# Patient Record
Sex: MALE | URBAN - METROPOLITAN AREA
[De-identification: names, ages, dates, MRNs, and addresses within clinical notes are randomized per-mention and may not be internally consistent; named-entity substitution may affect disease eponyms.]

---

## 2016-03-24 RX ORDER — DOLUTEGRAVIR SODIUM 25 MG/1
2 TABLET, FILM COATED ORAL
Qty: 0 | Refills: 0 | COMMUNITY
Start: 2016-03-24

## 2016-03-24 RX ORDER — LABETALOL HCL 100 MG
2 TABLET ORAL
Qty: 90 | Refills: 1 | COMMUNITY
Start: 2016-03-24 | End: 2016-05-22

## 2017-07-19 ENCOUNTER — INPATIENT (INPATIENT)
Facility: HOSPITAL | Age: 27
LOS: 3 days | Discharge: HOME HEALTH | DRG: 100 | End: 2017-07-23
Attending: HOSPITALIST | Admitting: HOSPITALIST
Payer: COMMERCIAL

## 2017-07-19 VITALS
SYSTOLIC BLOOD PRESSURE: 154 MMHG | TEMPERATURE: 99 F | HEIGHT: 69 IN | WEIGHT: 134.48 LBS | RESPIRATION RATE: 16 BRPM | DIASTOLIC BLOOD PRESSURE: 100 MMHG | OXYGEN SATURATION: 100 % | HEART RATE: 104 BPM

## 2017-07-19 DIAGNOSIS — I77.0 ARTERIOVENOUS FISTULA, ACQUIRED: Chronic | ICD-10-CM

## 2017-07-19 DIAGNOSIS — G40.909 EPILEPSY, UNSPECIFIED, NOT INTRACTABLE, WITHOUT STATUS EPILEPTICUS: ICD-10-CM

## 2017-07-19 LAB
ALBUMIN SERPL ELPH-MCNC: 4.2 G/DL — SIGNIFICANT CHANGE UP (ref 3.3–5.2)
ALP SERPL-CCNC: 625 U/L — HIGH (ref 40–120)
ALT FLD-CCNC: 11 U/L — SIGNIFICANT CHANGE UP
ANION GAP SERPL CALC-SCNC: 20 MMOL/L — HIGH (ref 5–17)
ANION GAP SERPL CALC-SCNC: 30 MMOL/L — HIGH (ref 5–17)
APTT BLD: 31.7 SEC — SIGNIFICANT CHANGE UP (ref 27.5–37.4)
AST SERPL-CCNC: 39 U/L — SIGNIFICANT CHANGE UP
BASOPHILS # BLD AUTO: 0 K/UL — SIGNIFICANT CHANGE UP (ref 0–0.2)
BASOPHILS NFR BLD AUTO: 0.2 % — SIGNIFICANT CHANGE UP (ref 0–2)
BILIRUB SERPL-MCNC: 0.5 MG/DL — SIGNIFICANT CHANGE UP (ref 0.4–2)
BLD GP AB SCN SERPL QL: SIGNIFICANT CHANGE UP
BUN SERPL-MCNC: 14 MG/DL — SIGNIFICANT CHANGE UP (ref 8–20)
BUN SERPL-MCNC: 16 MG/DL — SIGNIFICANT CHANGE UP (ref 8–20)
CALCIUM SERPL-MCNC: 8.5 MG/DL — LOW (ref 8.6–10.2)
CALCIUM SERPL-MCNC: 9 MG/DL — SIGNIFICANT CHANGE UP (ref 8.6–10.2)
CHLORIDE SERPL-SCNC: 93 MMOL/L — LOW (ref 98–107)
CHLORIDE SERPL-SCNC: 94 MMOL/L — LOW (ref 98–107)
CK MB CFR SERPL CALC: 2.6 NG/ML — SIGNIFICANT CHANGE UP (ref 0–6.7)
CK SERPL-CCNC: 555 U/L — HIGH (ref 30–200)
CO2 SERPL-SCNC: 17 MMOL/L — LOW (ref 22–29)
CO2 SERPL-SCNC: 25 MMOL/L — SIGNIFICANT CHANGE UP (ref 22–29)
CREAT SERPL-MCNC: 5.82 MG/DL — HIGH (ref 0.5–1.3)
CREAT SERPL-MCNC: 6.64 MG/DL — HIGH (ref 0.5–1.3)
EOSINOPHIL # BLD AUTO: 0 K/UL — SIGNIFICANT CHANGE UP (ref 0–0.5)
EOSINOPHIL NFR BLD AUTO: 0.3 % — SIGNIFICANT CHANGE UP (ref 0–5)
GLUCOSE SERPL-MCNC: 119 MG/DL — HIGH (ref 70–115)
GLUCOSE SERPL-MCNC: 124 MG/DL — HIGH (ref 70–115)
HCT VFR BLD CALC: 29.9 % — LOW (ref 42–52)
HGB BLD-MCNC: 9.3 G/DL — LOW (ref 14–18)
INR BLD: 1.03 RATIO — SIGNIFICANT CHANGE UP (ref 0.88–1.16)
LYMPHOCYTES # BLD AUTO: 1.7 K/UL — SIGNIFICANT CHANGE UP (ref 1–4.8)
LYMPHOCYTES # BLD AUTO: 25.5 % — SIGNIFICANT CHANGE UP (ref 20–55)
MCHC RBC-ENTMCNC: 28.6 PG — SIGNIFICANT CHANGE UP (ref 27–31)
MCHC RBC-ENTMCNC: 31.1 G/DL — LOW (ref 32–36)
MCV RBC AUTO: 92 FL — SIGNIFICANT CHANGE UP (ref 80–94)
MONOCYTES # BLD AUTO: 0.6 K/UL — SIGNIFICANT CHANGE UP (ref 0–0.8)
MONOCYTES NFR BLD AUTO: 8.7 % — SIGNIFICANT CHANGE UP (ref 3–10)
NEUTROPHILS # BLD AUTO: 4.2 K/UL — SIGNIFICANT CHANGE UP (ref 1.8–8)
NEUTROPHILS NFR BLD AUTO: 65 % — SIGNIFICANT CHANGE UP (ref 37–73)
PLATELET # BLD AUTO: 177 K/UL — SIGNIFICANT CHANGE UP (ref 150–400)
POTASSIUM SERPL-MCNC: 4.1 MMOL/L — SIGNIFICANT CHANGE UP (ref 3.5–5.3)
POTASSIUM SERPL-MCNC: 5.5 MMOL/L — HIGH (ref 3.5–5.3)
POTASSIUM SERPL-SCNC: 4.1 MMOL/L — SIGNIFICANT CHANGE UP (ref 3.5–5.3)
POTASSIUM SERPL-SCNC: 5.5 MMOL/L — HIGH (ref 3.5–5.3)
PROT SERPL-MCNC: 7.3 G/DL — SIGNIFICANT CHANGE UP (ref 6.6–8.7)
PROTHROM AB SERPL-ACNC: 11.3 SEC — SIGNIFICANT CHANGE UP (ref 9.8–12.7)
RBC # BLD: 3.25 M/UL — LOW (ref 4.6–6.2)
RBC # FLD: 17.2 % — HIGH (ref 11–15.6)
SODIUM SERPL-SCNC: 139 MMOL/L — SIGNIFICANT CHANGE UP (ref 135–145)
SODIUM SERPL-SCNC: 140 MMOL/L — SIGNIFICANT CHANGE UP (ref 135–145)
WBC # BLD: 6.5 K/UL — SIGNIFICANT CHANGE UP (ref 4.8–10.8)
WBC # FLD AUTO: 6.5 K/UL — SIGNIFICANT CHANGE UP (ref 4.8–10.8)

## 2017-07-19 PROCEDURE — 99291 CRITICAL CARE FIRST HOUR: CPT

## 2017-07-19 PROCEDURE — 93010 ELECTROCARDIOGRAM REPORT: CPT

## 2017-07-19 PROCEDURE — 73080 X-RAY EXAM OF ELBOW: CPT | Mod: 26,RT

## 2017-07-19 RX ORDER — LISINOPRIL 2.5 MG/1
20 TABLET ORAL DAILY
Qty: 0 | Refills: 0 | Status: DISCONTINUED | OUTPATIENT
Start: 2017-07-19 | End: 2017-07-23

## 2017-07-19 RX ORDER — GABAPENTIN 400 MG/1
100 CAPSULE ORAL AT BEDTIME
Qty: 0 | Refills: 0 | Status: DISCONTINUED | OUTPATIENT
Start: 2017-07-19 | End: 2017-07-23

## 2017-07-19 RX ORDER — LABETALOL HCL 100 MG
100 TABLET ORAL
Qty: 0 | Refills: 0 | Status: DISCONTINUED | OUTPATIENT
Start: 2017-07-19 | End: 2017-07-23

## 2017-07-19 RX ORDER — ACETAMINOPHEN 500 MG
650 TABLET ORAL EVERY 6 HOURS
Qty: 0 | Refills: 0 | Status: DISCONTINUED | OUTPATIENT
Start: 2017-07-19 | End: 2017-07-23

## 2017-07-19 RX ORDER — LEVETIRACETAM 250 MG/1
500 TABLET, FILM COATED ORAL
Qty: 0 | Refills: 0 | Status: DISCONTINUED | OUTPATIENT
Start: 2017-07-19 | End: 2017-07-20

## 2017-07-19 RX ORDER — LEVETIRACETAM 250 MG/1
500 TABLET, FILM COATED ORAL ONCE
Qty: 0 | Refills: 0 | Status: COMPLETED | OUTPATIENT
Start: 2017-07-19 | End: 2017-07-19

## 2017-07-19 RX ORDER — MORPHINE SULFATE 50 MG/1
2 CAPSULE, EXTENDED RELEASE ORAL ONCE
Qty: 0 | Refills: 0 | Status: DISCONTINUED | OUTPATIENT
Start: 2017-07-19 | End: 2017-07-19

## 2017-07-19 RX ORDER — AMLODIPINE BESYLATE 2.5 MG/1
10 TABLET ORAL DAILY
Qty: 0 | Refills: 0 | Status: DISCONTINUED | OUTPATIENT
Start: 2017-07-19 | End: 2017-07-23

## 2017-07-19 RX ORDER — MAGNESIUM SULFATE 500 MG/ML
2 VIAL (ML) INJECTION ONCE
Qty: 0 | Refills: 0 | Status: COMPLETED | OUTPATIENT
Start: 2017-07-19 | End: 2017-07-19

## 2017-07-19 RX ADMIN — Medication 100 MILLIGRAM(S): at 22:48

## 2017-07-19 RX ADMIN — Medication 2 MILLIGRAM(S): at 13:30

## 2017-07-19 RX ADMIN — Medication 2 MILLIGRAM(S): at 13:27

## 2017-07-19 RX ADMIN — Medication 50 GRAM(S): at 19:17

## 2017-07-19 RX ADMIN — LEVETIRACETAM 420 MILLIGRAM(S): 250 TABLET, FILM COATED ORAL at 13:36

## 2017-07-19 NOTE — ED PROVIDER NOTE - CARE PLAN
Principal Discharge DX:	Seizure disorder Principal Discharge DX:	Seizure disorder  Secondary Diagnosis:	ESRD (end stage renal disease) on dialysis  Secondary Diagnosis:	QT prolongation

## 2017-07-19 NOTE — ED PROVIDER NOTE - OBJECTIVE STATEMENT
26 year-old male pmh ESRD on HD (M,W,F), seizure-disorder, congenital HIV, cardiomyopathy, HTN, anxiety, chronic back pain, sent to ED from dialysis center after seizure-like activity. He had just finished his dialysis session and was standing up, when he reports that he felt light-headed and fell to the ground. He does not remember hitting the ground, but states that when he awoke, both knees were hurting. He was observed shaking, but had no loss of bladder or bowel control.. denies fever. denies HA or neck pain. no chest pain or sob. no abd pain. no n/v/d. no urinary f/u/d. no back pain. no motor or sensory deficits. no rash. no other acute issues symptoms or concerns

## 2017-07-19 NOTE — ED PROVIDER NOTE - CRITICAL CARE PROVIDED
documentation/consultation with other physicians/direct patient care (not related to procedure)/additional history taking/interpretation of diagnostic studies

## 2017-07-19 NOTE — ED PROVIDER NOTE - MEDICAL DECISION MAKING DETAILS
high risk dseizure question med complaince did have wirtnessed tonic clonic seizure at bedside responded to iv ativan will admit for im and nueor management need for multiple bedside reassessments 2/2 seizure and iv anti sz medications pt agrees to plan of care

## 2017-07-19 NOTE — ED PROVIDER NOTE - PHYSICAL EXAMINATION
neuro: CN II - XII intact, EOMI, PERRL, no papilledema, 5/5 muscle strength x 4 extremities, no sensory deficits, 2+ dtr globally, negative babinski, no ataxic gait, normal JIN and FNT, normal romberg

## 2017-07-19 NOTE — ED ADULT NURSE NOTE - OBJECTIVE STATEMENT
pt received sitting up in stretcher yelling for pain meds, biba from dialysis after treatment ( 5 minutes early due to seizure activity per pt ) + demanding and agitated at times, a+ox3,  states that he cant move any of his extemities and feels paralized, " I cant lift myself, I cant pull down my pants I need help,  ( pt noted to be moving all over his stretcher turning left and right side at times. states he doesn't feel good and needs more pain medsr eports he took all his meds this morning( poor historian, prior hx of noncompliance with follow up care)

## 2017-07-19 NOTE — ED ADULT NURSE NOTE - PMH
Anxiety    Cardiomyopathy    Depression    HIV disease  born HIV+  HTN (hypertension)    Pericarditis    Renal failure (ARF), acute on chronic  dialysis av fistula, RUE  Seizure

## 2017-07-20 ENCOUNTER — TRANSCRIPTION ENCOUNTER (OUTPATIENT)
Age: 27
End: 2017-07-20

## 2017-07-20 DIAGNOSIS — I42.9 CARDIOMYOPATHY, UNSPECIFIED: ICD-10-CM

## 2017-07-20 DIAGNOSIS — R94.31 ABNORMAL ELECTROCARDIOGRAM [ECG] [EKG]: ICD-10-CM

## 2017-07-20 DIAGNOSIS — B20 HUMAN IMMUNODEFICIENCY VIRUS [HIV] DISEASE: ICD-10-CM

## 2017-07-20 DIAGNOSIS — R56.9 UNSPECIFIED CONVULSIONS: ICD-10-CM

## 2017-07-20 DIAGNOSIS — I10 ESSENTIAL (PRIMARY) HYPERTENSION: ICD-10-CM

## 2017-07-20 DIAGNOSIS — N18.6 END STAGE RENAL DISEASE: ICD-10-CM

## 2017-07-20 DIAGNOSIS — Z29.9 ENCOUNTER FOR PROPHYLACTIC MEASURES, UNSPECIFIED: ICD-10-CM

## 2017-07-20 DIAGNOSIS — F41.9 ANXIETY DISORDER, UNSPECIFIED: ICD-10-CM

## 2017-07-20 DIAGNOSIS — G40.909 EPILEPSY, UNSPECIFIED, NOT INTRACTABLE, WITHOUT STATUS EPILEPTICUS: ICD-10-CM

## 2017-07-20 DIAGNOSIS — G89.29 OTHER CHRONIC PAIN: ICD-10-CM

## 2017-07-20 LAB
ABO RH CONFIRMATION: SIGNIFICANT CHANGE UP
ANION GAP SERPL CALC-SCNC: 18 MMOL/L — HIGH (ref 5–17)
BUN SERPL-MCNC: 25 MG/DL — HIGH (ref 8–20)
CALCIUM SERPL-MCNC: 8.1 MG/DL — LOW (ref 8.6–10.2)
CHLORIDE SERPL-SCNC: 91 MMOL/L — LOW (ref 98–107)
CO2 SERPL-SCNC: 24 MMOL/L — SIGNIFICANT CHANGE UP (ref 22–29)
CREAT SERPL-MCNC: 9.67 MG/DL — HIGH (ref 0.5–1.3)
GLUCOSE SERPL-MCNC: 105 MG/DL — SIGNIFICANT CHANGE UP (ref 70–115)
POTASSIUM SERPL-MCNC: 4.8 MMOL/L — SIGNIFICANT CHANGE UP (ref 3.5–5.3)
POTASSIUM SERPL-SCNC: 4.8 MMOL/L — SIGNIFICANT CHANGE UP (ref 3.5–5.3)
SODIUM SERPL-SCNC: 133 MMOL/L — LOW (ref 135–145)

## 2017-07-20 PROCEDURE — 70450 CT HEAD/BRAIN W/O DYE: CPT | Mod: 26

## 2017-07-20 RX ORDER — SEVELAMER CARBONATE 2400 MG/1
1 POWDER, FOR SUSPENSION ORAL
Qty: 90 | Refills: 0 | OUTPATIENT
Start: 2017-07-20 | End: 2017-08-19

## 2017-07-20 RX ORDER — DARUNAVIR 75 MG/1
1 TABLET, FILM COATED ORAL
Qty: 0 | Refills: 0 | COMMUNITY

## 2017-07-20 RX ORDER — SEVELAMER CARBONATE 2400 MG/1
800 POWDER, FOR SUSPENSION ORAL
Qty: 0 | Refills: 0 | Status: DISCONTINUED | OUTPATIENT
Start: 2017-07-20 | End: 2017-07-23

## 2017-07-20 RX ORDER — LABETALOL HCL 100 MG
1 TABLET ORAL
Qty: 60 | Refills: 0 | OUTPATIENT
Start: 2017-07-20 | End: 2017-08-04

## 2017-07-20 RX ORDER — GABAPENTIN 400 MG/1
1 CAPSULE ORAL
Qty: 30 | Refills: 0 | OUTPATIENT
Start: 2017-07-20 | End: 2017-08-19

## 2017-07-20 RX ORDER — CALCIUM ACETATE 667 MG
3 TABLET ORAL
Qty: 90 | Refills: 0 | OUTPATIENT
Start: 2017-07-20 | End: 2017-07-30

## 2017-07-20 RX ORDER — PANTOPRAZOLE SODIUM 20 MG/1
40 TABLET, DELAYED RELEASE ORAL
Qty: 0 | Refills: 0 | Status: DISCONTINUED | OUTPATIENT
Start: 2017-07-20 | End: 2017-07-23

## 2017-07-20 RX ORDER — ALPRAZOLAM 0.25 MG
2 TABLET ORAL
Qty: 0 | Refills: 0 | Status: DISCONTINUED | OUTPATIENT
Start: 2017-07-20 | End: 2017-07-23

## 2017-07-20 RX ORDER — DOLUTEGRAVIR SODIUM 25 MG/1
1 TABLET, FILM COATED ORAL
Qty: 30 | Refills: 0 | OUTPATIENT
Start: 2017-07-20 | End: 2017-08-19

## 2017-07-20 RX ORDER — LEVETIRACETAM 250 MG/1
750 TABLET, FILM COATED ORAL
Qty: 0 | Refills: 0 | Status: DISCONTINUED | OUTPATIENT
Start: 2017-07-20 | End: 2017-07-23

## 2017-07-20 RX ORDER — OXYCODONE AND ACETAMINOPHEN 5; 325 MG/1; MG/1
2 TABLET ORAL EVERY 6 HOURS
Qty: 0 | Refills: 0 | Status: DISCONTINUED | OUTPATIENT
Start: 2017-07-20 | End: 2017-07-21

## 2017-07-20 RX ORDER — LEVETIRACETAM 250 MG/1
1 TABLET, FILM COATED ORAL
Qty: 60 | Refills: 0 | OUTPATIENT
Start: 2017-07-20 | End: 2017-08-19

## 2017-07-20 RX ORDER — EMTRICITABINE 200 MG/1
200 CAPSULE ORAL
Qty: 0 | Refills: 0 | Status: DISCONTINUED | OUTPATIENT
Start: 2017-07-20 | End: 2017-07-22

## 2017-07-20 RX ORDER — GABAPENTIN 400 MG/1
1 CAPSULE ORAL
Qty: 0 | Refills: 0 | COMMUNITY

## 2017-07-20 RX ORDER — LABETALOL HCL 100 MG
1 TABLET ORAL
Qty: 0 | Refills: 0 | COMMUNITY

## 2017-07-20 RX ORDER — DOLUTEGRAVIR SODIUM 25 MG/1
1 TABLET, FILM COATED ORAL
Qty: 0 | Refills: 0 | COMMUNITY

## 2017-07-20 RX ORDER — DARUNAVIR 75 MG/1
800 TABLET, FILM COATED ORAL DAILY
Qty: 0 | Refills: 0 | Status: DISCONTINUED | OUTPATIENT
Start: 2017-07-20 | End: 2017-07-23

## 2017-07-20 RX ORDER — AMLODIPINE BESYLATE 2.5 MG/1
1 TABLET ORAL
Qty: 0 | Refills: 0 | COMMUNITY

## 2017-07-20 RX ORDER — DARUNAVIR 75 MG/1
1 TABLET, FILM COATED ORAL
Qty: 30 | Refills: 0 | OUTPATIENT
Start: 2017-07-20 | End: 2017-08-19

## 2017-07-20 RX ORDER — DOLUTEGRAVIR SODIUM 25 MG/1
2 TABLET, FILM COATED ORAL
Qty: 60 | Refills: 0 | OUTPATIENT
Start: 2017-07-20 | End: 2017-08-19

## 2017-07-20 RX ORDER — CALCIUM ACETATE 667 MG
3 TABLET ORAL
Qty: 0 | Refills: 0 | COMMUNITY

## 2017-07-20 RX ORDER — PANTOPRAZOLE SODIUM 20 MG/1
1 TABLET, DELAYED RELEASE ORAL
Qty: 30 | Refills: 0 | OUTPATIENT
Start: 2017-07-20 | End: 2017-08-19

## 2017-07-20 RX ORDER — RITONAVIR 100 MG/1
1 TABLET, FILM COATED ORAL
Qty: 0 | Refills: 0 | COMMUNITY

## 2017-07-20 RX ORDER — LISINOPRIL 2.5 MG/1
1 TABLET ORAL
Qty: 15 | Refills: 0 | OUTPATIENT
Start: 2017-07-20 | End: 2017-08-04

## 2017-07-20 RX ORDER — CALCIUM ACETATE 667 MG
2001 TABLET ORAL
Qty: 0 | Refills: 0 | Status: DISCONTINUED | OUTPATIENT
Start: 2017-07-20 | End: 2017-07-23

## 2017-07-20 RX ORDER — EMTRICITABINE 200 MG/1
200 CAPSULE ORAL DAILY
Qty: 0 | Refills: 0 | Status: DISCONTINUED | OUTPATIENT
Start: 2017-07-20 | End: 2017-07-20

## 2017-07-20 RX ORDER — RITONAVIR 100 MG/1
1 TABLET, FILM COATED ORAL
Qty: 30 | Refills: 0 | OUTPATIENT
Start: 2017-07-20 | End: 2017-08-19

## 2017-07-20 RX ORDER — AMLODIPINE BESYLATE 2.5 MG/1
1 TABLET ORAL
Qty: 30 | Refills: 0 | OUTPATIENT
Start: 2017-07-20 | End: 2017-08-19

## 2017-07-20 RX ORDER — SEVELAMER CARBONATE 2400 MG/1
1 POWDER, FOR SUSPENSION ORAL
Qty: 0 | Refills: 0 | COMMUNITY

## 2017-07-20 RX ADMIN — OXYCODONE AND ACETAMINOPHEN 2 TABLET(S): 5; 325 TABLET ORAL at 09:00

## 2017-07-20 RX ADMIN — Medication 100 MILLIGRAM(S): at 05:18

## 2017-07-20 RX ADMIN — Medication 650 MILLIGRAM(S): at 06:21

## 2017-07-20 RX ADMIN — Medication 2001 MILLIGRAM(S): at 08:39

## 2017-07-20 RX ADMIN — Medication 0.5 MILLIGRAM(S): at 13:45

## 2017-07-20 RX ADMIN — Medication 2 MILLIGRAM(S): at 08:39

## 2017-07-20 RX ADMIN — OXYCODONE AND ACETAMINOPHEN 2 TABLET(S): 5; 325 TABLET ORAL at 08:39

## 2017-07-20 RX ADMIN — OXYCODONE AND ACETAMINOPHEN 2 TABLET(S): 5; 325 TABLET ORAL at 02:17

## 2017-07-20 RX ADMIN — Medication 2 MILLIGRAM(S): at 02:17

## 2017-07-20 RX ADMIN — SEVELAMER CARBONATE 800 MILLIGRAM(S): 2400 POWDER, FOR SUSPENSION ORAL at 08:39

## 2017-07-20 RX ADMIN — OXYCODONE AND ACETAMINOPHEN 2 TABLET(S): 5; 325 TABLET ORAL at 22:07

## 2017-07-20 RX ADMIN — LEVETIRACETAM 500 MILLIGRAM(S): 250 TABLET, FILM COATED ORAL at 05:19

## 2017-07-20 RX ADMIN — LEVETIRACETAM 750 MILLIGRAM(S): 250 TABLET, FILM COATED ORAL at 18:18

## 2017-07-20 RX ADMIN — OXYCODONE AND ACETAMINOPHEN 2 TABLET(S): 5; 325 TABLET ORAL at 15:36

## 2017-07-20 RX ADMIN — LISINOPRIL 20 MILLIGRAM(S): 2.5 TABLET ORAL at 05:17

## 2017-07-20 RX ADMIN — AMLODIPINE BESYLATE 10 MILLIGRAM(S): 2.5 TABLET ORAL at 05:17

## 2017-07-20 RX ADMIN — OXYCODONE AND ACETAMINOPHEN 2 TABLET(S): 5; 325 TABLET ORAL at 23:00

## 2017-07-20 RX ADMIN — EMTRICITABINE 200 MILLIGRAM(S): 200 CAPSULE ORAL at 08:46

## 2017-07-20 RX ADMIN — Medication 100 MILLIGRAM(S): at 18:18

## 2017-07-20 RX ADMIN — PANTOPRAZOLE SODIUM 40 MILLIGRAM(S): 20 TABLET, DELAYED RELEASE ORAL at 06:21

## 2017-07-20 RX ADMIN — OXYCODONE AND ACETAMINOPHEN 2 TABLET(S): 5; 325 TABLET ORAL at 16:11

## 2017-07-20 RX ADMIN — OXYCODONE AND ACETAMINOPHEN 2 TABLET(S): 5; 325 TABLET ORAL at 04:53

## 2017-07-20 RX ADMIN — Medication 100 MILLIGRAM(S): at 15:36

## 2017-07-20 RX ADMIN — Medication 650 MILLIGRAM(S): at 07:55

## 2017-07-20 RX ADMIN — Medication 650 MILLIGRAM(S): at 01:18

## 2017-07-20 RX ADMIN — SEVELAMER CARBONATE 800 MILLIGRAM(S): 2400 POWDER, FOR SUSPENSION ORAL at 18:19

## 2017-07-20 RX ADMIN — Medication 0.5 MILLIGRAM(S): at 13:32

## 2017-07-20 RX ADMIN — Medication 650 MILLIGRAM(S): at 00:18

## 2017-07-20 RX ADMIN — GABAPENTIN 100 MILLIGRAM(S): 400 CAPSULE ORAL at 22:07

## 2017-07-20 NOTE — H&P ADULT - NSHPSOCIALHISTORY_GEN_ALL_CORE
Lives at home with mother. Has a visiting nurse who arranges his medications weekly. Denies alcohol, tobacco, or drug use.

## 2017-07-20 NOTE — H&P ADULT - PROBLEM SELECTOR PLAN 1
- admit to medicine resident service under Dr. Bertrand  - telemetry +   - patient received loading dose of IV keppra in ED, before keppra levels could be drawn  - c/w home dose keppra 500 mg PO BID  - TTE

## 2017-07-20 NOTE — DISCHARGE NOTE ADULT - PLAN OF CARE
Stable. Please continue all medications as prescribed, and follow up with your PMD in 1-2 days after discharge.Outpt eegs and old record comparison with Dr. Jiménez as outpatient. Please follow up with neurology (Dr. Jiménez) as outpatient. stable Please continue all medications as prescribed. Follow up with PMD in 1-2 days. Please continue dialysis as directed. Follow up with renal doctor within 1-2 days of discharge and continue and keep dialysis appointments as directed. Continue a renal diet as above. Please continue all medications as prescribed, and follow up with your PMD in 1-2 days . Continue all medications as prescribed, and follow up with PMD in 1-2 days. Continue medications as prescribed. Follow up with your PMD in 1-2 days and get a repeat EKG as outpatient. Please continue all medications as prescribed, and follow up with your PMD in 1-2 days after discharge. Outpt eegs and old record comparison with Dr. Jiménez as outpatient. Please follow up with neurology (Dr. Jiménez) as outpatient.

## 2017-07-20 NOTE — DISCHARGE NOTE ADULT - OTHER SIGNIFICANT FINDINGS
9.3    6.5   )-----------( 177      ( 19 Jul 2017 12:52 )             29.9   07-20    133<L>  |  91<L>  |  25.0<H>  ----------------------------<  105  4.8   |  24.0  |  9.67<H>    Ca    8.1<L>      20 Jul 2017 09:46  Phos  5.2     07-19  Mg     2.7     07-19    TPro  7.3  /  Alb  4.2  /  TBili  0.5  /  DBili  x   /  AST  39  /  ALT  11  /  AlkPhos  625<H>  07-19    < from: CT Head No Cont (07.20.17 @ 00:04) >    Impression:     No acute intracranial hemorrhage, large cortical infarct or mass effect,   given the extent of artifact. Additional findings as described. If   clinically indicated, a short-term follow-up or an MRI may be obtained   for further evaluation.     < end of copied text >

## 2017-07-20 NOTE — DISCHARGE NOTE ADULT - INSTRUCTIONS
Continue a renal diet  Please follow a renal diet, be careful to monitor intake of potassium, sodium, phosphorus and fluids.  Follow up with nephrologist in 1-2 days after discharge and attend appointments as directed.

## 2017-07-20 NOTE — DISCHARGE NOTE ADULT - MEDICATION SUMMARY - MEDICATIONS TO CHANGE
I will SWITCH the dose or number of times a day I take the medications listed below when I get home from the hospital:    levETIRAcetam 500 mg oral tablet  -- 1 tab(s) by mouth 2 times a day    labetalol 100 mg oral tablet  -- 1 tab(s) by mouth 2 times a day    dolutegravir 50 mg oral tablet  -- 2 tab(s) by mouth once a day    labetalol 200 mg oral tablet  -- 2 tab(s) by mouth 2 times a day

## 2017-07-20 NOTE — CHART NOTE - NSCHARTNOTEFT_GEN_A_CORE
Confidential Drug Utilization Report  Search Terms: matt claros, 01/25/1955 Search Date: 07/20/2017 04:33:21 PM  The Drug Utilization Report below displays all of the controlled substance prescriptions, if any, that your patient has filled in the last twelve months. The information displayed on this report is compiled from pharmacy submissions to the Department, and accurately reflects the information as submitted by the pharmacies.    This report was requested by: Polly Bertrand | Reference #: [id number]    Others' Prescriptions  Patient Name:	Matt Claros	YOB: 1955  Address:	[address] [place] [address]	Sex:	Male  Rx Written	Rx Dispensed	Drug	Quantity	Days Supply	Prescriber Name  07/14/2017	07/17/2017	hydrocodone-acetaminophen 5-300 mg tablet	50	16	Ernst Haynes MD  06/23/2017	06/27/2017	hydrocodone-acetaminophen 5-300 mg tablet	50	17	Ernst Haynes MD  06/16/2017	06/17/2017	hydrocodone-acetaminophen 7.5-300 mg tablet	22	7	Ernst Haynes MD  06/02/2017	06/02/2017	hydrocodone-acetaminophen 7.5-300 mg tablet	45	15	Ernst Haynes MD  05/12/2017	05/22/2017	hydrocodone-acetaminophen 5-300 mg tablet	50	14	Ernst Haynes MD  05/05/2017	05/08/2017	hydrocodone-acetaminophen 5-300 mg tablet	25	7	Ernst Haynes MD  04/21/2017	04/24/2017	hydrocodone-acetaminophen 5-300 mg tablet	50	16	Ernst Haynes MD  03/31/2017	04/01/2017	hydrocodone-acetaminophen 5-300 mg tablet	75	25	Ernst Haynes MD  03/17/2017	03/19/2017	hydrocodone-acetaminophen 5-300 mg tablet	50	16	Ernst Haynes MD  03/03/2017	03/04/2017	hydrocodone-acetaminophen 5-300 mg tablet	50	17	Ernst Haynes MD  02/10/2017	02/10/2017	hydrocodone-acetaminophen 5-300 mg tablet	75	25	Ernst Haynes MD  01/20/2017	01/21/2017	hydrocodone-acetaminophen 5-300 mg tablet	50	14	Ernst Haynes MD  01/06/2017	01/10/2017	hydrocodone-acetaminophen 5-300 mg tablet	50	14	Ernst Haynes MD  12/23/2016	12/26/2016	hydrocodone-acetaminophen 5-300 mg tablet	50	16	Ernst Haynes MD  12/09/2016	12/11/2016	hydrocodone-acetaminophen 5-300 mg tablet	50	14	Ernst Haynes MD  11/25/2016	11/28/2016	hydrocodone-acetaminophen 5-300 mg tablet	50	16	Ernst Haynes MD  11/11/2016	11/15/2016	hydrocodone-acetaminophen 5-300 mg tablet	50	16	Ernst Haynes MD  10/15/2016	10/15/2016	hydrocodone-acetaminophen 5-300 mg tablet	50	17	Ernst Haynes MD  09/30/2016	09/30/2016	hydrocodone-acetaminophen 5-300 mg tablet	50	16	Ernst Haynes MD  09/16/2016	09/16/2016	hydrocodone-acetaminophen 5-300 mg tablet	44	14	Ernst Haynes MD  09/09/2016	09/10/2016	hydrocodone-acetaminophen 5-300 mg tablet	22	7	Ernst Haynes MD  08/22/2016	08/30/2016	oxycodone-acetaminophen 7.5-325 mg tablet	28	7	Reshma Lam, MS FNP  08/12/2016	08/13/2016	hydrocodone-acetaminophen 5-300 mg tablet	50	16	Ernst Haynes MD  08/12/2016	08/13/2016	lyrica 100 mg capsule	60	30	Ernst Haynes MD  07/29/2016	07/29/2016	lyrica 75 mg capsule	60	30	Ernst Haynes MD  07/29/2016	07/29/2016	hydrocodone-acetaminophen 5-300 mg tablet	50	16	Ernst Haynes MD  Patient Name:	Matt Claros	YOB: 1955  Address:	[address] [place] [address]	Sex:	Male  Rx Written	Rx Dispensed	Drug	Quantity	Days Supply	Prescriber Name  08/18/2016	08/18/2016	hydrocodone-acetaminophen 7.5-325 tab	20	5	Adry Hylton ( MD)  08/18/2016	08/18/2016	lyrica 100 mg capsule	10	5	Adry Hylton MD)  * - Drugs marked with an asterisk are compound drugs. If the compound drug is made up of more than one controlled substance, then each controlled substance will be a separate row in the

## 2017-07-20 NOTE — H&P ADULT - ASSESSMENT
26 year-old male pmh ESRD on HD (M,W,F), seizure-disorder, congenital HIV, cardiomyopathy, HTN, anxiety, chronic back pain, a/w seizure-like activity concerning for seizure vs cardiogenic syncope.

## 2017-07-20 NOTE — CONSULT NOTE ADULT - PROBLEM SELECTOR RECOMMENDATION 9
Increase Keppra 750mg po q12.  MRI Brain without mey with seizure protocol.  Outpt eegs and old record comparison.  DVT prophylaxis.  Maintain seizure precautions.  D/w pt in detail.  Will follow.

## 2017-07-20 NOTE — DISCHARGE NOTE ADULT - MEDICATION SUMMARY - MEDICATIONS TO TAKE
I will START or STAY ON the medications listed below when I get home from the hospital:    lisinopril 20 mg oral tablet  -- 1 tab(s) by mouth once a day  -- Indication: For Hypertension    gabapentin 100 mg oral capsule  -- 1 cap(s) by mouth once a day (at bedtime)  -- Indication: For Antiseizure     levETIRAcetam 750 mg oral tablet  -- 1 tab(s) by mouth 2 times a day  -- Indication: For Antiseizure     ritonavir 100 mg oral tablet  -- 1 tab(s) by mouth once a day  -- Indication: For HIV disease    Prezista 800 mg oral tablet  -- 1 tab(s) by mouth once a day  -- Indication: For HIV disease    dolutegravir 50 mg oral tablet  -- 1 tab(s) by mouth once a day  -- Indication: For HIV disease    ALPRAZolam 2 mg oral tablet  -- 1 tab(s) by mouth 2 times a day; Dr Damian - [phone]  -- Indication: For Anxiety    labetalol 100 mg oral tablet  -- 1 tab(s) by mouth 4 times a day  -- Indication: For Hypertension    amLODIPine 10 mg oral tablet  -- 1 tab(s) by mouth once a day  -- Indication: For Hypertension    calcium acetate 667 mg oral tablet  -- 3 tab(s) by mouth 3 times a day with meals  -- Indication: For ESRD (end stage renal disease) on dialysis    sevelamer hydrochloride 800 mg oral tablet  -- 1 tab(s) by mouth 3 times a day (with meals)  -- Indication: For ESRD (end stage renal disease) on dialysis    pantoprazole 40 mg oral delayed release tablet  -- 1 tab(s) by mouth once a day (before a meal)  -- Indication: For PPI I will START or STAY ON the medications listed below when I get home from the hospital:    acetaminophen-hydrocodone 325 mg-10 mg oral tablet  -- 2 tab(s) by mouth every 6 hours, As needed, Severe Pain (7 - 10)  -- Indication: For Pain    lisinopril 20 mg oral tablet  -- 1 tab(s) by mouth once a day  -- Indication: For Hypertension    gabapentin 100 mg oral capsule  -- 1 cap(s) by mouth once a day (at bedtime)  -- Indication: For Antiseizure     levETIRAcetam 750 mg oral tablet  -- 1 tab(s) by mouth 2 times a day  -- Indication: For Antiseizure     nystatin 100,000 units/mL oral suspension  -- 5 milliliter(s) by mouth every 6 hours  -- Indication: For Antifungal    Prezista 800 mg oral tablet  -- 1 tab(s) by mouth once a day  -- Indication: For HIV disease    dolutegravir 50 mg oral tablet  -- 1 tab(s) by mouth once a day  -- Indication: For HIV disease    ritonavir 100 mg oral tablet  -- 1 tab(s) by mouth once a day  -- Indication: For HIV disease    ALPRAZolam 2 mg oral tablet  -- 1 tab(s) by mouth 2 times a day; Dr Damian - [phone]  -- Indication: For Anxiety    labetalol 100 mg oral tablet  -- 1 tab(s) by mouth 4 times a day  -- Indication: For Hypertension    amLODIPine 10 mg oral tablet  -- 1 tab(s) by mouth once a day  -- Indication: For Hypertension    docusate sodium 100 mg oral capsule  -- 1 cap(s) by mouth 2 times a day  -- Indication: For Constipation    polyethylene glycol 3350 oral powder for reconstitution  -- 17 gram(s) by mouth once a day, As Needed  -- Indication: For Constipation    senna oral tablet  -- 2 tab(s) by mouth once a day (at bedtime), As needed, Constipation  -- Indication: For Constipation    calcium acetate 667 mg oral tablet  -- 3 tab(s) by mouth 3 times a day with meals  -- Indication: For ESRD (end stage renal disease) on dialysis    sevelamer hydrochloride 800 mg oral tablet  -- 1 tab(s) by mouth 3 times a day (with meals)  -- Indication: For ESRD (end stage renal disease) on dialysis    pantoprazole 40 mg oral delayed release tablet  -- 1 tab(s) by mouth once a day (before a meal)  -- Indication: For PPI

## 2017-07-20 NOTE — DISCHARGE NOTE ADULT - HOSPITAL COURSE
Mr. Savage is a 27 yo M with a history of ESRD  (M,W,F), seizure-disorder, congenital HIV, cardiomyopathy, HTN, anxiety, chronic back pain who was sent to ED from dialysis center after seizure-like activity.   In the ED, he received a loading dose of keppra. Medications were reconciled with pharmacy on the day of admission ([facility]) and patient was continued on all appropriate medications.   X rays of the elbows were performed due to fall which showed diffuse soft tissue swelling but no fractures.   A CT head noncontrast was performed which was unremarkable. MRI of the head was performed which was ________.     For seizures, neurology was consulted (Dr. Jiménez) and dose adjustment was made to keppra.   Nephrology was consulted for ESRD and patient was found to be stable with scheduled dialysis for inpatient if patient were to stay inpatient.   While in house, patient was found to be hypertensive and adjustment was made to his BP medications.   For HIV, patient was continued on outpatient medications (pharmacy was called and most recent dosages of medications were reconciled).   Patient's pain was controlled on medications as appropriate, and he was maintained on seizure protocol and fall risk. Mr. Savage is a 25 yo M with a history of ESRD  (M,W,F), seizure-disorder, congenital HIV, cardiomyopathy, HTN, anxiety, chronic back pain who was sent to ED from dialysis center after seizure-like activity.   In the ED, he received a loading dose of keppra. Medications were reconciled with pharmacy on the day of admission ([facility]) and patient was continued on all appropriate medications.   X rays of the elbows were performed due to fall which showed diffuse soft tissue swelling but no fractures.   A CT head noncontrast was performed which was unremarkable. MRI of the head was performed which showed diffuse calvarial thickening and sclerosis with widening of the diploic space, may be related to hemoglobinopathies, blood dyscrasia or chronic anemias, metabolic disorders, anticonvulsant medications may also contribute calvarial thickening.      For seizures, neurology was consulted (Dr. Jiménez) and dose adjustment was made to keppra.   Nephrology was consulted for ESRD and patient was found to be stable with scheduled dialysis for inpatient if patient were to stay inpatient.   While in house, patient was found to be hypertensive and adjustment was made to his BP medications.   For HIV, patient was continued on outpatient medications (pharmacy was called and most recent dosages of medications were reconciled).   Patient's pain was controlled on medications as appropriate, and he was maintained on seizure protocol and fall risk. Mr. Savage is a 25 yo M with a history of ESRD  (M,W,F), seizure-disorder, congenital HIV, cardiomyopathy, HTN, anxiety, chronic back pain who was sent to ED from dialysis center after seizure-like activity.   In the ED, he received a loading dose of keppra. Medications were reconciled with pharmacy on the day of admission ([facility]) and patient was continued on all appropriate medications.   X rays of the elbows were performed due to fall which showed diffuse soft tissue swelling but no fractures.   A CT head noncontrast was performed which was unremarkable. MRI of the head was performed which showed diffuse calvarial thickening and sclerosis with widening of the diploic space, may be related to hemoglobinopathies, blood dyscrasia or chronic anemias, metabolic disorders, anticonvulsant medications may also contribute calvarial thickening.      For seizures, neurology was consulted (Dr. Jiménez) and dose adjustment was made to keppra.   Nephrology was consulted for ESRD and patient was found to be stable with scheduled dialysis while inpatient.     While in house, patient was found to be hypertensive and adjustment was made to his BP medications.   For HIV, patient was continued on outpatient medications (pharmacy was called and most recent dosages of medications were reconciled).   Patient's pain was controlled on medications as appropriate, and he was maintained on pain medications as tolerated.  Patient was maintained on seizure protocol and fall risk.   Social work and case management helped patient to set up home health care services upon discharge. Mr. Savage is a 25 yo M with a history of ESRD  (M,W,F), seizure-disorder, congenital HIV, cardiomyopathy, HTN, anxiety, chronic back pain who was sent to ED from dialysis center after seizure-like activity.   In the ED, he received a loading dose of keppra. Medications were reconciled with pharmacy on the day of admission ([facility]) and patient was continued on all appropriate medications.   X rays of the elbows were performed due to fall which showed diffuse soft tissue swelling but no fractures.   A CT head noncontrast was performed which was unremarkable. MRI of the head was performed which showed diffuse calvarial thickening and sclerosis with widening of the diploic space, may be related to hemoglobinopathies, blood dyscrasia or chronic anemias, metabolic disorders, anticonvulsant medications may also contribute calvarial thickening.      For seizures, neurology was consulted (Dr. Jiménez) and dose adjustment was made to keppra.   Nephrology was consulted for ESRD and patient was found to be stable with scheduled dialysis while inpatient.     While in house, patient was found to be hypertensive and adjustment was made to his BP medications.   For HIV, patient was continued on outpatient medications (pharmacy was called and most recent dosages of medications were reconciled).   Patient has chronic pain and was on multiple medications as an outpatient. During hospital stay he repeated kept on asking for IV Morphine, complaining of pain and swelling in his arms. Bilateral upper extremity dopplers were negative for DVT or arterial thrombus. His fistula is functional, through which he received HD. His medications we reconciled with [facility].  Social work and case management helped patient to set up home health care services upon discharge.     Discharge time - 45 minutes Mr. Savage is a 25 yo M with a history of ESRD  (M,W,F), seizure-disorder, congenital HIV, cardiomyopathy, HTN, anxiety, chronic back pain who was sent to ED from dialysis center after seizure-like activity.   In the ED, he received a loading dose of keppra. Medications were reconciled with pharmacy on the day of admission ([facility]) and patient was continued on all appropriate medications.   X rays of the elbows were performed due to fall which showed diffuse soft tissue swelling but no fractures.   A CT head noncontrast was performed which was unremarkable. MRI of the head was performed which showed diffuse calvarial thickening and sclerosis with widening of the diploic space, may be related to hemoglobinopathies, blood dyscrasia or chronic anemias, metabolic disorders, anticonvulsant medications may also contribute calvarial thickening.      For seizures, neurology was consulted (Dr. Jiménez) and dose adjustment was made to keppra. EEg was recommended by Neurology as an outpatient.    Nephrology was consulted for ESRD and patient was found to be stable with scheduled dialysis while inpatient.     While in house, patient was found to be hypertensive and adjustment was made to his BP medications.     For HIV, patient was continued on outpatient medications (pharmacy was called and most recent dosages of medications were reconciled).     Patient has chronic pain and was on multiple medications as an outpatient. During hospital stay he repeated kept on asking for IV Morphine, complaining of pain and swelling in his arms. Bilateral upper extremity dopplers were negative for DVT or arterial thrombus. His fistula is functional, through which he received HD. His medications we reconciled with [facility].  Social work and case management were involved  to help resume  home health care services upon discharge, which were to be arranged for Monday 07/24/17, however patient signed out AMA because he was not being given IV Morphine.    Discharge time - 45 minutes

## 2017-07-20 NOTE — PROGRESS NOTE ADULT - ATTENDING COMMENTS
Discussed with Neurology.  Keppra increased to 750mg twice daily from 500mg twice daily  Recommend MRI.

## 2017-07-20 NOTE — H&P ADULT - PROBLEM SELECTOR PLAN 7
- patient's xanax dose confirmed with his pharmacy, will continue lower dose here PRN for over sedation. - patient's xanax dose confirmed with his pharmacy, continue xanax 2 mg PO BID

## 2017-07-20 NOTE — DISCHARGE NOTE ADULT - CARE PROVIDER_API CALL
Marino Jiménez), Neurology  [address] [place] [address]  Phone: [phone]  Fax: [phone]    Marcelo Jiménez), Nephrology  [address] [place] [address]  Phone: [phone]  Fax: [phone]

## 2017-07-20 NOTE — PROGRESS NOTE ADULT - PROBLEM SELECTOR PLAN 6
- continue home emtricitabine 200 mg PO qd, darunavir 800 mg PO qd  - patient takes dolutegravir 50 mg, however it is not available here.   - ask patient's mother to bring this medication to the hospital, for dispensing by pharmacy

## 2017-07-20 NOTE — H&P ADULT - NSHPPHYSICALEXAM_GEN_ALL_CORE
VS:  ,  /87,  RR 20,  T 99.2 F,  SpO2 100 on RA  Gen- sleeping, NAD when awaken  HEENT- right eye lateral deviation, conjunctiva clear, no tongue lacerations or bleeding  Neck- supple, non-tender  Resp- CTABL  CV- tachycardic, II/VI systolic murmur, no JVD, cap refill <2 sec  Abd- soft, non-tender, non-distended  Neuro- AAO x 2 (did not know date), CN II-XII intact, motor exam limited by effort, non-focal  Vascular- RUE fistula +thrill, +radial pulse, cap refill <2 sec distally  Ext- +swelling of RUE, +tenderness to patella b/l without edema or skin changes  Psych- anxious

## 2017-07-20 NOTE — H&P ADULT - PROBLEM SELECTOR PLAN 4
- continue home labetalol (unable to verify dose, will start at 100 mg PO QID and titrate as needed)  - continue home amlodipine 10 mg qd

## 2017-07-20 NOTE — CONSULT NOTE ADULT - SUBJECTIVE AND OBJECTIVE BOX
HPI: 25yo RH male with pmh ESRD on HD (M,W,F), seizure-disorder, congenital HIV, cardiomyopathy, HTN, anxiety, chronic back pain, sent to ED from dialysis center after seizure-like activity. He had just finished his dialysis session and was standing up, when he reports that he felt light-headed and fell to the ground. He does not remember hitting the ground, but states that when he awoke, both knees were hurting. He was observed shaking, but had no loss of bladder or bowel control. Pt reports of using his Keppra regularly and has had seizure dx for a while.  His events typically occur post HD.  No recurrent seizure overnight.  Pt has not seen Neurologist as outpt.      PAST MEDICAL & SURGICAL HISTORY:  Seizure  Pericarditis  Anxiety  Depression  Renal failure (ARF), acute on chronic: dialysis av fistula, RUE  HTN (hypertension)  Cardiomyopathy  HIV disease: born HIV+  AV fistula  S/P tonsillectomy    MEDICATIONS  (STANDING):  lisinopril 20 milliGRAM(s) Oral daily  labetalol 100 milliGRAM(s) Oral four times a day  amLODIPine   Tablet 10 milliGRAM(s) Oral daily  gabapentin 100 milliGRAM(s) Oral at bedtime  calcium acetate 2001 milliGRAM(s) Oral three times a day with meals  sevelamer hydrochloride 800 milliGRAM(s) Oral three times a day with meals  pantoprazole    Tablet 40 milliGRAM(s) Oral before breakfast  darunavir 800 milliGRAM(s) Oral daily  emtricitabine 200 milliGRAM(s) Oral every 96 hours  levETIRAcetam 750 milliGRAM(s) Oral two times a day    MEDICATIONS  (PRN):  acetaminophen   Tablet. 650 milliGRAM(s) Oral every 6 hours PRN Moderate Pain (4 - 6)  ALPRAZolam 2 milliGRAM(s) Oral two times a day PRN anxiety  oxyCODONE    5 mG/acetaminophen 325 mG 2 Tablet(s) Oral every 6 hours PRN Severe Pain (7 - 10)    Allergies    vancomycin (Anaphylaxis)    Intolerances        FAMILY HISTORY:  No pertinent family history in first degree relatives          SOCIAL HISTORY:  Denies toxic habits;     REVIEW OF SYSTEMS:    As noted in the HPI.    VITAL SIGNS:  Vital Signs Last 24 Hrs  T(C): 37.3 (20 Jul 2017 04:30), Max: 38.1 (19 Jul 2017 20:23)  T(F): 99.2 (20 Jul 2017 04:30), Max: 100.5 (19 Jul 2017 20:23)  HR: 105 (20 Jul 2017 04:30) (81 - 108)  BP: 151/103 (20 Jul 2017 04:30) (131/87 - 154/100)  BP(mean): --  RR: 20 (20 Jul 2017 04:30) (16 - 20)  SpO2: 98% (20 Jul 2017 04:30) (97% - 100%)    PHYSICAL EXAMINATION:  General: Well-developed, well nourished, in no acute distress.  Eyes: Conjunctiva and sclera clear. Fundoscopic examination was deferred.  Neck: Supple.  Cardiac: +S1 & S2; Regular.  Chest: CTA b/l.    Musculoskeletal: No tenderness on palpation of spine.  No Brudzinski/Kernig's sign.    Neurologic:  - Mental Status:  Alert, awake, oriented to person, place, and time; Speech is fluent with intact naming, repetition, and comprehension  Cranial Nerves II-XII:    II:  Visual acuity is normal for age ; Visual fields are full to confrontation; Pupils are equal, round, and reactive to light.  III, IV, VI:  Extraocular movements are intact without nystagmus.  V:  Facial sensation is intact in the V1-V3 distribution bilaterally.  VII:  Face is symmetric with normal eye closure and smile  VIII:  Hearing is intact and symmetric for age  IX, X:  Uvula is midline and soft palate rises symmetrically  XI:  Head turning and shoulder shrug are intact.  XII:  Tongue protrudes in the midline.  - Motor:  Strength is 5/5 throughout.  There is no pronator drift.  Normal muscle bulk and tone throughout.  - Reflexes:  2+ and symmetric throughout.  - Sensory:  Intact and symmetric to light touch, and joint-position sense.  - Coordination:  No dysmetria/dysdiadochokinesis.   - Gait: Deferred.      LABS:                          9.3    6.5   )-----------( 177      ( 19 Jul 2017 12:52 )             29.9     19 Jul 2017 13:52    139    |  94     |  16.0   ----------------------------<  119    4.1     |  25.0   |  6.64     Ca    8.5        19 Jul 2017 13:52  Phos  5.2       19 Jul 2017 12:52  Mg     2.7       19 Jul 2017 12:52    TPro  7.3    /  Alb  4.2    /  TBili  0.5    /  DBili  x      /  AST  39     /  ALT  11     /  AlkPhos  625    19 Jul 2017 12:52    LIVER FUNCTIONS - ( 19 Jul 2017 12:52 )  Alb: 4.2 g/dL / Pro: 7.3 g/dL / ALK PHOS: 625 U/L / ALT: 11 U/L / AST: 39 U/L / GGT: x           PT/INR - ( 19 Jul 2017 12:57 )   PT: 11.3 sec;   INR: 1.03 ratio         PTT - ( 19 Jul 2017 12:57 )  PTT:31.7 sec      RADIOLOGY & ADDITIONAL STUDIES:        CT Head without contrast 7/20/17 - No acute intracranial hemorrhage, large cortical infarct or mass effect, given the extent of artifact. Additional findings as described. If clinically indicated, a short-term follow-up or an MRI may be obtained for further evaluation.        IMPRESSION:  H/o Seizures with breakthrough event.

## 2017-07-20 NOTE — PROGRESS NOTE ADULT - SUBJECTIVE AND OBJECTIVE BOX
26 year-old male pmh ESRD on HD (M,W,F), seizure-disorder, congenital HIV, cardiomyopathy, HTN, anxiety, chronic back pain, a/w seizure-like activity concerning for seizure vs cardiogenic syncope. No acute overnight events. No seizure activity. Sleeping this AM, lethargic when awoken, no complaints.      Vital Signs Last 24 Hrs  T(C): 37.3 (20 Jul 2017 04:30), Max: 38.1 (19 Jul 2017 20:23)  T(F): 99.2 (20 Jul 2017 04:30), Max: 100.5 (19 Jul 2017 20:23)  HR: 105 (20 Jul 2017 04:30) (81 - 108)  BP: 151/103 (20 Jul 2017 04:30) (131/87 - 154/100)  BP(mean): --  RR: 20 (20 Jul 2017 04:30) (16 - 20)  SpO2: 98% (20 Jul 2017 04:30) (97% - 100%)    Gen- sleeping, lethargic  HEENT- conjunctiva clear  Neck- supple, non-tender  Resp- CTABL  CV- tachycardic, II/VI systolic murmur, no JVD, cap refill <2 sec  Abd- soft, non-tender, non-distended  Neuro- AAO x 2 (did not know date), CN II-XII intact, motor exam limited by effort, non-focal  Vascular- RUE fistula +thrill, +radial pulse, cap refill <2 sec distally  Ext- +swelling of RUE, +tenderness to patella b/l without edema or skin changes  Psych- anxious    MEDICATIONS  (STANDING):  lisinopril 20 milliGRAM(s) Oral daily  labetalol 100 milliGRAM(s) Oral four times a day  levETIRAcetam 500 milliGRAM(s) Oral two times a day  amLODIPine   Tablet 10 milliGRAM(s) Oral daily  gabapentin 100 milliGRAM(s) Oral at bedtime  calcium acetate 2001 milliGRAM(s) Oral three times a day with meals  sevelamer hydrochloride 800 milliGRAM(s) Oral three times a day with meals  pantoprazole    Tablet 40 milliGRAM(s) Oral before breakfast  darunavir 800 milliGRAM(s) Oral daily  emtricitabine 200 milliGRAM(s) Oral every 96 hours    MEDICATIONS  (PRN):  acetaminophen   Tablet. 650 milliGRAM(s) Oral every 6 hours PRN Moderate Pain (4 - 6)  ALPRAZolam 2 milliGRAM(s) Oral two times a day PRN anxiety  oxyCODONE    5 mG/acetaminophen 325 mG 2 Tablet(s) Oral every 6 hours PRN Severe Pain (7 - 10)

## 2017-07-20 NOTE — H&P ADULT - PROBLEM SELECTOR PLAN 6
continue home emtricitabine 200 mg PO qd, dolutegravir 50 mg PO qd, darunavir 800 mg PO qd - continue home emtricitabine 200 mg PO qd, darunavir 800 mg PO qd  - patient takes dolutegravir 50 mg, however it is not available here. Will ask patient's mother to bring this medication to the hospital, for dispensing by pharmacy

## 2017-07-20 NOTE — CHART NOTE - NSCHARTNOTEFT_GEN_A_CORE
Called [facility] for most recent HIV medications,   which are:  Ritonavir 100mg QD  Tivicay 50mg QD  Prezista 800QD  Will reconcile med rec and send patient out on those medications as well    Alex NEGRETE

## 2017-07-20 NOTE — CONSULT NOTE ADULT - SUBJECTIVE AND OBJECTIVE BOX
NEPHROLOGY INTERVAL HPI/OVERNIGHT EVENTS:  HPI:  26 year-old male pmh ESRD on HD (M,W,F), seizure-disorder, congenital HIV, cardiomyopathy, HTN, anxiety, chronic back pain, sent to ED from dialysis center after seizure-like activity after HD.  Nephrology was consulted for ESRD care.  No SOB. Still complaining of pain all over and asking for Morphine IV.      ROS; other than pain rest negative  blind       PAST MEDICAL & SURGICAL HISTORY:  Seizure  Pericarditis  Anxiety  Depression  Renal failure (ARF), acute on chronic: dialysis av fistula, RUE  HTN (hypertension)  Cardiomyopathy  HIV disease: born HIV+  AV fistula  S/P tonsillectomy      MEDICATIONS  (STANDING):  lisinopril 20 milliGRAM(s) Oral daily  labetalol 100 milliGRAM(s) Oral four times a day  amLODIPine   Tablet 10 milliGRAM(s) Oral daily  gabapentin 100 milliGRAM(s) Oral at bedtime  calcium acetate 2001 milliGRAM(s) Oral three times a day with meals  sevelamer hydrochloride 800 milliGRAM(s) Oral three times a day with meals  pantoprazole    Tablet 40 milliGRAM(s) Oral before breakfast  darunavir 800 milliGRAM(s) Oral daily  emtricitabine 200 milliGRAM(s) Oral every 96 hours  levETIRAcetam 750 milliGRAM(s) Oral two times a day    MEDICATIONS  (PRN):  acetaminophen   Tablet. 650 milliGRAM(s) Oral every 6 hours PRN Moderate Pain (4 - 6)  ALPRAZolam 2 milliGRAM(s) Oral two times a day PRN anxiety  oxyCODONE    5 mG/acetaminophen 325 mG 2 Tablet(s) Oral every 6 hours PRN Severe Pain (7 - 10)  LORazepam   Injectable 0.5 milliGRAM(s) IntraMuscular once PRN Agitation      Allergies    vancomycin (Anaphylaxis)    Intolerances        Vital Signs Last 24 Hrs  T(C): 37.3 (20 Jul 2017 04:30), Max: 38.1 (19 Jul 2017 20:23)  T(F): 99.2 (20 Jul 2017 04:30), Max: 100.5 (19 Jul 2017 20:23)  HR: 105 (20 Jul 2017 04:30) (81 - 108)  BP: 151/103 (20 Jul 2017 04:30) (131/87 - 153/109)  BP(mean): --  RR: 20 (20 Jul 2017 04:30) (18 - 20)  SpO2: 98% (20 Jul 2017 04:30) (97% - 100%)  Daily     Daily     PHYSICAL EXAM:    GENERAL: NAD, well-groomed, well-developed  HEAD:  Atraumatic, Normocephalic  EYES: EOMI, PERRLA, conjunctiva and sclera clear  NECK: Supple, No JVD, Normal thyroid  NERVOUS SYSTEM:  Alert & Oriented X3,   blind   CHEST/LUNG: Clear to percussion bilaterally; No rales, rhonchi, wheezing, or rubs  HEART: Regular rate and rhythm; No murmurs, rubs, or gallops  ABDOMEN: Soft, Nontender, Nondistended; Bowel sounds present  EXTREMITIES:  bilateral UE swelling   right UE AVF  SKIN: No rashes or lesions    LABS:                        9.3    6.5   )-----------( 177      ( 19 Jul 2017 12:52 )             29.9     07-20    133<L>  |  91<L>  |  25.0<H>  ----------------------------<  105  4.8   |  24.0  |  9.67<H>    Ca    8.1<L>      20 Jul 2017 09:46  Phos  5.2     07-19  Mg     2.7     07-19    TPro  7.3  /  Alb  4.2  /  TBili  0.5  /  DBili  x   /  AST  39  /  ALT  11  /  AlkPhos  625<H>  07-19    PT/INR - ( 19 Jul 2017 12:57 )   PT: 11.3 sec;   INR: 1.03 ratio         PTT - ( 19 Jul 2017 12:57 )  PTT:31.7 sec            RADIOLOGY & ADDITIONAL TESTS:

## 2017-07-20 NOTE — PATIENT PROFILE ADULT. - NS TRANSFER PATIENT BELONGINGS
Jewelry/Money (specify)/cellphone, cell phone , head phones, jeans, coat, keys, cologne, sandals, shirt/Electronic Device (specify)/Clothing

## 2017-07-20 NOTE — DISCHARGE NOTE ADULT - PATIENT PORTAL LINK FT
“You can access the [facility] Patient Portal, offered by [facility], by registering with the following website: [email or web address]”

## 2017-07-20 NOTE — H&P ADULT - PROBLEM SELECTOR PLAN 2
- unclear eitiology  - avoid QTc prolonging medications - unclear etiology  - avoid QTc prolonging medications  - f/u electrolytes

## 2017-07-20 NOTE — H&P ADULT - PROBLEM SELECTOR PLAN 8
- IMPROVE score 0, chemoprophylaxis for DVT not indicated, encourage ambulation with assistance - continue home percocet 10/325, confirmed by patient's pharmacy

## 2017-07-20 NOTE — DISCHARGE NOTE ADULT - ADDITIONAL INSTRUCTIONS
Continue all diet and activity as tolerated  Continue medications as prescribed  Follow up with PMD in 1-2 days  Follow up with neurologist in 1-2 days after discharge  Follow up with nephrologist in 1-2 days

## 2017-07-20 NOTE — CONSULT NOTE ADULT - ASSESSMENT
ESRD on HD MWF  Seizure  Anemia of CKD  HIV    recommendations:  will arrange HD tomorrow if he remains in hospital.  will follow    thank you for the consult

## 2017-07-20 NOTE — DISCHARGE NOTE ADULT - NS AS ACTIVITY OBS
Do not make important decisions/Walking-Outdoors allowed/Do not drive or operate machinery/No Heavy lifting/straining/Showering allowed/Bathing allowed/Walking-Indoors allowed

## 2017-07-21 LAB
4/8 RATIO: 0.22 RATIO — LOW (ref 0.9–3.6)
ABS CD8: 1321 /UL — HIGH (ref 136–757)
ANION GAP SERPL CALC-SCNC: 21 MMOL/L — HIGH (ref 5–17)
BUN SERPL-MCNC: 44 MG/DL — HIGH (ref 8–20)
CALCIUM SERPL-MCNC: 8.5 MG/DL — LOW (ref 8.6–10.2)
CD16+CD56+ CELLS NFR BLD: 8 % — SIGNIFICANT CHANGE UP (ref 4–25)
CD16+CD56+ CELLS NFR SPEC: 169 /UL — SIGNIFICANT CHANGE UP (ref 64–494)
CD19 BLASTS SPEC-ACNC: 15 % — SIGNIFICANT CHANGE UP (ref 5–22)
CD19 BLASTS SPEC-ACNC: 313 /UL — SIGNIFICANT CHANGE UP (ref 68–528)
CD3 BLASTS SPEC-ACNC: 1597 /UL — SIGNIFICANT CHANGE UP (ref 799–2171)
CD3 BLASTS SPEC-ACNC: 76 % — SIGNIFICANT CHANGE UP (ref 59–85)
CD4 %: 13 % — LOW (ref 36–65)
CD8 %: 62 % — HIGH (ref 11–36)
CHLORIDE SERPL-SCNC: 87 MMOL/L — LOW (ref 98–107)
CO2 SERPL-SCNC: 23 MMOL/L — SIGNIFICANT CHANGE UP (ref 22–29)
CREAT SERPL-MCNC: 11.47 MG/DL — HIGH (ref 0.5–1.3)
GLUCOSE SERPL-MCNC: 92 MG/DL — SIGNIFICANT CHANGE UP (ref 70–115)
POTASSIUM SERPL-MCNC: 6.2 MMOL/L — CRITICAL HIGH (ref 3.5–5.3)
POTASSIUM SERPL-SCNC: 6.2 MMOL/L — CRITICAL HIGH (ref 3.5–5.3)
PREALB SERPL-MCNC: 15 MG/DL — LOW (ref 18–38)
SODIUM SERPL-SCNC: 131 MMOL/L — LOW (ref 135–145)
T-CELL CD4 SUBSET PNL BLD: 286 /UL — LOW (ref 489–1457)

## 2017-07-21 PROCEDURE — 99233 SBSQ HOSP IP/OBS HIGH 50: CPT | Mod: GC

## 2017-07-21 PROCEDURE — 93306 TTE W/DOPPLER COMPLETE: CPT | Mod: 26

## 2017-07-21 RX ORDER — DIPHENHYDRAMINE HCL 50 MG
25 CAPSULE ORAL ONCE
Qty: 0 | Refills: 0 | Status: COMPLETED | OUTPATIENT
Start: 2017-07-21 | End: 2017-07-21

## 2017-07-21 RX ORDER — OXYCODONE AND ACETAMINOPHEN 5; 325 MG/1; MG/1
2 TABLET ORAL EVERY 4 HOURS
Qty: 0 | Refills: 0 | Status: DISCONTINUED | OUTPATIENT
Start: 2017-07-21 | End: 2017-07-22

## 2017-07-21 RX ADMIN — OXYCODONE AND ACETAMINOPHEN 2 TABLET(S): 5; 325 TABLET ORAL at 03:10

## 2017-07-21 RX ADMIN — Medication 100 MILLIGRAM(S): at 23:25

## 2017-07-21 RX ADMIN — PANTOPRAZOLE SODIUM 40 MILLIGRAM(S): 20 TABLET, DELAYED RELEASE ORAL at 05:55

## 2017-07-21 RX ADMIN — Medication 100 MILLIGRAM(S): at 17:14

## 2017-07-21 RX ADMIN — SEVELAMER CARBONATE 800 MILLIGRAM(S): 2400 POWDER, FOR SUSPENSION ORAL at 08:55

## 2017-07-21 RX ADMIN — LEVETIRACETAM 750 MILLIGRAM(S): 250 TABLET, FILM COATED ORAL at 05:55

## 2017-07-21 RX ADMIN — LISINOPRIL 20 MILLIGRAM(S): 2.5 TABLET ORAL at 05:55

## 2017-07-21 RX ADMIN — Medication 25 MILLIGRAM(S): at 18:09

## 2017-07-21 RX ADMIN — GABAPENTIN 100 MILLIGRAM(S): 400 CAPSULE ORAL at 23:26

## 2017-07-21 RX ADMIN — OXYCODONE AND ACETAMINOPHEN 2 TABLET(S): 5; 325 TABLET ORAL at 10:16

## 2017-07-21 RX ADMIN — Medication 2 MILLIGRAM(S): at 09:14

## 2017-07-21 RX ADMIN — Medication 100 MILLIGRAM(S): at 00:23

## 2017-07-21 RX ADMIN — Medication 2 MILLIGRAM(S): at 02:48

## 2017-07-21 RX ADMIN — LEVETIRACETAM 750 MILLIGRAM(S): 250 TABLET, FILM COATED ORAL at 17:14

## 2017-07-21 RX ADMIN — OXYCODONE AND ACETAMINOPHEN 2 TABLET(S): 5; 325 TABLET ORAL at 21:39

## 2017-07-21 RX ADMIN — DARUNAVIR 800 MILLIGRAM(S): 75 TABLET, FILM COATED ORAL at 13:29

## 2017-07-21 RX ADMIN — OXYCODONE AND ACETAMINOPHEN 2 TABLET(S): 5; 325 TABLET ORAL at 11:16

## 2017-07-21 RX ADMIN — OXYCODONE AND ACETAMINOPHEN 2 TABLET(S): 5; 325 TABLET ORAL at 21:00

## 2017-07-21 RX ADMIN — SEVELAMER CARBONATE 800 MILLIGRAM(S): 2400 POWDER, FOR SUSPENSION ORAL at 13:29

## 2017-07-21 RX ADMIN — Medication 100 MILLIGRAM(S): at 13:29

## 2017-07-21 RX ADMIN — AMLODIPINE BESYLATE 10 MILLIGRAM(S): 2.5 TABLET ORAL at 05:55

## 2017-07-21 RX ADMIN — Medication 100 MILLIGRAM(S): at 05:55

## 2017-07-21 NOTE — PROGRESS NOTE ADULT - ASSESSMENT
ESRD on HD MWF  Seizure  Anemia of CKD  HIV    Continue HD as ordered. MWF, HD today  K2 bath  will follow

## 2017-07-21 NOTE — PROGRESS NOTE ADULT - ATTENDING COMMENTS
Awaiting MRI - if negative may discharge home. Awaiting MRI   Still with swelling and pain bilateral upper extremities.  Check bilateral UE dopplers  Increase Percocet to q4 from q6.

## 2017-07-21 NOTE — PROGRESS NOTE ADULT - PROBLEM SELECTOR PLAN 1
On Keppra 750mg po q12.  Await for MRI Brain/MRA brain.  DVT prophylaxis.  D/w pt in detail.  Will follow.

## 2017-07-21 NOTE — PROVIDER CONTACT NOTE (OTHER) - SITUATION
pt reports no relief in pain with percocet. requesting to speak with MD. Refusing MRI/MRA until medicated for pain.

## 2017-07-21 NOTE — PROVIDER CONTACT NOTE (OTHER) - SITUATION
pain reporting pain unrelieved with percocet. requesting dilaudid or morphine for b/l arm and back pain

## 2017-07-21 NOTE — PROGRESS NOTE ADULT - SUBJECTIVE AND OBJECTIVE BOX
INTERVAL HISTORY:  Seen at bedside and no further seizures.  Had refused MRI and now wants it done.    vancomycin (Anaphylaxis)      VITAL SIGNS:  Vital Signs Last 24 Hrs  T(C): 37.4 (21 Jul 2017 09:53), Max: 37.4 (21 Jul 2017 09:53)  T(F): 99.3 (21 Jul 2017 09:53), Max: 99.3 (21 Jul 2017 09:53)  HR: 77 (21 Jul 2017 09:53) (77 - 101)  BP: 152/91 (21 Jul 2017 09:53) (150/105 - 154/89)  BP(mean): --  RR: 20 (21 Jul 2017 09:53) (20 - 22)  SpO2: 97% (21 Jul 2017 09:53) (97% - 100%)    PHYSICAL EXAMINATION:  General: Well-developed, well nourished, in no acute distress.  Eyes: Conjunctiva and sclera clear.  Neurologic:  - Mental Status:  Alert, awake, oriented to person, place, and time; Speech is normal; Affect is normal.  - Cranial Nerves: II-XI intact.  - Motor:  Strength is 5/5 throughout.  There is no pronator drift.  Normal muscle bulk and tone throughout.  - Reflexes:  2+ throughout  - Sensory:  Intact to light touch, pin prick, vibration, and joint-position sense throughout.  - Coordination:  No dysmetria/dysdiadochokinesis.    MEDS:  MEDICATIONS  (STANDING):  lisinopril 20 milliGRAM(s) Oral daily  labetalol 100 milliGRAM(s) Oral four times a day  amLODIPine   Tablet 10 milliGRAM(s) Oral daily  gabapentin 100 milliGRAM(s) Oral at bedtime  calcium acetate 2001 milliGRAM(s) Oral three times a day with meals  sevelamer hydrochloride 800 milliGRAM(s) Oral three times a day with meals  pantoprazole    Tablet 40 milliGRAM(s) Oral before breakfast  darunavir 800 milliGRAM(s) Oral daily  emtricitabine 200 milliGRAM(s) Oral every 96 hours  levETIRAcetam 750 milliGRAM(s) Oral two times a day    MEDICATIONS  (PRN):  acetaminophen   Tablet. 650 milliGRAM(s) Oral every 6 hours PRN Moderate Pain (4 - 6)  ALPRAZolam 2 milliGRAM(s) Oral two times a day PRN anxiety  oxyCODONE    5 mG/acetaminophen 325 mG 2 Tablet(s) Oral every 6 hours PRN Severe Pain (7 - 10)      LABS:                          9.3    6.5   )-----------( 177      ( 19 Jul 2017 12:52 )             29.9     07-21    131<L>  |  87<L>  |  44.0<H>  ----------------------------<  92  6.2<HH>   |  23.0  |  11.47<H>    Ca    8.5<L>      21 Jul 2017 06:55  Phos  5.2     07-19  Mg     2.7     07-19    TPro  7.3  /  Alb  4.2  /  TBili  0.5  /  DBili  x   /  AST  39  /  ALT  11  /  AlkPhos  625<H>  07-19    LIVER FUNCTIONS - ( 19 Jul 2017 12:52 )  Alb: 4.2 g/dL / Pro: 7.3 g/dL / ALK PHOS: 625 U/L / ALT: 11 U/L / AST: 39 U/L / GGT: x                 IMPRESSION:  Seizure dx with HIV.

## 2017-07-21 NOTE — PROGRESS NOTE ADULT - SUBJECTIVE AND OBJECTIVE BOX
26 year-old male pmh ESRD on HD (M,W,F), seizure-disorder, congenital HIV, cardiomyopathy, HTN, anxiety, chronic back pain, a/w seizure-like activity concerning for seizure vs cardiogenic syncope. Actively drug seeking overnight, threatening to leave AMA if he was not given IV morphine or fentanyl. His mother visited in the evening and instructed him to stay. No seizure activity.       Vital Signs Last 24 Hrs  T(C): 37.2 (21 Jul 2017 05:50), Max: 37.2 (20 Jul 2017 09:52)  T(F): 98.9 (21 Jul 2017 05:50), Max: 98.9 (20 Jul 2017 09:52)  HR: 100 (21 Jul 2017 05:50) (97 - 101)  BP: 150/105 (21 Jul 2017 05:50) (150/105 - 155/90)  BP(mean): --  RR: 21 (21 Jul 2017 05:50) (20 - 22)  SpO2: 97% (21 Jul 2017 05:50) (97% - 100%)    Gen- sleeping, lethargic  HEENT- conjunctiva clear  Neck- supple, non-tender  Resp- CTABL  CV- RRR, II/VI systolic murmur, no JVD, cap refill <2 sec  Abd- soft, non-tender, non-distended  Neuro- AAO x 3 (did not know date), CN II-XII intact, motor exam limited by effort, non-focal  Vascular- RUE fistula +thrill, +radial pulse, cap refill <2 sec distally  Ext- +swelling of RUE, +tenderness to patella b/l without edema or skin changes      MEDICATIONS  (STANDING):  lisinopril 20 milliGRAM(s) Oral daily  labetalol 100 milliGRAM(s) Oral four times a day  amLODIPine   Tablet 10 milliGRAM(s) Oral daily  gabapentin 100 milliGRAM(s) Oral at bedtime  calcium acetate 2001 milliGRAM(s) Oral three times a day with meals  sevelamer hydrochloride 800 milliGRAM(s) Oral three times a day with meals  pantoprazole    Tablet 40 milliGRAM(s) Oral before breakfast  darunavir 800 milliGRAM(s) Oral daily  emtricitabine 200 milliGRAM(s) Oral every 96 hours  levETIRAcetam 750 milliGRAM(s) Oral two times a day    MEDICATIONS  (PRN):  acetaminophen   Tablet. 650 milliGRAM(s) Oral every 6 hours PRN Moderate Pain (4 - 6)  ALPRAZolam 2 milliGRAM(s) Oral two times a day PRN anxiety  oxyCODONE    5 mG/acetaminophen 325 mG 2 Tablet(s) Oral every 6 hours PRN Severe Pain (7 - 10)

## 2017-07-21 NOTE — ED ADULT NURSE REASSESSMENT NOTE - NS ED NURSE REASSESS COMMENT FT1
1800 - pt refused to go to ct scan, md aware.
Assumed pt care. Pt requesting pain meds frequently. Wants to changed doctors or have his MD paged to change his orders to more frequent Pt is legally blind and non ambulatory. Awaiting bed assignment.
Bed assigned, report given to 4T RN. Transported to floor.
PT REFUSED CT SCAN UNTIL MEDS RN AWARE
Pt medicated as ordered. Pt constantly calling for nurse and demanding morphine. Dr Chavez was at bedside and advised him that a morphine order will NOT be written. Pt persists in calling for help, and asking to be repositioned when he is mobile enough to self position for comfort. Pt often pulling his gown up and exposing his genitals. RN requesting a male aide to assist this pt. Awaiting bed assignment.
Pt persistently calling RN to bedside to ask for morphine. Pt advised that MD told him that he will not write an order for morphine. Pt states he wants to sign out AMA. Advised pt that Dr Chavez will be paged and made aware.
pt's sister called from [place] ( [phone] [phone])  for update and to mention that pt is very depressed over the death of his best friend last month. since than he has been more noncompliant with his care. info passed along to md.

## 2017-07-21 NOTE — PROGRESS NOTE ADULT - SUBJECTIVE AND OBJECTIVE BOX
NEPHROLOGY INTERVAL HPI/OVERNIGHT EVENTS:  HPI:  26 year-old male pmh ESRD on HD (M,W,F), seizure-disorder, congenital HIV, cardiomyopathy, HTN, anxiety, chronic back pain, sent to ED from dialysis center after seizure-like activity. He had just finished his dialysis session and was standing up, when he reports that he felt light-headed and fell to the ground. He does not remember hitting the ground, but states that when he awoke, both knees were hurting. He was observed shaking, but had no loss of bladder or bowel control. At the time of interview he states that the anterior aspects of both knees hurt, but was unable to further qualify pain. (20 Jul 2017 00:29)    Follow up for ESRD    Resting        PAST MEDICAL & SURGICAL HISTORY:  Seizure  Pericarditis  Anxiety  Depression  Renal failure (ARF), acute on chronic: dialysis av fistula, RUE  HTN (hypertension)  Cardiomyopathy  HIV disease: born HIV+  AV fistula  S/P tonsillectomy      MEDICATIONS  (STANDING):  lisinopril 20 milliGRAM(s) Oral daily  labetalol 100 milliGRAM(s) Oral four times a day  amLODIPine   Tablet 10 milliGRAM(s) Oral daily  gabapentin 100 milliGRAM(s) Oral at bedtime  calcium acetate 2001 milliGRAM(s) Oral three times a day with meals  sevelamer hydrochloride 800 milliGRAM(s) Oral three times a day with meals  pantoprazole    Tablet 40 milliGRAM(s) Oral before breakfast  darunavir 800 milliGRAM(s) Oral daily  emtricitabine 200 milliGRAM(s) Oral every 96 hours  levETIRAcetam 750 milliGRAM(s) Oral two times a day    MEDICATIONS  (PRN):  acetaminophen   Tablet. 650 milliGRAM(s) Oral every 6 hours PRN Moderate Pain (4 - 6)  ALPRAZolam 2 milliGRAM(s) Oral two times a day PRN anxiety  oxyCODONE    5 mG/acetaminophen 325 mG 2 Tablet(s) Oral every 6 hours PRN Severe Pain (7 - 10)      Allergies    vancomycin (Anaphylaxis)    Intolerances        Vital Signs Last 24 Hrs  T(C): 37 (21 Jul 2017 16:19), Max: 37.4 (21 Jul 2017 09:53)  T(F): 98.6 (21 Jul 2017 16:19), Max: 99.3 (21 Jul 2017 09:53)  HR: 96 (21 Jul 2017 13:00) (77 - 101)  BP: 148/80 (21 Jul 2017 13:00) (148/80 - 154/89)  BP(mean): --  RR: 20 (21 Jul 2017 13:00) (20 - 22)  SpO2: 98% (21 Jul 2017 13:00) (97% - 100%)  Daily     Daily     PHYSICAL EXAM:    GENERAL: NAD, well-groomed, well-developed  HEAD:  Atraumatic, Normocephalic  EYES: EOMI, PERRLA, conjunctiva and sclera clear  NECK: Supple, No JVD, Normal thyroid  NERVOUS SYSTEM:  Alert & Oriented X3,   blind   CHEST/LUNG: Clear to percussion bilaterally; No rales, rhonchi, wheezing, or rubs  HEART: Regular rate and rhythm; No murmurs, rubs, or gallops  ABDOMEN: Soft, Nontender, Nondistended; Bowel sounds present  EXTREMITIES:  bilateral UE swelling   right UE AVF  SKIN: No rashes or lesions      LABS:    07-21    131<L>  |  87<L>  |  44.0<H>  ----------------------------<  92  6.2<HH>   |  23.0  |  11.47<H>    Ca    8.5<L>      21 Jul 2017 06:55                  RADIOLOGY & ADDITIONAL TESTS:

## 2017-07-22 LAB
ANION GAP SERPL CALC-SCNC: 17 MMOL/L — SIGNIFICANT CHANGE UP (ref 5–17)
BUN SERPL-MCNC: 33 MG/DL — HIGH (ref 8–20)
CALCIUM SERPL-MCNC: 8.6 MG/DL — SIGNIFICANT CHANGE UP (ref 8.6–10.2)
CHLORIDE SERPL-SCNC: 94 MMOL/L — LOW (ref 98–107)
CO2 SERPL-SCNC: 28 MMOL/L — SIGNIFICANT CHANGE UP (ref 22–29)
CREAT SERPL-MCNC: 8.32 MG/DL — HIGH (ref 0.5–1.3)
GLUCOSE SERPL-MCNC: 93 MG/DL — SIGNIFICANT CHANGE UP (ref 70–115)
HIV-1 VIRAL LOAD RESULT: ABNORMAL
HIV1 RNA # SERPL NAA+PROBE: SIGNIFICANT CHANGE UP
HIV1 RNA SER-IMP: SIGNIFICANT CHANGE UP
HIV1 RNA SERPL NAA+PROBE-ACNC: ABNORMAL
HIV1 RNA SERPL NAA+PROBE-LOG#: 4.69 — SIGNIFICANT CHANGE UP
POTASSIUM SERPL-MCNC: 4.8 MMOL/L — SIGNIFICANT CHANGE UP (ref 3.5–5.3)
POTASSIUM SERPL-SCNC: 4.8 MMOL/L — SIGNIFICANT CHANGE UP (ref 3.5–5.3)
SODIUM SERPL-SCNC: 139 MMOL/L — SIGNIFICANT CHANGE UP (ref 135–145)

## 2017-07-22 PROCEDURE — 70551 MRI BRAIN STEM W/O DYE: CPT | Mod: 26

## 2017-07-22 PROCEDURE — 93971 EXTREMITY STUDY: CPT | Mod: 26,RT

## 2017-07-22 PROCEDURE — 99233 SBSQ HOSP IP/OBS HIGH 50: CPT | Mod: GC

## 2017-07-22 RX ORDER — DOLUTEGRAVIR SODIUM 25 MG/1
50 TABLET, FILM COATED ORAL DAILY
Qty: 0 | Refills: 0 | Status: DISCONTINUED | OUTPATIENT
Start: 2017-07-22 | End: 2017-07-23

## 2017-07-22 RX ORDER — OXYCODONE HYDROCHLORIDE 5 MG/1
15 TABLET ORAL EVERY 12 HOURS
Qty: 0 | Refills: 0 | Status: DISCONTINUED | OUTPATIENT
Start: 2017-07-22 | End: 2017-07-23

## 2017-07-22 RX ORDER — RITONAVIR 100 MG/1
100 TABLET, FILM COATED ORAL DAILY
Qty: 0 | Refills: 0 | Status: DISCONTINUED | OUTPATIENT
Start: 2017-07-22 | End: 2017-07-23

## 2017-07-22 RX ADMIN — RITONAVIR 100 MILLIGRAM(S): 100 TABLET, FILM COATED ORAL at 13:01

## 2017-07-22 RX ADMIN — OXYCODONE AND ACETAMINOPHEN 2 TABLET(S): 5; 325 TABLET ORAL at 13:02

## 2017-07-22 RX ADMIN — Medication 100 MILLIGRAM(S): at 23:22

## 2017-07-22 RX ADMIN — Medication 100 MILLIGRAM(S): at 13:03

## 2017-07-22 RX ADMIN — LISINOPRIL 20 MILLIGRAM(S): 2.5 TABLET ORAL at 06:20

## 2017-07-22 RX ADMIN — LEVETIRACETAM 750 MILLIGRAM(S): 250 TABLET, FILM COATED ORAL at 17:01

## 2017-07-22 RX ADMIN — OXYCODONE HYDROCHLORIDE 15 MILLIGRAM(S): 5 TABLET ORAL at 18:10

## 2017-07-22 RX ADMIN — Medication 100 MILLIGRAM(S): at 06:20

## 2017-07-22 RX ADMIN — DOLUTEGRAVIR SODIUM 50 MILLIGRAM(S): 25 TABLET, FILM COATED ORAL at 17:01

## 2017-07-22 RX ADMIN — Medication 2 MILLIGRAM(S): at 01:19

## 2017-07-22 RX ADMIN — LEVETIRACETAM 750 MILLIGRAM(S): 250 TABLET, FILM COATED ORAL at 06:19

## 2017-07-22 RX ADMIN — AMLODIPINE BESYLATE 10 MILLIGRAM(S): 2.5 TABLET ORAL at 06:20

## 2017-07-22 RX ADMIN — OXYCODONE AND ACETAMINOPHEN 2 TABLET(S): 5; 325 TABLET ORAL at 07:15

## 2017-07-22 RX ADMIN — PANTOPRAZOLE SODIUM 40 MILLIGRAM(S): 20 TABLET, DELAYED RELEASE ORAL at 06:19

## 2017-07-22 RX ADMIN — Medication 2 MILLIGRAM(S): at 10:35

## 2017-07-22 RX ADMIN — SEVELAMER CARBONATE 800 MILLIGRAM(S): 2400 POWDER, FOR SUSPENSION ORAL at 13:01

## 2017-07-22 RX ADMIN — OXYCODONE AND ACETAMINOPHEN 2 TABLET(S): 5; 325 TABLET ORAL at 01:06

## 2017-07-22 RX ADMIN — OXYCODONE AND ACETAMINOPHEN 2 TABLET(S): 5; 325 TABLET ORAL at 06:24

## 2017-07-22 RX ADMIN — Medication 100 MILLIGRAM(S): at 17:01

## 2017-07-22 RX ADMIN — DARUNAVIR 800 MILLIGRAM(S): 75 TABLET, FILM COATED ORAL at 13:01

## 2017-07-22 RX ADMIN — OXYCODONE AND ACETAMINOPHEN 2 TABLET(S): 5; 325 TABLET ORAL at 14:03

## 2017-07-22 RX ADMIN — OXYCODONE AND ACETAMINOPHEN 2 TABLET(S): 5; 325 TABLET ORAL at 02:00

## 2017-07-22 RX ADMIN — OXYCODONE HYDROCHLORIDE 15 MILLIGRAM(S): 5 TABLET ORAL at 19:02

## 2017-07-22 RX ADMIN — GABAPENTIN 100 MILLIGRAM(S): 400 CAPSULE ORAL at 23:22

## 2017-07-22 NOTE — PROGRESS NOTE ADULT - PROBLEM SELECTOR PLAN 1
Neuro stable.  Continue Keppra 750mg po q12.  F/u with primary treating neurologist.  Seizure precautions.  No driving for now.  Consider pain management with multiple issues.  Reconsult PRN.

## 2017-07-22 NOTE — PROGRESS NOTE ADULT - SUBJECTIVE AND OBJECTIVE BOX
INTERVAL HISTORY:  Seen at bedside.    vancomycin (Anaphylaxis)      VITAL SIGNS:  Vital Signs Last 24 Hrs  T(C): 37.6 (22 Jul 2017 10:50), Max: 37.8 (21 Jul 2017 23:18)  T(F): 99.6 (22 Jul 2017 10:50), Max: 100.1 (21 Jul 2017 23:18)  HR: 86 (22 Jul 2017 10:50) (86 - 103)  BP: 142/80 (22 Jul 2017 10:50) (130/60 - 153/86)  BP(mean): --  RR: 18 (22 Jul 2017 10:50) (16 - 18)  SpO2: 100% (22 Jul 2017 09:05) (20% - 100%)    PHYSICAL EXAMINATION:  General: Well-developed, well nourished, in no acute distress.  Eyes: Conjunctiva and sclera clear.  Neurologic:  - Mental Status:  Asleep but arousable, oriented to person, place, and time; Speech is normal; Affect is normal.  - Cranial Nerves: II-XI intact.  - Motor:  Symmetric and spontaneous all 4 ext.  There is no pronator drift.  Normal muscle bulk and tone throughout.  - Reflexes:  2+ throughout  - Sensory:  Intact to light touch sense throughout.  - Coordination:  Pt refused.    MEDS:  MEDICATIONS  (STANDING):  lisinopril 20 milliGRAM(s) Oral daily  labetalol 100 milliGRAM(s) Oral four times a day  amLODIPine   Tablet 10 milliGRAM(s) Oral daily  gabapentin 100 milliGRAM(s) Oral at bedtime  calcium acetate 2001 milliGRAM(s) Oral three times a day with meals  sevelamer hydrochloride 800 milliGRAM(s) Oral three times a day with meals  pantoprazole    Tablet 40 milliGRAM(s) Oral before breakfast  darunavir 800 milliGRAM(s) Oral daily  levETIRAcetam 750 milliGRAM(s) Oral two times a day  ritonavir Tablet 100 milliGRAM(s) Oral daily  oxyCODONE  ER Tablet 15 milliGRAM(s) Oral every 12 hours  dolutegravir 50 milliGRAM(s) Oral daily    MEDICATIONS  (PRN):  acetaminophen   Tablet. 650 milliGRAM(s) Oral every 6 hours PRN Moderate Pain (4 - 6)  ALPRAZolam 2 milliGRAM(s) Oral two times a day PRN anxiety  HYDROcodone 10 mG/acetaminophen 325 mG 2 Tablet(s) Oral every 6 hours PRN Severe Pain (7 - 10)      LABS:      07-22    139  |  94<L>  |  33.0<H>  ----------------------------<  93  4.8   |  28.0  |  8.32<H>    Ca    8.6      22 Jul 2017 06:58            RADIOLOGY & ADDITIONAL STUDIES:      MRI/MRA brain without mey 7/22/17 -   No space-occupying lesion or intracranial mass in particular, the mesial   temporal lobe structures are unremarkable.    Normal MRA of [place].    Diffuse calvarial thickening and sclerosis with widening of the diploic   space, may be related to hemoglobinopathies, blood dyscrasia or chronic   anemias, metabolic disorders, anticonvulsant medications may also   contribute calvarial thickening.        IMPRESSION: H/o Seizure dx with breakthrough sz.

## 2017-07-22 NOTE — PROGRESS NOTE ADULT - ATTENDING COMMENTS
No further seizures.  MRI brain without any responsible pathology.  Persistent complaints of swelling, weakness bilateral upper extremities asking for one shot of IV Morphine.  [place]  [place] - resume home Oxycodone ER twice daily and increase Percocet to 10/325 2tabs q6.  Pain management consultation

## 2017-07-22 NOTE — PROGRESS NOTE ADULT - SUBJECTIVE AND OBJECTIVE BOX
NEPHROLOGY INTERVAL HPI/OVERNIGHT EVENTS:  HPI:  26 year-old male pmh ESRD on HD (M,W,F), seizure-disorder, congenital HIV, cardiomyopathy, HTN, anxiety, chronic back pain, sent to ED from dialysis center after seizure-like activity. He had just finished his dialysis session and was standing up, when he reports that he felt light-headed and fell to the ground. He does not remember hitting the ground, but states that when he awoke, both knees were hurting. He was observed shaking, but had no loss of bladder or bowel control. At the time of interview he states that the anterior aspects of both knees hurt, but was unable to further qualify pain. (2017 00:29)    Follow up for ESRD    resting       PAST MEDICAL & SURGICAL HISTORY:  Seizure  Pericarditis  Anxiety  Depression  Renal failure (ARF), acute on chronic: dialysis av fistula, RUE  HTN (hypertension)  Cardiomyopathy  HIV disease: born HIV+  AV fistula  S/P tonsillectomy      MEDICATIONS  (STANDING):  lisinopril 20 milliGRAM(s) Oral daily  labetalol 100 milliGRAM(s) Oral four times a day  amLODIPine   Tablet 10 milliGRAM(s) Oral daily  gabapentin 100 milliGRAM(s) Oral at bedtime  calcium acetate 2001 milliGRAM(s) Oral three times a day with meals  sevelamer hydrochloride 800 milliGRAM(s) Oral three times a day with meals  pantoprazole    Tablet 40 milliGRAM(s) Oral before breakfast  darunavir 800 milliGRAM(s) Oral daily  levETIRAcetam 750 milliGRAM(s) Oral two times a day  ritonavir Tablet 100 milliGRAM(s) Oral daily    MEDICATIONS  (PRN):  acetaminophen   Tablet. 650 milliGRAM(s) Oral every 6 hours PRN Moderate Pain (4 - 6)  ALPRAZolam 2 milliGRAM(s) Oral two times a day PRN anxiety  oxyCODONE    5 mG/acetaminophen 325 mG 2 Tablet(s) Oral every 4 hours PRN Severe Pain (7 - 10)      Allergies    vancomycin (Anaphylaxis)    Intolerances        Vital Signs Last 24 Hrs  T(C): 37.6 (2017 10:50), Max: 37.8 (2017 23:18)  T(F): 99.6 (2017 10:50), Max: 100.1 (2017 23:18)  HR: 86 (2017 10:50) (86 - 103)  BP: 142/80 (2017 10:50) (130/60 - 153/86)  BP(mean): --  RR: 18 (2017 10:50) (16 - 20)  SpO2: 100% (2017 09:05) (20% - 100%)  Daily     Daily Weight in k.5 (2017 17:45)    PHYSICAL EXAM:      GENERAL: NAD, well-groomed, well-developed  HEAD:  Atraumatic, Normocephalic  EYES: EOMI, PERRLA, conjunctiva and sclera clear  NECK: Supple, No JVD, Normal thyroid  NERVOUS SYSTEM:  Alert & Oriented X3,   blind   CHEST/LUNG: Clear to percussion bilaterally; No rales, rhonchi, wheezing, or rubs  HEART: Regular rate and rhythm; No murmurs, rubs, or gallops  ABDOMEN: Soft, Nontender, Nondistended; Bowel sounds present  EXTREMITIES:  bilateral UE swelling   right UE AVF  SKIN: No rashes or lesions    LABS:        139  |  94<L>  |  33.0<H>  ----------------------------<  93  4.8   |  28.0  |  8.32<H>    Ca    8.6      2017 06:58                  RADIOLOGY & ADDITIONAL TESTS:

## 2017-07-22 NOTE — PROGRESS NOTE ADULT - SUBJECTIVE AND OBJECTIVE BOX
26 year-old male pmh ESRD on HD (M,W,F), seizure-disorder, congenital HIV, cardiomyopathy, HTN, anxiety, chronic back pain, a/w seizure-like activity concerning for seizure vs cardiogenic syncope. Displaying drug seeking behavior overnight. He repeatedly asked for fentanyl, morphine, or dilaudid, and stated that although he is prescribed xanax twice daily, he is allowed to take it as frequently as he wants. Per nursing report, he would state that he was in severe pain just after waking up, and when he was not given additional pain medication, he would fall back to sleep.  This AM he continues to state that he still has back and arm pain.     Tele - no events for 24 hours      Vital Signs Last 24 Hrs  T(C): 37.2 (22 Jul 2017 06:17), Max: 37.8 (21 Jul 2017 23:18)  T(F): 99 (22 Jul 2017 06:17), Max: 100.1 (21 Jul 2017 23:18)  HR: 99 (22 Jul 2017 06:17) (77 - 103)  BP: 140/80 (22 Jul 2017 06:17) (130/60 - 153/86)  BP(mean): --  RR: 18 (22 Jul 2017 06:17) (16 - 20)  SpO2: 99% (22 Jul 2017 06:17) (20% - 100%)    Patient refused physical exam.      MEDICATIONS  (STANDING):  lisinopril 20 milliGRAM(s) Oral daily  labetalol 100 milliGRAM(s) Oral four times a day  amLODIPine   Tablet 10 milliGRAM(s) Oral daily  gabapentin 100 milliGRAM(s) Oral at bedtime  calcium acetate 2001 milliGRAM(s) Oral three times a day with meals  sevelamer hydrochloride 800 milliGRAM(s) Oral three times a day with meals  pantoprazole    Tablet 40 milliGRAM(s) Oral before breakfast  darunavir 800 milliGRAM(s) Oral daily  emtricitabine 200 milliGRAM(s) Oral every 96 hours  levETIRAcetam 750 milliGRAM(s) Oral two times a day    MEDICATIONS  (PRN):  acetaminophen   Tablet. 650 milliGRAM(s) Oral every 6 hours PRN Moderate Pain (4 - 6)  ALPRAZolam 2 milliGRAM(s) Oral two times a day PRN anxiety  oxyCODONE    5 mG/acetaminophen 325 mG 2 Tablet(s) Oral every 6 hours PRN Severe Pain (7 - 10)

## 2017-07-23 VITALS
SYSTOLIC BLOOD PRESSURE: 120 MMHG | TEMPERATURE: 99 F | RESPIRATION RATE: 16 BRPM | HEART RATE: 70 BPM | OXYGEN SATURATION: 97 % | DIASTOLIC BLOOD PRESSURE: 70 MMHG

## 2017-07-23 DIAGNOSIS — B37.0 CANDIDAL STOMATITIS: ICD-10-CM

## 2017-07-23 PROCEDURE — 80177 DRUG SCRN QUAN LEVETIRACETAM: CPT

## 2017-07-23 PROCEDURE — 76377 3D RENDER W/INTRP POSTPROCES: CPT

## 2017-07-23 PROCEDURE — 96374 THER/PROPH/DIAG INJ IV PUSH: CPT

## 2017-07-23 PROCEDURE — 70450 CT HEAD/BRAIN W/O DYE: CPT

## 2017-07-23 PROCEDURE — 84134 ASSAY OF PREALBUMIN: CPT

## 2017-07-23 PROCEDURE — 93005 ELECTROCARDIOGRAM TRACING: CPT

## 2017-07-23 PROCEDURE — 93306 TTE W/DOPPLER COMPLETE: CPT

## 2017-07-23 PROCEDURE — 97163 PT EVAL HIGH COMPLEX 45 MIN: CPT

## 2017-07-23 PROCEDURE — T1013: CPT

## 2017-07-23 PROCEDURE — 86900 BLOOD TYPING SEROLOGIC ABO: CPT

## 2017-07-23 PROCEDURE — 99285 EMERGENCY DEPT VISIT HI MDM: CPT | Mod: 25

## 2017-07-23 PROCEDURE — 99261: CPT

## 2017-07-23 PROCEDURE — 70544 MR ANGIOGRAPHY HEAD W/O DYE: CPT

## 2017-07-23 PROCEDURE — 85610 PROTHROMBIN TIME: CPT

## 2017-07-23 PROCEDURE — 86355 B CELLS TOTAL COUNT: CPT

## 2017-07-23 PROCEDURE — 86357 NK CELLS TOTAL COUNT: CPT

## 2017-07-23 PROCEDURE — 86850 RBC ANTIBODY SCREEN: CPT

## 2017-07-23 PROCEDURE — 70551 MRI BRAIN STEM W/O DYE: CPT

## 2017-07-23 PROCEDURE — 85027 COMPLETE CBC AUTOMATED: CPT

## 2017-07-23 PROCEDURE — 73080 X-RAY EXAM OF ELBOW: CPT

## 2017-07-23 PROCEDURE — 96375 TX/PRO/DX INJ NEW DRUG ADDON: CPT

## 2017-07-23 PROCEDURE — 80053 COMPREHEN METABOLIC PANEL: CPT

## 2017-07-23 PROCEDURE — 99239 HOSP IP/OBS DSCHRG MGMT >30: CPT

## 2017-07-23 PROCEDURE — 87536 HIV-1 QUANT&REVRSE TRNSCRPJ: CPT

## 2017-07-23 PROCEDURE — 82553 CREATINE MB FRACTION: CPT

## 2017-07-23 PROCEDURE — 36415 COLL VENOUS BLD VENIPUNCTURE: CPT

## 2017-07-23 PROCEDURE — 83735 ASSAY OF MAGNESIUM: CPT

## 2017-07-23 PROCEDURE — 93971 EXTREMITY STUDY: CPT

## 2017-07-23 PROCEDURE — 84100 ASSAY OF PHOSPHORUS: CPT

## 2017-07-23 PROCEDURE — 82550 ASSAY OF CK (CPK): CPT

## 2017-07-23 PROCEDURE — 84443 ASSAY THYROID STIM HORMONE: CPT

## 2017-07-23 PROCEDURE — 85730 THROMBOPLASTIN TIME PARTIAL: CPT

## 2017-07-23 PROCEDURE — 86901 BLOOD TYPING SEROLOGIC RH(D): CPT

## 2017-07-23 PROCEDURE — 80048 BASIC METABOLIC PNL TOTAL CA: CPT

## 2017-07-23 PROCEDURE — 80307 DRUG TEST PRSMV CHEM ANLYZR: CPT

## 2017-07-23 RX ORDER — SENNA PLUS 8.6 MG/1
2 TABLET ORAL
Qty: 60 | Refills: 0 | OUTPATIENT
Start: 2017-07-23 | End: 2017-08-22

## 2017-07-23 RX ORDER — NYSTATIN 500MM UNIT
5 POWDER (EA) MISCELLANEOUS
Qty: 40 | Refills: 0 | OUTPATIENT
Start: 2017-07-23 | End: 2017-07-25

## 2017-07-23 RX ORDER — OXYCODONE HYDROCHLORIDE 5 MG/1
20 TABLET ORAL EVERY 12 HOURS
Qty: 0 | Refills: 0 | Status: DISCONTINUED | OUTPATIENT
Start: 2017-07-23 | End: 2017-07-23

## 2017-07-23 RX ORDER — NYSTATIN 500MM UNIT
500000 POWDER (EA) MISCELLANEOUS EVERY 6 HOURS
Qty: 0 | Refills: 0 | Status: DISCONTINUED | OUTPATIENT
Start: 2017-07-23 | End: 2017-07-23

## 2017-07-23 RX ORDER — SENNA PLUS 8.6 MG/1
2 TABLET ORAL AT BEDTIME
Qty: 0 | Refills: 0 | Status: DISCONTINUED | OUTPATIENT
Start: 2017-07-23 | End: 2017-07-23

## 2017-07-23 RX ORDER — DOCUSATE SODIUM 100 MG
100 CAPSULE ORAL
Qty: 0 | Refills: 0 | Status: DISCONTINUED | OUTPATIENT
Start: 2017-07-23 | End: 2017-07-23

## 2017-07-23 RX ORDER — POLYETHYLENE GLYCOL 3350 17 G/17G
17 POWDER, FOR SOLUTION ORAL
Qty: 510 | Refills: 0 | OUTPATIENT
Start: 2017-07-23 | End: 2017-08-22

## 2017-07-23 RX ORDER — DOCUSATE SODIUM 100 MG
1 CAPSULE ORAL
Qty: 60 | Refills: 0 | OUTPATIENT
Start: 2017-07-23 | End: 2017-08-22

## 2017-07-23 RX ORDER — ENOXAPARIN SODIUM 100 MG/ML
30 INJECTION SUBCUTANEOUS DAILY
Qty: 0 | Refills: 0 | Status: DISCONTINUED | OUTPATIENT
Start: 2017-07-23 | End: 2017-07-23

## 2017-07-23 RX ORDER — POLYETHYLENE GLYCOL 3350 17 G/17G
17 POWDER, FOR SOLUTION ORAL DAILY
Qty: 0 | Refills: 0 | Status: DISCONTINUED | OUTPATIENT
Start: 2017-07-23 | End: 2017-07-23

## 2017-07-23 RX ADMIN — RITONAVIR 100 MILLIGRAM(S): 100 TABLET, FILM COATED ORAL at 13:23

## 2017-07-23 RX ADMIN — Medication 100 MILLIGRAM(S): at 13:23

## 2017-07-23 RX ADMIN — Medication 2001 MILLIGRAM(S): at 13:23

## 2017-07-23 RX ADMIN — DOLUTEGRAVIR SODIUM 50 MILLIGRAM(S): 25 TABLET, FILM COATED ORAL at 13:23

## 2017-07-23 RX ADMIN — Medication 2 MILLIGRAM(S): at 05:15

## 2017-07-23 RX ADMIN — PANTOPRAZOLE SODIUM 40 MILLIGRAM(S): 20 TABLET, DELAYED RELEASE ORAL at 06:30

## 2017-07-23 RX ADMIN — OXYCODONE HYDROCHLORIDE 15 MILLIGRAM(S): 5 TABLET ORAL at 06:30

## 2017-07-23 RX ADMIN — DARUNAVIR 800 MILLIGRAM(S): 75 TABLET, FILM COATED ORAL at 13:23

## 2017-07-23 RX ADMIN — Medication 100 MILLIGRAM(S): at 06:30

## 2017-07-23 RX ADMIN — LEVETIRACETAM 750 MILLIGRAM(S): 250 TABLET, FILM COATED ORAL at 07:04

## 2017-07-23 RX ADMIN — AMLODIPINE BESYLATE 10 MILLIGRAM(S): 2.5 TABLET ORAL at 06:30

## 2017-07-23 RX ADMIN — OXYCODONE HYDROCHLORIDE 15 MILLIGRAM(S): 5 TABLET ORAL at 07:30

## 2017-07-23 RX ADMIN — LISINOPRIL 20 MILLIGRAM(S): 2.5 TABLET ORAL at 06:30

## 2017-07-23 NOTE — PROGRESS NOTE ADULT - PROBLEM SELECTOR PLAN 1
- continue keppra 750 mg PO BID -patient is neurologically stable, no signs of seizure overnight;    - continue keppra 750 mg PO BID  -MRI Brain w/o contrast is negative for space occupying lesions or intracranial mass;

## 2017-07-23 NOTE — PROVIDER CONTACT NOTE (OTHER) - ACTION/TREATMENT ORDERED:
pt signing out AMA. paperwork sign. team notified
no new orders at this time. Dr Bertrand to round on patient
no new orders at this time. re evaluate symptoms post HD and follow up with team if necessary
resident to come to unit to sign AMA paperwork. pt aware
team to round on pt shortly. no new orders at this time

## 2017-07-23 NOTE — PROGRESS NOTE ADULT - PROBLEM SELECTOR PLAN 3
- received HD yesterday  - appreciate nephrology recs - HD is M,W,F;    - appreciate nephrology recs  -ultrasound of av fistula does not detect any thrombus;

## 2017-07-23 NOTE — PROGRESS NOTE ADULT - PROBLEM SELECTOR PLAN 9
- IMPROVE score 0, chemoprophylaxis for DVT not indicated, encourage ambulation with assistance
-swish and swallow for thrush;

## 2017-07-23 NOTE — PROGRESS NOTE ADULT - PROBLEM SELECTOR PLAN 7
- continue home percocet 10/325, confirmed by patient's pharmacy Pain control improved with Hydrocodone-acetaminophen () 2 tbs at 5pm, 2 tabs at 3am;  reduced the pain from a 10 to 7;    Oxycontin (oxycodone er) 15mg 6pm, next dose at 6am;    Xanax 2mg po at 5am for anxiety;  Acute Pain Management consult placed, pending eval;

## 2017-07-23 NOTE — PROVIDER CONTACT NOTE (OTHER) - SITUATION
pt requesting d/c. home care needs not in place. explained to pt and mother with interpretor at bedside.

## 2017-07-23 NOTE — PROGRESS NOTE ADULT - PROBLEM SELECTOR PLAN 6
- patient's xanax dose confirmed with his pharmacy, continue xanax 2 mg PO BID - patient's xanax dose confirmed with his pharmacy,   -continue xanax 2 mg PO BID

## 2017-07-23 NOTE — PROGRESS NOTE ADULT - ATTENDING COMMENTS
No further seizures, work up negative. Stable on Keppra 750 twice daily.  Ultrasound upper extremities without thrombus - intact AVF.  Still asking for IV morphine '1 dose', though increased medications yesterday.  Awaiting PT/OT, encourage OOB.  Medically stable for discharge pending PT/OT

## 2017-07-23 NOTE — PROGRESS NOTE ADULT - PROBLEM SELECTOR PLAN 2
- unclear etiology  - avoid QTc prolonging medications
- unclear etiology  - avoid QTc prolonging medications  - f/u AM electrolytes

## 2017-07-23 NOTE — PROGRESS NOTE ADULT - SUBJECTIVE AND OBJECTIVE BOX
NEPHROLOGY INTERVAL HPI/OVERNIGHT EVENTS:    Follow up for ESRD    Resting      PAST MEDICAL & SURGICAL HISTORY:  Seizure  Pericarditis  Anxiety  Depression  Renal failure (ARF), acute on chronic: dialysis av fistula, RUE  HTN (hypertension)  Cardiomyopathy  HIV disease: born HIV+  AV fistula  S/P tonsillectomy      MEDICATIONS  (STANDING):  lisinopril 20 milliGRAM(s) Oral daily  labetalol 100 milliGRAM(s) Oral four times a day  amLODIPine   Tablet 10 milliGRAM(s) Oral daily  gabapentin 100 milliGRAM(s) Oral at bedtime  calcium acetate 2001 milliGRAM(s) Oral three times a day with meals  sevelamer hydrochloride 800 milliGRAM(s) Oral three times a day with meals  pantoprazole    Tablet 40 milliGRAM(s) Oral before breakfast  darunavir 800 milliGRAM(s) Oral daily  levETIRAcetam 750 milliGRAM(s) Oral two times a day  ritonavir Tablet 100 milliGRAM(s) Oral daily  oxyCODONE  ER Tablet 15 milliGRAM(s) Oral every 12 hours  dolutegravir 50 milliGRAM(s) Oral daily  docusate sodium 100 milliGRAM(s) Oral two times a day  polyethylene glycol 3350 17 Gram(s) Oral daily  nystatin    Suspension 563836 Unit(s) Oral every 6 hours  enoxaparin Injectable 40 milliGRAM(s) SubCutaneous daily    MEDICATIONS  (PRN):  acetaminophen   Tablet. 650 milliGRAM(s) Oral every 6 hours PRN Moderate Pain (4 - 6)  ALPRAZolam 2 milliGRAM(s) Oral two times a day PRN anxiety  HYDROcodone 10 mG/acetaminophen 325 mG 2 Tablet(s) Oral every 6 hours PRN Severe Pain (7 - 10)  senna 2 Tablet(s) Oral at bedtime PRN Constipation      Allergies    vancomycin (Anaphylaxis)    Intolerances        Vital Signs Last 24 Hrs  T(C): 36.9 (2017 11:17), Max: 37.2 (2017 23:18)  T(F): 98.5 (2017 11:17), Max: 99 (2017 23:18)  HR: 78 (2017 11:17) (70 - 100)  BP: 126/80 (2017 11:17) (125/70 - 149/82)  BP(mean): --  RR: 16 (2017 11:17) (16 - 18)  SpO2: 99% (2017 11:17) (95% - 100%)  Daily     Daily Weight in k.3 (2017 06:52)    PHYSICAL EXAM:    GENERAL: NAD, well-groomed, well-developed  HEAD:  Atraumatic, Normocephalic  EYES: EOMI, PERRLA, conjunctiva and sclera clear  NECK: Supple, No JVD, Normal thyroid  NERVOUS SYSTEM:  Alert & Oriented X3,   blind   CHEST/LUNG: Clear to percussion bilaterally; No rales, rhonchi, wheezing, or rubs  HEART: Regular rate and rhythm; No murmurs, rubs, or gallops  ABDOMEN: Soft, Nontender, Nondistended; Bowel sounds present  EXTREMITIES:  bilateral UE swelling   right UE AVF  SKIN: No rashes or lesions      LABS:        139  |  94<L>  |  33.0<H>  ----------------------------<  93  4.8   |  28.0  |  8.32<H>    Ca    8.6      2017 06:58                  RADIOLOGY & ADDITIONAL TESTS:

## 2017-07-23 NOTE — PROGRESS NOTE ADULT - ASSESSMENT
26 year-old male pmh ESRD on HD (M,W,F), seizure-disorder, congenital HIV, cardiomyopathy, HTN, anxiety, chronic back pain, a/w seizure-like activity concerning for seizure vs cardiogenic syncope.    Hospital Day 5;  7/23/17  oxyCODONE  ER Tablet 15 milliGRAM(s) Oral every 12 hours standing order     acetaminophen   Tablet. 650 milliGRAM(s) Oral every 6 hours PRN Moderate Pain (4 - 6)  ALPRAZolam 2 milliGRAM(s) Oral two times a day PRN anxiety  HYDROcodone 10 mG/acetaminophen 325 mG 2 Tablet(s) Oral every 6 hours PRN Severe Pain (7 - 10) 26 year-old male pmh ESRD on HD (M,W,F), seizure-disorder, congenital HIV, cardiomyopathy, HTN, anxiety, chronic back pain, a/w seizure-like activity concerning for seizure vs cardiogenic syncope.    Hospital Day 5;  7/23/17  u/s right upper extremity is negative for thrombus; Neuro recommend continue Keppra 750mg po q12,F/u with primary treating neurologist.  Nephro on board;    Pain control improved with Hydrocodone-acetaminophen () 2 tbs at 5pm, 2 tabs at 3am;  reduced the pain from a 10 to 7;    Oxycontin (oxycodone er) 15mg 6pm, next dose at 6am;    Xanax 2mg po at 5am for anxiety;  Acute Pain Management consult placed, pending eval; 26 year-old male pmh ESRD on HD (M,W,F), seizure-disorder, congenital HIV, cardiomyopathy, HTN, anxiety, chronic back pain, a/w seizure-like activity concerning for seizure vs cardiogenic syncope.  Patient likely had a breakthrough seizure.       Hospital Day 5;  7/23/17  u/s right upper extremity is negative for thrombus; Neuro recommend continue Keppra 750mg po q12,F/u with primary treating neurologist.  Nephro on board;    Pain control improved with Hydrocodone-acetaminophen () 2 tbs at 5pm, 2 tabs at 3am;  reduced the pain from a 10 to 7;    Oxycontin (oxycodone er) 15mg 6pm, next dose at 6am;    Xanax 2mg po at 5am for anxiety;  Acute Pain Management consult placed, pending eval;

## 2017-07-23 NOTE — PROGRESS NOTE ADULT - PROBLEM SELECTOR PLAN 4
- continue labetalol 100 mg PO QID  - continue home amlodipine 10 mg qd, and lisinopril 20 mg qd -bp not optimized currently;  systolic 140's   -patient will be better optimized with increasing doage of lisinopril from 20 to 30mg po daily;    - continue labetalol 100 mg PO QID  - continue home amlodipine 10 mg qd,

## 2017-07-23 NOTE — PROGRESS NOTE ADULT - PROBLEM SELECTOR PROBLEM 3
ESRD (end stage renal disease) on dialysis

## 2017-07-24 NOTE — CHART NOTE - NSCHARTNOTEFT_GEN_A_CORE
Called by Nurse earlier in evening to speak to patient who was threatening to leave AMA  Spoke to patient, about discharge plan and need to increase home health aide hours as per CM.  Patient stating that if we give him " like one dose of iv morphine"  to control his pain, then he is willing stay.  Explained to patient, home pain medication reviewed on I-STOPP and dose adjusted accordingly.    Spoke to PMD for patient who stated that she did not recommend patient sign out AMA.    Plan discussed to RN

## 2017-07-25 LAB — LEVETIRACETAM SERPL-MCNC: 52.5 MCG/ML — HIGH (ref 12–46)

## 2017-10-19 ENCOUNTER — INPATIENT (INPATIENT)
Facility: HOSPITAL | Age: 27
LOS: 3 days | Discharge: HOME | DRG: 683 | End: 2017-10-23
Attending: INTERNAL MEDICINE | Admitting: STUDENT IN AN ORGANIZED HEALTH CARE EDUCATION/TRAINING PROGRAM
Payer: COMMERCIAL

## 2017-10-19 VITALS
SYSTOLIC BLOOD PRESSURE: 188 MMHG | WEIGHT: 139.99 LBS | RESPIRATION RATE: 16 BRPM | DIASTOLIC BLOOD PRESSURE: 114 MMHG | HEIGHT: 69 IN | HEART RATE: 74 BPM | OXYGEN SATURATION: 99 % | TEMPERATURE: 97 F

## 2017-10-19 DIAGNOSIS — R53.1 WEAKNESS: ICD-10-CM

## 2017-10-19 DIAGNOSIS — I77.0 ARTERIOVENOUS FISTULA, ACQUIRED: Chronic | ICD-10-CM

## 2017-10-19 LAB
ALBUMIN SERPL ELPH-MCNC: 3.6 G/DL — SIGNIFICANT CHANGE UP (ref 3.3–5.2)
ALP SERPL-CCNC: 436 U/L — HIGH (ref 40–120)
ALT FLD-CCNC: <5 U/L — SIGNIFICANT CHANGE UP
ANION GAP SERPL CALC-SCNC: 18 MMOL/L — HIGH (ref 5–17)
ANISOCYTOSIS BLD QL: SLIGHT — SIGNIFICANT CHANGE UP
AST SERPL-CCNC: 11 U/L — SIGNIFICANT CHANGE UP
BILIRUB SERPL-MCNC: 0.5 MG/DL — SIGNIFICANT CHANGE UP (ref 0.4–2)
BUN SERPL-MCNC: 36 MG/DL — HIGH (ref 8–20)
CALCIUM SERPL-MCNC: 8.6 MG/DL — SIGNIFICANT CHANGE UP (ref 8.6–10.2)
CHLORIDE SERPL-SCNC: 94 MMOL/L — LOW (ref 98–107)
CO2 SERPL-SCNC: 27 MMOL/L — SIGNIFICANT CHANGE UP (ref 22–29)
CREAT SERPL-MCNC: 9.36 MG/DL — HIGH (ref 0.5–1.3)
DACRYOCYTES BLD QL SMEAR: SLIGHT — SIGNIFICANT CHANGE UP
ELLIPTOCYTES BLD QL SMEAR: SLIGHT — SIGNIFICANT CHANGE UP
GLUCOSE SERPL-MCNC: 94 MG/DL — SIGNIFICANT CHANGE UP (ref 70–115)
HCT VFR BLD CALC: 30.9 % — LOW (ref 42–52)
HGB BLD-MCNC: 9.7 G/DL — LOW (ref 14–18)
HYPOCHROMIA BLD QL: SLIGHT — SIGNIFICANT CHANGE UP
LIDOCAIN IGE QN: 14 U/L — LOW (ref 22–51)
LYMPHOCYTES # BLD AUTO: 29 % — SIGNIFICANT CHANGE UP (ref 20–55)
MACROCYTES BLD QL: SLIGHT — SIGNIFICANT CHANGE UP
MCHC RBC-ENTMCNC: 29.6 PG — SIGNIFICANT CHANGE UP (ref 27–31)
MCHC RBC-ENTMCNC: 31.4 G/DL — LOW (ref 32–36)
MCV RBC AUTO: 94.2 FL — HIGH (ref 80–94)
MICROCYTES BLD QL: SLIGHT — SIGNIFICANT CHANGE UP
MONOCYTES NFR BLD AUTO: 6 % — SIGNIFICANT CHANGE UP (ref 3–10)
NEUTROPHILS NFR BLD AUTO: 65 % — SIGNIFICANT CHANGE UP (ref 37–73)
NT-PROBNP SERPL-SCNC: HIGH PG/ML (ref 0–300)
OVALOCYTES BLD QL SMEAR: SLIGHT — SIGNIFICANT CHANGE UP
PLAT MORPH BLD: NORMAL — SIGNIFICANT CHANGE UP
PLATELET # BLD AUTO: 104 K/UL — LOW (ref 150–400)
POIKILOCYTOSIS BLD QL AUTO: SLIGHT — SIGNIFICANT CHANGE UP
POTASSIUM SERPL-MCNC: 4.2 MMOL/L — SIGNIFICANT CHANGE UP (ref 3.5–5.3)
POTASSIUM SERPL-SCNC: 4.2 MMOL/L — SIGNIFICANT CHANGE UP (ref 3.5–5.3)
PROT SERPL-MCNC: 6.8 G/DL — SIGNIFICANT CHANGE UP (ref 6.6–8.7)
RBC # BLD: 3.28 M/UL — LOW (ref 4.6–6.2)
RBC # FLD: 17.1 % — HIGH (ref 11–15.6)
RBC BLD AUTO: ABNORMAL
SCHISTOCYTES BLD QL AUTO: SLIGHT — SIGNIFICANT CHANGE UP
SODIUM SERPL-SCNC: 139 MMOL/L — SIGNIFICANT CHANGE UP (ref 135–145)
TARGETS BLD QL SMEAR: SLIGHT — SIGNIFICANT CHANGE UP
TROPONIN T SERPL-MCNC: 0.32 NG/ML — HIGH (ref 0–0.06)
WBC # BLD: 2.1 K/UL — LOW (ref 4.8–10.8)
WBC # FLD AUTO: 2.1 K/UL — LOW (ref 4.8–10.8)

## 2017-10-19 PROCEDURE — 71020: CPT | Mod: 26

## 2017-10-19 PROCEDURE — 93010 ELECTROCARDIOGRAM REPORT: CPT

## 2017-10-19 PROCEDURE — 99223 1ST HOSP IP/OBS HIGH 75: CPT

## 2017-10-19 PROCEDURE — 99285 EMERGENCY DEPT VISIT HI MDM: CPT

## 2017-10-19 RX ORDER — LABETALOL HCL 100 MG
10 TABLET ORAL ONCE
Qty: 0 | Refills: 0 | Status: COMPLETED | OUTPATIENT
Start: 2017-10-19 | End: 2017-10-19

## 2017-10-19 RX ORDER — SODIUM CHLORIDE 9 MG/ML
3 INJECTION INTRAMUSCULAR; INTRAVENOUS; SUBCUTANEOUS ONCE
Qty: 0 | Refills: 0 | Status: COMPLETED | OUTPATIENT
Start: 2017-10-19 | End: 2017-10-19

## 2017-10-19 RX ORDER — SODIUM CHLORIDE 9 MG/ML
500 INJECTION INTRAMUSCULAR; INTRAVENOUS; SUBCUTANEOUS ONCE
Qty: 0 | Refills: 0 | Status: COMPLETED | OUTPATIENT
Start: 2017-10-19 | End: 2017-10-19

## 2017-10-19 RX ORDER — FENTANYL CITRATE 50 UG/ML
1 INJECTION INTRAVENOUS
Qty: 0 | Refills: 0 | Status: DISCONTINUED | OUTPATIENT
Start: 2017-10-19 | End: 2017-10-23

## 2017-10-19 RX ORDER — LABETALOL HCL 100 MG
20 TABLET ORAL ONCE
Qty: 0 | Refills: 0 | Status: DISCONTINUED | OUTPATIENT
Start: 2017-10-19 | End: 2017-10-19

## 2017-10-19 RX ORDER — HEPARIN SODIUM 5000 [USP'U]/ML
5000 INJECTION INTRAVENOUS; SUBCUTANEOUS EVERY 12 HOURS
Qty: 0 | Refills: 0 | Status: DISCONTINUED | OUTPATIENT
Start: 2017-10-19 | End: 2017-10-23

## 2017-10-19 RX ORDER — METOCLOPRAMIDE HCL 10 MG
10 TABLET ORAL ONCE
Qty: 0 | Refills: 0 | Status: COMPLETED | OUTPATIENT
Start: 2017-10-19 | End: 2017-10-19

## 2017-10-19 RX ORDER — ALPRAZOLAM 0.25 MG
2 TABLET ORAL ONCE
Qty: 0 | Refills: 0 | Status: DISCONTINUED | OUTPATIENT
Start: 2017-10-19 | End: 2017-10-19

## 2017-10-19 RX ADMIN — Medication 10 MILLIGRAM(S): at 20:33

## 2017-10-19 RX ADMIN — Medication 1 MILLIGRAM(S): at 18:04

## 2017-10-19 RX ADMIN — SODIUM CHLORIDE 1500 MILLILITER(S): 9 INJECTION INTRAMUSCULAR; INTRAVENOUS; SUBCUTANEOUS at 16:57

## 2017-10-19 RX ADMIN — SODIUM CHLORIDE 3 MILLILITER(S): 9 INJECTION INTRAMUSCULAR; INTRAVENOUS; SUBCUTANEOUS at 20:33

## 2017-10-19 RX ADMIN — FENTANYL CITRATE 1 PATCH: 50 INJECTION INTRAVENOUS at 18:58

## 2017-10-19 NOTE — H&P ADULT - HISTORY OF PRESENT ILLNESS
This is a 26 y/o male, poor historian, with history of HIV +( CD4 unknown), ESRD on hemodialysis (M-W-F), Anxiety, Cardiomyopathy, Depression, HTN, Seizure. Patient indicates that he has been off his pain medication regimen for approx. one week, patient refused to answer further questions asking for his pain medications be restarted via IV. Unable to complete HPI.

## 2017-10-19 NOTE — ED PROVIDER NOTE - MEDICAL DECISION MAKING DETAILS
weakness last dialysis yesterday; cp hx of cardiomyopathy will fu labs to check electrolytes, ekg; trop; cxr to ro infectious etiology; tx with 500cc NS; consult dr. george (pts nephrologist)--reassess

## 2017-10-19 NOTE — ED ADULT NURSE NOTE - CHIEF COMPLAINT QUOTE
pt presents to ED with palpitations, weakness and body aches with decreased PO intake. pt c/o nausea. afebrile, pt dialysis pt, (M, W & F). pt is legally blind

## 2017-10-19 NOTE — ED PROVIDER NOTE - OBJECTIVE STATEMENT
26yo M hx of HIV at birth (unknown cd4) on meds but not full treatment as per pt, ESRD (M, W, F), cardiomyopathy, prolonged qt seizures, anxiety , depression p/w weakness, generalized body aches, dehydration, cp and palpitations. notes that felt like his heart was pounding today. + nausea last dialysis yesterday. Denies f/c/v/sob/palpitations/ cough/rash/headache/dizziness/abd.pain/d/c/dysuria/hematuria. does not make urine anymore. notes that has not been eating for the past few days as no appetite. no sick contacts/recent travel

## 2017-10-19 NOTE — ED ADULT NURSE NOTE - OBJECTIVE STATEMENT
Assumed pt care at 1700.  Pt awake alert and oriented x3 c/o chest pain, palpitations, chronic back pain and  pain to AV fistula.  Dialysis pt MWF, last dialysis yesterday.  Av fistula present to right upper arm.  Respirations even and unlabored, no signs of acute distress.  Skin warm and dry.  NSR on cardiac monitor.

## 2017-10-19 NOTE — ED ADULT TRIAGE NOTE - CHIEF COMPLAINT QUOTE
pt presents to ED with palpitations, weakness and body aches with decreased PO intake. pt c/o nausea. afebrile, pt dialysis pt, (M, W & F) pt presents to ED with palpitations, weakness and body aches with decreased PO intake. pt c/o nausea. afebrile, pt dialysis pt, (M, W & F). pt is legally blind

## 2017-10-19 NOTE — ED ADULT NURSE REASSESSMENT NOTE - NS ED NURSE REASSESS COMMENT FT1
Pt c/o nausea, elevated BP and requesting "xanax" and pain medication that he takes at home.  Dr. Soto made aware.  Pt also with elevated BP, Dr. Soto aware, no further orders at this time for BP.

## 2017-10-19 NOTE — H&P ADULT - NSHPPHYSICALEXAM_GEN_ALL_CORE
Vital Signs Last 24 Hrs  T(C): 37.2 (19 Oct 2017 19:05), Max: 37.2 (19 Oct 2017 19:05)  T(F): 99 (19 Oct 2017 19:05), Max: 99 (19 Oct 2017 19:05)  HR: 80 (19 Oct 2017 22:20) (71 - 82)  BP: 185/106 (19 Oct 2017 22:20) (185/106 - 200/110)  RR: 16 (19 Oct 2017 22:20) (15 - 16)  SpO2: 100% (19 Oct 2017 22:20) (99% - 100%)    Constitutional/General: Well developed, well nourished, vitals reviewed  EYE: blind, conjunctiva clear  ENT: Poor dentition, oropharynx clear  NECK: No masses, thyroid normal  RESP: CTAB, no wheezes, no rhonchi, normal effort  CV: RRR, normal S1S2, no murmur, 2+ DP pulses, no JVD, no edema  ABDOMEN: Soft, nontender, no HSM  LYMPH: No cervical or supraclavicular adenopathy  SKIN: right arm AV graft with pseudoaneurysm with thrill, Warm, dry, no rashes  PSYCHIATRIC: Oriented x3, normal mood and affect

## 2017-10-19 NOTE — H&P ADULT - ASSESSMENT
28 y/o male with history of HIV +( CD4 unknown), ESRD on hemodialysis (M-W-F), Anxiety, Cardiomyopathy, Depression, HTN, Seizure.   patient is hypertensive emergency   - 26 y/o male, poor historian, with history of HIV +( CD4 unknown), ESRD on hemodialysis (M-W-F), Anxiety, Cardiomyopathy, Depression, HTN, Seizure. patient indicates that he has been off this pain regimen for approx one week, current symptoms suspected secondary to opioids withdrawal. while in ER patient was found smoking THC in bed. IStop pain medication verified : Ref#[id number]    Admit to medical unit  please consult pain management for optimization of pain medications.   OOB with assistance  fall/aspiration precautions.   renal restriction diet    Hypertensive emergency   - Amlodipine  - Labetalol  - lisinopril    - hydralazine PRN    ESRD on hemodialysis (M-W-F)  to be managed by nephrology Dr. Jiménez consulted.     Chronic pain, specifics unknown    - As per ISTOP:  - 1c 5mg THC and 0.25mg caps  - 1c 2mg THC and 0.1mg cbd per 5 sec inhalation  - fentanyl 100mcg/hr patch (currently on pt)  - percocet 10/325  - Oxycodone ER 10mg   - Alprazolam 2mg    	  H/O HIV (acquired via vertical transmission)   - follows with Dr. Corbin ID ([place])  - patient unable to verify medications - please call [facility] pharm [phone]   - CD 4 unknown   - anemia suspected secondary to ACD, leukocytopenia suspected secondary to HIV    h/o cardiomyopathy, no clinical indication of HF  - ECHO   - elevated trop and BNP, possible secondary to hemodialysis     H/O Seizure - on keppra  - levels supra therapeutic    DVT prophylaxis; Heparin

## 2017-10-20 DIAGNOSIS — B20 HUMAN IMMUNODEFICIENCY VIRUS [HIV] DISEASE: ICD-10-CM

## 2017-10-20 DIAGNOSIS — F11.29 OPIOID DEPENDENCE WITH UNSPECIFIED OPIOID-INDUCED DISORDER: ICD-10-CM

## 2017-10-20 DIAGNOSIS — N17.9 ACUTE KIDNEY FAILURE, UNSPECIFIED: ICD-10-CM

## 2017-10-20 DIAGNOSIS — F32.9 MAJOR DEPRESSIVE DISORDER, SINGLE EPISODE, UNSPECIFIED: ICD-10-CM

## 2017-10-20 DIAGNOSIS — F41.9 ANXIETY DISORDER, UNSPECIFIED: ICD-10-CM

## 2017-10-20 DIAGNOSIS — M79.601 PAIN IN RIGHT ARM: ICD-10-CM

## 2017-10-20 DIAGNOSIS — G89.29 OTHER CHRONIC PAIN: ICD-10-CM

## 2017-10-20 DIAGNOSIS — M54.9 DORSALGIA, UNSPECIFIED: ICD-10-CM

## 2017-10-20 LAB
ALBUMIN SERPL ELPH-MCNC: 3.6 G/DL — SIGNIFICANT CHANGE UP (ref 3.3–5.2)
ALP SERPL-CCNC: 407 U/L — HIGH (ref 40–120)
ALT FLD-CCNC: 5 U/L — SIGNIFICANT CHANGE UP
ANION GAP SERPL CALC-SCNC: 29 MMOL/L — HIGH (ref 5–17)
ANISOCYTOSIS BLD QL: SLIGHT — SIGNIFICANT CHANGE UP
AST SERPL-CCNC: 14 U/L — SIGNIFICANT CHANGE UP
BILIRUB SERPL-MCNC: 0.5 MG/DL — SIGNIFICANT CHANGE UP (ref 0.4–2)
BUN SERPL-MCNC: 17 MG/DL — SIGNIFICANT CHANGE UP (ref 8–20)
CALCIUM SERPL-MCNC: 9.1 MG/DL — SIGNIFICANT CHANGE UP (ref 8.6–10.2)
CHLORIDE SERPL-SCNC: 96 MMOL/L — LOW (ref 98–107)
CK SERPL-CCNC: 92 U/L — SIGNIFICANT CHANGE UP (ref 30–200)
CO2 SERPL-SCNC: 18 MMOL/L — LOW (ref 22–29)
CREAT SERPL-MCNC: 6.75 MG/DL — HIGH (ref 0.5–1.3)
DACRYOCYTES BLD QL SMEAR: SLIGHT — SIGNIFICANT CHANGE UP
ELLIPTOCYTES BLD QL SMEAR: SLIGHT — SIGNIFICANT CHANGE UP
EOSINOPHIL NFR BLD AUTO: 1 % — SIGNIFICANT CHANGE UP (ref 0–6)
GLUCOSE BLDC GLUCOMTR-MCNC: 142 MG/DL — HIGH (ref 70–99)
GLUCOSE SERPL-MCNC: 116 MG/DL — HIGH (ref 70–115)
HCT VFR BLD CALC: 32.1 % — LOW (ref 42–52)
HGB BLD-MCNC: 9.7 G/DL — LOW (ref 14–18)
HYPOCHROMIA BLD QL: SLIGHT — SIGNIFICANT CHANGE UP
LYMPHOCYTES # BLD AUTO: 29 % — SIGNIFICANT CHANGE UP (ref 20–55)
MAGNESIUM SERPL-MCNC: 2.4 MG/DL — SIGNIFICANT CHANGE UP (ref 1.6–2.6)
MCHC RBC-ENTMCNC: 28.9 PG — SIGNIFICANT CHANGE UP (ref 27–31)
MCHC RBC-ENTMCNC: 30.2 G/DL — LOW (ref 32–36)
MCV RBC AUTO: 95.5 FL — HIGH (ref 80–94)
MONOCYTES NFR BLD AUTO: 11 % — HIGH (ref 3–10)
NEUTROPHILS NFR BLD AUTO: 56 % — SIGNIFICANT CHANGE UP (ref 37–73)
NEUTS BAND # BLD: 3 % — SIGNIFICANT CHANGE UP (ref 0–8)
PHOSPHATE SERPL-MCNC: 4 MG/DL — SIGNIFICANT CHANGE UP (ref 2.4–4.7)
PLAT MORPH BLD: NORMAL — SIGNIFICANT CHANGE UP
PLATELET # BLD AUTO: 112 K/UL — LOW (ref 150–400)
POIKILOCYTOSIS BLD QL AUTO: SLIGHT — SIGNIFICANT CHANGE UP
POTASSIUM SERPL-MCNC: 3.6 MMOL/L — SIGNIFICANT CHANGE UP (ref 3.5–5.3)
POTASSIUM SERPL-SCNC: 3.6 MMOL/L — SIGNIFICANT CHANGE UP (ref 3.5–5.3)
PROT SERPL-MCNC: 7.1 G/DL — SIGNIFICANT CHANGE UP (ref 6.6–8.7)
RBC # BLD: 3.36 M/UL — LOW (ref 4.6–6.2)
RBC # FLD: 17.3 % — HIGH (ref 11–15.6)
RBC BLD AUTO: ABNORMAL
SODIUM SERPL-SCNC: 143 MMOL/L — SIGNIFICANT CHANGE UP (ref 135–145)
TROPONIN T SERPL-MCNC: 0.32 NG/ML — HIGH (ref 0–0.06)
WBC # BLD: 2.4 K/UL — LOW (ref 4.8–10.8)
WBC # FLD AUTO: 2.4 K/UL — LOW (ref 4.8–10.8)

## 2017-10-20 PROCEDURE — 99233 SBSQ HOSP IP/OBS HIGH 50: CPT

## 2017-10-20 PROCEDURE — 93010 ELECTROCARDIOGRAM REPORT: CPT

## 2017-10-20 PROCEDURE — 93306 TTE W/DOPPLER COMPLETE: CPT | Mod: 26

## 2017-10-20 RX ORDER — HYDRALAZINE HCL 50 MG
10 TABLET ORAL ONCE
Qty: 0 | Refills: 0 | Status: COMPLETED | OUTPATIENT
Start: 2017-10-20 | End: 2017-10-20

## 2017-10-20 RX ORDER — LABETALOL HCL 100 MG
100 TABLET ORAL
Qty: 0 | Refills: 0 | Status: DISCONTINUED | OUTPATIENT
Start: 2017-10-20 | End: 2017-10-23

## 2017-10-20 RX ORDER — DIPHENHYDRAMINE HCL 50 MG
25 CAPSULE ORAL ONCE
Qty: 0 | Refills: 0 | Status: COMPLETED | OUTPATIENT
Start: 2017-10-20 | End: 2017-10-20

## 2017-10-20 RX ORDER — CALCIUM ACETATE 667 MG
1334 TABLET ORAL
Qty: 0 | Refills: 0 | Status: DISCONTINUED | OUTPATIENT
Start: 2017-10-20 | End: 2017-10-23

## 2017-10-20 RX ORDER — SEVELAMER CARBONATE 2400 MG/1
800 POWDER, FOR SUSPENSION ORAL
Qty: 0 | Refills: 0 | Status: DISCONTINUED | OUTPATIENT
Start: 2017-10-20 | End: 2017-10-23

## 2017-10-20 RX ORDER — PANTOPRAZOLE SODIUM 20 MG/1
40 TABLET, DELAYED RELEASE ORAL
Qty: 0 | Refills: 0 | Status: DISCONTINUED | OUTPATIENT
Start: 2017-10-20 | End: 2017-10-23

## 2017-10-20 RX ORDER — HYDRALAZINE HCL 50 MG
10 TABLET ORAL EVERY 6 HOURS
Qty: 0 | Refills: 0 | Status: DISCONTINUED | OUTPATIENT
Start: 2017-10-20 | End: 2017-10-23

## 2017-10-20 RX ORDER — ERYTHROPOIETIN 10000 [IU]/ML
10000 INJECTION, SOLUTION INTRAVENOUS; SUBCUTANEOUS
Qty: 0 | Refills: 0 | Status: DISCONTINUED | OUTPATIENT
Start: 2017-10-20 | End: 2017-10-23

## 2017-10-20 RX ORDER — POLYETHYLENE GLYCOL 3350 17 G/17G
17 POWDER, FOR SOLUTION ORAL DAILY
Qty: 0 | Refills: 0 | Status: DISCONTINUED | OUTPATIENT
Start: 2017-10-20 | End: 2017-10-23

## 2017-10-20 RX ORDER — LISINOPRIL 2.5 MG/1
20 TABLET ORAL DAILY
Qty: 0 | Refills: 0 | Status: DISCONTINUED | OUTPATIENT
Start: 2017-10-20 | End: 2017-10-23

## 2017-10-20 RX ORDER — OXYCODONE AND ACETAMINOPHEN 5; 325 MG/1; MG/1
2 TABLET ORAL EVERY 4 HOURS
Qty: 0 | Refills: 0 | Status: DISCONTINUED | OUTPATIENT
Start: 2017-10-20 | End: 2017-10-20

## 2017-10-20 RX ORDER — DIPHENHYDRAMINE HCL 50 MG
50 CAPSULE ORAL ONCE
Qty: 0 | Refills: 0 | Status: COMPLETED | OUTPATIENT
Start: 2017-10-20 | End: 2017-10-20

## 2017-10-20 RX ORDER — OXYCODONE HYDROCHLORIDE 5 MG/1
10 TABLET ORAL EVERY 12 HOURS
Qty: 0 | Refills: 0 | Status: DISCONTINUED | OUTPATIENT
Start: 2017-10-20 | End: 2017-10-20

## 2017-10-20 RX ORDER — AMLODIPINE BESYLATE 2.5 MG/1
10 TABLET ORAL DAILY
Qty: 0 | Refills: 0 | Status: DISCONTINUED | OUTPATIENT
Start: 2017-10-20 | End: 2017-10-23

## 2017-10-20 RX ORDER — DOCUSATE SODIUM 100 MG
100 CAPSULE ORAL
Qty: 0 | Refills: 0 | Status: DISCONTINUED | OUTPATIENT
Start: 2017-10-20 | End: 2017-10-23

## 2017-10-20 RX ORDER — SENNA PLUS 8.6 MG/1
2 TABLET ORAL AT BEDTIME
Qty: 0 | Refills: 0 | Status: DISCONTINUED | OUTPATIENT
Start: 2017-10-20 | End: 2017-10-23

## 2017-10-20 RX ORDER — GABAPENTIN 400 MG/1
100 CAPSULE ORAL AT BEDTIME
Qty: 0 | Refills: 0 | Status: DISCONTINUED | OUTPATIENT
Start: 2017-10-20 | End: 2017-10-23

## 2017-10-20 RX ORDER — INFLUENZA VIRUS VACCINE 15; 15; 15; 15 UG/.5ML; UG/.5ML; UG/.5ML; UG/.5ML
0.5 SUSPENSION INTRAMUSCULAR ONCE
Qty: 0 | Refills: 0 | Status: DISCONTINUED | OUTPATIENT
Start: 2017-10-20 | End: 2017-10-23

## 2017-10-20 RX ORDER — ALPRAZOLAM 0.25 MG
2 TABLET ORAL
Qty: 0 | Refills: 0 | Status: DISCONTINUED | OUTPATIENT
Start: 2017-10-20 | End: 2017-10-23

## 2017-10-20 RX ORDER — LEVETIRACETAM 250 MG/1
1000 TABLET, FILM COATED ORAL ONCE
Qty: 0 | Refills: 0 | Status: COMPLETED | OUTPATIENT
Start: 2017-10-20 | End: 2017-10-20

## 2017-10-20 RX ADMIN — Medication 10 MILLIGRAM(S): at 18:01

## 2017-10-20 RX ADMIN — PANTOPRAZOLE SODIUM 40 MILLIGRAM(S): 20 TABLET, DELAYED RELEASE ORAL at 06:01

## 2017-10-20 RX ADMIN — ERYTHROPOIETIN 10000 UNIT(S): 10000 INJECTION, SOLUTION INTRAVENOUS; SUBCUTANEOUS at 19:50

## 2017-10-20 RX ADMIN — Medication 100 MILLIGRAM(S): at 06:01

## 2017-10-20 RX ADMIN — Medication 2 MILLIGRAM(S): at 06:00

## 2017-10-20 RX ADMIN — Medication 1334 MILLIGRAM(S): at 08:43

## 2017-10-20 RX ADMIN — Medication 10 MILLIGRAM(S): at 03:52

## 2017-10-20 RX ADMIN — SEVELAMER CARBONATE 800 MILLIGRAM(S): 2400 POWDER, FOR SUSPENSION ORAL at 08:43

## 2017-10-20 RX ADMIN — AMLODIPINE BESYLATE 10 MILLIGRAM(S): 2.5 TABLET ORAL at 06:01

## 2017-10-20 RX ADMIN — Medication 100 MILLIGRAM(S): at 13:22

## 2017-10-20 RX ADMIN — OXYCODONE AND ACETAMINOPHEN 2 TABLET(S): 5; 325 TABLET ORAL at 10:35

## 2017-10-20 RX ADMIN — Medication 25 MILLIGRAM(S): at 18:09

## 2017-10-20 RX ADMIN — OXYCODONE AND ACETAMINOPHEN 2 TABLET(S): 5; 325 TABLET ORAL at 08:43

## 2017-10-20 RX ADMIN — LISINOPRIL 20 MILLIGRAM(S): 2.5 TABLET ORAL at 06:01

## 2017-10-20 RX ADMIN — Medication 25 MILLIGRAM(S): at 03:52

## 2017-10-20 RX ADMIN — Medication 100 MILLIGRAM(S): at 19:16

## 2017-10-20 RX ADMIN — Medication 50 MILLIGRAM(S): at 16:55

## 2017-10-20 NOTE — CONSULT NOTE ADULT - PROBLEM SELECTOR PROBLEM 3
Acute renal failure superimposed on chronic kidney disease, on chronic dialysis, unspecified acute renal failure type

## 2017-10-20 NOTE — CONSULT NOTE ADULT - PROBLEM SELECTOR RECOMMENDATION 9
1-Work up in progress  2-Meds: d/c Percocet & Oxycontin  -Start: Tylenol 975mg/f5c-[id number]            Oxycodone 5/10mg q4hrs/prn-mod/severe pain  -Continue: Fentanyl Patch 100mcg/q72hrs                  Gabapentin 100mg/QD                  Lyrica 25mg/@hs 1-Work up in progress  2-Meds: d/c Percocet & Oxycontin  -Start: Tylenol 975mg/[id number]            Oxycodone 5/10mg q4hrs/prn-mod/severe pain  -Continue: Fentanyl Patch 100mcg/q72hrs                  Gabapentin 100mg/QD                  Lyrica 25mg/@hs  3-Bowel Regimen as ordered  4-Extensive discussion about the risks, benefits, alternative to opioids, including treatment plan, patient expectations and pain management goals. Patient verbalised understanding, questions answered to his satisfaction.  5-We appreciate your consult, thank you for allowing us to participate in this patient's care.  6-Patient was seen and examined with Dr. Stone, discussed plan with Dr. Leon  7-Will f/u

## 2017-10-20 NOTE — CONSULT NOTE ADULT - ASSESSMENT
28 y/o male, poor historian, with history of HIV +, CD4 not bad, ESRD on hemodialysis (M-W-F), Anxiety, Cardiomyopathy, Depression, HTN, Seizure.     ESRD on hemodialysis (M-W-F)  - seen earlier, will dialyze in PM, consent in chart, d/w RN HD and 5 TOWER  - renal restriction diet  Anemia  HIV  Hypertensive	  H/O HIV (acquired via vertical transmission)   - follows with Dr. Corbin ID ([place])  h/o cardiomyopathy, no clinical indication of HF    shall follow 26 y/o male, poor historian, with history of HIV +, CD4 not bad, ESRD on hemodialysis (M-W-F), Anxiety, Cardiomyopathy, Depression, HTN, Seizure.     ESRD on hemodialysis (M-W-F)  - seen earlier, will dialyze in PM, consent in chart, d/w RN HD and 5 TOWER  - renal restriction diet  Anemia  HIV  Hypertensive	  H/O HIV (acquired via vertical transmission)   - follows with Dr. Corbin ID ([place])  h/o cardiomyopathy, no clinical indication of HF    shall follow,

## 2017-10-20 NOTE — CONSULT NOTE ADULT - ASSESSMENT
28 y/o male, PMH of  HIV +( 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 Patient reports back pain is constant, severe, throbbing, aching with minimal relief with the current medication regimen, also has pain to RUE at his AVF site. Patient admits he been out of his home medication; Fentanyl patch 100mcg/q72hrs; Percocet 10/325mg QID; and medical Marijuana for at least one week; which is prescribed by Dr. Corbin, states "I do not have her number." Patient reports pain is 10/10 at it's worse, with improves to 5/10 at it's best.

## 2017-10-20 NOTE — CONSULT NOTE ADULT - SUBJECTIVE AND OBJECTIVE BOX
Chief Complaint:    HPI:  This is a 26 y/o male, poor historian, with history of HIV +( CD4 unknown), ESRD on hemodialysis (M-W-F), Anxiety, Cardiomyopathy, Depression, HTN, Seizure. Patient indicates that he has been off his pain medication regimen for approx. one week, patient refused to answer further questions asking for his pain medications be restarted via IV. Unable to complete HPI. (19 Oct 2017 22:26)      PAST MEDICAL & SURGICAL HISTORY:  Seizure  Pericarditis  Anxiety  Depression  Renal failure (ARF), acute on chronic: dialysis av fistula, RUE  HTN (hypertension)  Cardiomyopathy  HIV disease: born HIV+  AV fistula  S/P tonsillectomy      FAMILY HISTORY:  No pertinent family history in first degree relatives      SOCIAL HISTORY:  [ ] Denies Smoking, Alcohol, or Drug Use    Allergies    vancomycin (Anaphylaxis)    Intolerances        PAIN MEDICATIONS:  ALPRAZolam 2 milliGRAM(s) Oral two times a day  fentaNYL   Patch 100 MICROgram(s)/Hr 1 Patch Transdermal every 72 hours  gabapentin 100 milliGRAM(s) Oral at bedtime  oxyCODONE    5 mG/acetaminophen 325 mG 2 Tablet(s) Oral every 4 hours PRN  oxyCODONE  ER Tablet 10 milliGRAM(s) Oral every 12 hours  pregabalin 25 milliGRAM(s) Oral at bedtime    Heme:  heparin  Injectable 5000 Unit(s) SubCutaneous every 12 hours    Antibiotics:    Cardiovascular:  amLODIPine   Tablet 10 milliGRAM(s) Oral daily  hydrALAZINE Injectable 10 milliGRAM(s) IV Push every 6 hours PRN  labetalol 100 milliGRAM(s) Oral four times a day  lisinopril 20 milliGRAM(s) Oral daily    GI:  docusate sodium 100 milliGRAM(s) Oral two times a day  pantoprazole    Tablet 40 milliGRAM(s) Oral before breakfast  polyethylene glycol 3350 17 Gram(s) Oral daily PRN  senna 2 Tablet(s) Oral at bedtime PRN    Endocrine:    All Other Medications:  calcium acetate 1334 milliGRAM(s) Oral three times a day with meals  influenza   Vaccine 0.5 milliLiter(s) IntraMuscular once  sevelamer hydrochloride 800 milliGRAM(s) Oral three times a day with meals      REVIEW OF SYSTEMS:    CONSTITUTIONAL: No fever, weight loss, or fatigue  EYES: No eye pain, visual disturbances, or discharge  ENMT:  No difficulty hearing, tinnitus, vertigo; No sinus or throat pain  NECK: No pain or stiffness  BREASTS: No pain, masses, or nipple discharge  RESPIRATORY: No cough, wheezing, chills or hemoptysis; No shortness of breath  CARDIOVASCULAR: No chest pain, palpitations, dizziness, or leg swelling  GASTROINTESTINAL: No abdominal or epigastric pain. No nausea, vomiting, or hematemesis; No diarrhea or constipation. No melena or hematochezia.  GENITOURINARY: No dysuria, frequency, hematuria, or incontinence  NEUROLOGICAL: No headaches, memory loss, loss of strength, numbness, or tremors  SKIN: No itching, burning, rashes, or lesions   LYMPH NODES: No enlarged glands  ENDOCRINE: No heat or cold intolerance; No hair loss  MUSCULOSKELETAL: No joint pain or swelling; No muscle, back, or extremity pain  PSYCHIATRIC: No depression, anxiety, mood swings, or difficulty sleeping  HEME/LYMPH: No easy bruising, or bleeding gums  ALLERY AND IMMUNOLOGIC: No hives or eczema      Vital Signs Last 24 Hrs  T(C): 36.6 (20 Oct 2017 08:44), Max: 37.2 (19 Oct 2017 19:05)  T(F): 97.9 (20 Oct 2017 08:44), Max: 99 (19 Oct 2017 19:05)  HR: 95 (20 Oct 2017 08:44) (71 - 95)  BP: 149/86 (20 Oct 2017 08:44) (147/90 - 200/110)  BP(mean): --  RR: 18 (20 Oct 2017 08:44) (15 - 20)  SpO2: 99% (20 Oct 2017 08:44) (98% - 100%)    PAIN SCALE:     VNRS (Verbal Numerical Rating Scale)1-10             CONSTITUTIONAL: Well-appearing; well nourished; in no apparent distress.  HEAD: Normocephalic, atraumatic.  EYES: PERRL, EOM intact, conjunctiva and sclera WNL  NECK/LYMPH: Supple, non tender, no cervical lymphadenopathy  LUNGS: Normal chest excursion with respiration  ABD/GI: Normal bowel sounds; non-distended, non-tender, no palpable organomegaly.  Back: No evidence of deformity, or step off noted.  Mild pain to palpation of the para-spinal area.   EXT/MS: Normal ROM in all four extremities; non-tender to palpation; distal pulses are normal.  SKIN: Warm and dry; good skin turgor; no apparrent lesions or exudate.  NEURO: Awake, alert and oriented X3, no gross deficits        LABS:                          9.7    2.1   )-----------( 104      ( 19 Oct 2017 17:12 )             30.9     10-19    139  |  94<L>  |  36.0<H>  ----------------------------<  94  4.2   |  27.0  |  9.36<H>    Ca    8.6      19 Oct 2017 17:12    TPro  6.8  /  Alb  3.6  /  TBili  0.5  /  DBili  x   /  AST  11  /  ALT  <5  /  AlkPhos  436<H>  10-19          RADIOLOGY:    Drug Screen:            [x ]  [place]  Reviewed and Copied to Chart    Search Terms: kalyan savage, 1990   Search Date: 10/20/2017 09:09:35 AM   The Drug Utilization Report below displays all of the controlled substance prescriptions, if any, that your patient has filled in the last twelve months. The information displayed on this report is compiled from pharmacy submissions to the Department, and accurately reflects the information as submitted by the pharmacies.  This report was requested by: Bekah Sanford | Reference #: [id number]   Others' Prescriptions  Patient Name:	Kalyan Savage	YOB: 1990	  Address:	[address] [place] [address]	Sex:	Male	    Rx Written	Rx Dispensed	Drug	Quantity	Days Supply	Prescriber Name			  02/16/2017	09/29/2017	20t:1c 5mg thc and 0.25mg cbd/capsule 	14	7	González Fung MD			  02/16/2017	09/29/2017	20t:1c 2mg thc and 0.1mg cbd per 5-second inhalation 	10	15	González Fung MD			  08/24/2017	09/16/2017	fentanyl 100 mcg/hr patch 	15	30	Octaviano Corbin			  09/13/2017	09/16/2017	oxycodone-acetaminophen  mg tab 	240	30	Octaviano Corbin			  09/12/2017	09/16/2017	oxycodone hcl er 10 mg tablet 	60	30	Octaviano Corbin			  09/02/2017	09/10/2017	alprazolam 2 mg tablet 	60	30	Hosea Damian, Npp			  08/24/2017	08/26/2017	oxycodone hcl 10 mg tablet 	120	30	Shaquille, Alexea			  08/15/2017	08/19/2017	hydromorphone 4 mg tablet 	24	4	Mukhi, Angelic			  08/16/2017	08/19/2017	clonazepam 1 mg tablet 	15	5	Mukhi, Angelic			  08/16/2017	08/19/2017	fentanyl 75 mcg/hr patch 	5	15	Mukhi, Angelic			  08/16/2017	08/19/2017	fentanyl 12 mcg/hr patch 	5	15	Mukhi, Angelic			  07/26/2017	07/26/2017	oxycodone hcl 10 mg tablet 	120	30	Shaquille, Alexea			  07/10/2017	07/10/2017	alprazolam 2 mg tablet 	60	30	Hosea Damian, Npp			  02/16/2017	06/28/2017	20t:1c 2mg thc and 0.1mg cbd per 5-second inhalation 	8	12	González Fung MD			  06/15/2017	06/17/2017	oxycodone hcl er 10 mg tablet 	45	5	Sukhdev Moyer			  06/15/2017	06/17/2017	oxycodone hcl 10 mg tablet 	30	5	Sukhdev Moyer			  06/03/2017	06/05/2017	oxycodone hcl er 10 mg tablet 	60	30	Shaquille Alexnehemias			  06/03/2017	06/05/2017	oxycodone-acetaminophen  mg tab 	240	30	Shaquille Alexnehemias			  05/13/2017	05/18/2017	alprazolam 2 mg tablet 	90	30	Hosea Damian, Npp			  02/16/2017	05/16/2017	20t:1c 2mg thc and 0.1mg cbd per 5-second inhalation 	6	30	González Fung MD			  02/16/2017	05/16/2017	20t:1c 2mg thc and 0.1mg cbd per 5-second inhalation 	6	30	González Fung MD			  04/19/2017	05/10/2017	fentanyl 100 mcg/hr patch 	15	30	Shaquille Alexnehemias			  05/02/2017	05/04/2017	oxycodone hcl er 10 mg tablet 	60	30	Octaviano Corbin			  04/19/2017	04/24/2017	oxycodone-acetaminophen  mg tab 	240	30	Octaviano Corbin HPI:  28 y/o male, PMH of  HIV +( 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 Patient reports back pain is constant, severe, throbbing, aching with minimal relief with the current medication regimen, also has pain to RUE at his AVF site. Patient admits he been out of his home medication; Fentanyl patch 100mcg/q72hrs; Percocet 10/325mg QID; and medical Marijuana for at least one week; which is prescribed by Dr. Corbin, states "I do not have her number." Patient reports pain is 10/10 at it's worse, with improves to 5/10 at it's best.   (patient very drowsy, slurring speech, falling asleep during interview, and asking for more pain medication, mother at bedside.)      PAST MEDICAL & SURGICAL HISTORY:  Seizure  Pericarditis  Anxiety  Depression  Renal failure (ARF), acute on chronic: dialysis av fistula, RUE  HTN (hypertension)  Cardiomyopathy  HIV disease: born HIV+  AV fistula  S/P tonsillectomy      FAMILY HISTORY:  No pertinent family history in first degree relatives      SOCIAL HISTORY:  [ ] Denies Smoking, Alcohol, or Drug Use    Allergies    vancomycin (Anaphylaxis)    Intolerances        PAIN MEDICATIONS:  ALPRAZolam 2 milliGRAM(s) Oral two times a day  fentaNYL   Patch 100 MICROgram(s)/Hr 1 Patch Transdermal every 72 hours  gabapentin 100 milliGRAM(s) Oral at bedtime  oxyCODONE    5 mG/acetaminophen 325 mG 2 Tablet(s) Oral every 4 hours PRN  oxyCODONE  ER Tablet 10 milliGRAM(s) Oral every 12 hours  pregabalin 25 milliGRAM(s) Oral at bedtime    Heme:  heparin  Injectable 5000 Unit(s) SubCutaneous every 12 hours    Antibiotics:    Cardiovascular:  amLODIPine   Tablet 10 milliGRAM(s) Oral daily  hydrALAZINE Injectable 10 milliGRAM(s) IV Push every 6 hours PRN  labetalol 100 milliGRAM(s) Oral four times a day  lisinopril 20 milliGRAM(s) Oral daily    GI:  docusate sodium 100 milliGRAM(s) Oral two times a day  pantoprazole    Tablet 40 milliGRAM(s) Oral before breakfast  polyethylene glycol 3350 17 Gram(s) Oral daily PRN  senna 2 Tablet(s) Oral at bedtime PRN    Endocrine:    All Other Medications:  calcium acetate 1334 milliGRAM(s) Oral three times a day with meals  influenza   Vaccine 0.5 milliLiter(s) IntraMuscular once  sevelamer hydrochloride 800 milliGRAM(s) Oral three times a day with meals      REVIEW OF SYSTEMS:    CONSTITUTIONAL: No fever, weight loss, or fatigue  EYES: No eye pain, visual disturbances, or discharge  ENMT:  No difficulty hearing, tinnitus, vertigo; No sinus or throat pain  NECK: No pain or stiffness  RESPIRATORY: No cough, wheezing, chills or hemoptysis; No shortness of breath  CARDIOVASCULAR: No chest pain, palpitations, dizziness, or leg swelling  GASTROINTESTINAL: No abdominal or epigastric pain. No nausea, vomiting; No diarrhea or constipation.   GENITOURINARY: No dysuria, frequency, hematuria, or incontinence  NEUROLOGICAL: No headaches, memory loss, loss of strength, numbness, or tremors  SKIN: + itching, no burning, rashes   MUSCULOSKELETAL: + RUE joint pain, no swelling; + muscle, back,  extremity pain  PSYCHIATRIC: + depression, anxiety, no difficulty sleeping      Vital Signs Last 24 Hrs  T(C): 36.6 (20 Oct 2017 08:44), Max: 37.2 (19 Oct 2017 19:05)  T(F): 97.9 (20 Oct 2017 08:44), Max: 99 (19 Oct 2017 19:05)  HR: 95 (20 Oct 2017 08:44) (71 - 95)  BP: 149/86 (20 Oct 2017 08:44) (147/90 - 200/110)  BP(mean): --  RR: 18 (20 Oct 2017 08:44) (15 - 20)  SpO2: 99% (20 Oct 2017 08:44) (98% - 100%)    PAIN SCALE:  5/10   VNRS (Verbal Numerical Rating Scale)1-10             CONSTITUTIONAL: Lethargic, well nourished; in no apparent distress.  HEAD: Normocephalic, atraumatic.  EYES: PERRL, EOM intact, conjunctiva and sclera WNL  NECK/LYMPH: Supple, non tender, no cervical lymphadenopathy  LUNGS: Normal chest excursion with respiration  ABD/GI: Normal bowel sounds; non-distended, non-tender, no palpable organomegaly.  Back: No evidence of deformity, positive tenderness to palpation of the para-spinal area.   EXT/MS: Normal ROM in all four extremities; non-tender to palpation; distal pulses are normal. LUE AVF bruit/thrill   SKIN: Warm and dry; good skin turgor; no apparent lesions or exudate.  NEURO: Lethargic,        LABS:                          9.7    2.1   )-----------( 104      ( 19 Oct 2017 17:12 )             30.9     10-19    139  |  94<L>  |  36.0<H>  ----------------------------<  94  4.2   |  27.0  |  9.36<H>    Ca    8.6      19 Oct 2017 17:12    TPro  6.8  /  Alb  3.6  /  TBili  0.5  /  DBili  x   /  AST  11  /  ALT  <5  /  AlkPhos  436<H>  10-19          RADIOLOGY:    Drug Screen:            [x ]  [place]  Reviewed and Copied to Chart    Search Terms: kalyan savage, 1990   Search Date: 10/20/2017 09:09:35 AM   The Drug Utilization Report below displays all of the controlled substance prescriptions, if any, that your patient has filled in the last twelve months. The information displayed on this report is compiled from pharmacy submissions to the Department, and accurately reflects the information as submitted by the pharmacies.  This report was requested by: Bekah Sanford | Reference #: [id number]   Others' Prescriptions  Patient Name:	Kalyan Savage	YOB: 1990	  Address:	[address] [place] [address]	Sex:	Male	    Rx Written	Rx Dispensed	Drug	Quantity	Days Supply	Prescriber Name			  02/16/2017	09/29/2017	20t:1c 5mg thc and 0.25mg cbd/capsule 	14	7	González Fung MD			  02/16/2017	09/29/2017	20t:1c 2mg thc and 0.1mg cbd per 5-second inhalation 	10	15	González Fung MD			  08/24/2017	09/16/2017	fentanyl 100 mcg/hr patch 	15	30	Octaviano Corbin			  09/13/2017	09/16/2017	oxycodone-acetaminophen  mg tab 	240	30	Octaviano Corbin			  09/12/2017	09/16/2017	oxycodone hcl er 10 mg tablet 	60	30	Willa-Venegas, Alexea			  09/02/2017	09/10/2017	alprazolam 2 mg tablet 	60	30	Hosea Damian, Npp			  08/24/2017	08/26/2017	oxycodone hcl 10 mg tablet 	120	30	Willa-Venegas, Alexea			  08/15/2017	08/19/2017	hydromorphone 4 mg tablet 	24	4	Mukhi, Angelic			  08/16/2017	08/19/2017	clonazepam 1 mg tablet 	15	5	Mukhi, Angelic			  08/16/2017	08/19/2017	fentanyl 75 mcg/hr patch 	5	15	Mukhi, Angelic			  08/16/2017	08/19/2017	fentanyl 12 mcg/hr patch 	5	15	Mukhi, Angelic			  07/26/2017	07/26/2017	oxycodone hcl 10 mg tablet 	120	30	[place]-Venegas, Alexea			  07/10/2017	07/10/2017	alprazolam 2 mg tablet 	60	30	Hosea Damian, Npp			  02/16/2017	06/28/2017	20t:1c 2mg thc and 0.1mg cbd per 5-second inhalation 	8	12	González Fung MD			  06/15/2017	06/17/2017	oxycodone hcl er 10 mg tablet 	45	5	Sukhdev Moyer			  06/15/2017	06/17/2017	oxycodone hcl 10 mg tablet 	30	5	Sukhdev Moyer			  06/03/2017	06/05/2017	oxycodone hcl er 10 mg tablet 	60	30	[place]-Venegas, Alexea			  06/03/2017	06/05/2017	oxycodone-acetaminophen  mg tab 	240	30	Willa-Venegas, Alexea			  05/13/2017	05/18/2017	alprazolam 2 mg tablet 	90	30	Hosea Damian, Npp			  02/16/2017	05/16/2017	20t:1c 2mg thc and 0.1mg cbd per 5-second inhalation 	6	30	González Fung MD			  02/16/2017	05/16/2017	20t:1c 2mg thc and 0.1mg cbd per 5-second inhalation 	6	30	González Fung MD			  04/19/2017	05/10/2017	fentanyl 100 mcg/hr patch 	15	30	[place]-Venegas, Alexea			  05/02/2017	05/04/2017	oxycodone hcl er 10 mg tablet 	60	30	Octaviano Corbin			  04/19/2017	04/24/2017	oxycodone-acetaminophen  mg tab 	240	30	Octaviano Corbin HPI:  28 y/o male, PMH of  HIV +( 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 Patient reports back pain is constant, severe, throbbing, aching with minimal relief with the current medication regimen, also has pain to RUE at his AVF site. Patient admits he been out of his home medication; Fentanyl patch 100mcg/q72hrs; Percocet 10/325mg QID; and medical Marijuana for at least one week; which is prescribed by Dr. Corbin, states "I do not have her number." Patient reports pain is 10/10 at it's worse, with improves to 5/10 at it's best.   (patient very drowsy, slurring speech, falling asleep during interview, and asking for more pain medication, mother at bedside.)      PAST MEDICAL & SURGICAL HISTORY:  Seizure  Pericarditis  Anxiety  Depression  Renal failure (ARF), acute on chronic: dialysis av fistula, RUE  HTN (hypertension)  Cardiomyopathy  HIV disease: born HIV+  AV fistula  S/P tonsillectomy      FAMILY HISTORY:  No pertinent family history in first degree relatives      SOCIAL HISTORY:  [ ] Denies Smoking, Alcohol, or Drug Use    Allergies    vancomycin (Anaphylaxis)    Intolerances        PAIN MEDICATIONS:  ALPRAZolam 2 milliGRAM(s) Oral two times a day  fentaNYL   Patch 100 MICROgram(s)/Hr 1 Patch Transdermal every 72 hours  gabapentin 100 milliGRAM(s) Oral at bedtime  oxyCODONE    5 mG/acetaminophen 325 mG 2 Tablet(s) Oral every 4 hours PRN  oxyCODONE  ER Tablet 10 milliGRAM(s) Oral every 12 hours  pregabalin 25 milliGRAM(s) Oral at bedtime    Heme:  heparin  Injectable 5000 Unit(s) SubCutaneous every 12 hours    Antibiotics:    Cardiovascular:  amLODIPine   Tablet 10 milliGRAM(s) Oral daily  hydrALAZINE Injectable 10 milliGRAM(s) IV Push every 6 hours PRN  labetalol 100 milliGRAM(s) Oral four times a day  lisinopril 20 milliGRAM(s) Oral daily    GI:  docusate sodium 100 milliGRAM(s) Oral two times a day  pantoprazole    Tablet 40 milliGRAM(s) Oral before breakfast  polyethylene glycol 3350 17 Gram(s) Oral daily PRN  senna 2 Tablet(s) Oral at bedtime PRN    Endocrine:    All Other Medications:  calcium acetate 1334 milliGRAM(s) Oral three times a day with meals  influenza   Vaccine 0.5 milliLiter(s) IntraMuscular once  sevelamer hydrochloride 800 milliGRAM(s) Oral three times a day with meals      REVIEW OF SYSTEMS:    CONSTITUTIONAL: No fever, weight loss, or fatigue  EYES: No eye pain, visual disturbances, or discharge  ENMT:  No difficulty hearing, tinnitus, vertigo; No sinus or throat pain  NECK: No pain or stiffness  RESPIRATORY: No cough, wheezing, chills or hemoptysis; No shortness of breath  CARDIOVASCULAR: No chest pain, palpitations, dizziness, or leg swelling  GASTROINTESTINAL: No abdominal or epigastric pain. No nausea, vomiting; No diarrhea or constipation.   GENITOURINARY: No dysuria, frequency, hematuria, or incontinence  NEUROLOGICAL: No headaches, memory loss, loss of strength, numbness, or tremors  SKIN: + itching, no burning, rashes   MUSCULOSKELETAL: + RUE joint pain, no swelling; + muscle, back,  extremity pain  PSYCHIATRIC: + depression, anxiety, no difficulty sleeping      Vital Signs Last 24 Hrs  T(C): 36.6 (20 Oct 2017 08:44), Max: 37.2 (19 Oct 2017 19:05)  T(F): 97.9 (20 Oct 2017 08:44), Max: 99 (19 Oct 2017 19:05)  HR: 95 (20 Oct 2017 08:44) (71 - 95)  BP: 149/86 (20 Oct 2017 08:44) (147/90 - 200/110)  BP(mean): --  RR: 18 (20 Oct 2017 08:44) (15 - 20)  SpO2: 99% (20 Oct 2017 08:44) (98% - 100%)    PAIN SCALE:  5/10   VNRS (Verbal Numerical Rating Scale)1-10             CONSTITUTIONAL: Lethargic, well nourished; in no apparent distress.  HEAD: Normocephalic, atraumatic.  EYES: PERRL, EOM intact, conjunctiva and sclera WNL  NECK/LYMPH: Supple, non tender, no cervical lymphadenopathy  LUNGS: Normal chest excursion with respiration; on room O2   ABD/GI: Normal bowel sounds; non-distended, non-tender  Back: No evidence of deformity, positive tenderness to palpation of the para-spinal area.   EXT/MS: Normal ROM in all four extremities; non-tender to palpation; distal pulses are normal. LUE AVF bruit/thrill   SKIN: Warm and dry; good skin turgor; no apparent lesions or exudate.  NEURO: Lethargic, SILT  Psych: limited/impaired insight; poor coping skills         LABS:                          9.7    2.1   )-----------( 104      ( 19 Oct 2017 17:12 )             30.9     10-19    139  |  94<L>  |  36.0<H>  ----------------------------<  94  4.2   |  27.0  |  9.36<H>    Ca    8.6      19 Oct 2017 17:12    TPro  6.8  /  Alb  3.6  /  TBili  0.5  /  DBili  x   /  AST  11  /  ALT  <5  /  AlkPhos  436<H>  10-19          RADIOLOGY:    Drug Screen:            [x ]  [place]  Reviewed and Copied to Chart    Search Terms: kalyan savage, 1990   Search Date: 10/20/2017 09:09:35 AM   The Drug Utilization Report below displays all of the controlled substance prescriptions, if any, that your patient has filled in the last twelve months. The information displayed on this report is compiled from pharmacy submissions to the Department, and accurately reflects the information as submitted by the pharmacies.  This report was requested by: Bekah Sanford | Reference #: [id number]   Others' Prescriptions  Patient Name:	Kalyan Savage	YOB: 1990	  Address:	[address] [place] [address]	Sex:	Male	    Rx Written	Rx Dispensed	Drug	Quantity	Days Supply	Prescriber Name			  02/16/2017	09/29/2017	20t:1c 5mg thc and 0.25mg cbd/capsule 	14	7	González Fung MD			  02/16/2017	09/29/2017	20t:1c 2mg thc and 0.1mg cbd per 5-second inhalation 	10	15	González Fung MD			  08/24/2017	09/16/2017	fentanyl 100 mcg/hr patch 	15	30	Octaviano Corbin			  09/13/2017	09/16/2017	oxycodone-acetaminophen  mg tab 	240	30	Octaviano Corbin			  09/12/2017	09/16/2017	oxycodone hcl er 10 mg tablet 	60	30	Shaquille, Alexea			  09/02/2017	09/10/2017	alprazolam 2 mg tablet 	60	30	Hosea Damian, Npp			  08/24/2017	08/26/2017	oxycodone hcl 10 mg tablet 	120	30	Shaquille, Alexea			  08/15/2017	08/19/2017	hydromorphone 4 mg tablet 	24	4	Mukhi, Angelic			  08/16/2017	08/19/2017	clonazepam 1 mg tablet 	15	5	Mukhi, Angelic			  08/16/2017	08/19/2017	fentanyl 75 mcg/hr patch 	5	15	Mukhi, Angelic			  08/16/2017	08/19/2017	fentanyl 12 mcg/hr patch 	5	15	Mukhi, Angelic			  07/26/2017	07/26/2017	oxycodone hcl 10 mg tablet 	120	30	Shaquille, Alexea			  07/10/2017	07/10/2017	alprazolam 2 mg tablet 	60	30	Hosea Damian, Npp			  02/16/2017	06/28/2017	20t:1c 2mg thc and 0.1mg cbd per 5-second inhalation 	8	12	González Fung MD			  06/15/2017	06/17/2017	oxycodone hcl er 10 mg tablet 	45	5	Sukhdev Moyer			  06/15/2017	06/17/2017	oxycodone hcl 10 mg tablet 	30	5	Sukhdev Moyer			  06/03/2017	06/05/2017	oxycodone hcl er 10 mg tablet 	60	30	RamandeepVenegasOctaviano			  06/03/2017	06/05/2017	oxycodone-acetaminophen  mg tab 	240	30	WillaChrisVenegas, Alexea			  05/13/2017	05/18/2017	alprazolam 2 mg tablet 	90	30	Hosea Damian, Npp			  02/16/2017	05/16/2017	20t:1c 2mg thc and 0.1mg cbd per 5-second inhalation 	6	30	González Fung MD			  02/16/2017	05/16/2017	20t:1c 2mg thc and 0.1mg cbd per 5-second inhalation 	6	30	González Fung MD			  04/19/2017	05/10/2017	fentanyl 100 mcg/hr patch 	15	30	Octaviano Corbin			  05/02/2017	05/04/2017	oxycodone hcl er 10 mg tablet 	60	30	Octaviano Corbin			  04/19/2017	04/24/2017	oxycodone-acetaminophen  mg tab 	240	30	Octaviano Corbin

## 2017-10-20 NOTE — PATIENT PROFILE ADULT. - NS TRANSFER PATIENT BELONGINGS
cellphone, cell phone , head phones, jeans, coat, keys, cologne, sandals, shirt/Clothing/Jewelry/Money (specify)/Electronic Device (specify)

## 2017-10-21 DIAGNOSIS — R56.9 UNSPECIFIED CONVULSIONS: ICD-10-CM

## 2017-10-21 PROCEDURE — 99233 SBSQ HOSP IP/OBS HIGH 50: CPT

## 2017-10-21 RX ORDER — ACETAMINOPHEN 500 MG
650 TABLET ORAL EVERY 6 HOURS
Qty: 0 | Refills: 0 | Status: DISCONTINUED | OUTPATIENT
Start: 2017-10-21 | End: 2017-10-23

## 2017-10-21 RX ORDER — OXYCODONE AND ACETAMINOPHEN 5; 325 MG/1; MG/1
1 TABLET ORAL ONCE
Qty: 0 | Refills: 0 | Status: DISCONTINUED | OUTPATIENT
Start: 2017-10-21 | End: 2017-10-21

## 2017-10-21 RX ORDER — ACETAMINOPHEN 500 MG
650 TABLET ORAL EVERY 6 HOURS
Qty: 0 | Refills: 0 | Status: DISCONTINUED | OUTPATIENT
Start: 2017-10-21 | End: 2017-10-21

## 2017-10-21 RX ORDER — LEVETIRACETAM 250 MG/1
750 TABLET, FILM COATED ORAL
Qty: 0 | Refills: 0 | Status: DISCONTINUED | OUTPATIENT
Start: 2017-10-21 | End: 2017-10-23

## 2017-10-21 RX ADMIN — PANTOPRAZOLE SODIUM 40 MILLIGRAM(S): 20 TABLET, DELAYED RELEASE ORAL at 06:21

## 2017-10-21 RX ADMIN — Medication 25 MILLIGRAM(S): at 00:03

## 2017-10-21 RX ADMIN — OXYCODONE AND ACETAMINOPHEN 1 TABLET(S): 5; 325 TABLET ORAL at 17:15

## 2017-10-21 RX ADMIN — Medication 2 MILLIGRAM(S): at 09:04

## 2017-10-21 RX ADMIN — SEVELAMER CARBONATE 800 MILLIGRAM(S): 2400 POWDER, FOR SUSPENSION ORAL at 17:15

## 2017-10-21 RX ADMIN — Medication 100 MILLIGRAM(S): at 00:05

## 2017-10-21 RX ADMIN — Medication 10 MILLIGRAM(S): at 00:06

## 2017-10-21 RX ADMIN — LISINOPRIL 20 MILLIGRAM(S): 2.5 TABLET ORAL at 06:21

## 2017-10-21 RX ADMIN — LEVETIRACETAM 750 MILLIGRAM(S): 250 TABLET, FILM COATED ORAL at 17:14

## 2017-10-21 RX ADMIN — Medication 100 MILLIGRAM(S): at 06:21

## 2017-10-21 RX ADMIN — AMLODIPINE BESYLATE 10 MILLIGRAM(S): 2.5 TABLET ORAL at 06:21

## 2017-10-21 RX ADMIN — OXYCODONE AND ACETAMINOPHEN 1 TABLET(S): 5; 325 TABLET ORAL at 17:23

## 2017-10-21 RX ADMIN — SEVELAMER CARBONATE 800 MILLIGRAM(S): 2400 POWDER, FOR SUSPENSION ORAL at 13:14

## 2017-10-21 RX ADMIN — Medication 650 MILLIGRAM(S): at 09:49

## 2017-10-21 RX ADMIN — Medication 2 MILLIGRAM(S): at 00:02

## 2017-10-21 RX ADMIN — Medication 25 MILLIGRAM(S): at 21:58

## 2017-10-21 RX ADMIN — LEVETIRACETAM 400 MILLIGRAM(S): 250 TABLET, FILM COATED ORAL at 00:04

## 2017-10-21 RX ADMIN — Medication 100 MILLIGRAM(S): at 17:14

## 2017-10-21 RX ADMIN — LEVETIRACETAM 750 MILLIGRAM(S): 250 TABLET, FILM COATED ORAL at 06:22

## 2017-10-21 RX ADMIN — Medication 1334 MILLIGRAM(S): at 17:14

## 2017-10-21 RX ADMIN — Medication 100 MILLIGRAM(S): at 13:13

## 2017-10-21 RX ADMIN — GABAPENTIN 100 MILLIGRAM(S): 400 CAPSULE ORAL at 00:04

## 2017-10-21 RX ADMIN — GABAPENTIN 100 MILLIGRAM(S): 400 CAPSULE ORAL at 21:58

## 2017-10-21 RX ADMIN — Medication 2 MILLIGRAM(S): at 21:58

## 2017-10-21 NOTE — PHARMACY COMMUNICATION NOTE - COMMENTS
Spoke with MD regarding usual 1 time daily dosing when patient is on hemodialysis.  MD wants to continue patient's home regimen of two times a day.

## 2017-10-21 NOTE — CHART NOTE - NSCHARTNOTEFT_GEN_A_CORE
Rapid Response PGY 2/ PGY 3 Note  Patient is a 27y old  Male   PMH history of HIV +,ESRD on hemodialysis (M-W-F), Anxiety, Cardiomyopathy, Depression, HTN, Seizure admitted for uncrontrolled pain  Rapid response team called because pt had a breif siezure less than 1 minutes and was postical and diaphoretic    Patient was seen and examined at the bedside by the rapid response team.    Allergies    vancomycin (Anaphylaxis)    Intolerances        PAST MEDICAL & SURGICAL HISTORY:  Seizure  Pericarditis  Anxiety  Depression  Renal failure (ARF), acute on chronic: dialysis av fistula, RUE  HTN (hypertension)  Cardiomyopathy  HIV disease: born HIV+  AV fistula  S/P tonsillectomy      Vital Signs Last 24 Hrs  T(C): 37.3 (20 Oct 2017 20:25), Max: 37.3 (20 Oct 2017 20:25)  T(F): 99.2 (20 Oct 2017 20:25), Max: 99.2 (20 Oct 2017 20:25)  HR: 97 (20 Oct 2017 20:25) (76 - 97)  BP: 160/94 (20 Oct 2017 20:25) (147/90 - 180/108)  BP(mean): --  RR: 18 (20 Oct 2017 20:25) (18 - 20)  SpO2: 100% (20 Oct 2017 20:25) (98% - 100%)          GENERAL: The patient is awake and alert  below baseline , diaphoretic  HEENT: Head is normocephalic and atraumatic. Extraocular muscles are intact. Mucous membranes are moist. No throat erythema/exudates no lymphadenopathy, no JVD,   NECK: Supple.  LUNGS: Clear to auscultation BL without wheezing, rales or rhonchi; respirations unlabored  HEART: Regular rate and rhythm ,+S1/+S2, no murmurs, rubs, gallops  ABDOMEN: Soft, nontender, and nondistended, no rebound, guarding rigidity, bowel sounds in all 4 quadrants  EXTREMITIES: Without any cyanosis, clubbing, rash, lesions or edema. left arm with blood in bedsheets  SKIN: No new rashes or lesions.  MSK: strength equal BL  VASCULAR: Radial and Dorsal pedal pulses palpable BL  NEUROLOGIC: Grossly intact.  PSYCH: No new changes.    10-19 @ 07:01  -  10-20 @ 07:00  --------------------------------------------------------  IN: 240 mL / OUT: 0 mL / NET: 240 mL    10-20 @ 07:01  -  10-21 @ 00:38  --------------------------------------------------------  IN: 0 mL / OUT: 1700 mL / NET: -1700 mL                              9.7    2.4   )-----------( 112      ( 20 Oct 2017 23:03 )             32.1     10-20    143  |  96<L>  |  17.0  ----------------------------<  116<H>  3.6   |  18.0<L>  |  6.75<H>    Ca    9.1      20 Oct 2017 23:03  Phos  4.0     10-20  Mg     2.4     10-20    TPro  7.1  /  Alb  3.6  /  TBili  0.5  /  DBili  x   /  AST  14  /  ALT  5   /  AlkPhos  407<H>  10-20         LIVER FUNCTIONS - ( 20 Oct 2017 23:03 )  Alb: 3.6 g/dL / Pro: 7.1 g/dL / ALK PHOS: 407 U/L / ALT: 5 U/L / AST: 14 U/L / GGT: x                  Vital Signs Last 24 Hrs*       Assessment- Rapid Response called for 27y year old Male with a past medical history of HIV +( CD4 unknown), ESRD on hemodialysis (M-W-F), Anxiety, Cardiomyopathy, Depression, HTN, Seizure here for seizure     Plan-pt is on keppra 750mg bid. Pt has not been getting it in the hospital.   Ordered 1000mg iv x1 now and 750mg po bid to start in the am  Pt was also hypertensive with a sbp 200 and 188. hydralazine 10mg ivp was given  keppra level ordered  case discussed with eICU and medical PA who agreed with plan

## 2017-10-22 LAB
ANION GAP SERPL CALC-SCNC: 18 MMOL/L — HIGH (ref 5–17)
BUN SERPL-MCNC: 31 MG/DL — HIGH (ref 8–20)
CALCIUM SERPL-MCNC: 8.6 MG/DL — SIGNIFICANT CHANGE UP (ref 8.6–10.2)
CHLORIDE SERPL-SCNC: 94 MMOL/L — LOW (ref 98–107)
CO2 SERPL-SCNC: 26 MMOL/L — SIGNIFICANT CHANGE UP (ref 22–29)
CREAT SERPL-MCNC: 10.25 MG/DL — HIGH (ref 0.5–1.3)
GLUCOSE SERPL-MCNC: 89 MG/DL — SIGNIFICANT CHANGE UP (ref 70–115)
HCT VFR BLD CALC: 29.3 % — LOW (ref 42–52)
HGB BLD-MCNC: 8.8 G/DL — LOW (ref 14–18)
MAGNESIUM SERPL-MCNC: 2.5 MG/DL — SIGNIFICANT CHANGE UP (ref 1.6–2.6)
MCHC RBC-ENTMCNC: 28.7 PG — SIGNIFICANT CHANGE UP (ref 27–31)
MCHC RBC-ENTMCNC: 30 G/DL — LOW (ref 32–36)
MCV RBC AUTO: 95.4 FL — HIGH (ref 80–94)
PHOSPHATE SERPL-MCNC: 5.8 MG/DL — HIGH (ref 2.4–4.7)
PLATELET # BLD AUTO: 89 K/UL — LOW (ref 150–400)
POTASSIUM SERPL-MCNC: 3.5 MMOL/L — SIGNIFICANT CHANGE UP (ref 3.5–5.3)
POTASSIUM SERPL-SCNC: 3.5 MMOL/L — SIGNIFICANT CHANGE UP (ref 3.5–5.3)
RBC # BLD: 3.07 M/UL — LOW (ref 4.6–6.2)
RBC # FLD: 17.4 % — HIGH (ref 11–15.6)
SODIUM SERPL-SCNC: 138 MMOL/L — SIGNIFICANT CHANGE UP (ref 135–145)
WBC # BLD: 2.8 K/UL — LOW (ref 4.8–10.8)
WBC # FLD AUTO: 2.8 K/UL — LOW (ref 4.8–10.8)

## 2017-10-22 PROCEDURE — 99233 SBSQ HOSP IP/OBS HIGH 50: CPT

## 2017-10-22 RX ORDER — OXYCODONE AND ACETAMINOPHEN 5; 325 MG/1; MG/1
2 TABLET ORAL EVERY 6 HOURS
Qty: 0 | Refills: 0 | Status: DISCONTINUED | OUTPATIENT
Start: 2017-10-22 | End: 2017-10-23

## 2017-10-22 RX ADMIN — OXYCODONE AND ACETAMINOPHEN 2 TABLET(S): 5; 325 TABLET ORAL at 18:01

## 2017-10-22 RX ADMIN — LEVETIRACETAM 750 MILLIGRAM(S): 250 TABLET, FILM COATED ORAL at 18:01

## 2017-10-22 RX ADMIN — FENTANYL CITRATE 1 PATCH: 50 INJECTION INTRAVENOUS at 18:05

## 2017-10-22 RX ADMIN — Medication 650 MILLIGRAM(S): at 08:25

## 2017-10-22 RX ADMIN — OXYCODONE AND ACETAMINOPHEN 2 TABLET(S): 5; 325 TABLET ORAL at 18:05

## 2017-10-22 RX ADMIN — LISINOPRIL 20 MILLIGRAM(S): 2.5 TABLET ORAL at 06:13

## 2017-10-22 RX ADMIN — Medication 100 MILLIGRAM(S): at 18:01

## 2017-10-22 RX ADMIN — Medication 100 MILLIGRAM(S): at 23:44

## 2017-10-22 RX ADMIN — Medication 1334 MILLIGRAM(S): at 08:25

## 2017-10-22 RX ADMIN — Medication 2 MILLIGRAM(S): at 21:47

## 2017-10-22 RX ADMIN — Medication 2 MILLIGRAM(S): at 08:25

## 2017-10-22 RX ADMIN — SEVELAMER CARBONATE 800 MILLIGRAM(S): 2400 POWDER, FOR SUSPENSION ORAL at 08:25

## 2017-10-22 RX ADMIN — Medication 100 MILLIGRAM(S): at 18:00

## 2017-10-22 RX ADMIN — Medication 650 MILLIGRAM(S): at 09:30

## 2017-10-22 RX ADMIN — Medication 100 MILLIGRAM(S): at 00:50

## 2017-10-22 RX ADMIN — Medication 100 MILLIGRAM(S): at 06:13

## 2017-10-22 RX ADMIN — FENTANYL CITRATE 1 PATCH: 50 INJECTION INTRAVENOUS at 18:01

## 2017-10-22 RX ADMIN — OXYCODONE AND ACETAMINOPHEN 2 TABLET(S): 5; 325 TABLET ORAL at 23:53

## 2017-10-22 RX ADMIN — OXYCODONE AND ACETAMINOPHEN 2 TABLET(S): 5; 325 TABLET ORAL at 13:08

## 2017-10-22 RX ADMIN — Medication 25 MILLIGRAM(S): at 21:48

## 2017-10-22 RX ADMIN — PANTOPRAZOLE SODIUM 40 MILLIGRAM(S): 20 TABLET, DELAYED RELEASE ORAL at 06:13

## 2017-10-22 RX ADMIN — AMLODIPINE BESYLATE 10 MILLIGRAM(S): 2.5 TABLET ORAL at 06:13

## 2017-10-22 RX ADMIN — Medication 10 MILLIGRAM(S): at 22:12

## 2017-10-22 RX ADMIN — GABAPENTIN 100 MILLIGRAM(S): 400 CAPSULE ORAL at 21:48

## 2017-10-22 RX ADMIN — Medication 100 MILLIGRAM(S): at 14:12

## 2017-10-22 RX ADMIN — OXYCODONE AND ACETAMINOPHEN 2 TABLET(S): 5; 325 TABLET ORAL at 11:31

## 2017-10-22 RX ADMIN — Medication 1334 MILLIGRAM(S): at 18:01

## 2017-10-22 RX ADMIN — SEVELAMER CARBONATE 800 MILLIGRAM(S): 2400 POWDER, FOR SUSPENSION ORAL at 18:01

## 2017-10-22 RX ADMIN — LEVETIRACETAM 750 MILLIGRAM(S): 250 TABLET, FILM COATED ORAL at 06:13

## 2017-10-22 NOTE — CONSULT NOTE ADULT - PROBLEM SELECTOR RECOMMENDATION 9
Continue Keppra 750mg po q12.  D/w pt in great detail of med compliance.  No driving.  Neuro stable.  Outpatient follow up at [facility] at [phone] or [phone].   Reconsult PRN.

## 2017-10-22 NOTE — CONSULT NOTE ADULT - ASSESSMENT
27 year old male with ESRD HD MWF consulted to "infiltration and ecchymosis" of right AVF site  - continue accessing R AVF until no longer able to  - when no longer able to access, make plans to create new AVF site, with temporary HD catheter until maturation, and then ligate current R AVF  - no imaging indicated at this time  - no further vascular surgery indicated at this time  - will follow  - Dr. Manzo to speak to Dr. Jiménez  Patient seen and examined with Dr. Manzo, plan also discussed with Dr. Manzo 27 year old male with ESRD HD MWF consulted for "infiltration and ecchymosis" of right AVF site  - continue accessing R AVF, there are sites that are not currently being used that are usable. We can erica for HD unit use of that is helpful.  - When patient is routinely not able to achieve adequate HD, recommend creation of new AVF with plan to use current access as bridge to new. Ligate current R AVF when new AVF established.  - No imaging indicated at this time  - No in patient procedure indicated at this time.  - Would be willing to follow as outpatient or he can follow-up with the current vascular surgeons he has been working with (was scheduled to be seen as outpatient tomorrow).   - Dr. Manzo spoke to Dr. Leon with these rec's.    Patient seen and examined with Dr. Manzo.

## 2017-10-22 NOTE — CONSULT NOTE ADULT - ATTENDING COMMENTS
Aneurysmal AVF - understand HD unit has issues/fear regarding use. NO revision of current AVF would resolve this given the degree of calcification of the current AVF. Recommendations are as above. Access central to the aneurysm for venous return and access closer to the arterial ansatamosis for draw both possible based on exam.    Happy to follow as outpatient or he can follow-up with his established [facility] vascular surgeon.    Will follow to D/C.
Patient poor historian with noted lethargy on exam.  Unable to sustain eye contact during encounter.  Concern for oversedation secondary to opioids.  Please monitor closely for respiratory depression.  Recommend d/c of Percocet & Oxycontin at this time.

## 2017-10-22 NOTE — CONSULT NOTE ADULT - SUBJECTIVE AND OBJECTIVE BOX
Vascular Attending:  Dr. Manzo       HPI:  This is a 26 y/o male, poor historian, with history of HIV +( CD4 unknown), ESRD on hemodialysis (M-W-F), Anxiety, Cardiomyopathy, Depression, HTN, Seizure. Patient indicates that he has been off his pain medication regimen for approx. one week, patient refused to answer further questions asking for his pain medications be restarted via IV. Unable to complete HPI. (19 Oct 2017 22:26)    Was consulted by medical team for "ecchymosis and infiltration" of his right AVF. Patient reports that he had his last HD session on Friday, was able to be dialyzed, but many alarms went off the entire time. Patient also reports technicians have had difficult time accessing the fistula. Fistula is aneurysmal, calcified and torturous, but remains functional.       PAST MEDICAL & SURGICAL HISTORY:  Seizure  Pericarditis  Anxiety  Depression  Renal failure (ARF), acute on chronic: dialysis av fistula, RUE  HTN (hypertension)  Cardiomyopathy  HIV disease: born HIV+  AV fistula  S/P tonsillectomy      MEDICATIONS  (STANDING):  ALPRAZolam 2 milliGRAM(s) Oral two times a day  amLODIPine   Tablet 10 milliGRAM(s) Oral daily  calcium acetate 1334 milliGRAM(s) Oral three times a day with meals  docusate sodium 100 milliGRAM(s) Oral two times a day  epoetin nitihn Injectable 41118 Unit(s) IV Push <User Schedule>  fentaNYL   Patch 100 MICROgram(s)/Hr 1 Patch Transdermal every 72 hours  gabapentin 100 milliGRAM(s) Oral at bedtime  heparin  Injectable 5000 Unit(s) SubCutaneous every 12 hours  influenza   Vaccine 0.5 milliLiter(s) IntraMuscular once  labetalol 100 milliGRAM(s) Oral four times a day  levETIRAcetam 750 milliGRAM(s) Oral two times a day  lisinopril 20 milliGRAM(s) Oral daily  pantoprazole    Tablet 40 milliGRAM(s) Oral before breakfast  pregabalin 25 milliGRAM(s) Oral at bedtime  sevelamer hydrochloride 800 milliGRAM(s) Oral three times a day with meals    MEDICATIONS  (PRN):  acetaminophen   Tablet. 650 milliGRAM(s) Oral every 6 hours PRN Moderate Pain (4 - 6)  hydrALAZINE Injectable 10 milliGRAM(s) IV Push every 6 hours PRN SBP/DBP >160/100  oxyCODONE    5 mG/acetaminophen 325 mG 2 Tablet(s) Oral every 6 hours PRN Moderate Pain (4 - 6)  polyethylene glycol 3350 17 Gram(s) Oral daily PRN Constipation  senna 2 Tablet(s) Oral at bedtime PRN Constipation      Allergies    vancomycin (Anaphylaxis)    Intolerances        SOCIAL HISTORY:      Vital Signs Last 24 Hrs  T(C): 37.1 (22 Oct 2017 06:00), Max: 37.3 (21 Oct 2017 17:20)  T(F): 98.7 (22 Oct 2017 06:00), Max: 99.1 (21 Oct 2017 17:20)  HR: 95 (22 Oct 2017 06:00) (95 - 97)  BP: 155/87 (22 Oct 2017 06:00) (148/89 - 155/87)  BP(mean): --  RR: 18 (22 Oct 2017 06:00) (18 - 18)  SpO2: 98% (22 Oct 2017 06:00) (98% - 98%)    PHYSICAL EXAM:    Constitutional: NAD  Eyes: EOMI  Respiratory: breathing comfortably on room air, no accessory muscle use, no conversational dyspnea  Extremities: R arm with AVF, torturous, calcified, aneurysmal, no surrounding erythema or ecchymosis, +thrill. Able to identify viable access sites  Neurological: A&O x 3        LABS:                        8.8    2.8   )-----------( 89       ( 22 Oct 2017 09:06 )             29.3     10-22    138  |  94<L>  |  31.0<H>  ----------------------------<  89  3.5   |  26.0  |  10.25<H>    Ca    8.6      22 Oct 2017 09:06  Phos  5.8     10-22  Mg     2.5     10-22    TPro  7.1  /  Alb  3.6  /  TBili  0.5  /  DBili  x   /  AST  14  /  ALT  5   /  AlkPhos  407<H>  10-20          RADIOLOGY & ADDITIONAL STUDIES    Impression and Plan:

## 2017-10-22 NOTE — CONSULT NOTE ADULT - SUBJECTIVE AND OBJECTIVE BOX
HPI: 28yo RH male with pmh ESRD on HD (M,W,F), seizure-disorder, congenital HIV, cardiomyopathy, HTN, anxiety, chronic back pain, sent to ED from dialysis center after seizure-like activity.  Pt reports of using his Keppra regularly and has had seizure dx for a while.  His events typically occur post HD.  Pt has not seen Neurologist as outpt.  Pt was admitted here for hist dialysis shunt issues and apparently had breakthrough seizure yesterday.  Pt admits to only using his morning Keppra on the outside and has not been using it regularly.  No new head injuries or falls.  Now at baseline.      PAST MEDICAL & SURGICAL HISTORY:  Seizure  Pericarditis  Anxiety  Depression  Renal failure (ARF), acute on chronic: dialysis av fistula, RUE  HTN (hypertension)  Cardiomyopathy  HIV disease: born HIV+  AV fistula  S/P tonsillectomy    MEDICATIONS  (STANDING):  ALPRAZolam 2 milliGRAM(s) Oral two times a day  amLODIPine   Tablet 10 milliGRAM(s) Oral daily  calcium acetate 1334 milliGRAM(s) Oral three times a day with meals  docusate sodium 100 milliGRAM(s) Oral two times a day  epoetin nithin Injectable 15326 Unit(s) IV Push <User Schedule>  fentaNYL   Patch 100 MICROgram(s)/Hr 1 Patch Transdermal every 72 hours  gabapentin 100 milliGRAM(s) Oral at bedtime  heparin  Injectable 5000 Unit(s) SubCutaneous every 12 hours  influenza   Vaccine 0.5 milliLiter(s) IntraMuscular once  labetalol 100 milliGRAM(s) Oral four times a day  levETIRAcetam 750 milliGRAM(s) Oral two times a day  lisinopril 20 milliGRAM(s) Oral daily  pantoprazole    Tablet 40 milliGRAM(s) Oral before breakfast  pregabalin 25 milliGRAM(s) Oral at bedtime  sevelamer hydrochloride 800 milliGRAM(s) Oral three times a day with meals    MEDICATIONS  (PRN):  acetaminophen   Tablet. 650 milliGRAM(s) Oral every 6 hours PRN Moderate Pain (4 - 6)  hydrALAZINE Injectable 10 milliGRAM(s) IV Push every 6 hours PRN SBP/DBP >160/100  oxyCODONE    5 mG/acetaminophen 325 mG 2 Tablet(s) Oral every 6 hours PRN Moderate Pain (4 - 6)  polyethylene glycol 3350 17 Gram(s) Oral daily PRN Constipation  senna 2 Tablet(s) Oral at bedtime PRN Constipation    Allergies    vancomycin (Anaphylaxis)    Intolerances        FAMILY HISTORY:  No pertinent family history in first degree relatives          SOCIAL HISTORY:  Denies toxic habits;     REVIEW OF SYSTEMS:    As noted in the HPI.    VITAL SIGNS:  Vital Signs Last 24 Hrs  T(C): 37.1 (22 Oct 2017 06:00), Max: 37.3 (21 Oct 2017 17:20)  T(F): 98.7 (22 Oct 2017 06:00), Max: 99.1 (21 Oct 2017 17:20)  HR: 95 (22 Oct 2017 06:00) (95 - 97)  BP: 155/87 (22 Oct 2017 06:00) (148/89 - 155/87)  BP(mean): --  RR: 18 (22 Oct 2017 06:00) (18 - 18)  SpO2: 98% (22 Oct 2017 06:00) (98% - 98%)    PHYSICAL EXAMINATION:  General: Well-developed, well nourished, in no acute distress.  Eyes: Conjunctiva and sclera clear. Fundoscopic examination was deferred.  Neck: Supple.  Cardiac: +S1 & S2; Regular.  Chest: CTA b/l.    Musculoskeletal: No tenderness on palpation of spine.  No Brudzinski/Kernig's sign.    Neurologic:  - Mental Status:  Alert, awake, oriented to person, place, and time; Speech is fluent with intact naming, repetition, and comprehension  Cranial Nerves II-XII:    II:  Visual acuity is normal for age ; Visual fields are full to confrontation; Pupils are equal, round, and reactive to light.  III, IV, VI:  Extraocular movements are intact without nystagmus.  V:  Facial sensation is intact in the V1-V3 distribution bilaterally.  VII:  Face is symmetric with normal eye closure and smile  VIII:  Hearing is intact and symmetric for age  IX, X:  Uvula is midline and soft palate rises symmetrically  XI:  Head turning and shoulder shrug are intact.  XII:  Tongue protrudes in the midline.  - Motor:  Strength is 5/5 throughout.  There is no pronator drift.  Normal muscle bulk and tone throughout.  - Reflexes:  2+ and symmetric throughout.  - Sensory:  Intact and symmetric to light touch, and joint-position sense.  - Coordination:  No dysmetria/dysdiadochokinesis.   - Gait: Deferred.      LABS:                          8.8    2.8   )-----------( 89       ( 22 Oct 2017 09:06 )             29.3     22 Oct 2017 09:06    138    |  94     |  31.0   ----------------------------<  89     3.5     |  26.0   |  10.25    Ca    8.6        22 Oct 2017 09:06  Phos  5.8       22 Oct 2017 09:06  Mg     2.5       22 Oct 2017 09:06    TPro  7.1    /  Alb  3.6    /  TBili  0.5    /  DBili  x      /  AST  14     /  ALT  5      /  AlkPhos  407    20 Oct 2017 23:03    LIVER FUNCTIONS - ( 20 Oct 2017 23:03 )  Alb: 3.6 g/dL / Pro: 7.1 g/dL / ALK PHOS: 407 U/L / ALT: 5 U/L / AST: 14 U/L / GGT: x               IMPRESSION:  History of seizure disorder and now with breakthrough event due to med noncompliance.

## 2017-10-23 ENCOUNTER — TRANSCRIPTION ENCOUNTER (OUTPATIENT)
Age: 27
End: 2017-10-23

## 2017-10-23 VITALS
DIASTOLIC BLOOD PRESSURE: 96 MMHG | RESPIRATION RATE: 18 BRPM | OXYGEN SATURATION: 99 % | TEMPERATURE: 98 F | HEART RATE: 87 BPM | SYSTOLIC BLOOD PRESSURE: 152 MMHG

## 2017-10-23 LAB — LEVETIRACETAM SERPL-MCNC: <2 MCG/ML — LOW (ref 12–46)

## 2017-10-23 PROCEDURE — 85027 COMPLETE CBC AUTOMATED: CPT

## 2017-10-23 PROCEDURE — 84100 ASSAY OF PHOSPHORUS: CPT

## 2017-10-23 PROCEDURE — 82962 GLUCOSE BLOOD TEST: CPT

## 2017-10-23 PROCEDURE — 82550 ASSAY OF CK (CPK): CPT

## 2017-10-23 PROCEDURE — 99261: CPT

## 2017-10-23 PROCEDURE — 80048 BASIC METABOLIC PNL TOTAL CA: CPT

## 2017-10-23 PROCEDURE — 93306 TTE W/DOPPLER COMPLETE: CPT

## 2017-10-23 PROCEDURE — 36415 COLL VENOUS BLD VENIPUNCTURE: CPT

## 2017-10-23 PROCEDURE — 84484 ASSAY OF TROPONIN QUANT: CPT

## 2017-10-23 PROCEDURE — 80053 COMPREHEN METABOLIC PANEL: CPT

## 2017-10-23 PROCEDURE — 71046 X-RAY EXAM CHEST 2 VIEWS: CPT

## 2017-10-23 PROCEDURE — 99239 HOSP IP/OBS DSCHRG MGMT >30: CPT

## 2017-10-23 PROCEDURE — 83880 ASSAY OF NATRIURETIC PEPTIDE: CPT

## 2017-10-23 PROCEDURE — 99285 EMERGENCY DEPT VISIT HI MDM: CPT | Mod: 25

## 2017-10-23 PROCEDURE — 96374 THER/PROPH/DIAG INJ IV PUSH: CPT

## 2017-10-23 PROCEDURE — 83690 ASSAY OF LIPASE: CPT

## 2017-10-23 PROCEDURE — 83735 ASSAY OF MAGNESIUM: CPT

## 2017-10-23 PROCEDURE — 93005 ELECTROCARDIOGRAM TRACING: CPT

## 2017-10-23 PROCEDURE — 80177 DRUG SCRN QUAN LEVETIRACETAM: CPT

## 2017-10-23 RX ORDER — LISINOPRIL 2.5 MG/1
1 TABLET ORAL
Qty: 30 | Refills: 0 | OUTPATIENT
Start: 2017-10-23 | End: 2017-11-22

## 2017-10-23 RX ORDER — SEVELAMER CARBONATE 2400 MG/1
1 POWDER, FOR SUSPENSION ORAL
Qty: 90 | Refills: 0 | OUTPATIENT
Start: 2017-10-23 | End: 2017-11-22

## 2017-10-23 RX ORDER — FENTANYL CITRATE 50 UG/ML
1 INJECTION INTRAVENOUS
Qty: 3 | Refills: 0 | OUTPATIENT
Start: 2017-10-23 | End: 2017-10-26

## 2017-10-23 RX ORDER — LEVETIRACETAM 250 MG/1
1 TABLET, FILM COATED ORAL
Qty: 60 | Refills: 0 | OUTPATIENT
Start: 2017-10-23 | End: 2017-11-22

## 2017-10-23 RX ORDER — RITONAVIR 100 MG/1
1 TABLET, FILM COATED ORAL
Qty: 30 | Refills: 0 | OUTPATIENT
Start: 2017-10-23 | End: 2017-11-22

## 2017-10-23 RX ORDER — DOCUSATE SODIUM 100 MG
1 CAPSULE ORAL
Qty: 60 | Refills: 0 | OUTPATIENT
Start: 2017-10-23 | End: 2017-11-22

## 2017-10-23 RX ORDER — AMLODIPINE BESYLATE 2.5 MG/1
1 TABLET ORAL
Qty: 30 | Refills: 0 | OUTPATIENT
Start: 2017-10-23 | End: 2017-11-22

## 2017-10-23 RX ORDER — CALCIUM ACETATE 667 MG
3 TABLET ORAL
Qty: 90 | Refills: 0 | OUTPATIENT
Start: 2017-10-23 | End: 2017-11-02

## 2017-10-23 RX ORDER — DOLUTEGRAVIR SODIUM 25 MG/1
1 TABLET, FILM COATED ORAL
Qty: 30 | Refills: 0 | OUTPATIENT
Start: 2017-10-23 | End: 2017-11-22

## 2017-10-23 RX ORDER — LABETALOL HCL 100 MG
1 TABLET ORAL
Qty: 120 | Refills: 0 | OUTPATIENT
Start: 2017-10-23 | End: 2017-11-22

## 2017-10-23 RX ORDER — DARUNAVIR 75 MG/1
1 TABLET, FILM COATED ORAL
Qty: 30 | Refills: 0 | OUTPATIENT
Start: 2017-10-23 | End: 2017-11-22

## 2017-10-23 RX ORDER — GABAPENTIN 400 MG/1
1 CAPSULE ORAL
Qty: 30 | Refills: 0 | OUTPATIENT
Start: 2017-10-23 | End: 2017-11-22

## 2017-10-23 RX ORDER — PANTOPRAZOLE SODIUM 20 MG/1
1 TABLET, DELAYED RELEASE ORAL
Qty: 30 | Refills: 0 | OUTPATIENT
Start: 2017-10-23 | End: 2017-11-22

## 2017-10-23 RX ADMIN — PANTOPRAZOLE SODIUM 40 MILLIGRAM(S): 20 TABLET, DELAYED RELEASE ORAL at 05:43

## 2017-10-23 RX ADMIN — SEVELAMER CARBONATE 800 MILLIGRAM(S): 2400 POWDER, FOR SUSPENSION ORAL at 08:51

## 2017-10-23 RX ADMIN — Medication 2 MILLIGRAM(S): at 12:21

## 2017-10-23 RX ADMIN — OXYCODONE AND ACETAMINOPHEN 2 TABLET(S): 5; 325 TABLET ORAL at 00:15

## 2017-10-23 RX ADMIN — Medication 1334 MILLIGRAM(S): at 12:21

## 2017-10-23 RX ADMIN — LISINOPRIL 20 MILLIGRAM(S): 2.5 TABLET ORAL at 05:42

## 2017-10-23 RX ADMIN — LEVETIRACETAM 750 MILLIGRAM(S): 250 TABLET, FILM COATED ORAL at 05:42

## 2017-10-23 RX ADMIN — SEVELAMER CARBONATE 800 MILLIGRAM(S): 2400 POWDER, FOR SUSPENSION ORAL at 12:21

## 2017-10-23 RX ADMIN — Medication 1334 MILLIGRAM(S): at 08:51

## 2017-10-23 RX ADMIN — Medication 100 MILLIGRAM(S): at 05:42

## 2017-10-23 RX ADMIN — AMLODIPINE BESYLATE 10 MILLIGRAM(S): 2.5 TABLET ORAL at 05:42

## 2017-10-23 RX ADMIN — Medication 100 MILLIGRAM(S): at 12:21

## 2017-10-23 NOTE — PROGRESS NOTE ADULT - PROBLEM SELECTOR PLAN 4
[facility]

## 2017-10-23 NOTE — DISCHARGE NOTE ADULT - CARE PLAN
Principal Discharge DX:	Acute renal failure superimposed on chronic kidney disease, on chronic dialysis, unspecified acute renal failure type  Goal:	Continue dialysis  Instructions for follow-up, activity and diet:	Follow up with nephrology.  Patient had a seizure. You must comply and take your Keppra 750 mg twice a day.  Secondary Diagnosis:	Anxiety  Goal:	Stable mood  Secondary Diagnosis:	HIV disease  Goal:	Comply with medications  Instructions for follow-up, activity and diet:	Follow up with Infectious Disease  Secondary Diagnosis:	Opioid dependence with opioid-induced disorder  Goal:	Follow up with Pain Managment Principal Discharge DX:	Acute renal failure superimposed on chronic kidney disease, on chronic dialysis, unspecified acute renal failure type  Goal:	Continue dialysis  Instructions for follow-up, activity and diet:	Follow up with nephrology.  Patient had a seizure. You must comply and take your Keppra 750 mg twice a day.  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 I called Dr Landau of vascular surgery and scheduled an appointment with him Monday 10/30 around noon.  Secondary Diagnosis:	Anxiety  Goal:	Stable mood  Secondary Diagnosis:	HIV disease  Goal:	Comply with medications  Instructions for follow-up, activity and diet:	Follow up with Infectious Disease  Secondary Diagnosis:	Opioid dependence with opioid-induced disorder  Goal:	Follow up with Pain Management

## 2017-10-23 NOTE — PROGRESS NOTE ADULT - PROBLEM SELECTOR PLAN 1
Pain management reducing pain meds secondary to grogginess. 10/21 Patient off oxycodone and morphine, still groggy. 10/23 Patient awake and alert back to baseline.
Pain management reducing pain meds secondary to grogginess. 10/21 Patient off oxycodone and morphine, still groggy.
Pain management reducing pain meds secondary to groggy

## 2017-10-23 NOTE — PROGRESS NOTE ADULT - SUBJECTIVE AND OBJECTIVE BOX
TEDDY CRAWFORD     Chief Complaint: Patient is a 27y old  Male who presents with a chief complaint of palpitations, nausea, weakness and body aches with decreased PO intake. (19 Oct 2017 22:26)      PAST MEDICAL & SURGICAL HISTORY:  Seizure  Pericarditis  Anxiety  Depression  Renal failure (ARF), acute on chronic: dialysis av fistula, RUE  HTN (hypertension)  Cardiomyopathy  HIV disease: born HIV+  AV fistula  S/P tonsillectomy      HPI/OVERNIGHT EVENTS: Patient in no distress    MEDICATIONS  (STANDING):  ALPRAZolam 2 milliGRAM(s) Oral two times a day  amLODIPine   Tablet 10 milliGRAM(s) Oral daily  calcium acetate 1334 milliGRAM(s) Oral three times a day with meals  docusate sodium 100 milliGRAM(s) Oral two times a day  epoetin nithin Injectable 26247 Unit(s) IV Push <User Schedule>  fentaNYL   Patch 100 MICROgram(s)/Hr 1 Patch Transdermal every 72 hours  gabapentin 100 milliGRAM(s) Oral at bedtime  heparin  Injectable 5000 Unit(s) SubCutaneous every 12 hours  influenza   Vaccine 0.5 milliLiter(s) IntraMuscular once  labetalol 100 milliGRAM(s) Oral four times a day  levETIRAcetam 750 milliGRAM(s) Oral two times a day  lisinopril 20 milliGRAM(s) Oral daily  pantoprazole    Tablet 40 milliGRAM(s) Oral before breakfast  pregabalin 25 milliGRAM(s) Oral at bedtime  sevelamer hydrochloride 800 milliGRAM(s) Oral three times a day with meals      Vital Signs Last 24 Hrs  T(C): 36.5 (23 Oct 2017 04:45), Max: 37.1 (22 Oct 2017 22:20)  T(F): 97.7 (23 Oct 2017 04:45), Max: 98.7 (22 Oct 2017 22:20)  HR: 87 (23 Oct 2017 04:45) (87 - 95)  BP: 152/96 (23 Oct 2017 04:45) (152/96 - 168/100)  BP(mean): --  RR: 18 (23 Oct 2017 04:45) (18 - 20)  SpO2: 99% (23 Oct 2017 04:45) (99% - 100%)    PHYSICAL EXAM:  Constitutional: NAD, well-groomed, well-developed  HEENT: PERRLA, EOMI, Normal Hearing, MMM  Neck: No LAD, No JVD  Back: Normal spine flexure, No CVA tenderness  Respiratory: CTAB Cardiovascular: S1 and S2, RRR, no M/G/R  Gastrointestinal: BS+, soft, NT/ND  Extremities: No peripheral edema  Vascular: 2+ peripheral pulses  Neurological: A/O x 3, no focal deficits  Psychiatric: Normal mood, normal affect  Musculoskeletal: 5/5 strength b/l upper and lower extremities  Skin: No rashes    CAPILLARY BLOOD GLUCOSE    LABS:                        8.8    2.8   )-----------( 89       ( 22 Oct 2017 09:06 )             29.3     10-22    138  |  94<L>  |  31.0<H>  ----------------------------<  89  3.5   |  26.0  |  10.25<H>    Ca    8.6      22 Oct 2017 09:06  Phos  5.8     10-22  Mg     2.5     10-22            RADIOLOGY & ADDITIONAL TESTS:
NEPHROLOGY INTERVAL HPI/OVERNIGHT EVENTS:  HPI:  This is a 28 y/o male, poor historian, with history of HIV +( CD4 unknown), ESRD on hemodialysis (M-W-F), Anxiety, Cardiomyopathy, Depression, HTN, Seizure. Patient indicates that he has been off his pain medication regimen for approx. one week, patient refused to answer further questions asking for his pain medications be restarted via IV. Unable to complete HPI. (19 Oct 2017 22:26)    No complaints today    PAST MEDICAL & SURGICAL HISTORY:  Seizure  Pericarditis  Anxiety  Depression  Renal failure (ARF), acute on chronic: dialysis av fistula, RUE  HTN (hypertension)  Cardiomyopathy  HIV disease: born HIV+  AV fistula  S/P tonsillectomy      MEDICATIONS  (STANDING):  ALPRAZolam 2 milliGRAM(s) Oral two times a day  amLODIPine   Tablet 10 milliGRAM(s) Oral daily  calcium acetate 1334 milliGRAM(s) Oral three times a day with meals  docusate sodium 100 milliGRAM(s) Oral two times a day  epoetin nithin Injectable 82207 Unit(s) IV Push <User Schedule>  fentaNYL   Patch 100 MICROgram(s)/Hr 1 Patch Transdermal every 72 hours  gabapentin 100 milliGRAM(s) Oral at bedtime  heparin  Injectable 5000 Unit(s) SubCutaneous every 12 hours  influenza   Vaccine 0.5 milliLiter(s) IntraMuscular once  labetalol 100 milliGRAM(s) Oral four times a day  levETIRAcetam 750 milliGRAM(s) Oral two times a day  lisinopril 20 milliGRAM(s) Oral daily  pantoprazole    Tablet 40 milliGRAM(s) Oral before breakfast  pregabalin 25 milliGRAM(s) Oral at bedtime  sevelamer hydrochloride 800 milliGRAM(s) Oral three times a day with meals    MEDICATIONS  (PRN):  acetaminophen   Tablet. 650 milliGRAM(s) Oral every 6 hours PRN Moderate Pain (4 - 6)  hydrALAZINE Injectable 10 milliGRAM(s) IV Push every 6 hours PRN SBP/DBP >160/100  oxyCODONE    5 mG/acetaminophen 325 mG 2 Tablet(s) Oral every 6 hours PRN Moderate Pain (4 - 6)  polyethylene glycol 3350 17 Gram(s) Oral daily PRN Constipation  senna 2 Tablet(s) Oral at bedtime PRN Constipation      Allergies    vancomycin (Anaphylaxis)    Intolerances        Vital Signs Last 24 Hrs  T(C): 37.1 (22 Oct 2017 06:00), Max: 37.3 (21 Oct 2017 17:20)  T(F): 98.7 (22 Oct 2017 06:00), Max: 99.1 (21 Oct 2017 17:20)  HR: 95 (22 Oct 2017 06:00) (95 - 97)  BP: 155/87 (22 Oct 2017 06:00) (148/89 - 155/87)  BP(mean): --  RR: 18 (22 Oct 2017 06:00) (18 - 18)  SpO2: 98% (22 Oct 2017 06:00) (98% - 98%)  Daily     Daily     PHYSICAL EXAM:  GENERAL: NAD, well-groomed, well-developed  HEAD:  Atraumatic, Normocephalic  EYES: EOMI, PERRLA, conjunctiva and sclera clear  ENMT: No tonsillar erythema, exudates, or enlargement; Moist mucous membranes, Good dentition, No lesions  NECK: Supple, No JVD, Normal thyroid  NERVOUS SYSTEM:  Alert & Oriented X3, Good concentration; Motor Strength 5/5 B/L upper and lower extremities; DTRs 2+ intact and symmetric  CHEST/LUNG: Clear to percussion bilaterally; No rales, rhonchi, wheezing, or rubs  HEART: Regular rate and rhythm; No murmurs, rubs, or gallops  ABDOMEN: Soft, Nontender, Nondistended; Bowel sounds present  EXTREMITIES:  2+ Peripheral Pulses, No clubbing, cyanosis, or edema  SKIN: No rashes or lesions    LABS:                        8.8    2.8   )-----------( 89       ( 22 Oct 2017 09:06 )             29.3     10-22    138  |  94<L>  |  31.0<H>  ----------------------------<  89  3.5   |  26.0  |  10.25<H>    Ca    8.6      22 Oct 2017 09:06  Phos  5.8     10-22  Mg     2.5     10-22    TPro  7.1  /  Alb  3.6  /  TBili  0.5  /  DBili  x   /  AST  14  /  ALT  5   /  AlkPhos  407<H>  10-20        Magnesium, Serum: 2.5 mg/dL (10-22 @ 09:06)  Phosphorus Level, Serum: 5.8 mg/dL (10-22 @ 09:06)        RADIOLOGY & ADDITIONAL TESTS:
TEDDY CRAWFORD     Chief Complaint: Patient is a 27y old  Male who presents with a chief complaint of palpitations, nausea, weakness and body aches with decreased PO intake. (19 Oct 2017 22:26)      PAST MEDICAL & SURGICAL HISTORY:  Seizure  Pericarditis  Anxiety  Depression  Renal failure (ARF), acute on chronic: dialysis av fistula, RUE  HTN (hypertension)  Cardiomyopathy  HIV disease: born HIV+  AV fistula  S/P tonsillectomy      HPI/OVERNIGHT EVENTS: Patient groggy, pain managment input appreciated.    MEDICATIONS  (STANDING):  ALPRAZolam 2 milliGRAM(s) Oral two times a day  amLODIPine   Tablet 10 milliGRAM(s) Oral daily  calcium acetate 1334 milliGRAM(s) Oral three times a day with meals  docusate sodium 100 milliGRAM(s) Oral two times a day  fentaNYL   Patch 100 MICROgram(s)/Hr 1 Patch Transdermal every 72 hours  gabapentin 100 milliGRAM(s) Oral at bedtime  heparin  Injectable 5000 Unit(s) SubCutaneous every 12 hours  influenza   Vaccine 0.5 milliLiter(s) IntraMuscular once  labetalol 100 milliGRAM(s) Oral four times a day  lisinopril 20 milliGRAM(s) Oral daily  pantoprazole    Tablet 40 milliGRAM(s) Oral before breakfast  pregabalin 25 milliGRAM(s) Oral at bedtime  sevelamer hydrochloride 800 milliGRAM(s) Oral three times a day with meals      Vital Signs Last 24 Hrs  T(C): 36.6 (20 Oct 2017 08:44), Max: 37.2 (19 Oct 2017 19:05)  T(F): 97.9 (20 Oct 2017 08:44), Max: 99 (19 Oct 2017 19:05)  HR: 95 (20 Oct 2017 08:44) (71 - 95)  BP: 149/86 (20 Oct 2017 08:44) (147/90 - 200/110)  BP(mean): --  RR: 18 (20 Oct 2017 08:44) (15 - 20)  SpO2: 99% (20 Oct 2017 08:44) (98% - 100%)    PHYSICAL EXAM:  Constitutional: NAD, well-groomed, well-developed  HEENT: PERRLA, EOMI, Normal Hearing, MMM  Neck: No LAD, No JVD  Back: Normal spine flexure, No CVA tenderness  Respiratory: CTAB Cardiovascular: S1 and S2, RRR, no M/G/R  Gastrointestinal: BS+, soft, NT/ND  Extremities: No peripheral edema  Vascular: 2+ peripheral pulses  Neurological: A/O x 3, no focal deficits  Psychiatric: Normal mood, normal affect  Musculoskeletal: 5/5 strength b/l upper and lower extremities  Skin: No rashes    CAPILLARY BLOOD GLUCOSE    LABS:                        9.7    2.1   )-----------( 104      ( 19 Oct 2017 17:12 )             30.9     10-19    139  |  94<L>  |  36.0<H>  ----------------------------<  94  4.2   |  27.0  |  9.36<H>    Ca    8.6      19 Oct 2017 17:12    TPro  6.8  /  Alb  3.6  /  TBili  0.5  /  DBili  x   /  AST  11  /  ALT  <5  /  AlkPhos  436<H>  10-19          RADIOLOGY & ADDITIONAL TESTS:
TEDDY CRAWFORD     Chief Complaint: Patient is a 27y old  Male who presents with a chief complaint of palpitations, nausea, weakness and body aches with decreased PO intake. (19 Oct 2017 22:26)      PAST MEDICAL & SURGICAL HISTORY:  Seizure  Pericarditis  Anxiety  Depression  Renal failure (ARF), acute on chronic: dialysis av fistula, RUE  HTN (hypertension)  Cardiomyopathy  HIV disease: born HIV+  AV fistula  S/P tonsillectomy      HPI/OVERNIGHT EVENTS: Patient sleeping, arousable. Pain management appreciated, off oxycodone.    MEDICATIONS  (STANDING):  ALPRAZolam 2 milliGRAM(s) Oral two times a day  amLODIPine   Tablet 10 milliGRAM(s) Oral daily  calcium acetate 1334 milliGRAM(s) Oral three times a day with meals  docusate sodium 100 milliGRAM(s) Oral two times a day  epoetin nithin Injectable 56504 Unit(s) IV Push <User Schedule>  fentaNYL   Patch 100 MICROgram(s)/Hr 1 Patch Transdermal every 72 hours  gabapentin 100 milliGRAM(s) Oral at bedtime  heparin  Injectable 5000 Unit(s) SubCutaneous every 12 hours  influenza   Vaccine 0.5 milliLiter(s) IntraMuscular once  labetalol 100 milliGRAM(s) Oral four times a day  levETIRAcetam 750 milliGRAM(s) Oral two times a day  lisinopril 20 milliGRAM(s) Oral daily  pantoprazole    Tablet 40 milliGRAM(s) Oral before breakfast  pregabalin 25 milliGRAM(s) Oral at bedtime  sevelamer hydrochloride 800 milliGRAM(s) Oral three times a day with meals      Vital Signs Last 24 Hrs  T(C): 37.8 (21 Oct 2017 08:55), Max: 37.8 (21 Oct 2017 08:55)  T(F): 100 (21 Oct 2017 08:55), Max: 100 (21 Oct 2017 08:55)  HR: 99 (21 Oct 2017 08:55) (87 - 110)  BP: 163/93 (21 Oct 2017 08:55) (160/94 - 166/105)  BP(mean): --  RR: 18 (21 Oct 2017 08:55) (18 - 18)  SpO2: 97% (21 Oct 2017 08:55) (97% - 100%)    PHYSICAL EXAM:  Constitutional:  groggy  HEENT: PERRLA, EOMI, Normal Hearing, MMM  Neck: No LAD, No JVD  Back: Normal spine flexure, No CVA tenderness  Respiratory: CTAB Cardiovascular: S1 and S2, RRR, no M/G/R  Gastrointestinal: BS+, soft, NT/ND  Extremities: No peripheral edema  Vascular: 2+ peripheral pulses  Neurological:  sleeping arousable  Psychiatric: Normal mood, normal affect  Musculoskeletal: 5/5 strength b/l upper and lower extremities  Skin: No rashes    CAPILLARY BLOOD GLUCOSE    LABS:                        9.7    2.4   )-----------( 112      ( 20 Oct 2017 23:03 )             32.1     10-20    143  |  96<L>  |  17.0  ----------------------------<  116<H>  3.6   |  18.0<L>  |  6.75<H>    Ca    9.1      20 Oct 2017 23:03  Phos  4.0     10-20  Mg     2.4     10-20    TPro  7.1  /  Alb  3.6  /  TBili  0.5  /  DBili  x   /  AST  14  /  ALT  5   /  AlkPhos  407<H>  10-20          RADIOLOGY & ADDITIONAL TESTS:

## 2017-10-23 NOTE — DISCHARGE NOTE ADULT - MEDICATION SUMMARY - MEDICATIONS TO TAKE
I will START or STAY ON the medications listed below when I get home from the hospital:    fentaNYL 100 mcg/hr transdermal film, extended release  -- 1 patch by transdermal patch every 72 hours MDD:1  -- Indication: For Pain    lisinopril 20 mg oral tablet  -- 1 tab(s) by mouth once a day  -- Indication: For HTN    levETIRAcetam 750 mg oral tablet  -- 1 tab(s) by mouth 2 times a day  -- Indication: For Seizure    pregabalin 25 mg oral capsule  -- 1 cap(s) by mouth once a day (at bedtime) MDD:1  -- Indication: For Pain    gabapentin 100 mg oral capsule  -- 1 cap(s) by mouth once a day (at bedtime)  -- Indication: For Pain    dolutegravir 50 mg oral tablet  -- 1 tab(s) by mouth once a day  -- Indication: For HIV disease    Prezista 800 mg oral tablet  -- 1 tab(s) by mouth once a day  -- Indication: For HIV disease    ritonavir 100 mg oral tablet  -- 1 tab(s) by mouth once a day  -- Indication: For HIV disease    ALPRAZolam 2 mg oral tablet  -- 1 tab(s) by mouth 2 times a day; Dr Damian - [phone]  -- Indication: For Anxiety    labetalol 100 mg oral tablet  -- 1 tab(s) by mouth 4 times a day  -- Indication: For HTN    amLODIPine 10 mg oral tablet  -- 1 tab(s) by mouth once a day  -- Indication: For HTN    docusate sodium 100 mg oral capsule  -- 1 cap(s) by mouth 2 times a day  -- Indication: For Constipation    calcium acetate 667 mg oral tablet  -- 3 tab(s) by mouth 3 times a day with meals  -- Indication: For ESRD    sevelamer hydrochloride 800 mg oral tablet  -- 1 tab(s) by mouth 3 times a day (with meals)  -- Indication: For ESRD    pantoprazole 40 mg oral delayed release tablet  -- 1 tab(s) by mouth once a day (before a meal)  -- Indication: For Gerd

## 2017-10-23 NOTE — PROGRESS NOTE ADULT - ASSESSMENT
27 year old male HIV, esrd, chronic pain, I spoke to Dr Nam who cleared for discharge.
27 year old male HIV, esrd, chronic pain
27 year old male HIV, esrd, chronic pain
Assessment and Plan:    ESRD on hemodialysis (M-W-F)  - Will maintain dialysis per schedule; next session arranged for 10/23/17  - Electrolytes stable, no uremia, no gross fluid overload; no acute indication for dialysis to be done at present  - Renal dialysis diet  Anemia  HIV  Hypertensive - BP stable at present  H/O HIV (acquired via vertical transmission)   - follows with Dr. Corbin ID ([place])  h/o cardiomyopathy, no clinical indication of HF

## 2017-10-23 NOTE — DISCHARGE NOTE ADULT - HOSPITAL COURSE
27 year old male HIV, esrd, chronic pain, I spoke to Dr Nam who cleared for discharge.     Problem/Plan - 1:  ·  Problem: Intractable back pain.  Plan: Pain management reducing pain meds secondary to grogginess. 10/21 Patient off oxycodone and morphine, still groggy. 10/23 Patient awake and alert back to baseline.      Problem/Plan - 2:  ·  Problem: Acute renal failure superimposed on chronic kidney disease, on chronic dialysis, unspecified acute renal failure type.  Plan: Nephrology input appreciated.      Problem/Plan - 3:  ·  Problem: HIV disease.  Plan: Stable follow up with ID as outpatient.      Problem/Plan - 4:  ·  Problem: Seizure.  Plan: Consult neuro, keppra level.     Attending Attestation:   I was physically present for the key portions of the evaluation and management (E/M) service provided.  I agree with the above history, physical, and plan which I have reviewed and edited where appropriate.     45 minutes spent on total encounter; more than 50% of the visit was spent counseling and/or coordinating care by the attending physician.

## 2017-10-23 NOTE — DISCHARGE NOTE ADULT - PATIENT PORTAL LINK FT
“You can access the Follow[facility] Patient Portal, offered by [facility], by registering with the following website: [email or web address]”

## 2017-10-23 NOTE — DISCHARGE NOTE ADULT - PLAN OF CARE
Continue dialysis Follow up with nephrology.  Patient had a seizure. You must comply and take your Keppra 750 mg twice a day. Stable mood Comply with medications Follow up with Infectious Disease Follow up with Pain Managment Follow up with nephrology.  Patient had a seizure. You must comply and take your Keppra 750 mg twice a day.  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 I called Dr Landau of vascular surgery and scheduled an appointment with him Monday 10/30 around noon. Follow up with Pain Management

## 2018-01-08 NOTE — DISCHARGE NOTE ADULT - MEDICATION SUMMARY - MEDICATIONS TO CHANGE
Spoke to patient and she was referred to Wound Care at [facility]. Pt states that she has an upcoming apt this Friday in [place].    I will SWITCH the dose or number of times a day I take the medications listed below when I get home from the hospital:  None

## 2018-01-12 ENCOUNTER — INPATIENT (INPATIENT)
Facility: HOSPITAL | Age: 28
LOS: 5 days | Discharge: HOME | DRG: 640 | End: 2018-01-18
Attending: INTERNAL MEDICINE | Admitting: INTERNAL MEDICINE
Payer: COMMERCIAL

## 2018-01-12 VITALS
WEIGHT: 139.99 LBS | OXYGEN SATURATION: 98 % | SYSTOLIC BLOOD PRESSURE: 134 MMHG | RESPIRATION RATE: 16 BRPM | DIASTOLIC BLOOD PRESSURE: 96 MMHG | HEART RATE: 155 BPM

## 2018-01-12 DIAGNOSIS — B20 HUMAN IMMUNODEFICIENCY VIRUS [HIV] DISEASE: ICD-10-CM

## 2018-01-12 DIAGNOSIS — E16.2 HYPOGLYCEMIA, UNSPECIFIED: ICD-10-CM

## 2018-01-12 DIAGNOSIS — E87.2 ACIDOSIS: ICD-10-CM

## 2018-01-12 DIAGNOSIS — J96.00 ACUTE RESPIRATORY FAILURE, UNSPECIFIED WHETHER WITH HYPOXIA OR HYPERCAPNIA: ICD-10-CM

## 2018-01-12 DIAGNOSIS — G40.909 EPILEPSY, UNSPECIFIED, NOT INTRACTABLE, WITHOUT STATUS EPILEPTICUS: ICD-10-CM

## 2018-01-12 DIAGNOSIS — I77.0 ARTERIOVENOUS FISTULA, ACQUIRED: Chronic | ICD-10-CM

## 2018-01-12 DIAGNOSIS — I10 ESSENTIAL (PRIMARY) HYPERTENSION: ICD-10-CM

## 2018-01-12 DIAGNOSIS — R41.82 ALTERED MENTAL STATUS, UNSPECIFIED: ICD-10-CM

## 2018-01-12 DIAGNOSIS — E87.5 HYPERKALEMIA: ICD-10-CM

## 2018-01-12 DIAGNOSIS — N18.6 END STAGE RENAL DISEASE: ICD-10-CM

## 2018-01-12 LAB
ALBUMIN SERPL ELPH-MCNC: 3.2 G/DL — LOW (ref 3.3–5.2)
ALP SERPL-CCNC: 632 U/L — HIGH (ref 40–120)
ALT FLD-CCNC: 61 U/L — HIGH
ANION GAP SERPL CALC-SCNC: 18 MMOL/L — HIGH (ref 5–17)
ANION GAP SERPL CALC-SCNC: 33 MMOL/L — HIGH (ref 5–17)
APTT BLD: 34.3 SEC — SIGNIFICANT CHANGE UP (ref 27.5–37.4)
AST SERPL-CCNC: 152 U/L — HIGH
BILIRUB SERPL-MCNC: 1.2 MG/DL — SIGNIFICANT CHANGE UP (ref 0.4–2)
BLD GP AB SCN SERPL QL: SIGNIFICANT CHANGE UP
BUN SERPL-MCNC: 39 MG/DL — HIGH (ref 8–20)
BUN SERPL-MCNC: 82 MG/DL — HIGH (ref 8–20)
CALCIUM SERPL-MCNC: 6.1 MG/DL — CRITICAL LOW (ref 8.6–10.2)
CALCIUM SERPL-MCNC: 6.2 MG/DL — CRITICAL LOW (ref 8.6–10.2)
CHLORIDE SERPL-SCNC: 88 MMOL/L — LOW (ref 98–107)
CHLORIDE SERPL-SCNC: 93 MMOL/L — LOW (ref 98–107)
CO2 SERPL-SCNC: 13 MMOL/L — LOW (ref 22–29)
CO2 SERPL-SCNC: 27 MMOL/L — SIGNIFICANT CHANGE UP (ref 22–29)
CREAT SERPL-MCNC: 14.26 MG/DL — HIGH (ref 0.5–1.3)
CREAT SERPL-MCNC: 7.77 MG/DL — HIGH (ref 0.5–1.3)
GAS PNL BLDA: SIGNIFICANT CHANGE UP
GLUCOSE BLDC GLUCOMTR-MCNC: 190 MG/DL — HIGH (ref 70–99)
GLUCOSE SERPL-MCNC: 339 MG/DL — HIGH (ref 70–115)
GLUCOSE SERPL-MCNC: 86 MG/DL — SIGNIFICANT CHANGE UP (ref 70–115)
HCT VFR BLD CALC: 24.9 % — LOW (ref 42–52)
HGB BLD-MCNC: 7.5 G/DL — LOW (ref 14–18)
INR BLD: 1.5 RATIO — HIGH (ref 0.88–1.16)
LACTATE BLDV-MCNC: 11.3 MMOL/L — CRITICAL HIGH (ref 0.5–2)
MAGNESIUM SERPL-MCNC: 2.4 MG/DL — SIGNIFICANT CHANGE UP (ref 1.6–2.6)
MCHC RBC-ENTMCNC: 28.8 PG — SIGNIFICANT CHANGE UP (ref 27–31)
MCHC RBC-ENTMCNC: 30.1 G/DL — LOW (ref 32–36)
MCV RBC AUTO: 95.8 FL — HIGH (ref 80–94)
PLATELET # BLD AUTO: 125 K/UL — LOW (ref 150–400)
POTASSIUM SERPL-MCNC: 4.6 MMOL/L — SIGNIFICANT CHANGE UP (ref 3.5–5.3)
POTASSIUM SERPL-MCNC: 8.6 MMOL/L — CRITICAL HIGH (ref 3.5–5.3)
POTASSIUM SERPL-MCNC: 8.8 MMOL/L — CRITICAL HIGH (ref 3.5–5.3)
POTASSIUM SERPL-SCNC: 4.6 MMOL/L — SIGNIFICANT CHANGE UP (ref 3.5–5.3)
POTASSIUM SERPL-SCNC: 8.6 MMOL/L — CRITICAL HIGH (ref 3.5–5.3)
POTASSIUM SERPL-SCNC: 8.8 MMOL/L — CRITICAL HIGH (ref 3.5–5.3)
PROT SERPL-MCNC: 6.5 G/DL — LOW (ref 6.6–8.7)
PROTHROM AB SERPL-ACNC: 16.6 SEC — HIGH (ref 9.8–12.7)
RBC # BLD: 2.6 M/UL — LOW (ref 4.6–6.2)
RBC # FLD: 18.8 % — HIGH (ref 11–15.6)
SODIUM SERPL-SCNC: 134 MMOL/L — LOW (ref 135–145)
SODIUM SERPL-SCNC: 138 MMOL/L — SIGNIFICANT CHANGE UP (ref 135–145)
WBC # BLD: 6.7 K/UL — SIGNIFICANT CHANGE UP (ref 4.8–10.8)
WBC # FLD AUTO: 6.7 K/UL — SIGNIFICANT CHANGE UP (ref 4.8–10.8)

## 2018-01-12 PROCEDURE — 99223 1ST HOSP IP/OBS HIGH 75: CPT | Mod: 25

## 2018-01-12 PROCEDURE — 93010 ELECTROCARDIOGRAM REPORT: CPT | Mod: 77

## 2018-01-12 PROCEDURE — 90935 HEMODIALYSIS ONE EVALUATION: CPT

## 2018-01-12 PROCEDURE — 99223 1ST HOSP IP/OBS HIGH 75: CPT

## 2018-01-12 PROCEDURE — 71045 X-RAY EXAM CHEST 1 VIEW: CPT | Mod: 26,59

## 2018-01-12 PROCEDURE — 93010 ELECTROCARDIOGRAM REPORT: CPT

## 2018-01-12 RX ORDER — LISINOPRIL 2.5 MG/1
20 TABLET ORAL DAILY
Qty: 0 | Refills: 0 | Status: DISCONTINUED | OUTPATIENT
Start: 2018-01-12 | End: 2018-01-18

## 2018-01-12 RX ORDER — CALCIUM GLUCONATE 100 MG/ML
1 VIAL (ML) INTRAVENOUS ONCE
Qty: 0 | Refills: 0 | Status: COMPLETED | OUTPATIENT
Start: 2018-01-12 | End: 2018-01-12

## 2018-01-12 RX ORDER — SODIUM BICARBONATE 1 MEQ/ML
50 SYRINGE (ML) INTRAVENOUS ONCE
Qty: 0 | Refills: 0 | Status: COMPLETED | OUTPATIENT
Start: 2018-01-12 | End: 2018-01-12

## 2018-01-12 RX ORDER — LEVETIRACETAM 250 MG/1
750 TABLET, FILM COATED ORAL EVERY 12 HOURS
Qty: 0 | Refills: 0 | Status: DISCONTINUED | OUTPATIENT
Start: 2018-01-12 | End: 2018-01-12

## 2018-01-12 RX ORDER — FENTANYL CITRATE 50 UG/ML
100 INJECTION INTRAVENOUS ONCE
Qty: 0 | Refills: 0 | Status: DISCONTINUED | OUTPATIENT
Start: 2018-01-12 | End: 2018-01-12

## 2018-01-12 RX ORDER — PANTOPRAZOLE SODIUM 20 MG/1
40 TABLET, DELAYED RELEASE ORAL DAILY
Qty: 0 | Refills: 0 | Status: DISCONTINUED | OUTPATIENT
Start: 2018-01-12 | End: 2018-01-13

## 2018-01-12 RX ORDER — HYDRALAZINE HCL 50 MG
10 TABLET ORAL EVERY 6 HOURS
Qty: 0 | Refills: 0 | Status: DISCONTINUED | OUTPATIENT
Start: 2018-01-12 | End: 2018-01-18

## 2018-01-12 RX ORDER — AMLODIPINE BESYLATE 2.5 MG/1
10 TABLET ORAL DAILY
Qty: 0 | Refills: 0 | Status: DISCONTINUED | OUTPATIENT
Start: 2018-01-12 | End: 2018-01-18

## 2018-01-12 RX ORDER — ROCURONIUM BROMIDE 10 MG/ML
50 VIAL (ML) INTRAVENOUS ONCE
Qty: 0 | Refills: 0 | Status: COMPLETED | OUTPATIENT
Start: 2018-01-12 | End: 2018-01-12

## 2018-01-12 RX ORDER — CALCIUM GLUCONATE 100 MG/ML
2 VIAL (ML) INTRAVENOUS ONCE
Qty: 0 | Refills: 0 | Status: COMPLETED | OUTPATIENT
Start: 2018-01-12 | End: 2018-01-12

## 2018-01-12 RX ORDER — PROPOFOL 10 MG/ML
10 INJECTION, EMULSION INTRAVENOUS
Qty: 1000 | Refills: 0 | Status: DISCONTINUED | OUTPATIENT
Start: 2018-01-12 | End: 2018-01-13

## 2018-01-12 RX ORDER — CALCIUM CHLORIDE
1000 POWDER (GRAM) MISCELLANEOUS ONCE
Qty: 0 | Refills: 0 | Status: COMPLETED | OUTPATIENT
Start: 2018-01-12 | End: 2018-01-12

## 2018-01-12 RX ORDER — ETOMIDATE 2 MG/ML
20 INJECTION INTRAVENOUS ONCE
Qty: 0 | Refills: 0 | Status: COMPLETED | OUTPATIENT
Start: 2018-01-12 | End: 2018-01-12

## 2018-01-12 RX ORDER — LEVETIRACETAM 250 MG/1
500 TABLET, FILM COATED ORAL EVERY 24 HOURS
Qty: 0 | Refills: 0 | Status: DISCONTINUED | OUTPATIENT
Start: 2018-01-12 | End: 2018-01-12

## 2018-01-12 RX ORDER — HEPARIN SODIUM 5000 [USP'U]/ML
5000 INJECTION INTRAVENOUS; SUBCUTANEOUS EVERY 8 HOURS
Qty: 0 | Refills: 0 | Status: DISCONTINUED | OUTPATIENT
Start: 2018-01-12 | End: 2018-01-18

## 2018-01-12 RX ORDER — CHLORHEXIDINE GLUCONATE 213 G/1000ML
15 SOLUTION TOPICAL
Qty: 0 | Refills: 0 | Status: DISCONTINUED | OUTPATIENT
Start: 2018-01-12 | End: 2018-01-13

## 2018-01-12 RX ORDER — DEXTROSE 50 % IN WATER 50 %
100 SYRINGE (ML) INTRAVENOUS ONCE
Qty: 0 | Refills: 0 | Status: COMPLETED | OUTPATIENT
Start: 2018-01-12 | End: 2018-01-12

## 2018-01-12 RX ORDER — ALBUTEROL 90 UG/1
2.5 AEROSOL, METERED ORAL
Qty: 0 | Refills: 0 | Status: COMPLETED | OUTPATIENT
Start: 2018-01-12 | End: 2018-01-12

## 2018-01-12 RX ORDER — LEVETIRACETAM 250 MG/1
750 TABLET, FILM COATED ORAL EVERY 12 HOURS
Qty: 0 | Refills: 0 | Status: DISCONTINUED | OUTPATIENT
Start: 2018-01-12 | End: 2018-01-13

## 2018-01-12 RX ADMIN — PROPOFOL 3.79 MICROGRAM(S)/KG/MIN: 10 INJECTION, EMULSION INTRAVENOUS at 22:45

## 2018-01-12 RX ADMIN — Medication 2 MILLIGRAM(S): at 14:18

## 2018-01-12 RX ADMIN — Medication 10 MILLIGRAM(S): at 16:54

## 2018-01-12 RX ADMIN — FENTANYL CITRATE 100 MICROGRAM(S): 50 INJECTION INTRAVENOUS at 14:31

## 2018-01-12 RX ADMIN — HEPARIN SODIUM 5000 UNIT(S): 5000 INJECTION INTRAVENOUS; SUBCUTANEOUS at 21:22

## 2018-01-12 RX ADMIN — Medication 50 MILLIGRAM(S): at 13:58

## 2018-01-12 RX ADMIN — LEVETIRACETAM 400 MILLIGRAM(S): 250 TABLET, FILM COATED ORAL at 18:28

## 2018-01-12 RX ADMIN — Medication 50 MILLIEQUIVALENT(S): at 14:18

## 2018-01-12 RX ADMIN — ALBUTEROL 2.5 MILLIGRAM(S): 90 AEROSOL, METERED ORAL at 15:29

## 2018-01-12 RX ADMIN — ALBUTEROL 2.5 MILLIGRAM(S): 90 AEROSOL, METERED ORAL at 15:30

## 2018-01-12 RX ADMIN — Medication 100 MILLILITER(S): at 14:19

## 2018-01-12 RX ADMIN — ETOMIDATE 20 MILLIGRAM(S): 2 INJECTION INTRAVENOUS at 13:58

## 2018-01-12 RX ADMIN — FENTANYL CITRATE 100 MICROGRAM(S): 50 INJECTION INTRAVENOUS at 14:24

## 2018-01-12 RX ADMIN — Medication 400 GRAM(S): at 14:45

## 2018-01-12 RX ADMIN — Medication 1000 MILLIGRAM(S): at 14:24

## 2018-01-12 RX ADMIN — Medication 200 GRAM(S): at 22:45

## 2018-01-12 RX ADMIN — PANTOPRAZOLE SODIUM 40 MILLIGRAM(S): 20 TABLET, DELAYED RELEASE ORAL at 18:26

## 2018-01-12 RX ADMIN — ALBUTEROL 2.5 MILLIGRAM(S): 90 AEROSOL, METERED ORAL at 14:45

## 2018-01-12 RX ADMIN — CHLORHEXIDINE GLUCONATE 15 MILLILITER(S): 213 SOLUTION TOPICAL at 18:26

## 2018-01-12 NOTE — H&P ADULT - RS GEN PE MLT RESP DETAILS PC
no rhonchi/no wheezes/airway patent/breath sounds equal/good air movement/respirations non-labored/clear to auscultation bilaterally/no rales/no intercostal retractions

## 2018-01-12 NOTE — ED ADULT NURSE REASSESSMENT NOTE - NS ED NURSE REASSESS COMMENT FT1
Jose, ICU PA at bedside for eval. pt remains hypertensive and tachy, vent in place, settings unchanged.
patient intubated by ER MD melgar at this time. post intubation XR ordered and completed by tech. pt remains on pads, monitor and ventilator. RT at bedside
renal, Dr. Connolly at bedside, awaiting MICU bed assignment

## 2018-01-12 NOTE — ED PROVIDER NOTE - OBJECTIVE STATEMENT
[age]something male, "Kalyan Savage" we think; BIB EMS for AMS; patient is dailaysis, diabetic, ESRD; reportedly missed last 1-2 sessions of dialsysis; family called EMS for being in bed and unresponsive; had no IV access in field, found to have FS 15; given 2 amps D50 with minimal response in mental status; subsequently given 1 mg narcan and removed a fentanyl patch with minimal change in mental status; snoring respirations; multiple wide complexes and peaked t waves on monitor; intubated for airway control with etomidate and rocuronium

## 2018-01-12 NOTE — H&P ADULT - PROBLEM SELECTOR PLAN 5
- given several amps of D50 in ED  - now hyperglycemic  - will start ISS and accu-checks q 6 hours while patient remains NPO

## 2018-01-12 NOTE — ED ADULT TRIAGE NOTE - CHIEF COMPLAINT QUOTE
Pt BIBA unresponsive, per EMS pt's BS was 15, given IM  glucagon with no effect. MD Urban and code team called to bedside stat.

## 2018-01-12 NOTE — H&P ADULT - HISTORY OF PRESENT ILLNESS
28 y/o M with a h/o DM, ESRD on HD (M,W,F), HTN, depression, seizure disorder, HIV+, presents to ED via EMS after being found unresponsive in bed this morning by family members. As per report, patient has missed his last 1-2 HD appointments. Initial BG of 15. Developed snoring respirations and lost ability to protect airway and was subsequently intubated. Also found to have wide complex tachyarrhythmia with significantly peaked T-waves- complexes narrowed after administration of IV calcium/bicarb/dextrose. Hypertensive (SBP ~ 180). Lactate: 13. AB.40/19/85/15. 28 y/o M (real name: Kalyan Savage) with a h/o DM, ESRD on HD (M,W,F), HTN, depression, seizure disorder, HIV+, presents to ED via EMS after being found unresponsive in bed this morning by family members. As per report, patient has missed his last 1-2 HD appointments. Initial BG of 15. Developed snoring respirations and lost ability to protect airway and was subsequently intubated. Also found to have wide complex tachyarrhythmia with significantly peaked T-waves- complexes narrowed after administration of IV calcium/bicarb/dextrose. Hypertensive (SBP ~ 180). Lactate: 13. AB.40/19/85/15.

## 2018-01-12 NOTE — ED PROVIDER NOTE - CARE PLAN
Principal Discharge DX:	Acute respiratory failure  Secondary Diagnosis:	Hyperkalemia  Secondary Diagnosis:	Metabolic acidosis

## 2018-01-12 NOTE — H&P ADULT - PROBLEM SELECTOR PLAN 1
- related to AMS and inability to protect airway   - continue mechanical ventilation, titrating to maintain SaO2 > 92%, will wean as tolerated  - treat underlying causes of AMS  - aspiration precautions

## 2018-01-12 NOTE — H&P ADULT - PROBLEM SELECTOR PLAN 3
- will need urgent hemodialysis  - received cocktail in ED  - nephrology already on board and dialysis orders written  - continue cardiac monitoring, will continue to give CaCl if rhythm begins to widen again

## 2018-01-12 NOTE — H&P ADULT - PROBLEM SELECTOR PLAN 8
Hpi Title: Evaluation of Skin Lesions
- will start outpatient antihypertensive regimen  - IV hydralazine PRN

## 2018-01-12 NOTE — H&P ADULT - PROBLEM SELECTOR PLAN 2
- likely related to uremia and acidosis  - currently obtunded, will observe off sedation for now  - continue to monitor for acute changes

## 2018-01-12 NOTE — H&P ADULT - PMH
Diabetes    ESRD (end stage renal disease)    HIV (human immunodeficiency virus infection)    Hypertension    Seizure disorder

## 2018-01-12 NOTE — CONSULT NOTE ADULT - SUBJECTIVE AND OBJECTIVE BOX
Renal :    134<L>  |  88<L>  |  82.0<H>  ----------------------------<  339<H>  Ca:6.2<LL> (2018 13:56)  8.8<HH>   |  13.0<L>  |  14.26<H>      eGFR if Non : 4 <L>  eGFR if : 5 <L>    TPro  6.5 g/dL<L>  /  Alb  3.2 g/dL<L>  /  TBili  1.2 mg/dL  /  DBili  x      /  AST  152 U/L<H>  /  ALT  61 U/L<H>  /  AlkPhos  632 U/L<H>  2018 13:56                        7.5<L>  6.7   )-----------( 125<L>    ( 2018 13:56 )             24.9<L>    Phos:-- M.4 mg/dL PTH:-- Uric acid:-- Serum Osm:--  Ferritin:-- Iron:-- TIBC:-- Tsat:--  B12:-- TSH:-- ( @ 13:56)             28 y/o  AA M (real name: Kalyan Savage) with  h/o DM, ESRD on HD (M,W,F), HTN, depression, seizure disorder, HIV+, presents to ED via EMS after being found unresponsive in bed this morning by family members. As per report, patient has missed his last 1-2 HD appointments. Initial BG of 15. Developed snoring respirations and lost ability to protect airway and was subsequently intubated. Also found to have wide complex tachyarrhythmia with significantly peaked T-waves- complexes narrowed after administration of IV calcium/bicarb/dextrose. Hypertensive (SBP ~ 180). Lactate: 13. AB.40/19/85/15.       Review of Systems:      · General	not applicable	  · Skin/Breast	not applicable	  · Ophthalmologic	not applicable	  · ENMT	not applicable	  · Respiratory and Thorax	not applicable	  · Cardiovascular	not applicable	  · Gastrointestinal	not applicable	  · Genitourinary	not applicable	  · Musculoskeletal	not applicable	  · Neurological	not applicable	  · Psychiatric	not applicable	  · Hematology/Lymphatics	not applicable	  · Endocrine	not applicable	  · Allergic/Immunologic	not applicable	  · Additional ROS	Unable to obtain, patient intubated	      Allergies and Intolerances:     	Allergy Status Unknown:     Home Medications:   * Outpatient Medication Status not yet specified      Past Medical History:  Diabetes    ESRD (end stage renal disease)    HIV (human immunodeficiency virus infection)    Hypertension    Seizure disorder.    Past Surgical History:  AVF - R,    Family History:  No pertinent family history in first degree relatives.    Tobacco Screening:  · Core Measure Site	No	  · Has the patient used tobacco in the past 30 days?	Unable to assess due to patient's cognitive impairment	        Physical Exam:  · Constitutional	detailed exam	  · Constitutional Details	well-developed; well-groomed; well-nourished; no distress	  · Constitutional Comments	intubated	  · Eyes	EOMI; PERRL; no drainage or redness	  · ENMT	No oral lesions; no gross abnormalities	  · Neck	No bruits; no thyromegaly or nodules	  · Breasts	not examined	  · Back	No deformity or limitation of movement	  · Respiratory	detailed exam	  · Respiratory Details	airway patent; breath sounds equal; good air movement; respirations non-labored; clear to auscultation bilaterally; no intercostal retractions; no rales; no rhonchi; no wheezes	  · Cardiovascular	detailed exam	  · Cardiovascular Details	regular rate and rhythm  no rub  no murmur	  · Cardiovascular Details	positive S1; positive S2	  · Gastrointestinal	detailed exam	  · GI Normal	soft; nontender; no distention; bowel sounds normal; no rebound tenderness; no guarding; no rigidity	  · Genitourinary	not examined	  · Rectal	not examined	  · Extremities	detailed exam	  · Extremities Details	no clubbing; no cyanosis; no pedal edema	  · Vascular	Equal and normal pulses (carotid, femoral, dorsalis pedis)	  · Neurological	detailed exam	  · Mental Status	A&Ox0, likely still experiencing sedative effects from intubation agents	  · Skin	detailed exam	  · Skin Comments	Large right UE AVF	  · Lymph Nodes	No lymphadedenopathy	  · Musculoskeletal	No joint pain, swelling or deformity; no limitation of movement	  · Psychiatric	Affect and characteristics of appearance, verbalizations, behaviors are appropriate	    Assessment and Plan:               28 y/o M with a h/o DM, ESRD on HD (M,W,F), HTN, depression, seizure disorder, HIV+, with AMS, acute respiratory failure requiring intubation, hyperkalemia, metabolic acidosis, hypoglycemia.    Problem/Plan - 1:  ·  Problem: Acute respiratory failure.      Plan: - related to AMS and inability to protect airway   - continue mechanical ventilation, titrating to maintain SaO2 > 92%, will wean as tolerated  - treat underlying causes of AMS  - aspiration precautions.       Problem/Plan - 2 :  ·  Problem: Hyperkalemia.      Plan: - urgent hemodialysis  - received cocktail in ED  - continue cardiac monitoring, will continue to give CaCl if rhythm begins to widen again.         Problem/Plan - 3:  ·  Problem: Hypertension.      Plan: - will start outpatient antihypertensive regimen, when able,  - IV hydralazine PRN.       Administrative consent obtained,     HD In Progress, Discussed care w. , Jose &  KARY Villanueva

## 2018-01-12 NOTE — H&P ADULT - ASSESSMENT
26 y/o M with a h/o DM, ESRD on HD (M,W,F), HTN, depression, seizure disorder, HIV+, with AMS, acute respiratory failure requiring intubation, hyperkalemia, metabolic acidosis, hypoglycemia.    Total critical care time spent: 55 mins

## 2018-01-12 NOTE — ED ADULT NURSE NOTE - OBJECTIVE STATEMENT
arrives via ems with finger stich of 14. pt unresponsive. unstable airway, MD Wilmar intubated. missed dialyses.

## 2018-01-13 DIAGNOSIS — J96.01 ACUTE RESPIRATORY FAILURE WITH HYPOXIA: ICD-10-CM

## 2018-01-13 DIAGNOSIS — E11.49 TYPE 2 DIABETES MELLITUS WITH OTHER DIABETIC NEUROLOGICAL COMPLICATION: ICD-10-CM

## 2018-01-13 DIAGNOSIS — I10 ESSENTIAL (PRIMARY) HYPERTENSION: ICD-10-CM

## 2018-01-13 DIAGNOSIS — M54.5 LOW BACK PAIN: ICD-10-CM

## 2018-01-13 LAB
ANION GAP SERPL CALC-SCNC: 17 MMOL/L — SIGNIFICANT CHANGE UP (ref 5–17)
BUN SERPL-MCNC: 46 MG/DL — HIGH (ref 8–20)
CALCIUM SERPL-MCNC: 6.7 MG/DL — LOW (ref 8.6–10.2)
CHLORIDE SERPL-SCNC: 93 MMOL/L — LOW (ref 98–107)
CO2 SERPL-SCNC: 28 MMOL/L — SIGNIFICANT CHANGE UP (ref 22–29)
CREAT SERPL-MCNC: 9.33 MG/DL — HIGH (ref 0.5–1.3)
GAS PNL BLDA: SIGNIFICANT CHANGE UP
GLUCOSE BLDC GLUCOMTR-MCNC: 100 MG/DL — HIGH (ref 70–99)
GLUCOSE BLDC GLUCOMTR-MCNC: 101 MG/DL — HIGH (ref 70–99)
GLUCOSE BLDC GLUCOMTR-MCNC: 58 MG/DL — LOW (ref 70–99)
GLUCOSE BLDC GLUCOMTR-MCNC: 80 MG/DL — SIGNIFICANT CHANGE UP (ref 70–99)
GLUCOSE SERPL-MCNC: 90 MG/DL — SIGNIFICANT CHANGE UP (ref 70–115)
HBV CORE AB SER-ACNC: SIGNIFICANT CHANGE UP
HBV CORE IGM SER-ACNC: SIGNIFICANT CHANGE UP
HBV SURFACE AB SER-ACNC: 10.4 MIU/ML — LOW
HBV SURFACE AG SER-ACNC: SIGNIFICANT CHANGE UP
HCT VFR BLD CALC: 25 % — LOW (ref 42–52)
HCV AB S/CO SERPL IA: 0.06 S/CO — SIGNIFICANT CHANGE UP
HCV AB SERPL-IMP: SIGNIFICANT CHANGE UP
HGB BLD-MCNC: 7.7 G/DL — LOW (ref 14–18)
MAGNESIUM SERPL-MCNC: 2.2 MG/DL — SIGNIFICANT CHANGE UP (ref 1.6–2.6)
MCHC RBC-ENTMCNC: 28.3 PG — SIGNIFICANT CHANGE UP (ref 27–31)
MCHC RBC-ENTMCNC: 30.8 G/DL — LOW (ref 32–36)
MCV RBC AUTO: 91.9 FL — SIGNIFICANT CHANGE UP (ref 80–94)
PHOSPHATE SERPL-MCNC: 6.6 MG/DL — HIGH (ref 2.4–4.7)
PLATELET # BLD AUTO: 116 K/UL — LOW (ref 150–400)
POTASSIUM SERPL-MCNC: 5.4 MMOL/L — HIGH (ref 3.5–5.3)
POTASSIUM SERPL-SCNC: 5.4 MMOL/L — HIGH (ref 3.5–5.3)
RBC # BLD: 2.72 M/UL — LOW (ref 4.6–6.2)
RBC # FLD: 18.6 % — HIGH (ref 11–15.6)
SODIUM SERPL-SCNC: 138 MMOL/L — SIGNIFICANT CHANGE UP (ref 135–145)
WBC # BLD: 3.8 K/UL — LOW (ref 4.8–10.8)
WBC # FLD AUTO: 3.8 K/UL — LOW (ref 4.8–10.8)

## 2018-01-13 PROCEDURE — 99233 SBSQ HOSP IP/OBS HIGH 50: CPT

## 2018-01-13 PROCEDURE — 90937 HEMODIALYSIS REPEATED EVAL: CPT

## 2018-01-13 RX ORDER — ALPRAZOLAM 0.25 MG
2 TABLET ORAL
Qty: 0 | Refills: 0 | Status: DISCONTINUED | OUTPATIENT
Start: 2018-01-13 | End: 2018-01-18

## 2018-01-13 RX ORDER — SEVELAMER CARBONATE 2400 MG/1
800 POWDER, FOR SUSPENSION ORAL
Qty: 0 | Refills: 0 | Status: DISCONTINUED | OUTPATIENT
Start: 2018-01-13 | End: 2018-01-13

## 2018-01-13 RX ORDER — METOPROLOL TARTRATE 50 MG
50 TABLET ORAL
Qty: 0 | Refills: 0 | Status: DISCONTINUED | OUTPATIENT
Start: 2018-01-13 | End: 2018-01-18

## 2018-01-13 RX ORDER — CALCIUM ACETATE 667 MG
667 TABLET ORAL
Qty: 0 | Refills: 0 | Status: DISCONTINUED | OUTPATIENT
Start: 2018-01-13 | End: 2018-01-13

## 2018-01-13 RX ORDER — ZOLPIDEM TARTRATE 10 MG/1
5 TABLET ORAL AT BEDTIME
Qty: 0 | Refills: 0 | Status: DISCONTINUED | OUTPATIENT
Start: 2018-01-13 | End: 2018-01-18

## 2018-01-13 RX ORDER — OXYCODONE HYDROCHLORIDE 5 MG/1
10 TABLET ORAL EVERY 12 HOURS
Qty: 0 | Refills: 0 | Status: DISCONTINUED | OUTPATIENT
Start: 2018-01-13 | End: 2018-01-13

## 2018-01-13 RX ORDER — CINACALCET 30 MG/1
60 TABLET, FILM COATED ORAL
Qty: 0 | Refills: 0 | Status: DISCONTINUED | OUTPATIENT
Start: 2018-01-13 | End: 2018-01-13

## 2018-01-13 RX ORDER — DOLUTEGRAVIR SODIUM 25 MG/1
50 TABLET, FILM COATED ORAL DAILY
Qty: 0 | Refills: 0 | Status: DISCONTINUED | OUTPATIENT
Start: 2018-01-13 | End: 2018-01-18

## 2018-01-13 RX ORDER — DARUNAVIR 75 MG/1
800 TABLET, FILM COATED ORAL DAILY
Qty: 0 | Refills: 0 | Status: DISCONTINUED | OUTPATIENT
Start: 2018-01-13 | End: 2018-01-18

## 2018-01-13 RX ORDER — FAMOTIDINE 10 MG/ML
20 INJECTION INTRAVENOUS DAILY
Qty: 0 | Refills: 0 | Status: DISCONTINUED | OUTPATIENT
Start: 2018-01-13 | End: 2018-01-18

## 2018-01-13 RX ORDER — CALCIUM GLUCONATE 100 MG/ML
2 VIAL (ML) INTRAVENOUS ONCE
Qty: 0 | Refills: 0 | Status: COMPLETED | OUTPATIENT
Start: 2018-01-13 | End: 2018-01-13

## 2018-01-13 RX ORDER — DIPHENHYDRAMINE HCL 50 MG
25 CAPSULE ORAL ONCE
Qty: 0 | Refills: 0 | Status: DISCONTINUED | OUTPATIENT
Start: 2018-01-13 | End: 2018-01-13

## 2018-01-13 RX ORDER — LEVETIRACETAM 250 MG/1
750 TABLET, FILM COATED ORAL
Qty: 0 | Refills: 0 | Status: DISCONTINUED | OUTPATIENT
Start: 2018-01-13 | End: 2018-01-18

## 2018-01-13 RX ORDER — LABETALOL HCL 100 MG
100 TABLET ORAL
Qty: 0 | Refills: 0 | Status: DISCONTINUED | OUTPATIENT
Start: 2018-01-13 | End: 2018-01-13

## 2018-01-13 RX ORDER — DIPHENHYDRAMINE HCL 50 MG
50 CAPSULE ORAL ONCE
Qty: 0 | Refills: 0 | Status: COMPLETED | OUTPATIENT
Start: 2018-01-13 | End: 2018-01-13

## 2018-01-13 RX ORDER — INSULIN LISPRO 100/ML
VIAL (ML) SUBCUTANEOUS
Qty: 0 | Refills: 0 | Status: DISCONTINUED | OUTPATIENT
Start: 2018-01-13 | End: 2018-01-18

## 2018-01-13 RX ORDER — OXYCODONE HYDROCHLORIDE 5 MG/1
15 TABLET ORAL EVERY 6 HOURS
Qty: 0 | Refills: 0 | Status: DISCONTINUED | OUTPATIENT
Start: 2018-01-13 | End: 2018-01-18

## 2018-01-13 RX ORDER — ERYTHROPOIETIN 10000 [IU]/ML
12000 INJECTION, SOLUTION INTRAVENOUS; SUBCUTANEOUS
Qty: 0 | Refills: 0 | Status: DISCONTINUED | OUTPATIENT
Start: 2018-01-13 | End: 2018-01-17

## 2018-01-13 RX ORDER — RITONAVIR 100 MG/1
100 TABLET, FILM COATED ORAL
Qty: 0 | Refills: 0 | Status: DISCONTINUED | OUTPATIENT
Start: 2018-01-13 | End: 2018-01-13

## 2018-01-13 RX ORDER — PANTOPRAZOLE SODIUM 20 MG/1
40 TABLET, DELAYED RELEASE ORAL
Qty: 0 | Refills: 0 | Status: DISCONTINUED | OUTPATIENT
Start: 2018-01-13 | End: 2018-01-13

## 2018-01-13 RX ORDER — GABAPENTIN 400 MG/1
100 CAPSULE ORAL AT BEDTIME
Qty: 0 | Refills: 0 | Status: DISCONTINUED | OUTPATIENT
Start: 2018-01-13 | End: 2018-01-18

## 2018-01-13 RX ORDER — HYDROMORPHONE HYDROCHLORIDE 2 MG/ML
2 INJECTION INTRAMUSCULAR; INTRAVENOUS; SUBCUTANEOUS EVERY 6 HOURS
Qty: 0 | Refills: 0 | Status: DISCONTINUED | OUTPATIENT
Start: 2018-01-13 | End: 2018-01-13

## 2018-01-13 RX ORDER — FENTANYL CITRATE 50 UG/ML
25 INJECTION INTRAVENOUS ONCE
Qty: 0 | Refills: 0 | Status: DISCONTINUED | OUTPATIENT
Start: 2018-01-13 | End: 2018-01-13

## 2018-01-13 RX ORDER — CALCIUM GLUCONATE 100 MG/ML
2 VIAL (ML) INTRAVENOUS
Qty: 0 | Refills: 0 | Status: COMPLETED | OUTPATIENT
Start: 2018-01-13 | End: 2018-01-13

## 2018-01-13 RX ORDER — RITONAVIR 100 MG/1
100 TABLET, FILM COATED ORAL DAILY
Qty: 0 | Refills: 0 | Status: DISCONTINUED | OUTPATIENT
Start: 2018-01-13 | End: 2018-01-18

## 2018-01-13 RX ORDER — TENOFOVIR DISOPROXIL FUMARATE 300 MG/1
300 TABLET, FILM COATED ORAL
Qty: 0 | Refills: 0 | Status: DISCONTINUED | OUTPATIENT
Start: 2018-01-13 | End: 2018-01-18

## 2018-01-13 RX ORDER — DIPHENHYDRAMINE HCL 50 MG
25 CAPSULE ORAL ONCE
Qty: 0 | Refills: 0 | Status: COMPLETED | OUTPATIENT
Start: 2018-01-13 | End: 2018-01-13

## 2018-01-13 RX ORDER — EMTRICITABINE 200 MG/1
200 CAPSULE ORAL
Qty: 0 | Refills: 0 | Status: DISCONTINUED | OUTPATIENT
Start: 2018-01-13 | End: 2018-01-18

## 2018-01-13 RX ORDER — FENTANYL CITRATE 50 UG/ML
1 INJECTION INTRAVENOUS
Qty: 0 | Refills: 0 | Status: DISCONTINUED | OUTPATIENT
Start: 2018-01-13 | End: 2018-01-18

## 2018-01-13 RX ORDER — CALCIUM ACETATE 667 MG
1334 TABLET ORAL
Qty: 0 | Refills: 0 | Status: DISCONTINUED | OUTPATIENT
Start: 2018-01-13 | End: 2018-01-18

## 2018-01-13 RX ORDER — SERTRALINE 25 MG/1
50 TABLET, FILM COATED ORAL DAILY
Qty: 0 | Refills: 0 | Status: DISCONTINUED | OUTPATIENT
Start: 2018-01-13 | End: 2018-01-18

## 2018-01-13 RX ORDER — QUETIAPINE FUMARATE 200 MG/1
50 TABLET, FILM COATED ORAL AT BEDTIME
Qty: 0 | Refills: 0 | Status: DISCONTINUED | OUTPATIENT
Start: 2018-01-13 | End: 2018-01-18

## 2018-01-13 RX ORDER — OXYCODONE HYDROCHLORIDE 5 MG/1
10 TABLET ORAL EVERY 6 HOURS
Qty: 0 | Refills: 0 | Status: DISCONTINUED | OUTPATIENT
Start: 2018-01-13 | End: 2018-01-18

## 2018-01-13 RX ADMIN — SERTRALINE 50 MILLIGRAM(S): 25 TABLET, FILM COATED ORAL at 21:09

## 2018-01-13 RX ADMIN — FENTANYL CITRATE 25 MICROGRAM(S): 50 INJECTION INTRAVENOUS at 07:02

## 2018-01-13 RX ADMIN — Medication 50 MILLIGRAM(S): at 09:36

## 2018-01-13 RX ADMIN — Medication 50 MILLIGRAM(S): at 23:35

## 2018-01-13 RX ADMIN — LEVETIRACETAM 400 MILLIGRAM(S): 250 TABLET, FILM COATED ORAL at 06:07

## 2018-01-13 RX ADMIN — Medication 200 GRAM(S): at 08:17

## 2018-01-13 RX ADMIN — TENOFOVIR DISOPROXIL FUMARATE 300 MILLIGRAM(S): 300 TABLET, FILM COATED ORAL at 17:20

## 2018-01-13 RX ADMIN — QUETIAPINE FUMARATE 50 MILLIGRAM(S): 200 TABLET, FILM COATED ORAL at 21:09

## 2018-01-13 RX ADMIN — LEVETIRACETAM 750 MILLIGRAM(S): 250 TABLET, FILM COATED ORAL at 17:19

## 2018-01-13 RX ADMIN — OXYCODONE HYDROCHLORIDE 10 MILLIGRAM(S): 5 TABLET ORAL at 10:15

## 2018-01-13 RX ADMIN — OXYCODONE HYDROCHLORIDE 10 MILLIGRAM(S): 5 TABLET ORAL at 16:13

## 2018-01-13 RX ADMIN — FAMOTIDINE 20 MILLIGRAM(S): 10 INJECTION INTRAVENOUS at 17:21

## 2018-01-13 RX ADMIN — Medication 25 MILLIGRAM(S): at 11:24

## 2018-01-13 RX ADMIN — Medication 200 GRAM(S): at 01:30

## 2018-01-13 RX ADMIN — Medication 2 MILLIGRAM(S): at 18:04

## 2018-01-13 RX ADMIN — FENTANYL CITRATE 25 MICROGRAM(S): 50 INJECTION INTRAVENOUS at 05:25

## 2018-01-13 RX ADMIN — FENTANYL CITRATE 25 MICROGRAM(S): 50 INJECTION INTRAVENOUS at 06:32

## 2018-01-13 RX ADMIN — GABAPENTIN 100 MILLIGRAM(S): 400 CAPSULE ORAL at 21:09

## 2018-01-13 RX ADMIN — OXYCODONE HYDROCHLORIDE 10 MILLIGRAM(S): 5 TABLET ORAL at 10:49

## 2018-01-13 RX ADMIN — FENTANYL CITRATE 1 PATCH: 50 INJECTION INTRAVENOUS at 08:54

## 2018-01-13 RX ADMIN — ERYTHROPOIETIN 12000 UNIT(S): 10000 INJECTION, SOLUTION INTRAVENOUS; SUBCUTANEOUS at 11:07

## 2018-01-13 RX ADMIN — DARUNAVIR 800 MILLIGRAM(S): 75 TABLET, FILM COATED ORAL at 21:36

## 2018-01-13 RX ADMIN — Medication 1334 MILLIGRAM(S): at 17:19

## 2018-01-13 RX ADMIN — DOLUTEGRAVIR SODIUM 50 MILLIGRAM(S): 25 TABLET, FILM COATED ORAL at 13:44

## 2018-01-13 RX ADMIN — HEPARIN SODIUM 5000 UNIT(S): 5000 INJECTION INTRAVENOUS; SUBCUTANEOUS at 13:44

## 2018-01-13 RX ADMIN — FENTANYL CITRATE 25 MICROGRAM(S): 50 INJECTION INTRAVENOUS at 04:55

## 2018-01-13 RX ADMIN — OXYCODONE HYDROCHLORIDE 10 MILLIGRAM(S): 5 TABLET ORAL at 16:12

## 2018-01-13 RX ADMIN — AMLODIPINE BESYLATE 10 MILLIGRAM(S): 2.5 TABLET ORAL at 13:44

## 2018-01-13 RX ADMIN — RITONAVIR 100 MILLIGRAM(S): 100 TABLET, FILM COATED ORAL at 17:19

## 2018-01-13 RX ADMIN — LISINOPRIL 20 MILLIGRAM(S): 2.5 TABLET ORAL at 13:44

## 2018-01-13 RX ADMIN — Medication 200 GRAM(S): at 08:54

## 2018-01-13 RX ADMIN — Medication 50 MILLIGRAM(S): at 18:04

## 2018-01-13 RX ADMIN — HEPARIN SODIUM 5000 UNIT(S): 5000 INJECTION INTRAVENOUS; SUBCUTANEOUS at 06:06

## 2018-01-13 RX ADMIN — HEPARIN SODIUM 5000 UNIT(S): 5000 INJECTION INTRAVENOUS; SUBCUTANEOUS at 21:09

## 2018-01-13 NOTE — CHART NOTE - NSCHARTNOTEFT_GEN_A_CORE
-patient much more awake this morning once sedation held. Patient able to write his needs/messages on pieces of paper with pen  -he wrote that "he is blind." and "Am I doing better."    -as patient awake, sedation held,and placed on SBT  -CPAP 12/5 titrated down to 5/5, once on 5/5 patient watched for ~30 minutes.  -Dariusz/RSBI <80  -patient following all commands    -was successfully extubated to CAM mask.  no striodor post extubation  -speaking clearly, told us his name (Kalyan Savage) and his  (1990)  -once extubated first thing patient asked for was his fentanyl patch (100 mcg every 2 days) and his 20mg of oxycodone PRN.  -will need to investigate if these does are accurate before re-starting, also will hold these for now as patient was just extubated and do not want to cause respiratory depression    -later today if doses are confirmed, will re-start once patient maintains adequate respirations      35 minutes of critical care time spent at patient's bedside performing SBT, evaluating patient, extubating patient, and ,monitoring patient post extubation.

## 2018-01-13 NOTE — CONSULT NOTE ADULT - SUBJECTIVE AND OBJECTIVE BOX
Chief Complaint:    HPI:  28 y/o M (real name: Kalyan Savage) with a h/o DM, ESRD on HD (M,W,F), HTN, depression, seizure disorder, HIV+, presents to ED via EMS after being found unresponsive in bed this morning by family members. As per report, patient has missed his last 1-2 HD appointments. Initial BG of 15. Developed snoring respirations and lost ability to protect airway and was subsequently intubated. Also found to have wide complex tachyarrhythmia with significantly peaked T-waves- complexes narrowed after administration of IV calcium/bicarb/dextrose. Hypertensive (SBP ~ 180). Lactate: 13. AB.40/19/85/15. (2018 15:15)    Patient complains of severe pain in the lumbar spine. Pain radiates up into the thoracic and cervical spine. It is described as constant and aching nature. It is worse with any movement. Denies any radicular symptoms. No numbness/tingling or weakness. No saddle anesthesia. Patient states he has chronic pain that is managed outside with opiate medication by his pain provider. Patient states until he was given Dilaudid IV 2mg his pain was not controlled. He states he is comfortable at this time since IV meds were given.      was reviewed: Patient is currently managed with fentanyl 100mcg/hr q48 hours, oxycontin 10mg bid and oxycodone 10mg MDD 5 tabs per day. He also is prescrived xanax 2mg bid for anxiety.      PAST MEDICAL & SURGICAL HISTORY:  HIV (human immunodeficiency virus infection)  Seizure disorder  Hypertension  Diabetes  ESRD (end stage renal disease)  No significant past surgical history      FAMILY HISTORY:  No pertinent family history in first degree relatives      SOCIAL HISTORY:  [ ] Denies Smoking, Alcohol, or Drug Use    Allergies    Allergy Status Unknown    Intolerances        PAIN MEDICATIONS:  ALPRAZolam 2 milliGRAM(s) Oral two times a day  fentaNYL   Patch 100 MICROgram(s)/Hr 1 Patch Transdermal every 72 hours  gabapentin 100 milliGRAM(s) Oral at bedtime  levETIRAcetam 750 milliGRAM(s) Oral two times a day  oxyCODONE    IR 15 milliGRAM(s) Oral every 6 hours PRN  oxyCODONE    IR 10 milliGRAM(s) Oral every 6 hours PRN  QUEtiapine 50 milliGRAM(s) Oral at bedtime  sertraline 50 milliGRAM(s) Oral daily  zolpidem 5 milliGRAM(s) Oral at bedtime PRN  zolpidem 5 milliGRAM(s) Oral at bedtime PRN    Heme:  heparin  Injectable 5000 Unit(s) SubCutaneous every 8 hours    Antibiotics:  darunavir 800 milliGRAM(s) Oral daily  dolutegravir 50 milliGRAM(s) Oral daily  emtricitabine 200 milliGRAM(s) Oral <User Schedule>  ritonavir Tablet 100 milliGRAM(s) Oral daily  tenofovir 300 milliGRAM(s) Oral every 48 hours    Cardiovascular:  amLODIPine   Tablet 10 milliGRAM(s) Oral daily  hydrALAZINE Injectable 10 milliGRAM(s) IV Push every 6 hours PRN  lisinopril 20 milliGRAM(s) Oral daily  metoprolol     tartrate 50 milliGRAM(s) Oral two times a day    GI:  famotidine    Tablet 20 milliGRAM(s) Oral daily    Endocrine:  insulin lispro (HumaLOG) corrective regimen sliding scale   SubCutaneous three times a day before meals    All Other Medications:  calcium acetate 1334 milliGRAM(s) Oral three times a day with meals  epoetin nithin Injectable 90275 Unit(s) IV Push <User Schedule>      LABS:                          7.7    3.8   )-----------( 116      ( 2018 06:19 )             25.0         138  |  93<L>  |  46.0<H>  ----------------------------<  90  5.4<H>   |  28.0  |  9.33<H>    Ca    6.7<L>      2018 06:19  Phos  6.6       Mg     2.2         TPro  6.5<L>  /  Alb  3.2<L>  /  TBili  1.2  /  DBili  x   /  AST  152<H>  /  ALT  61<H>  /  AlkPhos  632<H>      PT/INR - ( 2018 13:56 )   PT: 16.6 sec;   INR: 1.50 ratio         PTT - ( 2018 13:56 )  PTT:34.3 sec      Drug Screen:        RADIOLOGY:    REVIEW OF SYSTEMS:    CONSTITUTIONAL: No fever, weight loss, or fatigue  EYES: No eye pain, patient is blind  ENMT:  No difficulty hearing, tinnitus, vertigo; No sinus or throat pain  NECK: No pain or stiffness  BREASTS: No pain, masses, or nipple discharge  RESPIRATORY: No cough, wheezing, chills or hemoptysis; No shortness of breath  CARDIOVASCULAR: No chest pain, palpitations, dizziness, or leg swelling  GASTROINTESTINAL: No abdominal or epigastric pain. No nausea, vomiting, or hematemesis; No diarrhea or constipation. No melena or hematochezia.  GENITOURINARY: No dysuria, frequency, hematuria, or incontinence  NEUROLOGICAL: No headaches, memory loss, loss of strength, numbness, or tremors  SKIN: No itching, burning, rashes, or lesions   LYMPH NODES: No enlarged glands  ENDOCRINE: No heat or cold intolerance; No hair loss  MUSCULOSKELETAL: +back pain  PSYCHIATRIC: No depression, anxiety, mood swings, or difficulty sleeping        Vital Signs Last 24 Hrs  T(C): 37 (2018 12:38), Max: 37 (2018 07:00)  T(F): 98.6 (2018 12:38), Max: 98.6 (2018 07:00)  HR: 79 (2018 16:00) (63 - 83)  BP: 107/61 (2018 16:00) (104/58 - 155/99)  BP(mean): 77 (2018 16:00) (73 - 122)  RR: 23 (2018 16:00) (9 - 29)  SpO2: 98% (2018 16:00) (81% - 100%)    PAIN SCORE:     5/10    SCALE USED: (1-10)      PHYSICAL EXAM:    GENERAL: NAD, well-groomed, well-developed  HEAD:  Atraumatic, Normocephalic  EYES: EOMI, PERRLA, conjunctiva and sclera clear  ENMT: No tonsillar erythema, exudates, or enlargement; Moist mucous membranes, Good dentition, No lesions  NECK: Supple, No JVD, Normal thyroid  NERVOUS SYSTEM:  Alert & Oriented X3, Good concentration; Motor Strength 5/5 B/L upper and lower extremities; DTRs 2+ intact and symmetric  CHEST/LUNG: Clear to percussion bilaterally;   HEART: Regular rate and rhythm;  ABDOMEN: Soft, Nontender, Nondistended; Bowel sounds present  EXTREMITIES:  2+ Peripheral Pulses, No clubbing, cyanosis, or edema        [x ]  [place]  Reviewed and Copied to Chart    Others' Prescriptions  Patient Name:	Kalyan Savage	YOB: 1990  Address:	[address] [place] [id number]	Sex:	Male  Rx Written	Rx Dispensed	Drug	Quantity	Days Supply	Prescriber Name  2018	zolpidem tartrate 10 mg tablet	30	30	Hosea Damian, Npp  2017	oxycodone hcl 10 mg tablet	150	30	Jasper-Venegas, Alexea  2017	oxycodone hcl er 10 mg tablet	60	30	Willa-Venegas, Alexea  2017	alprazolam 2 mg tablet	60	30	Hosea Damian, Npp  2017	fentanyl 100 mcg/hr patch	15	30	Jasper-Venegas, Alexea  2017	oxycodone hcl 10 mg tablet	150	30	Jasper-Venegas, Alexea  2017	20t:1c 5mg thc and 0.25mg cbd/capsule (14ct)	28	14	González Fung MD  2017	20t:1c 2mg thc and 0.1mg cbd per 5-second inhalation	6	9	González Fung MD  2017	alprazolam 2 mg tablet	60	30	Hosea Damian, Npp  10/16/2017	10/26/2017	oxycodone hcl 10 mg tablet	120	30	Willa-Venegas, Alexea  10/16/2017	10/26/2017	fentanyl 100 mcg/hr patch	15	30	Willa-Venegas, Alexea  10/24/2017	10/26/2017	oxycodone hcl er 10 mg tablet	60	30	Jasper-Venegas, Alexea  2017	20t:1c 5mg thc and 0.25mg cbd/capsule	14	7	González Fung MD  2017	20t:1c 2mg thc and 0.1mg cbd per 5-second inhalation	10	15	González Fung MD  2017	fentanyl 100 mcg/hr patch	15	30	Willa-Venegas, Alexea  2017	oxycodone-acetaminophen  mg tab	240	30	Willa-Theo, Alexea  2017	oxycodone hcl er 10 mg tablet	60	30	Jasper-Venegas, Alexea  2017	09/10/2017	alprazolam 2 mg tablet	60	30	Hosea Damian, Npp  2017	oxycodone hcl 10 mg tablet	120	30	Willa-Venegas, Alexea  08/15/2017	2017	hydromorphone 4 mg tablet	24	4	Mukhi, Angelic  2017	clonazepam 1 mg tablet	15	5	Mukhi, Angelic  2017	fentanyl 75 mcg/hr patch	5	15	Mukhi, Angelic  2017	fentanyl 12 mcg/hr patch	5	15	Mukhi, Angelic  2017	oxycodone hcl 10 mg tablet	120	30	Willa-Theo, Alexea  07/10/2017	07/10/2017	alprazolam 2 mg tablet	60	30	Hosea Damian, Npp  2017	20t:1c 2mg thc and 0.1mg cbd per 5-second inhalation	8	12	Gonzálze Fung MD  06/15/2017	2017	oxycodone hcl er 10 mg tablet	45	5	Sukhdev Moyer  06/15/2017	2017	oxycodone hcl 10 mg tablet	30	5	Sukhdev Moyer  2017	oxycodone hcl er 10 mg tablet	60	30	Willa-Theo, Alexea  2017	oxycodone-acetaminophen  mg tab	240	30	Willa-Venegas, Alexea  2017	alprazolam 2 mg tablet	90	30	Hosea Damian, Npp  2017	20t:1c 2mg thc and 0.1mg cbd per 5-second inhalation	6	30	González Fung MD  2017	20t:1c 2mg thc and 0.1mg cbd per 5-second inhalation	6	30	González Fung MD  2017	05/10/2017	fentanyl 100 mcg/hr patch	15	30	Jasper-Venegas, Octaviano  2017	oxycodone hcl er 10 mg tablet	60	30	WillaRubia, Alexea  2017	oxycodone-acetaminophen  mg tab	240	30	JasperHailey, Octaviano  2017	oxycodone hcl 5 mg tablet	10	5	[facility]  [facility] 1 mg tablet	12	4	[facility]  [facility]	20t:1c 2mg thc and 0.1mg cbd per 5-second inhalation	6	30	González Fung MD  2017	20t:1c 2mg thc and 0.1mg cbd per 5-second inhalation	6	30	González Fung MD  2017	oxycodone-acetaminophen  mg tab	180	30	Shaquille, Octaviano  2017	fentanyl 100 mcg/hr patch	15	30	Shaquille, Octaviano  2017	oxycontin 20 mg tablet	60	30	Shaquille, Alexnehemias  2017	alprazolam 2 mg tablet	90	30	Hosea Damian, Npp  2017	20t:1c 2mg thc and 0.1mg cbd per 5-second inhalation	4	20	González Fung MD  2017	20t:1c 2mg thc and 0.1mg cbd per 5-second inhalation	4	20	González Fung MD  2017	oxycontin 20 mg tablet	60	30	JasperRubia, Alexea  2017	oxycodone-acetaminophen  mg tab	240	30	Octaviano Corbin  2017	fentanyl 100 mcg/hr patch	15	30	Octaviano Corbin  2017	alprazolam 2 mg tablet	90	30	Hosea Damian CPNP, Npp  * - Drugs marked with an asterisk are compound drugs. If the compound drug is made up of more than one controlled substance, then each controlled substance will be a separate row in the

## 2018-01-13 NOTE — CONSULT NOTE ADULT - PROBLEM SELECTOR RECOMMENDATION 9
Patient reports exacerbation in low back pain.  He is currently managed outside with inappropriate high doses of opiates.  was reviewed and is in consult.  I have agreed to continue patient on his baseline medication, however I WILL NOT RECOMMEND IV OPIOID MEDICATION.  -Continue Fentanyl 100mcg/hr [id number]  -Continue Oxycodone 10mg [id number] for moderate pain  -Start oxycodone 15mg [id number] for severe pain  -Discontinue IV Dilaudid  I discussed above plan with patient. I recommend the patient follow up with outpatient addiction medicine office/treatment facility for possible suboxone treatment as he has significant psychological dependence to opiates.     Above plan discussed with Dr. Noel who is in agreement.   Will sign off. Patient reports exacerbation in low back pain.  He is currently managed outside with inappropriate high doses of opiates.  was reviewed and is in consult.  I have agreed to continue patient on his baseline medication, however I WILL NOT RECOMMEND IV OPIOID MEDICATION.  -Continue Fentanyl 100mcg/hr [id number]  -Continue Oxycodone 10mg [id number] for moderate pain  -Start oxycodone 15mg [id number] for severe pain  -Discontinue IV Dilaudid    Continue to monitor for side effects of opiates including respiratory depression, increased sedation. Hold opiates if patient is experiencing respiratory depression/increased lethargy/decreased 02.    I discussed above plan with patient. I recommend the patient follow up with outpatient addiction medicine office/treatment facility for possible suboxone treatment as he has significant psychological dependence to opiates.     Above plan discussed with Dr. Noel who is in agreement.   Will sign off.

## 2018-01-13 NOTE — CONSULT NOTE ADULT - ASSESSMENT
Patient is a 27 year old male h/o DM, ESRD on HD (M,W,F), HTN, depression, seizure disorder, HIV+, presents to ED via EMS after being found unresponsive in bed  He reports exacerbation in chronic back pain

## 2018-01-14 LAB
ANION GAP SERPL CALC-SCNC: 17 MMOL/L — SIGNIFICANT CHANGE UP (ref 5–17)
BUN SERPL-MCNC: 40 MG/DL — HIGH (ref 8–20)
CALCIUM SERPL-MCNC: 7.1 MG/DL — LOW (ref 8.6–10.2)
CHLORIDE SERPL-SCNC: 95 MMOL/L — LOW (ref 98–107)
CO2 SERPL-SCNC: 27 MMOL/L — SIGNIFICANT CHANGE UP (ref 22–29)
CREAT SERPL-MCNC: 7.6 MG/DL — HIGH (ref 0.5–1.3)
GLUCOSE BLDC GLUCOMTR-MCNC: 103 MG/DL — HIGH (ref 70–99)
GLUCOSE BLDC GLUCOMTR-MCNC: 111 MG/DL — HIGH (ref 70–99)
GLUCOSE BLDC GLUCOMTR-MCNC: 112 MG/DL — HIGH (ref 70–99)
GLUCOSE BLDC GLUCOMTR-MCNC: 94 MG/DL — SIGNIFICANT CHANGE UP (ref 70–99)
GLUCOSE SERPL-MCNC: 99 MG/DL — SIGNIFICANT CHANGE UP (ref 70–115)
HCT VFR BLD CALC: 27.7 % — LOW (ref 42–52)
HGB BLD-MCNC: 8.6 G/DL — LOW (ref 14–18)
MAGNESIUM SERPL-MCNC: 2.3 MG/DL — SIGNIFICANT CHANGE UP (ref 1.6–2.6)
MCHC RBC-ENTMCNC: 29.4 PG — SIGNIFICANT CHANGE UP (ref 27–31)
MCHC RBC-ENTMCNC: 31 G/DL — LOW (ref 32–36)
MCV RBC AUTO: 94.5 FL — HIGH (ref 80–94)
PHOSPHATE SERPL-MCNC: 5.9 MG/DL — HIGH (ref 2.4–4.7)
PLATELET # BLD AUTO: 122 K/UL — LOW (ref 150–400)
POTASSIUM SERPL-MCNC: 4.2 MMOL/L — SIGNIFICANT CHANGE UP (ref 3.5–5.3)
POTASSIUM SERPL-SCNC: 4.2 MMOL/L — SIGNIFICANT CHANGE UP (ref 3.5–5.3)
RBC # BLD: 2.93 M/UL — LOW (ref 4.6–6.2)
RBC # FLD: 18.5 % — HIGH (ref 11–15.6)
SODIUM SERPL-SCNC: 139 MMOL/L — SIGNIFICANT CHANGE UP (ref 135–145)
TSH SERPL-MCNC: 2.2 UIU/ML — SIGNIFICANT CHANGE UP (ref 0.27–4.2)
WBC # BLD: 4.1 K/UL — LOW (ref 4.8–10.8)
WBC # FLD AUTO: 4.1 K/UL — LOW (ref 4.8–10.8)

## 2018-01-14 PROCEDURE — 99233 SBSQ HOSP IP/OBS HIGH 50: CPT

## 2018-01-14 RX ORDER — ACETAMINOPHEN 500 MG
650 TABLET ORAL EVERY 6 HOURS
Qty: 0 | Refills: 0 | Status: DISCONTINUED | OUTPATIENT
Start: 2018-01-14 | End: 2018-01-18

## 2018-01-14 RX ORDER — DIPHENHYDRAMINE HCL 50 MG
25 CAPSULE ORAL ONCE
Qty: 0 | Refills: 0 | Status: COMPLETED | OUTPATIENT
Start: 2018-01-14 | End: 2018-01-14

## 2018-01-14 RX ORDER — CALCITRIOL 0.5 UG/1
0.5 CAPSULE ORAL DAILY
Qty: 0 | Refills: 0 | Status: DISCONTINUED | OUTPATIENT
Start: 2018-01-14 | End: 2018-01-18

## 2018-01-14 RX ADMIN — QUETIAPINE FUMARATE 50 MILLIGRAM(S): 200 TABLET, FILM COATED ORAL at 22:18

## 2018-01-14 RX ADMIN — DARUNAVIR 800 MILLIGRAM(S): 75 TABLET, FILM COATED ORAL at 22:18

## 2018-01-14 RX ADMIN — HEPARIN SODIUM 5000 UNIT(S): 5000 INJECTION INTRAVENOUS; SUBCUTANEOUS at 06:37

## 2018-01-14 RX ADMIN — Medication 25 MILLIGRAM(S): at 22:59

## 2018-01-14 RX ADMIN — Medication 650 MILLIGRAM(S): at 19:00

## 2018-01-14 RX ADMIN — OXYCODONE HYDROCHLORIDE 10 MILLIGRAM(S): 5 TABLET ORAL at 09:30

## 2018-01-14 RX ADMIN — Medication 2 MILLIGRAM(S): at 06:36

## 2018-01-14 RX ADMIN — Medication 50 MILLIGRAM(S): at 18:04

## 2018-01-14 RX ADMIN — CALCITRIOL 0.5 MICROGRAM(S): 0.5 CAPSULE ORAL at 13:11

## 2018-01-14 RX ADMIN — OXYCODONE HYDROCHLORIDE 15 MILLIGRAM(S): 5 TABLET ORAL at 13:05

## 2018-01-14 RX ADMIN — Medication 50 MILLIGRAM(S): at 06:37

## 2018-01-14 RX ADMIN — OXYCODONE HYDROCHLORIDE 15 MILLIGRAM(S): 5 TABLET ORAL at 22:20

## 2018-01-14 RX ADMIN — SERTRALINE 50 MILLIGRAM(S): 25 TABLET, FILM COATED ORAL at 22:18

## 2018-01-14 RX ADMIN — GABAPENTIN 100 MILLIGRAM(S): 400 CAPSULE ORAL at 22:18

## 2018-01-14 RX ADMIN — Medication 2 MILLIGRAM(S): at 18:04

## 2018-01-14 RX ADMIN — DOLUTEGRAVIR SODIUM 50 MILLIGRAM(S): 25 TABLET, FILM COATED ORAL at 12:46

## 2018-01-14 RX ADMIN — FAMOTIDINE 20 MILLIGRAM(S): 10 INJECTION INTRAVENOUS at 12:46

## 2018-01-14 RX ADMIN — OXYCODONE HYDROCHLORIDE 15 MILLIGRAM(S): 5 TABLET ORAL at 23:17

## 2018-01-14 RX ADMIN — RITONAVIR 100 MILLIGRAM(S): 100 TABLET, FILM COATED ORAL at 12:46

## 2018-01-14 RX ADMIN — Medication 1334 MILLIGRAM(S): at 12:47

## 2018-01-14 RX ADMIN — LISINOPRIL 20 MILLIGRAM(S): 2.5 TABLET ORAL at 06:37

## 2018-01-14 RX ADMIN — Medication 650 MILLIGRAM(S): at 18:04

## 2018-01-14 RX ADMIN — OXYCODONE HYDROCHLORIDE 10 MILLIGRAM(S): 5 TABLET ORAL at 08:30

## 2018-01-14 RX ADMIN — LEVETIRACETAM 750 MILLIGRAM(S): 250 TABLET, FILM COATED ORAL at 06:37

## 2018-01-14 RX ADMIN — LEVETIRACETAM 750 MILLIGRAM(S): 250 TABLET, FILM COATED ORAL at 18:04

## 2018-01-14 RX ADMIN — AMLODIPINE BESYLATE 10 MILLIGRAM(S): 2.5 TABLET ORAL at 06:37

## 2018-01-14 RX ADMIN — OXYCODONE HYDROCHLORIDE 15 MILLIGRAM(S): 5 TABLET ORAL at 14:03

## 2018-01-14 RX ADMIN — Medication 1334 MILLIGRAM(S): at 18:03

## 2018-01-15 LAB
ANION GAP SERPL CALC-SCNC: 17 MMOL/L — SIGNIFICANT CHANGE UP (ref 5–17)
BLD GP AB SCN SERPL QL: SIGNIFICANT CHANGE UP
BUN SERPL-MCNC: 58 MG/DL — HIGH (ref 8–20)
CALCIUM SERPL-MCNC: 7.6 MG/DL — LOW (ref 8.6–10.2)
CHLORIDE SERPL-SCNC: 95 MMOL/L — LOW (ref 98–107)
CO2 SERPL-SCNC: 26 MMOL/L — SIGNIFICANT CHANGE UP (ref 22–29)
CREAT SERPL-MCNC: 10.13 MG/DL — HIGH (ref 0.5–1.3)
GLUCOSE BLDC GLUCOMTR-MCNC: 103 MG/DL — HIGH (ref 70–99)
GLUCOSE BLDC GLUCOMTR-MCNC: 87 MG/DL — SIGNIFICANT CHANGE UP (ref 70–99)
GLUCOSE BLDC GLUCOMTR-MCNC: 89 MG/DL — SIGNIFICANT CHANGE UP (ref 70–99)
GLUCOSE BLDC GLUCOMTR-MCNC: 90 MG/DL — SIGNIFICANT CHANGE UP (ref 70–99)
GLUCOSE SERPL-MCNC: 96 MG/DL — SIGNIFICANT CHANGE UP (ref 70–115)
HCT VFR BLD CALC: 24.9 % — LOW (ref 42–52)
HGB BLD-MCNC: 7.7 G/DL — LOW (ref 14–18)
MCHC RBC-ENTMCNC: 28.9 PG — SIGNIFICANT CHANGE UP (ref 27–31)
MCHC RBC-ENTMCNC: 30.9 G/DL — LOW (ref 32–36)
MCV RBC AUTO: 93.6 FL — SIGNIFICANT CHANGE UP (ref 80–94)
PLATELET # BLD AUTO: 101 K/UL — LOW (ref 150–400)
POTASSIUM SERPL-MCNC: 4.9 MMOL/L — SIGNIFICANT CHANGE UP (ref 3.5–5.3)
POTASSIUM SERPL-SCNC: 4.9 MMOL/L — SIGNIFICANT CHANGE UP (ref 3.5–5.3)
RBC # BLD: 2.66 M/UL — LOW (ref 4.6–6.2)
RBC # FLD: 18.2 % — HIGH (ref 11–15.6)
SODIUM SERPL-SCNC: 138 MMOL/L — SIGNIFICANT CHANGE UP (ref 135–145)
TYPE + AB SCN PNL BLD: SIGNIFICANT CHANGE UP
WBC # BLD: 4.3 K/UL — LOW (ref 4.8–10.8)
WBC # FLD AUTO: 4.3 K/UL — LOW (ref 4.8–10.8)

## 2018-01-15 PROCEDURE — 99233 SBSQ HOSP IP/OBS HIGH 50: CPT

## 2018-01-15 PROCEDURE — 90935 HEMODIALYSIS ONE EVALUATION: CPT

## 2018-01-15 RX ORDER — DIPHENHYDRAMINE HCL 50 MG
50 CAPSULE ORAL ONCE
Qty: 0 | Refills: 0 | Status: COMPLETED | OUTPATIENT
Start: 2018-01-15 | End: 2018-01-15

## 2018-01-15 RX ADMIN — DARUNAVIR 800 MILLIGRAM(S): 75 TABLET, FILM COATED ORAL at 23:08

## 2018-01-15 RX ADMIN — LISINOPRIL 20 MILLIGRAM(S): 2.5 TABLET ORAL at 05:26

## 2018-01-15 RX ADMIN — DOLUTEGRAVIR SODIUM 50 MILLIGRAM(S): 25 TABLET, FILM COATED ORAL at 18:06

## 2018-01-15 RX ADMIN — OXYCODONE HYDROCHLORIDE 10 MILLIGRAM(S): 5 TABLET ORAL at 19:00

## 2018-01-15 RX ADMIN — TENOFOVIR DISOPROXIL FUMARATE 300 MILLIGRAM(S): 300 TABLET, FILM COATED ORAL at 18:07

## 2018-01-15 RX ADMIN — QUETIAPINE FUMARATE 50 MILLIGRAM(S): 200 TABLET, FILM COATED ORAL at 23:08

## 2018-01-15 RX ADMIN — Medication 50 MILLIGRAM(S): at 18:06

## 2018-01-15 RX ADMIN — OXYCODONE HYDROCHLORIDE 15 MILLIGRAM(S): 5 TABLET ORAL at 07:37

## 2018-01-15 RX ADMIN — LEVETIRACETAM 750 MILLIGRAM(S): 250 TABLET, FILM COATED ORAL at 18:12

## 2018-01-15 RX ADMIN — SERTRALINE 50 MILLIGRAM(S): 25 TABLET, FILM COATED ORAL at 23:07

## 2018-01-15 RX ADMIN — AMLODIPINE BESYLATE 10 MILLIGRAM(S): 2.5 TABLET ORAL at 05:25

## 2018-01-15 RX ADMIN — Medication 1334 MILLIGRAM(S): at 09:02

## 2018-01-15 RX ADMIN — Medication 2 MILLIGRAM(S): at 05:26

## 2018-01-15 RX ADMIN — OXYCODONE HYDROCHLORIDE 15 MILLIGRAM(S): 5 TABLET ORAL at 05:40

## 2018-01-15 RX ADMIN — OXYCODONE HYDROCHLORIDE 10 MILLIGRAM(S): 5 TABLET ORAL at 18:13

## 2018-01-15 RX ADMIN — LEVETIRACETAM 750 MILLIGRAM(S): 250 TABLET, FILM COATED ORAL at 05:26

## 2018-01-15 RX ADMIN — Medication 50 MILLIGRAM(S): at 14:00

## 2018-01-15 RX ADMIN — ERYTHROPOIETIN 12000 UNIT(S): 10000 INJECTION, SOLUTION INTRAVENOUS; SUBCUTANEOUS at 14:49

## 2018-01-15 RX ADMIN — RITONAVIR 100 MILLIGRAM(S): 100 TABLET, FILM COATED ORAL at 18:12

## 2018-01-15 RX ADMIN — Medication 50 MILLIGRAM(S): at 05:25

## 2018-01-15 RX ADMIN — Medication 2 MILLIGRAM(S): at 23:08

## 2018-01-15 RX ADMIN — GABAPENTIN 100 MILLIGRAM(S): 400 CAPSULE ORAL at 23:07

## 2018-01-15 RX ADMIN — Medication 1334 MILLIGRAM(S): at 18:06

## 2018-01-16 LAB
4/8 RATIO: 0.19 RATIO — LOW (ref 0.9–3.6)
ABS CD8: 1364 /UL — HIGH (ref 136–757)
ANION GAP SERPL CALC-SCNC: 17 MMOL/L — SIGNIFICANT CHANGE UP (ref 5–17)
BUN SERPL-MCNC: 29 MG/DL — HIGH (ref 8–20)
CALCIUM SERPL-MCNC: 8.3 MG/DL — LOW (ref 8.6–10.2)
CD3 BLASTS SPEC-ACNC: 1655 /UL — SIGNIFICANT CHANGE UP (ref 799–2171)
CD3 BLASTS SPEC-ACNC: 84 % — SIGNIFICANT CHANGE UP (ref 59–85)
CD4 %: 13 % — LOW (ref 36–65)
CD8 %: 69 % — HIGH (ref 11–36)
CHLORIDE SERPL-SCNC: 95 MMOL/L — LOW (ref 98–107)
CO2 SERPL-SCNC: 28 MMOL/L — SIGNIFICANT CHANGE UP (ref 22–29)
CREAT SERPL-MCNC: 7.14 MG/DL — HIGH (ref 0.5–1.3)
GLUCOSE BLDC GLUCOMTR-MCNC: 107 MG/DL — HIGH (ref 70–99)
GLUCOSE BLDC GLUCOMTR-MCNC: 115 MG/DL — HIGH (ref 70–99)
GLUCOSE BLDC GLUCOMTR-MCNC: 121 MG/DL — HIGH (ref 70–99)
GLUCOSE SERPL-MCNC: 94 MG/DL — SIGNIFICANT CHANGE UP (ref 70–115)
HCT VFR BLD CALC: 30.1 % — LOW (ref 42–52)
HGB BLD-MCNC: 9.5 G/DL — LOW (ref 14–18)
HIV-1 VIRAL LOAD RESULT: ABNORMAL
HIV1 RNA # SERPL NAA+PROBE: SIGNIFICANT CHANGE UP
HIV1 RNA SER-IMP: SIGNIFICANT CHANGE UP
HIV1 RNA SERPL NAA+PROBE-ACNC: ABNORMAL
HIV1 RNA SERPL NAA+PROBE-LOG#: 3.89 — SIGNIFICANT CHANGE UP
MCHC RBC-ENTMCNC: 29.3 PG — SIGNIFICANT CHANGE UP (ref 27–31)
MCHC RBC-ENTMCNC: 31.6 G/DL — LOW (ref 32–36)
MCV RBC AUTO: 92.9 FL — SIGNIFICANT CHANGE UP (ref 80–94)
PLATELET # BLD AUTO: 112 K/UL — LOW (ref 150–400)
POTASSIUM SERPL-MCNC: 4 MMOL/L — SIGNIFICANT CHANGE UP (ref 3.5–5.3)
POTASSIUM SERPL-SCNC: 4 MMOL/L — SIGNIFICANT CHANGE UP (ref 3.5–5.3)
RBC # BLD: 3.24 M/UL — LOW (ref 4.6–6.2)
RBC # FLD: 18.3 % — HIGH (ref 11–15.6)
SODIUM SERPL-SCNC: 140 MMOL/L — SIGNIFICANT CHANGE UP (ref 135–145)
T-CELL CD4 SUBSET PNL BLD: 254 /UL — LOW (ref 489–1457)
WBC # BLD: 4 K/UL — LOW (ref 4.8–10.8)
WBC # FLD AUTO: 4 K/UL — LOW (ref 4.8–10.8)

## 2018-01-16 PROCEDURE — 99233 SBSQ HOSP IP/OBS HIGH 50: CPT

## 2018-01-16 RX ADMIN — FENTANYL CITRATE 1 PATCH: 50 INJECTION INTRAVENOUS at 12:30

## 2018-01-16 RX ADMIN — CALCITRIOL 0.5 MICROGRAM(S): 0.5 CAPSULE ORAL at 12:38

## 2018-01-16 RX ADMIN — GABAPENTIN 100 MILLIGRAM(S): 400 CAPSULE ORAL at 22:50

## 2018-01-16 RX ADMIN — LEVETIRACETAM 750 MILLIGRAM(S): 250 TABLET, FILM COATED ORAL at 18:06

## 2018-01-16 RX ADMIN — Medication 1334 MILLIGRAM(S): at 18:06

## 2018-01-16 RX ADMIN — OXYCODONE HYDROCHLORIDE 15 MILLIGRAM(S): 5 TABLET ORAL at 13:00

## 2018-01-16 RX ADMIN — FENTANYL CITRATE 1 PATCH: 50 INJECTION INTRAVENOUS at 12:38

## 2018-01-16 RX ADMIN — Medication 2 MILLIGRAM(S): at 18:06

## 2018-01-16 RX ADMIN — QUETIAPINE FUMARATE 50 MILLIGRAM(S): 200 TABLET, FILM COATED ORAL at 22:51

## 2018-01-16 RX ADMIN — ZOLPIDEM TARTRATE 5 MILLIGRAM(S): 10 TABLET ORAL at 22:51

## 2018-01-16 RX ADMIN — Medication 50 MILLIGRAM(S): at 06:35

## 2018-01-16 RX ADMIN — DARUNAVIR 800 MILLIGRAM(S): 75 TABLET, FILM COATED ORAL at 22:51

## 2018-01-16 RX ADMIN — OXYCODONE HYDROCHLORIDE 15 MILLIGRAM(S): 5 TABLET ORAL at 12:30

## 2018-01-16 RX ADMIN — DOLUTEGRAVIR SODIUM 50 MILLIGRAM(S): 25 TABLET, FILM COATED ORAL at 12:31

## 2018-01-16 RX ADMIN — LISINOPRIL 20 MILLIGRAM(S): 2.5 TABLET ORAL at 06:35

## 2018-01-16 RX ADMIN — Medication 1334 MILLIGRAM(S): at 12:31

## 2018-01-16 RX ADMIN — RITONAVIR 100 MILLIGRAM(S): 100 TABLET, FILM COATED ORAL at 12:31

## 2018-01-16 RX ADMIN — Medication 50 MILLIGRAM(S): at 18:06

## 2018-01-16 RX ADMIN — SERTRALINE 50 MILLIGRAM(S): 25 TABLET, FILM COATED ORAL at 22:51

## 2018-01-16 RX ADMIN — Medication 1334 MILLIGRAM(S): at 10:51

## 2018-01-16 RX ADMIN — FAMOTIDINE 20 MILLIGRAM(S): 10 INJECTION INTRAVENOUS at 12:38

## 2018-01-16 RX ADMIN — LEVETIRACETAM 750 MILLIGRAM(S): 250 TABLET, FILM COATED ORAL at 06:34

## 2018-01-16 RX ADMIN — AMLODIPINE BESYLATE 10 MILLIGRAM(S): 2.5 TABLET ORAL at 06:34

## 2018-01-16 RX ADMIN — Medication 2 MILLIGRAM(S): at 06:34

## 2018-01-16 NOTE — DIETITIAN INITIAL EVALUATION ADULT. - NS AS NUTRI INTERV MEALS SNACK
DASH/TLC, CHO cons, renal replacement/Carbohydrate - modified diet/Other (specify)/Fluid - modified diet

## 2018-01-16 NOTE — PHYSICAL THERAPY INITIAL EVALUATION ADULT - ADDITIONAL COMMENTS
Pt reports living with mother and 2 brothers in a private house 3 steps to enter front, no rails. 4 to side door with rails. independent at baseline within home. Pt is blind and requires assist in new environments.

## 2018-01-16 NOTE — DIETITIAN INITIAL EVALUATION ADULT. - DIET TYPE
renal replacement pts:no protein restr,no conc K & phos, low sodium/consistent carbohydrate (no snacks)/DASH/TLC (sodium and cholesterol restricted diet)/1500ml

## 2018-01-16 NOTE — DIETITIAN INITIAL EVALUATION ADULT. - NS AS NUTRI INTERV ED CONTENT
Pt educated about CHO cons/Purpose of the nutrition education/Nutrition relationship to health/disease

## 2018-01-16 NOTE — PHYSICAL THERAPY INITIAL EVALUATION ADULT - ACTIVE RANGE OF MOTION EXAMINATION, REHAB EVAL
bilateral upper extremity Active ROM was WFL (within functional limits)/bilateral shoulder flexion to approx 90 degrees (pt reports bilateral scapular fxs 'recently')/bilateral  lower extremity Active ROM was WFL (within functional limits)

## 2018-01-16 NOTE — CHART NOTE - NSCHARTNOTEFT_GEN_A_CORE
Upon Nutritional Assessment by the Registered Dietitian your patient was determined to meet criteria / has evidence of the following diagnosis/diagnoses:          [ ]  Mild Protein Calorie Malnutrition        [x ]  Moderate Protein Calorie Malnutrition        [ ] Severe Protein Calorie Malnutrition        [ ] Unspecified Protein Calorie Malnutrition        [ ] Underweight / BMI <19        [ ] Morbid Obesity / BMI > 40      Findings as based on:  •  Comprehensive nutrition assessment and consultation  •  Calorie counts (nutrient intake analysis)  •  Food acceptance and intake status from observations by staff  •  Follow up  •  Patient education  •  Intervention secondary to interdisciplinary rounds  •   concerns      Treatment:    The following diet has been recommended: Continue diet. Recommend change to Glucerna BID, Nephrovite.       PROVIDER Section:     By signing this assessment you are acknowledging and agree with the diagnosis/diagnoses assigned by the Registered Dietitian    Comments:

## 2018-01-16 NOTE — PHYSICAL THERAPY INITIAL EVALUATION ADULT - PERTINENT HX OF CURRENT PROBLEM, REHAB EVAL
26 y/o male BIBA, found unresponsive. AMS. Required intubation as pt unable to protect airway. Extubated 1/13. Pt with h/o HIV, seizures, blind.

## 2018-01-16 NOTE — DIETITIAN INITIAL EVALUATION ADULT. - PHYSICAL APPEARANCE
BMI 24 (pre-dialysis wt, 5'9"), NFPE unavailable though pt presents with generalized weakness/underweight

## 2018-01-16 NOTE — PHYSICAL THERAPY INITIAL EVALUATION ADULT - GENERAL OBSERVATIONS, REHAB EVAL
Pt received lying in bed on 5 tower, NAD. Agreeable to PT evaluation. Requesting to utilize bathroom at start of session.

## 2018-01-16 NOTE — DIETITIAN INITIAL EVALUATION ADULT. - OTHER INFO
BMI 24. Pt states 10-15lb wt loss x 6 months, questionable due to AMS. Admit wt (1/12/18) 139lbs, questionable for accuracy. Pre-dialysis wt. (1/15/18) 161lbs, post-. Went over the menu, implementing 6 small meals. Pt educated about benefits adhering to CHO consistent and renal diet.

## 2018-01-17 LAB
ANION GAP SERPL CALC-SCNC: 18 MMOL/L — HIGH (ref 5–17)
BUN SERPL-MCNC: 44 MG/DL — HIGH (ref 8–20)
CALCIUM SERPL-MCNC: 8.4 MG/DL — LOW (ref 8.6–10.2)
CHLORIDE SERPL-SCNC: 97 MMOL/L — LOW (ref 98–107)
CO2 SERPL-SCNC: 25 MMOL/L — SIGNIFICANT CHANGE UP (ref 22–29)
CREAT SERPL-MCNC: 9.45 MG/DL — HIGH (ref 0.5–1.3)
GLUCOSE BLDC GLUCOMTR-MCNC: 117 MG/DL — HIGH (ref 70–99)
GLUCOSE BLDC GLUCOMTR-MCNC: 82 MG/DL — SIGNIFICANT CHANGE UP (ref 70–99)
GLUCOSE BLDC GLUCOMTR-MCNC: 97 MG/DL — SIGNIFICANT CHANGE UP (ref 70–99)
GLUCOSE BLDC GLUCOMTR-MCNC: 98 MG/DL — SIGNIFICANT CHANGE UP (ref 70–99)
GLUCOSE SERPL-MCNC: 95 MG/DL — SIGNIFICANT CHANGE UP (ref 70–115)
HCT VFR BLD CALC: 28.5 % — LOW (ref 42–52)
HGB BLD-MCNC: 9 G/DL — LOW (ref 14–18)
MAGNESIUM SERPL-MCNC: 2.4 MG/DL — SIGNIFICANT CHANGE UP (ref 1.6–2.6)
MCHC RBC-ENTMCNC: 28.8 PG — SIGNIFICANT CHANGE UP (ref 27–31)
MCHC RBC-ENTMCNC: 31.6 G/DL — LOW (ref 32–36)
MCV RBC AUTO: 91.3 FL — SIGNIFICANT CHANGE UP (ref 80–94)
PHOSPHATE SERPL-MCNC: 5.3 MG/DL — HIGH (ref 2.4–4.7)
PLATELET # BLD AUTO: 95 K/UL — LOW (ref 150–400)
POTASSIUM SERPL-MCNC: 4.7 MMOL/L — SIGNIFICANT CHANGE UP (ref 3.5–5.3)
POTASSIUM SERPL-SCNC: 4.7 MMOL/L — SIGNIFICANT CHANGE UP (ref 3.5–5.3)
RBC # BLD: 3.12 M/UL — LOW (ref 4.6–6.2)
RBC # FLD: 17.9 % — HIGH (ref 11–15.6)
SODIUM SERPL-SCNC: 140 MMOL/L — SIGNIFICANT CHANGE UP (ref 135–145)
WBC # BLD: 3.8 K/UL — LOW (ref 4.8–10.8)
WBC # FLD AUTO: 3.8 K/UL — LOW (ref 4.8–10.8)

## 2018-01-17 PROCEDURE — 99233 SBSQ HOSP IP/OBS HIGH 50: CPT

## 2018-01-17 RX ORDER — DIPHENHYDRAMINE HCL 50 MG
50 CAPSULE ORAL ONCE
Qty: 0 | Refills: 0 | Status: COMPLETED | OUTPATIENT
Start: 2018-01-17 | End: 2018-01-17

## 2018-01-17 RX ORDER — DIPHENHYDRAMINE HCL 50 MG
25 CAPSULE ORAL ONCE
Qty: 0 | Refills: 0 | Status: COMPLETED | OUTPATIENT
Start: 2018-01-17 | End: 2018-01-17

## 2018-01-17 RX ORDER — DIPHENHYDRAMINE HCL 50 MG
50 CAPSULE ORAL ONCE
Qty: 0 | Refills: 0 | Status: DISCONTINUED | OUTPATIENT
Start: 2018-01-17 | End: 2018-01-17

## 2018-01-17 RX ORDER — ERYTHROPOIETIN 10000 [IU]/ML
12000 INJECTION, SOLUTION INTRAVENOUS; SUBCUTANEOUS
Qty: 0 | Refills: 0 | Status: DISCONTINUED | OUTPATIENT
Start: 2018-01-17 | End: 2018-01-18

## 2018-01-17 RX ADMIN — LEVETIRACETAM 750 MILLIGRAM(S): 250 TABLET, FILM COATED ORAL at 18:36

## 2018-01-17 RX ADMIN — ZOLPIDEM TARTRATE 5 MILLIGRAM(S): 10 TABLET ORAL at 23:47

## 2018-01-17 RX ADMIN — Medication 1334 MILLIGRAM(S): at 14:30

## 2018-01-17 RX ADMIN — DOLUTEGRAVIR SODIUM 50 MILLIGRAM(S): 25 TABLET, FILM COATED ORAL at 18:35

## 2018-01-17 RX ADMIN — Medication 25 MILLIGRAM(S): at 12:03

## 2018-01-17 RX ADMIN — Medication 50 MILLIGRAM(S): at 10:06

## 2018-01-17 RX ADMIN — Medication 1334 MILLIGRAM(S): at 18:35

## 2018-01-17 RX ADMIN — DARUNAVIR 800 MILLIGRAM(S): 75 TABLET, FILM COATED ORAL at 22:08

## 2018-01-17 RX ADMIN — OXYCODONE HYDROCHLORIDE 10 MILLIGRAM(S): 5 TABLET ORAL at 22:21

## 2018-01-17 RX ADMIN — LEVETIRACETAM 750 MILLIGRAM(S): 250 TABLET, FILM COATED ORAL at 06:26

## 2018-01-17 RX ADMIN — LISINOPRIL 20 MILLIGRAM(S): 2.5 TABLET ORAL at 06:26

## 2018-01-17 RX ADMIN — CALCITRIOL 0.5 MICROGRAM(S): 0.5 CAPSULE ORAL at 14:28

## 2018-01-17 RX ADMIN — OXYCODONE HYDROCHLORIDE 10 MILLIGRAM(S): 5 TABLET ORAL at 14:46

## 2018-01-17 RX ADMIN — GABAPENTIN 100 MILLIGRAM(S): 400 CAPSULE ORAL at 22:11

## 2018-01-17 RX ADMIN — Medication 1334 MILLIGRAM(S): at 08:25

## 2018-01-17 RX ADMIN — EMTRICITABINE 200 MILLIGRAM(S): 200 CAPSULE ORAL at 18:36

## 2018-01-17 RX ADMIN — QUETIAPINE FUMARATE 50 MILLIGRAM(S): 200 TABLET, FILM COATED ORAL at 22:11

## 2018-01-17 RX ADMIN — OXYCODONE HYDROCHLORIDE 10 MILLIGRAM(S): 5 TABLET ORAL at 23:21

## 2018-01-17 RX ADMIN — AMLODIPINE BESYLATE 10 MILLIGRAM(S): 2.5 TABLET ORAL at 06:26

## 2018-01-17 RX ADMIN — SERTRALINE 50 MILLIGRAM(S): 25 TABLET, FILM COATED ORAL at 22:11

## 2018-01-17 RX ADMIN — OXYCODONE HYDROCHLORIDE 10 MILLIGRAM(S): 5 TABLET ORAL at 15:28

## 2018-01-17 RX ADMIN — Medication 2 MILLIGRAM(S): at 06:26

## 2018-01-17 RX ADMIN — OXYCODONE HYDROCHLORIDE 15 MILLIGRAM(S): 5 TABLET ORAL at 10:11

## 2018-01-17 RX ADMIN — Medication 50 MILLIGRAM(S): at 18:48

## 2018-01-17 RX ADMIN — OXYCODONE HYDROCHLORIDE 15 MILLIGRAM(S): 5 TABLET ORAL at 10:54

## 2018-01-17 RX ADMIN — Medication 50 MILLIGRAM(S): at 06:26

## 2018-01-17 RX ADMIN — ERYTHROPOIETIN 12000 UNIT(S): 10000 INJECTION, SOLUTION INTRAVENOUS; SUBCUTANEOUS at 11:06

## 2018-01-17 RX ADMIN — HEPARIN SODIUM 5000 UNIT(S): 5000 INJECTION INTRAVENOUS; SUBCUTANEOUS at 14:31

## 2018-01-17 RX ADMIN — TENOFOVIR DISOPROXIL FUMARATE 300 MILLIGRAM(S): 300 TABLET, FILM COATED ORAL at 18:34

## 2018-01-17 RX ADMIN — FAMOTIDINE 20 MILLIGRAM(S): 10 INJECTION INTRAVENOUS at 14:30

## 2018-01-17 RX ADMIN — RITONAVIR 100 MILLIGRAM(S): 100 TABLET, FILM COATED ORAL at 14:29

## 2018-01-18 ENCOUNTER — TRANSCRIPTION ENCOUNTER (OUTPATIENT)
Age: 28
End: 2018-01-18

## 2018-01-18 VITALS
SYSTOLIC BLOOD PRESSURE: 144 MMHG | DIASTOLIC BLOOD PRESSURE: 89 MMHG | HEART RATE: 96 BPM | RESPIRATION RATE: 18 BRPM | OXYGEN SATURATION: 97 % | TEMPERATURE: 98 F

## 2018-01-18 LAB
GLUCOSE BLDC GLUCOMTR-MCNC: 118 MG/DL — HIGH (ref 70–99)
GLUCOSE BLDC GLUCOMTR-MCNC: 90 MG/DL — SIGNIFICANT CHANGE UP (ref 70–99)

## 2018-01-18 PROCEDURE — 99261: CPT

## 2018-01-18 PROCEDURE — 86706 HEP B SURFACE ANTIBODY: CPT

## 2018-01-18 PROCEDURE — 85730 THROMBOPLASTIN TIME PARTIAL: CPT

## 2018-01-18 PROCEDURE — 94002 VENT MGMT INPAT INIT DAY: CPT

## 2018-01-18 PROCEDURE — 82435 ASSAY OF BLOOD CHLORIDE: CPT

## 2018-01-18 PROCEDURE — 86360 T CELL ABSOLUTE COUNT/RATIO: CPT

## 2018-01-18 PROCEDURE — 99239 HOSP IP/OBS DSCHRG MGMT >30: CPT

## 2018-01-18 PROCEDURE — 84443 ASSAY THYROID STIM HORMONE: CPT

## 2018-01-18 PROCEDURE — 83605 ASSAY OF LACTIC ACID: CPT

## 2018-01-18 PROCEDURE — 87340 HEPATITIS B SURFACE AG IA: CPT

## 2018-01-18 PROCEDURE — 97116 GAIT TRAINING THERAPY: CPT

## 2018-01-18 PROCEDURE — 71045 X-RAY EXAM CHEST 1 VIEW: CPT

## 2018-01-18 PROCEDURE — 84132 ASSAY OF SERUM POTASSIUM: CPT

## 2018-01-18 PROCEDURE — 85027 COMPLETE CBC AUTOMATED: CPT

## 2018-01-18 PROCEDURE — 82947 ASSAY GLUCOSE BLOOD QUANT: CPT

## 2018-01-18 PROCEDURE — 83735 ASSAY OF MAGNESIUM: CPT

## 2018-01-18 PROCEDURE — 86704 HEP B CORE ANTIBODY TOTAL: CPT

## 2018-01-18 PROCEDURE — 86901 BLOOD TYPING SEROLOGIC RH(D): CPT

## 2018-01-18 PROCEDURE — 96375 TX/PRO/DX INJ NEW DRUG ADDON: CPT | Mod: XU

## 2018-01-18 PROCEDURE — 86803 HEPATITIS C AB TEST: CPT

## 2018-01-18 PROCEDURE — 80048 BASIC METABOLIC PNL TOTAL CA: CPT

## 2018-01-18 PROCEDURE — 86850 RBC ANTIBODY SCREEN: CPT

## 2018-01-18 PROCEDURE — 85610 PROTHROMBIN TIME: CPT

## 2018-01-18 PROCEDURE — 94640 AIRWAY INHALATION TREATMENT: CPT

## 2018-01-18 PROCEDURE — 85014 HEMATOCRIT: CPT

## 2018-01-18 PROCEDURE — P9016: CPT

## 2018-01-18 PROCEDURE — 84100 ASSAY OF PHOSPHORUS: CPT

## 2018-01-18 PROCEDURE — 82803 BLOOD GASES ANY COMBINATION: CPT

## 2018-01-18 PROCEDURE — 86900 BLOOD TYPING SEROLOGIC ABO: CPT

## 2018-01-18 PROCEDURE — 36600 WITHDRAWAL OF ARTERIAL BLOOD: CPT

## 2018-01-18 PROCEDURE — 82962 GLUCOSE BLOOD TEST: CPT

## 2018-01-18 PROCEDURE — 86920 COMPATIBILITY TEST SPIN: CPT

## 2018-01-18 PROCEDURE — 80053 COMPREHEN METABOLIC PANEL: CPT

## 2018-01-18 PROCEDURE — 93005 ELECTROCARDIOGRAM TRACING: CPT

## 2018-01-18 PROCEDURE — 36415 COLL VENOUS BLD VENIPUNCTURE: CPT

## 2018-01-18 PROCEDURE — 97163 PT EVAL HIGH COMPLEX 45 MIN: CPT

## 2018-01-18 PROCEDURE — 84295 ASSAY OF SERUM SODIUM: CPT

## 2018-01-18 PROCEDURE — 96374 THER/PROPH/DIAG INJ IV PUSH: CPT | Mod: XU

## 2018-01-18 PROCEDURE — 99291 CRITICAL CARE FIRST HOUR: CPT | Mod: 25

## 2018-01-18 PROCEDURE — 87536 HIV-1 QUANT&REVRSE TRNSCRPJ: CPT

## 2018-01-18 PROCEDURE — 86705 HEP B CORE ANTIBODY IGM: CPT

## 2018-01-18 PROCEDURE — 94003 VENT MGMT INPAT SUBQ DAY: CPT

## 2018-01-18 PROCEDURE — 31500 INSERT EMERGENCY AIRWAY: CPT

## 2018-01-18 PROCEDURE — 82330 ASSAY OF CALCIUM: CPT

## 2018-01-18 RX ORDER — FAMOTIDINE 10 MG/ML
1 INJECTION INTRAVENOUS
Qty: 30 | Refills: 0 | OUTPATIENT
Start: 2018-01-18 | End: 2018-02-16

## 2018-01-18 RX ORDER — OXYCODONE HYDROCHLORIDE 5 MG/1
2 TABLET ORAL
Qty: 30 | Refills: 0 | OUTPATIENT
Start: 2018-01-18 | End: 2018-01-24

## 2018-01-18 RX ORDER — CALCITRIOL 0.5 UG/1
1 CAPSULE ORAL
Qty: 30 | Refills: 0 | OUTPATIENT
Start: 2018-01-18 | End: 2018-02-16

## 2018-01-18 RX ORDER — GABAPENTIN 400 MG/1
1 CAPSULE ORAL
Qty: 0 | Refills: 0 | COMMUNITY
Start: 2018-01-18

## 2018-01-18 RX ORDER — CALCIUM ACETATE 667 MG
1 TABLET ORAL
Qty: 0 | Refills: 0 | COMMUNITY
Start: 2018-01-18

## 2018-01-18 RX ORDER — AMLODIPINE BESYLATE 2.5 MG/1
1 TABLET ORAL
Qty: 30 | Refills: 0 | OUTPATIENT
Start: 2018-01-18 | End: 2018-02-16

## 2018-01-18 RX ORDER — FENTANYL CITRATE 50 UG/ML
1 INJECTION INTRAVENOUS
Qty: 10 | Refills: 0 | OUTPATIENT
Start: 2018-01-18 | End: 2018-01-27

## 2018-01-18 RX ADMIN — LISINOPRIL 20 MILLIGRAM(S): 2.5 TABLET ORAL at 05:50

## 2018-01-18 RX ADMIN — FAMOTIDINE 20 MILLIGRAM(S): 10 INJECTION INTRAVENOUS at 15:43

## 2018-01-18 RX ADMIN — Medication 50 MILLIGRAM(S): at 05:50

## 2018-01-18 RX ADMIN — DOLUTEGRAVIR SODIUM 50 MILLIGRAM(S): 25 TABLET, FILM COATED ORAL at 15:43

## 2018-01-18 RX ADMIN — Medication 2 MILLIGRAM(S): at 05:57

## 2018-01-18 RX ADMIN — Medication 1334 MILLIGRAM(S): at 09:20

## 2018-01-18 RX ADMIN — AMLODIPINE BESYLATE 10 MILLIGRAM(S): 2.5 TABLET ORAL at 05:50

## 2018-01-18 RX ADMIN — LEVETIRACETAM 750 MILLIGRAM(S): 250 TABLET, FILM COATED ORAL at 05:50

## 2018-01-18 RX ADMIN — RITONAVIR 100 MILLIGRAM(S): 100 TABLET, FILM COATED ORAL at 15:43

## 2018-01-18 NOTE — PROGRESS NOTE ADULT - PROBLEM SELECTOR PROBLEM 3
Essential hypertension
Essential hypertension
Seizure disorder
Essential hypertension

## 2018-01-18 NOTE — DISCHARGE NOTE ADULT - HOME CARE AGENCY
[facility] [phone]  Start of care is 1/20/2018 [facility]  Start of care is 1/23/2018

## 2018-01-18 NOTE — PROGRESS NOTE ADULT - SUBJECTIVE AND OBJECTIVE BOX
[facility] DIVISION OF KIDNEY DISEASES AND HYPERTENSION -- HEMODIALYSIS NOTE  --------------------------------------------------------------------------------  Chief Complaint: ESRD/Ongoing hemodialysis requirement    24 hour events/subjective: Alert,     PAST HISTORY  --------------------------------------------------------------------------------  No significant changes to PMH, PSH, FHx, SHx, unless otherwise noted    ALLERGIES & MEDICATIONS  --------------------------------------------------------------------------------  Allergies    Allergy Status Unknown    Standing Inpatient Medications  amLODIPine   Tablet 10 milliGRAM(s) Oral daily  calcium gluconate IVPB 2 Gram(s) IV Intermittent every 2 hours  diphenhydrAMINE   Injectable 50 milliGRAM(s) IV Push once  heparin  Injectable 5000 Unit(s) SubCutaneous every 8 hours  levETIRAcetam  IVPB 750 milliGRAM(s) IV Intermittent every 12 hours  lisinopril 20 milliGRAM(s) Oral daily  pantoprazole  Injectable 40 milliGRAM(s) IV Push daily    PRN Inpatient Medications  hydrALAZINE Injectable 10 milliGRAM(s) IV Push every 6 hours PRN    REVIEW OF SYSTEMS  --------------------------------------------------------------------------------  Gen: + weakness  Skin: No rashes  Head/Eyes/Ears/Mouth: No headache; Normal hearing;    Respiratory: No dyspnea, cough, wheezing, hemoptysis  CV: No chest pain, orthopnea  GI: No abdominal pain, diarrhea, constipation, nausea, vomiting, melena, hematochezia  : Anuric,  Neuro: No dizziness/lightheadedness, weakness, seizures, numbness, tingling  Heme: No easy bruising or bleeding  Endo: No heat/cold intolerance    All other systems were reviewed and are negative, except as noted.    VITALS/PHYSICAL EXAM  --------------------------------------------------------------------------------  T(C): 37 (01-13-18 @ 07:00), Max: 37.5 (01-12-18 @ 15:30)  HR: 72 (01-13-18 @ 08:00) (63 - 170)  BP: 113/69 (01-13-18 @ 08:00) (105/68 - 203/134)  RR: 10 (01-13-18 @ 08:00) (9 - 31)  SpO2: 100% (01-13-18 @ 08:00) (91% - 100%)  Wt(kg): --    Weight (kg): 63.1 (01-12-18 @ 15:30)      01-12-18 @ 07:01  -  01-13-18 @ 07:00  --------------------------------------------------------  IN: 0 mL / OUT: 2000 mL / NET: -2000 mL      Physical Exam:  	Gen: NAD, ill -appearing  	HEENT: Pale,  	Pulm: CTA B/L  	CV: RRR, S1S2; no rub  	Abd: +BS, soft, nontender/nondistended  	: No suprapubic tenderness  	UE: Warm, FROM, no clubbing, intact strength; no edema; no asterixis  	LE: Warm, FROM, no clubbing, intact strength; no edema  	Neuro: No focal deficits,       	Vascular access: AVF    LABS/STUDIES  --------------------------------------------------------------------------------               7.5    6.7   >-----------<  125      [01-12-18 @ 13:56]              24.9     138  |  93  |  46.0  ----------------------------<  90      [01-13-18 @ 06:19]  5.4   |  28.0  |  9.33        Ca     6.7     [01-13-18 @ 06:19]      Mg     2.2     [01-13-18 @ 06:19]      Phos  6.6     [01-13-18 @ 06:19]    TPro  6.5  /  Alb  3.2  /  TBili  1.2  /  DBili  x   /  AST  152  /  ALT  61  /  AlkPhos  632  [01-12-18 @ 13:56]    PT/INR: PT 16.6 , INR 1.50       [01-12-18 @ 13:56]  PTT: 34.3       [01-12-18 @ 13:56]    < from: Xray Chest 1 View AP- PORTABLE-Urgent (01.12.18 @ 14:17) >  PROCEDURE DATE:  01/12/2018          INTERPRETATION:  Portable chest radiograph        CLINICAL INFORMATION: Post intubation.    TECHNIQUE:  Portable  AP view of the chest was obtained.    COMPARISON: No previous examinations are available for review.    FINDINGS:  ET tube tip above tracheal bifurcation.  NG tube tip beyond GE junction.    The lungs  are clear.  No pleural abnormality is seen.         The  heart is enlarged in transverse diameter. No hilar mass. Trachea   midline.        Visualized osseous structures are intact.        IMPRESSION: Cardiomegaly. Lungs clear. ET tube tip above tracheal   bifurcation.  NG tube tip beyond GE junction.
Renal :    Chief Complaint: Patient is a 27y old  Sp. Male who presents with a chief complaint of  Acute Respiratory failure, hyperkalemia (12 Jan 2018 15:15)    PAST MEDICAL & SURGICAL HISTORY:  HIV (human immunodeficiency virus infection)  Seizure disorder  Hypertension  Diabetes  ESRD (end stage renal disease)    HPI /OVERNIGHT EVENTS: Alert, S/P Acute HD ,    MEDICATIONS  (STANDING):    ALPRAZolam 2 milliGRAM(s) Oral two times a day  amLODIPine   Tablet 10 milliGRAM(s) Oral daily  calcium acetate 1334 milliGRAM(s) Oral three times a day with meals  darunavir 800 milliGRAM(s) Oral daily  dolutegravir 50 milliGRAM(s) Oral daily  emtricitabine 200 milliGRAM(s) Oral <User Schedule>  epoetin nithin Injectable 57900 Unit(s) IV Push <User Schedule>  famotidine    Tablet 20 milliGRAM(s) Oral daily  fentaNYL   Patch 100 MICROgram(s)/Hr 1 Patch Transdermal every 72 hours  gabapentin 100 milliGRAM(s) Oral at bedtime  heparin  Injectable 5000 Unit(s) SubCutaneous every 8 hours  insulin lispro (HumaLOG) corrective regimen sliding scale   SubCutaneous three times a day before meals  levETIRAcetam 750 milliGRAM(s) Oral two times a day  lisinopril 20 milliGRAM(s) Oral daily  metoprolol     tartrate 50 milliGRAM(s) Oral two times a day  oxyCODONE  ER Tablet 10 milliGRAM(s) Oral every 12 hours  QUEtiapine 50 milliGRAM(s) Oral at bedtime  ritonavir Tablet 100 milliGRAM(s) Oral daily  sertraline 50 milliGRAM(s) Oral daily  tenofovir 300 milliGRAM(s) Oral every 48 hours    Vital Signs Last 24 Hrs  T(C): 37 (13 Jan 2018 09:38), Max: 37.5 (12 Jan 2018 15:30)  T(F): 98.6 (13 Jan 2018 09:38), Max: 99.5 (12 Jan 2018 15:30)  HR: 67 (13 Jan 2018 10:00) (63 - 170)  BP: 125/79 (13 Jan 2018 10:00) (105/68 - 203/134)  BP(mean): 97 (13 Jan 2018 10:00) (80 - 163)  RR: 15 (13 Jan 2018 10:00) (9 - 31)  SpO2: 96% (13 Jan 2018 10:00) (91% - 100%)    PHYSICAL EXAM:  Constitutional: NAD, well-groomed, well-developed, Pale , Debilitated,  HEENT: Blind,  Neck: No JVD  Back: No CVA tenderness  Respiratory: CTAB   Cardiovascular: S1 and S2, Harsh SM - Precordium,  Gastrointestinal: BS+, soft, NT / ND  Extremities: No peripheral edema  Vascular: 2+ peripheral pulses  Neurological: A/O x 3, no focal deficits   AVF - RUE + B & T :    CAPILLARY BLOOD GLUCOSE    LABS:                        7.7    3.8   )-----------( 116      ( 13 Jan 2018 06:19 )             25.0     01-13    138  |  93<L>  |  46.0<H>  ----------------------------<  90  5.4<H>   |  28.0  |  9.33<H>    Ca    6.7<L>      13 Jan 2018 06:19  Phos  6.6     01-13  Mg     2.2     01-13    TPro  6.5<L>  /  Alb  3.2<L>  /  TBili  1.2  /  DBili  x   /  AST  152<H>  /  ALT  61<H>  /  AlkPhos  632<H>  01-12    PT/INR - ( 12 Jan 2018 13:56 )   PT: 16.6 sec;   INR: 1.50 ratio         PTT - ( 12 Jan 2018 13:56 )  PTT:34.3 sec    RADIOLOGY & ADDITIONAL TESTS:    X-ray Chest 1 View AP- PORTABLE-Urgent (01.12.18 @ 14:17)     INTERPRETATION:  Portable chest radiograph        CLINICAL INFORMATION: Post intubation.    TECHNIQUE:  Portable  AP view of the chest was obtained.    COMPARISON: No previous examinations are available for review.    FINDINGS:  ET tube tip above tracheal bifurcation.  NG tube tip beyond GE junction.    The lungs  are clear.  No pleural abnormality is seen.         The  heart is enlarged in transverse diameter. No hilar mass. Trachea   midline.        Visualized osseous structures are intact.        IMPRESSION: Cardiomegaly. Lungs clear. ET tube tip above tracheal   bifurcation.  NG tube tip beyond GE junction.                Assessment and Plan:     27 year old black male with HIV, ESRD, blind, seizures missed dialysis was also hypoglycemic, hyperkalemic, altered intubated for airway protection improved this am.   He was extubated and transferred from MICU to medicine.         Problem/Plan - 1:  ·  Problem: Acute respiratory failure with hypoxia.      Plan: Patient now awake and alert.   Respiratory failure resolved.     Problem/Plan - 2:  ·  Problem: HIV (human immunodeficiency virus infection).      Plan: Continue antiretrovirals.     Problem/Plan - 3:  ·  Problem: Essential hypertension.      Plan: Monitor BP.     Problem/Plan - 4:  ·  Problem: Type 2 diabetes mellitus with other neurologic complication, without long-term current use of insulin.      Plan: Monitor BG,     Problem/Plan - 5:  ·  Problem: ESRD (end stage renal disease).      Plan: Dialysis in AM,
[facility] DIVISION OF KIDNEY DISEASES AND HYPERTENSION -- FOLLOW UP NOTE  --------------------------------------------------------------------------------  Chief Complaint:  ESRD on HD,    Drug - Dependance,    24 hour events/subjective:  Pt seen and examined today  OOB to chair - NAD  Offers no complaints or concerns when asked    PAST HISTORY  --------------------------------------------------------------------------------  No significant changes to PMH, PSH, FHx, SHx, unless otherwise noted    ALLERGIES & MEDICATIONS  --------------------------------------------------------------------------------  Allergies    Allergy Status Unknown    Standing Inpatient Medications :    ALPRAZolam 2 milliGRAM(s) Oral two times a day  amLODIPine   Tablet 10 milliGRAM(s) Oral daily  calcitriol   Capsule 0.5 MICROGram(s) Oral daily  calcium acetate 1334 milliGRAM(s) Oral three times a day with meals  darunavir 800 milliGRAM(s) Oral daily  dolutegravir 50 milliGRAM(s) Oral daily  emtricitabine 200 milliGRAM(s) Oral <User Schedule>  epoetin nithin Injectable 23085 Unit(s) IV Push <User Schedule>  famotidine    Tablet 20 milliGRAM(s) Oral daily  fentaNYL   Patch 100 MICROgram(s)/Hr 1 Patch Transdermal every 72 hours  gabapentin 100 milliGRAM(s) Oral at bedtime  heparin  Injectable 5000 Unit(s) SubCutaneous every 8 hours  insulin lispro (HumaLOG) corrective regimen sliding scale   SubCutaneous three times a day before meals  levETIRAcetam 750 milliGRAM(s) Oral two times a day  lisinopril 20 milliGRAM(s) Oral daily  metoprolol     tartrate 50 milliGRAM(s) Oral two times a day  QUEtiapine 50 milliGRAM(s) Oral at bedtime  ritonavir Tablet 100 milliGRAM(s) Oral daily  sertraline 50 milliGRAM(s) Oral daily  tenofovir 300 milliGRAM(s) Oral every 48 hours    PRN Inpatient Medications  acetaminophen   Tablet. 650 milliGRAM(s) Oral every 6 hours PRN  hydrALAZINE Injectable 10 milliGRAM(s) IV Push every 6 hours PRN  oxyCODONE    IR 15 milliGRAM(s) Oral every 6 hours PRN  oxyCODONE    IR 10 milliGRAM(s) Oral every 6 hours PRN  zolpidem 5 milliGRAM(s) Oral at bedtime PRN  zolpidem 5 milliGRAM(s) Oral at bedtime PRN      REVIEW OF SYSTEMS  --------------------------------------------------------------------------------  Gen: No weight changes, fatigue, fevers/chills, weakness  Skin: No rashes  Head/Eyes/Ears/Mouth: No headache; Normal hearing; Normal vision w/o blurriness; No sinus pain/discomfort, sore throat  Respiratory: No dyspnea, cough, wheezing, hemoptysis  CV: No chest pain, PND, orthopnea  GI: No abdominal pain, diarrhea, constipation, nausea, vomiting, melena, hematochezia  : No increased frequency, dysuria, hematuria, nocturia  MSK: No joint pain/swelling; no back pain; no edema  Neuro: No dizziness/lightheadedness, weakness, seizures, numbness, tingling  Heme: No easy bruising or bleeding  Endo: No heat/cold intolerance  Psych: No significant nervousness, anxiety, stress, depression    All other systems were reviewed and are negative, except as noted.    VITALS/PHYSICAL EXAM  --------------------------------------------------------------------------------  T(C): 36.7 (01-15-18 @ 12:00), Max: 37.1 (01-15-18 @ 04:53)  HR: 989 (01-15-18 @ 12:00) (80 - 989)  BP: 121/73 (01-15-18 @ 12:00) (121/73 - 136/83)  RR: 18 (01-15-18 @ 12:00) (16 - 18)  SpO2: 97% (01-15-18 @ 12:00) (95% - 97%)  Wt(kg): --    Physical Exam:    Constitutional: NAD, well-groomed, well-developed, Pale , Debilitated,  HEENT: Blind,  Neck: No JVD  Back: No CVA tenderness  Respiratory: CTAB   Cardiovascular: S1 and S2, Harsh SM - Precordium,  Gastrointestinal: BS+, soft, NT / ND  Extremities: No peripheral edema  Vascular: 2+ peripheral pulses  Neurological: A/O x 3, no focal deficits   AVF - RUE + B & T     LABS/STUDIES  --------------------------------------------------------------------------------    140    |  95<L>  |  29.0<H>  ----------------------------<  94     Ca:8.3<L> (2018 08:33)  4.0     |  28.0   |  7.14<H>      eGFR if Non : 10 <L>  eGFR if : 11 <L>                           9.5<L>( Post Transfusion )  4.0<L> )-----------( 112<L>    ( 2018 08:33 )             30.1<L>    Phos:5.9 mg/dL<H> M.3 mg/dL PTH:-- Uric acid:-- Serum Osm:--  Ferritin:-- Iron:-- TIBC:-- Tsat:--  B12:2.20 uIU/mL TSH:-- ( 08:15)                7.7    4.3   >-----------<  101      [01-15-18 @ 07:20]              24.9     138  |  95  |  58.0  ----------------------------<  96      [01-15-18 @ 07:20]  4.9   |  26.0  |  10.13        Ca     7.6     [01-15-18 @ 07:20]      Mg     2.3     [18 @ 08:15]      Phos  5.9     [18 08:15]    Creatinine Trend:  SCr 10.13 [01-15 @ 07:20]  SCr 7.60 [ 08:15]  SCr 9.33 [ 06:19]  SCr 7.77 [ 21:37]  SCr 14.26 [ 13:56]    TSH 2.20      [18 @ 08:15]    HBsAb 10.4      [18 @ 20:03]  HBsAg Nonreact      [18 @ 20:03]  HBcAb Nonreact      [18 20:03]  HCV 0.06, Nonreact      [18 20:03]        Assessment and Plan:     27 year old black male with HIV, ESRD, blind, seizures missed dialysis was also hypoglycemic, hyperkalemic, altered intubated for airway protection improved ,  He was extubated and transferred from MICU to medicine.      Problem/Plan - 1:  ·  Problem: Acute respiratory failure with hypoxia.      Plan: Patient now awake and alert.   Respiratory failure resolved.     Problem/Plan - 2:  ·  Problem: HIV (human immunodeficiency virus infection).      Plan: Continue antiretrovirals.     Problem/Plan - 3:  ·  Problem: Essential hypertension.      Plan: Monitor BP.     Problem/Plan - 4:  ·  Problem: Type 2 diabetes mellitus with other neurologic complication, without long-term current use of insulin.      Plan: Monitor Blood Glucose Levels ,     Problem/Plan - 5:  ·  Problem: ESRD (end stage renal disease).      Plan: Dialysis in AM,
[facility]     Chief Complaint: Patient is a 27y old  Male who presents with a chief complaint of Respiratory failure, hyperkalemia (12 Jan 2018 15:15)      PAST MEDICAL & SURGICAL HISTORY:  HIV (human immunodeficiency virus infection)  Seizure disorder  Hypertension  Diabetes  ESRD (end stage renal disease)  No significant past surgical history      HPI/OVERNIGHT EVENTS: Patient is hungry, being dialyzed.    MEDICATIONS  (STANDING):  ALPRAZolam 2 milliGRAM(s) Oral two times a day  amLODIPine   Tablet 10 milliGRAM(s) Oral daily  calcium acetate 1334 milliGRAM(s) Oral three times a day with meals  darunavir 800 milliGRAM(s) Oral daily  dolutegravir 50 milliGRAM(s) Oral daily  emtricitabine 200 milliGRAM(s) Oral <User Schedule>  epoetin nithin Injectable 93087 Unit(s) IV Push <User Schedule>  famotidine    Tablet 20 milliGRAM(s) Oral daily  fentaNYL   Patch 100 MICROgram(s)/Hr 1 Patch Transdermal every 72 hours  gabapentin 100 milliGRAM(s) Oral at bedtime  heparin  Injectable 5000 Unit(s) SubCutaneous every 8 hours  insulin lispro (HumaLOG) corrective regimen sliding scale   SubCutaneous three times a day before meals  levETIRAcetam 750 milliGRAM(s) Oral two times a day  lisinopril 20 milliGRAM(s) Oral daily  metoprolol     tartrate 50 milliGRAM(s) Oral two times a day  oxyCODONE  ER Tablet 10 milliGRAM(s) Oral every 12 hours  QUEtiapine 50 milliGRAM(s) Oral at bedtime  ritonavir Tablet 100 milliGRAM(s) Oral daily  sertraline 50 milliGRAM(s) Oral daily  tenofovir 300 milliGRAM(s) Oral every 48 hours      Vital Signs Last 24 Hrs  T(C): 37 (13 Jan 2018 09:38), Max: 37.5 (12 Jan 2018 15:30)  T(F): 98.6 (13 Jan 2018 09:38), Max: 99.5 (12 Jan 2018 15:30)  HR: 67 (13 Jan 2018 10:00) (63 - 170)  BP: 125/79 (13 Jan 2018 10:00) (105/68 - 203/134)  BP(mean): 97 (13 Jan 2018 10:00) (80 - 163)  RR: 15 (13 Jan 2018 10:00) (9 - 31)  SpO2: 96% (13 Jan 2018 10:00) (91% - 100%)    PHYSICAL EXAM:  Constitutional: NAD, well-groomed, well-developed  HEENT: PERRLA, EOMI, Normal Hearing, MMM  Neck: No LAD, No JVD  Back: Normal spine flexure, No CVA tenderness  Respiratory: CTAB Cardiovascular: S1 and S2, RRR, no M/G/R  Gastrointestinal: BS+, soft, NT/ND  Extremities: No peripheral edema  Vascular: 2+ peripheral pulses  Neurological: A/O x 3, no focal deficits     CAPILLARY BLOOD GLUCOSE    LABS:                        7.7    3.8   )-----------( 116      ( 13 Jan 2018 06:19 )             25.0     01-13    138  |  93<L>  |  46.0<H>  ----------------------------<  90  5.4<H>   |  28.0  |  9.33<H>    Ca    6.7<L>      13 Jan 2018 06:19  Phos  6.6     01-13  Mg     2.2     01-13    TPro  6.5<L>  /  Alb  3.2<L>  /  TBili  1.2  /  DBili  x   /  AST  152<H>  /  ALT  61<H>  /  AlkPhos  632<H>  01-12    PT/INR - ( 12 Jan 2018 13:56 )   PT: 16.6 sec;   INR: 1.50 ratio         PTT - ( 12 Jan 2018 13:56 )  PTT:34.3 sec      RADIOLOGY & ADDITIONAL TESTS:
[facility] DIVISION OF KIDNEY DISEASES AND HYPERTENSION -- FOLLOW UP NOTE  --------------------------------------------------------------------------------  Chief Complaint:  ESRD on HD    24 hour events/subjective:  Pt seen and examined today  OOB to chair - NAD  Offers no complaints or concerns when asked  HD today      PAST HISTORY  --------------------------------------------------------------------------------  No significant changes to PMH, PSH, FHx, SHx, unless otherwise noted    ALLERGIES & MEDICATIONS  --------------------------------------------------------------------------------  Allergies    Allergy Status Unknown    Intolerances      Standing Inpatient Medications  ALPRAZolam 2 milliGRAM(s) Oral two times a day  amLODIPine   Tablet 10 milliGRAM(s) Oral daily  calcitriol   Capsule 0.5 MICROGram(s) Oral daily  calcium acetate 1334 milliGRAM(s) Oral three times a day with meals  darunavir 800 milliGRAM(s) Oral daily  dolutegravir 50 milliGRAM(s) Oral daily  emtricitabine 200 milliGRAM(s) Oral <User Schedule>  epoetin nithin Injectable 48118 Unit(s) IV Push <User Schedule>  famotidine    Tablet 20 milliGRAM(s) Oral daily  fentaNYL   Patch 100 MICROgram(s)/Hr 1 Patch Transdermal every 72 hours  gabapentin 100 milliGRAM(s) Oral at bedtime  heparin  Injectable 5000 Unit(s) SubCutaneous every 8 hours  insulin lispro (HumaLOG) corrective regimen sliding scale   SubCutaneous three times a day before meals  levETIRAcetam 750 milliGRAM(s) Oral two times a day  lisinopril 20 milliGRAM(s) Oral daily  metoprolol     tartrate 50 milliGRAM(s) Oral two times a day  QUEtiapine 50 milliGRAM(s) Oral at bedtime  ritonavir Tablet 100 milliGRAM(s) Oral daily  sertraline 50 milliGRAM(s) Oral daily  tenofovir 300 milliGRAM(s) Oral every 48 hours    PRN Inpatient Medications  acetaminophen   Tablet. 650 milliGRAM(s) Oral every 6 hours PRN  hydrALAZINE Injectable 10 milliGRAM(s) IV Push every 6 hours PRN  oxyCODONE    IR 15 milliGRAM(s) Oral every 6 hours PRN  oxyCODONE    IR 10 milliGRAM(s) Oral every 6 hours PRN  zolpidem 5 milliGRAM(s) Oral at bedtime PRN  zolpidem 5 milliGRAM(s) Oral at bedtime PRN      REVIEW OF SYSTEMS  --------------------------------------------------------------------------------  Gen: No weight changes, fatigue, fevers/chills, weakness  Skin: No rashes  Head/Eyes/Ears/Mouth: No headache; Normal hearing; Normal vision w/o blurriness; No sinus pain/discomfort, sore throat  Respiratory: No dyspnea, cough, wheezing, hemoptysis  CV: No chest pain, PND, orthopnea  GI: No abdominal pain, diarrhea, constipation, nausea, vomiting, melena, hematochezia  : No increased frequency, dysuria, hematuria, nocturia  MSK: No joint pain/swelling; no back pain; no edema  Neuro: No dizziness/lightheadedness, weakness, seizures, numbness, tingling  Heme: No easy bruising or bleeding  Endo: No heat/cold intolerance  Psych: No significant nervousness, anxiety, stress, depression    All other systems were reviewed and are negative, except as noted.    VITALS/PHYSICAL EXAM  --------------------------------------------------------------------------------  T(C): 36.7 (01-15-18 @ 12:00), Max: 37.1 (01-15-18 @ 04:53)  HR: 989 (01-15-18 @ 12:00) (80 - 989)  BP: 121/73 (01-15-18 @ 12:00) (121/73 - 136/83)  RR: 18 (01-15-18 @ 12:00) (16 - 18)  SpO2: 97% (01-15-18 @ 12:00) (95% - 97%)  Wt(kg): --        Physical Exam:    Constitutional: NAD, well-groomed, well-developed, Pale , Debilitated,  HEENT: Blind,  Neck: No JVD  Back: No CVA tenderness  Respiratory: CTAB   Cardiovascular: S1 and S2, Harsh SM - Precordium,  Gastrointestinal: BS+, soft, NT / ND  Extremities: No peripheral edema  Vascular: 2+ peripheral pulses  Neurological: A/O x 3, no focal deficits   AVF - RUE + B & T     LABS/STUDIES  --------------------------------------------------------------------------------              7.7    4.3   >-----------<  101      [01-15-18 @ 07:20]              24.9     138  |  95  |  58.0  ----------------------------<  96      [01-15-18 @ 07:20]  4.9   |  26.0  |  10.13        Ca     7.6     [01-15-18 @ 07:20]      Mg     2.3     [01-14-18 @ 08:15]      Phos  5.9     [01-14-18 @ 08:15]            Creatinine Trend:  SCr 10.13 [01-15 @ 07:20]  SCr 7.60 [01-14 @ 08:15]  SCr 9.33 [01-13 @ 06:19]  SCr 7.77 [01-12 @ 21:37]  SCr 14.26 [01-12 @ 13:56]        TSH 2.20      [01-14-18 @ 08:15]    HBsAb 10.4      [01-13-18 @ 20:03]  HBsAg Nonreact      [01-13-18 @ 20:03]  HBcAb Nonreact      [01-13-18 @ 20:03]  HCV 0.06, Nonreact      [01-13-18 @ 20:03]
NEPHROLOGY INTERVAL HPI/OVERNIGHT EVENTS:  HPI: (Our patient at [facility], dialysis)    26 y/o M (real name: Kalyan Savage) with a h/o DM, ESRD on HD (M,W,F), HTN, depression, seizure disorder, HIV+, presents to ED via EMS after being found unresponsive in bed this morning by family members. As per report, patient has missed his last 1-2 HD appointments. Initial BG of 15. Developed snoring respirations and lost ability to protect airway and was subsequently intubated. Also found to have wide complex tachyarrhythmia with significantly peaked T-waves- complexes narrowed after administration of IV calcium/bicarb/dextrose. Hypertensive (SBP ~ 180). Lactate: 13. AB.40/19/85/15. (2018 15:15)      PAST MEDICAL & SURGICAL HISTORY:  HIV (human immunodeficiency virus infection)  Seizure disorder  Hypertension  Diabetes  ESRD (end stage renal disease)  No significant past surgical history      MEDICATIONS  (STANDING):  ALPRAZolam 2 milliGRAM(s) Oral two times a day  amLODIPine   Tablet 10 milliGRAM(s) Oral daily  calcitriol   Capsule 0.5 MICROGram(s) Oral daily  calcium acetate 1334 milliGRAM(s) Oral three times a day with meals  darunavir 800 milliGRAM(s) Oral daily  dolutegravir 50 milliGRAM(s) Oral daily  emtricitabine 200 milliGRAM(s) Oral <User Schedule>  epoetin nithin Injectable 88327 Unit(s) IV Push <User Schedule>  famotidine    Tablet 20 milliGRAM(s) Oral daily  fentaNYL   Patch 100 MICROgram(s)/Hr 1 Patch Transdermal every 72 hours  gabapentin 100 milliGRAM(s) Oral at bedtime  heparin  Injectable 5000 Unit(s) SubCutaneous every 8 hours  insulin lispro (HumaLOG) corrective regimen sliding scale   SubCutaneous three times a day before meals  levETIRAcetam 750 milliGRAM(s) Oral two times a day  lisinopril 20 milliGRAM(s) Oral daily  metoprolol     tartrate 50 milliGRAM(s) Oral two times a day  QUEtiapine 50 milliGRAM(s) Oral at bedtime  ritonavir Tablet 100 milliGRAM(s) Oral daily  sertraline 50 milliGRAM(s) Oral daily  tenofovir 300 milliGRAM(s) Oral every 48 hours    MEDICATIONS  (PRN):  acetaminophen   Tablet. 650 milliGRAM(s) Oral every 6 hours PRN pain  hydrALAZINE Injectable 10 milliGRAM(s) IV Push every 6 hours PRN SBP > 180  oxyCODONE    IR 15 milliGRAM(s) Oral every 6 hours PRN Severe Pain (7 - 10)  oxyCODONE    IR 10 milliGRAM(s) Oral every 6 hours PRN Moderate Pain (4 - 6)  zolpidem 5 milliGRAM(s) Oral at bedtime PRN Insomnia  zolpidem 5 milliGRAM(s) Oral at bedtime PRN Insomnia      Allergies    Allergy Status Unknown    Intolerances        Vital Signs Last 24 Hrs  T(C): 37.1 (2018 09:25), Max: 37.1 (2018 13:24)  T(F): 98.8 (2018 09:25), Max: 98.8 (2018 09:25)  HR: 95 (2018 09:25) (90 - 104)  BP: 135/89 (2018 09:25) (114/67 - 146/86)  BP(mean): --  RR: 20 (2018 09:25) (18 - 20)  SpO2: 96% (2018 09:25) (96% - 99%)  Daily     Daily Weight in k.6 (2018 09:25)    PHYSICAL EXAM:    GENERAL: NAD, well-groomed, well-developed  HEAD:  Atraumatic, Normocephalic  EYES: EOMI, PERRLA, conjunctiva and sclera clear  ENMT: No tonsillar erythema, exudates, or enlargement; Moist mucous membranes, Good dentition, No lesions  NECK: Supple, No JVD, Normal thyroid  NERVOUS SYSTEM:  Alert & Oriented X3, Good concentration; Motor Strength 5/5 B/L upper and lower extremities; DTRs 2+ intact and symmetric  CHEST/LUNG: Clear to percussion bilaterally; No rales, rhonchi, wheezing, or rubs  HEART: Regular rate and rhythm; No murmurs, rubs, or gallops  ABDOMEN: Soft, Nontender, Nondistended; Bowel sounds present  EXTREMITIES:  2+ Peripheral Pulses, No clubbing, cyanosis, or edema  SKIN: No rashes or lesions    LABS:                        9.0    3.8   )-----------( 95       ( 2018 07:25 )             28.5         140  |  97<L>  |  44.0<H>  ----------------------------<  95  4.7   |  25.0  |  9.45<H>    Ca    8.4<L>      2018 07:25  Phos  5.3       Mg     2.4               Magnesium, Serum: 2.4 mg/dL ( @ 07:25)  Phosphorus Level, Serum: 5.3 mg/dL ( @ 07:25)        RADIOLOGY & ADDITIONAL TESTS:
NEPHROLOGY INTERVAL HPI/OVERNIGHT EVENTS:  HPI: (Our patient at [place], dialysis)    26 y/o M (real name: Kalyan Savage) with a h/o DM, ESRD on HD (M,W,F), HTN, depression, seizure disorder, HIV+, presents to ED via EMS after being found unresponsive in bed this morning by family members. As per report, patient has missed his last 1-2 HD appointments. Initial BG of 15. Developed snoring respirations and lost ability to protect airway and was subsequently intubated. Also found to have wide complex tachyarrhythmia with significantly peaked T-waves- complexes narrowed after administration of IV calcium/bicarb/dextrose. Hypertensive (SBP ~ 180). Lactate: 13. AB.40/19/85/15. (2018 15:15)    F/u ESRD       PAST MEDICAL & SURGICAL HISTORY:  HIV (human immunodeficiency virus infection)  Seizure disorder  Hypertension  Diabetes  ESRD (end stage renal disease)  No significant past surgical history      MEDICATIONS  (STANDING):  ALPRAZolam 2 milliGRAM(s) Oral two times a day  amLODIPine   Tablet 10 milliGRAM(s) Oral daily  calcitriol   Capsule 0.5 MICROGram(s) Oral daily  calcium acetate 1334 milliGRAM(s) Oral three times a day with meals  darunavir 800 milliGRAM(s) Oral daily  dolutegravir 50 milliGRAM(s) Oral daily  emtricitabine 200 milliGRAM(s) Oral <User Schedule>  epoetin nithin Injectable 48180 Unit(s) IV Push <User Schedule>  famotidine    Tablet 20 milliGRAM(s) Oral daily  fentaNYL   Patch 100 MICROgram(s)/Hr 1 Patch Transdermal every 72 hours  gabapentin 100 milliGRAM(s) Oral at bedtime  heparin  Injectable 5000 Unit(s) SubCutaneous every 8 hours  insulin lispro (HumaLOG) corrective regimen sliding scale   SubCutaneous three times a day before meals  levETIRAcetam 750 milliGRAM(s) Oral two times a day  lisinopril 20 milliGRAM(s) Oral daily  metoprolol     tartrate 50 milliGRAM(s) Oral two times a day  QUEtiapine 50 milliGRAM(s) Oral at bedtime  ritonavir Tablet 100 milliGRAM(s) Oral daily  sertraline 50 milliGRAM(s) Oral daily  tenofovir 300 milliGRAM(s) Oral every 48 hours    MEDICATIONS  (PRN):  acetaminophen   Tablet. 650 milliGRAM(s) Oral every 6 hours PRN pain  hydrALAZINE Injectable 10 milliGRAM(s) IV Push every 6 hours PRN SBP > 180  oxyCODONE    IR 15 milliGRAM(s) Oral every 6 hours PRN Severe Pain (7 - 10)  oxyCODONE    IR 10 milliGRAM(s) Oral every 6 hours PRN Moderate Pain (4 - 6)  zolpidem 5 milliGRAM(s) Oral at bedtime PRN Insomnia  zolpidem 5 milliGRAM(s) Oral at bedtime PRN Insomnia      Allergies    Allergy Status Unknown    Intolerances        Vital Signs Last 24 Hrs  T(C): 37.1 (2018 09:25), Max: 37.1 (2018 13:24)  T(F): 98.8 (2018 09:25), Max: 98.8 (2018 09:25)  HR: 95 (2018 09:25) (90 - 104)  BP: 135/89 (2018 09:25) (114/67 - 146/86)  BP(mean): --  RR: 20 (2018 09:25) (18 - 20)  SpO2: 96% (2018 09:25) (96% - 99%)  Daily     Daily Weight in k.6 (2018 09:25)    PHYSICAL EXAM:    GENERAL: NAD, well-groomed, well-developed  HEAD:  Atraumatic, Normocephalic  EYES: EOMI, PERRLA, conjunctiva and sclera clear  ENMT: No tonsillar erythema, exudates, or enlargement; Moist mucous membranes, Good dentition, No lesions  NECK: Supple, No JVD, Normal thyroid  NERVOUS SYSTEM:  Alert & Oriented X3, Good concentration; Motor Strength 5/5 B/L upper and lower extremities; DTRs 2+ intact and symmetric  CHEST/LUNG: Clear to percussion bilaterally; No rales, rhonchi, wheezing, or rubs  HEART: Regular rate and rhythm; No murmurs, rubs, or gallops  ABDOMEN: Soft, Nontender, Nondistended; Bowel sounds present  EXTREMITIES:  2+ Peripheral Pulses, No clubbing, cyanosis, or edema  SKIN: No rashes or lesions    LABS:                        9.0    3.8   )-----------( 95       ( 2018 07:25 )             28.5         140  |  97<L>  |  44.0<H>  ----------------------------<  95  4.7   |  25.0  |  9.45<H>    Ca    8.4<L>      2018 07:25  Phos  5.3       Mg     2.4               Magnesium, Serum: 2.4 mg/dL ( @ 07:25)  Phosphorus Level, Serum: 5.3 mg/dL ( @ 07:25)        RADIOLOGY & ADDITIONAL TESTS:
Renal  :      HD Scheduled this PM,     Seen & Examined,    Full consult to Follow,    Discussed w. Dr. Urban,
Renal :    138    |  95<L>  |  58.0<H>  ----------------------------<  96     Ca:7.6<L> (15 Jos 2018 07:20)  4.9     |  26.0   |  10.13<H>      eGFR if Non : 6 <L>  eGFR if : 7 <L>    TPro  6.5 g/dL<L>  /  Alb  3.2 g/dL<L>  /  TBili  1.2 mg/dL  /  DBili  x      /  AST  152 U/L<H>  /  ALT  61 U/L<H>  /  AlkPhos  632 U/L<H>  2018 13:56                        7.7<L>  4.3<L> )-----------( 101<L>    ( 15 Jos 2018 07:20 )             24.9<L>    Phos:5.9 mg/dL<H> M.3 mg/dL PTH:-- Uric acid:-- Serum Osm:--  Ferritin:-- Iron:-- TIBC:-- Tsat:--  B12:2.20 uIU/mL TSH:-- ( @ 08:15)    Patient was seen and evaluated on dialysis.   Patient is tolerating the procedure well.   T(C): 36.7 (01-15-18 @ 12:00), Max: 37.1 (01-15-18 @ 04:53)  HR: 989 (01-15-18 @ 12:00) (80 - 989)  BP: 121/73 (01-15-18 @ 12:00) (121/73 - 136/83)  Continue dialysis: M W F  Dialyzer: Revaclear 300 QB:  450 ml., QD: 500ml.,  Goal UF 1-2 L as Ousmane.,    Transfused 1 Unit - PC,    D/W Dr. Leon,
Renal :    3 hrs of HD,    uf 2L ,    Post hd access care done.    Report given to KARY Geronimo By Our HD RN - Romy,    D/W Dr. Carvajal,
Renal :    Patient was seen and evaluated on dialysis.   T(C): --  HR: 76 (01-12-18 @ 15:27) (76 - 170)  BP: 179/118 (01-12-18 @ 14:35) (134/96 - 182/110)  Continue dialysis: Daily,  Dialyzer:  Revaclear 300        QB: 450 ml.,       QD: 500ml.,  Goal UF As Ousmane.    On 2 K+ Dialysate, EKG - Tall Peaked T Waves,
TEDDY CRAWFORD     Chief Complaint: Patient is a 27y old  Male who presents with a chief complaint of Respiratory failure, hyperkalemia (12 Jan 2018 15:15)      PAST MEDICAL & SURGICAL HISTORY:  HIV (human immunodeficiency virus infection)  Seizure disorder  Hypertension  Diabetes  ESRD (end stage renal disease)  No significant past surgical history      HPI/OVERNIGHT EVENTS: Patient continues to improve. DC in am.    MEDICATIONS  (STANDING):  ALPRAZolam 2 milliGRAM(s) Oral two times a day  amLODIPine   Tablet 10 milliGRAM(s) Oral daily  calcitriol   Capsule 0.5 MICROGram(s) Oral daily  calcium acetate 1334 milliGRAM(s) Oral three times a day with meals  darunavir 800 milliGRAM(s) Oral daily  dolutegravir 50 milliGRAM(s) Oral daily  emtricitabine 200 milliGRAM(s) Oral <User Schedule>  epoetin nithin Injectable 21142 Unit(s) IV Push <User Schedule>  famotidine    Tablet 20 milliGRAM(s) Oral daily  fentaNYL   Patch 100 MICROgram(s)/Hr 1 Patch Transdermal every 72 hours  gabapentin 100 milliGRAM(s) Oral at bedtime  heparin  Injectable 5000 Unit(s) SubCutaneous every 8 hours  insulin lispro (HumaLOG) corrective regimen sliding scale   SubCutaneous three times a day before meals  levETIRAcetam 750 milliGRAM(s) Oral two times a day  lisinopril 20 milliGRAM(s) Oral daily  metoprolol     tartrate 50 milliGRAM(s) Oral two times a day  QUEtiapine 50 milliGRAM(s) Oral at bedtime  ritonavir Tablet 100 milliGRAM(s) Oral daily  sertraline 50 milliGRAM(s) Oral daily  tenofovir 300 milliGRAM(s) Oral every 48 hours      Vital Signs Last 24 Hrs  T(C): 36.8 (17 Jan 2018 13:40), Max: 37.1 (17 Jan 2018 06:22)  T(F): 98.3 (17 Jan 2018 13:40), Max: 98.8 (17 Jan 2018 09:25)  HR: 90 (17 Jan 2018 13:40) (90 - 104)  BP: 124/83 (17 Jan 2018 13:40) (114/67 - 146/86)  BP(mean): --  RR: 18 (17 Jan 2018 13:40) (18 - 20)  SpO2: 98% (17 Jan 2018 13:40) (96% - 99%)    PHYSICAL EXAM:  Constitutional: NAD, well-groomed, well-developed  HEENT: PERRLA, EOMI, Normal Hearing, MMM  Neck: No LAD, No JVD  Back: Normal spine flexure, No CVA tenderness  Respiratory: CTAB Cardiovascular: S1 and S2, RRR, no M/G/R  Gastrointestinal: BS+, soft, NT/ND  Extremities: No peripheral edema  Vascular: 2+ peripheral pulses  Neurological: A/O x 3, no focal deficits   Struggles to ambulate    CAPILLARY BLOOD GLUCOSE    LABS:                        9.0    3.8   )-----------( 95       ( 17 Jan 2018 07:25 )             28.5     01-17    140  |  97<L>  |  44.0<H>  ----------------------------<  95  4.7   |  25.0  |  9.45<H>    Ca    8.4<L>      17 Jan 2018 07:25  Phos  5.3     01-17  Mg     2.4     01-17            RADIOLOGY & ADDITIONAL TESTS:
TEDDY CRAWFORD     Chief Complaint: Patient is a 27y old  Male who presents with a chief complaint of Respiratory failure, hyperkalemia (12 Jan 2018 15:15)      PAST MEDICAL & SURGICAL HISTORY:  HIV (human immunodeficiency virus infection)  Seizure disorder  Hypertension  Diabetes  ESRD (end stage renal disease)  No significant past surgical history      HPI/OVERNIGHT EVENTS: Patient is stable. I educated him regarding healthy habits such as complying with his medications and restricing his diet in particular fluid restriction. I taught him some leg strengthening exercises and cleared him for discharge.    MEDICATIONS  (STANDING):  ALPRAZolam 2 milliGRAM(s) Oral two times a day  amLODIPine   Tablet 10 milliGRAM(s) Oral daily  calcitriol   Capsule 0.5 MICROGram(s) Oral daily  calcium acetate 1334 milliGRAM(s) Oral three times a day with meals  darunavir 800 milliGRAM(s) Oral daily  dolutegravir 50 milliGRAM(s) Oral daily  emtricitabine 200 milliGRAM(s) Oral <User Schedule>  epoetin nithin Injectable 99096 Unit(s) IV Push <User Schedule>  famotidine    Tablet 20 milliGRAM(s) Oral daily  fentaNYL   Patch 100 MICROgram(s)/Hr 1 Patch Transdermal every 72 hours  gabapentin 100 milliGRAM(s) Oral at bedtime  heparin  Injectable 5000 Unit(s) SubCutaneous every 8 hours  insulin lispro (HumaLOG) corrective regimen sliding scale   SubCutaneous three times a day before meals  levETIRAcetam 750 milliGRAM(s) Oral two times a day  lisinopril 20 milliGRAM(s) Oral daily  metoprolol     tartrate 50 milliGRAM(s) Oral two times a day  QUEtiapine 50 milliGRAM(s) Oral at bedtime  ritonavir Tablet 100 milliGRAM(s) Oral daily  sertraline 50 milliGRAM(s) Oral daily  tenofovir 300 milliGRAM(s) Oral every 48 hours      Vital Signs Last 24 Hrs  T(C): 36.7 (18 Jan 2018 09:10), Max: 37.2 (17 Jan 2018 18:38)  T(F): 98 (18 Jan 2018 09:10), Max: 99 (17 Jan 2018 18:38)  HR: 84 (18 Jan 2018 09:10) (80 - 97)  BP: 129/80 (18 Jan 2018 09:10) (124/83 - 140/77)  BP(mean): --  RR: 18 (18 Jan 2018 09:10) (18 - 18)  SpO2: 97% (18 Jan 2018 09:10) (96% - 98%)    PHYSICAL EXAM:  Constitutional: NAD, well-groomed, well-developed  HEENT: blind  Neck: No LAD, No JVD  Back: Normal spine flexure, No CVA tenderness  Respiratory: CTAB Cardiovascular: S1 and S2, RRR, no M/G/R  Gastrointestinal: BS+, soft, NT/ND  Extremities: No peripheral edema  Vascular: 2+ peripheral pulses  Neurological: A/O x 3, no focal deficits   difficulty ambulating    CAPILLARY BLOOD GLUCOSE    LABS:                        9.0    3.8   )-----------( 95       ( 17 Jan 2018 07:25 )             28.5     01-17    140  |  97<L>  |  44.0<H>  ----------------------------<  95  4.7   |  25.0  |  9.45<H>    Ca    8.4<L>      17 Jan 2018 07:25  Phos  5.3     01-17  Mg     2.4     01-17            RADIOLOGY & ADDITIONAL TESTS:
TEDDY CRAWFORD     Chief Complaint: Patient is a 27y old  Male who presents with a chief complaint of Respiratory failure, hyperkalemia (12 Jan 2018 15:15)      PAST MEDICAL & SURGICAL HISTORY:  HIV (human immunodeficiency virus infection)  Seizure disorder  Hypertension  Diabetes  ESRD (end stage renal disease)  No significant past surgical history      HPI/OVERNIGHT EVENTS: Patient struggles to walk    MEDICATIONS  (STANDING):  ALPRAZolam 2 milliGRAM(s) Oral two times a day  amLODIPine   Tablet 10 milliGRAM(s) Oral daily  calcitriol   Capsule 0.5 MICROGram(s) Oral daily  calcium acetate 1334 milliGRAM(s) Oral three times a day with meals  darunavir 800 milliGRAM(s) Oral daily  dolutegravir 50 milliGRAM(s) Oral daily  emtricitabine 200 milliGRAM(s) Oral <User Schedule>  epoetin nithin Injectable 76210 Unit(s) IV Push <User Schedule>  famotidine    Tablet 20 milliGRAM(s) Oral daily  fentaNYL   Patch 100 MICROgram(s)/Hr 1 Patch Transdermal every 72 hours  gabapentin 100 milliGRAM(s) Oral at bedtime  heparin  Injectable 5000 Unit(s) SubCutaneous every 8 hours  insulin lispro (HumaLOG) corrective regimen sliding scale   SubCutaneous three times a day before meals  levETIRAcetam 750 milliGRAM(s) Oral two times a day  lisinopril 20 milliGRAM(s) Oral daily  metoprolol     tartrate 50 milliGRAM(s) Oral two times a day  QUEtiapine 50 milliGRAM(s) Oral at bedtime  ritonavir Tablet 100 milliGRAM(s) Oral daily  sertraline 50 milliGRAM(s) Oral daily  tenofovir 300 milliGRAM(s) Oral every 48 hours      Vital Signs Last 24 Hrs  T(C): 37.1 (16 Jan 2018 13:24), Max: 37.2 (15 Jos 2018 17:39)  T(F): 98.7 (16 Jan 2018 13:24), Max: 98.9 (15 Jos 2018 17:39)  HR: 97 (16 Jan 2018 13:24) (87 - 97)  BP: 137/80 (16 Jan 2018 13:24) (132/80 - 142/88)  BP(mean): --  RR: 18 (16 Jan 2018 13:24) (18 - 19)  SpO2: 96% (16 Jan 2018 13:24) (95% - 100%)    PHYSICAL EXAM:  Constitutional: blind  HEENT: PERRLA, EOMI, Normal Hearing, MMM  Neck: No LAD, No JVD  Back: Normal spine flexure, No CVA tenderness  Respiratory: CTAB Cardiovascular: S1 and S2, RRR, no M/G/R  Gastrointestinal: BS+, soft, NT/ND  Extremities: No peripheral edema  Vascular: 2+ peripheral pulses  Neurological:  can't walk     CAPILLARY BLOOD GLUCOSE    LABS:                        9.5    4.0   )-----------( 112      ( 16 Jan 2018 08:33 )             30.1     01-16    140  |  95<L>  |  29.0<H>  ----------------------------<  94  4.0   |  28.0  |  7.14<H>    Ca    8.3<L>      16 Jan 2018 08:33            RADIOLOGY & ADDITIONAL TESTS:
Patient is a 27y old  Male who presents with a chief complaint of Respiratory failure, hyperkalemia (12 Jan 2018 15:15)      BRIEF HOSPITAL COURSE: 27 yom found altered brought to ED intubated found with hypoglycemia and hyperkalemia    Events last 24 hours: extubated feeling better, mouth is dry    PAST MEDICAL & SURGICAL HISTORY:  HIV (human immunodeficiency virus infection)  Seizure disorder  Hypertension  Diabetes  ESRD (end stage renal disease)  No significant past surgical history      Review of Systems:  CONSTITUTIONAL: No fever, chills, or fatigue  EYES: blind  ENMT:  No difficulty hearing, tinnitus, vertigo; No sinus or throat pain  NECK: No pain or stiffness  RESPIRATORY: No cough, wheezing, chills or hemoptysis; No shortness of breath  CARDIOVASCULAR: No chest pain, palpitations, dizziness, or leg swelling  GASTROINTESTINAL: No abdominal or epigastric pain. No nausea, vomiting, or hematemesis; No diarrhea or constipation. No melena or hematochezia.  GENITOURINARY: No dysuria, frequency, hematuria, or incontinence  NEUROLOGICAL: No headaches, memory loss, loss of strength, numbness, or tremors  SKIN: No itching, burning, rashes, or lesions   MUSCULOSKELETAL: No joint pain or swelling; No muscle, back, or extremity pain  PSYCHIATRIC: No depression, anxiety, mood swings, or difficulty sleeping      Medications:  darunavir 800 milliGRAM(s) Oral daily  dolutegravir 50 milliGRAM(s) Oral daily  emtricitabine 200 milliGRAM(s) Oral <User Schedule>  ritonavir Tablet 100 milliGRAM(s) Oral daily  tenofovir 300 milliGRAM(s) Oral every 48 hours    amLODIPine   Tablet 10 milliGRAM(s) Oral daily  hydrALAZINE Injectable 10 milliGRAM(s) IV Push every 6 hours PRN  lisinopril 20 milliGRAM(s) Oral daily  metoprolol     tartrate 50 milliGRAM(s) Oral two times a day      ALPRAZolam 2 milliGRAM(s) Oral two times a day  fentaNYL   Patch 100 MICROgram(s)/Hr 1 Patch Transdermal every 72 hours  gabapentin 100 milliGRAM(s) Oral at bedtime  levETIRAcetam 750 milliGRAM(s) Oral two times a day  oxyCODONE    IR 10 milliGRAM(s) Oral every 6 hours PRN  oxyCODONE  ER Tablet 10 milliGRAM(s) Oral every 12 hours  QUEtiapine 50 milliGRAM(s) Oral at bedtime  sertraline 50 milliGRAM(s) Oral daily  zolpidem 5 milliGRAM(s) Oral at bedtime PRN  zolpidem 5 milliGRAM(s) Oral at bedtime PRN      heparin  Injectable 5000 Unit(s) SubCutaneous every 8 hours    famotidine    Tablet 20 milliGRAM(s) Oral daily      insulin lispro (HumaLOG) corrective regimen sliding scale   SubCutaneous three times a day before meals    calcium acetate 1334 milliGRAM(s) Oral three times a day with meals    epoetin nithin Injectable 84148 Unit(s) IV Push <User Schedule>          Mode: CPAP with PS  FiO2: 40  PEEP: 5  MAP: 9      ICU Vital Signs Last 24 Hrs  T(C): 37 (13 Jan 2018 09:38), Max: 37.5 (12 Jan 2018 15:30)  T(F): 98.6 (13 Jan 2018 09:38), Max: 99.5 (12 Jan 2018 15:30)  HR: 67 (13 Jan 2018 10:00) (63 - 170)  BP: 125/79 (13 Jan 2018 10:00) (105/68 - 203/134)  BP(mean): 97 (13 Jan 2018 10:00) (80 - 163)  ABP: --  ABP(mean): --  RR: 15 (13 Jan 2018 10:00) (9 - 31)  SpO2: 96% (13 Jan 2018 10:00) (91% - 100%)      ABG - ( 13 Jan 2018 06:32 )  pH: 7.44  /  pCO2: 44    /  pO2: 76    / HCO3: 29    / Base Excess: 5.2   /  SaO2: 94                  I&O's Detail    12 Jan 2018 07:01  -  13 Jan 2018 07:00  --------------------------------------------------------  IN:  Total IN: 0 mL    OUT:    Other: 2000 mL  Total OUT: 2000 mL    Total NET: -2000 mL            LABS:                        7.7    3.8   )-----------( 116      ( 13 Jan 2018 06:19 )             25.0     01-13    138  |  93<L>  |  46.0<H>  ----------------------------<  90  5.4<H>   |  28.0  |  9.33<H>    Ca    6.7<L>      13 Jan 2018 06:19  Phos  6.6     01-13  Mg     2.2     01-13    TPro  6.5<L>  /  Alb  3.2<L>  /  TBili  1.2  /  DBili  x   /  AST  152<H>  /  ALT  61<H>  /  AlkPhos  632<H>  01-12          CAPILLARY BLOOD GLUCOSE      POCT Blood Glucose.: 190 mg/dL (12 Jan 2018 15:56)    PT/INR - ( 12 Jan 2018 13:56 )   PT: 16.6 sec;   INR: 1.50 ratio         PTT - ( 12 Jan 2018 13:56 )  PTT:34.3 sec    CULTURES:      Physical Examination:    General: No acute distress.  Alert, oriented, interactive, nonfocal    HEENT:  Symmetric.    PULM: Clear to auscultation bilaterally, no significant sputum production    CVS: Regular rate and rhythm, no murmurs, rubs, or gallops    ABD: Soft, nondistended, nontender, normoactive bowel sounds, no masses    EXT: No edema, nontender    SKIN: Warm and well perfused, no rashes noted.
TEDDY CRAWFORD     Chief Complaint: Patient is a 27y old  Male who presents with a chief complaint of Respiratory failure, hyperkalemia (12 Jan 2018 15:15)      PAST MEDICAL & SURGICAL HISTORY:  HIV (human immunodeficiency virus infection)  Seizure disorder  Hypertension  Diabetes  ESRD (end stage renal disease)  No significant past surgical history      HPI/OVERNIGHT EVENTS: Patient for transfusion today. He needs PT.    MEDICATIONS  (STANDING):  ALPRAZolam 2 milliGRAM(s) Oral two times a day  amLODIPine   Tablet 10 milliGRAM(s) Oral daily  calcitriol   Capsule 0.5 MICROGram(s) Oral daily  calcium acetate 1334 milliGRAM(s) Oral three times a day with meals  darunavir 800 milliGRAM(s) Oral daily  dolutegravir 50 milliGRAM(s) Oral daily  emtricitabine 200 milliGRAM(s) Oral <User Schedule>  epoetin nithin Injectable 35921 Unit(s) IV Push <User Schedule>  famotidine    Tablet 20 milliGRAM(s) Oral daily  fentaNYL   Patch 100 MICROgram(s)/Hr 1 Patch Transdermal every 72 hours  gabapentin 100 milliGRAM(s) Oral at bedtime  heparin  Injectable 5000 Unit(s) SubCutaneous every 8 hours  insulin lispro (HumaLOG) corrective regimen sliding scale   SubCutaneous three times a day before meals  levETIRAcetam 750 milliGRAM(s) Oral two times a day  lisinopril 20 milliGRAM(s) Oral daily  metoprolol     tartrate 50 milliGRAM(s) Oral two times a day  QUEtiapine 50 milliGRAM(s) Oral at bedtime  ritonavir Tablet 100 milliGRAM(s) Oral daily  sertraline 50 milliGRAM(s) Oral daily  tenofovir 300 milliGRAM(s) Oral every 48 hours      Vital Signs Last 24 Hrs  T(C): 37.1 (15 Jos 2018 04:53), Max: 37.1 (15 Jos 2018 04:53)  T(F): 98.7 (15 Jos 2018 04:53), Max: 98.7 (15 Jos 2018 04:53)  HR: 99 (15 Jos 2018 04:53) (80 - 99)  BP: 136/83 (15 Jos 2018 04:53) (100/62 - 136/83)  BP(mean): --  RR: 18 (15 Jos 2018 04:53) (16 - 18)  SpO2: 95% (14 Jan 2018 21:01) (95% - 96%)    PHYSICAL EXAM:  Constitutional:  Blind  HEENT: PERRLA, EOMI, Normal Hearing, MMM  Neck: No LAD, No JVD  Back: Normal spine flexure, No CVA tenderness  Respiratory: CTAB Cardiovascular: S1 and S2, RRR, no M/G/R  Gastrointestinal: BS+, soft, NT/ND  Extremities: No peripheral edema  Vascular: 2+ peripheral pulses  Neurological:  Difficulty ambulation    CAPILLARY BLOOD GLUCOSE    LABS:                        7.7    4.3   )-----------( 101      ( 15 Jos 2018 07:20 )             24.9     01-15    138  |  95<L>  |  58.0<H>  ----------------------------<  96  4.9   |  26.0  |  10.13<H>    Ca    7.6<L>      15 Jos 2018 07:20  Phos  5.9     01-14  Mg     2.3     01-14            RADIOLOGY & ADDITIONAL TESTS:
TEDDY CRAWFORD     Chief Complaint: Patient is a 27y old  Male who presents with a chief complaint of Respiratory failure, hyperkalemia (12 Jan 2018 15:15)      PAST MEDICAL & SURGICAL HISTORY:  HIV (human immunodeficiency virus infection)  Seizure disorder  Hypertension  Diabetes  ESRD (end stage renal disease)  No significant past surgical history      HPI/OVERNIGHT EVENTS:    MEDICATIONS  (STANDING):  ALPRAZolam 2 milliGRAM(s) Oral two times a day  amLODIPine   Tablet 10 milliGRAM(s) Oral daily  calcitriol   Capsule 0.5 MICROGram(s) Oral daily  calcium acetate 1334 milliGRAM(s) Oral three times a day with meals  darunavir 800 milliGRAM(s) Oral daily  dolutegravir 50 milliGRAM(s) Oral daily  emtricitabine 200 milliGRAM(s) Oral <User Schedule>  epoetin nithin Injectable 19001 Unit(s) IV Push <User Schedule>  famotidine    Tablet 20 milliGRAM(s) Oral daily  fentaNYL   Patch 100 MICROgram(s)/Hr 1 Patch Transdermal every 72 hours  gabapentin 100 milliGRAM(s) Oral at bedtime  heparin  Injectable 5000 Unit(s) SubCutaneous every 8 hours  insulin lispro (HumaLOG) corrective regimen sliding scale   SubCutaneous three times a day before meals  levETIRAcetam 750 milliGRAM(s) Oral two times a day  lisinopril 20 milliGRAM(s) Oral daily  metoprolol     tartrate 50 milliGRAM(s) Oral two times a day  QUEtiapine 50 milliGRAM(s) Oral at bedtime  ritonavir Tablet 100 milliGRAM(s) Oral daily  sertraline 50 milliGRAM(s) Oral daily  tenofovir 300 milliGRAM(s) Oral every 48 hours      Vital Signs Last 24 Hrs  T(C): 36.8 (14 Jan 2018 12:15), Max: 37.2 (13 Jan 2018 19:00)  T(F): 98.2 (14 Jan 2018 12:15), Max: 98.9 (13 Jan 2018 19:00)  HR: 81 (14 Jan 2018 12:15) (79 - 89)  BP: 100/62 (14 Jan 2018 12:15) (100/62 - 126/83)  BP(mean): 104 (13 Jan 2018 21:00) (77 - 104)  RR: 16 (14 Jan 2018 12:15) (15 - 28)  SpO2: 96% (14 Jan 2018 12:15) (96% - 99%)    PHYSICAL EXAM:  Constitutional: NAD, well-groomed, well-developed  HEENT: PERRLA, EOMI, Normal Hearing, MMM  Neck: No LAD, No JVD  Back: Normal spine flexure, No CVA tenderness  Respiratory: CTAB Cardiovascular: S1 and S2, RRR, no M/G/R  Gastrointestinal: BS+, soft, NT/ND  Extremities: No peripheral edema  Vascular: 2+ peripheral pulses  Neurological: A/O x 3, no focal deficits  Psychiatric: Normal mood, normal affect  Musculoskeletal: 5/5 strength b/l upper and lower extremities  Skin: No rashes    CAPILLARY BLOOD GLUCOSE    LABS:                        8.6    4.1   )-----------( 122      ( 14 Jan 2018 08:15 )             27.7     01-14    139  |  95<L>  |  40.0<H>  ----------------------------<  99  4.2   |  27.0  |  7.60<H>    Ca    7.1<L>      14 Jan 2018 08:15  Phos  5.9     01-14  Mg     2.3     01-14            RADIOLOGY & ADDITIONAL TESTS:

## 2018-01-18 NOTE — PROGRESS NOTE ADULT - PROBLEM SELECTOR PROBLEM 5
ESRD (end stage renal disease)
Hypoglycemia

## 2018-01-18 NOTE — DISCHARGE NOTE ADULT - MEDICATION SUMMARY - MEDICATIONS TO TAKE
I will START or STAY ON the medications listed below when I get home from the hospital:    fentaNYL 100 mcg/hr transdermal film, extended release  -- 1 patch by transdermal patch every 72 hours MDD:1  -- Indication: For Pain    oxyCODONE 5 mg oral tablet  -- 2 tab(s) by mouth every 6 hours, As Needed pain MDD:4  -- Indication: For Pain    lisinopril 20 mg oral tablet  -- 1 tab(s) by mouth once a day  -- Indication: For HTN    gabapentin 100 mg oral capsule  -- 1 cap(s) by mouth once a day (at bedtime)  -- Indication: For Pain    Keppra 500 mg oral tablet  -- 1 tab(s) by mouth 2 times a day  -- Indication: For Seizure disorder    Zoloft 50 mg oral tablet  -- 1 tab(s) by mouth once a day  -- Indication: For Depression    SEROquel 50 mg oral tablet  -- 1 tab(s) by mouth once a day (at bedtime)  -- Indication: For Depression    Prezista 800 mg oral tablet  -- 1 tab(s) by mouth once a day  -- Indication: For Aids    Norvir 100 mg oral tablet  -- 1 tab(s) by mouth once a day  -- Indication: For AIDS    Tivicay 50 mg oral tablet  -- 1 tab(s) by mouth once a day  -- Indication: For AIDS    Viread 300 mg oral tablet  -- 1 tab(s) by mouth every 48 hours  give after dialysis on dialysis days  -- Indication: For AIDS    Emtriva 200 mg oral capsule  -- 1 cap(s) by mouth once a week on wed after dialysis  -- Indication: For AIDS    Ambien 10 mg oral tablet  -- 1 tab(s) by mouth once a day (at bedtime)  -- Indication: For Insomnia    metoprolol tartrate 50 mg oral tablet  -- 1 tab(s) by mouth 2 times a day  -- Indication: For HTN    amLODIPine 10 mg oral tablet  -- 1 tab(s) by mouth once a day  -- Indication: For HTN    famotidine 20 mg oral tablet  -- 1 tab(s) by mouth once a day  -- Indication: For GERD    calcium acetate 667 mg oral tablet  -- 1 tab(s) by mouth 3 times a day (before meals)  -- Indication: For Renal failure    Renvela 800 mg oral tablet  -- 3 tab(s) by mouth 3 times a day (with meals)  -- Indication: For Renal failure    Protonix 40 mg oral delayed release tablet  -- 1 tab(s) by mouth once a day  -- Indication: For GERD    calcitriol 0.5 mcg oral capsule  -- 1 cap(s) by mouth once a day  -- Indication: For Renal Failure

## 2018-01-18 NOTE — PROGRESS NOTE ADULT - PROBLEM SELECTOR PROBLEM 4
Type 2 diabetes mellitus with other neurologic complication, without long-term current use of insulin
ESRD (end stage renal disease)
Type 2 diabetes mellitus with other neurologic complication, without long-term current use of insulin

## 2018-01-18 NOTE — DISCHARGE NOTE ADULT - PLAN OF CARE
Resolved Follow up with primary care Follow up with nephrology Stable BP Follow up with Infectious disease Secondary to renal failure  Monitor glucose levels

## 2018-01-18 NOTE — DISCHARGE NOTE ADULT - CARE PROVIDER_API CALL
Marcelo Jiménez), Nephrology  [address]  [address]  [place] [address]  Phone: [phone]  Fax: [phone]

## 2018-01-18 NOTE — PROGRESS NOTE ADULT - PROVIDER SPECIALTY LIST ADULT
Hospitalist
Nephrology
Critical Care
Hospitalist
Hospitalist

## 2018-01-18 NOTE — DISCHARGE NOTE ADULT - PATIENT PORTAL LINK FT
“You can access the Follow[facility] Patient Portal, offered by [facility], by registering with the following website: [email or web address]”

## 2018-01-18 NOTE — PROGRESS NOTE ADULT - PROBLEM SELECTOR PLAN 5
Dialysis as per nephrology.
resolved monitor

## 2018-01-18 NOTE — DISCHARGE NOTE ADULT - HOSPITAL COURSE
27 year old black male with HIV, ESRD, blind, seizures missed dialysis was also hypoglycemic, hyperkalemic, altered intubated for airway protection improved this am. He was extubated and transferred from MICU to medicine. 1/14 Patient seen by pain management. He is requesting a grill cheese sandwich. On 1/15 Blood transfusion and pt consult. Patient is stable. I educated him regarding healthy habits such as complying with his medications and restricing his diet in particular fluid restriction. I taught him some leg strengthening exercises and cleared him for discharge.     Problem/Plan - 1:  ·  Problem: Acute respiratory failure with hypoxia.  Plan: Patient now awake and alert. Respiratory failure resolved.      Problem/Plan - 2:  ·  Problem: HIV (human immunodeficiency virus infection).  Plan: Continue antiretrovirals.      Problem/Plan - 3:  ·  Problem: Essential hypertension.  Plan: Monitor BP.      Problem/Plan - 4:  ·  Problem: Type 2 diabetes mellitus with other neurologic complication, without long-term current use of insulin.  Plan: Monitor bgm.      Problem/Plan - 5:  ·  Problem: ESRD (end stage renal disease).  Plan: Dialysis as per nephrology.     Attending Attestation:   I was physically present for the key portions of the evaluation and management (E/M) service provided.  I agree with the above history, physical, and plan which I have reviewed and edited where appropriate.     45 minutes spent on total encounter; more than 50% of the visit was spent counseling and/or coordinating care by the attending physician.

## 2018-01-18 NOTE — PROGRESS NOTE ADULT - PROBLEM SELECTOR PROBLEM 2
HIV (human immunodeficiency virus infection)
HIV (human immunodeficiency virus infection)
Hypertension
HIV (human immunodeficiency virus infection)

## 2018-01-18 NOTE — PROGRESS NOTE ADULT - PROBLEM SELECTOR PLAN 2
Continue antiretrovirals
Continue antiretrovirals
continue currnet meds
Continue antiretrovirals

## 2018-01-18 NOTE — PROGRESS NOTE ADULT - ASSESSMENT
27 year old black male with HIV, ESRD, blind, seizures missed dialysis was also hypoglycemic, hyperkalemic, altered intubated for airway protection improved this am. He was extubated and transferred from MICU to medicine. 1/14 Patient seen by pain management. He is requesting a grill cheese sandwich.
27 yom with HIV, ESRD, blind, seizures missed dialysis was also hypoglycemic, hyperkalemic, altered intubated for airway protection improved this am    Stable fro transfer to the floor
Patient was seen and evaluated on dialysis.   Patient is tolerating the procedure well.   T(C): 37 (01-13-18 @ 07:00), Max: 37.5 (01-12-18 @ 15:30)  HR: 72 (01-13-18 @ 08:00) (63 - 170)  BP: 113/69 (01-13-18 @ 08:00) (105/68 - 203/134)  Continue dialysis: Monday,  Dialyzer: Revaclear         QB: 450 ml.,       QD: 500ml.,
27 year old black male with HIV, ESRD, blind, seizures missed dialysis was also hypoglycemic, hyperkalemic, altered intubated for airway protection improved this am.   He was extubated and transferred from MICU to medicine.      Problem/Plan - 1:  ·  Problem: Acute respiratory failure with hypoxia.      Plan: Patient now awake and alert.   Respiratory failure resolved.     Problem/Plan - 2:  ·  Problem: HIV (human immunodeficiency virus infection).      Plan: Continue antiretrovirals.     Problem/Plan - 3:  ·  Problem: Essential hypertension.      Plan: Monitor BP.     Problem/Plan - 4:  ·  Problem: Type 2 diabetes mellitus with other neurologic complication, without long-term current use of insulin.      Plan: Monitor BG,     Problem/Plan - 5:  ·  Problem: ESRD (end stage renal disease).      Plan: Dialysis today.
27 year old black male with HIV, ESRD, blind, seizures missed dialysis was also hypoglycemic, hyperkalemic, altered intubated for airway protection improved this am. He was extubated and transferred from MICU to medicine.
27 year old black male with HIV, ESRD, blind, seizures missed dialysis was also hypoglycemic, hyperkalemic, altered intubated for airway protection improved this am. He was extubated and transferred from MICU to medicine. 1/14 Patient seen by pain management. He is requesting a grill cheese sandwich. On 1/15 Blood transfusion and pt consult.
27 year old black male with HIV, ESRD, blind, seizures missed dialysis was also hypoglycemic, hyperkalemic, altered intubated for airway protection improved this am. He was extubated and transferred from MICU to medicine. 1/14 Patient seen by pain management. He is requesting a grill cheese sandwich. On 1/15 Blood transfusion and pt consult. DC in 1/18
27 year old black male with HIV, ESRD, blind, seizures missed dialysis was also hypoglycemic, hyperkalemic, altered intubated for airway protection improved this am. He was extubated and transferred from MICU to medicine. 1/14 Patient seen by pain management. He is requesting a grill cheese sandwich. On 1/15 Blood transfusion and pt consult. Patient is stable. I educated him regarding healthy habits such as complying with his medications and restricing his diet in particular fluid restriction. I taught him some leg strengthening exercises and cleared him for discharge.
Assessment and Plan:        ESRD (end stage renal disease). Hyperphosphatemia and sec hyperparathyroidism. was admitted with  - Hypoglycemia and altered Mental status     - HD MWF    Acute respiratory failure with hypoxia. - Respiratory failure resolved.   HIV (human immunodeficiency virus infection).    Essential hypertension.       Outpatient follow up at [facility]
Assessment and Plan:    WELL KNOWN TO US FROM [place], SPOKE TO OTHER GROUP OF NEPHROLOGY, WHO HAVE BEEN SEEING THE PT, THEY AGREED AND WE WILL TAKE OVER THE CARE    ESRD (end stage renal disease). Hyperphosphatemia and sec hyperparathyroidism. was admitted with:  - Hypoglycemia and altered Mental status     - Patient now awake and alert and was seen at HD that is in progress    Acute respiratory failure with hypoxia.   - Respiratory failure resolved.   HIV (human immunodeficiency virus infection).    Essential hypertension.       dialysis in progress  reviewed all meds AND D/W RN AND PATIENT IN DIALYSIS
27 year old black male with HIV, ESRD, blind, seizures missed dialysis was also hypoglycemic, hyperkalemic, altered intubated for airway protection improved this am. He was extubated and transferred from MICU to medicine. 1/14 Patient seen by pain management. He is requesting a grill cheese sandwich. On 1/15 Blood transfusion and pt consult.

## 2018-01-18 NOTE — PROGRESS NOTE ADULT - PROBLEM SELECTOR PROBLEM 1
Acute respiratory failure with hypoxia
HIV (human immunodeficiency virus infection)
Acute respiratory failure with hypoxia

## 2018-01-18 NOTE — DISCHARGE NOTE ADULT - SECONDARY DIAGNOSIS.
Chronic bilateral low back pain without sciatica ESRD (end stage renal disease) Essential hypertension HIV (human immunodeficiency virus infection) Hypoglycemia

## 2018-01-18 NOTE — DISCHARGE NOTE ADULT - MEDICATION SUMMARY - MEDICATIONS TO CHANGE
I will SWITCH the dose or number of times a day I take the medications listed below when I get home from the hospital:    oxyCODONE 10 mg oral tablet, extended release  -- 1 tab(s) by mouth every 12 hours    oxyCODONE 5 mg oral tablet  -- 2 tab(s) by mouth every 6 hours, As Needed pain

## 2018-01-18 NOTE — DISCHARGE NOTE ADULT - CARE PLAN
Principal Discharge DX:	Acute respiratory failure with hypoxia  Goal:	Resolved  Assessment and plan of treatment:	Follow up with primary care  Secondary Diagnosis:	Chronic bilateral low back pain without sciatica  Goal:	Follow up with primary care  Secondary Diagnosis:	ESRD (end stage renal disease)  Goal:	Follow up with nephrology  Secondary Diagnosis:	Essential hypertension  Goal:	Stable BP  Secondary Diagnosis:	HIV (human immunodeficiency virus infection)  Goal:	Follow up with Infectious disease  Secondary Diagnosis:	Hypoglycemia  Goal:	Resolved  Assessment and plan of treatment:	Secondary to renal failure  Monitor glucose levels

## 2018-01-18 NOTE — PROGRESS NOTE ADULT - PROBLEM SELECTOR PLAN 1
Patient now awake and alert. Respiratory failure resolved.
Patient now awake and alert. Respiratory failure resolved.
continue current meds sees Dr. Venegas at [facility], [facility] and verified meds
Patient now awake and alert. Respiratory failure resolved.

## 2018-01-19 RX ORDER — CALCIUM ACETATE 667 MG
3 TABLET ORAL
Qty: 0 | Refills: 0 | COMMUNITY

## 2018-01-19 RX ORDER — ALPRAZOLAM 0.25 MG
1 TABLET ORAL
Qty: 0 | Refills: 0 | COMMUNITY

## 2018-01-19 RX ORDER — OXYCODONE HYDROCHLORIDE 5 MG/1
2 TABLET ORAL
Qty: 0 | Refills: 0 | COMMUNITY

## 2018-01-19 RX ORDER — OXYCODONE HYDROCHLORIDE 5 MG/1
1 TABLET ORAL
Qty: 0 | Refills: 0 | COMMUNITY

## 2018-01-20 RX ORDER — ALPRAZOLAM 0.25 MG
1 TABLET ORAL
Qty: 0 | Refills: 0 | COMMUNITY

## 2018-01-20 RX ORDER — FENTANYL CITRATE 50 UG/ML
1 INJECTION INTRAVENOUS
Qty: 10 | Refills: 0 | OUTPATIENT
Start: 2018-01-20 | End: 2018-01-29

## 2018-02-01 ENCOUNTER — OUTPATIENT (OUTPATIENT)
Dept: OUTPATIENT SERVICES | Facility: HOSPITAL | Age: 28
LOS: 1 days | End: 2018-02-01
Payer: MEDICAID

## 2018-02-01 DIAGNOSIS — I77.0 ARTERIOVENOUS FISTULA, ACQUIRED: Chronic | ICD-10-CM

## 2018-02-01 PROCEDURE — G9001: CPT

## 2018-02-19 ENCOUNTER — INPATIENT (INPATIENT)
Facility: HOSPITAL | Age: 28
LOS: 2 days | Discharge: HOME | DRG: 100 | End: 2018-02-22
Attending: INTERNAL MEDICINE | Admitting: INTERNAL MEDICINE
Payer: COMMERCIAL

## 2018-02-19 VITALS
TEMPERATURE: 98 F | HEART RATE: 87 BPM | RESPIRATION RATE: 16 BRPM | HEIGHT: 69 IN | DIASTOLIC BLOOD PRESSURE: 98 MMHG | OXYGEN SATURATION: 98 % | SYSTOLIC BLOOD PRESSURE: 157 MMHG | WEIGHT: 134.48 LBS

## 2018-02-19 DIAGNOSIS — I77.0 ARTERIOVENOUS FISTULA, ACQUIRED: Chronic | ICD-10-CM

## 2018-02-19 DIAGNOSIS — G40.909 EPILEPSY, UNSPECIFIED, NOT INTRACTABLE, WITHOUT STATUS EPILEPTICUS: ICD-10-CM

## 2018-02-19 PROBLEM — I10 ESSENTIAL (PRIMARY) HYPERTENSION: Chronic | Status: ACTIVE | Noted: 2018-01-12

## 2018-02-19 PROBLEM — N18.6 END STAGE RENAL DISEASE: Chronic | Status: ACTIVE | Noted: 2018-01-12

## 2018-02-19 LAB
ALBUMIN SERPL ELPH-MCNC: 3.5 G/DL — SIGNIFICANT CHANGE UP (ref 3.3–5.2)
ALP SERPL-CCNC: 748 U/L — HIGH (ref 40–120)
ALT FLD-CCNC: 8 U/L — SIGNIFICANT CHANGE UP
ANION GAP SERPL CALC-SCNC: 17 MMOL/L — SIGNIFICANT CHANGE UP (ref 5–17)
APTT BLD: 32.9 SEC — SIGNIFICANT CHANGE UP (ref 27.5–37.4)
AST SERPL-CCNC: 20 U/L — SIGNIFICANT CHANGE UP
BILIRUB SERPL-MCNC: 0.4 MG/DL — SIGNIFICANT CHANGE UP (ref 0.4–2)
BUN SERPL-MCNC: 50 MG/DL — HIGH (ref 8–20)
CALCIUM SERPL-MCNC: 8.5 MG/DL — LOW (ref 8.6–10.2)
CHLORIDE SERPL-SCNC: 95 MMOL/L — LOW (ref 98–107)
CO2 SERPL-SCNC: 27 MMOL/L — SIGNIFICANT CHANGE UP (ref 22–29)
CREAT SERPL-MCNC: 12.03 MG/DL — HIGH (ref 0.5–1.3)
GLUCOSE SERPL-MCNC: 74 MG/DL — SIGNIFICANT CHANGE UP (ref 70–115)
HCT VFR BLD CALC: 28.1 % — LOW (ref 42–52)
HGB BLD-MCNC: 8.9 G/DL — LOW (ref 14–18)
INR BLD: 1.12 RATIO — SIGNIFICANT CHANGE UP (ref 0.88–1.16)
MCHC RBC-ENTMCNC: 30.3 PG — SIGNIFICANT CHANGE UP (ref 27–31)
MCHC RBC-ENTMCNC: 31.7 G/DL — LOW (ref 32–36)
MCV RBC AUTO: 95.6 FL — HIGH (ref 80–94)
PLATELET # BLD AUTO: 74 K/UL — LOW (ref 150–400)
POTASSIUM SERPL-MCNC: 4.4 MMOL/L — SIGNIFICANT CHANGE UP (ref 3.5–5.3)
POTASSIUM SERPL-SCNC: 4.4 MMOL/L — SIGNIFICANT CHANGE UP (ref 3.5–5.3)
PROT SERPL-MCNC: 6.4 G/DL — LOW (ref 6.6–8.7)
PROTHROM AB SERPL-ACNC: 12.3 SEC — SIGNIFICANT CHANGE UP (ref 9.8–12.7)
RBC # BLD: 2.94 M/UL — LOW (ref 4.6–6.2)
RBC # FLD: 19.5 % — HIGH (ref 11–15.6)
SODIUM SERPL-SCNC: 139 MMOL/L — SIGNIFICANT CHANGE UP (ref 135–145)
WBC # BLD: 2.3 K/UL — LOW (ref 4.8–10.8)
WBC # FLD AUTO: 2.3 K/UL — LOW (ref 4.8–10.8)

## 2018-02-19 PROCEDURE — 70450 CT HEAD/BRAIN W/O DYE: CPT | Mod: 26

## 2018-02-19 PROCEDURE — 99285 EMERGENCY DEPT VISIT HI MDM: CPT

## 2018-02-19 PROCEDURE — 99223 1ST HOSP IP/OBS HIGH 75: CPT

## 2018-02-19 PROCEDURE — 71250 CT THORAX DX C-: CPT | Mod: 26

## 2018-02-19 RX ORDER — PANTOPRAZOLE SODIUM 20 MG/1
40 TABLET, DELAYED RELEASE ORAL
Qty: 0 | Refills: 0 | Status: DISCONTINUED | OUTPATIENT
Start: 2018-02-19 | End: 2018-02-22

## 2018-02-19 RX ORDER — RITONAVIR 100 MG/1
100 TABLET, FILM COATED ORAL
Qty: 0 | Refills: 0 | Status: DISCONTINUED | OUTPATIENT
Start: 2018-02-19 | End: 2018-02-21

## 2018-02-19 RX ORDER — FENTANYL CITRATE 50 UG/ML
1 INJECTION INTRAVENOUS
Qty: 0 | Refills: 0 | Status: DISCONTINUED | OUTPATIENT
Start: 2018-02-19 | End: 2018-02-22

## 2018-02-19 RX ORDER — METOCLOPRAMIDE HCL 10 MG
10 TABLET ORAL ONCE
Qty: 0 | Refills: 0 | Status: COMPLETED | OUTPATIENT
Start: 2018-02-19 | End: 2018-02-19

## 2018-02-19 RX ORDER — GABAPENTIN 400 MG/1
100 CAPSULE ORAL AT BEDTIME
Qty: 0 | Refills: 0 | Status: DISCONTINUED | OUTPATIENT
Start: 2018-02-19 | End: 2018-02-22

## 2018-02-19 RX ORDER — LEVETIRACETAM 250 MG/1
500 TABLET, FILM COATED ORAL
Qty: 0 | Refills: 0 | Status: DISCONTINUED | OUTPATIENT
Start: 2018-02-19 | End: 2018-02-22

## 2018-02-19 RX ORDER — SEVELAMER CARBONATE 2400 MG/1
800 POWDER, FOR SUSPENSION ORAL
Qty: 0 | Refills: 0 | Status: DISCONTINUED | OUTPATIENT
Start: 2018-02-19 | End: 2018-02-22

## 2018-02-19 RX ORDER — ERYTHROPOIETIN 10000 [IU]/ML
10000 INJECTION, SOLUTION INTRAVENOUS; SUBCUTANEOUS
Qty: 0 | Refills: 0 | Status: DISCONTINUED | OUTPATIENT
Start: 2018-02-19 | End: 2018-02-22

## 2018-02-19 RX ORDER — LEVETIRACETAM 250 MG/1
1000 TABLET, FILM COATED ORAL ONCE
Qty: 0 | Refills: 0 | Status: COMPLETED | OUTPATIENT
Start: 2018-02-19 | End: 2018-02-19

## 2018-02-19 RX ORDER — LISINOPRIL 2.5 MG/1
20 TABLET ORAL DAILY
Qty: 0 | Refills: 0 | Status: DISCONTINUED | OUTPATIENT
Start: 2018-02-19 | End: 2018-02-22

## 2018-02-19 RX ORDER — TENOFOVIR DISOPROXIL FUMARATE 300 MG/1
300 TABLET, FILM COATED ORAL ONCE
Qty: 0 | Refills: 0 | Status: COMPLETED | OUTPATIENT
Start: 2018-02-19 | End: 2018-02-19

## 2018-02-19 RX ORDER — SERTRALINE 25 MG/1
50 TABLET, FILM COATED ORAL DAILY
Qty: 0 | Refills: 0 | Status: DISCONTINUED | OUTPATIENT
Start: 2018-02-19 | End: 2018-02-22

## 2018-02-19 RX ORDER — LEVETIRACETAM 250 MG/1
1000 TABLET, FILM COATED ORAL
Qty: 0 | Refills: 0 | Status: DISCONTINUED | OUTPATIENT
Start: 2018-02-19 | End: 2018-02-19

## 2018-02-19 RX ORDER — CALCITRIOL 0.5 UG/1
0.5 CAPSULE ORAL DAILY
Qty: 0 | Refills: 0 | Status: DISCONTINUED | OUTPATIENT
Start: 2018-02-19 | End: 2018-02-22

## 2018-02-19 RX ORDER — OXYCODONE HYDROCHLORIDE 5 MG/1
5 TABLET ORAL EVERY 6 HOURS
Qty: 0 | Refills: 0 | Status: DISCONTINUED | OUTPATIENT
Start: 2018-02-19 | End: 2018-02-21

## 2018-02-19 RX ORDER — TENOFOVIR DISOPROXIL FUMARATE 300 MG/1
300 TABLET, FILM COATED ORAL DAILY
Qty: 0 | Refills: 0 | Status: DISCONTINUED | OUTPATIENT
Start: 2018-02-19 | End: 2018-02-21

## 2018-02-19 RX ORDER — AMLODIPINE BESYLATE 2.5 MG/1
10 TABLET ORAL ONCE
Qty: 0 | Refills: 0 | Status: COMPLETED | OUTPATIENT
Start: 2018-02-19 | End: 2018-02-19

## 2018-02-19 RX ORDER — QUETIAPINE FUMARATE 200 MG/1
50 TABLET, FILM COATED ORAL AT BEDTIME
Qty: 0 | Refills: 0 | Status: DISCONTINUED | OUTPATIENT
Start: 2018-02-19 | End: 2018-02-22

## 2018-02-19 RX ORDER — CALCIUM ACETATE 667 MG
667 TABLET ORAL
Qty: 0 | Refills: 0 | Status: DISCONTINUED | OUTPATIENT
Start: 2018-02-19 | End: 2018-02-19

## 2018-02-19 RX ORDER — AMLODIPINE BESYLATE 2.5 MG/1
10 TABLET ORAL DAILY
Qty: 0 | Refills: 0 | Status: DISCONTINUED | OUTPATIENT
Start: 2018-02-19 | End: 2018-02-22

## 2018-02-19 RX ORDER — METOPROLOL TARTRATE 50 MG
50 TABLET ORAL
Qty: 0 | Refills: 0 | Status: DISCONTINUED | OUTPATIENT
Start: 2018-02-19 | End: 2018-02-22

## 2018-02-19 RX ORDER — HYDROMORPHONE HYDROCHLORIDE 2 MG/ML
1 INJECTION INTRAMUSCULAR; INTRAVENOUS; SUBCUTANEOUS ONCE
Qty: 0 | Refills: 0 | Status: DISCONTINUED | OUTPATIENT
Start: 2018-02-19 | End: 2018-02-19

## 2018-02-19 RX ORDER — SODIUM CHLORIDE 9 MG/ML
3 INJECTION INTRAMUSCULAR; INTRAVENOUS; SUBCUTANEOUS ONCE
Qty: 0 | Refills: 0 | Status: COMPLETED | OUTPATIENT
Start: 2018-02-19 | End: 2018-02-19

## 2018-02-19 RX ADMIN — TENOFOVIR DISOPROXIL FUMARATE 300 MILLIGRAM(S): 300 TABLET, FILM COATED ORAL at 14:45

## 2018-02-19 RX ADMIN — Medication 50 MILLIGRAM(S): at 18:42

## 2018-02-19 RX ADMIN — LEVETIRACETAM 400 MILLIGRAM(S): 250 TABLET, FILM COATED ORAL at 14:46

## 2018-02-19 RX ADMIN — RITONAVIR 100 MILLIGRAM(S): 100 TABLET, FILM COATED ORAL at 14:46

## 2018-02-19 RX ADMIN — SODIUM CHLORIDE 3 MILLILITER(S): 9 INJECTION INTRAMUSCULAR; INTRAVENOUS; SUBCUTANEOUS at 18:41

## 2018-02-19 RX ADMIN — Medication 10 MILLIGRAM(S): at 14:47

## 2018-02-19 RX ADMIN — OXYCODONE HYDROCHLORIDE 5 MILLIGRAM(S): 5 TABLET ORAL at 18:42

## 2018-02-19 RX ADMIN — HYDROMORPHONE HYDROCHLORIDE 1 MILLIGRAM(S): 2 INJECTION INTRAMUSCULAR; INTRAVENOUS; SUBCUTANEOUS at 14:45

## 2018-02-19 RX ADMIN — AMLODIPINE BESYLATE 10 MILLIGRAM(S): 2.5 TABLET ORAL at 14:45

## 2018-02-19 NOTE — ED ADULT NURSE REASSESSMENT NOTE - NS ED NURSE REASSESS COMMENT FT1
Pt received from ED a/ox4 complaining of pain, medication provideed. VSS, NSR on monitor, safety maintined.

## 2018-02-19 NOTE — CONSULT NOTE ADULT - ASSESSMENT
Assessment and Plan:  HIV (vertical Tx)  low CD4,   ESRD on hemodialysis  Sec hyperparathyroidism  Aneurysmal AVF,   Seizure disorder.   Legally blind HIV/cytomegalovirus retinopathy.     In ER loaded with seizure meds  defer HD today, stabilize and then dialyze tomorrow  - consent and orders done Assessment and Plan:  HIV (vertical Tx)  low CD4,   ESRD on hemodialysis  Sec hyperparathyroidism  Anemia  Hyperphosphatemia  Aneurysmal AVF,   Seizure disorder.   Legally blind HIV/cytomegalovirus retinopathy.   HTN    In ER loaded with seizure meds  defer HD today, stabilize and then dialyze tomorrow  - consent and orders done  MIKE Assessment and Plan:  HIV (vertical Tx)  low CD4,   ESRD on hemodialysis  Sec hyperparathyroidism  Anemia  Hyperphosphatemia  Aneurysmal AVF,   Seizure disorder.   Legally blind HIV/cytomegalovirus retinopathy.   HTN    In ER loaded with seizure meds  defer HD today, stabilize and then dialyze tomorrow  - consent and orders done  MIKE  HIV meds per ID or hospitalist Assessment and Plan:  HIV (vertical Tx)  low CD4,   ESRD on hemodialysis  Sec hyperparathyroidism  Anemia  Hyperphosphatemia  Aneurysmal AVF,   Seizure disorder.   Legally blind HIV/cytomegalovirus retinopathy.   HTN    In ER loaded with seizure meds  defer HD today, stabilize and then dialyze tomorrow  - consent and orders done  MIKE  HIV meds per ID or hospitalist  d/c ca acetate, keep on renvela and calcitriol

## 2018-02-19 NOTE — H&P ADULT - ASSESSMENT
The patient is a 27 year old male with a history of HIV ( non compliant with treatment), ESRD on HD MWF, hypertension and seizure disorder who was brought to the ER from his dialysis center after having  a witnessed seizure likely secondary to non- compliance with his medications    Assessement/Plan:    1. Seizure: Resume keppra, check keppra level, seizure precautions; iv antivan prn for seizure    2. HIV- non compliant with medications. I spoke with the ER doctor who conferred with his HIV physician. She recommended Tiviay, Tenofovir and Norvir to continue at this time. He has not followed up with her since October.  HIV viral load ordered.     3. Hypertension: Resume metoprolol, lisinopril and norvasc    4. ESRD On HD MWF: Nephrology consulted for HD orders.     5. Pancytopenia likely secondary to HIV    VTE_ SCDS bilaterally

## 2018-02-19 NOTE — CHART NOTE - NSCHARTNOTEFT_GEN_A_CORE
MIGUEL Note - met with pt briefly, pt administered Dilaudid for pain and  difficult to converse with at present. pt has been hospital hopping 2/2 "can't get my meds. I am blind and no one can pour them for me. My mom speaks Bangladeshi only and can't read the bottles. When I had the nurse come he/she would pour the meds for me." pt reports he had [facility] - but they closed down. He thinks (but is not sure) that he may have had [facility] but can't recall. Has not had any assistance for several weeks so he has been going from hospital to hospital to get his meds. pt born HIV+ and is signed up with [facility], they have been assisting pt with various issues but "they don't have the power to get me a nurse to pour my meds" pt seems apathetic with some helplessness. pt to be admitted - discussed case with Dr. Rojo. Will hand off to floor SW & CCC>

## 2018-02-19 NOTE — ED PROVIDER NOTE - MEDICAL DECISION MAKING DETAILS
Patient with seizure D/O, HIV + not compliant on any medication presents s/p seizure at HD today. As he believes his CD4 count is < 200, not on any prophylactic medications, will work up for infectious etiology vs. seizure from non-complaince on medications and will restart antiretrovirals and plan to admit.

## 2018-02-19 NOTE — ED PROVIDER NOTE - CARE PLAN
Principal Discharge DX:	Seizure disorder Principal Discharge DX:	Seizure disorder  Secondary Diagnosis:	HIV disease  Secondary Diagnosis:	ESRD (end stage renal disease)

## 2018-02-19 NOTE — ED ADULT NURSE NOTE - PMH
Anxiety    Cardiomyopathy    Depression    Diabetes    ESRD (end stage renal disease)    HIV (human immunodeficiency virus infection)    HIV disease  born HIV+  HTN (hypertension)    Hypertension    Pericarditis    Renal failure (ARF), acute on chronic  dialysis av fistula, RUE  Seizure    Seizure disorder

## 2018-02-19 NOTE — H&P ADULT - HISTORY OF PRESENT ILLNESS
The patient is a 27 year old male with a history of HIV ( non compliant with treatment), ESRD on HD MWF, hypertension and seizure disorder who was brought to the ER from his dialysis center after having  a witnessed seizure. He admits to being non compliant with his medications at home; he had not taken his medications for the past 2 weeks. He was recently admitted to [facility] for respiratory failure secondary to pneumonia and treated with antibiotics. Prior to the this he was admitted in January for respiratory failure post seizure, intubated and admitted to the ICU.   In the ER, he was given IV keppra 1000mg x 1, CT head was negative; CT chest negative for infiltrate. At the time of my examination patient is The patient is a 27 year old male with a history of HIV ( non compliant with treatment), ESRD on HD MWF, hypertension and seizure disorder who was brought to the ER from his dialysis center after having  a witnessed seizure. He admits to being non compliant with his medications at home; he had not taken his medications for the past 2 weeks. He was recently admitted to [facility] for respiratory failure secondary to pneumonia and treated with antibiotics. Prior to the this he was admitted in January for respiratory failure post seizure, intubated and admitted to the ICU.   In the ER, he was given IV keppra 1000mg x 1, CT head was negative; CT chest negative for infiltrate. At the time of my examination patient is complaining of pain in his back. 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 He has been trying to get help at home but has been unsuccessful and has resigned to going from one hospital to another.

## 2018-02-19 NOTE — CONSULT NOTE ADULT - SUBJECTIVE AND OBJECTIVE BOX
Patient is a 27y old  Male who presents with a chief complaint of seizure at HD after starting for about 15 minutes at [facility].    HPI:  HIV (vertical Tx) and dialysis pt, sec hyperparathyroidism, low CD4, and aneurysmal AVF, seizure disorder. Did not take any meds for a week or so. Legally blind HIV/cytomegalovirus retinopathy. loaded in ER with seizure meds  On HD MWF.    PAST MEDICAL & SURGICAL HISTORY:  HIV (human immunodeficiency virus infection)  Seizure disorder  Hypertension  Diabetes  ESRD (end stage renal disease)  Seizure  Pericarditis  Anxiety  Depression  Renal failure (ARF), acute on chronic: dialysis av fistula, RUE  HTN (hypertension)  Cardiomyopathy  HIV disease: born HIV+  No significant past surgical history  AV fistula  S/P tonsillectomy    FAMILY HISTORY:  No pertinent family history in first degree relatives    Social History: smoker, marijuana too    MEDICATIONS  (STANDING):  ritonavir Tablet 100 milliGRAM(s) Oral two times a day  sodium chloride 0.9% lock flush 3 milliLiter(s) IV Push once    MEDICATIONS  (PRN):   Meds reviewed    Allergies vancomycin (Anaphylaxis)    REVIEW OF SYSTEMS:    CONSTITUTIONAL:  sob, weight gain, feels weak  EYES: No eye pain, visual disturbances, or discharge  ENMT:  No difficulty hearing, tinnitus, vertigo; No sinus or throat pain  NECK: No pain or stiffness  BREASTS: No pain, masses, or nipple discharge  RESPIRATORY: sob  CARDIOVASCULAR: No chest pain, palpitations, dizziness,   GASTROINTESTINAL: No abdominal or epigastric pain. No nausea, vomiting, or hematemesis; No diarrhea or constipation. No melena or hematochezia.Trunckal obesity  GENITOURINARY: No dysuria, frequency, hematuria, or incontinence  NEUROLOGICAL: No headaches, SEIZURE AT hd  SKIN: Diffuse erythema, no blisters  LYMPH NODES: No enlarged glands  ENDOCRINE: No heat or cold intolerance; No hair loss  MUSCULOSKELETAL: Chronic lymphedema legs with scars  PSYCHIATRIC: No depression, anxiety, mood swings, or difficulty sleeping  HEME/LYMPH: No easy bruising, or bleeding gums  ALLERY AND IMMUNOLOGIC: No hives or eczema    Vital Signs Last 24 Hrs  T(C): 36.8 (19 Feb 2018 16:03), Max: 36.9 (19 Feb 2018 12:32)  T(F): 98.2 (19 Feb 2018 16:03), Max: 98.4 (19 Feb 2018 12:32)  HR: 75 (19 Feb 2018 16:03) (75 - 87)  BP: 163/106 (19 Feb 2018 16:03) (157/98 - 163/106)  BP(mean): --  RR: 20 (19 Feb 2018 16:03) (16 - 20)  SpO2: 98% (19 Feb 2018 16:03) (98% - 98%)  Daily Height in cm: 175.26 (19 Feb 2018 12:32)    Daily     PHYSICAL EXAM:    GENERAL: appears chronically ill, oriented. PALLOR  HEAD:  Atraumatic, Normocephalic  EYES: EOMI, PERRLA, conjunctiva and sclera clear  ENMT: No tonsillar erythema, exudates, or enlargement; Moist mucous membranes, Good dentition, No lesions  NECK: Supple, neck  veins full  NERVOUS SYSTEM:  Alert & Oriented X3, Good concentration; Motor Strength wnl upper and lower extremities;CHEST/LUNG: Clear to percussion bilaterally; No rales, rhonchi, wheezing, or rubs  HEART: Regular rate and rhythm; No murmurs, rubs, or gallops  ABDOMEN: Soft, Nontender, Nondistended; Bowel sounds present, body wall and flank edema  EXTREMITIES:  +2 - +3 leg edema . avf RT ue WITH ANEURYSMAL AREAS  LYMPH: No lymphadenopathy noted  SKIN: No rashes or lesions, pale    LABS:                        8.9    2.3   )-----------( 74       ( 19 Feb 2018 13:27 )             28.1     02-19    139  |  95<L>  |  50.0<H>  ----------------------------<  74  4.4   |  27.0  |  12.03<H>    Ca    8.5<L>      19 Feb 2018 13:27  Phos  4.9     02-19  Mg     2.4     02-19    TPro  6.4<L>  /  Alb  3.5  /  TBili  0.4  /  DBili  x   /  AST  20  /  ALT  8   /  AlkPhos  748<H>  02-19    PT/INR - ( 19 Feb 2018 13:27 )   PT: 12.3 sec;   INR: 1.12 ratio         PTT - ( 19 Feb 2018 13:27 )  PTT:32.9 sec    Magnesium, Serum: 2.4 mg/dL (02-19 @ 13:27)  Phosphorus Level, Serum: 4.9 mg/dL (02-19 @ 13:27)      TREND Hb Hemoglobin: 8.9 (02-19 @ 13:27)    Trend Potassium Potassium, Serum: 4.4 (02-19 @ 13:27)    RADIOLOGY & ADDITIONAL TESTS:

## 2018-02-19 NOTE — ED PROVIDER NOTE - NS ED ROS FT
Const: Denies fever, chills  HEENT: Denies blurry vision, sore throat  Neck: Denies neck pain/stiffness  Resp: + SOB Denies coughing  Cardiovascular: + CP, palpitations. Denies LE edema  GI: Deneis nausea, vomiting, abdominal pain, diarrhea, constipation, blood in stool  : Denies urinary frequency/urgency/dysuria, hematuria  MSK: + back pain  Neuro: + HA, dizziness, Denies numbness, weakness  Skin: Denies rashes.

## 2018-02-19 NOTE — ED PROVIDER NOTE - OBJECTIVE STATEMENT
Patient with complicated medical history of HIV + (non-compliant on antiretrovirals, believes CD4 count is < 200), ESRD on HD (M/W/F), seizure disorder (non-compliant on Keppra), HTN presents to the ED for a witnessed seizure that occurred today at HD (about 25 mins into the procedure). He states he has not had any of his medication for the past 1.5 weeks, noted that his BP was very high at the time and he had a normal post-ictal period, but is currently experiencing total body pain, HA, dizziness, SOB. He was admitted to [facility] two weeks ago for hypertensive emergency, but cannot get his prescriptions when he is not admitted to the hospital. He believes he was being treated for PNA at [facility]. He notes allergy to Vanco, smokes medical marijuana, but denies cigarettes, EtOH or illicit drugs.  PMD/ID: Dr. Corbin

## 2018-02-19 NOTE — ED PROVIDER NOTE - PHYSICAL EXAMINATION
Const: Awake, alert and oriented. In no acute distress. Well appearing.  HEENT: NC/AT. Moist mucous membranes.  Eyes: No scleral icterus. Disconjugate gaze which is his baseline (legally blind).  Neck:. Soft and supple. Full ROM without pain.  Cardiac: Regular rate and rhythm. +S1/S2. + systolic murmur. Peripheral pulses 2+ and symmetric. No LE edema.  Resp: Speaking in full sentences. No evidence of respiratory distress. No wheezes, rales or rhonchi.  Abd: Soft, non-tender, non-distended. Normal bowel sounds in all 4 quadrants. No guarding or rebound.  Back: Spine midline and non-tender. No CVAT.  Skin: No rashes, abrasions or lacerations.  Lymph: No cervical lymphadenopathy.   Neuro: Sensation, motor grossly intact. Moves all extremities symmetrically. Const: Awake, alert and oriented. In no acute distress. Well appearing.  HEENT: NC/AT. Moist mucous membranes.  Eyes: No scleral icterus. Disconjugate gaze which is his baseline (legally blind).  Neck:. Soft and supple. Full ROM without pain.  Cardiac: Regular rate and rhythm. +S1/S2. + systolic murmur. Peripheral pulses 2+ and symmetric. No LE edema.  Resp: Speaking in full sentences. No evidence of respiratory distress. No wheezes, rales or rhonchi.  Abd: Soft, non-tender, non-distended. Normal bowel sounds in all 4 quadrants. No guarding or rebound.  Back: Spine midline and non-tender. No CVAT.  Skin: No rashes, abrasions or lacerations.  Lymph: No cervical lymphadenopathy.   Neuro: Tongue midline with fasiculations. CN sensory and motor symmetrically intact aside from CN II - VI which cannot be assessed due to blindness. Sensation, motor grossly intact. Moves all extremities symmetrically.

## 2018-02-20 LAB
4/8 RATIO: 0.17 RATIO — LOW (ref 0.9–3.6)
ABS CD8: 508 /UL — SIGNIFICANT CHANGE UP (ref 136–757)
ANION GAP SERPL CALC-SCNC: 16 MMOL/L — SIGNIFICANT CHANGE UP (ref 5–17)
BUN SERPL-MCNC: 59 MG/DL — HIGH (ref 8–20)
CALCIUM SERPL-MCNC: 8.2 MG/DL — LOW (ref 8.6–10.2)
CD16+CD56+ CELLS NFR BLD: 14 % — SIGNIFICANT CHANGE UP (ref 4–25)
CD16+CD56+ CELLS NFR SPEC: 102 /UL — SIGNIFICANT CHANGE UP (ref 64–494)
CD19 BLASTS SPEC-ACNC: 56 /UL — LOW (ref 68–528)
CD19 BLASTS SPEC-ACNC: 8 % — SIGNIFICANT CHANGE UP (ref 5–22)
CD3 BLASTS SPEC-ACNC: 590 /UL — LOW (ref 799–2171)
CD3 BLASTS SPEC-ACNC: 78 % — SIGNIFICANT CHANGE UP (ref 59–85)
CD4 %: 11 % — LOW (ref 36–65)
CD8 %: 66 % — HIGH (ref 11–36)
CHLORIDE SERPL-SCNC: 95 MMOL/L — LOW (ref 98–107)
CO2 SERPL-SCNC: 27 MMOL/L — SIGNIFICANT CHANGE UP (ref 22–29)
CREAT SERPL-MCNC: 13.67 MG/DL — HIGH (ref 0.5–1.3)
GLUCOSE SERPL-MCNC: 93 MG/DL — SIGNIFICANT CHANGE UP (ref 70–115)
HCT VFR BLD CALC: 29.1 % — LOW (ref 42–52)
HGB BLD-MCNC: 9 G/DL — LOW (ref 14–18)
MCHC RBC-ENTMCNC: 29.7 PG — SIGNIFICANT CHANGE UP (ref 27–31)
MCHC RBC-ENTMCNC: 30.9 G/DL — LOW (ref 32–36)
MCV RBC AUTO: 96 FL — HIGH (ref 80–94)
PLATELET # BLD AUTO: 69 K/UL — LOW (ref 150–400)
POTASSIUM SERPL-MCNC: 4.8 MMOL/L — SIGNIFICANT CHANGE UP (ref 3.5–5.3)
POTASSIUM SERPL-SCNC: 4.8 MMOL/L — SIGNIFICANT CHANGE UP (ref 3.5–5.3)
RBC # BLD: 3.03 M/UL — LOW (ref 4.6–6.2)
RBC # FLD: 19.6 % — HIGH (ref 11–15.6)
SODIUM SERPL-SCNC: 138 MMOL/L — SIGNIFICANT CHANGE UP (ref 135–145)
T-CELL CD4 SUBSET PNL BLD: 88 /UL — LOW (ref 489–1457)
WBC # BLD: 2.5 K/UL — LOW (ref 4.8–10.8)
WBC # FLD AUTO: 2.5 K/UL — LOW (ref 4.8–10.8)

## 2018-02-20 PROCEDURE — 93990 DOPPLER FLOW TESTING: CPT | Mod: 26

## 2018-02-20 PROCEDURE — 99233 SBSQ HOSP IP/OBS HIGH 50: CPT

## 2018-02-20 RX ORDER — DIPHENHYDRAMINE HCL 50 MG
25 CAPSULE ORAL ONCE
Qty: 0 | Refills: 0 | Status: COMPLETED | OUTPATIENT
Start: 2018-02-20 | End: 2018-02-20

## 2018-02-20 RX ORDER — ACETAMINOPHEN 500 MG
650 TABLET ORAL EVERY 8 HOURS
Qty: 0 | Refills: 0 | Status: DISCONTINUED | OUTPATIENT
Start: 2018-02-20 | End: 2018-02-20

## 2018-02-20 RX ORDER — DIPHENHYDRAMINE HCL 50 MG
50 CAPSULE ORAL ONCE
Qty: 0 | Refills: 0 | Status: COMPLETED | OUTPATIENT
Start: 2018-02-20 | End: 2018-02-20

## 2018-02-20 RX ADMIN — QUETIAPINE FUMARATE 50 MILLIGRAM(S): 200 TABLET, FILM COATED ORAL at 00:15

## 2018-02-20 RX ADMIN — Medication 50 MILLIGRAM(S): at 06:03

## 2018-02-20 RX ADMIN — LISINOPRIL 20 MILLIGRAM(S): 2.5 TABLET ORAL at 06:03

## 2018-02-20 RX ADMIN — RITONAVIR 100 MILLIGRAM(S): 100 TABLET, FILM COATED ORAL at 06:03

## 2018-02-20 RX ADMIN — QUETIAPINE FUMARATE 50 MILLIGRAM(S): 200 TABLET, FILM COATED ORAL at 21:38

## 2018-02-20 RX ADMIN — PANTOPRAZOLE SODIUM 40 MILLIGRAM(S): 20 TABLET, DELAYED RELEASE ORAL at 06:03

## 2018-02-20 RX ADMIN — Medication 25 MILLIGRAM(S): at 21:38

## 2018-02-20 RX ADMIN — SEVELAMER CARBONATE 800 MILLIGRAM(S): 2400 POWDER, FOR SUSPENSION ORAL at 18:22

## 2018-02-20 RX ADMIN — GABAPENTIN 100 MILLIGRAM(S): 400 CAPSULE ORAL at 21:38

## 2018-02-20 RX ADMIN — CALCITRIOL 0.5 MICROGRAM(S): 0.5 CAPSULE ORAL at 18:19

## 2018-02-20 RX ADMIN — OXYCODONE HYDROCHLORIDE 5 MILLIGRAM(S): 5 TABLET ORAL at 10:12

## 2018-02-20 RX ADMIN — ERYTHROPOIETIN 10000 UNIT(S): 10000 INJECTION, SOLUTION INTRAVENOUS; SUBCUTANEOUS at 10:19

## 2018-02-20 RX ADMIN — OXYCODONE HYDROCHLORIDE 5 MILLIGRAM(S): 5 TABLET ORAL at 19:29

## 2018-02-20 RX ADMIN — GABAPENTIN 100 MILLIGRAM(S): 400 CAPSULE ORAL at 00:15

## 2018-02-20 RX ADMIN — AMLODIPINE BESYLATE 10 MILLIGRAM(S): 2.5 TABLET ORAL at 06:03

## 2018-02-20 RX ADMIN — SERTRALINE 50 MILLIGRAM(S): 25 TABLET, FILM COATED ORAL at 18:19

## 2018-02-20 RX ADMIN — LEVETIRACETAM 500 MILLIGRAM(S): 250 TABLET, FILM COATED ORAL at 18:22

## 2018-02-20 RX ADMIN — Medication 25 MILLIGRAM(S): at 12:05

## 2018-02-20 RX ADMIN — SEVELAMER CARBONATE 800 MILLIGRAM(S): 2400 POWDER, FOR SUSPENSION ORAL at 08:34

## 2018-02-20 RX ADMIN — RITONAVIR 100 MILLIGRAM(S): 100 TABLET, FILM COATED ORAL at 18:20

## 2018-02-20 RX ADMIN — OXYCODONE HYDROCHLORIDE 5 MILLIGRAM(S): 5 TABLET ORAL at 18:39

## 2018-02-20 RX ADMIN — Medication 50 MILLIGRAM(S): at 18:22

## 2018-02-20 RX ADMIN — TENOFOVIR DISOPROXIL FUMARATE 300 MILLIGRAM(S): 300 TABLET, FILM COATED ORAL at 18:20

## 2018-02-20 RX ADMIN — OXYCODONE HYDROCHLORIDE 5 MILLIGRAM(S): 5 TABLET ORAL at 08:45

## 2018-02-20 RX ADMIN — Medication 50 MILLIGRAM(S): at 10:17

## 2018-02-20 RX ADMIN — LEVETIRACETAM 500 MILLIGRAM(S): 250 TABLET, FILM COATED ORAL at 06:03

## 2018-02-20 NOTE — PROGRESS NOTE ADULT - SUBJECTIVE AND OBJECTIVE BOX
CC: Seizure    INTERVAL HPI/OVERNIGHT EVENTS: Patient seen and examined, no further seizure episodes since yesterday.       Vital Signs Last 24 Hrs  T(C): 36.6 (20 Feb 2018 09:40), Max: 36.9 (19 Feb 2018 12:32)  T(F): 97.9 (20 Feb 2018 09:40), Max: 98.4 (19 Feb 2018 12:32)  HR: 74 (20 Feb 2018 09:40) (70 - 87)  BP: 132/72 (20 Feb 2018 09:40) (131/88 - 163/106)  BP(mean): --  RR: 18 (20 Feb 2018 09:40) (16 - 20)  SpO2: 98% (20 Feb 2018 09:40) (97% - 98%)    PHYSICAL EXAM:    GENERAL: NAD, AOX3  HEAD:  Atraumatic, Normocephalic  EYES: Legally blind   ENMT: Moist mucous membranes  CHEST/LUNG: Clear to auscultation bilaterally; No rales, rhonchi, wheezing, or rubs  HEART: Regular rate and rhythm; No murmurs, rubs, or gallops  ABDOMEN: Soft, Nontender, Nondistended; Bowel sounds present  EXTREMITIES:  2+ Peripheral Pulses, No clubbing, cyanosis, or edema        MEDICATIONS  (STANDING):  amLODIPine   Tablet 10 milliGRAM(s) Oral daily  calcitriol   Capsule 0.5 MICROGram(s) Oral daily  epoetin nithin Injectable 93503 Unit(s) IV Push <User Schedule>  fentaNYL   Patch 100 MICROgram(s)/Hr 1 Patch Transdermal every 72 hours  gabapentin 100 milliGRAM(s) Oral at bedtime  levETIRAcetam 500 milliGRAM(s) Oral two times a day  lisinopril 20 milliGRAM(s) Oral daily  metoprolol     tartrate 50 milliGRAM(s) Oral two times a day  pantoprazole    Tablet 40 milliGRAM(s) Oral before breakfast  QUEtiapine 50 milliGRAM(s) Oral at bedtime  ritonavir Tablet 100 milliGRAM(s) Oral two times a day  sertraline 50 milliGRAM(s) Oral daily  sevelamer hydrochloride 800 milliGRAM(s) Oral three times a day with meals  tenofovir 300 milliGRAM(s) Oral daily    MEDICATIONS  (PRN):  acetaminophen   Tablet 650 milliGRAM(s) Oral every 8 hours PRN Pain  diphenhydrAMINE   Injectable 25 milliGRAM(s) IV Push once PRN Rash and/or Itching  LORazepam   Injectable 2 milliGRAM(s) IV Push once PRN seizure  oxyCODONE    IR 5 milliGRAM(s) Oral every 6 hours PRN Moderate Pain (4 - 6)      Allergies    vancomycin (Anaphylaxis)    Intolerances          LABS:                          9.0    2.5   )-----------( 69       ( 20 Feb 2018 05:45 )             29.1     02-20    138  |  95<L>  |  59.0<H>  ----------------------------<  93  4.8   |  27.0  |  13.67<H>    Ca    8.2<L>      20 Feb 2018 05:45  Phos  4.9     02-19  Mg     2.4     02-19    TPro  6.4<L>  /  Alb  3.5  /  TBili  0.4  /  DBili  x   /  AST  20  /  ALT  8   /  AlkPhos  748<H>  02-19    PT/INR - ( 19 Feb 2018 13:27 )   PT: 12.3 sec;   INR: 1.12 ratio         PTT - ( 19 Feb 2018 13:27 )  PTT:32.9 sec      RADIOLOGY & ADDITIONAL TESTS:

## 2018-02-20 NOTE — PROGRESS NOTE ADULT - ASSESSMENT
HIV (vertical Tx)  low CD4,   ESRD on hemodialysis  Sec hyperparathyroidism  Anemia  Hyperphosphatemia  Aneurysmal AVF,   Seizure disorder.   Legally blind HIV/cytomegalovirus retinopathy.   HTN    -Seen and examined on dialysis. Tolerating well. No change to orders  -Benadryl with dialysis for severe itching  -Renal dialysis diet  -MIKE  -HIV meds per ID or hospitalist  -Keep on Renvela and Calcitriol  -Neuro eval    D/W HD RN

## 2018-02-20 NOTE — PROGRESS NOTE ADULT - ASSESSMENT
The patient is a 27 year old male with a history of HIV ( non compliant with treatment), ESRD on HD MWF, hypertension and seizure disorder who was brought to the ER from his dialysis center after having  a witnessed seizure likely secondary to non- compliance with his medications    Assessement/Plan:    1. Seizure: Continue keppra'  seizure precautions; iv antivan prn for seizure    2. HIV- non compliant with medications. I spoke with the ER doctor who conferred with his HIV physician. She recommended Tiviay, Tenofovir and Norvir to continue at this time. He has not followed up with her since October.  HIV viral load ordered.     3. Hypertension: Resume metoprolol, lisinopril and norvasc    4. ESRD On HD MWF: HD today     5. Pancytopenia likely secondary to HIV    VTE_ SCDS bilaterally

## 2018-02-20 NOTE — PROGRESS NOTE ADULT - SUBJECTIVE AND OBJECTIVE BOX
NEPHROLOGY INTERVAL HPI/OVERNIGHT EVENTS:  HPI:  The patient is a 27 year old male with a history of HIV ( non compliant with treatment), ESRD on HD MWF, hypertension and seizure disorder who was brought to the ER from his dialysis center after having  a witnessed seizure. He admits to being non compliant with his medications at home; he had not taken his medications for the past 2 weeks. He was recently admitted to [facility] for respiratory failure secondary to pneumonia and treated with antibiotics. Prior to the this he was admitted in January for respiratory failure post seizure, intubated and admitted to the ICU.   In the ER, he was given IV keppra 1000mg x 1, CT head was negative; CT chest negative for infiltrate. At the time of my examination patient is complaining of pain in his back. 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 He has been trying to get help at home but has been unsuccessful and has resigned to going from one hospital to another. (2018 17:36)    F/u ESRD on HD  Seen on dialysis, no acute complaints      PAST MEDICAL & SURGICAL HISTORY:  HIV (human immunodeficiency virus infection)  Seizure disorder  Hypertension  Diabetes  ESRD (end stage renal disease)  Seizure  Pericarditis  Anxiety  Depression  Renal failure (ARF), acute on chronic: dialysis av fistula, RUE  HTN (hypertension)  Cardiomyopathy  HIV disease: born HIV+  No significant past surgical history  AV fistula  S/P tonsillectomy      MEDICATIONS  (STANDING):  amLODIPine   Tablet 10 milliGRAM(s) Oral daily  calcitriol   Capsule 0.5 MICROGram(s) Oral daily  epoetin nithin Injectable 24898 Unit(s) IV Push <User Schedule>  fentaNYL   Patch 100 MICROgram(s)/Hr 1 Patch Transdermal every 72 hours  gabapentin 100 milliGRAM(s) Oral at bedtime  levETIRAcetam 500 milliGRAM(s) Oral two times a day  lisinopril 20 milliGRAM(s) Oral daily  metoprolol     tartrate 50 milliGRAM(s) Oral two times a day  pantoprazole    Tablet 40 milliGRAM(s) Oral before breakfast  QUEtiapine 50 milliGRAM(s) Oral at bedtime  ritonavir Tablet 100 milliGRAM(s) Oral two times a day  sertraline 50 milliGRAM(s) Oral daily  sevelamer hydrochloride 800 milliGRAM(s) Oral three times a day with meals  tenofovir 300 milliGRAM(s) Oral daily    MEDICATIONS  (PRN):  acetaminophen   Tablet 650 milliGRAM(s) Oral every 8 hours PRN Pain  diphenhydrAMINE   Injectable 25 milliGRAM(s) IV Push once PRN Rash and/or Itching  LORazepam   Injectable 2 milliGRAM(s) IV Push once PRN seizure  oxyCODONE    IR 5 milliGRAM(s) Oral every 6 hours PRN Moderate Pain (4 - 6)      Allergies    vancomycin (Anaphylaxis)    Intolerances        Vital Signs Last 24 Hrs  T(C): 36.6 (2018 09:40), Max: 36.9 (2018 12:32)  T(F): 97.9 (2018 09:40), Max: 98.4 (2018 12:32)  HR: 74 (2018 09:40) (70 - 87)  BP: 132/72 (2018 09:40) (131/88 - 163/106)  BP(mean): --  RR: 18 (2018 09:40) (16 - 20)  SpO2: 98% (2018 09:40) (97% - 98%)  Daily Height in cm: 175.26 (2018 12:32)    Daily Weight in k (2018 09:40)    PHYSICAL EXAM:  GENERAL: appears chronically ill, oriented. PALLOR  HEAD:  Atraumatic, Normocephalic  EYES: EOMI, PERRLA, conjunctiva and sclera clear  ENMT: No tonsillar erythema, exudates, or enlargement; Moist mucous membranes, Good dentition, No lesions  NECK: Supple, neck  veins full  NERVOUS SYSTEM:  Alert & Oriented X3, Good concentration; Motor Strength wnl upper and lower extremities  CHEST/LUNG: Clear to percussion bilaterally; No rales, rhonchi, wheezing, or rubs  HEART: Regular rate and rhythm; No murmurs, rubs, or gallops  ABDOMEN: Soft, Nontender, Nondistended; Bowel sounds present, body wall and flank edema  EXTREMITIES:  +2 - +3 leg edema . avf RT ue WITH ANEURYSMAL AREAS  LYMPH: No lymphadenopathy noted  SKIN: No rashes or lesions, pale    LABS:                        9.0    2.5   )-----------( 69       ( 2018 05:45 )             29.1     02-20    138  |  95<L>  |  59.0<H>  ----------------------------<  93  4.8   |  27.0  |  13.67<H>    Ca    8.2<L>      2018 05:45  Phos  4.9       Mg     2.4         TPro  6.4<L>  /  Alb  3.5  /  TBili  0.4  /  DBili  x   /  AST  20  /  ALT  8   /  AlkPhos  748<H>      PT/INR - ( 2018 13:27 )   PT: 12.3 sec;   INR: 1.12 ratio         PTT - ( 2018 13:27 )  PTT:32.9 sec    Magnesium, Serum: 2.4 mg/dL ( @ 13:27)  Phosphorus Level, Serum: 4.9 mg/dL ( @ 13:27)        RADIOLOGY & ADDITIONAL TESTS:

## 2018-02-21 ENCOUNTER — TRANSCRIPTION ENCOUNTER (OUTPATIENT)
Age: 28
End: 2018-02-21

## 2018-02-21 LAB
HIV-1 VIRAL LOAD RESULT: ABNORMAL
HIV1 RNA # SERPL NAA+PROBE: SIGNIFICANT CHANGE UP
HIV1 RNA SER-IMP: SIGNIFICANT CHANGE UP
HIV1 RNA SERPL NAA+PROBE-ACNC: ABNORMAL
HIV1 RNA SERPL NAA+PROBE-LOG#: 4.4 LG COP/ML — SIGNIFICANT CHANGE UP

## 2018-02-21 PROCEDURE — 99233 SBSQ HOSP IP/OBS HIGH 50: CPT

## 2018-02-21 RX ORDER — RITONAVIR 100 MG/1
1 TABLET, FILM COATED ORAL
Qty: 0 | Refills: 0 | COMMUNITY

## 2018-02-21 RX ORDER — RITONAVIR 100 MG/1
100 TABLET, FILM COATED ORAL DAILY
Qty: 0 | Refills: 0 | Status: DISCONTINUED | OUTPATIENT
Start: 2018-02-21 | End: 2018-02-22

## 2018-02-21 RX ORDER — ALPRAZOLAM 0.25 MG
2 TABLET ORAL DAILY
Qty: 0 | Refills: 0 | Status: DISCONTINUED | OUTPATIENT
Start: 2018-02-21 | End: 2018-02-22

## 2018-02-21 RX ORDER — DIPHENHYDRAMINE HCL 50 MG
50 CAPSULE ORAL ONCE
Qty: 0 | Refills: 0 | Status: DISCONTINUED | OUTPATIENT
Start: 2018-02-21 | End: 2018-02-21

## 2018-02-21 RX ORDER — EMTRICITABINE 200 MG/1
1 CAPSULE ORAL
Qty: 0 | Refills: 0 | COMMUNITY

## 2018-02-21 RX ORDER — DARUNAVIR 75 MG/1
1 TABLET, FILM COATED ORAL
Qty: 0 | Refills: 0 | COMMUNITY

## 2018-02-21 RX ORDER — DOLUTEGRAVIR SODIUM 25 MG/1
1 TABLET, FILM COATED ORAL
Qty: 0 | Refills: 0 | COMMUNITY

## 2018-02-21 RX ORDER — ZOLPIDEM TARTRATE 10 MG/1
5 TABLET ORAL AT BEDTIME
Qty: 0 | Refills: 0 | Status: DISCONTINUED | OUTPATIENT
Start: 2018-02-21 | End: 2018-02-22

## 2018-02-21 RX ORDER — OXYCODONE HYDROCHLORIDE 5 MG/1
10 TABLET ORAL EVERY 12 HOURS
Qty: 0 | Refills: 0 | Status: DISCONTINUED | OUTPATIENT
Start: 2018-02-21 | End: 2018-02-22

## 2018-02-21 RX ORDER — DARUNAVIR 75 MG/1
800 TABLET, FILM COATED ORAL DAILY
Qty: 0 | Refills: 0 | Status: DISCONTINUED | OUTPATIENT
Start: 2018-02-21 | End: 2018-02-22

## 2018-02-21 RX ORDER — DIPHENHYDRAMINE HCL 50 MG
50 CAPSULE ORAL ONCE
Qty: 0 | Refills: 0 | Status: COMPLETED | OUTPATIENT
Start: 2018-02-21 | End: 2018-02-21

## 2018-02-21 RX ORDER — TENOFOVIR DISOPROXIL FUMARATE 300 MG/1
1 TABLET, FILM COATED ORAL
Qty: 0 | Refills: 0 | COMMUNITY

## 2018-02-21 RX ADMIN — CALCITRIOL 0.5 MICROGRAM(S): 0.5 CAPSULE ORAL at 12:47

## 2018-02-21 RX ADMIN — AMLODIPINE BESYLATE 10 MILLIGRAM(S): 2.5 TABLET ORAL at 06:02

## 2018-02-21 RX ADMIN — PANTOPRAZOLE SODIUM 40 MILLIGRAM(S): 20 TABLET, DELAYED RELEASE ORAL at 06:03

## 2018-02-21 RX ADMIN — SEVELAMER CARBONATE 800 MILLIGRAM(S): 2400 POWDER, FOR SUSPENSION ORAL at 08:39

## 2018-02-21 RX ADMIN — LEVETIRACETAM 500 MILLIGRAM(S): 250 TABLET, FILM COATED ORAL at 17:35

## 2018-02-21 RX ADMIN — Medication 50 MILLIGRAM(S): at 17:35

## 2018-02-21 RX ADMIN — Medication 50 MILLIGRAM(S): at 00:56

## 2018-02-21 RX ADMIN — OXYCODONE HYDROCHLORIDE 10 MILLIGRAM(S): 5 TABLET ORAL at 18:01

## 2018-02-21 RX ADMIN — GABAPENTIN 100 MILLIGRAM(S): 400 CAPSULE ORAL at 22:15

## 2018-02-21 RX ADMIN — Medication 50 MILLIGRAM(S): at 06:02

## 2018-02-21 RX ADMIN — RITONAVIR 100 MILLIGRAM(S): 100 TABLET, FILM COATED ORAL at 08:39

## 2018-02-21 RX ADMIN — OXYCODONE HYDROCHLORIDE 10 MILLIGRAM(S): 5 TABLET ORAL at 17:35

## 2018-02-21 RX ADMIN — DARUNAVIR 800 MILLIGRAM(S): 75 TABLET, FILM COATED ORAL at 12:48

## 2018-02-21 RX ADMIN — OXYCODONE HYDROCHLORIDE 5 MILLIGRAM(S): 5 TABLET ORAL at 02:38

## 2018-02-21 RX ADMIN — LEVETIRACETAM 500 MILLIGRAM(S): 250 TABLET, FILM COATED ORAL at 06:03

## 2018-02-21 RX ADMIN — LISINOPRIL 20 MILLIGRAM(S): 2.5 TABLET ORAL at 06:02

## 2018-02-21 RX ADMIN — OXYCODONE HYDROCHLORIDE 5 MILLIGRAM(S): 5 TABLET ORAL at 03:30

## 2018-02-21 RX ADMIN — RITONAVIR 100 MILLIGRAM(S): 100 TABLET, FILM COATED ORAL at 12:48

## 2018-02-21 RX ADMIN — QUETIAPINE FUMARATE 50 MILLIGRAM(S): 200 TABLET, FILM COATED ORAL at 22:15

## 2018-02-21 RX ADMIN — SERTRALINE 50 MILLIGRAM(S): 25 TABLET, FILM COATED ORAL at 12:46

## 2018-02-21 RX ADMIN — SEVELAMER CARBONATE 800 MILLIGRAM(S): 2400 POWDER, FOR SUSPENSION ORAL at 17:35

## 2018-02-21 RX ADMIN — SEVELAMER CARBONATE 800 MILLIGRAM(S): 2400 POWDER, FOR SUSPENSION ORAL at 12:47

## 2018-02-21 NOTE — DISCHARGE NOTE ADULT - PLAN OF CARE
Compliance with seizure medications Continue keppra po Continue metoprolol and norvasc HAART   FOllow up with ID in 1-2 weeks HD MWF

## 2018-02-21 NOTE — PROGRESS NOTE ADULT - SUBJECTIVE AND OBJECTIVE BOX
CC: Pain    INTERVAL HPI/OVERNIGHT EVENTS: Patient seen and examined, has chronic pain in his neck and he says he is not on his pain medication regimen that is prescribed to him by his PMD.   No siezure activity since admission      Vital Signs Last 24 Hrs  T(C): 37.1 (21 Feb 2018 04:49), Max: 37.1 (21 Feb 2018 04:49)  T(F): 98.8 (21 Feb 2018 04:49), Max: 98.8 (21 Feb 2018 04:49)  HR: 73 (21 Feb 2018 09:55) (71 - 76)  BP: 128/70 (21 Feb 2018 09:55) (128/70 - 143/90)  BP(mean): --  RR: 16 (21 Feb 2018 09:55) (16 - 20)  SpO2: 98% (21 Feb 2018 09:55) (96% - 98%)    PHYSICAL EXAM:    GENERAL: NAD, AOX3  HEAD:  Atraumatic, Normocephalic  EYES: EOMI, PERRLA, conjunctiva and sclera clear; legally blind   CHEST/LUNG: Clear to auscultation bilaterally; No rales, rhonchi, wheezing, or rubs  HEART: Regular rate and rhythm; No murmurs, rubs, or gallops  ABDOMEN: Soft, Nontender, Nondistended; Bowel sounds present  EXTREMITIES:  2+ Peripheral Pulses, No clubbing, cyanosis, or edema  AVF right upper extremity         MEDICATIONS  (STANDING):  amLODIPine   Tablet 10 milliGRAM(s) Oral daily  calcitriol   Capsule 0.5 MICROGram(s) Oral daily  darunavir 800 milliGRAM(s) Oral daily  epoetin nithin Injectable 66141 Unit(s) IV Push <User Schedule>  fentaNYL   Patch 100 MICROgram(s)/Hr 1 Patch Transdermal every 72 hours  gabapentin 100 milliGRAM(s) Oral at bedtime  levETIRAcetam 500 milliGRAM(s) Oral two times a day  lisinopril 20 milliGRAM(s) Oral daily  metoprolol     tartrate 50 milliGRAM(s) Oral two times a day  oxyCODONE  ER Tablet 10 milliGRAM(s) Oral every 12 hours  pantoprazole    Tablet 40 milliGRAM(s) Oral before breakfast  QUEtiapine 50 milliGRAM(s) Oral at bedtime  ritonavir Tablet 100 milliGRAM(s) Oral daily  sertraline 50 milliGRAM(s) Oral daily  sevelamer hydrochloride 800 milliGRAM(s) Oral three times a day with meals    MEDICATIONS  (PRN):  ALPRAZolam 2 milliGRAM(s) Oral daily PRN anxiety  LORazepam   Injectable 2 milliGRAM(s) IV Push once PRN seizure  zolpidem 5 milliGRAM(s) Oral at bedtime PRN Insomnia  zolpidem 5 milliGRAM(s) Oral at bedtime PRN Insomnia      Allergies    vancomycin (Anaphylaxis)    Intolerances          LABS:                          9.0    2.5   )-----------( 69       ( 20 Feb 2018 05:45 )             29.1     02-20    138  |  95<L>  |  59.0<H>  ----------------------------<  93  4.8   |  27.0  |  13.67<H>    Ca    8.2<L>      20 Feb 2018 05:45  Phos  4.9     02-19  Mg     2.4     02-19    TPro  6.4<L>  /  Alb  3.5  /  TBili  0.4  /  DBili  x   /  AST  20  /  ALT  8   /  AlkPhos  748<H>  02-19    PT/INR - ( 19 Feb 2018 13:27 )   PT: 12.3 sec;   INR: 1.12 ratio         PTT - ( 19 Feb 2018 13:27 )  PTT:32.9 sec      RADIOLOGY & ADDITIONAL TESTS:

## 2018-02-21 NOTE — DISCHARGE NOTE ADULT - CARE PLAN
Principal Discharge DX:	Seizure disorder  Goal:	Compliance with seizure medications  Assessment and plan of treatment:	Continue keppra po  Secondary Diagnosis:	Essential hypertension  Assessment and plan of treatment:	Continue metoprolol and norvasc  Secondary Diagnosis:	HIV (human immunodeficiency virus infection)  Assessment and plan of treatment:	HAART   FOllow up with ID in 1-2 weeks  Secondary Diagnosis:	ESRD (end stage renal disease)  Assessment and plan of treatment:	HD MWF  Secondary Diagnosis:	Anxiety

## 2018-02-21 NOTE — DISCHARGE NOTE ADULT - HOSPITAL COURSE
The patient is a 27 year old male with a history of HIV ( non compliant with treatment), ESRD on HD MWF, hypertension and seizure disorder who was brought to the ER from his dialysis center after having  a witnessed seizure likely secondary to non- compliance with his medications. Keppra was restarted with no further seizure activity. Blood pressure improved with his medications. HIV- non compliant with medications. I spoke with Dr Ramandeep Venegas, his PCP and ID physician confirmed his HAART regimen and re-ordered it.   He has to follow up with her for further management. Spoke with CM, to be set up with nursing at home to help arrange his medications to ensure compliance. Medically stable for discharge     40 mins spent coordinating care and discharge. The patient is a 27 year old male with a history of HIV ( non compliant with treatment), ESRD on HD MWF, hypertension and seizure disorder who was brought to the ER from his dialysis center after having  a witnessed seizure likely secondary to non- compliance with his medications. Keppra was restarted with no further seizure activity. Blood pressure improved with his medications. HIV- non compliant with medications. I spoke with Dr Ramandeep Venegas, his PCP and ID physician confirmed his HAART regimen and re-ordered it.   He has to follow up with her for further management. Spoke with CM, to be set up with nursing at home to help arrange his medications to ensure compliance. Medically stable for discharge  Spoke with case management and social work. Medications were sent to [facility] pharmacy to be dispensed in a blister pack for easy administration. Patient's mother and sister in agreement with plan to help patient recieve his medications.     40 mins spent coordinating care and discharge.

## 2018-02-21 NOTE — DISCHARGE NOTE ADULT - MEDICATION SUMMARY - MEDICATIONS TO TAKE
I will START or STAY ON the medications listed below when I get home from the hospital:    lisinopril 20 mg oral tablet  -- 1 tab(s) by mouth once a day  -- Indication: For Hypertension    gabapentin 100 mg oral capsule  -- 1 cap(s) by mouth once a day (at bedtime)  -- Indication: For Neuropathy    levETIRAcetam 500 mg oral tablet  -- 1 tab(s) by mouth 2 times a day  -- Indication: For Seizure disorder    sertraline 50 mg oral tablet  -- 1 tab(s) by mouth once a day  -- Indication: For depression    QUEtiapine 50 mg oral tablet  -- 1 tab(s) by mouth once a day (at bedtime)  -- Indication: For depression    darunavir 800 mg oral tablet  -- 1 tab(s) by mouth once a day  -- Indication: For HIV (human immunodeficiency virus infection)    ritonavir 100 mg oral tablet  -- 1 tab(s) by mouth once a day   -- Indication: For HIV (human immunodeficiency virus infection)    Tivicay 50 mg oral tablet  -- 1 tab(s) by mouth once a day  -- Indication: For HIV (human immunodeficiency virus infection)    metoprolol tartrate 50 mg oral tablet  -- 1 tab(s) by mouth 2 times a day  -- Indication: For Hypertension    amLODIPine 10 mg oral tablet  -- 1 tab(s) by mouth once a day  -- Indication: For Hypertension    sevelamer carbonate 800 mg oral tablet  -- 1 tab(s) by mouth 3 times a day (with meals)  -- Indication: For Supplement    pantoprazole 40 mg oral delayed release tablet  -- 1 tab(s) by mouth once a day  -- Indication: For gerd    calcitriol 0.5 mcg oral capsule  -- 1 cap(s) by mouth once a day  -- Indication: For Suppleemnt

## 2018-02-21 NOTE — PROGRESS NOTE ADULT - ASSESSMENT
The patient is a 27 year old male with a history of HIV ( non compliant with treatment), ESRD on HD MWF, hypertension and seizure disorder who was brought to the ER from his dialysis center after having  a witnessed seizure likely secondary to non- compliance with his medications. Keppra was restarted with no further seizure activity. Blood pressure improved with his medications.     Assessement/Plan:    1. Seizure: Continue keppra  seizure precautions; iv ativan prn for seizure    2. HIV- non compliant with medications. I spoke with Dr Ramandeep Venegas, his PCP and ID phsycian; confirmed his HAART regimen and re-ordered it.       3. Hypertension: Improved BP on meds; continue  metoprolol, lisinopril and norvasc    4. ESRD On HD MWF    5. Pancytopenia likely secondary to HIV    6. Chronic pain and anxiety: ISTOP reveals prescriptions of xanax, ambien, oxycodone ER and fentanyl patch and they were re-ordered accordingly.   ISTOP results placed in the chart    VTE_ SCDS bilaterally     Spoke with CM, to be set up with nursing at home to help arrange his medications to ensure compliance.   Medically stable for discharge pending home care at home.

## 2018-02-22 VITALS — SYSTOLIC BLOOD PRESSURE: 126 MMHG | DIASTOLIC BLOOD PRESSURE: 78 MMHG | HEART RATE: 68 BPM

## 2018-02-22 LAB — LEVETIRACETAM SERPL-MCNC: <2 MCG/ML — LOW (ref 12–46)

## 2018-02-22 PROCEDURE — 85730 THROMBOPLASTIN TIME PARTIAL: CPT

## 2018-02-22 PROCEDURE — 85610 PROTHROMBIN TIME: CPT

## 2018-02-22 PROCEDURE — 83735 ASSAY OF MAGNESIUM: CPT

## 2018-02-22 PROCEDURE — 93990 DOPPLER FLOW TESTING: CPT

## 2018-02-22 PROCEDURE — 99232 SBSQ HOSP IP/OBS MODERATE 35: CPT

## 2018-02-22 PROCEDURE — 85027 COMPLETE CBC AUTOMATED: CPT

## 2018-02-22 PROCEDURE — 80048 BASIC METABOLIC PNL TOTAL CA: CPT

## 2018-02-22 PROCEDURE — 80053 COMPREHEN METABOLIC PANEL: CPT

## 2018-02-22 PROCEDURE — 36415 COLL VENOUS BLD VENIPUNCTURE: CPT

## 2018-02-22 PROCEDURE — 96374 THER/PROPH/DIAG INJ IV PUSH: CPT

## 2018-02-22 PROCEDURE — 99261: CPT

## 2018-02-22 PROCEDURE — 84100 ASSAY OF PHOSPHORUS: CPT

## 2018-02-22 PROCEDURE — 96375 TX/PRO/DX INJ NEW DRUG ADDON: CPT

## 2018-02-22 PROCEDURE — 70450 CT HEAD/BRAIN W/O DYE: CPT

## 2018-02-22 PROCEDURE — 80177 DRUG SCRN QUAN LEVETIRACETAM: CPT

## 2018-02-22 PROCEDURE — 86357 NK CELLS TOTAL COUNT: CPT

## 2018-02-22 PROCEDURE — 86355 B CELLS TOTAL COUNT: CPT

## 2018-02-22 PROCEDURE — 87536 HIV-1 QUANT&REVRSE TRNSCRPJ: CPT

## 2018-02-22 PROCEDURE — 99285 EMERGENCY DEPT VISIT HI MDM: CPT | Mod: 25

## 2018-02-22 PROCEDURE — 71250 CT THORAX DX C-: CPT

## 2018-02-22 RX ORDER — LEVETIRACETAM 250 MG/1
1 TABLET, FILM COATED ORAL
Qty: 30 | Refills: 0 | OUTPATIENT
Start: 2018-02-22 | End: 2018-03-08

## 2018-02-22 RX ORDER — SERTRALINE 25 MG/1
1 TABLET, FILM COATED ORAL
Qty: 0 | Refills: 0 | COMMUNITY

## 2018-02-22 RX ORDER — PANTOPRAZOLE SODIUM 20 MG/1
1 TABLET, DELAYED RELEASE ORAL
Qty: 0 | Refills: 0 | COMMUNITY

## 2018-02-22 RX ORDER — SEVELAMER CARBONATE 2400 MG/1
3 POWDER, FOR SUSPENSION ORAL
Qty: 0 | Refills: 0 | COMMUNITY

## 2018-02-22 RX ORDER — DARUNAVIR 75 MG/1
1 TABLET, FILM COATED ORAL
Qty: 0 | Refills: 0 | COMMUNITY

## 2018-02-22 RX ORDER — AMLODIPINE BESYLATE 2.5 MG/1
1 TABLET ORAL
Qty: 15 | Refills: 0 | OUTPATIENT
Start: 2018-02-22 | End: 2018-03-08

## 2018-02-22 RX ORDER — ZOLPIDEM TARTRATE 10 MG/1
1 TABLET ORAL
Qty: 0 | Refills: 0 | COMMUNITY

## 2018-02-22 RX ORDER — LISINOPRIL 2.5 MG/1
1 TABLET ORAL
Qty: 15 | Refills: 0 | OUTPATIENT
Start: 2018-02-22 | End: 2018-03-08

## 2018-02-22 RX ORDER — QUETIAPINE FUMARATE 200 MG/1
1 TABLET, FILM COATED ORAL
Qty: 0 | Refills: 0 | COMMUNITY

## 2018-02-22 RX ORDER — METOPROLOL TARTRATE 50 MG
1 TABLET ORAL
Qty: 0 | Refills: 0 | COMMUNITY

## 2018-02-22 RX ORDER — ALPRAZOLAM 0.25 MG
1 TABLET ORAL
Qty: 0 | Refills: 0 | COMMUNITY

## 2018-02-22 RX ORDER — PANTOPRAZOLE SODIUM 20 MG/1
1 TABLET, DELAYED RELEASE ORAL
Qty: 15 | Refills: 0 | OUTPATIENT
Start: 2018-02-22 | End: 2018-03-08

## 2018-02-22 RX ORDER — DIPHENHYDRAMINE HCL 50 MG
25 CAPSULE ORAL ONCE
Qty: 0 | Refills: 0 | Status: DISCONTINUED | OUTPATIENT
Start: 2018-02-22 | End: 2018-02-22

## 2018-02-22 RX ORDER — SEVELAMER CARBONATE 2400 MG/1
1 POWDER, FOR SUSPENSION ORAL
Qty: 45 | Refills: 0 | OUTPATIENT
Start: 2018-02-22 | End: 2018-03-08

## 2018-02-22 RX ORDER — DIPHENHYDRAMINE HCL 50 MG
50 CAPSULE ORAL ONCE
Qty: 0 | Refills: 0 | Status: COMPLETED | OUTPATIENT
Start: 2018-02-22 | End: 2018-02-22

## 2018-02-22 RX ORDER — LEVETIRACETAM 250 MG/1
1 TABLET, FILM COATED ORAL
Qty: 0 | Refills: 0 | COMMUNITY

## 2018-02-22 RX ORDER — DOLUTEGRAVIR SODIUM 25 MG/1
1 TABLET, FILM COATED ORAL
Qty: 0 | Refills: 0 | COMMUNITY

## 2018-02-22 RX ORDER — RITONAVIR 100 MG/1
1 TABLET, FILM COATED ORAL
Qty: 0 | Refills: 0 | COMMUNITY

## 2018-02-22 RX ORDER — SERTRALINE 25 MG/1
1 TABLET, FILM COATED ORAL
Qty: 15 | Refills: 0 | OUTPATIENT
Start: 2018-02-22 | End: 2018-03-08

## 2018-02-22 RX ORDER — GABAPENTIN 400 MG/1
1 CAPSULE ORAL
Qty: 15 | Refills: 0 | OUTPATIENT
Start: 2018-02-22 | End: 2018-03-08

## 2018-02-22 RX ORDER — DARUNAVIR 75 MG/1
1 TABLET, FILM COATED ORAL
Qty: 15 | Refills: 0 | OUTPATIENT
Start: 2018-02-22 | End: 2018-03-08

## 2018-02-22 RX ORDER — DOLUTEGRAVIR SODIUM 25 MG/1
1 TABLET, FILM COATED ORAL
Qty: 15 | Refills: 0 | OUTPATIENT
Start: 2018-02-22 | End: 2018-03-08

## 2018-02-22 RX ORDER — LISINOPRIL 2.5 MG/1
1 TABLET ORAL
Qty: 0 | Refills: 0 | COMMUNITY

## 2018-02-22 RX ORDER — RITONAVIR 100 MG/1
1 TABLET, FILM COATED ORAL
Qty: 15 | Refills: 0 | OUTPATIENT
Start: 2018-02-22 | End: 2018-03-08

## 2018-02-22 RX ORDER — METOPROLOL TARTRATE 50 MG
1 TABLET ORAL
Qty: 30 | Refills: 0 | OUTPATIENT
Start: 2018-02-22 | End: 2018-03-08

## 2018-02-22 RX ORDER — CALCITRIOL 0.5 UG/1
1 CAPSULE ORAL
Qty: 15 | Refills: 0 | OUTPATIENT
Start: 2018-02-22 | End: 2018-03-08

## 2018-02-22 RX ORDER — QUETIAPINE FUMARATE 200 MG/1
1 TABLET, FILM COATED ORAL
Qty: 15 | Refills: 0 | OUTPATIENT
Start: 2018-02-22 | End: 2018-03-08

## 2018-02-22 RX ORDER — OXYCODONE HYDROCHLORIDE 5 MG/1
1 TABLET ORAL
Qty: 0 | Refills: 0 | COMMUNITY

## 2018-02-22 RX ADMIN — PANTOPRAZOLE SODIUM 40 MILLIGRAM(S): 20 TABLET, DELAYED RELEASE ORAL at 06:23

## 2018-02-22 RX ADMIN — LISINOPRIL 20 MILLIGRAM(S): 2.5 TABLET ORAL at 05:39

## 2018-02-22 RX ADMIN — OXYCODONE HYDROCHLORIDE 10 MILLIGRAM(S): 5 TABLET ORAL at 05:39

## 2018-02-22 RX ADMIN — RITONAVIR 100 MILLIGRAM(S): 100 TABLET, FILM COATED ORAL at 17:55

## 2018-02-22 RX ADMIN — ZOLPIDEM TARTRATE 5 MILLIGRAM(S): 10 TABLET ORAL at 00:55

## 2018-02-22 RX ADMIN — OXYCODONE HYDROCHLORIDE 10 MILLIGRAM(S): 5 TABLET ORAL at 17:35

## 2018-02-22 RX ADMIN — ERYTHROPOIETIN 10000 UNIT(S): 10000 INJECTION, SOLUTION INTRAVENOUS; SUBCUTANEOUS at 13:44

## 2018-02-22 RX ADMIN — Medication 50 MILLIGRAM(S): at 13:46

## 2018-02-22 RX ADMIN — SEVELAMER CARBONATE 800 MILLIGRAM(S): 2400 POWDER, FOR SUSPENSION ORAL at 08:38

## 2018-02-22 RX ADMIN — AMLODIPINE BESYLATE 10 MILLIGRAM(S): 2.5 TABLET ORAL at 05:39

## 2018-02-22 RX ADMIN — OXYCODONE HYDROCHLORIDE 10 MILLIGRAM(S): 5 TABLET ORAL at 06:30

## 2018-02-22 RX ADMIN — SERTRALINE 50 MILLIGRAM(S): 25 TABLET, FILM COATED ORAL at 17:55

## 2018-02-22 RX ADMIN — Medication 50 MILLIGRAM(S): at 17:55

## 2018-02-22 RX ADMIN — LEVETIRACETAM 500 MILLIGRAM(S): 250 TABLET, FILM COATED ORAL at 17:55

## 2018-02-22 RX ADMIN — SEVELAMER CARBONATE 800 MILLIGRAM(S): 2400 POWDER, FOR SUSPENSION ORAL at 17:56

## 2018-02-22 RX ADMIN — LEVETIRACETAM 500 MILLIGRAM(S): 250 TABLET, FILM COATED ORAL at 05:39

## 2018-02-22 RX ADMIN — Medication 50 MILLIGRAM(S): at 05:39

## 2018-02-22 RX ADMIN — DARUNAVIR 800 MILLIGRAM(S): 75 TABLET, FILM COATED ORAL at 17:55

## 2018-02-22 NOTE — PROGRESS NOTE ADULT - SUBJECTIVE AND OBJECTIVE BOX
INTERVAL HPI/OVERNIGHT EVENTS:      Vital Signs Last 24 Hrs  T(C): 37.1 (22 Feb 2018 05:41), Max: 37.1 (22 Feb 2018 05:41)  T(F): 98.7 (22 Feb 2018 05:41), Max: 98.7 (22 Feb 2018 05:41)  HR: 69 (22 Feb 2018 05:41) (67 - 76)  BP: 142/88 (22 Feb 2018 05:41) (128/80 - 142/88)  BP(mean): --  RR: 18 (22 Feb 2018 05:41) (18 - 18)  SpO2: 98% (22 Feb 2018 05:41) (97% - 98%)    PHYSICAL EXAM:    GENERAL: NAD, AoX3   ENMT: Moist mucous membranes  NECK: Supple, No JVD  CHEST/LUNG: Clear to auscultation bilaterally; No rales, rhonchi, wheezing, or rubs  HEART: Regular rate and rhythm; No murmurs, rubs, or gallops  ABDOMEN: Soft, Nontender, Nondistended; Bowel sounds present  EXTREMITIES:  2+ Peripheral Pulses, No clubbing, cyanosis, or edema  Left UE AVF      MEDICATIONS  (STANDING):  amLODIPine   Tablet 10 milliGRAM(s) Oral daily  calcitriol   Capsule 0.5 MICROGram(s) Oral daily  darunavir 800 milliGRAM(s) Oral daily  epoetin nithin Injectable 39768 Unit(s) IV Push <User Schedule>  fentaNYL   Patch 100 MICROgram(s)/Hr 1 Patch Transdermal every 72 hours  gabapentin 100 milliGRAM(s) Oral at bedtime  levETIRAcetam 500 milliGRAM(s) Oral two times a day  lisinopril 20 milliGRAM(s) Oral daily  metoprolol     tartrate 50 milliGRAM(s) Oral two times a day  oxyCODONE  ER Tablet 10 milliGRAM(s) Oral every 12 hours  pantoprazole    Tablet 40 milliGRAM(s) Oral before breakfast  QUEtiapine 50 milliGRAM(s) Oral at bedtime  ritonavir Tablet 100 milliGRAM(s) Oral daily  sertraline 50 milliGRAM(s) Oral daily  sevelamer hydrochloride 800 milliGRAM(s) Oral three times a day with meals    MEDICATIONS  (PRN):  ALPRAZolam 2 milliGRAM(s) Oral daily PRN anxiety  LORazepam   Injectable 2 milliGRAM(s) IV Push once PRN seizure  zolpidem 5 milliGRAM(s) Oral at bedtime PRN Insomnia  zolpidem 5 milliGRAM(s) Oral at bedtime PRN Insomnia      Allergies    vancomycin (Anaphylaxis)    Intolerances          LABS:                  RADIOLOGY & ADDITIONAL TESTS:

## 2018-02-22 NOTE — PROGRESS NOTE ADULT - ASSESSMENT
HIV (vertical Tx)  low CD4,   ESRD on hemodialysis  Sec hyperparathyroidism  Anemia  Hyperphosphatemia  Aneurysmal AVF,   Seizure disorder.   Legally blind HIV/cytomegalovirus retinopathy.   HTN    -HD today as ordered. Tolerating well. No change to orders.   -The patient is truly MWF; will dialyze tomorrow for a short session to place back on schedule. If discharged, can resume outpatient dialysis as scheduled.   -Benadryl with dialysis for severe itching  -Renal dialysis diet  -MIKE  -HIV meds per ID or hospitalist  -Keep on Renvela and Calcitriol  -Neuro eval    D/W HD RN

## 2018-02-22 NOTE — PROGRESS NOTE ADULT - SUBJECTIVE AND OBJECTIVE BOX
NEPHROLOGY INTERVAL HPI/OVERNIGHT EVENTS:  HPI:  The patient is a 27 year old male with a history of HIV ( non compliant with treatment), ESRD on HD MWF, hypertension and seizure disorder who was brought to the ER from his dialysis center after having  a witnessed seizure. He admits to being non compliant with his medications at home; he had not taken his medications for the past 2 weeks. He was recently admitted to [facility] for respiratory failure secondary to pneumonia and treated with antibiotics. Prior to the this he was admitted in January for respiratory failure post seizure, intubated and admitted to the ICU.   In the ER, he was given IV keppra 1000mg x 1, CT head was negative; CT chest negative for infiltrate. At the time of my examination patient is complaining of pain in his back. 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 He has been trying to get help at home but has been unsuccessful and has resigned to going from one hospital to another. (2018 17:36)    F/u ESRD  No acute complaints    PAST MEDICAL & SURGICAL HISTORY:  HIV (human immunodeficiency virus infection)  Seizure disorder  Hypertension  Diabetes  ESRD (end stage renal disease)  Seizure  Pericarditis  Anxiety  Depression  Renal failure (ARF), acute on chronic: dialysis av fistula, RUE  HTN (hypertension)  Cardiomyopathy  HIV disease: born HIV+  No significant past surgical history  AV fistula  S/P tonsillectomy      MEDICATIONS  (STANDING):  amLODIPine   Tablet 10 milliGRAM(s) Oral daily  calcitriol   Capsule 0.5 MICROGram(s) Oral daily  darunavir 800 milliGRAM(s) Oral daily  epoetin nithin Injectable 70568 Unit(s) IV Push <User Schedule>  fentaNYL   Patch 100 MICROgram(s)/Hr 1 Patch Transdermal every 72 hours  gabapentin 100 milliGRAM(s) Oral at bedtime  levETIRAcetam 500 milliGRAM(s) Oral two times a day  lisinopril 20 milliGRAM(s) Oral daily  metoprolol     tartrate 50 milliGRAM(s) Oral two times a day  oxyCODONE  ER Tablet 10 milliGRAM(s) Oral every 12 hours  pantoprazole    Tablet 40 milliGRAM(s) Oral before breakfast  QUEtiapine 50 milliGRAM(s) Oral at bedtime  ritonavir Tablet 100 milliGRAM(s) Oral daily  sertraline 50 milliGRAM(s) Oral daily  sevelamer hydrochloride 800 milliGRAM(s) Oral three times a day with meals    MEDICATIONS  (PRN):  ALPRAZolam 2 milliGRAM(s) Oral daily PRN anxiety  diphenhydrAMINE   Injectable 50 milliGRAM(s) IntraMuscular once PRN Rash and/or Itching  diphenhydrAMINE   Injectable 25 milliGRAM(s) IntraMuscular once PRN Rash and/or Itching  LORazepam   Injectable 2 milliGRAM(s) IV Push once PRN seizure  zolpidem 5 milliGRAM(s) Oral at bedtime PRN Insomnia  zolpidem 5 milliGRAM(s) Oral at bedtime PRN Insomnia      Allergies    vancomycin (Anaphylaxis)    Intolerances        Vital Signs Last 24 Hrs  T(C): 36.7 (2018 12:40), Max: 37.1 (2018 05:41)  T(F): 98.1 (2018 12:40), Max: 98.7 (2018 05:41)  HR: 67 (2018 12:40) (67 - 76)  BP: 123/77 (2018 12:40) (123/77 - 142/88)  BP(mean): --  RR: 18 (2018 12:40) (18 - 18)  SpO2: 97% (2018 12:40) (97% - 98%)  Daily     Daily Weight in k.5 (2018 12:40)    PHYSICAL EXAM:  GENERAL: appears chronically ill, oriented. PALLOR  HEAD:  Atraumatic, Normocephalic  EYES: EOMI, PERRLA, conjunctiva and sclera clear  ENMT: No tonsillar erythema, exudates, or enlargement; Moist mucous membranes, Good dentition, No lesions  NECK: Supple, neck  veins full  NERVOUS SYSTEM:  Alert & Oriented X3, Good concentration; Motor Strength wnl upper and lower extremities  CHEST/LUNG: Clear to percussion bilaterally; No rales, rhonchi, wheezing, or rubs  HEART: Regular rate and rhythm; No murmurs, rubs, or gallops  ABDOMEN: Soft, Nontender, Nondistended; Bowel sounds present, body wall and flank edema  EXTREMITIES:  +2 - +3 leg edema . avf RT ue WITH ANEURYSMAL AREAS  LYMPH: No lymphadenopathy noted  SKIN: No rashes or lesions, pale        LABS:        RADIOLOGY & ADDITIONAL TESTS:

## 2018-02-22 NOTE — PROGRESS NOTE ADULT - ASSESSMENT
The patient is a 27 year old male with a history of HIV ( non compliant with treatment), ESRD on HD MWF, hypertension and seizure disorder who was brought to the ER from his dialysis center after having  a witnessed seizure likely secondary to non- compliance with his medications. Keppra was restarted with no further seizure activity. Blood pressure improved with his medications.     Assessement/Plan:    1. Seizure: Continue keppra  seizure precautions; iv ativan prn for seizure    2. HIV- non compliant with medications. Continue HAART; Follow up with ID as outpatient     3. Hypertension: Improved BP on meds; continue  metoprolol, lisinopril and norvasc    4. ESRD On HD MWF    5. Pancytopenia likely secondary to HIV    6. Chronic pain and anxiety: ISTOP reveals prescriptions of xanax, ambien, oxycodone ER and fentanyl patch and they were re-ordered accordingly.   ISTOP results placed in the chart    VTE_ SCDS bilaterally     Spoke with CM, to be set up with nursing at home to help arrange his medications to ensure compliance.   Medically stable for discharge pending home care at home. Spoke with CM< she is working on arranging home nursing.

## 2018-02-23 DIAGNOSIS — R69 ILLNESS, UNSPECIFIED: ICD-10-CM

## 2018-04-13 ENCOUNTER — INPATIENT (INPATIENT)
Facility: HOSPITAL | Age: 28
LOS: 5 days | Discharge: HOME | DRG: 871 | End: 2018-04-19
Attending: HOSPITALIST | Admitting: INTERNAL MEDICINE
Payer: COMMERCIAL

## 2018-04-13 VITALS — HEIGHT: 70 IN | WEIGHT: 164.91 LBS

## 2018-04-13 DIAGNOSIS — G40.909 EPILEPSY, UNSPECIFIED, NOT INTRACTABLE, WITHOUT STATUS EPILEPTICUS: ICD-10-CM

## 2018-04-13 DIAGNOSIS — A41.9 SEPSIS, UNSPECIFIED ORGANISM: ICD-10-CM

## 2018-04-13 DIAGNOSIS — N19 UNSPECIFIED KIDNEY FAILURE: ICD-10-CM

## 2018-04-13 DIAGNOSIS — G93.41 METABOLIC ENCEPHALOPATHY: ICD-10-CM

## 2018-04-13 DIAGNOSIS — E87.5 HYPERKALEMIA: ICD-10-CM

## 2018-04-13 DIAGNOSIS — J96.00 ACUTE RESPIRATORY FAILURE, UNSPECIFIED WHETHER WITH HYPOXIA OR HYPERCAPNIA: ICD-10-CM

## 2018-04-13 DIAGNOSIS — R41.82 ALTERED MENTAL STATUS, UNSPECIFIED: ICD-10-CM

## 2018-04-13 DIAGNOSIS — G93.40 ENCEPHALOPATHY, UNSPECIFIED: ICD-10-CM

## 2018-04-13 DIAGNOSIS — I77.0 ARTERIOVENOUS FISTULA, ACQUIRED: Chronic | ICD-10-CM

## 2018-04-13 DIAGNOSIS — N18.6 END STAGE RENAL DISEASE: ICD-10-CM

## 2018-04-13 LAB
ALBUMIN SERPL ELPH-MCNC: 3.8 G/DL — SIGNIFICANT CHANGE UP (ref 3.3–5.2)
ALP SERPL-CCNC: 617 U/L — HIGH (ref 40–120)
ALT FLD-CCNC: 20 U/L — SIGNIFICANT CHANGE UP
ANION GAP SERPL CALC-SCNC: 21 MMOL/L — HIGH (ref 5–17)
ANION GAP SERPL CALC-SCNC: 25 MMOL/L — HIGH (ref 5–17)
APTT BLD: 36.8 SEC — SIGNIFICANT CHANGE UP (ref 27.5–37.4)
AST SERPL-CCNC: 45 U/L — HIGH
BILIRUB SERPL-MCNC: 1 MG/DL — SIGNIFICANT CHANGE UP (ref 0.4–2)
BUN SERPL-MCNC: 64 MG/DL — HIGH (ref 8–20)
BUN SERPL-MCNC: 69 MG/DL — HIGH (ref 8–20)
CALCIUM SERPL-MCNC: 8.2 MG/DL — LOW (ref 8.6–10.2)
CALCIUM SERPL-MCNC: 9.1 MG/DL — SIGNIFICANT CHANGE UP (ref 8.6–10.2)
CHLORIDE SERPL-SCNC: 88 MMOL/L — LOW (ref 98–107)
CHLORIDE SERPL-SCNC: 94 MMOL/L — LOW (ref 98–107)
CK MB CFR SERPL CALC: 2.1 NG/ML — SIGNIFICANT CHANGE UP (ref 0–6.7)
CK SERPL-CCNC: 157 U/L — SIGNIFICANT CHANGE UP (ref 30–200)
CO2 SERPL-SCNC: 23 MMOL/L — SIGNIFICANT CHANGE UP (ref 22–29)
CO2 SERPL-SCNC: 25 MMOL/L — SIGNIFICANT CHANGE UP (ref 22–29)
CREAT SERPL-MCNC: 10.46 MG/DL — HIGH (ref 0.5–1.3)
CREAT SERPL-MCNC: 10.95 MG/DL — HIGH (ref 0.5–1.3)
GAS PNL BLDA: SIGNIFICANT CHANGE UP
GLUCOSE SERPL-MCNC: 69 MG/DL — LOW (ref 70–115)
GLUCOSE SERPL-MCNC: 73 MG/DL — SIGNIFICANT CHANGE UP (ref 70–115)
HCT VFR BLD CALC: 31 % — LOW (ref 42–52)
HGB BLD-MCNC: 9.7 G/DL — LOW (ref 14–18)
INR BLD: 1.3 RATIO — HIGH (ref 0.88–1.16)
MCHC RBC-ENTMCNC: 29.1 PG — SIGNIFICANT CHANGE UP (ref 27–31)
MCHC RBC-ENTMCNC: 31.3 G/DL — LOW (ref 32–36)
MCV RBC AUTO: 93.1 FL — SIGNIFICANT CHANGE UP (ref 80–94)
NT-PROBNP SERPL-SCNC: HIGH PG/ML (ref 0–300)
PLATELET # BLD AUTO: 152 K/UL — SIGNIFICANT CHANGE UP (ref 150–400)
POTASSIUM SERPL-MCNC: 5.8 MMOL/L — HIGH (ref 3.5–5.3)
POTASSIUM SERPL-MCNC: 7 MMOL/L — CRITICAL HIGH (ref 3.5–5.3)
POTASSIUM SERPL-SCNC: 5.8 MMOL/L — HIGH (ref 3.5–5.3)
POTASSIUM SERPL-SCNC: 7 MMOL/L — CRITICAL HIGH (ref 3.5–5.3)
PROT SERPL-MCNC: 7.1 G/DL — SIGNIFICANT CHANGE UP (ref 6.6–8.7)
PROTHROM AB SERPL-ACNC: 14.4 SEC — HIGH (ref 9.8–12.7)
RBC # BLD: 3.33 M/UL — LOW (ref 4.6–6.2)
RBC # FLD: 17.3 % — HIGH (ref 11–15.6)
SODIUM SERPL-SCNC: 136 MMOL/L — SIGNIFICANT CHANGE UP (ref 135–145)
SODIUM SERPL-SCNC: 140 MMOL/L — SIGNIFICANT CHANGE UP (ref 135–145)
TROPONIN T SERPL-MCNC: 0.18 NG/ML — HIGH (ref 0–0.06)
WBC # BLD: 5.4 K/UL — SIGNIFICANT CHANGE UP (ref 4.8–10.8)
WBC # FLD AUTO: 5.4 K/UL — SIGNIFICANT CHANGE UP (ref 4.8–10.8)

## 2018-04-13 PROCEDURE — 31500 INSERT EMERGENCY AIRWAY: CPT

## 2018-04-13 PROCEDURE — 99291 CRITICAL CARE FIRST HOUR: CPT | Mod: 25

## 2018-04-13 PROCEDURE — 93010 ELECTROCARDIOGRAM REPORT: CPT

## 2018-04-13 PROCEDURE — 95819 EEG AWAKE AND ASLEEP: CPT | Mod: 26

## 2018-04-13 PROCEDURE — 99291 CRITICAL CARE FIRST HOUR: CPT

## 2018-04-13 PROCEDURE — 70450 CT HEAD/BRAIN W/O DYE: CPT | Mod: 26

## 2018-04-13 PROCEDURE — 74176 CT ABD & PELVIS W/O CONTRAST: CPT | Mod: 26

## 2018-04-13 PROCEDURE — 71045 X-RAY EXAM CHEST 1 VIEW: CPT | Mod: 26

## 2018-04-13 PROCEDURE — 71250 CT THORAX DX C-: CPT | Mod: 26

## 2018-04-13 RX ORDER — DAPTOMYCIN 500 MG/10ML
450 INJECTION, POWDER, LYOPHILIZED, FOR SOLUTION INTRAVENOUS ONCE
Qty: 0 | Refills: 0 | Status: COMPLETED | OUTPATIENT
Start: 2018-04-13 | End: 2018-04-13

## 2018-04-13 RX ORDER — CEFTRIAXONE 500 MG/1
1 INJECTION, POWDER, FOR SOLUTION INTRAMUSCULAR; INTRAVENOUS ONCE
Qty: 0 | Refills: 0 | Status: DISCONTINUED | OUTPATIENT
Start: 2018-04-13 | End: 2018-04-13

## 2018-04-13 RX ORDER — DAPTOMYCIN 500 MG/10ML
INJECTION, POWDER, LYOPHILIZED, FOR SOLUTION INTRAVENOUS
Qty: 0 | Refills: 0 | Status: DISCONTINUED | OUTPATIENT
Start: 2018-04-13 | End: 2018-04-15

## 2018-04-13 RX ORDER — CEFEPIME 1 G/1
1000 INJECTION, POWDER, FOR SOLUTION INTRAMUSCULAR; INTRAVENOUS ONCE
Qty: 0 | Refills: 0 | Status: COMPLETED | OUTPATIENT
Start: 2018-04-13 | End: 2018-04-13

## 2018-04-13 RX ORDER — SODIUM CHLORIDE 9 MG/ML
2500 INJECTION, SOLUTION INTRAVENOUS ONCE
Qty: 0 | Refills: 0 | Status: COMPLETED | OUTPATIENT
Start: 2018-04-13 | End: 2018-04-13

## 2018-04-13 RX ORDER — LEVETIRACETAM 250 MG/1
500 TABLET, FILM COATED ORAL EVERY 12 HOURS
Qty: 0 | Refills: 0 | Status: DISCONTINUED | OUTPATIENT
Start: 2018-04-13 | End: 2018-04-16

## 2018-04-13 RX ORDER — PROPOFOL 10 MG/ML
2 INJECTION, EMULSION INTRAVENOUS
Qty: 500 | Refills: 0 | Status: DISCONTINUED | OUTPATIENT
Start: 2018-04-13 | End: 2018-04-14

## 2018-04-13 RX ORDER — DEXTROSE 50 % IN WATER 50 %
50 SYRINGE (ML) INTRAVENOUS ONCE
Qty: 0 | Refills: 0 | Status: COMPLETED | OUTPATIENT
Start: 2018-04-13 | End: 2018-04-13

## 2018-04-13 RX ORDER — AZITHROMYCIN 500 MG/1
500 TABLET, FILM COATED ORAL DAILY
Qty: 0 | Refills: 0 | Status: DISCONTINUED | OUTPATIENT
Start: 2018-04-13 | End: 2018-04-17

## 2018-04-13 RX ORDER — CEFEPIME 1 G/1
INJECTION, POWDER, FOR SOLUTION INTRAMUSCULAR; INTRAVENOUS
Qty: 0 | Refills: 0 | Status: DISCONTINUED | OUTPATIENT
Start: 2018-04-13 | End: 2018-04-13

## 2018-04-13 RX ORDER — VANCOMYCIN HCL 1 G
1000 VIAL (EA) INTRAVENOUS ONCE
Qty: 0 | Refills: 0 | Status: DISCONTINUED | OUTPATIENT
Start: 2018-04-13 | End: 2018-04-13

## 2018-04-13 RX ORDER — SUCCINYLCHOLINE CHLORIDE 100 MG/5ML
80 SYRINGE (ML) INTRAVENOUS ONCE
Qty: 0 | Refills: 0 | Status: COMPLETED | OUTPATIENT
Start: 2018-04-13 | End: 2018-04-13

## 2018-04-13 RX ORDER — CALCIUM CHLORIDE
10 POWDER (GRAM) MISCELLANEOUS ONCE
Qty: 0 | Refills: 0 | Status: COMPLETED | OUTPATIENT
Start: 2018-04-13 | End: 2018-04-13

## 2018-04-13 RX ORDER — LABETALOL HCL 100 MG
20 TABLET ORAL ONCE
Qty: 0 | Refills: 0 | Status: COMPLETED | OUTPATIENT
Start: 2018-04-13 | End: 2018-04-13

## 2018-04-13 RX ORDER — ETOMIDATE 2 MG/ML
22 INJECTION INTRAVENOUS ONCE
Qty: 0 | Refills: 0 | Status: COMPLETED | OUTPATIENT
Start: 2018-04-13 | End: 2018-04-13

## 2018-04-13 RX ORDER — DARBEPOETIN ALFA IN POLYSORBAT 200MCG/0.4
100 PEN INJECTOR (ML) SUBCUTANEOUS
Qty: 0 | Refills: 0 | Status: DISCONTINUED | OUTPATIENT
Start: 2018-04-16 | End: 2018-04-19

## 2018-04-13 RX ORDER — CEFTRIAXONE 500 MG/1
1000 INJECTION, POWDER, FOR SOLUTION INTRAMUSCULAR; INTRAVENOUS ONCE
Qty: 0 | Refills: 0 | Status: DISCONTINUED | OUTPATIENT
Start: 2018-04-13 | End: 2018-04-13

## 2018-04-13 RX ORDER — INSULIN HUMAN 100 [IU]/ML
5 INJECTION, SOLUTION SUBCUTANEOUS ONCE
Qty: 0 | Refills: 0 | Status: COMPLETED | OUTPATIENT
Start: 2018-04-13 | End: 2018-04-13

## 2018-04-13 RX ORDER — PROPOFOL 10 MG/ML
10 INJECTION, EMULSION INTRAVENOUS ONCE
Qty: 0 | Refills: 0 | Status: COMPLETED | OUTPATIENT
Start: 2018-04-13 | End: 2018-04-13

## 2018-04-13 RX ORDER — SODIUM POLYSTYRENE SULFONATE 4.1 MEQ/G
30 POWDER, FOR SUSPENSION ORAL ONCE
Qty: 0 | Refills: 0 | Status: COMPLETED | OUTPATIENT
Start: 2018-04-13 | End: 2018-04-13

## 2018-04-13 RX ORDER — CEFEPIME 1 G/1
INJECTION, POWDER, FOR SOLUTION INTRAMUSCULAR; INTRAVENOUS
Qty: 0 | Refills: 0 | Status: DISCONTINUED | OUTPATIENT
Start: 2018-04-13 | End: 2018-04-17

## 2018-04-13 RX ORDER — ACETAMINOPHEN 500 MG
650 TABLET ORAL ONCE
Qty: 0 | Refills: 0 | Status: COMPLETED | OUTPATIENT
Start: 2018-04-13 | End: 2018-04-13

## 2018-04-13 RX ORDER — CEFEPIME 1 G/1
500 INJECTION, POWDER, FOR SOLUTION INTRAMUSCULAR; INTRAVENOUS DAILY
Qty: 0 | Refills: 0 | Status: DISCONTINUED | OUTPATIENT
Start: 2018-04-14 | End: 2018-04-17

## 2018-04-13 RX ORDER — DAPTOMYCIN 500 MG/10ML
450 INJECTION, POWDER, LYOPHILIZED, FOR SOLUTION INTRAVENOUS
Qty: 0 | Refills: 0 | Status: DISCONTINUED | OUTPATIENT
Start: 2018-04-15 | End: 2018-04-15

## 2018-04-13 RX ORDER — ETOMIDATE 2 MG/ML
1500 INJECTION INTRAVENOUS ONCE
Qty: 0 | Refills: 0 | Status: DISCONTINUED | OUTPATIENT
Start: 2018-04-13 | End: 2018-04-13

## 2018-04-13 RX ADMIN — PROPOFOL 0.9 MICROGRAM(S)/KG/MIN: 10 INJECTION, EMULSION INTRAVENOUS at 13:32

## 2018-04-13 RX ADMIN — PROPOFOL 10 MILLIGRAM(S): 10 INJECTION, EMULSION INTRAVENOUS at 13:15

## 2018-04-13 RX ADMIN — SODIUM POLYSTYRENE SULFONATE 30 GRAM(S): 4.1 POWDER, FOR SUSPENSION ORAL at 13:12

## 2018-04-13 RX ADMIN — CEFEPIME 1000 MILLIGRAM(S): 1 INJECTION, POWDER, FOR SOLUTION INTRAMUSCULAR; INTRAVENOUS at 16:41

## 2018-04-13 RX ADMIN — Medication 80 MILLIGRAM(S): at 12:14

## 2018-04-13 RX ADMIN — Medication 20 MILLIGRAM(S): at 12:09

## 2018-04-13 RX ADMIN — DAPTOMYCIN 118 MILLIGRAM(S): 500 INJECTION, POWDER, LYOPHILIZED, FOR SOLUTION INTRAVENOUS at 17:20

## 2018-04-13 RX ADMIN — Medication 20 MILLIGRAM(S): at 12:53

## 2018-04-13 RX ADMIN — INSULIN HUMAN 5 UNIT(S): 100 INJECTION, SOLUTION SUBCUTANEOUS at 13:12

## 2018-04-13 RX ADMIN — LEVETIRACETAM 420 MILLIGRAM(S): 250 TABLET, FILM COATED ORAL at 19:00

## 2018-04-13 RX ADMIN — Medication 650 MILLIGRAM(S): at 13:10

## 2018-04-13 RX ADMIN — ETOMIDATE 22 MILLIGRAM(S): 2 INJECTION INTRAVENOUS at 12:14

## 2018-04-13 RX ADMIN — AZITHROMYCIN 500 MILLIGRAM(S): 500 TABLET, FILM COATED ORAL at 16:41

## 2018-04-13 RX ADMIN — Medication 50 MILLILITER(S): at 13:13

## 2018-04-13 RX ADMIN — Medication 10 MILLIGRAM(S): at 13:18

## 2018-04-13 NOTE — ED ADULT TRIAGE NOTE - CHIEF COMPLAINT QUOTE
Arrived from dialysis after becoming unresponsive prior to treatment. Code team and respiratory called to bedside.

## 2018-04-13 NOTE — ED PROVIDER NOTE - CARE PLAN
Principal Discharge DX:	Encephalopathy acute  Secondary Diagnosis:	Sepsis, due to unspecified organism  Secondary Diagnosis:	Hypertension

## 2018-04-13 NOTE — ED PROVIDER NOTE - CRITICAL CARE PROVIDED
documentation/60 MINUTES/direct patient care (not related to procedure)/interpretation of diagnostic studies/consultation with other physicians

## 2018-04-13 NOTE — ED ADULT NURSE REASSESSMENT NOTE - NS ED NURSE REASSESS COMMENT FT1
Pt intubated at 12 noon by Dr Mcfadden.  7.5 tube, 23 at lip line.  She gave 20mg etomidate, 80 mg succi choline

## 2018-04-13 NOTE — ED ADULT NURSE REASSESSMENT NOTE - NS ED NURSE REASSESS COMMENT FT1
pt transport to ct scan then to MICU per protocol on cardiac moniotr, vent, IVP with RN, resp. therapy and transported. bedside report given to Estephania PRESTON, questions answered.

## 2018-04-13 NOTE — H&P ADULT - PROBLEM SELECTOR PLAN 5
Received calcium chloride  Emergent HD arranged, will move pt to ICU to begin HD asap  Renal/Nam called will follow

## 2018-04-13 NOTE — ED ADULT NURSE NOTE - OBJECTIVE STATEMENT
pt from [facility]. became unresponsive and dyspnea. history of intubation in the past

## 2018-04-13 NOTE — H&P ADULT - PROBLEM SELECTOR PLAN 8
Will get a STAT EEG to r/o seizure as underlying cause of AMS  continue Keppra  may need Neurology evaluation

## 2018-04-13 NOTE — PATIENT PROFILE ADULT. - VISION (WITH CORRECTIVE LENSES IF THE PATIENT USUALLY WEARS THEM):
pt states he is blind/Severely impaired: cannot locate objects without hearing or touching them or patient nonresponsive.

## 2018-04-13 NOTE — H&P ADULT - ATTENDING COMMENTS
I have seen and evaluated the patient and agree with above    prelim EEG neg, known narcotic use removed fentanyl patch  dialysis per renal - urgently done today  will lighten sedation and see if extuabable  imaging reviewed  re-assess HIV meds in am likely component of non-compliance  antibiotics for fever likely aspirated due to MS change continue to monitor I have seen and evaluated the patient and agree with above    prelim EEG neg, known narcotic use removed fentanyl patch  dialysis per renal - urgently done today  will lighten sedation and see if extuabable  imaging reviewed  re-assess HIV meds in am likely component of non-compliance  antibiotics for fever likely aspirated due to MS change continue to monitor    Critical care time 50 min

## 2018-04-13 NOTE — ED PROVIDER NOTE - CRITICAL CARE INDICATION, MLM
patient was critically ill... Patient was critically ill with a high probability of imminent or life threatening deterioration. PT WITH AMS. NEEDED STAT IV ACCESS STAT INTUBATION STAT OGT STAT LABS  STAT ABG STAT IV FLUIDS STAT IV MEDS RENAL CONSULT ICU CONSULT

## 2018-04-13 NOTE — H&P ADULT - MENTAL STATUS
unresponsive but has received sedation/paralytics in ED will need to go re-examine pt once meds wear off

## 2018-04-13 NOTE — ED ADULT NURSE NOTE - RESPIRATORY ASSESSMENT
After Visit Summary   3/15/2017    Yahaira Ramirez    MRN: [id number]           Patient Information     Date Of Birth          1974        Visit Information        Provider Department      3/15/2017 4:00 PM Guille Torre MD [facility]        Today's Diagnoses     Preop general physical exam    -  1    Malignant neoplasm of rectum (H)        Mild intermittent asthma without complication        Major depressive disorder, recurrent episode, mild (H)          Care Instructions      Before Your Surgery      Call your surgeon if there is any change in your health. This includes signs of a cold or flu (such as a sore throat, runny nose, cough, rash or fever).    Do not smoke, drink alcohol or take over the counter medicine (unless your surgeon or primary care doctor tells you to) for the 24 hours before and after surgery.    If you take prescribed drugs: Follow your doctor s orders about which medicines to take and which to stop until after surgery.    Eating and drinking prior to surgery: follow the instructions from your surgeon    Take a shower or bath the night before surgery. Use the soap your surgeon gave you to gently clean your skin. If you do not have soap from your surgeon, use your regular soap. Do not shave or scrub the surgery site.  Wear clean pajamas and have clean sheets on your bed.         Follow-ups after your visit        Your next 10 appointments already scheduled     Mar 21, 2017   Procedure with Yair Adkins MD   [facility] ([facility])    [address]  [place] [address]   [phone]           [facility] is located at [address] [place]            Apr 05, 2017   Procedure with Yair Adkins MD   [facility] (--)    [address]  [place] [address] [phone]              Who to contact     If you have questions or need follow up information  "about today's clinic visit or your schedule please contact [facility] directly at [phone].  Normal or non-critical lab and imaging results will be communicated to you by MyChart, letter or phone within 4 business days after the clinic has received the results. If you do not hear from us within 7 days, please contact the clinic through [employer] or phone. If you have a critical or abnormal lab result, we will notify you by phone as soon as possible.  Submit refill requests through [employer] or call your pharmacy and they will forward the refill request to us. Please allow 3 business days for your refill to be completed.          Additional Information About Your Visit        [employer] Information     [employer] gives you secure access to your electronic health record. If you see a primary care provider, you can also send messages to your care team and make appointments. If you have questions, please call your primary care clinic.  If you do not have a primary care provider, please call [phone] and they will assist you.        Care EveryWhere ID     This is your Care EveryWhere ID. This could be used by other organizations to access your [place] medical records  [id number]        Your Vitals Were     Pulse Temperature Height Pulse Oximetry BMI (Body Mass Index)       98 98.8  F (37.1  C) (Oral) 5' 6\" (1.676 m) 99% 43.56 kg/m2        Blood Pressure from Last 3 Encounters:   03/15/17 130/80   01/02/17 (!) 142/92   12/09/16 (!) 139/97    Weight from Last 3 Encounters:   03/15/17 269 lb 14.4 oz (122.4 kg)   12/09/16 257 lb (116.6 kg)   10/28/16 256 lb (116.1 kg)              We Performed the Following     Glucose     Hemoglobin A1c     Hemoglobin     INR     Partial thromboplastin time     Platelet count     Potassium        Primary Care Provider Office Phone # Fax #    Guille Torre [phone]       [facility] [address] [facility] [address]      "   Thank you!     Thank you for choosing [facility]  for your care. Our goal is always to provide you with excellent care. Hearing back from our patients is one way we can continue to improve our services. Please take a few minutes to complete the written survey that you may receive in the mail after your visit with us. Thank you!             Your Updated Medication List - Protect others around you: Learn how to safely use, store and throw away your medicines at www.disposemymeds.org.          This list is accurate as of: 3/15/17  4:23 PM.  Always use your most recent med list.                   Brand Name Dispense Instructions for use    buPROPion 150 MG 12 hr tablet    WELLBUTRIN SR    180 tablet    Take 1 tablet (150 mg) by mouth 2 times daily       citalopram 40 MG tablet    celeXA    90 tablet    Take 1 tablet (40 mg) by mouth daily       fluticasone 50 MCG/ACT spray    FLONASE    30 g    Spray 1-2 sprays into both nostrils daily       IBUPROFEN PO      Take 400 mg by mouth as needed.       levalbuterol 45 MCG/ACT Inhaler    XOPENEX HFA    1 Inhaler    Inhale 1-2 puffs into the lungs every 4 hours as needed for shortness of breath / dyspnea.       levothyroxine 125 MCG tablet    SYNTHROID/LEVOTHROID    90 tablet    Take 1 tablet (125 mcg) by mouth daily Then on day 7 take 1 1/2 tablets.       mometasone-formoterol 100-5 MCG/ACT oral inhaler    DULERA    1 Inhaler    Inhale 2 puffs into the lungs 2 times daily          - - -

## 2018-04-13 NOTE — CONSULT NOTE ADULT - SUBJECTIVE AND OBJECTIVE BOX
Patient is a 27y old  Male who presents with a chief complaint of      HPI: 27 M with a hx of HIV, renal failure, hyperkalemia, and similar presentation in the past presents to the ED for AMS. Pt was brought from dialysis due to AMS. unable to give hx or ROS. Pt is known by ER staff. No other complaints at this time.       PAST MEDICAL & SURGICAL HISTORY:  HIV (human immunodeficiency virus infection)  Seizure disorder  Hypertension  Diabetes  ESRD (end stage renal disease)  Seizure  Pericarditis  Anxiety  Depression  Renal failure (ARF), acute on chronic: dialysis av fistula, RUE  HTN (hypertension)  Cardiomyopathy  HIV disease: born HIV+  No significant past surgical history  AV fistula  S/P tonsillectomy       FAMILY HISTORY:  No pertinent family history in first degree relatives  No pertinent family history in first degree relatives  [place]    Social History:Non smoker    MEDICATIONS  (STANDING):  acetaminophen  Suppository 650 milliGRAM(s) Rectal once  azithromycin   Tablet 500 milliGRAM(s) Oral daily  calcium chloride Injectable 10 milliGRAM(s) IV Push once  cefepime Injectable. 1000 milliGRAM(s) IV Push once  cefepime Injectable.      labetalol Injectable 20 milliGRAM(s) IV Push once  lactated ringers Bolus 2500 milliLiter(s) IV Bolus once  propofol Infusion 2 MICROgram(s)/kG/Min (0.898 mL/Hr) IV Continuous <Continuous>  propofol Injectable 10 milliGRAM(s) IV Push once    MEDICATIONS  (PRN):   Meds reviewed    Allergies    vancomycin (Anaphylaxis)    Intolerances         REVIEW OF SYSTEMS: NA. On vent. Sedated.     Vital Signs Last 24 Hrs  T(C): 38.8 (13 Apr 2018 12:43), Max: 38.8 (13 Apr 2018 12:43)  T(F): 101.9 (13 Apr 2018 12:43), Max: 101.9 (13 Apr 2018 12:43)  HR: 90 (13 Apr 2018 14:29) (90 - 106)  BP: 144/97 (13 Apr 2018 14:00) (142/95 - 214/138)  BP(mean): --  RR: 37 (13 Apr 2018 14:00) (28 - 48)  SpO2: 100% (13 Apr 2018 14:29) (94% - 100%)  Daily Height in cm: 177.8 (13 Apr 2018 11:54)    Daily     PHYSICAL EXAM:    GENERAL:+ETT  HEAD:  Atraumatic, Normocephalic  EYES: EOMI, PERRLA, conjunctiva and sclera clear  NECK: Supple, neck  veins full  NERVOUS SYSTEM:  Sedated   CHEST/LUNG: Clear to percussion bilaterally; No rales, rhonchi, wheezing, or rubs  HEART: Regular rate and rhythm; No murmurs, rubs, or gallops  ABDOMEN: Soft, Nontender, Nondistended; Bowel sounds present,  EXTREMITIES:  Edema  LYMPH: No lymphadenopathy noted  SKIN: No rashes or lesions, pale  +AVF with bruit and thrill       LABS:                        9.7    5.4   )-----------( 152      ( 13 Apr 2018 12:17 )             31.0     04-13    136  |  88<L>  |  64.0<H>  ----------------------------<  69<L>  7.0<HH>   |  23.0  |  10.46<H>    Ca    9.1      13 Apr 2018 12:17    TPro  7.1  /  Alb  3.8  /  TBili  1.0  /  DBili  x   /  AST  45<H>  /  ALT  20  /  AlkPhos  617<H>  04-13    PT/INR - ( 13 Apr 2018 12:17 )   PT: 14.4 sec;   INR: 1.30 ratio         PTT - ( 13 Apr 2018 12:17 )  PTT:36.8 sec      ABG - ( 13 Apr 2018 12:25 )  pH: 7.35  /  pCO2: 40    /  pO2: 159   / HCO3: 22    / Base Excess: -3.5  /  SaO2: 100                   RADIOLOGY & ADDITIONAL TESTS:

## 2018-04-13 NOTE — H&P ADULT - PROBLEM SELECTOR PLAN 2
as above pt does have h/o seizure d/o and unclear compliance with medications  is on Keppra, can send level but may take days to get back  will do STAT EEG to r/o underlying seizure activity

## 2018-04-13 NOTE — H&P ADULT - PROBLEM SELECTOR PLAN 1
Likely due to metabolic derangements-uremia/hyperkalemia  Initial head CT negative, if remains altered may need f/u CT

## 2018-04-13 NOTE — ED PROVIDER NOTE - PROGRESS NOTE DETAILS
Pt intubated to protect airway. L EJ started for IV access and labs. AVG sent, will likely require treatment for hyperkalemia and ICU evaluation.

## 2018-04-13 NOTE — H&P ADULT - PROBLEM SELECTOR PLAN 4
Full ventilatory support  Wean Fi02 as tolerated to maintain 02 >92%  F/u ABG  VAP prophylaxis  CXR confirms ETT ok

## 2018-04-13 NOTE — H&P ADULT - PROBLEM SELECTOR PLAN 3
Pan culture  Broad spectrum ABx coverage with Dapto/Zosyn/Zithro  CXR volume overload likely can't r/o underlying infiltrate will get chest CT

## 2018-04-13 NOTE — ED PROVIDER NOTE - OBJECTIVE STATEMENT
26 y/o M with a hx of HIV, renal failure, hyperkalemia, and similar presentation in the past presents to the ED for AMS. Pt was brought from dialysis due to AMS. unable to give hx or ROS. Pt is known by ER staff. No other complaints at this time.

## 2018-04-13 NOTE — H&P ADULT - HISTORY OF PRESENT ILLNESS
Pt is a 27 YOM with known h/o ESRD on HD, HIV, blindness, HTN, cardiomyopathy, seizure d/o, pericarditis who was Pt is a 27 YOM with known h/o ESRD on HD, HIV, blindness, HTN, cardiomyopathy, seizure d/o, pericarditis who was sent prior to HD today for AMS, unresponsiveness.  Pt was unresponsive in ED and was intubated for airway protection.  Pt has known h/o drug use and has a fentanyl Patch in place which was not removed in ED, pt did not receive Narcan due to concerns of h/o chronic pain.  Pt was found to be hyperkalemic, uremic.  Head CT initially is negative for acute findings. Pt was admitted to ICU for emergent hemodialysis and management of acute respiratory failure and encephalopathy, change in MS.

## 2018-04-13 NOTE — CONSULT NOTE ADULT - ASSESSMENT
ESRD  AMS - He has a history of drug abuse and OD  Acute respiratory failure on vent   Hyperkalemia   HTN  HIV  DM  Anemia of CKD    - HD now. 3.5 hours. 1 K bath for 2 hours then 2 K bath for 1.5 hours  - S/p calcium gluconate and insulin/glucose in the ER  - He received 3.5 liters of LR in the ER. He does not appear to be septic at this time. We will attempt to remove 2.5-3 liters of fluid today. He will likely need to be dialyze again tomorrow  - Aranesp     D/w ICU  D/w HD RN

## 2018-04-13 NOTE — H&P ADULT - ASSESSMENT
27 YOM with ESRD requiring emergent HD for hyperkalemia/uremia with AMS, encephalopathy possibly due to metabolic derangements HIV with fever, r/o sespsis, acute respiratory failure.

## 2018-04-13 NOTE — ED ADULT NURSE REASSESSMENT NOTE - NS ED NURSE REASSESS COMMENT FT1
pt medicated for elevated K+ level per protocol, kayexelate via OG tube , pt grabiel it well. place #16 [place] foely catheter no resistance , no return despite  pressing on bladder, tube removed secondary to pt probable is anuric / HD. Dr. Mcfadden aware.

## 2018-04-14 DIAGNOSIS — Z29.9 ENCOUNTER FOR PROPHYLACTIC MEASURES, UNSPECIFIED: ICD-10-CM

## 2018-04-14 DIAGNOSIS — B20 HUMAN IMMUNODEFICIENCY VIRUS [HIV] DISEASE: ICD-10-CM

## 2018-04-14 LAB
ANION GAP SERPL CALC-SCNC: 17 MMOL/L — SIGNIFICANT CHANGE UP (ref 5–17)
BUN SERPL-MCNC: 34 MG/DL — HIGH (ref 8–20)
CALCIUM SERPL-MCNC: 8.3 MG/DL — LOW (ref 8.6–10.2)
CHLORIDE SERPL-SCNC: 92 MMOL/L — LOW (ref 98–107)
CO2 SERPL-SCNC: 29 MMOL/L — SIGNIFICANT CHANGE UP (ref 22–29)
CREAT SERPL-MCNC: 6.26 MG/DL — HIGH (ref 0.5–1.3)
GLUCOSE SERPL-MCNC: 86 MG/DL — SIGNIFICANT CHANGE UP (ref 70–115)
NT-PROBNP SERPL-SCNC: HIGH PG/ML (ref 0–300)
POTASSIUM SERPL-MCNC: 4.4 MMOL/L — SIGNIFICANT CHANGE UP (ref 3.5–5.3)
POTASSIUM SERPL-SCNC: 4.4 MMOL/L — SIGNIFICANT CHANGE UP (ref 3.5–5.3)
SODIUM SERPL-SCNC: 138 MMOL/L — SIGNIFICANT CHANGE UP (ref 135–145)

## 2018-04-14 PROCEDURE — 99233 SBSQ HOSP IP/OBS HIGH 50: CPT

## 2018-04-14 PROCEDURE — 99223 1ST HOSP IP/OBS HIGH 75: CPT

## 2018-04-14 PROCEDURE — 93306 TTE W/DOPPLER COMPLETE: CPT | Mod: 26

## 2018-04-14 RX ORDER — SERTRALINE 25 MG/1
50 TABLET, FILM COATED ORAL DAILY
Qty: 0 | Refills: 0 | Status: DISCONTINUED | OUTPATIENT
Start: 2018-04-14 | End: 2018-04-18

## 2018-04-14 RX ORDER — AMLODIPINE BESYLATE 2.5 MG/1
10 TABLET ORAL DAILY
Qty: 0 | Refills: 0 | Status: DISCONTINUED | OUTPATIENT
Start: 2018-04-14 | End: 2018-04-19

## 2018-04-14 RX ORDER — DARUNAVIR 75 MG/1
800 TABLET, FILM COATED ORAL DAILY
Qty: 0 | Refills: 0 | Status: DISCONTINUED | OUTPATIENT
Start: 2018-04-14 | End: 2018-04-19

## 2018-04-14 RX ORDER — DOLUTEGRAVIR SODIUM 25 MG/1
50 TABLET, FILM COATED ORAL DAILY
Qty: 0 | Refills: 0 | Status: DISCONTINUED | OUTPATIENT
Start: 2018-04-14 | End: 2018-04-19

## 2018-04-14 RX ORDER — DIPHENHYDRAMINE HCL 50 MG
25 CAPSULE ORAL ONCE
Qty: 0 | Refills: 0 | Status: COMPLETED | OUTPATIENT
Start: 2018-04-14 | End: 2018-04-14

## 2018-04-14 RX ORDER — FENTANYL CITRATE 50 UG/ML
1 INJECTION INTRAVENOUS
Qty: 0 | Refills: 0 | Status: DISCONTINUED | OUTPATIENT
Start: 2018-04-14 | End: 2018-04-16

## 2018-04-14 RX ORDER — SEVELAMER CARBONATE 2400 MG/1
800 POWDER, FOR SUSPENSION ORAL THREE TIMES A DAY
Qty: 0 | Refills: 0 | Status: DISCONTINUED | OUTPATIENT
Start: 2018-04-14 | End: 2018-04-19

## 2018-04-14 RX ORDER — HYDROMORPHONE HYDROCHLORIDE 2 MG/ML
1 INJECTION INTRAMUSCULAR; INTRAVENOUS; SUBCUTANEOUS ONCE
Qty: 0 | Refills: 0 | Status: DISCONTINUED | OUTPATIENT
Start: 2018-04-14 | End: 2018-04-14

## 2018-04-14 RX ORDER — GABAPENTIN 400 MG/1
100 CAPSULE ORAL AT BEDTIME
Qty: 0 | Refills: 0 | Status: DISCONTINUED | OUTPATIENT
Start: 2018-04-14 | End: 2018-04-19

## 2018-04-14 RX ORDER — CALCITRIOL 0.5 UG/1
0.5 CAPSULE ORAL DAILY
Qty: 0 | Refills: 0 | Status: DISCONTINUED | OUTPATIENT
Start: 2018-04-14 | End: 2018-04-19

## 2018-04-14 RX ORDER — LISINOPRIL 2.5 MG/1
20 TABLET ORAL DAILY
Qty: 0 | Refills: 0 | Status: DISCONTINUED | OUTPATIENT
Start: 2018-04-14 | End: 2018-04-19

## 2018-04-14 RX ORDER — METOPROLOL TARTRATE 50 MG
50 TABLET ORAL
Qty: 0 | Refills: 0 | Status: DISCONTINUED | OUTPATIENT
Start: 2018-04-14 | End: 2018-04-19

## 2018-04-14 RX ORDER — DIPHENHYDRAMINE HCL 50 MG
50 CAPSULE ORAL ONCE
Qty: 0 | Refills: 0 | Status: COMPLETED | OUTPATIENT
Start: 2018-04-14 | End: 2018-04-14

## 2018-04-14 RX ORDER — HYDROMORPHONE HYDROCHLORIDE 2 MG/ML
1 INJECTION INTRAMUSCULAR; INTRAVENOUS; SUBCUTANEOUS EVERY 6 HOURS
Qty: 0 | Refills: 0 | Status: DISCONTINUED | OUTPATIENT
Start: 2018-04-14 | End: 2018-04-16

## 2018-04-14 RX ORDER — QUETIAPINE FUMARATE 200 MG/1
50 TABLET, FILM COATED ORAL AT BEDTIME
Qty: 0 | Refills: 0 | Status: DISCONTINUED | OUTPATIENT
Start: 2018-04-14 | End: 2018-04-19

## 2018-04-14 RX ORDER — PANTOPRAZOLE SODIUM 20 MG/1
40 TABLET, DELAYED RELEASE ORAL
Qty: 0 | Refills: 0 | Status: DISCONTINUED | OUTPATIENT
Start: 2018-04-14 | End: 2018-04-19

## 2018-04-14 RX ADMIN — HYDROMORPHONE HYDROCHLORIDE 1 MILLIGRAM(S): 2 INJECTION INTRAMUSCULAR; INTRAVENOUS; SUBCUTANEOUS at 19:15

## 2018-04-14 RX ADMIN — DOLUTEGRAVIR SODIUM 50 MILLIGRAM(S): 25 TABLET, FILM COATED ORAL at 13:25

## 2018-04-14 RX ADMIN — HYDROMORPHONE HYDROCHLORIDE 1 MILLIGRAM(S): 2 INJECTION INTRAMUSCULAR; INTRAVENOUS; SUBCUTANEOUS at 10:02

## 2018-04-14 RX ADMIN — CALCITRIOL 0.5 MICROGRAM(S): 0.5 CAPSULE ORAL at 13:40

## 2018-04-14 RX ADMIN — LEVETIRACETAM 420 MILLIGRAM(S): 250 TABLET, FILM COATED ORAL at 18:14

## 2018-04-14 RX ADMIN — GABAPENTIN 100 MILLIGRAM(S): 400 CAPSULE ORAL at 21:14

## 2018-04-14 RX ADMIN — AZITHROMYCIN 500 MILLIGRAM(S): 500 TABLET, FILM COATED ORAL at 12:49

## 2018-04-14 RX ADMIN — AMLODIPINE BESYLATE 10 MILLIGRAM(S): 2.5 TABLET ORAL at 13:40

## 2018-04-14 RX ADMIN — Medication 50 MILLIGRAM(S): at 18:16

## 2018-04-14 RX ADMIN — QUETIAPINE FUMARATE 50 MILLIGRAM(S): 200 TABLET, FILM COATED ORAL at 21:14

## 2018-04-14 RX ADMIN — LEVETIRACETAM 420 MILLIGRAM(S): 250 TABLET, FILM COATED ORAL at 05:51

## 2018-04-14 RX ADMIN — SERTRALINE 50 MILLIGRAM(S): 25 TABLET, FILM COATED ORAL at 13:41

## 2018-04-14 RX ADMIN — PROPOFOL 0.9 MICROGRAM(S)/KG/MIN: 10 INJECTION, EMULSION INTRAVENOUS at 01:20

## 2018-04-14 RX ADMIN — DARUNAVIR 800 MILLIGRAM(S): 75 TABLET, FILM COATED ORAL at 13:40

## 2018-04-14 RX ADMIN — LISINOPRIL 20 MILLIGRAM(S): 2.5 TABLET ORAL at 13:41

## 2018-04-14 RX ADMIN — FENTANYL CITRATE 1 PATCH: 50 INJECTION INTRAVENOUS at 10:13

## 2018-04-14 RX ADMIN — CEFEPIME 500 MILLIGRAM(S): 1 INJECTION, POWDER, FOR SOLUTION INTRAMUSCULAR; INTRAVENOUS at 12:49

## 2018-04-14 RX ADMIN — Medication 50 MILLIGRAM(S): at 15:53

## 2018-04-14 RX ADMIN — HYDROMORPHONE HYDROCHLORIDE 1 MILLIGRAM(S): 2 INJECTION INTRAMUSCULAR; INTRAVENOUS; SUBCUTANEOUS at 18:28

## 2018-04-14 RX ADMIN — SEVELAMER CARBONATE 800 MILLIGRAM(S): 2400 POWDER, FOR SUSPENSION ORAL at 21:14

## 2018-04-14 RX ADMIN — Medication 25 MILLIGRAM(S): at 17:56

## 2018-04-14 RX ADMIN — PANTOPRAZOLE SODIUM 40 MILLIGRAM(S): 20 TABLET, DELAYED RELEASE ORAL at 13:44

## 2018-04-14 RX ADMIN — HYDROMORPHONE HYDROCHLORIDE 1 MILLIGRAM(S): 2 INJECTION INTRAMUSCULAR; INTRAVENOUS; SUBCUTANEOUS at 10:52

## 2018-04-14 RX ADMIN — SEVELAMER CARBONATE 800 MILLIGRAM(S): 2400 POWDER, FOR SUSPENSION ORAL at 13:42

## 2018-04-14 NOTE — PROGRESS NOTE ADULT - ASSESSMENT
27 YOM with ESRD requiring emergent HD for hyperkalemia/uremia with AMS, encephalopathy possibly due to metabolic derangements, HIV with fever, r/o sepsis, acute respiratory failure.

## 2018-04-14 NOTE — CONSULT NOTE ADULT - SUBJECTIVE AND OBJECTIVE BOX
[facility] [place] OTTO KYLE MD FACP   JOCELIN JUNE MD  Diplomates American Board of Internal Medicine and Infecctious Diseases  [phone]  [phone] GUILLE [id number]      HPI:  Pt is a 27 YOM with known h/o ESRD on HD, HIV, blindness, HTN, cardiomyopathy, seizure d/o, pericarditis who was sent prior to HD today for AMS, unresponsiveness.  Pt was unresponsive in ED and was intubated for airway protection.  Pt has known h/o drug use and has a fentanyl Patch in place which was not removed in ED, pt did not receive Narcan due to concerns of h/o chronic pain.  Pt was found to be hyperkalemic, uremic.  Head CT initially is negative for acute findings. Pt was admitted to ICU for emergent hemodialysis and management of acute respiratory failure and encephalopathy, change in MS.    ASKED TO EVALUATE FROM ID STANDPOINT       PAST MEDICAL & SURGICAL HISTORY:  HIV (human immunodeficiency virus infection)  Seizure disorder  Hypertension  Diabetes  ESRD (end stage renal disease)  Seizure  Pericarditis  Anxiety  Depression  Renal failure (ARF), acute on chronic: dialysis av fistula, RUE  HTN (hypertension)  Cardiomyopathy  HIV disease: born HIV+  AV fistula  S/P tonsillectomy      ANTIBIOTICS  azithromycin   Tablet 500 milliGRAM(s) Oral daily  cefepime Injectable. 500 milliGRAM(s) IV Push daily  cefepime Injectable.      DAPTOmycin IVPB      darunavir 800 milliGRAM(s) Oral daily  dolutegravir 50 milliGRAM(s) Oral daily      Allergies    vancomycin (Anaphylaxis)    Intolerances        SOCIAL HISTORY:    FAMILY HISTORY:  No pertinent family history in first degree relatives  No pertinent family history in first degree relatives      Vital Signs Last 24 Hrs  T(C): 37 (14 Apr 2018 08:00), Max: 38.8 (13 Apr 2018 12:43)  T(F): 98.6 (14 Apr 2018 08:00), Max: 101.9 (13 Apr 2018 12:43)  HR: 91 (14 Apr 2018 10:00) (76 - 96)  BP: 160/97 (14 Apr 2018 10:00) (115/73 - 193/123)  BP(mean): 123 (14 Apr 2018 10:00) (90 - 128)  RR: 27 (14 Apr 2018 10:00) (21 - 48)  SpO2: 97% (14 Apr 2018 10:00) (94% - 100%)  Drug Dosing Weight  Height (cm): 177.8 (13 Apr 2018 11:54)  Weight (kg): 74.8 (13 Apr 2018 11:54)  BMI (kg/m2): 23.7 (13 Apr 2018 11:54)  BSA (m2): 1.92 (13 Apr 2018 11:54)      REVIEW OF SYSTEMS:    CONSTITUTIONAL:  As per HPI.    HEENT:  Eyes:  No diplopia or blurred vision. ENT:  No earache, sore throat or runny nose.    CARDIOVASCULAR:  No pressure, squeezing, strangling, tightness, heaviness or aching about the chest, neck, axilla or epigastrium.    RESPIRATORY:  No cough, shortness of breath, PND or orthopnea.    GASTROINTESTINAL:  No nausea, vomiting or diarrhea.    GENITOURINARY:  No dysuria, frequency or urgency.    MUSCULOSKELETAL:  As per HPI.    SKIN:  No change in skin, hair or nails.    NEUROLOGIC:  No paresthesias, fasciculations, seizures or weakness.                  PHYSICAL EXAMINATION:    GENERAL: The patient is a well-developed _ IN NAD LETHARGIC  EXTUBATED EARLIER TODAY     VITAL SIGNS: T(C): 37 (04-14-18 @ 08:00), Max: 38.8 (04-13-18 @ 12:43)  HR: 91 (04-14-18 @ 10:00) (76 - 96)  BP: 160/97 (04-14-18 @ 10:00) (115/73 - 193/123)  RR: 27 (04-14-18 @ 10:00) (21 - 48)  SpO2: 97% (04-14-18 @ 10:00) (94% - 100%)  Wt(kg): --    HEENT: Head is normocephalic and atraumatic.  ANICTERIC  NECK: Supple. No carotid bruits.  No lymphadenopathy or thyromegaly.    LUNGS:COARSE BREATH SOUNDS    HEART: Regular rate and rhythm without murmur.    ABDOMEN: Soft, nontender, and nondistended.  Positive bowel sounds.  No hepatosplenomegaly was noted. NO REBOUND NO GUARDING    EXTREMITIES: NO EDEMA NO ERYTHEMA    NEUROLOGIC: LETHARGIC ANSWER SIMPLE QUESTIONS      SKIN: No ulceration or induration present. NO RASH        BLOOD CULTURES       URINE CX          LABS:                        9.7    5.4   )-----------( 152      ( 13 Apr 2018 12:17 )             31.0     04-14    138  |  92<L>  |  34.0<H>  ----------------------------<  86  4.4   |  29.0  |  6.26<H>    Ca    8.3<L>      14 Apr 2018 06:55    TPro  7.1  /  Alb  3.8  /  TBili  1.0  /  DBili  x   /  AST  45<H>  /  ALT  20  /  AlkPhos  617<H>  04-13    PT/INR - ( 13 Apr 2018 12:17 )   PT: 14.4 sec;   INR: 1.30 ratio         PTT - ( 13 Apr 2018 12:17 )  PTT:36.8 sec      RADIOLOGY & ADDITIONAL STUDIES:      ASSESSMENT/PLAN    Pt is a 27 YOM with known h/o ESRD on HD, HIV, blindness, HTN, cardiomyopathy, seizure d/o, pericarditis who was sent prior to HD today for AMS, unresponsiveness.  Pt was unresponsive in ED and was intubated for airway protection.  Pt has known h/o drug use and has a fentanyl Patch in place which was not removed in ED, pt did not receive Narcan due to concerns of h/o chronic pain.  Pt was found to be hyperkalemic, uremic.  Head CT initially is negative for acute findings. Pt was admitted to ICU for emergent hemodialysis and management of acute respiratory failure and encephalopathy, change in MS.    PT EXTUBATED   ON TREATMENT N FOR PNEUMONIA  PT HIV BUT UNCLEAR IF COMPLAINTS LAST  CD4 COUNT  FROM FEB 2018 88  VIRAL LOAD 25K   PT ON CEFEPIME DAPTO  NO EVIDENCE OF MRA WILL D/C DAPTO CAN CONTINUE Cefepime  WILL FOLLOW UP  NEED OLD RECORDS IN TERMS OF HIV                JOCELIN MOISE MD [facility] [place] OTTO KYLE MD FACP   JOCELIN JUNE MD  Diplomates American Board of Internal Medicine and Infecctious Diseases  [phone]  [id number] GUILLE [id number]      HPI:  Pt is a 27 YOM with known h/o ESRD on HD, HIV, blindness, HTN, cardiomyopathy, seizure d/o, pericarditis who was sent prior to HD today for AMS, unresponsiveness.  Pt was unresponsive in ED and was intubated for airway protection.  Pt has known h/o drug use and has a fentanyl Patch in place which was not removed in ED, pt did not receive Narcan due to concerns of h/o chronic pain.  Pt was found to be hyperkalemic, uremic.  Head CT initially is negative for acute findings. Pt was admitted to ICU for emergent hemodialysis and management of acute respiratory failure and encephalopathy, change in MS.    ASKED TO EVALUATE FROM ID STANDPOINT       PAST MEDICAL & SURGICAL HISTORY:  HIV (human immunodeficiency virus infection)  Seizure disorder  Hypertension  Diabetes  ESRD (end stage renal disease)  Seizure  Pericarditis  Anxiety  Depression  Renal failure (ARF), acute on chronic: dialysis av fistula, RUE  HTN (hypertension)  Cardiomyopathy  HIV disease: born HIV+  AV fistula  S/P tonsillectomy      ANTIBIOTICS  azithromycin   Tablet 500 milliGRAM(s) Oral daily  cefepime Injectable. 500 milliGRAM(s) IV Push daily  cefepime Injectable.      DAPTOmycin IVPB      darunavir 800 milliGRAM(s) Oral daily  dolutegravir 50 milliGRAM(s) Oral daily      Allergies    vancomycin (Anaphylaxis)    Intolerances        SOCIAL HISTORY:    FAMILY HISTORY:  No pertinent family history in first degree relatives  No pertinent family history in first degree relatives      Vital Signs Last 24 Hrs  T(C): 37 (14 Apr 2018 08:00), Max: 38.8 (13 Apr 2018 12:43)  T(F): 98.6 (14 Apr 2018 08:00), Max: 101.9 (13 Apr 2018 12:43)  HR: 91 (14 Apr 2018 10:00) (76 - 96)  BP: 160/97 (14 Apr 2018 10:00) (115/73 - 193/123)  BP(mean): 123 (14 Apr 2018 10:00) (90 - 128)  RR: 27 (14 Apr 2018 10:00) (21 - 48)  SpO2: 97% (14 Apr 2018 10:00) (94% - 100%)  Drug Dosing Weight  Height (cm): 177.8 (13 Apr 2018 11:54)  Weight (kg): 74.8 (13 Apr 2018 11:54)  BMI (kg/m2): 23.7 (13 Apr 2018 11:54)  BSA (m2): 1.92 (13 Apr 2018 11:54)      REVIEW OF SYSTEMS:    CONSTITUTIONAL:  As per HPI.    HEENT:  Eyes:  No diplopia or blurred vision. ENT:  No earache, sore throat or runny nose.    CARDIOVASCULAR:  No pressure, squeezing, strangling, tightness, heaviness or aching about the chest, neck, axilla or epigastrium.    RESPIRATORY:  No cough, shortness of breath, PND or orthopnea.    GASTROINTESTINAL:  No nausea, vomiting or diarrhea.    GENITOURINARY:  No dysuria, frequency or urgency.    MUSCULOSKELETAL:  As per HPI.    SKIN:  No change in skin, hair or nails.    NEUROLOGIC:  No paresthesias, fasciculations, seizures or weakness.                  PHYSICAL EXAMINATION:    GENERAL: The patient is a well-developed _ IN NAD LETHARGIC  EXTUBATED EARLIER TODAY     VITAL SIGNS: T(C): 37 (04-14-18 @ 08:00), Max: 38.8 (04-13-18 @ 12:43)  HR: 91 (04-14-18 @ 10:00) (76 - 96)  BP: 160/97 (04-14-18 @ 10:00) (115/73 - 193/123)  RR: 27 (04-14-18 @ 10:00) (21 - 48)  SpO2: 97% (04-14-18 @ 10:00) (94% - 100%)  Wt(kg): --    HEENT: Head is normocephalic and atraumatic.  ANICTERIC  NECK: Supple. No carotid bruits.  No lymphadenopathy or thyromegaly.    LUNGS:COARSE BREATH SOUNDS    HEART: Regular rate and rhythm without murmur.    ABDOMEN: Soft, nontender, and nondistended.  Positive bowel sounds.  No hepatosplenomegaly was noted. NO REBOUND NO GUARDING    EXTREMITIES: NO EDEMA NO ERYTHEMA    NEUROLOGIC: LETHARGIC ANSWER SIMPLE QUESTIONS      SKIN: No ulceration or induration present. NO RASH        BLOOD CULTURES       URINE CX          LABS:                        9.7    5.4   )-----------( 152      ( 13 Apr 2018 12:17 )             31.0     04-14    138  |  92<L>  |  34.0<H>  ----------------------------<  86  4.4   |  29.0  |  6.26<H>    Ca    8.3<L>      14 Apr 2018 06:55    TPro  7.1  /  Alb  3.8  /  TBili  1.0  /  DBili  x   /  AST  45<H>  /  ALT  20  /  AlkPhos  617<H>  04-13    PT/INR - ( 13 Apr 2018 12:17 )   PT: 14.4 sec;   INR: 1.30 ratio         PTT - ( 13 Apr 2018 12:17 )  PTT:36.8 sec      RADIOLOGY & ADDITIONAL STUDIES:      ASSESSMENT/PLAN    Pt is a 27 YOM with known h/o ESRD on HD, HIV, blindness, HTN, cardiomyopathy, seizure d/o, pericarditis who was sent prior to HD today for AMS, unresponsiveness.  Pt was unresponsive in ED and was intubated for airway protection.  Pt has known h/o drug use and has a fentanyl Patch in place which was not removed in ED, pt did not receive Narcan due to concerns of h/o chronic pain.  Pt was found to be hyperkalemic, uremic.  Head CT initially is negative for acute findings. Pt was admitted to ICU for emergent hemodialysis and management of acute respiratory failure and encephalopathy, change in MS.    PT EXTUBATED   ON TREATMENT N FOR PNEUMONIA  PT HIV BUT UNCLEAR IF COMPLAINTS LAST  CD4 COUNT  FROM FEB 2018 88  VIRAL LOAD 25K   PT ON CEFEPIME/ zithromax  DAPTO  NO EVIDENCE OF MRsA WILL D/C DAPTO    WILL FOLLOW UP  NEED OLD RECORDS IN TERMS OF HIV                JOCELIN MOISE MD

## 2018-04-14 NOTE — PROGRESS NOTE ADULT - ASSESSMENT
ESRD  AMS - He has a history of drug abuse and OD  Acute respiratory failure on vent   Hyperkalemia   HTN  HIV  DM  Anemia of CKD    - S/p HD on 4/13/18 with UF of 2L  - Will do an additional session of dialysis today 4/14/18: Opti 180, 3 hours, BFR 400ml/min, 2k bicarb bath, and UF of 2.5-3.5kg as tolerated per hemodynamics  - Renal dialysis diet  - Continue management per MICU    D/w ICU RN  D/w HD RN

## 2018-04-14 NOTE — PROGRESS NOTE ADULT - ASSESSMENT
27 YOM with ESRD requiring emergent HD for hyperkalemia/uremia with AMS, encephalopathy possibly due to metabolic derangements HIV with fever, r/o sepsis, acute respiratory failure.

## 2018-04-15 DIAGNOSIS — I50.42 CHRONIC COMBINED SYSTOLIC (CONGESTIVE) AND DIASTOLIC (CONGESTIVE) HEART FAILURE: ICD-10-CM

## 2018-04-15 DIAGNOSIS — G89.29 OTHER CHRONIC PAIN: ICD-10-CM

## 2018-04-15 LAB
GLUCOSE BLDC GLUCOMTR-MCNC: 132 MG/DL — HIGH (ref 70–99)
GLUCOSE BLDC GLUCOMTR-MCNC: 91 MG/DL — SIGNIFICANT CHANGE UP (ref 70–99)
GLUCOSE BLDC GLUCOMTR-MCNC: 95 MG/DL — SIGNIFICANT CHANGE UP (ref 70–99)

## 2018-04-15 PROCEDURE — 99233 SBSQ HOSP IP/OBS HIGH 50: CPT

## 2018-04-15 RX ORDER — OXYCODONE HYDROCHLORIDE 5 MG/1
10 TABLET ORAL EVERY 12 HOURS
Qty: 0 | Refills: 0 | Status: DISCONTINUED | OUTPATIENT
Start: 2018-04-15 | End: 2018-04-19

## 2018-04-15 RX ORDER — OXYCODONE HYDROCHLORIDE 5 MG/1
10 TABLET ORAL EVERY 6 HOURS
Qty: 0 | Refills: 0 | Status: DISCONTINUED | OUTPATIENT
Start: 2018-04-15 | End: 2018-04-16

## 2018-04-15 RX ORDER — ZOLPIDEM TARTRATE 10 MG/1
5 TABLET ORAL AT BEDTIME
Qty: 0 | Refills: 0 | Status: DISCONTINUED | OUTPATIENT
Start: 2018-04-15 | End: 2018-04-19

## 2018-04-15 RX ORDER — ALPRAZOLAM 0.25 MG
1 TABLET ORAL THREE TIMES A DAY
Qty: 0 | Refills: 0 | Status: DISCONTINUED | OUTPATIENT
Start: 2018-04-15 | End: 2018-04-19

## 2018-04-15 RX ADMIN — Medication 50 MILLIGRAM(S): at 17:10

## 2018-04-15 RX ADMIN — DOLUTEGRAVIR SODIUM 50 MILLIGRAM(S): 25 TABLET, FILM COATED ORAL at 12:11

## 2018-04-15 RX ADMIN — LEVETIRACETAM 420 MILLIGRAM(S): 250 TABLET, FILM COATED ORAL at 06:51

## 2018-04-15 RX ADMIN — SEVELAMER CARBONATE 800 MILLIGRAM(S): 2400 POWDER, FOR SUSPENSION ORAL at 06:51

## 2018-04-15 RX ADMIN — GABAPENTIN 100 MILLIGRAM(S): 400 CAPSULE ORAL at 22:09

## 2018-04-15 RX ADMIN — OXYCODONE HYDROCHLORIDE 10 MILLIGRAM(S): 5 TABLET ORAL at 23:00

## 2018-04-15 RX ADMIN — AMLODIPINE BESYLATE 10 MILLIGRAM(S): 2.5 TABLET ORAL at 09:17

## 2018-04-15 RX ADMIN — HYDROMORPHONE HYDROCHLORIDE 1 MILLIGRAM(S): 2 INJECTION INTRAMUSCULAR; INTRAVENOUS; SUBCUTANEOUS at 09:41

## 2018-04-15 RX ADMIN — OXYCODONE HYDROCHLORIDE 10 MILLIGRAM(S): 5 TABLET ORAL at 17:04

## 2018-04-15 RX ADMIN — DARUNAVIR 800 MILLIGRAM(S): 75 TABLET, FILM COATED ORAL at 12:12

## 2018-04-15 RX ADMIN — CEFEPIME 500 MILLIGRAM(S): 1 INJECTION, POWDER, FOR SOLUTION INTRAMUSCULAR; INTRAVENOUS at 12:13

## 2018-04-15 RX ADMIN — OXYCODONE HYDROCHLORIDE 10 MILLIGRAM(S): 5 TABLET ORAL at 18:00

## 2018-04-15 RX ADMIN — HYDROMORPHONE HYDROCHLORIDE 1 MILLIGRAM(S): 2 INJECTION INTRAMUSCULAR; INTRAVENOUS; SUBCUTANEOUS at 02:04

## 2018-04-15 RX ADMIN — Medication 1 MILLIGRAM(S): at 16:02

## 2018-04-15 RX ADMIN — Medication 50 MILLIGRAM(S): at 06:52

## 2018-04-15 RX ADMIN — HYDROMORPHONE HYDROCHLORIDE 1 MILLIGRAM(S): 2 INJECTION INTRAMUSCULAR; INTRAVENOUS; SUBCUTANEOUS at 09:17

## 2018-04-15 RX ADMIN — SEVELAMER CARBONATE 800 MILLIGRAM(S): 2400 POWDER, FOR SUSPENSION ORAL at 12:56

## 2018-04-15 RX ADMIN — LEVETIRACETAM 420 MILLIGRAM(S): 250 TABLET, FILM COATED ORAL at 17:03

## 2018-04-15 RX ADMIN — HYDROMORPHONE HYDROCHLORIDE 1 MILLIGRAM(S): 2 INJECTION INTRAMUSCULAR; INTRAVENOUS; SUBCUTANEOUS at 00:53

## 2018-04-15 RX ADMIN — QUETIAPINE FUMARATE 50 MILLIGRAM(S): 200 TABLET, FILM COATED ORAL at 22:09

## 2018-04-15 RX ADMIN — OXYCODONE HYDROCHLORIDE 10 MILLIGRAM(S): 5 TABLET ORAL at 22:09

## 2018-04-15 RX ADMIN — AZITHROMYCIN 500 MILLIGRAM(S): 500 TABLET, FILM COATED ORAL at 12:09

## 2018-04-15 RX ADMIN — SERTRALINE 50 MILLIGRAM(S): 25 TABLET, FILM COATED ORAL at 12:10

## 2018-04-15 RX ADMIN — PANTOPRAZOLE SODIUM 40 MILLIGRAM(S): 20 TABLET, DELAYED RELEASE ORAL at 06:52

## 2018-04-15 RX ADMIN — CALCITRIOL 0.5 MICROGRAM(S): 0.5 CAPSULE ORAL at 12:10

## 2018-04-15 RX ADMIN — LISINOPRIL 20 MILLIGRAM(S): 2.5 TABLET ORAL at 06:51

## 2018-04-15 RX ADMIN — Medication 1 MILLIGRAM(S): at 23:04

## 2018-04-15 NOTE — PROGRESS NOTE ADULT - ASSESSMENT
Pt is a 27 YOM with known h/o ESRD on HD, HIV, blindness, HTN, cardiomyopathy, seizure d/o, pericarditis who was sent prior to HD today for AMS, unresponsiveness.  Pt was unresponsive in ED and was intubated for airway protection.  Pt has known h/o drug use and has a fentanyl Patch in place which was not removed in ED, pt did not receive Narcan due to concerns of h/o chronic pain.  Pt was found to be hyperkalemic, uremic.  Head CT initially is negative for acute findings. Pt was admitted to ICU for emergent hemodialysis and management of acute respiratory failure and encephalopathy, change in MS.   PT AWAKE AND ALERT TODAY  UNCLEAR IF POSSIBLE ASP PNEUMONIA  CHECK BLOOD CX  CONTINUE ABX FOR NOW

## 2018-04-15 NOTE — PROGRESS NOTE ADULT - ASSESSMENT
27 YOM with ESRD requiring emergent HD for hyperkalemia/uremia with AMS, encephalopathy possibly due to metabolic derangements, HIV with fever, r/o sepsis, acute respiratory failure.    Metabolic encephalopathy resolved. Multifactorial  secondary to uremia, [place]acy. [place]  - patient on oxycodone 10 q6, Oxycontin 10 twice daily, Xanax 2mg three times a day, Zolpidem 10 and Fentanyl 100.    ESRD - continued on HD. States regular schedule is M,W,F    Chronic pain - still complaining of pain, wants to stay on IV Dilaudid    Chronic CHFrEF - Appears to be well compensated currently. TTE shows mildly decreased LVSF, EF 45-50%, Grade I Diastolic dysfunction and moderate PAH.    Fever - resolved. Cultures pending. Being continued on IV antibiotics for possible gram positive, gram negative pneumonia. 27 YOM with ESRD requiring emergent HD for hyperkalemia/uremia with AMS, encephalopathy possibly due to metabolic derangements, HIV with fever, r/o sepsis, acute respiratory failure.    Metabolic encephalopathy resolved. Multifactorial  secondary to uremia, polypharmacy, fever and possible infection. [place]  - patient on oxycodone 10 q6, Oxycontin 10 twice daily, Xanax 2mg three times a day, Zolpidem 10 and Fentanyl 100.    ESRD - continued on HD. States regular schedule is M,W,F    Chronic pain - still complaining of pain, wants to stay on IV Dilaudid    Chronic CHFrEF - Appears to be well compensated currently. TTE shows mildly decreased LVSF, EF 45-50%, Grade I Diastolic dysfunction and moderate PAH.    Fever - resolved. Cultures pending. Being continued on IV antibiotics for possible gram positive, gram negative pneumonia.

## 2018-04-15 NOTE — PROGRESS NOTE ADULT - PROBLEM SELECTOR PLAN 3
renal follow up appreciated  dialysis today   humza anders renal follow up appreciated  dialysis per schedule

## 2018-04-15 NOTE — PROCEDURE NOTE - NSPROCDETAILS_GEN_ALL_CORE
sterile technique, catheter placed/location identified, draped/prepped, sterile technique used/flushes easily/blood seen on insertion/secured in place

## 2018-04-15 NOTE — CHART NOTE - NSCHARTNOTEFT_GEN_A_CORE
Address: [address] [place] [address] Sex: Male         Rx Written    Rx Dispensed    Drug    Quantity    Days Supply    Prescriber Name              04/06/2018 04/06/2018 zolpidem tartrate 10 mg tablet  30 30 Hosea Damian, Npp     04/06/2018 04/06/2018 alprazolam 2 mg tablet  90 30 Hosea Damian, Npp     03/15/2018 03/16/2018 oxycontin 10 mg tablet  60 30 Willa-Venegas, Alexea     03/15/2018 03/16/2018 oxycodone hcl 10 mg tablet  150 30 Defiance-Venegas, Alexea     03/15/2018 03/16/2018 fentanyl 100 mcg/hr patch  15 30 Defiance-Venegas, Alexea     03/01/2018 03/01/2018 alprazolam 2 mg tablet  60 30 Hosea Damian, Npp     03/01/2018 03/01/2018 zolpidem tartrate 10 mg tablet  30 30 Hosea Damian, Npp     02/02/2018 02/08/2018 oxycodone hcl er 10 mg tablet  60 30 Willa-Venegas, Alexea     02/06/2017 02/01/2018 20t:1c 2mg thc and 0.1mg cbd per 5-second inhalation  7 10 González Fung MD     02/06/2017 02/01/2018 20t:1c 5mg thc and 0.25mg cbd/capsule (14ct)  14 7 González Fung MD     01/20/2018 01/22/2018 fentanyl 100 mcg/hr patch  10 30 Castillo Leon MD     01/07/2018 01/09/2018 zolpidem tartrate 10 mg tablet  30 30 Hosea Damian, Npp     12/19/2017 12/23/2017 oxycodone hcl 10 mg tablet  150 30 Willa-Venegas, Alexea     12/20/2017 12/23/2017 oxycodone hcl er 10 mg tablet  60 30 Defiance-Venegas, Alexea     12/22/2017 12/23/2017 alprazolam 2 mg tablet  60 30 Hosea Damian, Npp     12/19/2017 12/19/2017 fentanyl 100 mcg/hr patch  15 30 Defiance-Venegas, Alexea     11/27/2017 12/02/2017 oxycodone hcl 10 mg tablet  150 30 Willa-Venegas, Alexea     02/16/2017 11/18/2017 20t:1c 5mg thc and 0.25mg cbd/capsule (14ct)  28 14 González Fung MD     02/16/2017 11/18/2017 20t:1c 2mg thc and 0.1mg cbd per 5-second inhalation  6 9 González Fung MD     11/07/2017 11/09/2017 alprazolam 2 mg tablet  60 30 Hosea Damian, Npp     10/16/2017 10/26/2017 oxycodone hcl 10 mg tablet  120 30 Defiance-Venegas, Alexea     10/16/2017 10/26/2017 fentanyl 100 mcg/hr patch  15 30 Shaquille, Alexea     10/24/2017 10/26/2017 oxycodone hcl er 10 mg tablet  60 30 DefianceRubia, Alexea     02/16/2017 09/29/2017 20t:1c 5mg thc and 0.25mg cbd/capsule  14 7 González Fung MD     02/16/2017 09/29/2017 20t:1c 2mg thc and 0.1mg cbd per 5-second inhalation  10 15 González Fung MD     08/24/2017 09/16/2017 fentanyl 100 mcg/hr patch  15 30 Defiance-Venegas, Alexea     09/13/2017 09/16/2017 oxycodone-acetaminophen  mg tab  240 30 Shaquille, Alexea     09/12/2017 09/16/2017 oxycodone hcl er 10 mg tablet  60 30 Shaquille, Alexea     09/02/2017 09/10/2017 alprazolam 2 mg tablet  60 30 Hosea Damian, Npp     08/24/2017 08/26/2017 oxycodone hcl 10 mg tablet  120 30 Willa-Venegas, Alexea     08/15/2017 08/19/2017 hydromorphone 4 mg tablet  24 4 Mukhi, Angelic     08/16/2017 08/19/2017 clonazepam 1 mg tablet  15 5 Mukhi, Angelic     08/16/2017 08/19/2017 fentanyl 75 mcg/hr patch  5 15 Mukhi, Angelic     08/16/2017 08/19/2017 fentanyl 12 mcg/hr patch  5 15 Allison Angelic     07/26/2017 07/26/2017 oxycodone hcl 10 mg tablet  120 30 DefianceHailey, Alexea     07/10/2017 07/10/2017 alprazolam 2 mg tablet  60 30 Hosea Damian, Npp     02/16/2017 06/28/2017 20t:1c 2mg thc and 0.1mg cbd per 5-second inhalation  8 12 González Fung MD     06/15/2017 06/17/2017 oxycodone hcl er 10 mg tablet  45 5 Sukhdev Moyer     06/15/2017 06/17/2017 oxycodone hcl 10 mg tablet  30 5 Sukhdev Moyer     06/03/2017 06/05/2017 oxycodone hcl er 10 mg tablet  60 30 WillaRubia, Alexnehemias     06/03/2017 06/05/2017 oxycodone-acetaminophen  mg tab  240 30 Shaquille, Alexnehemias     05/13/2017 05/18/2017 alprazolam 2 mg tablet  90 30 Hosea Damian, Npp     02/16/2017 05/16/2017 20t:1c 2mg thc and 0.1mg cbd per 5-second inhalation  6 30 González Fung MD     02/16/2017 05/16/2017 20t:1c 2mg thc and 0.1mg cbd per 5-second inhalation  6 30 González Fung MD     04/19/2017 05/10/2017 fentanyl 100 mcg/hr patch  15 30 Shaquille, Alexnehemias     05/02/2017 05/04/2017 oxycodone hcl er 10 mg tablet  60 30 DefianceRubia, Alexea     04/19/2017 04/24/2017 oxycodone-acetaminophen  mg tab  240 30 WillaHailey, Alexea     04/20/2017 04/24/2017 oxycodone hcl 5 mg tablet  10 5 Dona Lockett     04/20/2017 04/24/2017 alprazolam 1 mg tablet  12 4 Dona Lockett           * - Drugs marked with an asterisk are compound drugs. If the compound drug is made up of more than one controlled substance, then each controlled substance will be a separate row in the table.

## 2018-04-15 NOTE — PROGRESS NOTE ADULT - ASSESSMENT
ESRD  AMS - He has a history of drug abuse and OD - resolved  Acute respiratory failure - extubated  PNA  Hyperkalemia - resolved  HTN  HIV  DM  Anemia of CKD    - HD MWF  - S/p Additional HD on 4/14/18 with UF of 2.5L  - Next dialysis session to be done 4/16/18; no indication for additional dialysis to be done at this time  - Abx per ID    D/w ICU RN  D/w HD RN

## 2018-04-15 NOTE — PROGRESS NOTE ADULT - PROBLEM SELECTOR PLAN 1
resolving   will be dialyzed again today Resolved.  Continue HD as per M,W,F schedule.  If work up negative anticipate home soon

## 2018-04-15 NOTE — PROGRESS NOTE ADULT - PROBLEM SELECTOR PLAN 4
Continue Fentanyl 75  Add home Oxycodone, Oxycontin  Will also resume Xanax (lower dose) and Ambien and monitor to see if becomes encephalopathic, lest patient go into withdrawal Continue Fentanyl 75  Added home Oxycodone, Oxycontin  Will also resume Xanax (lower dose) and Ambien and monitor to see if becomes encephalopathic, lest patient go into withdrawal

## 2018-04-16 ENCOUNTER — TRANSCRIPTION ENCOUNTER (OUTPATIENT)
Age: 28
End: 2018-04-16

## 2018-04-16 DIAGNOSIS — J15.9 UNSPECIFIED BACTERIAL PNEUMONIA: ICD-10-CM

## 2018-04-16 LAB
ALBUMIN SERPL ELPH-MCNC: 3.1 G/DL — LOW (ref 3.3–5.2)
ALP SERPL-CCNC: 484 U/L — HIGH (ref 40–120)
ALT FLD-CCNC: 22 U/L — SIGNIFICANT CHANGE UP
ANION GAP SERPL CALC-SCNC: 18 MMOL/L — HIGH (ref 5–17)
AST SERPL-CCNC: 25 U/L — SIGNIFICANT CHANGE UP
BILIRUB SERPL-MCNC: 0.4 MG/DL — SIGNIFICANT CHANGE UP (ref 0.4–2)
BUN SERPL-MCNC: 60 MG/DL — HIGH (ref 8–20)
CALCIUM SERPL-MCNC: 8.6 MG/DL — SIGNIFICANT CHANGE UP (ref 8.6–10.2)
CHLORIDE SERPL-SCNC: 94 MMOL/L — LOW (ref 98–107)
CO2 SERPL-SCNC: 26 MMOL/L — SIGNIFICANT CHANGE UP (ref 22–29)
CREAT SERPL-MCNC: 8.67 MG/DL — HIGH (ref 0.5–1.3)
GLUCOSE SERPL-MCNC: 113 MG/DL — SIGNIFICANT CHANGE UP (ref 70–115)
HCT VFR BLD CALC: 30.5 % — LOW (ref 42–52)
HGB BLD-MCNC: 9.7 G/DL — LOW (ref 14–18)
MCHC RBC-ENTMCNC: 29.2 PG — SIGNIFICANT CHANGE UP (ref 27–31)
MCHC RBC-ENTMCNC: 31.8 G/DL — LOW (ref 32–36)
MCV RBC AUTO: 91.9 FL — SIGNIFICANT CHANGE UP (ref 80–94)
PLATELET # BLD AUTO: 141 K/UL — LOW (ref 150–400)
POTASSIUM SERPL-MCNC: 3.8 MMOL/L — SIGNIFICANT CHANGE UP (ref 3.5–5.3)
POTASSIUM SERPL-SCNC: 3.8 MMOL/L — SIGNIFICANT CHANGE UP (ref 3.5–5.3)
PROT SERPL-MCNC: 6.4 G/DL — LOW (ref 6.6–8.7)
RBC # BLD: 3.32 M/UL — LOW (ref 4.6–6.2)
RBC # FLD: 16.8 % — HIGH (ref 11–15.6)
SODIUM SERPL-SCNC: 138 MMOL/L — SIGNIFICANT CHANGE UP (ref 135–145)
WBC # BLD: 4.2 K/UL — LOW (ref 4.8–10.8)
WBC # FLD AUTO: 4.2 K/UL — LOW (ref 4.8–10.8)

## 2018-04-16 PROCEDURE — 99232 SBSQ HOSP IP/OBS MODERATE 35: CPT

## 2018-04-16 PROCEDURE — 99233 SBSQ HOSP IP/OBS HIGH 50: CPT

## 2018-04-16 RX ORDER — RITONAVIR 100 MG/1
100 TABLET, FILM COATED ORAL EVERY OTHER DAY
Qty: 0 | Refills: 0 | Status: DISCONTINUED | OUTPATIENT
Start: 2018-04-16 | End: 2018-04-16

## 2018-04-16 RX ORDER — HYDROMORPHONE HYDROCHLORIDE 2 MG/ML
0.5 INJECTION INTRAMUSCULAR; INTRAVENOUS; SUBCUTANEOUS EVERY 4 HOURS
Qty: 0 | Refills: 0 | Status: DISCONTINUED | OUTPATIENT
Start: 2018-04-16 | End: 2018-04-19

## 2018-04-16 RX ORDER — ATOVAQUONE 750 MG/5ML
1500 SUSPENSION ORAL DAILY
Qty: 0 | Refills: 0 | Status: DISCONTINUED | OUTPATIENT
Start: 2018-04-16 | End: 2018-04-19

## 2018-04-16 RX ORDER — EMTRICITABINE 200 MG/1
200 CAPSULE ORAL
Qty: 0 | Refills: 0 | Status: DISCONTINUED | OUTPATIENT
Start: 2018-04-16 | End: 2018-04-16

## 2018-04-16 RX ORDER — RITONAVIR 100 MG/1
100 TABLET, FILM COATED ORAL DAILY
Qty: 0 | Refills: 0 | Status: DISCONTINUED | OUTPATIENT
Start: 2018-04-16 | End: 2018-04-19

## 2018-04-16 RX ORDER — HEPARIN SODIUM 5000 [USP'U]/ML
5000 INJECTION INTRAVENOUS; SUBCUTANEOUS EVERY 12 HOURS
Qty: 0 | Refills: 0 | Status: DISCONTINUED | OUTPATIENT
Start: 2018-04-16 | End: 2018-04-19

## 2018-04-16 RX ORDER — OXYCODONE HYDROCHLORIDE 5 MG/1
5 TABLET ORAL EVERY 4 HOURS
Qty: 0 | Refills: 0 | Status: DISCONTINUED | OUTPATIENT
Start: 2018-04-16 | End: 2018-04-19

## 2018-04-16 RX ORDER — OXYCODONE HYDROCHLORIDE 5 MG/1
10 TABLET ORAL EVERY 4 HOURS
Qty: 0 | Refills: 0 | Status: DISCONTINUED | OUTPATIENT
Start: 2018-04-16 | End: 2018-04-19

## 2018-04-16 RX ORDER — OXYCODONE HYDROCHLORIDE 5 MG/1
15 TABLET ORAL EVERY 4 HOURS
Qty: 0 | Refills: 0 | Status: DISCONTINUED | OUTPATIENT
Start: 2018-04-16 | End: 2018-04-19

## 2018-04-16 RX ORDER — TENOFOVIR DISOPROXIL FUMARATE 300 MG/1
300 TABLET, FILM COATED ORAL
Qty: 0 | Refills: 0 | Status: DISCONTINUED | OUTPATIENT
Start: 2018-04-16 | End: 2018-04-19

## 2018-04-16 RX ORDER — TENOFOVIR DISOPROXIL FUMARATE 300 MG/1
300 TABLET, FILM COATED ORAL
Qty: 0 | Refills: 0 | Status: DISCONTINUED | OUTPATIENT
Start: 2018-04-16 | End: 2018-04-16

## 2018-04-16 RX ORDER — TENOFOVIR DISOPROXIL FUMARATE 300 MG/1
300 TABLET, FILM COATED ORAL ONCE
Qty: 0 | Refills: 0 | Status: DISCONTINUED | OUTPATIENT
Start: 2018-04-16 | End: 2018-04-16

## 2018-04-16 RX ORDER — EMTRICITABINE 200 MG/1
200 CAPSULE ORAL
Qty: 0 | Refills: 0 | Status: DISCONTINUED | OUTPATIENT
Start: 2018-04-16 | End: 2018-04-19

## 2018-04-16 RX ORDER — LEVETIRACETAM 250 MG/1
500 TABLET, FILM COATED ORAL
Qty: 0 | Refills: 0 | Status: DISCONTINUED | OUTPATIENT
Start: 2018-04-16 | End: 2018-04-19

## 2018-04-16 RX ADMIN — HYDROMORPHONE HYDROCHLORIDE 0.5 MILLIGRAM(S): 2 INJECTION INTRAMUSCULAR; INTRAVENOUS; SUBCUTANEOUS at 18:35

## 2018-04-16 RX ADMIN — HYDROMORPHONE HYDROCHLORIDE 1 MILLIGRAM(S): 2 INJECTION INTRAMUSCULAR; INTRAVENOUS; SUBCUTANEOUS at 02:22

## 2018-04-16 RX ADMIN — HYDROMORPHONE HYDROCHLORIDE 0.5 MILLIGRAM(S): 2 INJECTION INTRAMUSCULAR; INTRAVENOUS; SUBCUTANEOUS at 13:36

## 2018-04-16 RX ADMIN — HEPARIN SODIUM 5000 UNIT(S): 5000 INJECTION INTRAVENOUS; SUBCUTANEOUS at 18:16

## 2018-04-16 RX ADMIN — DOLUTEGRAVIR SODIUM 50 MILLIGRAM(S): 25 TABLET, FILM COATED ORAL at 11:03

## 2018-04-16 RX ADMIN — OXYCODONE HYDROCHLORIDE 10 MILLIGRAM(S): 5 TABLET ORAL at 18:16

## 2018-04-16 RX ADMIN — HYDROMORPHONE HYDROCHLORIDE 0.5 MILLIGRAM(S): 2 INJECTION INTRAMUSCULAR; INTRAVENOUS; SUBCUTANEOUS at 13:34

## 2018-04-16 RX ADMIN — Medication 1 MILLIGRAM(S): at 06:06

## 2018-04-16 RX ADMIN — AZITHROMYCIN 500 MILLIGRAM(S): 500 TABLET, FILM COATED ORAL at 11:02

## 2018-04-16 RX ADMIN — Medication 50 MILLIGRAM(S): at 06:07

## 2018-04-16 RX ADMIN — Medication 1 MILLIGRAM(S): at 22:21

## 2018-04-16 RX ADMIN — HYDROMORPHONE HYDROCHLORIDE 0.5 MILLIGRAM(S): 2 INJECTION INTRAMUSCULAR; INTRAVENOUS; SUBCUTANEOUS at 18:49

## 2018-04-16 RX ADMIN — TENOFOVIR DISOPROXIL FUMARATE 300 MILLIGRAM(S): 300 TABLET, FILM COATED ORAL at 18:16

## 2018-04-16 RX ADMIN — OXYCODONE HYDROCHLORIDE 10 MILLIGRAM(S): 5 TABLET ORAL at 21:30

## 2018-04-16 RX ADMIN — SEVELAMER CARBONATE 800 MILLIGRAM(S): 2400 POWDER, FOR SUSPENSION ORAL at 06:07

## 2018-04-16 RX ADMIN — OXYCODONE HYDROCHLORIDE 10 MILLIGRAM(S): 5 TABLET ORAL at 20:33

## 2018-04-16 RX ADMIN — SEVELAMER CARBONATE 800 MILLIGRAM(S): 2400 POWDER, FOR SUSPENSION ORAL at 22:21

## 2018-04-16 RX ADMIN — AMLODIPINE BESYLATE 10 MILLIGRAM(S): 2.5 TABLET ORAL at 06:07

## 2018-04-16 RX ADMIN — ATOVAQUONE 1500 MILLIGRAM(S): 750 SUSPENSION ORAL at 18:17

## 2018-04-16 RX ADMIN — CEFEPIME 500 MILLIGRAM(S): 1 INJECTION, POWDER, FOR SOLUTION INTRAMUSCULAR; INTRAVENOUS at 11:14

## 2018-04-16 RX ADMIN — GABAPENTIN 100 MILLIGRAM(S): 400 CAPSULE ORAL at 22:21

## 2018-04-16 RX ADMIN — EMTRICITABINE 200 MILLIGRAM(S): 200 CAPSULE ORAL at 18:16

## 2018-04-16 RX ADMIN — Medication 100 MICROGRAM(S): at 14:49

## 2018-04-16 RX ADMIN — Medication 1 MILLIGRAM(S): at 15:29

## 2018-04-16 RX ADMIN — LEVETIRACETAM 500 MILLIGRAM(S): 250 TABLET, FILM COATED ORAL at 18:16

## 2018-04-16 RX ADMIN — Medication 50 MILLIGRAM(S): at 17:21

## 2018-04-16 RX ADMIN — PANTOPRAZOLE SODIUM 40 MILLIGRAM(S): 20 TABLET, DELAYED RELEASE ORAL at 06:06

## 2018-04-16 RX ADMIN — OXYCODONE HYDROCHLORIDE 10 MILLIGRAM(S): 5 TABLET ORAL at 06:06

## 2018-04-16 RX ADMIN — QUETIAPINE FUMARATE 50 MILLIGRAM(S): 200 TABLET, FILM COATED ORAL at 22:21

## 2018-04-16 RX ADMIN — LISINOPRIL 20 MILLIGRAM(S): 2.5 TABLET ORAL at 11:01

## 2018-04-16 RX ADMIN — OXYCODONE HYDROCHLORIDE 10 MILLIGRAM(S): 5 TABLET ORAL at 18:49

## 2018-04-16 RX ADMIN — SERTRALINE 50 MILLIGRAM(S): 25 TABLET, FILM COATED ORAL at 11:06

## 2018-04-16 RX ADMIN — HYDROMORPHONE HYDROCHLORIDE 1 MILLIGRAM(S): 2 INJECTION INTRAMUSCULAR; INTRAVENOUS; SUBCUTANEOUS at 02:35

## 2018-04-16 RX ADMIN — DARUNAVIR 800 MILLIGRAM(S): 75 TABLET, FILM COATED ORAL at 11:02

## 2018-04-16 RX ADMIN — LEVETIRACETAM 420 MILLIGRAM(S): 250 TABLET, FILM COATED ORAL at 06:06

## 2018-04-16 NOTE — DISCHARGE NOTE ADULT - HOSPITAL COURSE
27 YOM with ESRD requiring emergent HD for hyperkalemia/uremia with AMS, encephalopathy possibly due to metabolic derangements, HIV with fever, r/o sepsis, acute respiratory failure requiring intubation for airway protection.   Metabolic encephalopathy resolved, probably multifactorial   secondary to uremia, polypharmacy, fever and possible infection. [place]  [place]  - patient on oxycodone 10 q6, Oxycontin 10 twice daily, Xanax 2mg three times a day, Zolpidem 10 and Fentanyl 100. Continued on HD M-W-f.  Has H/O Chronic CHFrEF . Appears to be well compensated currently. TTE shows mildly decreased LVSF, EF 45-50%, Grade I Diastolic dysfunction and moderate PAH.  Fever , continued on IV antibiotics for possible gram positive, gram negative pneumonia. Followed by ID.  IV Maxipime/Zithro for RLL infiltrate on CT likely aspiration.  Pain management consulted for optimization of pain medications. . Psych called for addiction issue and Depression.  H/O  HIV ,  retroviral agents resumed as per ID. History of seizure,  continue Keppra , no overt seizure activity on EEG. 27 YOM with ESRD requiring emergent HD for hyperkalemia/uremia with AMS, encephalopathy possibly due to metabolic derangements, HIV with fever, r/o sepsis, acute respiratory failure requiring intubation for airway protection.   Metabolic encephalopathy resolved, probably multifactorial   secondary to uremia, polypharmacy, fever and possible infection. [place]  - patient on oxycodone 10 q6, Oxycontin 10 twice daily, Xanax 2mg three times a day, Zolpidem 10 and Fentanyl 100. Continued on HD M-W-f.  Has H/O Chronic CHFrEF . Appears to be well compensated currently. TTE shows mildly decreased LVSF, EF 45-50%, Grade I Diastolic dysfunction and moderate PAH.  Fever , continued on IV antibiotics for possible gram positive, gram negative pneumonia. Followed by ID.  IV Maxipime/Zithro for RLL infiltrate on CT likely aspiration.  Pain management consulted for optimization of pain medications. . Psych called for addiction issue and Depression.  H/O  HIV ,  retroviral agents resumed as per ID. History of seizure,  continue Keppra , no overt seizure activity on EEG. Psychiatry recommend  starting therapy for management of bereavement and depression. Seen by discharge planning, will receive home care on discharge. The patient is stable for discharge. 27 YOM with ESRD requiring emergent HD for hyperkalemia/uremia with AMS, encephalopathy possibly due to metabolic derangements, HIV with fever, r/o sepsis, acute respiratory failure requiring intubation for airway protection.   Metabolic encephalopathy resolved, probably multifactorial   secondary to uremia, polypharmacy, fever and possible infection. [place]  - patient on oxycodone 10 q6, Oxycontin 10 twice daily, Xanax 2mg three times a day, Zolpidem 10 and Fentanyl 100. Continued on HD M-W-f.  Has H/O Chronic CHFrEF . Appears to be well compensated currently. TTE shows mildly decreased LVSF, EF 45-50%, Grade I Diastolic dysfunction and moderate PAH.  Fever , continued on IV antibiotics for possible gram positive, gram negative pneumonia. Followed by ID.  IV Maxipime/Zithro for RLL infiltrate on CT likely aspiration.  Changed to PO Levaquin, to complete 7 days treatment. H/O  HIV ,  retroviral agents resumed as per ID.    Pain management consulted for optimization of pain medications. . Psych called for addiction issue and Depression.  Recommended discontinuing IV Dilaudid which patient has been repeatedly asking for.    History of seizure,  continue Keppra , no overt seizure activity on EEG. Psychiatry recommend  starting therapy for management of bereavement and depression.     Seen by discharge planning, will receive home care on discharge. Mother met with ROVERTO RIVERA - deemed appropriate to dispense medications and take care of patient. In addition home care for teaching is also being arranged.     The patient is medically stable for discharge.     Discharge time - 38 minutes 27 YOM with ESRD requiring emergent HD for hyperkalemia/uremia with AMS, encephalopathy possibly due to metabolic derangements, HIV with fever, r/o sepsis, acute respiratory failure requiring intubation for airway protection.   Metabolic encephalopathy resolved, probably multifactorial secondary to uremia, polypharmacy, fever and possible infection, [place]  [place], patient on oxycodone 10 q6, Oxycontin 10 twice daily, Xanax 2mg three times a day, Zolpidem 10 and Fentanyl 100, continued on HD M-W-f.  Has H/O Chronic CHFrEF . Appears to be well compensated currently. TTE shows mildly decreased LVSF, EF 45-50%, Grade I Diastolic dysfunction and moderate PAH, his encephalopathy has resolved with meds adjustments and treatment for pneumonia,  he has no more fever, he was continued on IV antibiotics for possible gram positive, gram negative pneumonia, patient was seen and Followed by ID over the4 course, he was on IV Maxipime/Zithro for RLL infiltrate on CT likely aspiration, Changed to PO Levaquin, to complete 7 days treatment, he has H/O  HIV and he was continued with his retroviral agents.  patient has been on pain meds, Pain management consulted for optimization of pain medications and his pain meds adjusted according to recommendation, Psych called for addiction issue and Depression, Recommended discontinuing IV Dilaudid which patient has been repeatedly asking for, counselled about long term effects related to narcotics.   He has Hx of seizure disorder, he was continued with Keppra , no overt seizure activity on EEG, he was monitored by for  seizure and put on seizure prophylaxis, Psychiatry recommend  starting therapy for management of bereavement and depression.   patient was seen by discharge planning and team had meeting with Mother MIGUEL, ROVERTO, they deemed appropriate to dispense medications In addition to home care for teaching and help at home.   patient is doing well, his symptoms has been resolved, he is being discharged home with home care in a stable condition.     Vital Signs Last 24 Hrs  T(C): 36.6 (19 Apr 2018 08:00), Max: 36.8 (18 Apr 2018 14:16)  T(F): 97.9 (19 Apr 2018 08:00), Max: 98.2 (18 Apr 2018 14:16)  HR: 84 (19 Apr 2018 08:00) (84 - 98)  BP: 125/79 (19 Apr 2018 08:00) (121/72 - 152/90)  RR: 18 (19 Apr 2018 08:00) (18 - 18)  SpO2: 97% (19 Apr 2018 08:00) (97% - 100%)    PHYSICAL EXAM:    General: Middle age male looking comfortable.   Respiratory: Decrease air entry b/l, no wheezing or rhonchi   Cardiovascular: Regular rate and rhythm. S1 and S2 Normal; No murmurs  Gastrointestinal: Soft non-tender non-distended; Normal bowel sounds; No hepatosplenomegaly  Extremities: RUE +AVF  Vascular: Peripheral pulses palpable 2+ bilaterally  Neurological: Alert and oriented x4  Skin: Warm and dry. No acute rash  Psychiatric: Normal mood    Discharge time - 35 minutes

## 2018-04-16 NOTE — DISCHARGE NOTE ADULT - MEDICATION SUMMARY - MEDICATIONS TO CHANGE
I will SWITCH the dose or number of times a day I take the medications listed below when I get home from the hospital:    levETIRAcetam 500 mg oral tablet  -- 2 tab(s) by mouth 2 times a day

## 2018-04-16 NOTE — DISCHARGE NOTE ADULT - MEDICATION SUMMARY - MEDICATIONS TO TAKE
I will START or STAY ON the medications listed below when I get home from the hospital:    oxyCODONE 10 mg oral tablet, extended release  -- 1 tab(s) by mouth every 12 hours MDD:2 tabs  -- Indication: For Chronic pain    oxyCODONE 5 mg oral tablet  -- 1 tab oral for Mild pain Pain, 2 tabs oral for Moderate pain, 3 tabs oral for severe pain every 6 hours as needed MDD:12 tabs  -- Indication: For Chronic pain    lisinopril 20 mg oral tablet  -- 1 tab(s) by mouth once a day  -- Indication: For HTN    levETIRAcetam 500 mg oral tablet  -- 1 tab(s) by mouth 2 times a day  -- Indication: For Seizure disorder    gabapentin 100 mg oral capsule  -- 1 cap(s) by mouth once a day (at bedtime)  -- Indication: For Chronic pain    sertraline 50 mg oral tablet  -- 1 tab(s) by mouth once a day  -- Indication: For Depression    QUEtiapine 50 mg oral tablet  -- 1 tab(s) by mouth once a day (at bedtime)  -- Indication: For Psychiatric disorder    ritonavir 100 mg oral tablet  -- 1 tab(s) by mouth once a day  -- Indication: For HIV (human immunodeficiency virus infection)    emtricitabine 200 mg oral capsule  -- 1 cap(s) by mouth 2 times a week after HD Monday/Thursday  -- Indication: For HIV disease    darunavir 800 mg oral tablet  -- 1 tab(s) by mouth once a day   -- Check with your doctor before becoming pregnant.  It is very important that you take or use this exactly as directed.  Do not skip doses or discontinue unless directed by your doctor.  Obtain medical advice before taking any non-prescription drugs as some may affect the action of this medication.  Swallow whole.  Do not crush.  Take with food or milk.    -- Indication: For HIV disease    dolutegravir 50 mg oral tablet  -- 1 tab(s) by mouth once a day  -- Indication: For HIV (human immunodeficiency virus infection)    tenofovir disoproxil fumarate 300 mg oral tablet  -- 1 tab(s) by mouth once a week on Monday  post HD  -- Indication: For HIV (human immunodeficiency virus infection)    ALPRAZolam 2 mg oral tablet  -- 0.5 tab(s) by mouth 3 times a day, As Needed  -- Indication: For Anxiety    zolpidem 10 mg oral tablet  -- 0.5 tab(s) by mouth once a day (at bedtime)  -- Indication: For Insomnia    metoprolol tartrate 50 mg oral tablet  -- 2 tab(s) by mouth 2 times a day  -- Indication: For HTN    amLODIPine 10 mg oral tablet  -- 1 tab(s) by mouth once a day  -- Indication: For HTN    darbepoetin nithin 40 mcg/mL injectable solution  -- 100 microgram(s) injectable every 7 days  as per Renal  -- Indication: For ESRD on hemodialysis    atovaquone 750 mg/5 mL oral suspension  -- 10 milliliter(s) by mouth once a day  -- Indication: For HIV disease    sevelamer carbonate 800 mg oral tablet  -- 1 tab(s) by mouth 3 times a day (with meals)  -- Indication: For ESRD (end stage renal disease)    pantoprazole 40 mg oral delayed release tablet  -- 1 tab(s) by mouth once a day (before a meal)  -- Indication: For GERD    levoFLOXacin 250 mg oral tablet  -- 1 tab(s) by mouth every 48 hours  -- Indication: For Sepsis, due to unspecified organism    calcitriol 0.5 mcg oral capsule  -- 1 cap(s) by mouth once a day  -- Indication: For ESRD on hemodialysis

## 2018-04-16 NOTE — PROGRESS NOTE ADULT - ASSESSMENT
ESRD  AMS - He has a history of drug abuse and OD - resolved  Acute respiratory failure - extubated  PNA  Hyperkalemia - resolved  HTN  HIV  DM  Anemia of CKD    - HD MWF; To proceed with dialysis today 4/16/18 as ordered  - Abx per ID  - Renal dialysis diet    D/w HD RN

## 2018-04-16 NOTE — DISCHARGE NOTE ADULT - CARE PLAN
Principal Discharge DX:	Encephalopathy acute  Goal:	Maintain baseline function  Assessment and plan of treatment:	encephalopathy possibly due to metabolic derangements now resolved  Continue HDas per Nephrology  Continue pain medications as prescribed, follow up with pain management as outpatient  Secondary Diagnosis:	Acute respiratory failure, unspecified whether with hypoxia or hypercapnia  Assessment and plan of treatment:	resolved  take pain medications as prescribed  stop Fentanyl patch  Secondary Diagnosis:	ESRD (end stage renal disease)  Assessment and plan of treatment:	HD compliance  follow up with Nephrology as outpatient  Secondary Diagnosis:	HIV disease  Assessment and plan of treatment:	Continue retroviral agents as prescribed  Secondary Diagnosis:	Sepsis, due to unspecified organism  Secondary Diagnosis:	Seizure disorder  Assessment and plan of treatment:	EEG negative  Continue Keppra  Secondary Diagnosis:	Other chronic pain  Assessment and plan of treatment:	Follow pain medication RX as per Pain management Principal Discharge DX:	Encephalopathy acute  Goal:	Maintain baseline function  Assessment and plan of treatment:	encephalopathy possibly due to metabolic derangements now resolved  Continue HD as per Nephrology  Continue pain medications as prescribed, follow up with pain management as outpatient  Secondary Diagnosis:	Acute respiratory failure, unspecified whether with hypoxia or hypercapnia  Assessment and plan of treatment:	resolved  take pain medications as prescribed  stop Fentanyl patch  Secondary Diagnosis:	ESRD (end stage renal disease)  Assessment and plan of treatment:	HD compliance  follow up with Nephrology as outpatient  Secondary Diagnosis:	HIV disease  Assessment and plan of treatment:	Continue retroviral agents as prescribed  Secondary Diagnosis:	Sepsis, due to unspecified organism  Assessment and plan of treatment:	Probable due to Aspiration PNA  Complete Antibiotics as prescribed  Secondary Diagnosis:	Seizure disorder  Assessment and plan of treatment:	EEG negative  Continue Keppra  Secondary Diagnosis:	Other chronic pain  Assessment and plan of treatment:	Follow pain medication RX as per Pain management

## 2018-04-16 NOTE — PROGRESS NOTE ADULT - PROBLEM SELECTOR PLAN 1
Infiltration in RLL upper segment. will continue cefepime and azithromycin until we have blood cultures back, if neg can switch to po levaquin to complete 7 days.

## 2018-04-16 NOTE — DISCHARGE NOTE ADULT - SECONDARY DIAGNOSIS.
Acute respiratory failure, unspecified whether with hypoxia or hypercapnia ESRD (end stage renal disease) HIV disease Sepsis, due to unspecified organism Seizure disorder Other chronic pain

## 2018-04-16 NOTE — CONSULT NOTE ADULT - ASSESSMENT
Patient seen and examined at bedside C/O PAIN IN RIGHT UPPER EXTREMITY AND LOWER BACK PAIN  -Patient takes high dose pain medications as out patient HOME MEDS (-zolpidem 10mg qhs, -alprozolam 2mg TID, -Oxycontin 10mg bid, -oxycodone 10mg mdd 5/day, -fentanyl 100mcg/hr q48hrs )      DC Fentanyl PATCH  -Slidding scale of   -OXYCODONE  5mg MILD PAIN PRN Q4HRS  -OXYCODONE 10MG MODERATE PAIN  PRN Q4HRS   -OXYCODONE 15MG SEVERE PAIN PRN Q4HRS   -DILAUDID 0.5MG SUBQ Q4HRS PRN BTP SEVERE   -SPOKE TO DR JUAREZ MORRIS- PAIN MANAGEMENT ATTENDING- RECOMMENDS ADDICTION MEDICINE CONSULT AND RECOMMENDS DISCONTINUE FENTANYL PATCH

## 2018-04-16 NOTE — DISCHARGE NOTE ADULT - PATIENT PORTAL LINK FT
You can access the [facility] Patient Portal, offered by [facility], by registering with the following website: [email or web address]

## 2018-04-16 NOTE — PROGRESS NOTE ADULT - ASSESSMENT
27 YOM with ESRD requiring emergent HD for hyperkalemia/uremia with AMS, encephalopathy possibly due to metabolic derangements, HIV with fever, r/o sepsis, acute respiratory failure.    Metabolic encephalopathy resolved. Multifactorial  secondary to uremia, polypharmacy, fever and possible infection. [place]  - patient on oxycodone 10 q6, Oxycontin 10 twice daily, Xanax 2mg three times a day, Zolpidem 10 and Fentanyl 100.    ESRD - continued on HD. States regular schedule is M,W,F    Chronic pain - still complaining of pain, wants to stay on IV Dilaudid    Chronic CHFrEF - Appears to be well compensated currently. TTE shows mildly decreased LVSF, EF 45-50%, Grade I Diastolic dysfunction and moderate PAH.    Fever - resolved. Cultures pending. Being continued on IV antibiotics for possible gram positive, gram negative pneumonia.     Problem/Plan - 1:  ·  Problem: Metabolic encephalopathy.  Plan: Resolved.  Continue HD as per M,W,F schedule.  PT consult       Problem/Plan - 2:  ·  Problem: Sepsis, due to unspecified organism.  Plan: cultures pending   Maxipime/Zithro  RLL infiltrate on CT likely aspiration  ID input appreciated, if BC negative will change to oral Levaquin. BC pending     Problem/Plan - 3:  ·  Problem: ESRD on hemodialysis.  Plan: renal follow up appreciated  dialysis per schedule.     Problem/Plan - 4:  ·  Problem: Other chronic pain.  Plan: Pain management input appreciated. Psych called for addiction issue and Depression.     Problem/Plan - 5:  ·  Problem: Chronic combined systolic and diastolic HF (heart failure).  Plan: Continue BB, ACEI.      Problem/Plan - 6:  Problem: HIV (human immunodeficiency virus infection). Plan:  retroviral agents as per ID       Problem/Plan - 7:  ·  Problem: Seizure disorder.  Plan: continue Keppra   no overt seizure activity on EEG.      Problem/Plan - 8:  ·  Problem: Acute respiratory failure, unspecified whether with hypoxia or hypercapnia.  Plan: Resolved, now on Room air     Problem/Plan - 9:  ·  Problem: Prophylactic measure.  Plan: VTE ppx - Hepari SQ    Problem/Plan-10:  Problem: Depression. Plan: Appreciate Social Work input. Psych consult

## 2018-04-16 NOTE — CONSULT NOTE ADULT - SUBJECTIVE AND OBJECTIVE BOX
Chief Complaint:    HPI:  Pt is a 27 YOM with known h/o ESRD on HD, HIV, blindness, HTN, cardiomyopathy, seizure d/o, pericarditis who was sent prior to HD today for AMS, unresponsiveness.  Pt was unresponsive in ED and was intubated for airway protection.  Pt has known h/o drug use and has a fentanyl Patch in place which was not removed in ED, pt did not receive Narcan due to concerns of h/o chronic pain.  Pt was found to be hyperkalemic, uremic.  Head CT initially is negative for acute findings. Pt was admitted to ICU for emergent hemodialysis and management of acute respiratory failure and encephalopathy, change in MS. (2018 15:29)      PAST MEDICAL & SURGICAL HISTORY:  HIV (human immunodeficiency virus infection)  Seizure disorder  Hypertension  Diabetes  ESRD (end stage renal disease)  Seizure  Pericarditis  Anxiety  Depression  Renal failure (ARF), acute on chronic: dialysis av fistula, RUE  HTN (hypertension)  Cardiomyopathy  HIV disease: born HIV+  AV fistula  S/P tonsillectomy      FAMILY HISTORY:  No pertinent family history in first degree relatives  No pertinent family history in first degree relatives      SOCIAL HISTORY:  Denies Smoking, Alcohol, or Drug Use    Allergies    vancomycin (Anaphylaxis)    Intolerances        PAIN MEDICATIONS:  ALPRAZolam 1 milliGRAM(s) Oral three times a day  fentaNYL   Patch  75 MICROgram(s)/Hr 1 Patch Transdermal every 72 hours  gabapentin 100 milliGRAM(s) Oral at bedtime  levETIRAcetam 500 milliGRAM(s) Oral two times a day  oxyCODONE    IR 10 milliGRAM(s) Oral every 6 hours PRN  oxyCODONE  ER Tablet 10 milliGRAM(s) Oral every 12 hours  QUEtiapine 50 milliGRAM(s) Oral at bedtime  sertraline 50 milliGRAM(s) Oral daily  zolpidem 5 milliGRAM(s) Oral at bedtime PRN    Heme:  heparin  Injectable 5000 Unit(s) SubCutaneous every 12 hours    Antibiotics:  atovaquone Suspension 1500 milliGRAM(s) Oral daily  azithromycin   Tablet 500 milliGRAM(s) Oral daily  cefepime Injectable. 500 milliGRAM(s) IV Push daily  cefepime Injectable.      darunavir 800 milliGRAM(s) Oral daily  dolutegravir 50 milliGRAM(s) Oral daily  emtricitabine 200 milliGRAM(s) Oral <User Schedule>  ritonavir Tablet 100 milliGRAM(s) Oral daily  tenofovir 300 milliGRAM(s) Oral <User Schedule>    Cardiovascular:  amLODIPine   Tablet 10 milliGRAM(s) Oral daily  lisinopril 20 milliGRAM(s) Oral daily  metoprolol tartrate 50 milliGRAM(s) Oral two times a day    GI:  pantoprazole    Tablet 40 milliGRAM(s) Oral before breakfast    Endocrine:    All Other Medications:  calcitriol   Capsule 0.5 MICROGram(s) Oral daily  darbepoetin Injectable Syringe 100 MICROGram(s) IV Push every 7 days  sevelamer hydrochloride 800 milliGRAM(s) Oral three times a day      REVIEW OF SYSTEMS:    NECK: No pain or stiffness  BREASTS: RESPIRATORY: No cough, wheezing, chills or hemoptysis; No shortness of breath  CARDIOVASCULAR: No chest pain, palpitations, dizziness,   GASTROINTESTINAL: No abdominal or epigastric pain. No nausea, vomiting, or hematemesis; No diarrhea or constipation. No melena or hematochezia.  GENITOURINARY: No dysuria, frequency, hematuria, or incontinence  NEUROLOGICAL: No headaches, memory loss, loss of strength, numbness, or tremors  MUSCULOSKELETAL: No joint pain or swelling; back pain, right upper arm pain  PSYCHIATRIC: No depression, anxiety, mood swings, or difficulty sleeping  HEME/LYMPH: No easy bruising, or bleeding gums  ALLERY AND IMMUNOLOGIC: No hives or eczema      Vital Signs Last 24 Hrs  T(C): 36.8 (2018 09:44), Max: 37.2 (2018 01:29)  T(F): 98.2 (2018 09:44), Max: 99 (2018 01:29)  HR: 85 (2018 09:44) (81 - 93)  BP: 148/95 (2018 09:44) (139/84 - 159/97)  BP(mean): --  RR: 20 (:44) (18 - 20)  SpO2: 97% (:44) (97% - 98%)    PAIN SCORE:      9   SCALE USED: (1-10)    OTHER SYMPTOMS (0 = None;  1 = Mild; 2 = Moderate; 3 = Severe)  Anorexia: 0          Dyspnea:0         Pruritus:0         Nausea:0             Agitation:0        Anxiety:0    Vomitin          Drowsiness:0    Depression:0  Constipation:0    Diarrhea:0        Other:0           PHYSICAL EXAM:    GENERAL: NAD, well-groomed, well-developed  HEAD:  Atraumatic, Normocephalic  NERVOUS SYSTEM:  Alert & Oriented X3, Good concentration;     [place]  Reviewed and Copied to Chart: done

## 2018-04-16 NOTE — DISCHARGE NOTE ADULT - MEDICATION SUMMARY - MEDICATIONS TO STOP TAKING
I will STOP taking the medications listed below when I get home from the hospital:    gabapentin 300 mg oral capsule  -- 1 cap(s) by mouth once a day

## 2018-04-16 NOTE — DISCHARGE NOTE ADULT - CARE PROVIDER_API CALL
Manuel Nam), Nephrology  [address]  [place] [address]  Phone: [phone]  Fax: [phone]    Josh Navarro (MD), Infectious Disease; Internal Medicine  [address] [place] [id number]  Phone: [phone]  Fax: [phone]    Tony Gunter), Anesthesiology; Pain Medicine  [address] [address]  Phone: [phone]  Fax: [phone]

## 2018-04-16 NOTE — PROGRESS NOTE ADULT - ASSESSMENT
HIV with CD4 88 and [id number], with pneumonia. Nonadherent to meds. was admitted with AMS and unresponsiveness. Infection vs drugs. now back to his normal.   On cefepime for pneumonia now.

## 2018-04-16 NOTE — DISCHARGE NOTE ADULT - PLAN OF CARE
Maintain baseline function encephalopathy possibly due to metabolic derangements now resolved  Continue HDas per Nephrology  Continue pain medications as prescribed, follow up with pain management as outpatient resolved  take pain medications as prescribed  stop Fentanyl patch HD compliance  follow up with Nephrology as outpatient Continue retroviral agents as prescribed EEG negative  Continue Keppra Follow pain medication RX as per Pain management encephalopathy possibly due to metabolic derangements now resolved  Continue HD as per Nephrology  Continue pain medications as prescribed, follow up with pain management as outpatient Probable due to Aspiration PNA  Complete Antibiotics as prescribed

## 2018-04-16 NOTE — DISCHARGE NOTE ADULT - VISION (WITH CORRECTIVE LENSES IF THE PATIENT USUALLY WEARS THEM):
Severely impaired: cannot locate objects without hearing or touching them or patient nonresponsive./pt states he is blind

## 2018-04-17 DIAGNOSIS — Z87.898 PERSONAL HISTORY OF OTHER SPECIFIED CONDITIONS: ICD-10-CM

## 2018-04-17 DIAGNOSIS — F43.21 ADJUSTMENT DISORDER WITH DEPRESSED MOOD: ICD-10-CM

## 2018-04-17 PROCEDURE — 90792 PSYCH DIAG EVAL W/MED SRVCS: CPT

## 2018-04-17 PROCEDURE — 99232 SBSQ HOSP IP/OBS MODERATE 35: CPT

## 2018-04-17 PROCEDURE — 99233 SBSQ HOSP IP/OBS HIGH 50: CPT

## 2018-04-17 RX ADMIN — OXYCODONE HYDROCHLORIDE 15 MILLIGRAM(S): 5 TABLET ORAL at 21:11

## 2018-04-17 RX ADMIN — Medication 1 MILLIGRAM(S): at 06:14

## 2018-04-17 RX ADMIN — DOLUTEGRAVIR SODIUM 50 MILLIGRAM(S): 25 TABLET, FILM COATED ORAL at 11:37

## 2018-04-17 RX ADMIN — SEVELAMER CARBONATE 800 MILLIGRAM(S): 2400 POWDER, FOR SUSPENSION ORAL at 13:41

## 2018-04-17 RX ADMIN — GABAPENTIN 100 MILLIGRAM(S): 400 CAPSULE ORAL at 21:11

## 2018-04-17 RX ADMIN — AMLODIPINE BESYLATE 10 MILLIGRAM(S): 2.5 TABLET ORAL at 06:13

## 2018-04-17 RX ADMIN — LEVETIRACETAM 500 MILLIGRAM(S): 250 TABLET, FILM COATED ORAL at 17:58

## 2018-04-17 RX ADMIN — Medication 50 MILLIGRAM(S): at 17:58

## 2018-04-17 RX ADMIN — Medication 50 MILLIGRAM(S): at 06:13

## 2018-04-17 RX ADMIN — SERTRALINE 50 MILLIGRAM(S): 25 TABLET, FILM COATED ORAL at 11:39

## 2018-04-17 RX ADMIN — OXYCODONE HYDROCHLORIDE 10 MILLIGRAM(S): 5 TABLET ORAL at 17:57

## 2018-04-17 RX ADMIN — HYDROMORPHONE HYDROCHLORIDE 0.5 MILLIGRAM(S): 2 INJECTION INTRAMUSCULAR; INTRAVENOUS; SUBCUTANEOUS at 11:40

## 2018-04-17 RX ADMIN — OXYCODONE HYDROCHLORIDE 15 MILLIGRAM(S): 5 TABLET ORAL at 21:43

## 2018-04-17 RX ADMIN — LISINOPRIL 20 MILLIGRAM(S): 2.5 TABLET ORAL at 06:13

## 2018-04-17 RX ADMIN — Medication 1 MILLIGRAM(S): at 13:41

## 2018-04-17 RX ADMIN — QUETIAPINE FUMARATE 50 MILLIGRAM(S): 200 TABLET, FILM COATED ORAL at 21:11

## 2018-04-17 RX ADMIN — RITONAVIR 100 MILLIGRAM(S): 100 TABLET, FILM COATED ORAL at 11:39

## 2018-04-17 RX ADMIN — HEPARIN SODIUM 5000 UNIT(S): 5000 INJECTION INTRAVENOUS; SUBCUTANEOUS at 17:57

## 2018-04-17 RX ADMIN — OXYCODONE HYDROCHLORIDE 10 MILLIGRAM(S): 5 TABLET ORAL at 06:13

## 2018-04-17 RX ADMIN — SEVELAMER CARBONATE 800 MILLIGRAM(S): 2400 POWDER, FOR SUSPENSION ORAL at 21:11

## 2018-04-17 RX ADMIN — ATOVAQUONE 1500 MILLIGRAM(S): 750 SUSPENSION ORAL at 11:37

## 2018-04-17 RX ADMIN — OXYCODONE HYDROCHLORIDE 15 MILLIGRAM(S): 5 TABLET ORAL at 09:30

## 2018-04-17 RX ADMIN — CALCITRIOL 0.5 MICROGRAM(S): 0.5 CAPSULE ORAL at 11:37

## 2018-04-17 RX ADMIN — OXYCODONE HYDROCHLORIDE 15 MILLIGRAM(S): 5 TABLET ORAL at 08:54

## 2018-04-17 RX ADMIN — HYDROMORPHONE HYDROCHLORIDE 0.5 MILLIGRAM(S): 2 INJECTION INTRAMUSCULAR; INTRAVENOUS; SUBCUTANEOUS at 12:10

## 2018-04-17 RX ADMIN — PANTOPRAZOLE SODIUM 40 MILLIGRAM(S): 20 TABLET, DELAYED RELEASE ORAL at 06:13

## 2018-04-17 RX ADMIN — LEVETIRACETAM 500 MILLIGRAM(S): 250 TABLET, FILM COATED ORAL at 06:13

## 2018-04-17 RX ADMIN — SEVELAMER CARBONATE 800 MILLIGRAM(S): 2400 POWDER, FOR SUSPENSION ORAL at 08:54

## 2018-04-17 RX ADMIN — HEPARIN SODIUM 5000 UNIT(S): 5000 INJECTION INTRAVENOUS; SUBCUTANEOUS at 06:14

## 2018-04-17 RX ADMIN — Medication 1 MILLIGRAM(S): at 21:11

## 2018-04-17 RX ADMIN — OXYCODONE HYDROCHLORIDE 10 MILLIGRAM(S): 5 TABLET ORAL at 18:40

## 2018-04-17 RX ADMIN — DARUNAVIR 800 MILLIGRAM(S): 75 TABLET, FILM COATED ORAL at 11:37

## 2018-04-17 NOTE — PROGRESS NOTE ADULT - ASSESSMENT
ESRD  AMS - He has a history of drug abuse and OD - resolved  Acute respiratory failure - extubated  PNA  Hyperkalemia - resolved  HTN  HIV  DM  Anemia of CKD    - HD MWF; S/P HD on 4/16/18 with UF of 3kg; next dialysis, if admitted, is on 4/18/18; otherwise, will be done as outpatient  - Abx per ID  - Renal dialysis diet    Patient has been seen, but knows Dr. Jiménez well and not myself, therefore, he assumes he isn't being seen because he does not usually remember my name.     DC planning    D/w RN

## 2018-04-17 NOTE — PHYSICAL THERAPY INITIAL EVALUATION ADULT - ACTIVE RANGE OF MOTION EXAMINATION, REHAB EVAL
bilateral upper extremity Active ROM was WFL (within functional limits)/bilateral  lower extremity Active ROM was WFL (within functional limits)/bilateral shd flexion to 90

## 2018-04-17 NOTE — PHYSICAL THERAPY INITIAL EVALUATION ADULT - ADDITIONAL COMMENTS
Pt lives in a house with 3 steps to enter with 1 rail and  0 stairs inside.  Pt owns medical equipment: Blind cane   Pt lives with: Mom and brother

## 2018-04-17 NOTE — PROGRESS NOTE ADULT - PROBLEM SELECTOR PLAN 2
ARV seems incomplete in orders, needs to be on:  -Tenofovir 300mg weekly after HD  -Emtricitabine 200mg every 3 days  -Tivicay 50mg daily  -Darunavir 800mg daily  -Norvir 100mg daily   -Add atovaquon 1500mg daily for PCP prophylaxis, due to HD wont give bactrim at this point.  needs f/u with primary care for labs and med adjustment.
1. recommend psych consult and referall to addiction medicine as an outpatient for possible suboxone program
Will continue current ARV listed above, advised him about adherence and risks of nonadherence and OIs with low CD4. will follow up with PMD.
cultures pending   HIV+ unclear if compliant with HAART will get ID involved   Broad spectrum ABx coverage with Dapto/Zosyn/Zithro  RLL infiltrate on CT likely aspiration
cultures pending   HIV+ unclear if compliant with HAART will get ID involved   Broad spectrum ABx coverage with Dapto/Zosyn/Zithro  RLL infiltrate on CT likely aspiration  ID consult - discussed with Dr singh
cultures pending   HIV+ unclear if compliant with HAART will get ID involved   Broad spectrum ABx coverage with Dapto/Zosyn/Zithro  RLL infiltrate on CT likely aspiration  ID follow up

## 2018-04-17 NOTE — PROGRESS NOTE ADULT - PROBLEM SELECTOR PLAN 1
1.patient is reporting pain is well controlled  2. meds        - continue oxycontin 10 mg bid        - continue oxycodone 5/10/15 q 4 prn mild./mod/severe         - continue SQ dilaudid 0.5 mg q 4 sevre BTP only. ONE HOUR AFTER ORAL DOSE. HOLD FOR SEDATION         - NO REASON FOR IV MEDS AT THIS TIME        - continue neurontin as ordered         - bowel regimen per primary team  3. will continue to follow  4. call with questions

## 2018-04-17 NOTE — PROGRESS NOTE ADULT - PROBLEM SELECTOR PROBLEM 2
HIV (human immunodeficiency virus infection)
HIV (human immunodeficiency virus infection)
History of substance use
Sepsis, due to unspecified organism

## 2018-04-17 NOTE — BEHAVIORAL HEALTH ASSESSMENT NOTE - NSBHCHARTREVIEWVS_PSY_A_CORE FT
Vital Signs Last 24 Hrs  T(C): 37.5 (17 Apr 2018 08:00), Max: 37.5 (17 Apr 2018 08:00)  T(F): 99.5 (17 Apr 2018 08:00), Max: 99.5 (17 Apr 2018 08:00)  HR: 87 (17 Apr 2018 08:00) (87 - 96)  BP: 157/97 (17 Apr 2018 08:00) (142/97 - 170/96)  BP(mean): --  RR: 18 (17 Apr 2018 08:00) (16 - 18)  SpO2: 94% (17 Apr 2018 08:00) (94% - 99%)

## 2018-04-17 NOTE — BEHAVIORAL HEALTH ASSESSMENT NOTE - HPI (INCLUDE ILLNESS QUALITY, SEVERITY, DURATION, TIMING, CONTEXT, MODIFYING FACTORS, ASSOCIATED SIGNS AND SYMPTOMS)
27 year old man, history of chronic depression, 2 prior psychiatric hospitalizations in early 20s at 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  Patient reports chronic intermittent depression for past several years, reports since death of best friend last year in June he has had more depression, with anhedonia, amotivation, stays in house most of the time. He denies guilt worthlessness, reports chronic difficulty falling asleep which is helped by ambien. He reports being given Xanax for anxiety which is helpful, states his problematic relationship with mother is also cause of depression. He states he thinks mother was not giving him medication at the proper schedule leading to an increase in hospitalization since his visiting nurse service was discontinued. He also states mother does not understand what he goes through having  chronic medical illnesses.  He reports intermittent chronic passive suicidal ideation, last had suicidal plan years ago, has never had suicidal intent, states he does not think it is “in” him to attempt suicide. He states Zoloft was started 1 month ago , that it seemed to help him sleep but because mother is not good with medications thinks she started giving it to him in the morning.  He reports stressor of people around him expecting him to die at some point, states that he knows he will not live that long due to his medical problems and accepts this as a reality. He states the circumstances around his friend's death were myserious as she was found in her room unresponsive, that this bothers him as he wants answers, has considered seeing a psychic for answers but feels conflicted because this is against his [employer].  He denies hx of manic symptoms , AH, VH, and paranoia.

## 2018-04-17 NOTE — BEHAVIORAL HEALTH ASSESSMENT NOTE - RISK ASSESSMENT
Chronic risk due to hx of depression and chronic medical problems. Acute risk due to acute medical issues. However patient has no personal hx of suicide attempts, denies suicidal plan or intent, has some social supports, displays help seeking behavior, future oriented. Patient assessed to not be at imminent risk of suicide.

## 2018-04-17 NOTE — PROGRESS NOTE ADULT - ASSESSMENT
27 YOM with ESRD requiring emergent HD for hyperkalemia/uremia with AMS, encephalopathy possibly due to metabolic derangements, HIV with fever, r/o sepsis, acute respiratory failure requiring intubation for airway protection.   Metabolic encephalopathy resolved, probably multifactorial   secondary to uremia, polypharmacy, fever and possible infection. [place]  - patient on oxycodone 10 q6, Oxycontin 10 twice daily, Xanax 2mg three times a day, Zolpidem 10 and Fentanyl 100. Continued on HD M-W-f.  Has H/O Chronic CHFrEF . Appears to be well compensated currently. TTE shows mildly decreased LVSF, EF 45-50%, Grade I Diastolic dysfunction and moderate PAH.  Fever , continued on IV antibiotics for possible gram positive, gram negative pneumonia. Followed by ID.  IV Maxipime/Zithro for RLL infiltrate on CT likely aspiration.  Pain management consulted for optimization of pain medications. . Psych called for addiction issue and Depression.  H/O  HIV ,  retroviral agents resumed as per ID. History of seizure,  continue Keppra , no overt seizure activity on EEG.      Problem/Plan - 1:  ·  Problem: Metabolic encephalopathy.  Plan: Resolved.  Continue HD as per M,W,F schedule.  PT consult       Problem/Plan - 2:  ·  Problem: Sepsis, due to unspecified organism.  Plan: cultures pending   Maxipime/Zithro  RLL infiltrate on CT likely aspiration  ID input appreciated, BC negative, change to oral Levaquin.      Problem/Plan - 3:  ·  Problem: ESRD on hemodialysis.  Plan: renal follow up appreciated  dialysis per schedule.     Problem/Plan - 4:  ·  Problem: Other chronic pain.  Plan: Pain management input appreciated, follow up as outpatient. Psych called for addiction issue and Depression.     Problem/Plan - 5:  ·  Problem: Chronic combined systolic and diastolic HF (heart failure).  Plan: Continue BB, ACEI.      Problem/Plan - 6:  Problem: HIV (human immunodeficiency virus infection). Plan:  retroviral agents as per ID       Problem/Plan - 7:  ·  Problem: Seizure disorder.  Plan: continue Keppra   no overt seizure activity on EEG.      Problem/Plan - 8:  ·  Problem: Acute respiratory failure, unspecified whether with hypoxia or hypercapnia.  Plan: Resolved, now on Room air     Problem/Plan - 9:  ·  Problem: Prophylactic measure.  Plan: VTE ppx - Heparin SQ    Problem/Plan-10:  Problem: Depression. Plan: Appreciate Social Work input. Psych consult    Dispo: Discussed with discharge planning, multiple admissions since discontinuation of visiting nurse in January. To be discussed with [facility] and Home Care Agency to address needs for safe discharge. 27 YOM with ESRD requiring emergent HD for hyperkalemia/uremia with AMS, encephalopathy possibly due to metabolic derangements, HIV with fever, r/o sepsis, acute respiratory failure requiring intubation for airway protection.   Metabolic encephalopathy resolved, probably multifactorial   secondary to uremia, polypharmacy, fever and possible infection. [place]  - patient on oxycodone 10 q6, Oxycontin 10 twice daily, Xanax 2mg three times a day, Zolpidem 10 and Fentanyl 100. Continued on HD M-W-f.  Has H/O Chronic CHFrEF . Appears to be well compensated currently. TTE shows mildly decreased LVSF, EF 45-50%, Grade I Diastolic dysfunction and moderate PAH.  Fever , continued on IV antibiotics for possible gram positive, gram negative pneumonia. Followed by ID.  IV Maxipime/Zithro for RLL infiltrate on CT likely aspiration.  Pain management consulted for optimization of pain medications. . Psych called for addiction issue and Depression.  H/O  HIV ,  retroviral agents resumed as per ID. History of seizure,  continue Keppra , no overt seizure activity on EEG.      Problem/Plan - 1:  ·  Problem: Metabolic encephalopathy.  Plan: Resolved.  Continue HD as per M,W,F schedule.  PT consult       Problem/Plan - 2:  ·  Problem: Sepsis, due to unspecified organism.  Plan: cultures pending   Maxipime/Zithro initially  RLL infiltrate on CT likely aspiration  ID input appreciated, BC negative, changed to oral Levaquin.      Problem/Plan - 3:  ·  Problem: ESRD on hemodialysis.  Plan: renal follow up appreciated  dialysis per schedule.     Problem/Plan - 4:  ·  Problem: Other chronic pain.  Plan: Pain management input appreciated, follow up as outpatient. Psych called for addiction issue and Depression.     Problem/Plan - 5:  ·  Problem: Chronic combined systolic and diastolic HF (heart failure).  Plan: Continue BB, ACEI.      Problem/Plan - 6:  Problem: HIV (human immunodeficiency virus infection). Plan:  retroviral agents as per ID       Problem/Plan - 7:  ·  Problem: Seizure disorder.  Plan: continue Keppra   no overt seizure activity on EEG.      Problem/Plan - 8:  ·  Problem: Acute respiratory failure, unspecified whether with hypoxia or hypercapnia.  Plan: Resolved, now on Room air     Problem/Plan - 9:  ·  Problem: Prophylactic measure.  Plan: VTE ppx - Heparin SQ    Problem/Plan-10:  Problem: Depression. Plan: Appreciate Social Work input. Psych consult    Dispo: Discussed with discharge planning, multiple admissions since discontinuation of visiting nurse in January. To be discussed with [facility] and Home Care Agency to address needs for safe discharge.

## 2018-04-17 NOTE — BEHAVIORAL HEALTH ASSESSMENT NOTE - SUMMARY
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  Patient with chronic depression in context of long history of recurrent medical problems, displays insight into having depression, chronic passive suicidal ideation, worsened in the past year since death of friend and in context of feeling not well understood by mother.   Patient reports starting zoloft  one month ago which he found helpful for sleep.    patient would benefit from starting therapy for management of bereavement and depression  -would continue zoloft 50 mg; change to qhs as patient reports it helps sleep  -continue ambien for now  -refer to outpatient therapy on discharge

## 2018-04-17 NOTE — PROGRESS NOTE ADULT - ASSESSMENT
27 YOM with known h/o ESRD on HD, HIV, blindness, HTN, cardiomyopathy, seizure d/o, pericarditis who was sent prior to HD today for AMS, unresponsiveness.  Pt was unresponsive in ED and was intubated for airway protection. He is reporting pain is well controlled with regimen. Pain down to 5/10 today. He reports pain is better controlled with medication adjustments. Denies side effects

## 2018-04-17 NOTE — BEHAVIORAL HEALTH ASSESSMENT NOTE - DETAILS
na is adopted, bio parents had mental illness, sister with bipolar disorder parents with substance abuse

## 2018-04-18 LAB
ALBUMIN SERPL ELPH-MCNC: 3.2 G/DL — LOW (ref 3.3–5.2)
ALP SERPL-CCNC: 451 U/L — HIGH (ref 40–120)
ALT FLD-CCNC: 17 U/L — SIGNIFICANT CHANGE UP
ANION GAP SERPL CALC-SCNC: 14 MMOL/L — SIGNIFICANT CHANGE UP (ref 5–17)
AST SERPL-CCNC: 21 U/L — SIGNIFICANT CHANGE UP
BILIRUB SERPL-MCNC: 0.3 MG/DL — LOW (ref 0.4–2)
BUN SERPL-MCNC: 46 MG/DL — HIGH (ref 8–20)
CALCIUM SERPL-MCNC: 8.7 MG/DL — SIGNIFICANT CHANGE UP (ref 8.6–10.2)
CHLORIDE SERPL-SCNC: 96 MMOL/L — LOW (ref 98–107)
CO2 SERPL-SCNC: 28 MMOL/L — SIGNIFICANT CHANGE UP (ref 22–29)
CREAT SERPL-MCNC: 5.28 MG/DL — HIGH (ref 0.5–1.3)
GLUCOSE SERPL-MCNC: 113 MG/DL — SIGNIFICANT CHANGE UP (ref 70–115)
HCT VFR BLD CALC: 30.2 % — LOW (ref 42–52)
HGB BLD-MCNC: 9.6 G/DL — LOW (ref 14–18)
MCHC RBC-ENTMCNC: 29.4 PG — SIGNIFICANT CHANGE UP (ref 27–31)
MCHC RBC-ENTMCNC: 31.8 G/DL — LOW (ref 32–36)
MCV RBC AUTO: 92.4 FL — SIGNIFICANT CHANGE UP (ref 80–94)
PLATELET # BLD AUTO: 125 K/UL — LOW (ref 150–400)
POTASSIUM SERPL-MCNC: 4 MMOL/L — SIGNIFICANT CHANGE UP (ref 3.5–5.3)
POTASSIUM SERPL-SCNC: 4 MMOL/L — SIGNIFICANT CHANGE UP (ref 3.5–5.3)
PROT SERPL-MCNC: 6.6 G/DL — SIGNIFICANT CHANGE UP (ref 6.6–8.7)
RBC # BLD: 3.27 M/UL — LOW (ref 4.6–6.2)
RBC # FLD: 16.9 % — HIGH (ref 11–15.6)
SODIUM SERPL-SCNC: 138 MMOL/L — SIGNIFICANT CHANGE UP (ref 135–145)
WBC # BLD: 3.4 K/UL — LOW (ref 4.8–10.8)
WBC # FLD AUTO: 3.4 K/UL — LOW (ref 4.8–10.8)

## 2018-04-18 PROCEDURE — 99239 HOSP IP/OBS DSCHRG MGMT >30: CPT

## 2018-04-18 RX ORDER — RITONAVIR 100 MG/1
1 TABLET, FILM COATED ORAL
Qty: 30 | Refills: 0 | OUTPATIENT
Start: 2018-04-18 | End: 2018-05-17

## 2018-04-18 RX ORDER — ALPRAZOLAM 0.25 MG
1 TABLET ORAL
Qty: 0 | Refills: 0 | COMMUNITY

## 2018-04-18 RX ORDER — EMTRICITABINE 200 MG/1
1 CAPSULE ORAL
Qty: 9 | Refills: 0 | OUTPATIENT
Start: 2018-04-18 | End: 2018-05-17

## 2018-04-18 RX ORDER — OXYCODONE HYDROCHLORIDE 5 MG/1
1 TABLET ORAL
Qty: 0 | Refills: 0 | COMMUNITY

## 2018-04-18 RX ORDER — OXYCODONE HYDROCHLORIDE 5 MG/1
1 TABLET ORAL
Qty: 60 | Refills: 0 | OUTPATIENT
Start: 2018-04-18 | End: 2018-04-22

## 2018-04-18 RX ORDER — FENTANYL CITRATE 50 UG/ML
1 INJECTION INTRAVENOUS
Qty: 0 | Refills: 0 | COMMUNITY

## 2018-04-18 RX ORDER — DIPHENHYDRAMINE HCL 50 MG
25 CAPSULE ORAL ONCE
Qty: 0 | Refills: 0 | Status: COMPLETED | OUTPATIENT
Start: 2018-04-18 | End: 2018-04-18

## 2018-04-18 RX ORDER — GABAPENTIN 400 MG/1
1 CAPSULE ORAL
Qty: 0 | Refills: 0 | COMMUNITY

## 2018-04-18 RX ORDER — LEVETIRACETAM 250 MG/1
2 TABLET, FILM COATED ORAL
Qty: 0 | Refills: 0 | COMMUNITY

## 2018-04-18 RX ORDER — OXYCODONE HYDROCHLORIDE 5 MG/1
1 TABLET ORAL
Qty: 10 | Refills: 0 | OUTPATIENT
Start: 2018-04-18 | End: 2018-04-22

## 2018-04-18 RX ORDER — SERTRALINE 25 MG/1
50 TABLET, FILM COATED ORAL AT BEDTIME
Qty: 0 | Refills: 0 | Status: DISCONTINUED | OUTPATIENT
Start: 2018-04-18 | End: 2018-04-19

## 2018-04-18 RX ORDER — AMLODIPINE BESYLATE 2.5 MG/1
1 TABLET ORAL
Qty: 30 | Refills: 0 | OUTPATIENT
Start: 2018-04-18 | End: 2018-05-17

## 2018-04-18 RX ORDER — DOLUTEGRAVIR SODIUM 25 MG/1
1 TABLET, FILM COATED ORAL
Qty: 30 | Refills: 0 | OUTPATIENT
Start: 2018-04-18 | End: 2018-05-17

## 2018-04-18 RX ORDER — ATOVAQUONE 750 MG/5ML
10 SUSPENSION ORAL
Qty: 300 | Refills: 0 | OUTPATIENT
Start: 2018-04-18 | End: 2018-05-17

## 2018-04-18 RX ORDER — PANTOPRAZOLE SODIUM 20 MG/1
1 TABLET, DELAYED RELEASE ORAL
Qty: 30 | Refills: 0 | OUTPATIENT
Start: 2018-04-18 | End: 2018-05-17

## 2018-04-18 RX ORDER — GABAPENTIN 400 MG/1
1 CAPSULE ORAL
Qty: 30 | Refills: 0 | OUTPATIENT
Start: 2018-04-18 | End: 2018-05-17

## 2018-04-18 RX ORDER — TENOFOVIR DISOPROXIL FUMARATE 300 MG/1
1 TABLET, FILM COATED ORAL
Qty: 4 | Refills: 0 | OUTPATIENT
Start: 2018-04-18 | End: 2018-05-17

## 2018-04-18 RX ORDER — ZOLPIDEM TARTRATE 10 MG/1
1 TABLET ORAL
Qty: 0 | Refills: 0 | COMMUNITY

## 2018-04-18 RX ORDER — DARUNAVIR 75 MG/1
1 TABLET, FILM COATED ORAL
Qty: 30 | Refills: 0 | OUTPATIENT
Start: 2018-04-18 | End: 2018-05-17

## 2018-04-18 RX ORDER — CALCITRIOL 0.5 UG/1
1 CAPSULE ORAL
Qty: 30 | Refills: 0 | OUTPATIENT
Start: 2018-04-18 | End: 2018-05-17

## 2018-04-18 RX ORDER — DARBEPOETIN ALFA IN POLYSORBAT 200MCG/0.4
100 PEN INJECTOR (ML) SUBCUTANEOUS
Qty: 0 | Refills: 0 | COMMUNITY
Start: 2018-04-18

## 2018-04-18 RX ADMIN — Medication 50 MILLIGRAM(S): at 17:59

## 2018-04-18 RX ADMIN — HYDROMORPHONE HYDROCHLORIDE 0.5 MILLIGRAM(S): 2 INJECTION INTRAMUSCULAR; INTRAVENOUS; SUBCUTANEOUS at 14:40

## 2018-04-18 RX ADMIN — Medication 50 MILLIGRAM(S): at 05:58

## 2018-04-18 RX ADMIN — DOLUTEGRAVIR SODIUM 50 MILLIGRAM(S): 25 TABLET, FILM COATED ORAL at 12:09

## 2018-04-18 RX ADMIN — OXYCODONE HYDROCHLORIDE 10 MILLIGRAM(S): 5 TABLET ORAL at 12:10

## 2018-04-18 RX ADMIN — OXYCODONE HYDROCHLORIDE 10 MILLIGRAM(S): 5 TABLET ORAL at 05:58

## 2018-04-18 RX ADMIN — HEPARIN SODIUM 5000 UNIT(S): 5000 INJECTION INTRAVENOUS; SUBCUTANEOUS at 17:59

## 2018-04-18 RX ADMIN — ATOVAQUONE 1500 MILLIGRAM(S): 750 SUSPENSION ORAL at 12:09

## 2018-04-18 RX ADMIN — OXYCODONE HYDROCHLORIDE 10 MILLIGRAM(S): 5 TABLET ORAL at 18:31

## 2018-04-18 RX ADMIN — AMLODIPINE BESYLATE 10 MILLIGRAM(S): 2.5 TABLET ORAL at 05:57

## 2018-04-18 RX ADMIN — HYDROMORPHONE HYDROCHLORIDE 0.5 MILLIGRAM(S): 2 INJECTION INTRAMUSCULAR; INTRAVENOUS; SUBCUTANEOUS at 15:30

## 2018-04-18 RX ADMIN — PANTOPRAZOLE SODIUM 40 MILLIGRAM(S): 20 TABLET, DELAYED RELEASE ORAL at 05:57

## 2018-04-18 RX ADMIN — GABAPENTIN 100 MILLIGRAM(S): 400 CAPSULE ORAL at 21:50

## 2018-04-18 RX ADMIN — OXYCODONE HYDROCHLORIDE 10 MILLIGRAM(S): 5 TABLET ORAL at 05:57

## 2018-04-18 RX ADMIN — HYDROMORPHONE HYDROCHLORIDE 0.5 MILLIGRAM(S): 2 INJECTION INTRAMUSCULAR; INTRAVENOUS; SUBCUTANEOUS at 21:12

## 2018-04-18 RX ADMIN — SERTRALINE 50 MILLIGRAM(S): 25 TABLET, FILM COATED ORAL at 21:50

## 2018-04-18 RX ADMIN — SEVELAMER CARBONATE 800 MILLIGRAM(S): 2400 POWDER, FOR SUSPENSION ORAL at 07:50

## 2018-04-18 RX ADMIN — LEVETIRACETAM 500 MILLIGRAM(S): 250 TABLET, FILM COATED ORAL at 05:57

## 2018-04-18 RX ADMIN — HYDROMORPHONE HYDROCHLORIDE 0.5 MILLIGRAM(S): 2 INJECTION INTRAMUSCULAR; INTRAVENOUS; SUBCUTANEOUS at 09:34

## 2018-04-18 RX ADMIN — LEVETIRACETAM 500 MILLIGRAM(S): 250 TABLET, FILM COATED ORAL at 17:59

## 2018-04-18 RX ADMIN — Medication 25 MILLIGRAM(S): at 15:30

## 2018-04-18 RX ADMIN — QUETIAPINE FUMARATE 50 MILLIGRAM(S): 200 TABLET, FILM COATED ORAL at 21:50

## 2018-04-18 RX ADMIN — HEPARIN SODIUM 5000 UNIT(S): 5000 INJECTION INTRAVENOUS; SUBCUTANEOUS at 05:58

## 2018-04-18 RX ADMIN — Medication 1 MILLIGRAM(S): at 13:50

## 2018-04-18 RX ADMIN — Medication 25 MILLIGRAM(S): at 16:37

## 2018-04-18 RX ADMIN — OXYCODONE HYDROCHLORIDE 10 MILLIGRAM(S): 5 TABLET ORAL at 00:00

## 2018-04-18 RX ADMIN — CALCITRIOL 0.5 MICROGRAM(S): 0.5 CAPSULE ORAL at 12:09

## 2018-04-18 RX ADMIN — RITONAVIR 100 MILLIGRAM(S): 100 TABLET, FILM COATED ORAL at 12:09

## 2018-04-18 RX ADMIN — OXYCODONE HYDROCHLORIDE 10 MILLIGRAM(S): 5 TABLET ORAL at 17:59

## 2018-04-18 RX ADMIN — LISINOPRIL 20 MILLIGRAM(S): 2.5 TABLET ORAL at 05:57

## 2018-04-18 RX ADMIN — Medication 1 MILLIGRAM(S): at 21:50

## 2018-04-18 RX ADMIN — HYDROMORPHONE HYDROCHLORIDE 0.5 MILLIGRAM(S): 2 INJECTION INTRAMUSCULAR; INTRAVENOUS; SUBCUTANEOUS at 20:38

## 2018-04-18 RX ADMIN — DARUNAVIR 800 MILLIGRAM(S): 75 TABLET, FILM COATED ORAL at 12:09

## 2018-04-18 RX ADMIN — Medication 1 MILLIGRAM(S): at 05:57

## 2018-04-18 RX ADMIN — HYDROMORPHONE HYDROCHLORIDE 0.5 MILLIGRAM(S): 2 INJECTION INTRAMUSCULAR; INTRAVENOUS; SUBCUTANEOUS at 10:00

## 2018-04-18 NOTE — PROGRESS NOTE ADULT - ASSESSMENT
27 YOM with ESRD requiring emergent HD for hyperkalemia/uremia with AMS, encephalopathy possibly due to metabolic derangements, HIV with fever, r/o sepsis, acute respiratory failure requiring intubation for airway protection.   Metabolic encephalopathy resolved, probably multifactorial   secondary to uremia, polypharmacy, fever and possible infection. [place]  - patient on oxycodone 10 q6, Oxycontin 10 twice daily, Xanax 2mg three times a day, Zolpidem 10 and Fentanyl 100. Continued on HD M-W-f.  Has H/O Chronic CHFrEF . Appears to be well compensated currently. TTE shows mildly decreased LVSF, EF 45-50%, Grade I Diastolic dysfunction and moderate PAH.  Fever , continued on IV antibiotics for possible gram positive, gram negative pneumonia. Followed by ID.  IV Maxipime/Zithro for RLL infiltrate on CT likely aspiration.  Pain management consulted for optimization of pain medications. . Psych called for addiction issue and Depression.  H/O  HIV ,  retroviral agents resumed as per ID. History of seizure,  continue Keppra , no overt seizure activity on EEG.      Problem/Plan - 1:  ·  Problem: Metabolic encephalopathy.  Plan: Resolved.  Continue HD as per M,W,F schedule.  PT consulted, OK to return home       Problem/Plan - 2:  ·  Problem: Sepsis, due to unspecified organism.  Plan: BC negative  Maxipime/Zithro initially  RLL infiltrate on CT likely aspiration  ID input appreciated, BC negative, changed to oral Levaquin.      Problem/Plan - 3:  ·  Problem: ESRD on hemodialysis.  Plan: renal follow up appreciated  dialysis per schedule.     Problem/Plan - 4:  ·  Problem: Other chronic pain.  Plan: Pain management input appreciated, follow up as outpatient. Psych input appreciated. No Psychiatric contraindications to discharge.      Problem/Plan - 5:  ·  Problem: Chronic combined systolic and diastolic HF (heart failure).  Plan: Continue BB, ACEI.      Problem/Plan - 6:  Problem: HIV (human immunodeficiency virus infection). Plan:  retroviral agents as per ID       Problem/Plan - 7:  ·  Problem: Seizure disorder.  Plan: continue Keppra   no overt seizure activity on EEG.      Problem/Plan - 8:  ·  Problem: Acute respiratory failure, unspecified whether with hypoxia or hypercapnia.  Plan: Resolved, now on Room air     Problem/Plan - 9:  ·  Problem: Prophylactic measure.  Plan: VTE ppx - Heparin SQ    Problem/Plan-10:  Problem: Depression. Plan: Appreciate Social Work input. Psych consult    Dispo: Home with Home Care today

## 2018-04-18 NOTE — PROGRESS NOTE ADULT - ASSESSMENT
ESRD  AMS - He has a history of drug abuse and OD - resolved  Acute respiratory failure - extubated  PNA  Hyperkalemia - resolved  HTN  HIV  DM  Anemia of CKD  hyperparathyroidism, future for parathyroidectomy    - HD MWF; S/P HD on 4/16/18 with UF of 3kg; 4/18's dialysis in progress, romve 3 kilo. check BMP and change bath according to K resulted.  - Abx per ID  - Renal dialysis diet    DC planning    D/w RN

## 2018-04-18 NOTE — PROGRESS NOTE ADULT - ATTENDING COMMENTS
Patient seen, examined at bedside earlier today for ESRD, encephalopathy and chronic pain.  Comfortable, requesting IV Dilaudid; reassured that restarted on home regimen, with lower doses for BDZ/Ambien.  Then patient wanted to sign out AMA - notified plan for discharge after HD.  Then patient stated he hasn't seen his Nephrologist again today    Again recouped hospital admission and course.   Encephalopathy multifactorial, likely polypharmacy, uremia, fever  Pneumonia, pending culture, switch to PO Levaquin complete 7 day course  Decreased dose Fentanyl, Xanax, Ambien - resumed home Oxycontin, Oxycodone  Pain management consult called.    Likely discharge home after HD with close outpatient follow up
Patient seen, examined at bedside, mother present - no complaints.  Discussed with ALFREDO Clement, MIGUEL Bell and CCC.  Discharge home after HD today
Patient seen and examined at bedside earlier for ESRD, pneumonia, chronic Pain.  Nephrology, ID, CCC, Pain management and Psych consult appreciated.    Pneumonia - resolving   Metabolic Encephalopathy - resolved  Chronic pain - controlled on current regimen  ESRD - on HD  HIV - on medications    Medically stable for discharge - CCC/SW for safe discharge.
Will follow up.
Will sign off please call with any questions
Pt clinically stable to transfer out of ICU at this time.
If remains stable likely discharge home after HD tomorrow.

## 2018-04-19 VITALS — SYSTOLIC BLOOD PRESSURE: 149 MMHG | DIASTOLIC BLOOD PRESSURE: 93 MMHG | HEART RATE: 99 BPM

## 2018-04-19 LAB
CULTURE RESULTS: SIGNIFICANT CHANGE UP
CULTURE RESULTS: SIGNIFICANT CHANGE UP
HBV SURFACE AG SER-ACNC: SIGNIFICANT CHANGE UP
HCV AB S/CO SERPL IA: 0.09 S/CO — SIGNIFICANT CHANGE UP
HCV AB SERPL-IMP: SIGNIFICANT CHANGE UP
SPECIMEN SOURCE: SIGNIFICANT CHANGE UP
SPECIMEN SOURCE: SIGNIFICANT CHANGE UP

## 2018-04-19 PROCEDURE — 99239 HOSP IP/OBS DSCHRG MGMT >30: CPT

## 2018-04-19 PROCEDURE — 71250 CT THORAX DX C-: CPT

## 2018-04-19 PROCEDURE — 82803 BLOOD GASES ANY COMBINATION: CPT

## 2018-04-19 PROCEDURE — 80048 BASIC METABOLIC PNL TOTAL CA: CPT

## 2018-04-19 PROCEDURE — 85730 THROMBOPLASTIN TIME PARTIAL: CPT

## 2018-04-19 PROCEDURE — 36000 PLACE NEEDLE IN VEIN: CPT | Mod: LT,XU

## 2018-04-19 PROCEDURE — 82330 ASSAY OF CALCIUM: CPT

## 2018-04-19 PROCEDURE — 82550 ASSAY OF CK (CPK): CPT

## 2018-04-19 PROCEDURE — 84295 ASSAY OF SERUM SODIUM: CPT

## 2018-04-19 PROCEDURE — 82553 CREATINE MB FRACTION: CPT

## 2018-04-19 PROCEDURE — 71045 X-RAY EXAM CHEST 1 VIEW: CPT

## 2018-04-19 PROCEDURE — 83605 ASSAY OF LACTIC ACID: CPT

## 2018-04-19 PROCEDURE — 97163 PT EVAL HIGH COMPLEX 45 MIN: CPT

## 2018-04-19 PROCEDURE — 93306 TTE W/DOPPLER COMPLETE: CPT

## 2018-04-19 PROCEDURE — 83880 ASSAY OF NATRIURETIC PEPTIDE: CPT

## 2018-04-19 PROCEDURE — 43753 TX GASTRO INTUB W/ASP: CPT

## 2018-04-19 PROCEDURE — 85610 PROTHROMBIN TIME: CPT

## 2018-04-19 PROCEDURE — 99261: CPT

## 2018-04-19 PROCEDURE — 99233 SBSQ HOSP IP/OBS HIGH 50: CPT

## 2018-04-19 PROCEDURE — 31500 INSERT EMERGENCY AIRWAY: CPT

## 2018-04-19 PROCEDURE — 82962 GLUCOSE BLOOD TEST: CPT

## 2018-04-19 PROCEDURE — 74176 CT ABD & PELVIS W/O CONTRAST: CPT

## 2018-04-19 PROCEDURE — 85014 HEMATOCRIT: CPT

## 2018-04-19 PROCEDURE — 84484 ASSAY OF TROPONIN QUANT: CPT

## 2018-04-19 PROCEDURE — 93005 ELECTROCARDIOGRAM TRACING: CPT

## 2018-04-19 PROCEDURE — 94002 VENT MGMT INPAT INIT DAY: CPT

## 2018-04-19 PROCEDURE — 87340 HEPATITIS B SURFACE AG IA: CPT

## 2018-04-19 PROCEDURE — T1013: CPT

## 2018-04-19 PROCEDURE — 70450 CT HEAD/BRAIN W/O DYE: CPT

## 2018-04-19 PROCEDURE — 36600 WITHDRAWAL OF ARTERIAL BLOOD: CPT

## 2018-04-19 PROCEDURE — 82435 ASSAY OF BLOOD CHLORIDE: CPT

## 2018-04-19 PROCEDURE — 86803 HEPATITIS C AB TEST: CPT

## 2018-04-19 PROCEDURE — 94003 VENT MGMT INPAT SUBQ DAY: CPT

## 2018-04-19 PROCEDURE — 85027 COMPLETE CBC AUTOMATED: CPT

## 2018-04-19 PROCEDURE — 99232 SBSQ HOSP IP/OBS MODERATE 35: CPT

## 2018-04-19 PROCEDURE — 95819 EEG AWAKE AND ASLEEP: CPT

## 2018-04-19 PROCEDURE — 84132 ASSAY OF SERUM POTASSIUM: CPT

## 2018-04-19 PROCEDURE — 82947 ASSAY GLUCOSE BLOOD QUANT: CPT

## 2018-04-19 PROCEDURE — 87040 BLOOD CULTURE FOR BACTERIA: CPT

## 2018-04-19 PROCEDURE — 80053 COMPREHEN METABOLIC PANEL: CPT

## 2018-04-19 PROCEDURE — 99291 CRITICAL CARE FIRST HOUR: CPT | Mod: 25

## 2018-04-19 PROCEDURE — 36415 COLL VENOUS BLD VENIPUNCTURE: CPT

## 2018-04-19 RX ADMIN — DARUNAVIR 800 MILLIGRAM(S): 75 TABLET, FILM COATED ORAL at 13:01

## 2018-04-19 RX ADMIN — ZOLPIDEM TARTRATE 5 MILLIGRAM(S): 10 TABLET ORAL at 01:29

## 2018-04-19 RX ADMIN — HYDROMORPHONE HYDROCHLORIDE 0.5 MILLIGRAM(S): 2 INJECTION INTRAMUSCULAR; INTRAVENOUS; SUBCUTANEOUS at 01:28

## 2018-04-19 RX ADMIN — LEVETIRACETAM 500 MILLIGRAM(S): 250 TABLET, FILM COATED ORAL at 17:49

## 2018-04-19 RX ADMIN — HYDROMORPHONE HYDROCHLORIDE 0.5 MILLIGRAM(S): 2 INJECTION INTRAMUSCULAR; INTRAVENOUS; SUBCUTANEOUS at 11:45

## 2018-04-19 RX ADMIN — OXYCODONE HYDROCHLORIDE 10 MILLIGRAM(S): 5 TABLET ORAL at 05:45

## 2018-04-19 RX ADMIN — DOLUTEGRAVIR SODIUM 50 MILLIGRAM(S): 25 TABLET, FILM COATED ORAL at 13:00

## 2018-04-19 RX ADMIN — ATOVAQUONE 1500 MILLIGRAM(S): 750 SUSPENSION ORAL at 13:01

## 2018-04-19 RX ADMIN — HYDROMORPHONE HYDROCHLORIDE 0.5 MILLIGRAM(S): 2 INJECTION INTRAMUSCULAR; INTRAVENOUS; SUBCUTANEOUS at 02:13

## 2018-04-19 RX ADMIN — RITONAVIR 100 MILLIGRAM(S): 100 TABLET, FILM COATED ORAL at 13:00

## 2018-04-19 RX ADMIN — Medication 1 MILLIGRAM(S): at 13:47

## 2018-04-19 RX ADMIN — Medication 1 MILLIGRAM(S): at 05:58

## 2018-04-19 RX ADMIN — HEPARIN SODIUM 5000 UNIT(S): 5000 INJECTION INTRAVENOUS; SUBCUTANEOUS at 05:44

## 2018-04-19 RX ADMIN — Medication 50 MILLIGRAM(S): at 17:49

## 2018-04-19 RX ADMIN — HYDROMORPHONE HYDROCHLORIDE 0.5 MILLIGRAM(S): 2 INJECTION INTRAMUSCULAR; INTRAVENOUS; SUBCUTANEOUS at 11:15

## 2018-04-19 RX ADMIN — OXYCODONE HYDROCHLORIDE 10 MILLIGRAM(S): 5 TABLET ORAL at 06:45

## 2018-04-19 RX ADMIN — LISINOPRIL 20 MILLIGRAM(S): 2.5 TABLET ORAL at 05:45

## 2018-04-19 RX ADMIN — AMLODIPINE BESYLATE 10 MILLIGRAM(S): 2.5 TABLET ORAL at 05:44

## 2018-04-19 RX ADMIN — PANTOPRAZOLE SODIUM 40 MILLIGRAM(S): 20 TABLET, DELAYED RELEASE ORAL at 05:45

## 2018-04-19 RX ADMIN — CALCITRIOL 0.5 MICROGRAM(S): 0.5 CAPSULE ORAL at 13:00

## 2018-04-19 RX ADMIN — SEVELAMER CARBONATE 800 MILLIGRAM(S): 2400 POWDER, FOR SUSPENSION ORAL at 05:44

## 2018-04-19 RX ADMIN — SEVELAMER CARBONATE 800 MILLIGRAM(S): 2400 POWDER, FOR SUSPENSION ORAL at 13:00

## 2018-04-19 RX ADMIN — Medication 50 MILLIGRAM(S): at 05:44

## 2018-04-19 RX ADMIN — LEVETIRACETAM 500 MILLIGRAM(S): 250 TABLET, FILM COATED ORAL at 05:44

## 2018-04-19 NOTE — PROGRESS NOTE BEHAVIORAL HEALTH - NSBHFUPINTERVALHXFT_PSY_A_CORE
28yo with PMHx of depression, HIV, ESRD on HD, blind, seizure disorder, cardiomyopathy is consulted for psychiatric follow up for self harm comment. As per  from the patient's insurance company, pt was asking why he was not eligible for skilled nursing. The  stated the qualifications, including that he would need wound care and the patient responded with, "What do I have to do, cut my thigh," which raised a red flag for the , ordering a 1:1 for the patient. When interviewing the patient, he confirmed that he did make that comment, but he stated multiple times that he said the phrase out of anger, to someone he could trust, and he would never harm himself. When asked what would happen if he did not receive a home health aide, he stated he would go home and have assistance from his mother which he has been doing since he lost home service but states that he feels that his mother does things her way. Pt denies feeling suicidal, currently and in the past. He shared that the depression that he feels is related to the loss of his friend and states that his health has never cause him depression.

## 2018-04-19 NOTE — PROGRESS NOTE ADULT - PROBLEM SELECTOR PROBLEM 1
Metabolic encephalopathy
Other chronic pain
Other chronic pain
Pneumonia, bacterial
Pneumonia, bacterial

## 2018-04-19 NOTE — PROGRESS NOTE ADULT - ASSESSMENT
Pt is a 27 YOM with known h/o ESRD on HD, HIV, blindness, HTN, cardiomyopathy, seizure d/o, pericarditis. Patient has hx of chronic pain syndrome managed with high dose opiates in the past. Pain currently controlled however patient continues to request IV dilaudid.

## 2018-04-19 NOTE — PROGRESS NOTE ADULT - SUBJECTIVE AND OBJECTIVE BOX
CC: AMS    INTERVAL HPI/OVERNIGHT EVENTS: Patient seen and examined. Extensive discussion with Social Work, Discharge Planner and Mother regarding medication administration and compliance.  stated that patient's nurse will provide med management education to mother, patient's mother in agreement with education. Home Care to be arranged prior to discharge, was not arranged yesterday. Patient still asking for Dilaudid.     Vital Signs Last 24 Hrs  T(C): 36.6 (19 Apr 2018 08:00), Max: 36.8 (18 Apr 2018 14:16)  T(F): 97.9 (19 Apr 2018 08:00), Max: 98.2 (18 Apr 2018 14:16)  HR: 84 (19 Apr 2018 08:00) (84 - 98)  BP: 125/79 (19 Apr 2018 08:00) (121/72 - 152/90)  BP(mean): --  RR: 18 (19 Apr 2018 08:00) (18 - 18)  SpO2: 97% (19 Apr 2018 08:00) (97% - 100%)    PHYSICAL EXAM:    General: Well developed; well nourished; in no acute distress  Respiratory: No wheezes, rales or rhonchi  Cardiovascular: Regular rate and rhythm. S1 and S2 Normal; No murmurs, gallops or rubs  Gastrointestinal: Soft non-tender non-distended; Normal bowel sounds; No hepatosplenomegaly  Extremities: RUE +AVF  Vascular: Peripheral pulses palpable 2+ bilaterally  Neurological: Alert and oriented x4  Skin: Warm and dry. No acute rash  Psychiatric: Depressed mood      I&O's Detail    18 Apr 2018 07:01  -  19 Apr 2018 07:00  --------------------------------------------------------  IN:    Other: 800 mL  Total IN: 800 mL    OUT:    Other: 3000 mL  Total OUT: 3000 mL    Total NET: -2200 mL                                    9.6    3.4   )-----------( 125      ( 18 Apr 2018 21:19 )             30.2     18 Apr 2018 21:19    138    |  96     |  46.0   ----------------------------<  113    4.0     |  28.0   |  5.28     Ca    8.7        18 Apr 2018 21:19    TPro  6.6    /  Alb  3.2    /  TBili  0.3    /  DBili  x      /  AST  21     /  ALT  17     /  AlkPhos  451    18 Apr 2018 21:19      CAPILLARY BLOOD GLUCOSE        LIVER FUNCTIONS - ( 18 Apr 2018 21:19 )  Alb: 3.2 g/dL / Pro: 6.6 g/dL / ALK PHOS: 451 U/L / ALT: 17 U/L / AST: 21 U/L / GGT: x               MEDICATIONS  (STANDING):  ALPRAZolam 1 milliGRAM(s) Oral three times a day  amLODIPine   Tablet 10 milliGRAM(s) Oral daily  atovaquone Suspension 1500 milliGRAM(s) Oral daily  calcitriol   Capsule 0.5 MICROGram(s) Oral daily  darbepoetin Injectable Syringe 100 MICROGram(s) IV Push every 7 days  darunavir 800 milliGRAM(s) Oral daily  dolutegravir 50 milliGRAM(s) Oral daily  emtricitabine 200 milliGRAM(s) Oral <User Schedule>  gabapentin 100 milliGRAM(s) Oral at bedtime  heparin  Injectable 5000 Unit(s) SubCutaneous every 12 hours  levETIRAcetam 500 milliGRAM(s) Oral two times a day  lisinopril 20 milliGRAM(s) Oral daily  metoprolol tartrate 50 milliGRAM(s) Oral two times a day  oxyCODONE  ER Tablet 10 milliGRAM(s) Oral every 12 hours  pantoprazole    Tablet 40 milliGRAM(s) Oral before breakfast  QUEtiapine 50 milliGRAM(s) Oral at bedtime  ritonavir Tablet 100 milliGRAM(s) Oral daily  sertraline 50 milliGRAM(s) Oral at bedtime  sevelamer hydrochloride 800 milliGRAM(s) Oral three times a day  tenofovir 300 milliGRAM(s) Oral <User Schedule>    MEDICATIONS  (PRN):  HYDROmorphone  Injectable 0.5 milliGRAM(s) SubCutaneous every 4 hours PRN Severe Pain (7 - 10)  oxyCODONE    IR 5 milliGRAM(s) Oral every 4 hours PRN Mild Pain (1 - 3)  oxyCODONE    IR 10 milliGRAM(s) Oral every 4 hours PRN Moderate Pain (4 - 6)  oxyCODONE    IR 15 milliGRAM(s) Oral every 4 hours PRN Severe Pain (7 - 10)  zolpidem 5 milliGRAM(s) Oral at bedtime PRN Insomnia      RADIOLOGY & ADDITIONAL TESTS:
TEDDY CRAWFORD is a 27y Male with HPI:  Pt is a 27 YOM with known h/o ESRD on HD, HIV, blindness, HTN, cardiomyopathy, seizure d/o, pericarditis who was sent prior to HD today for AMS, unresponsiveness.  Pt was unresponsive in ED and was intubated for airway protection.  Pt has known h/o drug use and has a fentanyl Patch in place which was not removed in ED, pt did not receive Narcan due to concerns of h/o chronic pain.  Pt was found to be hyperkalemic, uremic.  Head CT initially is negative for acute findings. Pt was admitted to ICU for emergent hemodialysis and management of acute respiratory failure and encephalopathy, change in MS.   PT AWAKE AND ALERT TODAY  UNCLEAR IF PNEUMONIA      Allergies:  vancomycin (Anaphylaxis)      Medications:  ALPRAZolam 1 milliGRAM(s) Oral three times a day  amLODIPine   Tablet 10 milliGRAM(s) Oral daily  azithromycin   Tablet 500 milliGRAM(s) Oral daily  calcitriol   Capsule 0.5 MICROGram(s) Oral daily  cefepime Injectable. 500 milliGRAM(s) IV Push daily  cefepime Injectable.      darunavir 800 milliGRAM(s) Oral daily  dolutegravir 50 milliGRAM(s) Oral daily  fentaNYL   Patch  75 MICROgram(s)/Hr 1 Patch Transdermal every 72 hours  gabapentin 100 milliGRAM(s) Oral at bedtime  HYDROmorphone  Injectable 1 milliGRAM(s) IV Push every 6 hours PRN  levETIRAcetam  IVPB 500 milliGRAM(s) IV Intermittent every 12 hours  lisinopril 20 milliGRAM(s) Oral daily  metoprolol tartrate 50 milliGRAM(s) Oral two times a day  oxyCODONE    IR 10 milliGRAM(s) Oral every 6 hours PRN  oxyCODONE  ER Tablet 10 milliGRAM(s) Oral every 12 hours  pantoprazole    Tablet 40 milliGRAM(s) Oral before breakfast  QUEtiapine 50 milliGRAM(s) Oral at bedtime  sertraline 50 milliGRAM(s) Oral daily  sevelamer hydrochloride 800 milliGRAM(s) Oral three times a day  zolpidem 5 milliGRAM(s) Oral at bedtime PRN      ANTIBIOTICS:         Review of Systems: - Negative except as mentioned above     Physical Exam:  ICU Vital Signs Last 24 Hrs  T(C): 36.6 (15 Apr 2018 08:27), Max: 37.2 (14 Apr 2018 23:31)  T(F): 97.8 (15 Apr 2018 08:27), Max: 99 (14 Apr 2018 23:31)  HR: 88 (15 Apr 2018 08:27) (84 - 98)  BP: 156/97 (15 Apr 2018 08:27) (135/79 - 183/110)  BP(mean): 120 (14 Apr 2018 20:00) (120 - 140)  ABP: --  ABP(mean): --  RR: 18 (15 Apr 2018 08:27) (18 - 59)  SpO2: 98% (15 Apr 2018 08:27) (93% - 100%)    GEN: NAD, pleasant  HEENT: normocephalic and atraumatic. EOMI. NATALIE...  NECK: Supple. No carotid bruits.  No lymphadenopathy or thyromegaly.  LUNGS: Clear to auscultation.  HEART: Regular rate and rhythm without murmur.  ABDOMEN: Soft, nontender, and nondistended.  Positive bowel sounds.  No hepatosplenomegaly was noted.  NO REBOUND NO GUARDING  EXTREMITIES: Without any cyanosis, clubbing, rash, lesions or edema.  NEUROLOGIC: Cranial nerves II through XII are grossly intact.    SKIN: No ulceration or induration present.      Labs:
CC: AMS     INTERVAL HPI/OVERNIGHT EVENTS: No acute events overnight. Expresses concern over his mom administering his medications at home. States he was having home RN coming weekly up until January who would set up his medications. Now his mother administers his medications and he has been having frequent hospitalizations ever since. Denies chest pain, SOB, dizziness, lightheadedness, nausea, vomiting, fever, chills. C/O back and Right Upper arm pain at fistula site, however improved today, not asking for IV dilaudid.     Vital Signs Last 24 Hrs  T(C): 37.5 (17 Apr 2018 08:00), Max: 37.5 (17 Apr 2018 08:00)  T(F): 99.5 (17 Apr 2018 08:00), Max: 99.5 (17 Apr 2018 08:00)  HR: 87 (17 Apr 2018 08:00) (87 - 96)  BP: 157/97 (17 Apr 2018 08:00) (142/97 - 170/96)  BP(mean): --  RR: 18 (17 Apr 2018 08:00) (16 - 18)  SpO2: 94% (17 Apr 2018 08:00) (94% - 99%)    PHYSICAL EXAM:    General: Well developed; well nourished; in no acute distress  Respiratory: No wheezes, rales or rhonchi  Cardiovascular: Regular rate and rhythm. S1 and S2 Normal; No murmurs, gallops or rubs  Gastrointestinal: Soft non-tender non-distended; Normal bowel sounds; No hepatosplenomegaly  Extremities: RUE +AVF  Vascular: Peripheral pulses palpable 2+ bilaterally  Neurological: Alert and oriented x4  Skin: Warm and dry. No acute rash  Psychiatric: Depressed mood      I&O's Detail    16 Apr 2018 07:01  -  17 Apr 2018 07:00  --------------------------------------------------------  IN:  Total IN: 0 mL    OUT:    Other: 3000 mL  Total OUT: 3000 mL    Total NET: -3000 mL                                    9.7    4.2   )-----------( 141      ( 16 Apr 2018 14:03 )             30.5     16 Apr 2018 14:03    138    |  94     |  60.0   ----------------------------<  113    3.8     |  26.0   |  8.67     Ca    8.6        16 Apr 2018 14:03    TPro  6.4    /  Alb  3.1    /  TBili  0.4    /  DBili  x      /  AST  25     /  ALT  22     /  AlkPhos  484    16 Apr 2018 14:03      CAPILLARY BLOOD GLUCOSE        LIVER FUNCTIONS - ( 16 Apr 2018 14:03 )  Alb: 3.1 g/dL / Pro: 6.4 g/dL / ALK PHOS: 484 U/L / ALT: 22 U/L / AST: 25 U/L / GGT: x               MEDICATIONS  (STANDING):  ALPRAZolam 1 milliGRAM(s) Oral three times a day  amLODIPine   Tablet 10 milliGRAM(s) Oral daily  atovaquone Suspension 1500 milliGRAM(s) Oral daily  calcitriol   Capsule 0.5 MICROGram(s) Oral daily  darbepoetin Injectable Syringe 100 MICROGram(s) IV Push every 7 days  darunavir 800 milliGRAM(s) Oral daily  dolutegravir 50 milliGRAM(s) Oral daily  emtricitabine 200 milliGRAM(s) Oral <User Schedule>  gabapentin 100 milliGRAM(s) Oral at bedtime  heparin  Injectable 5000 Unit(s) SubCutaneous every 12 hours  levETIRAcetam 500 milliGRAM(s) Oral two times a day  levoFLOXacin  Tablet 250 milliGRAM(s) Oral every 48 hours  lisinopril 20 milliGRAM(s) Oral daily  metoprolol tartrate 50 milliGRAM(s) Oral two times a day  oxyCODONE  ER Tablet 10 milliGRAM(s) Oral every 12 hours  pantoprazole    Tablet 40 milliGRAM(s) Oral before breakfast  QUEtiapine 50 milliGRAM(s) Oral at bedtime  ritonavir Tablet 100 milliGRAM(s) Oral daily  sertraline 50 milliGRAM(s) Oral daily  sevelamer hydrochloride 800 milliGRAM(s) Oral three times a day  tenofovir 300 milliGRAM(s) Oral <User Schedule>    MEDICATIONS  (PRN):  HYDROmorphone  Injectable 0.5 milliGRAM(s) SubCutaneous every 4 hours PRN Severe Pain (7 - 10)  oxyCODONE    IR 5 milliGRAM(s) Oral every 4 hours PRN Mild Pain (1 - 3)  oxyCODONE    IR 10 milliGRAM(s) Oral every 4 hours PRN Moderate Pain (4 - 6)  oxyCODONE    IR 15 milliGRAM(s) Oral every 4 hours PRN Severe Pain (7 - 10)  zolpidem 5 milliGRAM(s) Oral at bedtime PRN Insomnia      RADIOLOGY & ADDITIONAL TESTS:
CC: AMS     INTERVAL HPI/OVERNIGHT EVENTS: Patient seen and examined. No acute events overnight. Discharge planner and Social Work met with patient and Mother to review medication understanding and administration. Patient expresses concern  with recent loss of friend and personal health issues.     Vital Signs Last 24 Hrs  T(C): 37.3 (18 Apr 2018 07:40), Max: 37.3 (18 Apr 2018 07:40)  T(F): 99.1 (18 Apr 2018 07:40), Max: 99.1 (18 Apr 2018 07:40)  HR: 101 (18 Apr 2018 07:40) (94 - 124)  BP: 132/85 (18 Apr 2018 07:40) (132/85 - 150/92)  BP(mean): --  RR: 18 (18 Apr 2018 07:40) (18 - 18)  SpO2: 98% (18 Apr 2018 07:40) (98% - 100%)    PHYSICAL EXAM:    General: Well developed; well nourished; in no acute distress  Respiratory: No wheezes, rales or rhonchi  Cardiovascular: Regular rate and rhythm. S1 and S2 Normal; No murmurs, gallops or rubs  Gastrointestinal: Soft non-tender non-distended; Normal bowel sounds; No hepatosplenomegaly  Extremities: RUE +AVF  Vascular: Peripheral pulses palpable 2+ bilaterally  Neurological: Alert and oriented x4  Skin: Warm and dry. No acute rash  Psychiatric: Depressed mood    I&O's Detail    17 Apr 2018 07:01  -  18 Apr 2018 07:00  --------------------------------------------------------  IN:    Oral Fluid: 480 mL  Total IN: 480 mL    OUT:  Total OUT: 0 mL    Total NET: 480 mL                                    9.7    4.2   )-----------( 141      ( 16 Apr 2018 14:03 )             30.5     16 Apr 2018 14:03    138    |  94     |  60.0   ----------------------------<  113    3.8     |  26.0   |  8.67     Ca    8.6        16 Apr 2018 14:03    TPro  6.4    /  Alb  3.1    /  TBili  0.4    /  DBili  x      /  AST  25     /  ALT  22     /  AlkPhos  484    16 Apr 2018 14:03      CAPILLARY BLOOD GLUCOSE        LIVER FUNCTIONS - ( 16 Apr 2018 14:03 )  Alb: 3.1 g/dL / Pro: 6.4 g/dL / ALK PHOS: 484 U/L / ALT: 22 U/L / AST: 25 U/L / GGT: x               MEDICATIONS  (STANDING):  ALPRAZolam 1 milliGRAM(s) Oral three times a day  amLODIPine   Tablet 10 milliGRAM(s) Oral daily  atovaquone Suspension 1500 milliGRAM(s) Oral daily  calcitriol   Capsule 0.5 MICROGram(s) Oral daily  darbepoetin Injectable Syringe 100 MICROGram(s) IV Push every 7 days  darunavir 800 milliGRAM(s) Oral daily  dolutegravir 50 milliGRAM(s) Oral daily  emtricitabine 200 milliGRAM(s) Oral <User Schedule>  gabapentin 100 milliGRAM(s) Oral at bedtime  heparin  Injectable 5000 Unit(s) SubCutaneous every 12 hours  levETIRAcetam 500 milliGRAM(s) Oral two times a day  levoFLOXacin  Tablet 250 milliGRAM(s) Oral every 48 hours  lisinopril 20 milliGRAM(s) Oral daily  metoprolol tartrate 50 milliGRAM(s) Oral two times a day  oxyCODONE  ER Tablet 10 milliGRAM(s) Oral every 12 hours  pantoprazole    Tablet 40 milliGRAM(s) Oral before breakfast  QUEtiapine 50 milliGRAM(s) Oral at bedtime  ritonavir Tablet 100 milliGRAM(s) Oral daily  sertraline 50 milliGRAM(s) Oral at bedtime  sevelamer hydrochloride 800 milliGRAM(s) Oral three times a day  tenofovir 300 milliGRAM(s) Oral <User Schedule>    MEDICATIONS  (PRN):  HYDROmorphone  Injectable 0.5 milliGRAM(s) SubCutaneous every 4 hours PRN Severe Pain (7 - 10)  oxyCODONE    IR 5 milliGRAM(s) Oral every 4 hours PRN Mild Pain (1 - 3)  oxyCODONE    IR 10 milliGRAM(s) Oral every 4 hours PRN Moderate Pain (4 - 6)  oxyCODONE    IR 15 milliGRAM(s) Oral every 4 hours PRN Severe Pain (7 - 10)  zolpidem 5 milliGRAM(s) Oral at bedtime PRN Insomnia      RADIOLOGY & ADDITIONAL TESTS:
CC: AMS    INTERVAL HPI/OVERNIGHT EVENTS: Patient seen and examined. C/O RUE pain, back pain, wants IV Dilaudid. Has not been out of bed. Denies chest pain, SOB, dizziness, lightheadedness, nausea, vomiting, fever, chills.     Vital Signs Last 24 Hrs  T(C): 36.8 (16 Apr 2018 09:44), Max: 37.2 (16 Apr 2018 01:29)  T(F): 98.2 (16 Apr 2018 09:44), Max: 99 (16 Apr 2018 01:29)  HR: 85 (16 Apr 2018 09:44) (81 - 93)  BP: 148/95 (16 Apr 2018 09:44) (139/84 - 159/97)  BP(mean): --  RR: 20 (16 Apr 2018 09:44) (18 - 20)  SpO2: 97% (16 Apr 2018 09:44) (97% - 98%)    PHYSICAL EXAM:    General: Well developed; well nourished; in no acute distress  Respiratory: No wheezes, rales or rhonchi  Cardiovascular: Regular rate and rhythm. S1 and S2 Normal; No murmurs, gallops or rubs  Gastrointestinal: Soft non-tender non-distended; Normal bowel sounds; No hepatosplenomegaly  Extremities: RUE +AVF  Vascular: Peripheral pulses palpable 2+ bilaterally  Neurological: Alert and oriented x4  Skin: Warm and dry. No acute rash  Psychiatric: Depressed mood    I&O's Detail                    CAPILLARY BLOOD GLUCOSE      POCT Blood Glucose.: 95 mg/dL (15 Apr 2018 16:07)          MEDICATIONS  (STANDING):  ALPRAZolam 1 milliGRAM(s) Oral three times a day  amLODIPine   Tablet 10 milliGRAM(s) Oral daily  atovaquone Suspension 1500 milliGRAM(s) Oral daily  azithromycin   Tablet 500 milliGRAM(s) Oral daily  calcitriol   Capsule 0.5 MICROGram(s) Oral daily  cefepime Injectable. 500 milliGRAM(s) IV Push daily  cefepime Injectable.      darbepoetin Injectable Syringe 100 MICROGram(s) IV Push every 7 days  darunavir 800 milliGRAM(s) Oral daily  dolutegravir 50 milliGRAM(s) Oral daily  emtricitabine 200 milliGRAM(s) Oral <User Schedule>  gabapentin 100 milliGRAM(s) Oral at bedtime  heparin  Injectable 5000 Unit(s) SubCutaneous every 12 hours  levETIRAcetam 500 milliGRAM(s) Oral two times a day  lisinopril 20 milliGRAM(s) Oral daily  metoprolol tartrate 50 milliGRAM(s) Oral two times a day  oxyCODONE  ER Tablet 10 milliGRAM(s) Oral every 12 hours  pantoprazole    Tablet 40 milliGRAM(s) Oral before breakfast  QUEtiapine 50 milliGRAM(s) Oral at bedtime  ritonavir Tablet 100 milliGRAM(s) Oral daily  sertraline 50 milliGRAM(s) Oral daily  sevelamer hydrochloride 800 milliGRAM(s) Oral three times a day  tenofovir 300 milliGRAM(s) Oral <User Schedule>    MEDICATIONS  (PRN):  HYDROmorphone  Injectable 0.5 milliGRAM(s) SubCutaneous every 4 hours PRN Severe Pain (7 - 10)  oxyCODONE    IR 5 milliGRAM(s) Oral every 4 hours PRN Mild Pain (1 - 3)  oxyCODONE    IR 10 milliGRAM(s) Oral every 4 hours PRN Moderate Pain (4 - 6)  oxyCODONE    IR 15 milliGRAM(s) Oral every 4 hours PRN Severe Pain (7 - 10)  zolpidem 5 milliGRAM(s) Oral at bedtime PRN Insomnia      RADIOLOGY & ADDITIONAL TESTS:
Called by [facility] and . Call received from CM at [facility] Kalyan Bob  (office#[phone] cell [phone]) and he states that the pt. told him  over the phone that he is going home to cut himself so that he will get a nurse  to come in 2x week to oversee his care.  Psychiatry recalled. Patient placed on Constant observation.
Chief Complaint:     Patient seen and examined at bedside. patient resting comfortably in bed in NAD. 27 YOM with known h/o ESRD on HD, HIV, blindness, HTN, cardiomyopathy, seizure d/o, pericarditis who was sent prior to HD today for AMS, unresponsiveness.  Pt was unresponsive in ED and was intubated for airway protection.    He is reporting pain is well controlled with regimen. Pain down to 5/10 today. He reports pain is better controlled with medication adjustments. Denies side effects    PAST MEDICAL & SURGICAL HISTORY:  HIV (human immunodeficiency virus infection)  Seizure disorder  Hypertension  Diabetes  ESRD (end stage renal disease)  Seizure  Pericarditis  Anxiety  Depression  Renal failure (ARF), acute on chronic: dialysis av fistula, RUE  HTN (hypertension)  Cardiomyopathy  HIV disease: born HIV+  AV fistula  S/P tonsillectomy      SOCIAL HISTORY:  [ ] Denies Smoking, Alcohol, or Drug Use    Allergies    vancomycin (Anaphylaxis)    Intolerances        PAIN MEDICATIONS:  ALPRAZolam 1 milliGRAM(s) Oral three times a day  gabapentin 100 milliGRAM(s) Oral at bedtime  HYDROmorphone  Injectable 0.5 milliGRAM(s) SubCutaneous every 4 hours PRN  levETIRAcetam 500 milliGRAM(s) Oral two times a day  oxyCODONE    IR 5 milliGRAM(s) Oral every 4 hours PRN  oxyCODONE    IR 10 milliGRAM(s) Oral every 4 hours PRN  oxyCODONE    IR 15 milliGRAM(s) Oral every 4 hours PRN  oxyCODONE  ER Tablet 10 milliGRAM(s) Oral every 12 hours  QUEtiapine 50 milliGRAM(s) Oral at bedtime  sertraline 50 milliGRAM(s) Oral daily  zolpidem 5 milliGRAM(s) Oral at bedtime PRN    Heme:  heparin  Injectable 5000 Unit(s) SubCutaneous every 12 hours    Antibiotics:  atovaquone Suspension 1500 milliGRAM(s) Oral daily  darunavir 800 milliGRAM(s) Oral daily  dolutegravir 50 milliGRAM(s) Oral daily  emtricitabine 200 milliGRAM(s) Oral <User Schedule>  levoFLOXacin  Tablet 250 milliGRAM(s) Oral every 48 hours  ritonavir Tablet 100 milliGRAM(s) Oral daily  tenofovir 300 milliGRAM(s) Oral <User Schedule>    Cardiac:  amLODIPine   Tablet 10 milliGRAM(s) Oral daily  lisinopril 20 milliGRAM(s) Oral daily  metoprolol tartrate 50 milliGRAM(s) Oral two times a day    Pulmonary:    Endocrine    GI:  pantoprazole    Tablet 40 milliGRAM(s) Oral before breakfast    All Other Meds:  calcitriol   Capsule 0.5 MICROGram(s) Oral daily  darbepoetin Injectable Syringe 100 MICROGram(s) IV Push every 7 days  sevelamer hydrochloride 800 milliGRAM(s) Oral three times a day      REVIEW OF SYSTEMS:  CONSTITUTIONAL: Negative fever,chills  NECK: No pain or stiffness  RESPIRATORY: No cough, wheezing,  No shortness of breath  GASTROINTESTINAL: No abdominal, No nausea, vomiting; No diarrhea or constipation.   GENITOURINARY: No dysuria, frequency, or incontinence  NEUROLOGICAL: No headaches, memory loss, loss of strength, numbness, or  SKIN: No itching, burning, rashes, or lesions   MUSCULOSKELETAL: No joint pain or swelling; No muscle, back, or extremity pain    PHYSICAL EXAM:    Vital Signs Last 24 Hrs  T(C): 37.5 (17 Apr 2018 08:00), Max: 37.5 (17 Apr 2018 08:00)  T(F): 99.5 (17 Apr 2018 08:00), Max: 99.5 (17 Apr 2018 08:00)  HR: 87 (17 Apr 2018 08:00) (87 - 96)  BP: 157/97 (17 Apr 2018 08:00) (142/97 - 170/96)  BP(mean): --  RR: 18 (17 Apr 2018 08:00) (16 - 18)  SpO2: 94% (17 Apr 2018 08:00) (94% - 99%)    PAIN SCORE:    5     SCALE USED: (1-10 VNRS)       General: Well developed; well nourished; NAD  Respiratory: No wheezes, rales or rhonchi  Cardiovascular: Regular rate and rhythm. No murmurs, gallops or rubs  Gastrointestinal: Soft non-tender non-distended; Normal bowel sounds; No hepatosplenomegaly  Extremities: RUE +AVF  Vascular: Peripheral pulses palpable 2+ bilaterally  Neurological: Alert and oriented x4  Skin: Warm and dry. No acute rash  Psychiatric: Depressed mood on exam    LABS:                          9.7    4.2   )-----------( 141      ( 16 Apr 2018 14:03 )             30.5     04-16    138  |  94<L>  |  60.0<H>  ----------------------------<  113  3.8   |  26.0  |  8.67<H>    Ca    8.6      16 Apr 2018 14:03    TPro  6.4<L>  /  Alb  3.1<L>  /  TBili  0.4  /  DBili  x   /  AST  25  /  ALT  22  /  AlkPhos  484<H>  04-16          Drug Screen:        RADIOLOGY:      [ ]  GENE  Reviewed and Copied to Chart
Chief Complaint:    Patient seen and examined at bedside. Patient sitting in bed comfortably in NAD. States that he has increased pain at the moment because he is pending pain medication. Continues to have pain in the lumbar spine. Described as aching in nature and worse with movement. Nonradicular. He was having abdominal pain yesterday but this has subsided.    He is getting good relief with medication. Tolerating without any adverse reactions.     Patient continues to ask for IV dilaudid multiple times during the visit. He states he always gets it during hospitalizations. States medication is helping but IV helps more.    Patient denies any CP/SOB/dizziness/headaches/n/v/d/bowel or bladder changes.    PAST MEDICAL & SURGICAL HISTORY:  HIV (human immunodeficiency virus infection)  Seizure disorder  Hypertension  Diabetes  ESRD (end stage renal disease)  Seizure  Pericarditis  Anxiety  Depression  Renal failure (ARF), acute on chronic: dialysis av fistula, RUE  HTN (hypertension)  Cardiomyopathy  HIV disease: born HIV+  AV fistula  S/P tonsillectomy      SOCIAL HISTORY:  [ ] Denies Smoking, Alcohol, or Drug Use    Allergies    vancomycin (Anaphylaxis)    Intolerances        PAIN MEDICATIONS:  ALPRAZolam 1 milliGRAM(s) Oral three times a day  gabapentin 100 milliGRAM(s) Oral at bedtime  levETIRAcetam 500 milliGRAM(s) Oral two times a day  oxyCODONE    IR 5 milliGRAM(s) Oral every 4 hours PRN  oxyCODONE    IR 10 milliGRAM(s) Oral every 4 hours PRN  oxyCODONE    IR 15 milliGRAM(s) Oral every 4 hours PRN  oxyCODONE  ER Tablet 10 milliGRAM(s) Oral every 12 hours  QUEtiapine 50 milliGRAM(s) Oral at bedtime  sertraline 50 milliGRAM(s) Oral at bedtime  zolpidem 5 milliGRAM(s) Oral at bedtime PRN    Heme:  heparin  Injectable 5000 Unit(s) SubCutaneous every 12 hours    Antibiotics:  atovaquone Suspension 1500 milliGRAM(s) Oral daily  darunavir 800 milliGRAM(s) Oral daily  dolutegravir 50 milliGRAM(s) Oral daily  emtricitabine 200 milliGRAM(s) Oral <User Schedule>  ritonavir Tablet 100 milliGRAM(s) Oral daily  tenofovir 300 milliGRAM(s) Oral <User Schedule>    Cardiac:  amLODIPine   Tablet 10 milliGRAM(s) Oral daily  lisinopril 20 milliGRAM(s) Oral daily  metoprolol tartrate 50 milliGRAM(s) Oral two times a day    Pulmonary:    Endocrine    GI:  pantoprazole    Tablet 40 milliGRAM(s) Oral before breakfast    All Other Meds:  calcitriol   Capsule 0.5 MICROGram(s) Oral daily  darbepoetin Injectable Syringe 100 MICROGram(s) IV Push every 7 days  sevelamer hydrochloride 800 milliGRAM(s) Oral three times a day      LABS:                          9.6    3.4   )-----------( 125      ( 18 Apr 2018 21:19 )             30.2     04-18    138  |  96<L>  |  46.0<H>  ----------------------------<  113  4.0   |  28.0  |  5.28<H>    Ca    8.7      18 Apr 2018 21:19    TPro  6.6  /  Alb  3.2<L>  /  TBili  0.3<L>  /  DBili  x   /  AST  21  /  ALT  17  /  AlkPhos  451<H>  04-18          Drug Screen:        RADIOLOGY:    Vital Signs Last 24 Hrs  T(C): 36.6 (19 Apr 2018 08:00), Max: 36.8 (18 Apr 2018 14:16)  T(F): 97.9 (19 Apr 2018 08:00), Max: 98.2 (18 Apr 2018 14:16)  HR: 84 (19 Apr 2018 08:00) (84 - 98)  BP: 125/79 (19 Apr 2018 08:00) (121/72 - 152/90)  BP(mean): --  RR: 18 (19 Apr 2018 08:00) (18 - 18)  SpO2: 97% (19 Apr 2018 08:00) (97% - 100%)    PAIN SCORE:     7/10    SCALE USED: (1-10)      PHYSICAL EXAM:    GENERAL: NAD, well-groomed, well-developed  HEAD:  Atraumatic, Normocephalic  NECK: Supple, No JVD, Normal thyroid  NERVOUS SYSTEM:  Alert & Oriented X3, Good concentration; Motor Strength 5/5 B/L upper and lower extremities; DTRs 2+ intact and symmetric  CHEST/LUNG: Clear to percussion bilaterally; nonlabored breathing  HEART: Regular rate and rhythm;   ABDOMEN: Soft, Nontender, Nondistended; Bowel sounds present  EXTREMITIES:  2+ Peripheral Pulses, No clubbing, cyanosis, or edema; right UE +AVF  BACK: TTP lumbar paraspinals bilaterally, neg SLR  LYMPH: No lymphadenopathy noted  SKIN: No rashes or lesions      [ ]  [place]  Reviewed and Copied to Chart
HOSPITALIST PROGRESS NOTE    TEDDY CRAWFORD  [id number]  27yMale    Patient is a 27y old  Male who presents with a chief complaint of AMS (13 Apr 2018 15:29)      SUBJECTIVE:   Chart reviewed since last visit.  Patient seen and examined at bedside in ICU for encephalopathy, ESRD.  Somnolent, as per MICU just received Dilaudid for pain.    OBJECTIVE:  Vital Signs Last 24 Hrs  T(C): 37 (14 Apr 2018 08:00), Max: 38.8 (13 Apr 2018 12:43)  T(F): 98.6 (14 Apr 2018 08:00), Max: 101.9 (13 Apr 2018 12:43)  HR: 91 (14 Apr 2018 10:00) (76 - 96)  BP: 160/97 (14 Apr 2018 10:00) (115/73 - 193/123)  BP(mean): 123 (14 Apr 2018 10:00) (90 - 128)  RR: 27 (14 Apr 2018 10:00) (21 - 48)  SpO2: 97% (14 Apr 2018 10:00) (94% - 100%)    PHYSICAL EXAMINATION  General: NAD[+]  lying in bed  HEENT: AT/NC[+]  blind   Moist oral mucosa[]  Pharyngeal exudates[]  NECK: Supple[+]  JVD[] Carotid bruit[]  CVS: RRR[+]  Irregular[]  S1+S2[+]   Murmur[]  RESP: Fair air entry bilaterally[+]   Clear sounds[]   poor effort [+]  wheeze[]   Crackles[]  GI: Soft[+]  Nondistended[]   Nontender[+]   Bowel Sounds[+]  Mass[]   HSM[]   Ascites[]  : suprapubic tenderness[-]   CVA Tenderness[]   Mccoy[]  MS: RUE AVF[+]  CNS: Somnolent but arousable. exam limited by patient somnolence  INTEG: Skin is Warm[+]  dry[] Lesion[] Decubitus[]  PSYCH: unable to determine    MONITOR:  CAPILLARY BLOOD GLUCOSE            I&O's Summary    13 Apr 2018 07:01  -  14 Apr 2018 07:00  --------------------------------------------------------  IN: 69.3 mL / OUT: 2000 mL / NET: -1930.7 mL                            9.7    5.4   )-----------( 152      ( 13 Apr 2018 12:17 )             31.0     PT/INR - ( 13 Apr 2018 12:17 )   PT: 14.4 sec;   INR: 1.30 ratio         PTT - ( 13 Apr 2018 12:17 )  PTT:36.8 sec  04-14    138  |  92<L>  |  34.0<H>  ----------------------------<  86  4.4   |  29.0  |  6.26<H>    Ca    8.3<L>      14 Apr 2018 06:55    TPro  7.1  /  Alb  3.8  /  TBili  1.0  /  DBili  x   /  AST  45<H>  /  ALT  20  /  AlkPhos  617<H>  04-13    CARDIAC MARKERS ( 13 Apr 2018 12:17 )  x     / 0.18 ng/mL / 157 U/L / x     / 2.1 ng/mL      < from: EEG Awake and Asleep (04.13.18 @ 17:25) >    EXAM:  EEG-AWAKE AND ASLEEP      PROCEDURE DATE:  Apr 13 2018   .      INTERPRETATION:  Technique: This 18-channel EEG with 1 channel devoted to   EKG is performed with the patient in the sedated. Electrodes were placed   according to the standard 10-20 system.    Background: The background was mildly disorganized with a posterior   dominant rhythm of 6-7 Hz after propofol was stopped.  It may have been   residual drowsiness form porpofol. There is poor posterior to anterior   progression    Drowsiness was seen as above, but may have been result of lingering   sedation.     Stage II sleep was not seen.    Stimulation: Hyperventilation was not done.  Photic stimulation produced no change in the background.    Specific features: No focal, lateralizing, or epileptiform activity was   present.    Clinical   impression: This is an abnormal record. Mild slowing may be due to   drowsiness or indicate mild diffuse cerebral dysfunction.  No seizure   activity was seen during the recording.      IMPRESSION:      Read By: WESLEY SOSA M.D., NEUROLOGY ATTENDING  Apr 13 2018  6:13PM.    Signed By: WESLEY SOSA M.D., NEUROLOGY ATTENDING  Apr 13 2018  6:17PM  This report has been electronically signed.    < end of copied text >         TTE:  < from: TTE Echo Complete w/Doppler (10.20.17 @ 11:29) >  EXAM:  ECHO TRANSTHORACIC COMP W DOPP      PROCEDURE DATE:  Oct 20 2017   .      INTERPRETATION:  REPORT:    TRANSTHORACIC ECHOCARDIOGRAM REPORT           Patient Name:   TEDDY CRAWFORD Patient Location: formerly [facility] Rec #:  [id number]     Accession #:      [id number]  Account #:                         Height:           68.9 in 175.0 cm  YOB: 1990          Weight:           138.9 lb 63.00 kg  Patient Age:    27 years           BSA:              1.77 m²  Patient Gender: M                  BP:               147/90 mmHg        Date of Exam:        10/20/2017 11:29:20 AM  Sonographer:         Erika Alejandra  Referring Physician: Gracie Andersen MD     Procedure:     2D Echo/Doppler/Color Doppler Complete.  Indications:   Hypertensive heart disease without heart failure - I11.9  Diagnosis:     Hypertensive heart disease without heart failure - I11.9  Study Details: Technically good study.           2D AND M-MODE MEASUREMENTS (normal ranges within parentheses):  Left                 Normal   Aorta/Left            Normal  Ventricle:                    Atrium:  IVSd (2D):    1.09  (0.7-1.1) Aortic Root   4.40  (2.4-3.7)                 cm             (Mmode):       cm  LVPWd (2D):   1.56  (0.7-1.1) Left Atrium 5.10  (1.9-4.0)                 cm             (Mmode):       cm  LVIDd (2D):   5.76  (3.4-5.7) LA Volume     68.8                 cm             Index        ml/m²  LVIDs (2D):   4.49                 cm  LV FS (2D):   22.0   (>25%)  %  Relative Wall 0.54   (<0.42)  Thickness     LV SYSTOLIC FUNCTION BY 2D PLANIMETRY (MOD):  EF-A4C View: 47.5 % EF-A2C View: 53.4 % EF-Biplane: 51.8 %     LV DIASTOLIC FUNCTION:  MV Peak E: 1.00 m/s E/e' Ratio: 6.60  MV Peak A: 1.10 m/s  E/A Ratio: 0.91     SPECTRAL DOPPLER ANALYSIS (where applicable):  LVOT Vmax:  LVOT VTI:  LVOT Diameter: 2.55 cm     Tricuspid Valve and PA/RV Systolic Pressure: TR Max Velocity: 2.34 m/s   RA Pressure: 3 mmHg RVSP/PASP: 25.0 mmHg        PHYSICIAN INTERPRETATION:  Left Ventricle: The left ventricular internal cavity size is normal.  Global LV systolic function was normal. Left ventricular ejection   fraction, by visual estimation, is 55 to 60%. Spectral Doppler shows   impaired relaxation pattern of left ventricular myocardial filling (Grade   I diastolic dysfunction). Normal LV filling pressures. Trabeculated LV   myocardium which does not meet diagnostic criteria for non-compaction.  Right Ventricle: Normal right ventricular size and function.  Left Atrium: Severely enlarged left atrium.  Right Atrium: The right atrium is severely dilated.  Pericardium: There is no evidence of pericardial effusion.  Mitral Valve: Thickening and calcification of the anterior and posterior   mitral valve leaflets. There is mild to moderate mitral annular   calcification. Mild mitral valve regurgitation is seen.  Tricuspid Valve: The tricuspid valve is normal in structure. Moderate   tricuspid regurgitation is visualized.  Aortic Valve: The aortic valve is trileaflet. Sclerotic aortic valve with   normal opening. No evidence of aortic valve regurgitation is seen.  Pulmonic Valve: Structurally normal pulmonic valve, with normal leaflet   excursion. Trace pulmonic valve regurgitation.  Aorta: The aortic root andascending aorta are structurally normal, with   no evidence of dilitation.  Pulmonary Artery: The main pulmonary artery is normal in size.  Venous: The inferior vena cava was normal sized, with respiratory size   variation greater than 50%.        Summary:   1. Technically good study.   2. Normal global left ventricular systolic function.   3. Left ventricular ejection fraction, by visual estimation, is 55 to   60%.   4. Trabeculated LV myocardium which does not meet diagnostic criteria   for non-compaction.   5. Spectral Doppler shows impaired relaxation pattern of left   ventricular myocardial filling (Grade I diastolic dysfunction).   6. Severely dilated right atrium.   7. Normal right ventricular size and function.   8. Mild to moderate mitral annular calcification.   9. Thickening and calcification of the anterior and posterior mitral   valve leaflets.  10. Sclerotic aortic valve with normal opening.  11. Severely enlarged left atrium.  12. Mild mitral valve regurgitation.  13. Moderate tricuspid regurgitation. Normal estimated PASP.  14. There is no evidence of pericardial effusion.     MD Sena Electronically signed on 10/20/2017 at 1:37:36 PM                  < end of copied text >    RADIOLOGY  < from: CT Abdomen and Pelvis No Cont (04.13.18 @ 14:19) >     EXAM:  CT ABDOMEN AND PELVIS                         EXAM:  CT CHEST                          PROCEDURE DATE:  04/13/2018          INTERPRETATION:  HISTORY:  Difficulty breathing. Fever. .    Date/Time of exam: 4/13/2018 2:07 PM    TECHNIQUE:  Sections were obtained from the lung apices to the symphysis   pubis without oral or intravenous contrast.       COMPARISON EXAMINATION:     2/19/2018.    FINDINGS:    An endotracheal tube is noted with the tip located superior to the   toyin. A nasogastric tube is identified. The tip is located in the   gastric fundus.    Dilated right axillary and subclavian vessels are noted likely secondary   to a right upper extremity dialysis shunt.    No evidence of mediastinal or hilar lymphadenopathy. No evidence of a   pleural or pericardial effusion. Cardiomegaly is noted.    No evidence of left axillary lymphadenopathy.    There is an infiltrate in the superior segment of the right lower lobe.    Hepatomegaly. Diffuse thickening of the wall of the gallbladder.   Respiratory motion artifact in the upper abdomen precludes evaluation for   pericholecystic inflammation.    The spleen is not enlarged and demonstrates no focal abnormality. The   pancreatic contour is unremarkable without evidence of mass, inflammation   or ductal dilatation. The adrenal glands demonstrate normal size and   contour.    Marked atrophy of the kidneys is noted.    No evidence of retroperitoneal or pelvic lymphadenopathy. The prostate,   seminal vesicles, and bladder demonstrate no abnormality.    Small amount of ascites.    No evidence of ileus or obstruction.    No significant osseous abnormality.    IMPRESSION:     Small infiltrate in the superior segment of the right lower lobe.    Hepatomegaly. Diffuse thickening of the wall of the gallbladder. Suggest   sonogram of the gallbladder for further evaluation.    Atrophy of both kidneys.    Small amount of ascites.                 STUART HATFIELD M.D., ATTENDING RADIOLOGIST    < end of copied text >    < from: CT Head No Cont (04.13.18 @ 14:18) >   EXAM:  CT BRAIN                          PROCEDURE DATE:  04/13/2018          INTERPRETATION:  CLINICAL HISTORY: Altered mental status    COMPARISON: CT head dated 2/19/2018    TECHNIQUE: Noncontrast CT of the head. Multiplanar reformations are   submitted.    FINDINGS:  There is no compelling evidence for an acute transcortical infarction.   There is no evidence of mass, mass effect, midline shift or extra-axial   fluid collection. The lateral ventricles and cortical sulci are   age-appropriate in size and configuration. The orbits, mastoid air cells   and visualized paranasal sinuses are normal. The calvarium is intact.   Redemonstrated is diffuse thickening with abnormal groundglass appearance   to the calvarium and maxillofacial bones.Differential diagnosis includes   fibrosis dysplasia, renal osteodystrophy, hyperparathyroidism.    IMPRESSION:      No acute intracranial pathology. Stable osseous   abnormality.                BAIRON PARSONS M.D., ATTENDING RADIOLOGIST  This document has been electronically signed. Apr 13 2018  2:40PM    < end of copied text >      MEDICATIONS  (STANDING):  amLODIPine   Tablet 10 milliGRAM(s) Oral daily  azithromycin   Tablet 500 milliGRAM(s) Oral daily  calcitriol   Capsule 0.5 MICROGram(s) Oral daily  cefepime Injectable. 500 milliGRAM(s) IV Push daily  cefepime Injectable.      DAPTOmycin IVPB      darunavir 800 milliGRAM(s) Oral daily  dolutegravir 50 milliGRAM(s) Oral daily  fentaNYL   Patch  75 MICROgram(s)/Hr 1 Patch Transdermal every 72 hours  gabapentin 100 milliGRAM(s) Oral at bedtime  levETIRAcetam  IVPB 500 milliGRAM(s) IV Intermittent every 12 hours  lisinopril 20 milliGRAM(s) Oral daily  metoprolol tartrate 50 milliGRAM(s) Oral two times a day  pantoprazole    Tablet 40 milliGRAM(s) Oral before breakfast  QUEtiapine 50 milliGRAM(s) Oral at bedtime  sertraline 50 milliGRAM(s) Oral daily  sevelamer hydrochloride 800 milliGRAM(s) Oral three times a day      MEDICATIONS  (PRN):
HOSPITALIST PROGRESS NOTE    TEDDY CRAWFORD  [id number]  27yMale    Patient is a 27y old  Male who presents with a chief complaint of AMS (13 Apr 2018 15:29)      SUBJECTIVE:   Chart reviewed since last visit.  Patient seen and examined at bedside for encephalopathy, ESRD, CMP  Feels fine, denies any dyspnea, chest pain, palpitations, fever or chills.  Wants to continues Dilaudid IV.      OBJECTIVE:  Vital Signs Last 24 Hrs  T(C): 36.6 (15 Apr 2018 08:27), Max: 37.2 (14 Apr 2018 23:31)  T(F): 97.8 (15 Apr 2018 08:27), Max: 99 (14 Apr 2018 23:31)  HR: 88 (15 Apr 2018 08:27) (84 - 98)  BP: 156/97 (15 Apr 2018 08:27) (135/79 - 183/110)  BP(mean): 120 (14 Apr 2018 20:00) (120 - 140)  RR: 18 (15 Apr 2018 08:27) (18 - 59)  SpO2: 98% (15 Apr 2018 08:27) (93% - 100%)    PHYSICAL EXAMINATION  General: NAD[+]  lying in bed  HEENT: AT/NC[+]  blind   Moist oral mucosa[]  Pharyngeal exudates[]  NECK: Supple[+]  JVD[] Carotid bruit[] [facility] TLC[+]  CVS: RRR[+]  Irregular[]  S1+S2[+]   Murmur[]  RESP: Fair air entry bilaterally[+]   Clear sounds[+]   poor effort []  wheeze[]   Crackles[]  GI: Soft[+]  Nondistended[]   Nontender[+]   Bowel Sounds[+]  Mass[]   HSM[]   Ascites[]  : suprapubic tenderness[-]   CVA Tenderness[]   Mccoy[]  MS: RUE AVF[+]  CNS: AAOx3, visual loss[+], moves all extremities  INTEG: Skin is Warm[+]  dry[] Lesion[] Decubitus[]  PSYCH: fair mood affect      MONITOR:  CAPILLARY BLOOD GLUCOSE      POCT Blood Glucose.: 132 mg/dL (15 Apr 2018 11:24)  POCT Blood Glucose.: 91 mg/dL (15 Apr 2018 08:15)        I&O's Summary    14 Apr 2018 07:01  -  15 Apr 2018 07:00  --------------------------------------------------------  IN: 0 mL / OUT: 2500 mL / NET: -2500 mL              Culture:    TTE:  < from: TTE Echo Complete w/Doppler (04.14.18 @ 13:34) >    EXAM:  ECHO TRANSTHORACIC COMP W DOPP      PROCEDURE DATE:  Apr 14 2018   .      INTERPRETATION:  REPORT:    TRANSTHORACIC ECHOCARDIOGRAM REPORT         Patient Name:   TEDDY CRAWFORD Patient Location: [facility]  Medical Rec #:  [id number]   Accession #:      [id number]  Account #:                         Height:           70.1 in 178.0 cm  YOB: 1990          Weight:           165.3 lb 75.00 kg  Patient Age:    27 years           BSA:              1.93 m²  Patient Gender: M                  BP:               179/104 mmHg       Date of Exam:        4/14/2018 1:34:25 PM  Sonographer:         Lamin Zee Jr  Referring Physician: Polly Bertrand MD    Procedure:   2D Echo/Doppler/Color Doppler Complete.  Indications: Cardiomyopathy, unspecified - I42.9  Diagnosis:   Cardiomyopathy, unspecified - I42.9         2D AND M-MODE MEASUREMENTS (normal ranges within parentheses):  Left                Normal    Aorta/Left           Normal  Ventricle:                    Atrium:  IVSd (2D):    0.87  (0.7-1.1) Aortic Root  3.81 cm (2.4-3.7)                cm              (2D):  LVPWd (2D):   1.18  (0.7-1.1) Left Atrium  4.56 cm (1.9-4.0)                cm              (2D):  LVIDd (2D):   6.17  (3.4-5.7) LA Volume    46.2              cm              Index        ml/m²  LVIDs (2D):   4.53            Right Ventricle:                cm              TAPSE:           1.87 cm  LV FS (2D):   26.6  (>25%)                %  Relative Wall 0.38  (<0.42)  Thickness    LV SYSTOLICFUNCTION BY 2D PLANIMETRY (MOD):  EF-Biplane: 51 %    LV DIASTOLIC FUNCTION:  MV Peak E: 1.40 m/s E/e' Ratio: 17.30  MV Peak A: 1.18 m/s Decel Time: 127 msec  E/A Ratio: 1.19    SPECTRAL DOPPLER ANALYSIS (where applicable):  Mitral Valve:  MV P1/2 Time: 36.83 msec  MV Area, PHT: 5.97 cm²         Tricuspid Valve and PA/RV Systolic Pressure: TR Max Velocity: 3.42 m/s RA   Pressure: 5 mmHg RVSP/PASP: 51.8 mmHg       PHYSICIAN INTERPRETATION:  Left Ventricle: The left ventricular internal cavity sizeis mildly   increased.  Global LV systolic function was mildly decreased. Left ventricular   ejection fraction, by visual estimation, is 45 to 50%. Spectral Doppler   shows impaired relaxation pattern of left ventricular myocardial filling   (Grade Idiastolic dysfunction). Elevated mean left atrial pressure.  Right Ventricle: Normal right ventricular size and function. TV S' 0.1   m/s.  Left Atrium: Moderate to severe left atrial enlargement.  Right Atrium: Severely enlarged right atrium.  Pericardium: There is no evidence of pericardial effusion.  Mitral Valve: The mitral valve is normal in structure. Thickening and   calcification of the anterior and posterior mitral valve leaflets. There   is moderate mitral annular calcification. No evidence of mitral stenosis.   Mild mitral valve regurgitation is seen. The MR jet is centrally-directed.  Tricuspid Valve: Structurally normal tricuspid valve, with normal leaflet   excursion. Moderate tricuspid regurgitation is visualized. Estimated   pulmonary artery systolic pressure is 51.8 mmHg assuming a right atrial   pressure of 5 mmHg, which is consistent with moderate pulmonary   hypertension.  Aortic Valve: The aortic valve is trileaflet. Sclerotic aortic valve with   normal opening. Trivial aortic valve regurgitation is seen. Sclerotic   aortic valve with normal opening.  Pulmonic Valve: Mild to moderate pulmonic valve regurgitation.  Aorta: The aortic root is normal in size and structure.  Venous: The inferior vena cava was normal sized, with respiratory size   variation greater than 50%.       Summary:   1. Left ventricular ejection fraction, by visual estimation, is 45 to   50%.   2. Mildly decreased global left ventricular systolic function.   3. Spectral Doppler shows impaired relaxation pattern of left   ventricular myocardial filling (Grade I diastolic dysfunction). Elevated   mean left atrial pressure.   4. Mildly increased left ventricular internal cavity size.   5. Normal right ventricular size and function.   6. Moderate mitral annular calcification.   7. Thickening and calcification of the anterior and posterior mitral   valve leaflets.   8. Mild mitral valve regurgitation.   9. Sclerotic aortic valve with normal opening.  10. Moderate tricuspid regurgitation.  11. Mild to moderate pulmonic valve regurgitation.  12. Estimated pulmonary artery systolic pressure is 51.8 mmHg assuming a   right atrial pressure of 5 mmHg, which is consistent with moderate   pulmonary hypertension.  13. There is no evidence of pericardial effusion.    [id number] Dread Kunz MD, Electronically signed on 4/14/2018 at 5:29:54   PM    < end of copied text >    RADIOLOGY        MEDICATIONS  (STANDING):  ALPRAZolam 1 milliGRAM(s) Oral three times a day  amLODIPine   Tablet 10 milliGRAM(s) Oral daily  azithromycin   Tablet 500 milliGRAM(s) Oral daily  calcitriol   Capsule 0.5 MICROGram(s) Oral daily  cefepime Injectable. 500 milliGRAM(s) IV Push daily  cefepime Injectable.      darunavir 800 milliGRAM(s) Oral daily  dolutegravir 50 milliGRAM(s) Oral daily  fentaNYL   Patch  75 MICROgram(s)/Hr 1 Patch Transdermal every 72 hours  gabapentin 100 milliGRAM(s) Oral at bedtime  levETIRAcetam  IVPB 500 milliGRAM(s) IV Intermittent every 12 hours  lisinopril 20 milliGRAM(s) Oral daily  metoprolol tartrate 50 milliGRAM(s) Oral two times a day  oxyCODONE  ER Tablet 10 milliGRAM(s) Oral every 12 hours  pantoprazole    Tablet 40 milliGRAM(s) Oral before breakfast  QUEtiapine 50 milliGRAM(s) Oral at bedtime  sertraline 50 milliGRAM(s) Oral daily  sevelamer hydrochloride 800 milliGRAM(s) Oral three times a day      MEDICATIONS  (PRN):  HYDROmorphone  Injectable 1 milliGRAM(s) IV Push every 6 hours PRN breakthrough pain  oxyCODONE    IR 10 milliGRAM(s) Oral every 6 hours PRN moderate to severe pain  zolpidem 5 milliGRAM(s) Oral at bedtime PRN Insomnia
NEPHROLOGY INTERVAL HPI/OVERNIGHT EVENTS:  HPI:  HPI: 27 M with a hx of HIV, renal failure, hyperkalemia, and similar presentation in the past presents to the ED for AMS. Pt was brought from dialysis due to AMS. S/p HD in the ICU and now extubated.     No acute events overnight  Tolerating extubation well thus far      PAST MEDICAL & SURGICAL HISTORY:  HIV (human immunodeficiency virus infection)  Seizure disorder  Hypertension  Diabetes  ESRD (end stage renal disease)  Seizure  Pericarditis  Anxiety  Depression  Renal failure (ARF), acute on chronic: dialysis av fistula, RUE  HTN (hypertension)  Cardiomyopathy  HIV disease: born HIV+  AV fistula  S/P tonsillectomy      MEDICATIONS  (STANDING):  azithromycin   Tablet 500 milliGRAM(s) Oral daily  cefepime Injectable. 500 milliGRAM(s) IV Push daily  cefepime Injectable.      DAPTOmycin IVPB      levETIRAcetam  IVPB 500 milliGRAM(s) IV Intermittent every 12 hours  propofol Infusion 2 MICROgram(s)/kG/Min (0.898 mL/Hr) IV Continuous <Continuous>    MEDICATIONS  (PRN):      Allergies    vancomycin (Anaphylaxis)    Intolerances        Vital Signs Last 24 Hrs  T(C): 37.7 (14 Apr 2018 04:00), Max: 38.8 (13 Apr 2018 12:43)  T(F): 99.8 (14 Apr 2018 04:00), Max: 101.9 (13 Apr 2018 12:43)  HR: 87 (14 Apr 2018 07:00) (76 - 106)  BP: 153/99 (14 Apr 2018 07:00) (115/73 - 214/138)  BP(mean): 121 (14 Apr 2018 07:00) (90 - 128)  RR: 30 (14 Apr 2018 07:00) (21 - 48)  SpO2: 100% (14 Apr 2018 07:00) (94% - 100%)  Daily Height in cm: 177.8 (13 Apr 2018 11:54)    Daily     PHYSICAL EXAM:  GENERAL: ill appearing, mild distress, facemask  HEAD:  Atraumatic, Normocephalic  EYES: EOMI, PERRLA, conjunctiva and sclera clear  ENMT: Moist mucous membranes  NECK: +JVD  NERVOUS SYSTEM:  Alert & Oriented X3  CHEST/LUNG: Scattered rhonchi B/L  HEART: Regular rate and rhythm; No murmurs, rubs, or gallops  ABDOMEN: Soft, Nontender, Nondistended; Bowel sounds present  EXTREMITIES:  +Edema; AVF +ve bruit  SKIN: No rashes or lesions    LABS:                        9.7    5.4   )-----------( 152      ( 13 Apr 2018 12:17 )             31.0     04-13    140  |  94<L>  |  69.0<H>  ----------------------------<  73  5.8<H>   |  25.0  |  10.95<H>    Ca    8.2<L>      13 Apr 2018 16:13    TPro  7.1  /  Alb  3.8  /  TBili  1.0  /  DBili  x   /  AST  45<H>  /  ALT  20  /  AlkPhos  617<H>  04-13    PT/INR - ( 13 Apr 2018 12:17 )   PT: 14.4 sec;   INR: 1.30 ratio         PTT - ( 13 Apr 2018 12:17 )  PTT:36.8 sec      ABG - ( 13 Apr 2018 12:25 )  pH: 7.35  /  pCO2: 40    /  pO2: 159   / HCO3: 22    / Base Excess: -3.5  /  SaO2: 100                 RADIOLOGY & ADDITIONAL TESTS:
NEPHROLOGY INTERVAL HPI/OVERNIGHT EVENTS:  HPI:  Pt is a 27 YOM with known h/o ESRD on HD, HIV, blindness, HTN, cardiomyopathy, seizure d/o, pericarditis who was sent prior to HD today for AMS, unresponsiveness.  Pt was unresponsive in ED and was intubated for airway protection.  Pt has known h/o drug use and has a fentanyl Patch in place which was not removed in ED, pt did not receive Narcan due to concerns of h/o chronic pain.  Pt was found to be hyperkalemic, uremic.  Head CT initially is negative for acute findings. Pt was admitted to ICU for emergent hemodialysis and management of acute respiratory failure and encephalopathy, change in MS. (13 Apr 2018 15:29)    F/u ESRD  No acute events overnight    PAST MEDICAL & SURGICAL HISTORY:  HIV (human immunodeficiency virus infection)  Seizure disorder  Hypertension  Diabetes  ESRD (end stage renal disease)  Seizure  Pericarditis  Anxiety  Depression  Renal failure (ARF), acute on chronic: dialysis av fistula, RUE  HTN (hypertension)  Cardiomyopathy  HIV disease: born HIV+  AV fistula  S/P tonsillectomy      MEDICATIONS  (STANDING):  ALPRAZolam 1 milliGRAM(s) Oral three times a day  amLODIPine   Tablet 10 milliGRAM(s) Oral daily  atovaquone Suspension 1500 milliGRAM(s) Oral daily  azithromycin   Tablet 500 milliGRAM(s) Oral daily  calcitriol   Capsule 0.5 MICROGram(s) Oral daily  cefepime Injectable. 500 milliGRAM(s) IV Push daily  cefepime Injectable.      darbepoetin Injectable Syringe 100 MICROGram(s) IV Push every 7 days  darunavir 800 milliGRAM(s) Oral daily  dolutegravir 50 milliGRAM(s) Oral daily  emtricitabine 200 milliGRAM(s) Oral every 72 hours  fentaNYL   Patch  75 MICROgram(s)/Hr 1 Patch Transdermal every 72 hours  gabapentin 100 milliGRAM(s) Oral at bedtime  heparin  Injectable 5000 Unit(s) SubCutaneous every 12 hours  levETIRAcetam 500 milliGRAM(s) Oral two times a day  lisinopril 20 milliGRAM(s) Oral daily  metoprolol tartrate 50 milliGRAM(s) Oral two times a day  oxyCODONE  ER Tablet 10 milliGRAM(s) Oral every 12 hours  pantoprazole    Tablet 40 milliGRAM(s) Oral before breakfast  QUEtiapine 50 milliGRAM(s) Oral at bedtime  ritonavir Tablet 100 milliGRAM(s) Oral every other day  sertraline 50 milliGRAM(s) Oral daily  sevelamer hydrochloride 800 milliGRAM(s) Oral three times a day  tenofovir 300 milliGRAM(s) Oral once    MEDICATIONS  (PRN):  oxyCODONE    IR 10 milliGRAM(s) Oral every 6 hours PRN moderate to severe pain  zolpidem 5 milliGRAM(s) Oral at bedtime PRN Insomnia      Allergies    vancomycin (Anaphylaxis)    Intolerances        Vital Signs Last 24 Hrs  T(C): 36.8 (16 Apr 2018 09:44), Max: 37.2 (16 Apr 2018 01:29)  T(F): 98.2 (16 Apr 2018 09:44), Max: 99 (16 Apr 2018 01:29)  HR: 85 (16 Apr 2018 09:44) (81 - 93)  BP: 148/95 (16 Apr 2018 09:44) (139/84 - 159/97)  BP(mean): --  RR: 20 (16 Apr 2018 09:44) (18 - 20)  SpO2: 97% (16 Apr 2018 09:44) (97% - 98%)  Daily     Daily     PHYSICAL EXAM:  GENERAL: ill appearing, no distress  HEAD:  Atraumatic, Normocephalic  EYES: EOMI, PERRLA, conjunctiva and sclera clear  ENMT: Moist mucous membranes  NECK: No JVD  NERVOUS SYSTEM:  Alert & Oriented X3  CHEST/LUNG: Few scattered rhonchi B/L  HEART: Regular rate and rhythm; No murmurs, rubs, or gallops  ABDOMEN: Soft, Nontender, Nondistended; Bowel sounds present  EXTREMITIES:  +Edema; AVF +ve bruit  SKIN: No rashes or lesions    LABS:                    RADIOLOGY & ADDITIONAL TESTS:
NEPHROLOGY INTERVAL HPI/OVERNIGHT EVENTS:  HPI:  Pt is a 27 YOM with known h/o ESRD on HD, HIV, blindness, HTN, cardiomyopathy, seizure d/o, pericarditis who was sent prior to HD today for AMS, unresponsiveness.  Pt was unresponsive in ED and was intubated for airway protection.  Pt has known h/o drug use and has a fentanyl Patch in place which was not removed in ED, pt did not receive Narcan due to concerns of h/o chronic pain.  Pt was found to be hyperkalemic, uremic.  Head CT initially is negative for acute findings. Pt was admitted to ICU for emergent hemodialysis and management of acute respiratory failure and encephalopathy, change in MS. (2018 15:29)      PAST MEDICAL & SURGICAL HISTORY:  HIV (human immunodeficiency virus infection)  Seizure disorder  Hypertension  Diabetes  ESRD (end stage renal disease)  Seizure  Pericarditis  Anxiety  Depression  Renal failure (ARF), acute on chronic: dialysis av fistula, RUE  HTN (hypertension)  Cardiomyopathy  HIV disease: born HIV+  AV fistula  S/P tonsillectomy      MEDICATIONS  (STANDING):  ALPRAZolam 1 milliGRAM(s) Oral three times a day  amLODIPine   Tablet 10 milliGRAM(s) Oral daily  atovaquone Suspension 1500 milliGRAM(s) Oral daily  calcitriol   Capsule 0.5 MICROGram(s) Oral daily  darbepoetin Injectable Syringe 100 MICROGram(s) IV Push every 7 days  darunavir 800 milliGRAM(s) Oral daily  dolutegravir 50 milliGRAM(s) Oral daily  emtricitabine 200 milliGRAM(s) Oral <User Schedule>  gabapentin 100 milliGRAM(s) Oral at bedtime  heparin  Injectable 5000 Unit(s) SubCutaneous every 12 hours  levETIRAcetam 500 milliGRAM(s) Oral two times a day  levoFLOXacin  Tablet 250 milliGRAM(s) Oral every 48 hours  lisinopril 20 milliGRAM(s) Oral daily  metoprolol tartrate 50 milliGRAM(s) Oral two times a day  oxyCODONE  ER Tablet 10 milliGRAM(s) Oral every 12 hours  pantoprazole    Tablet 40 milliGRAM(s) Oral before breakfast  QUEtiapine 50 milliGRAM(s) Oral at bedtime  ritonavir Tablet 100 milliGRAM(s) Oral daily  sertraline 50 milliGRAM(s) Oral at bedtime  sevelamer hydrochloride 800 milliGRAM(s) Oral three times a day  tenofovir 300 milliGRAM(s) Oral <User Schedule>    MEDICATIONS  (PRN):  HYDROmorphone  Injectable 0.5 milliGRAM(s) SubCutaneous every 4 hours PRN Severe Pain (7 - 10)  oxyCODONE    IR 5 milliGRAM(s) Oral every 4 hours PRN Mild Pain (1 - 3)  oxyCODONE    IR 10 milliGRAM(s) Oral every 4 hours PRN Moderate Pain (4 - 6)  oxyCODONE    IR 15 milliGRAM(s) Oral every 4 hours PRN Severe Pain (7 - 10)  zolpidem 5 milliGRAM(s) Oral at bedtime PRN Insomnia      Allergies    vancomycin (Anaphylaxis)    Intolerances        Vital Signs Last 24 Hrs  T(C): 36.8 (2018 14:16), Max: 37.3 (2018 07:40)  T(F): 98.2 (2018 14:16), Max: 99.1 (2018 07:40)  HR: 97 (2018 14:16) (94 - 124)  BP: 152/90 (2018 14:16) (132/85 - 152/90)  BP(mean): --  RR: 18 (2018 14:16) (18 - 18)  SpO2: 100% (2018 14:16) (98% - 100%)  Daily     Daily Weight in k.4 (2018 14:16)    PHYSICAL EXAM:    GENERAL: NAD, well-groomed, well-developed  HEAD:  Atraumatic, Normocephalic  EYES: EOMI, PERRLA, conjunctiva and sclera clear  ENMT: No tonsillar erythema, exudates, or enlargement; Moist mucous membranes, Good dentition, No lesions  NECK: Supple, No JVD, Normal thyroid  NERVOUS SYSTEM:  Alert & Oriented X3, Good concentration; Motor Strength 5/5 B/L upper and lower extremities; DTRs 2+ intact and symmetric  CHEST/LUNG: Clear to percussion bilaterally; No rales, rhonchi, wheezing, or rubs  HEART: Regular rate and rhythm; No murmurs, rubs, or gallops  ABDOMEN: Soft, Nontender, Nondistended; Bowel sounds present  EXTREMITIES:  2+ Peripheral Pulses, No clubbing, cyanosis, or edema  SKIN: No rashes or lesions    LABS:    none current, to be donne now.. at HD    RADIOLOGY & ADDITIONAL TESTS:
NEPHROLOGY INTERVAL HPI/OVERNIGHT EVENTS:  HPI:  Pt is a 27 YOM with known h/o ESRD on HD, HIV, blindness, HTN, cardiomyopathy, seizure d/o, pericarditis who was sent prior to HD today for AMS, unresponsiveness.  Pt was unresponsive in ED and was intubated for airway protection.  Pt has known h/o drug use and has a fentanyl Patch in place which was not removed in ED, pt did not receive Narcan due to concerns of h/o chronic pain.  Pt was found to be hyperkalemic, uremic.  Head CT initially is negative for acute findings. Pt was admitted to ICU for emergent hemodialysis and management of acute respiratory failure and encephalopathy, change in MS. (2018 15:29)    F/u ESRD  No c/o       PAST MEDICAL & SURGICAL HISTORY:  HIV (human immunodeficiency virus infection)  Seizure disorder  Hypertension  Diabetes  ESRD (end stage renal disease)  Seizure  Pericarditis  Anxiety  Depression  Renal failure (ARF), acute on chronic: dialysis av fistula, RUE  HTN (hypertension)  Cardiomyopathy  HIV disease: born HIV+  AV fistula  S/P tonsillectomy      MEDICATIONS  (STANDING):  ALPRAZolam 1 milliGRAM(s) Oral three times a day  amLODIPine   Tablet 10 milliGRAM(s) Oral daily  atovaquone Suspension 1500 milliGRAM(s) Oral daily  calcitriol   Capsule 0.5 MICROGram(s) Oral daily  darbepoetin Injectable Syringe 100 MICROGram(s) IV Push every 7 days  darunavir 800 milliGRAM(s) Oral daily  dolutegravir 50 milliGRAM(s) Oral daily  emtricitabine 200 milliGRAM(s) Oral <User Schedule>  gabapentin 100 milliGRAM(s) Oral at bedtime  heparin  Injectable 5000 Unit(s) SubCutaneous every 12 hours  levETIRAcetam 500 milliGRAM(s) Oral two times a day  lisinopril 20 milliGRAM(s) Oral daily  metoprolol tartrate 50 milliGRAM(s) Oral two times a day  oxyCODONE  ER Tablet 10 milliGRAM(s) Oral every 12 hours  pantoprazole    Tablet 40 milliGRAM(s) Oral before breakfast  QUEtiapine 50 milliGRAM(s) Oral at bedtime  ritonavir Tablet 100 milliGRAM(s) Oral daily  sertraline 50 milliGRAM(s) Oral at bedtime  sevelamer hydrochloride 800 milliGRAM(s) Oral three times a day  tenofovir 300 milliGRAM(s) Oral <User Schedule>    MEDICATIONS  (PRN):  HYDROmorphone  Injectable 0.5 milliGRAM(s) SubCutaneous every 4 hours PRN Severe Pain (7 - 10)  oxyCODONE    IR 5 milliGRAM(s) Oral every 4 hours PRN Mild Pain (1 - 3)  oxyCODONE    IR 10 milliGRAM(s) Oral every 4 hours PRN Moderate Pain (4 - 6)  oxyCODONE    IR 15 milliGRAM(s) Oral every 4 hours PRN Severe Pain (7 - 10)  zolpidem 5 milliGRAM(s) Oral at bedtime PRN Insomnia      Allergies    vancomycin (Anaphylaxis)    Intolerances        Vital Signs Last 24 Hrs  T(C): 36.6 (2018 08:00), Max: 36.8 (2018 14:16)  T(F): 97.9 (2018 08:00), Max: 98.2 (2018 14:16)  HR: 84 (2018 08:00) (84 - 98)  BP: 125/79 (2018 08:00) (121/72 - 152/90)  BP(mean): --  RR: 18 (2018 08:00) (18 - 18)  SpO2: 97% (2018 08:00) (97% - 100%)  Daily     Daily Weight in k.4 (2018 14:16)    PHYSICAL EXAM:  GENERAL: NAD, well-groomed, well-developed  HEAD:  Atraumatic, Normocephalic  NECK: Supple, No JVD, Normal thyroid  NERVOUS SYSTEM:  Alert & Oriented   CHEST/LUNG: Clear to percussion bilaterally; No rales, rhonchi, wheezing, or rubs  HEART: Regular rate and rhythm; No murmurs, rubs, or gallops  ABDOMEN: Soft, Nontender, Nondistended; Bowel sounds present  EXTREMITIES:  2+ Peripheral Pulses, No clubbing, cyanosis, or edema  SKIN: No rashes or lesions  LABS:                        9.6    3.4   )-----------( 125      ( 2018 21:19 )             30.2     04-18    138  |  96<L>  |  46.0<H>  ----------------------------<  113  4.0   |  28.0  |  5.28<H>    Ca    8.7      2018 21:19    TPro  6.6  /  Alb  3.2<L>  /  TBili  0.3<L>  /  DBili  x   /  AST  21  /  ALT  17  /  AlkPhos  451<H>  04-18                RADIOLOGY & ADDITIONAL TESTS:
NEPHROLOGY INTERVAL HPI/OVERNIGHT EVENTS:  HPI:  Pt is a 27 YOM with known h/o ESRD on HD, HIV, blindness, HTN, cardiomyopathy, seizure d/o, pericarditis who was sent prior to HD today for AMS, unresponsiveness.  Pt was unresponsive in ED and was intubated for airway protection.  Pt has known h/o drug use and has a fentanyl Patch in place which was not removed in ED, pt did not receive Narcan due to concerns of h/o chronic pain.  Pt was found to be hyperkalemic, uremic.  Head CT initially is negative for acute findings. Pt was admitted to ICU for emergent hemodialysis and management of acute respiratory failure and encephalopathy, change in MS. (2018 15:29)    F/u ESRD  No complaints    PAST MEDICAL & SURGICAL HISTORY:  HIV (human immunodeficiency virus infection)  Seizure disorder  Hypertension  Diabetes  ESRD (end stage renal disease)  Seizure  Pericarditis  Anxiety  Depression  Renal failure (ARF), acute on chronic: dialysis av fistula, RUE  HTN (hypertension)  Cardiomyopathy  HIV disease: born HIV+  AV fistula  S/P tonsillectomy      MEDICATIONS  (STANDING):  ALPRAZolam 1 milliGRAM(s) Oral three times a day  amLODIPine   Tablet 10 milliGRAM(s) Oral daily  atovaquone Suspension 1500 milliGRAM(s) Oral daily  azithromycin   Tablet 500 milliGRAM(s) Oral daily  calcitriol   Capsule 0.5 MICROGram(s) Oral daily  cefepime Injectable. 500 milliGRAM(s) IV Push daily  cefepime Injectable.      darbepoetin Injectable Syringe 100 MICROGram(s) IV Push every 7 days  darunavir 800 milliGRAM(s) Oral daily  dolutegravir 50 milliGRAM(s) Oral daily  emtricitabine 200 milliGRAM(s) Oral <User Schedule>  gabapentin 100 milliGRAM(s) Oral at bedtime  heparin  Injectable 5000 Unit(s) SubCutaneous every 12 hours  levETIRAcetam 500 milliGRAM(s) Oral two times a day  lisinopril 20 milliGRAM(s) Oral daily  metoprolol tartrate 50 milliGRAM(s) Oral two times a day  oxyCODONE  ER Tablet 10 milliGRAM(s) Oral every 12 hours  pantoprazole    Tablet 40 milliGRAM(s) Oral before breakfast  QUEtiapine 50 milliGRAM(s) Oral at bedtime  ritonavir Tablet 100 milliGRAM(s) Oral daily  sertraline 50 milliGRAM(s) Oral daily  sevelamer hydrochloride 800 milliGRAM(s) Oral three times a day  tenofovir 300 milliGRAM(s) Oral <User Schedule>    MEDICATIONS  (PRN):  HYDROmorphone  Injectable 0.5 milliGRAM(s) SubCutaneous every 4 hours PRN Severe Pain (7 - 10)  oxyCODONE    IR 5 milliGRAM(s) Oral every 4 hours PRN Mild Pain (1 - 3)  oxyCODONE    IR 10 milliGRAM(s) Oral every 4 hours PRN Moderate Pain (4 - 6)  oxyCODONE    IR 15 milliGRAM(s) Oral every 4 hours PRN Severe Pain (7 - 10)  zolpidem 5 milliGRAM(s) Oral at bedtime PRN Insomnia      Allergies    vancomycin (Anaphylaxis)    Intolerances        Vital Signs Last 24 Hrs  T(C): 37.2 (2018 23:35), Max: 37.2 (2018 23:35)  T(F): 98.9 (2018 23:35), Max: 98.9 (2018 23:35)  HR: 96 (2018 06:00) (85 - 96)  BP: 170/96 (2018 06:00) (142/97 - 170/96)  BP(mean): --  RR: 17 (2018 23:35) (16 - 20)  SpO2: 98% (2018 23:35) (97% - 99%)  Daily     Daily Weight in k (2018 14:32)    PHYSICAL EXAM:  GENERAL: No distress  HEAD:  Atraumatic, Normocephalic, poor dentition  ENMT: Moist mucous membranes  NECK: Supple, No JVD, Normal thyroid  NERVOUS SYSTEM:  Alert & Oriented X3  CHEST/LUNG: Clear to percussion bilaterally; No rales, rhonchi, wheezing, or rubs  HEART: Regular rate and rhythm; No murmurs, rubs, or gallops  ABDOMEN: Soft, Nontender, Nondistended; Bowel sounds present  EXTREMITIES:  No edema; AVF +ve bruit  SKIN: No rashes or lesions    LABS:                        9.7    4.2   )-----------( 141      ( 2018 14:03 )             30.5     04-16    138  |  94<L>  |  60.0<H>  ----------------------------<  113  3.8   |  26.0  |  8.67<H>    Ca    8.6      2018 14:03    TPro  6.4<L>  /  Alb  3.1<L>  /  TBili  0.4  /  DBili  x   /  AST  25  /  ALT  22  /  AlkPhos  484<H>  04-16              RADIOLOGY & ADDITIONAL TESTS:
NEPHROLOGY INTERVAL HPI/OVERNIGHT EVENTS:  HPI: 27 M with a hx of HIV, renal failure, hyperkalemia, and similar presentation in the past presents to the ED for AMS. Pt was brought from dialysis due to AMS. S/p HD in the ICU and now extubated.     No acute events overnight  No new complaints      PAST MEDICAL & SURGICAL HISTORY:  HIV (human immunodeficiency virus infection)  Seizure disorder  Hypertension  Diabetes  ESRD (end stage renal disease)  Seizure  Pericarditis  Anxiety  Depression  Renal failure (ARF), acute on chronic: dialysis av fistula, RUE  HTN (hypertension)  Cardiomyopathy  HIV disease: born HIV+  AV fistula  S/P tonsillectomy      MEDICATIONS  (STANDING):  amLODIPine   Tablet 10 milliGRAM(s) Oral daily  azithromycin   Tablet 500 milliGRAM(s) Oral daily  calcitriol   Capsule 0.5 MICROGram(s) Oral daily  cefepime Injectable. 500 milliGRAM(s) IV Push daily  cefepime Injectable.      DAPTOmycin IVPB      DAPTOmycin IVPB 450 milliGRAM(s) IV Intermittent every 48 hours  darunavir 800 milliGRAM(s) Oral daily  dolutegravir 50 milliGRAM(s) Oral daily  fentaNYL   Patch  75 MICROgram(s)/Hr 1 Patch Transdermal every 72 hours  gabapentin 100 milliGRAM(s) Oral at bedtime  levETIRAcetam  IVPB 500 milliGRAM(s) IV Intermittent every 12 hours  lisinopril 20 milliGRAM(s) Oral daily  metoprolol tartrate 50 milliGRAM(s) Oral two times a day  pantoprazole    Tablet 40 milliGRAM(s) Oral before breakfast  QUEtiapine 50 milliGRAM(s) Oral at bedtime  sertraline 50 milliGRAM(s) Oral daily  sevelamer hydrochloride 800 milliGRAM(s) Oral three times a day    MEDICATIONS  (PRN):  HYDROmorphone  Injectable 1 milliGRAM(s) IV Push every 6 hours PRN breakthrough pain      Allergies    vancomycin (Anaphylaxis)    Intolerances        Vital Signs Last 24 Hrs  T(C): 37.2 (2018 23:31), Max: 37.2 (2018 23:31)  T(F): 99 (2018 23:31), Max: 99 (2018 23:31)  HR: 86 (2018 23:31) (85 - 98)  BP: 145/89 (2018 23:31) (135/79 - 183/110)  BP(mean): 120 (2018 20:00) (109 - 140)  RR: 18 (2018 23:31) (18 - 59)  SpO2: 93% (2018 23:31) (93% - 100%)  Daily     Daily Weight in k (2018 15:35)    PHYSICAL EXAM:  GENERAL: ill appearing, no distress  HEAD:  Atraumatic, Normocephalic  EYES: EOMI, PERRLA, conjunctiva and sclera clear  ENMT: Moist mucous membranes  NECK: No JVD  NERVOUS SYSTEM:  Alert & Oriented X3  CHEST/LUNG: Few scattered rhonchi B/L  HEART: Regular rate and rhythm; No murmurs, rubs, or gallops  ABDOMEN: Soft, Nontender, Nondistended; Bowel sounds present  EXTREMITIES:  +Edema; AVF +ve bruit  SKIN: No rashes or lesions  LABS:                        9.7    5.4   )-----------( 152      ( 2018 12:17 )             31.0     04-14    138  |  92<L>  |  34.0<H>  ----------------------------<  86  4.4   |  29.0  |  6.26<H>    Ca    8.3<L>      2018 06:55    TPro  7.1  /  Alb  3.8  /  TBili  1.0  /  DBili  x   /  AST  45<H>  /  ALT  20  /  AlkPhos  617<H>  04-13    PT/INR - ( 2018 12:17 )   PT: 14.4 sec;   INR: 1.30 ratio         PTT - ( 2018 12:17 )  PTT:36.8 sec      ABG - ( 2018 12:25 )  pH: 7.35  /  pCO2: 40    /  pO2: 159   / HCO3: 22    / Base Excess: -3.5  /  SaO2: 100                 RADIOLOGY & ADDITIONAL TESTS:
[facility]  INFECTIOUS DISEASES AND INTERNAL MEDICINE at [place]  =======================================================  Howie Navarro MD  Diplomates American Board of Internal Medicine and Infectious Diseases  =======================================================    TEDDY CRAWFORD [id number]    Follow up:  26 Y/O man with known h/o ESRD on HD, HIV recent CD4=88 and VL=25K, blindness, HTN, cardiomyopathy, seizure, pericarditis who was sent prior to HD today for AMS, unresponsiveness.  Pt was unresponsive in ED and was intubated for airway protection.  Pt has known h/o drug use and had a fentanyl Patch and also Dilaudid around the clock.  Pt was found to be hyperkalemic, uremic.  Head CT  negative for acute findings. Pt was admitted to ICU for emergent hemodialysis and management of acute respiratory failure and encephalopathy, change in MS.   Now he is back to his normal mental status. Chest CT showed pneumonia, he was on cefepime and azithromycin, yesterday was switched to levaquin and also restarted on complete ARV regimen      Allergies:  vancomycin (Anaphylaxis)     REVIEW OF SYSTEMS:  CONSTITUTIONAL:  No Fever or chills  HEENT:   light perception only bilaterally   CARDIOVASCULAR:  No pressure, squeezing, strangling, tightness, heaviness or aching about the chest, neck, axilla or epigastrium.  RESPIRATORY:  cough+  GASTROINTESTINAL:  No nausea, vomiting or diarrhea.  GENITOURINARY:  No dysuria, frequency or urgency. No Blood in urine  MUSCULOSKELETAL:  pain in low back  SKIN:  No change in skin, hair or nails.     Physical Exam:  ICU Vital Signs Last 24 Hrs  T(C): 37.5 (17 Apr 2018 08:00), Max: 37.5 (17 Apr 2018 08:00)  T(F): 99.5 (17 Apr 2018 08:00), Max: 99.5 (17 Apr 2018 08:00)  HR: 87 (17 Apr 2018 08:00) (85 - 96)  BP: 157/97 (17 Apr 2018 08:00) (142/97 - 170/96)  BP(mean): --  ABP: --  ABP(mean): --  RR: 18 (17 Apr 2018 08:00) (16 - 20)  SpO2: 94% (17 Apr 2018 08:00) (94% - 99%)  GEN: NAD  HEENT: normocephalic and atraumatic.blindness only sees light  NECK: Supple. No carotid bruits.  No lymphadenopathy or thyromegaly.  LUNGS: Clear to auscultation. with minimal coarse ronchi bilaterally   HEART: Regular rate and rhythm without murmur.  ABDOMEN: Soft, nontender, and nondistended.  Positive bowel sounds.    : No CVA tenderness  EXTREMITIES: right arm A-V fistula with good bruit  MSK: no joint swelling  SKIN: No ulceration or induration present.      Allergies:  vancomycin (Anaphylaxis)    Labs:  04-16    138  |  94<L>  |  60.0<H>  ----------------------------<  113  3.8   |  26.0  |  8.67<H>    Ca    8.6      16 Apr 2018 14:03    TPro  6.4<L>  /  Alb  3.1<L>  /  TBili  0.4  /  DBili  x   /  AST  25  /  ALT  22  /  AlkPhos  484<H>  04-16                          9.7    4.2   )-----------( 141      ( 16 Apr 2018 14:03 )             30.5       LIVER FUNCTIONS - ( 16 Apr 2018 14:03 )  Alb: 3.1 g/dL / Pro: 6.4 g/dL / ALK PHOS: 484 U/L / ALT: 22 U/L / AST: 25 U/L / GGT: x             RECENT CULTURES:  04-14 @ 15:56 .Blood Blood-Peripheral     No growth at 48 hours
Patient is a 27y old  Male who presents with a chief complaint of AMS (13 Apr 2018 15:29)      BRIEF HOSPITAL COURSE: 27 YOM with ESRD requiring emergent HD for hyperkalemia/uremia with AMS, encephalopathy possibly due to metabolic derangements HIV with fever, r/o sepsis, acute respiratory failure.    Events last 24 hours: extubated around 630am, breathing well on room air presently    PAST MEDICAL & SURGICAL HISTORY:  HIV (human immunodeficiency virus infection)  Seizure disorder  Hypertension  Diabetes  ESRD (end stage renal disease)  Seizure  Pericarditis  Anxiety  Depression  Renal failure (ARF), acute on chronic: dialysis av fistula, RUE  HTN (hypertension)  Cardiomyopathy  HIV disease: born HIV+  AV fistula  S/P tonsillectomy      Review of Systems: c/o total body pain and hunger otherwise denies complaints  CONSTITUTIONAL: No fever, chills, or fatigue  EYES: No eye pain, visual disturbances, or discharge  ENMT:  No difficulty hearing, tinnitus, vertigo; No sinus or throat pain  NECK: No pain or stiffness  RESPIRATORY: No cough, wheezing, chills or hemoptysis; No shortness of breath  CARDIOVASCULAR: No chest pain, palpitations, dizziness, or leg swelling  GASTROINTESTINAL: No abdominal or epigastric pain. No nausea, vomiting, or hematemesis; No diarrhea or constipation. No melena or hematochezia.  GENITOURINARY: No dysuria, frequency, hematuria, or incontinence  NEUROLOGICAL: No headaches, memory loss, loss of strength, numbness, or tremors  SKIN: No itching, burning, rashes, or lesions   MUSCULOSKELETAL: No joint pain or swelling; No muscle, back, or extremity pain  PSYCHIATRIC: No depression, anxiety, mood swings, or difficulty sleeping      Medications:  azithromycin   Tablet 500 milliGRAM(s) Oral daily  cefepime Injectable. 500 milliGRAM(s) IV Push daily  cefepime Injectable.      DAPTOmycin IVPB            fentaNYL   Patch  75 MICROgram(s)/Hr 1 Patch Transdermal every 72 hours  HYDROmorphone  Injectable 1 milliGRAM(s) IV Push once  levETIRAcetam  IVPB 500 milliGRAM(s) IV Intermittent every 12 hours                        Mode: CPAP with PS  FiO2: 40  PEEP: 5  PS: 5  MAP: 8      ICU Vital Signs Last 24 Hrs  T(C): 37 (14 Apr 2018 08:00), Max: 38.8 (13 Apr 2018 12:43)  T(F): 98.6 (14 Apr 2018 08:00), Max: 101.9 (13 Apr 2018 12:43)  HR: 87 (14 Apr 2018 08:00) (76 - 106)  BP: 142/90 (14 Apr 2018 08:00) (115/73 - 214/138)  BP(mean): 111 (14 Apr 2018 08:00) (90 - 128)  ABP: --  ABP(mean): --  RR: 21 (14 Apr 2018 08:00) (21 - 48)  SpO2: 100% (14 Apr 2018 08:00) (94% - 100%)      ABG - ( 13 Apr 2018 12:25 )  pH: 7.35  /  pCO2: 40    /  pO2: 159   / HCO3: 22    / Base Excess: -3.5  /  SaO2: 100                 I&O's Detail    13 Apr 2018 07:01  -  14 Apr 2018 07:00  --------------------------------------------------------  IN:    propofol Infusion: 69.3 mL  Total IN: 69.3 mL    OUT:    Other: 2000 mL  Total OUT: 2000 mL    Total NET: -1930.7 mL            LABS:                        9.7    5.4   )-----------( 152      ( 13 Apr 2018 12:17 )             31.0     04-14    138  |  92<L>  |  34.0<H>  ----------------------------<  86  4.4   |  29.0  |  6.26<H>    Ca    8.3<L>      14 Apr 2018 06:55    TPro  7.1  /  Alb  3.8  /  TBili  1.0  /  DBili  x   /  AST  45<H>  /  ALT  20  /  AlkPhos  617<H>  04-13      CARDIAC MARKERS ( 13 Apr 2018 12:17 )  x     / 0.18 ng/mL / 157 U/L / x     / 2.1 ng/mL      CAPILLARY BLOOD GLUCOSE      POCT Blood Glucose.: 71 mg/dL (13 Apr 2018 12:13)    PT/INR - ( 13 Apr 2018 12:17 )   PT: 14.4 sec;   INR: 1.30 ratio         PTT - ( 13 Apr 2018 12:17 )  PTT:36.8 sec    CULTURES:      Physical Examination:    General: No acute distress.      HEENT: Pupils equal, reactive to light.  Symmetric.    PULM: Clear to auscultation bilaterally, no significant sputum production    CVS: Regular rate and rhythm, no murmurs, rubs, or gallops    ABD: Soft, nondistended, nontender, normoactive bowel sounds, no masses    EXT: No edema, nontender    SKIN: Warm and well perfused, no rashes noted.    NEURO: Alert, oriented, interactive, nonfocal    RADIOLOGY:   < from: CT Abdomen and Pelvis No Cont (04.13.18 @ 14:19) >  FINDINGS:    An endotracheal tube is noted with the tip located superior to the   toyin. A nasogastric tube is identified. The tip is located in the   gastric fundus.    Dilated right axillary and subclavian vessels are noted likely secondary   to a right upper extremity dialysis shunt.    No evidence of mediastinal or hilar lymphadenopathy. No evidence of a   pleural or pericardial effusion. Cardiomegaly is noted.    No evidence of left axillary lymphadenopathy.    There is an infiltrate in the superior segment of the right lower lobe.    Hepatomegaly. Diffuse thickening of the wall of the gallbladder.   Respiratory motion artifact in the upper abdomen precludes evaluation for   pericholecystic inflammation.    The spleen is not enlarged and demonstrates no focal abnormality. The   pancreatic contour is unremarkable without evidence of mass, inflammation   or ductal dilatation. The adrenal glands demonstrate normal size and   contour.    Marked atrophy of the kidneys is noted.    No evidence of retroperitoneal or pelvic lymphadenopathy. The prostate,   seminal vesicles, and bladder demonstrate no abnormality.    Small amount of ascites.    No evidence of ileus or obstruction.    No significant osseous abnormality.    IMPRESSION:     Small infiltrate in the superior segment of the right lower lobe.    Hepatomegaly. Diffuse thickening of the wall of the gallbladder. Suggest   sonogram of the gallbladder for further evaluation.    Atrophy of both kidneys.    Small amount of ascites.                 STUART HATFIELD M.D., ATTENDING RADIOLOGIST  This document has been electronicallysigned. Apr 13 2018  2:44PM    < end of copied text >    < from: CT Chest No Cont (04.13.18 @ 14:18) >  FINDINGS:    An endotracheal tube is noted with the tip located superior to the   toyin. A nasogastric tube is identified. The tip is located in the   gastric fundus.    Dilated right axillary and subclavian vessels are noted likely secondary   to a right upper extremity dialysis shunt.    No evidence of mediastinal or hilar lymphadenopathy. No evidence of a   pleural or pericardial effusion. Cardiomegaly is noted.    No evidence of left axillary lymphadenopathy.    There is an infiltrate in the superior segment of the right lower lobe.    Hepatomegaly. Diffuse thickening of the wall of the gallbladder.   Respiratory motion artifact in the upper abdomen precludes evaluation for   pericholecystic inflammation.    The spleen is not enlarged and demonstrates no focal abnormality. The   pancreatic contour is unremarkable without evidence of mass, inflammation   or ductal dilatation. The adrenal glands demonstrate normal size and   contour.    Marked atrophy of the kidneys is noted.    No evidence of retroperitoneal or pelvic lymphadenopathy. The prostate,   seminal vesicles, and bladder demonstrate no abnormality.    Small amount of ascites.    No evidence of ileus or obstruction.    No significant osseous abnormality.    IMPRESSION:     Small infiltrate in the superior segment of the right lower lobe.    Hepatomegaly. Diffuse thickening of the wall of the gallbladder. Suggest   sonogram of the gallbladder for further evaluation.    Atrophy of both kidneys.    Small amount of ascites.                 STUART HATFIELD M.D., ATTENDING RADIOLOGIST  This document has been electronicallysigned. Apr 13 2018  2:44PM        < end of copied text >  < from: EEG Awake and Asleep (04.13.18 @ 17:25) >  INTERPRETATION:  Technique: This 18-channel EEG with 1 channel devoted to   EKG is performed with the patient in the sedated. Electrodes were placed   according to the standard 10-20 system.    Background: The background was mildly disorganized with a posterior   dominant rhythm of 6-7 Hz after propofol was stopped.  It may have been   residual drowsiness form porpofol. There is poor posterior to anterior   progression    Drowsiness was seen as above, but may have been result of lingering   sedation.     Stage II sleep was not seen.    Stimulation: Hyperventilation was not done.  Photic stimulation produced no change in the background.    Specific features: No focal, lateralizing, or epileptiform activity was   present.    Clinical   impression: This is an abnormal record. Mild slowing may be due to   drowsiness or indicate mild diffuse cerebral dysfunction.  No seizure   activity was seen during the recording.    < end of copied text >    CRITICAL CARE TIME SPENT:
[facility]  INFECTIOUS DISEASES AND INTERNAL MEDICINE at [place]  =======================================================  Howie Paulino MD  Diplomates American Board of Internal Medicine and Infectious Diseases  =======================================================    TEDDY CRAWFORD [id number]    Follow up:   28 Y/O man with known h/o ESRD on HD, HIV, blindness, HTN, cardiomyopathy, seizure d/o, pericarditis who was sent prior to HD today for AMS, unresponsiveness.  Pt was unresponsive in ED and was intubated for airway protection.  Pt has known h/o drug use and had a fentanyl Patch and also Dilaudid around the clock.  Pt was found to be hyperkalemic, uremic.  Head CT  negative for acute findings. Pt was admitted to ICU for emergent hemodialysis and management of acute respiratory failure and encephalopathy, change in MS.   Now he is back to his normal mental status. Just asking for pain meds for chronic low back pain. no other complaint.   in further questioning has cough but no chest pain or SOB.     Allergies:  vancomycin (Anaphylaxis)    Antibiotics:   cefepime Injectable. 500 milliGRAM(s) IV Push daily     REVIEW OF SYSTEMS:  CONSTITUTIONAL:  No Fever or chills  HEENT:   light perception only bilaterally   CARDIOVASCULAR:  No pressure, squeezing, strangling, tightness, heaviness or aching about the chest, neck, axilla or epigastrium.  RESPIRATORY:  No cough, shortness of breath, PND or orthopnea.  GASTROINTESTINAL:  No nausea, vomiting or diarrhea.  GENITOURINARY:  No dysuria, frequency or urgency. No Blood in urine  MUSCULOSKELETAL:  pain in low back  SKIN:  No change in skin, hair or nails.  NEUROLOGIC:  No paresthesias, fasciculations, seizures or weakness.  PSYCHIATRIC:  No disorder of thought or mood.  ENDOCRINE:  No heat or cold intolerance, polyuria or polydipsia.  HEMATOLOGICAL:  No easy bruising or bleeding.      Physical Exam:  ICU Vital Signs Last 24 Hrs  T(C): 36.8 (16 Apr 2018 09:44), Max: 37.2 (16 Apr 2018 01:29)  T(F): 98.2 (16 Apr 2018 09:44), Max: 99 (16 Apr 2018 01:29)  HR: 85 (16 Apr 2018 09:44) (81 - 93)  BP: 148/95 (16 Apr 2018 09:44) (139/84 - 159/97)  BP(mean): --  ABP: --  ABP(mean): --  RR: 20 (16 Apr 2018 09:44) (18 - 20)  SpO2: 97% (16 Apr 2018 09:44) (97% - 98%)    GEN: NAD  HEENT: normocephalic and atraumatic.blindness only sees light  NECK: Supple. No carotid bruits.  No lymphadenopathy or thyromegaly.  LUNGS: Clear to auscultation. with minimal coarse ronchi bilaterally   HEART: Regular rate and rhythm without murmur.  ABDOMEN: Soft, nontender, and nondistended.  Positive bowel sounds.    : No CVA tenderness  EXTREMITIES: right arm A-V fistula with good bruit  MSK: no joint swelling  SKIN: No ulceration or induration present.        Labs:    RECENT CULTURES  Pending    CT chest and abdomen: shows infiltration in superior segment of RLL possible pneumonia

## 2018-04-19 NOTE — PROGRESS NOTE ADULT - PROVIDER SPECIALTY LIST ADULT
Critical Care
Hospitalist
Infectious Disease
Nephrology
Pain Medicine
Pain Medicine
Hospitalist
Infectious Disease
Infectious Disease

## 2018-04-19 NOTE — PROGRESS NOTE BEHAVIORAL HEALTH - NSBHCONSULTPRIMARYDISCUSSYES_PSY_A_CORE FT
Dr Horner- disposition of evaluation- Pt not is need of psychiatric hospitalization and will be following up at [facility] on 5/3

## 2018-04-19 NOTE — PROGRESS NOTE ADULT - ASSESSMENT
27 YOM with ESRD requiring emergent HD for hyperkalemia/uremia with AMS, encephalopathy possibly due to metabolic derangements, HIV with fever, r/o sepsis, acute respiratory failure requiring intubation for airway protection.   Metabolic encephalopathy resolved, probably multifactorial   secondary to uremia, polypharmacy, fever and possible infection. [place]  - patient on oxycodone 10 q6, Oxycontin 10 twice daily, Xanax 2mg three times a day, Zolpidem 10 and Fentanyl 100. Continued on HD M-W-f.  Has H/O Chronic CHFrEF . Appears to be well compensated currently. TTE shows mildly decreased LVSF, EF 45-50%, Grade I Diastolic dysfunction and moderate PAH.  Fever , continued on IV antibiotics for possible gram positive, gram negative pneumonia. Followed by ID.  IV Maxipime/Zithro for RLL infiltrate on CT likely aspiration.  Pain management consulted for optimization of pain medications. . Psych called for addiction issue and Depression.  H/O  HIV ,  retroviral agents resumed as per ID. History of seizure,  continue Keppra , no overt seizure activity on EEG.      Problem/Plan - 1:  ·  Problem: Metabolic encephalopathy.  Plan: Resolved.  Continue HD as per M,W,F schedule.  PT consulted, OK to return home       Problem/Plan - 2:  ·  Problem: Sepsis, due to unspecified organism.  Plan: BC negative  Maxipime/Zithro initially  RLL infiltrate on CT likely aspiration  ID input appreciated, BC negative, changed to oral Levaquin, now completed.       Problem/Plan - 3:  ·  Problem: ESRD on hemodialysis.  Plan: renal follow up appreciated  dialysis per schedule.     Problem/Plan - 4:  ·  Problem: Other chronic pain.  Plan: Pain management input appreciated, follow up as outpatient. Psych input appreciated. No Psychiatric contraindications to discharge.      Problem/Plan - 5:  ·  Problem: Chronic combined systolic and diastolic HF (heart failure).  Plan: Continue BB, ACEI.      Problem/Plan - 6:  Problem: HIV (human immunodeficiency virus infection). Plan:  retroviral agents as per ID       Problem/Plan - 7:  ·  Problem: Seizure disorder.  Plan: continue Keppra   no overt seizure activity on EEG.      Problem/Plan - 8:  ·  Problem: Acute respiratory failure, unspecified whether with hypoxia or hypercapnia.  Plan: Resolved, now on Room air     Problem/Plan - 9:  ·  Problem: Prophylactic measure.  Plan: VTE ppx - Heparin SQ    Problem/Plan-10:  Problem: Depression. Plan: Appreciate Social Work input. Psych input. Patient states he will consider Grief counseling as outpatient    Dispo: Home with Home Care today

## 2018-04-19 NOTE — PROGRESS NOTE BEHAVIORAL HEALTH - RISK ASSESSMENT
Low risk: States that he would never harm himself, made comment out of frustration.  Protective Factors: Family support, therapeutic

## 2018-04-19 NOTE — PROGRESS NOTE ADULT - ASSESSMENT
ESRD  AMS - He has a history of drug abuse and OD - resolved  Acute respiratory failure - extubated  PNA  Hyperkalemia - resolved  HTN  HIV  DM  Anemia of CKD  hyperparathyroidism, future for parathyroidectomy    - HD MWF. HD tomorrow if not d/c yet   - Abx per ID. Now to oral   - Renal dialysis diet    DC planning    D/w RN

## 2018-04-19 NOTE — CHART NOTE - NSCHARTNOTEFT_GEN_A_CORE
patient is seen and evaluated, case discuss with NP, patient is being discharged home with home care, please see discharge Summary for further details. Patient was angry at the afternoon time and was asking why he is not eligible for skilled nursing, The  stated the qualifications, including that he would need wound care and the patient responded with, "What do I have to do, cut my thigh," which raised a red flag for the  and they ordered 1:1 observation,  Psy was called and as per them When interviewing the patient, he confirmed that he did make that comment, but he stated multiple times that he said the phrase out of anger, to someone he could trust, and he would never harm himself., When asked what would happen if he did not receive a home health aide, he stated he would go home and have assistance from his mother which he has been doing since he lost home service, he denied suicidality, as per Psych, Pt not in need of psychiatric hospitalization and will be following up at [facility] on 5/3, no psychiatric contraindications to discharge, Outpatient counseling- [facility] appt made.   after psychiatrist see the patient, patient called me and wanted to to discharge him home, and is being discharged home in a stable condition, patient is being discharged home with home care, please see discharge Summary for further details.

## 2018-04-19 NOTE — PROGRESS NOTE BEHAVIORAL HEALTH - SUMMARY
26yo with PMHx of depression, HIV, ESRD on HD, blind, seizure disorder, cardiomyopathy is consulted for psychiatric follow up for self harm comment. As per  from the patient's insurance company, pt was asking why he was not eligible for skilled nursing. The  stated the qualifications, including that he would need wound care and the patient responded with, "What do I have to do, cut my thigh," which raised a red flag for the , ordering a 1:1 for the patient. When interviewing the patient, he confirmed that he did make that comment, but he stated multiple times that he said the phrase out of anger, to someone he could trust, and he would never harm himself. When asked what would happen if he did not receive a home health aide, he stated he would go home and have assistance from his mother which he has been doing since he lost home service. Pt denies suicidality.

## 2018-04-19 NOTE — PROGRESS NOTE BEHAVIORAL HEALTH - NSBHCHARTREVIEWVS_PSY_A_CORE FT
Vital Signs Last 24 Hrs  T(C): 36.6 (19 Apr 2018 15:00), Max: 36.7 (18 Apr 2018 17:20)  T(F): 97.9 (19 Apr 2018 15:00), Max: 98 (18 Apr 2018 17:20)  HR: 92 (19 Apr 2018 15:00) (84 - 98)  BP: 127/74 (19 Apr 2018 15:00) (121/72 - 139/86)  BP(mean): --  RR: 20 (19 Apr 2018 15:00) (18 - 20)  SpO2: 99% (19 Apr 2018 15:00) (97% - 100%)

## 2018-04-19 NOTE — PROGRESS NOTE ADULT - PROBLEM SELECTOR PLAN 1
Patient is comfortable and stable on current medication. He continues to request IV Dilaudid.  -I do not feel it is appropriate to give patient any IV medication. He is stable and reporting good relief with oral oxycodone. Discharge planning.  -Continue oxycodone 5/10/15mg [id number] prn mild/mod/severe pain  Continue to monitor for any adverse drug reactions. Hold opiates for respiratory depression or increased sedation.    Discussed above plan with patient who is in agreement. Will continue to follow. Call with any pain questions .

## 2018-06-30 ENCOUNTER — INPATIENT (INPATIENT)
Facility: HOSPITAL | Age: 28
LOS: 16 days | Discharge: HOME | DRG: 981 | End: 2018-07-17
Attending: HOSPITALIST | Admitting: HOSPITALIST
Payer: COMMERCIAL

## 2018-06-30 VITALS — HEIGHT: 69 IN | WEIGHT: 133.38 LBS

## 2018-06-30 DIAGNOSIS — N18.6 END STAGE RENAL DISEASE: ICD-10-CM

## 2018-06-30 DIAGNOSIS — R11.2 NAUSEA WITH VOMITING, UNSPECIFIED: ICD-10-CM

## 2018-06-30 DIAGNOSIS — R62.7 ADULT FAILURE TO THRIVE: ICD-10-CM

## 2018-06-30 DIAGNOSIS — B20 HUMAN IMMUNODEFICIENCY VIRUS [HIV] DISEASE: ICD-10-CM

## 2018-06-30 DIAGNOSIS — I77.0 ARTERIOVENOUS FISTULA, ACQUIRED: Chronic | ICD-10-CM

## 2018-06-30 DIAGNOSIS — E11.8 TYPE 2 DIABETES MELLITUS WITH UNSPECIFIED COMPLICATIONS: ICD-10-CM

## 2018-06-30 DIAGNOSIS — G40.909 EPILEPSY, UNSPECIFIED, NOT INTRACTABLE, WITHOUT STATUS EPILEPTICUS: ICD-10-CM

## 2018-06-30 DIAGNOSIS — I10 ESSENTIAL (PRIMARY) HYPERTENSION: ICD-10-CM

## 2018-06-30 LAB
ALBUMIN SERPL ELPH-MCNC: 3.9 G/DL — SIGNIFICANT CHANGE UP (ref 3.3–5.2)
ALP SERPL-CCNC: 322 U/L — HIGH (ref 40–120)
ALT FLD-CCNC: 5 U/L — SIGNIFICANT CHANGE UP
ANION GAP SERPL CALC-SCNC: 22 MMOL/L — HIGH (ref 5–17)
ANISOCYTOSIS BLD QL: SLIGHT — SIGNIFICANT CHANGE UP
APTT BLD: 37.3 SEC — SIGNIFICANT CHANGE UP (ref 27.5–37.4)
AST SERPL-CCNC: 14 U/L — SIGNIFICANT CHANGE UP
BASE EXCESS BLDV CALC-SCNC: 3.7 MMOL/L — HIGH (ref -2–2)
BILIRUB SERPL-MCNC: 0.7 MG/DL — SIGNIFICANT CHANGE UP (ref 0.4–2)
BUN SERPL-MCNC: 56 MG/DL — HIGH (ref 8–20)
CA-I SERPL-SCNC: 1.04 MMOL/L — LOW (ref 1.15–1.33)
CALCIUM SERPL-MCNC: 9.3 MG/DL — SIGNIFICANT CHANGE UP (ref 8.6–10.2)
CHLORIDE BLDV-SCNC: 92 MMOL/L — LOW (ref 98–107)
CHLORIDE SERPL-SCNC: 90 MMOL/L — LOW (ref 98–107)
CO2 SERPL-SCNC: 24 MMOL/L — SIGNIFICANT CHANGE UP (ref 22–29)
CREAT SERPL-MCNC: 12.19 MG/DL — HIGH (ref 0.5–1.3)
DACRYOCYTES BLD QL SMEAR: SLIGHT — SIGNIFICANT CHANGE UP
ELLIPTOCYTES BLD QL SMEAR: SLIGHT — SIGNIFICANT CHANGE UP
EOSINOPHIL NFR BLD AUTO: 1 % — SIGNIFICANT CHANGE UP (ref 0–6)
GAS PNL BLDV: 139 MMOL/L — SIGNIFICANT CHANGE UP (ref 135–145)
GAS PNL BLDV: SIGNIFICANT CHANGE UP
GAS PNL BLDV: SIGNIFICANT CHANGE UP
GLUCOSE BLDV-MCNC: 96 MG/DL — SIGNIFICANT CHANGE UP (ref 70–99)
GLUCOSE SERPL-MCNC: 98 MG/DL — SIGNIFICANT CHANGE UP (ref 70–115)
HCO3 BLDV-SCNC: 28 MMOL/L — SIGNIFICANT CHANGE UP (ref 21–29)
HCT VFR BLD CALC: 31.8 % — LOW (ref 42–52)
HGB BLD-MCNC: 10.1 G/DL — LOW (ref 14–18)
HYPOCHROMIA BLD QL: SLIGHT — SIGNIFICANT CHANGE UP
INR BLD: 1.11 RATIO — SIGNIFICANT CHANGE UP (ref 0.88–1.16)
LACTATE BLDV-MCNC: 1.1 MMOL/L — SIGNIFICANT CHANGE UP (ref 0.5–2)
LIDOCAIN IGE QN: 62 U/L — HIGH (ref 22–51)
LYMPHOCYTES # BLD AUTO: 29 % — SIGNIFICANT CHANGE UP (ref 20–55)
MACROCYTES BLD QL: SLIGHT — SIGNIFICANT CHANGE UP
MCHC RBC-ENTMCNC: 29 PG — SIGNIFICANT CHANGE UP (ref 27–31)
MCHC RBC-ENTMCNC: 31.8 G/DL — LOW (ref 32–36)
MCV RBC AUTO: 91.4 FL — SIGNIFICANT CHANGE UP (ref 80–94)
MICROCYTES BLD QL: SLIGHT — SIGNIFICANT CHANGE UP
MONOCYTES NFR BLD AUTO: 12 % — HIGH (ref 3–10)
MYELOCYTES NFR BLD: 1 % — HIGH (ref 0–0)
NEUTROPHILS NFR BLD AUTO: 56 % — SIGNIFICANT CHANGE UP (ref 37–73)
OVALOCYTES BLD QL SMEAR: SLIGHT — SIGNIFICANT CHANGE UP
PCO2 BLDV: 47 MMHG — SIGNIFICANT CHANGE UP (ref 35–50)
PH BLDV: 7.4 — SIGNIFICANT CHANGE UP (ref 7.32–7.43)
PLAT MORPH BLD: NORMAL — SIGNIFICANT CHANGE UP
PLATELET # BLD AUTO: 98 K/UL — LOW (ref 150–400)
PO2 BLDV: 84 MMHG — HIGH (ref 25–45)
POIKILOCYTOSIS BLD QL AUTO: SLIGHT — SIGNIFICANT CHANGE UP
POLYCHROMASIA BLD QL SMEAR: SLIGHT — SIGNIFICANT CHANGE UP
POTASSIUM BLDV-SCNC: 4.5 MMOL/L — SIGNIFICANT CHANGE UP (ref 3.4–4.5)
POTASSIUM SERPL-MCNC: 4.7 MMOL/L — SIGNIFICANT CHANGE UP (ref 3.5–5.3)
POTASSIUM SERPL-SCNC: 4.7 MMOL/L — SIGNIFICANT CHANGE UP (ref 3.5–5.3)
PROMYELOCYTES # FLD: 1 % — HIGH (ref 0–0)
PROT SERPL-MCNC: 7.2 G/DL — SIGNIFICANT CHANGE UP (ref 6.6–8.7)
PROTHROM AB SERPL-ACNC: 12.2 SEC — SIGNIFICANT CHANGE UP (ref 9.8–12.7)
RBC # BLD: 3.48 M/UL — LOW (ref 4.6–6.2)
RBC # FLD: 17 % — HIGH (ref 11–15.6)
RBC BLD AUTO: ABNORMAL
SAO2 % BLDV: 96 % — SIGNIFICANT CHANGE UP
SCHISTOCYTES BLD QL AUTO: SLIGHT — SIGNIFICANT CHANGE UP
SODIUM SERPL-SCNC: 136 MMOL/L — SIGNIFICANT CHANGE UP (ref 135–145)
SPHEROCYTES BLD QL SMEAR: SLIGHT — SIGNIFICANT CHANGE UP
TROPONIN T SERPL-MCNC: 0.17 NG/ML — HIGH (ref 0–0.06)
WBC # BLD: 1.8 K/UL — LOW (ref 4.8–10.8)
WBC # FLD AUTO: 1.8 K/UL — LOW (ref 4.8–10.8)

## 2018-06-30 PROCEDURE — 99285 EMERGENCY DEPT VISIT HI MDM: CPT

## 2018-06-30 PROCEDURE — 99223 1ST HOSP IP/OBS HIGH 75: CPT

## 2018-06-30 PROCEDURE — 70450 CT HEAD/BRAIN W/O DYE: CPT | Mod: 26

## 2018-06-30 PROCEDURE — 71045 X-RAY EXAM CHEST 1 VIEW: CPT | Mod: 26

## 2018-06-30 PROCEDURE — 93010 ELECTROCARDIOGRAM REPORT: CPT

## 2018-06-30 RX ORDER — DEXTROSE 50 % IN WATER 50 %
25 SYRINGE (ML) INTRAVENOUS ONCE
Qty: 0 | Refills: 0 | Status: DISCONTINUED | OUTPATIENT
Start: 2018-06-30 | End: 2018-07-01

## 2018-06-30 RX ORDER — DEXTROSE 50 % IN WATER 50 %
12.5 SYRINGE (ML) INTRAVENOUS ONCE
Qty: 0 | Refills: 0 | Status: DISCONTINUED | OUTPATIENT
Start: 2018-06-30 | End: 2018-07-01

## 2018-06-30 RX ORDER — SERTRALINE 25 MG/1
50 TABLET, FILM COATED ORAL DAILY
Qty: 0 | Refills: 0 | Status: DISCONTINUED | OUTPATIENT
Start: 2018-06-30 | End: 2018-07-17

## 2018-06-30 RX ORDER — KETOROLAC TROMETHAMINE 30 MG/ML
30 SYRINGE (ML) INJECTION ONCE
Qty: 0 | Refills: 0 | Status: DISCONTINUED | OUTPATIENT
Start: 2018-06-30 | End: 2018-06-30

## 2018-06-30 RX ORDER — SEVELAMER CARBONATE 2400 MG/1
800 POWDER, FOR SUSPENSION ORAL THREE TIMES A DAY
Qty: 0 | Refills: 0 | Status: DISCONTINUED | OUTPATIENT
Start: 2018-06-30 | End: 2018-07-17

## 2018-06-30 RX ORDER — DARUNAVIR 75 MG/1
800 TABLET, FILM COATED ORAL DAILY
Qty: 0 | Refills: 0 | Status: DISCONTINUED | OUTPATIENT
Start: 2018-06-30 | End: 2018-07-16

## 2018-06-30 RX ORDER — GABAPENTIN 400 MG/1
100 CAPSULE ORAL AT BEDTIME
Qty: 0 | Refills: 0 | Status: DISCONTINUED | OUTPATIENT
Start: 2018-06-30 | End: 2018-07-02

## 2018-06-30 RX ORDER — AMLODIPINE BESYLATE 2.5 MG/1
10 TABLET ORAL DAILY
Qty: 0 | Refills: 0 | Status: DISCONTINUED | OUTPATIENT
Start: 2018-06-30 | End: 2018-07-17

## 2018-06-30 RX ORDER — INSULIN LISPRO 100/ML
VIAL (ML) SUBCUTANEOUS
Qty: 0 | Refills: 0 | Status: DISCONTINUED | OUTPATIENT
Start: 2018-06-30 | End: 2018-07-01

## 2018-06-30 RX ORDER — OXYCODONE HYDROCHLORIDE 5 MG/1
10 TABLET ORAL EVERY 12 HOURS
Qty: 0 | Refills: 0 | Status: DISCONTINUED | OUTPATIENT
Start: 2018-06-30 | End: 2018-07-02

## 2018-06-30 RX ORDER — CALCITRIOL 0.5 UG/1
0.5 CAPSULE ORAL DAILY
Qty: 0 | Refills: 0 | Status: DISCONTINUED | OUTPATIENT
Start: 2018-06-30 | End: 2018-07-17

## 2018-06-30 RX ORDER — PANTOPRAZOLE SODIUM 20 MG/1
40 TABLET, DELAYED RELEASE ORAL
Qty: 0 | Refills: 0 | Status: DISCONTINUED | OUTPATIENT
Start: 2018-06-30 | End: 2018-07-10

## 2018-06-30 RX ORDER — HEPARIN SODIUM 5000 [USP'U]/ML
5000 INJECTION INTRAVENOUS; SUBCUTANEOUS EVERY 12 HOURS
Qty: 0 | Refills: 0 | Status: DISCONTINUED | OUTPATIENT
Start: 2018-06-30 | End: 2018-07-17

## 2018-06-30 RX ORDER — DOLUTEGRAVIR SODIUM 25 MG/1
50 TABLET, FILM COATED ORAL DAILY
Qty: 0 | Refills: 0 | Status: DISCONTINUED | OUTPATIENT
Start: 2018-06-30 | End: 2018-07-16

## 2018-06-30 RX ORDER — LEVETIRACETAM 250 MG/1
500 TABLET, FILM COATED ORAL
Qty: 0 | Refills: 0 | Status: DISCONTINUED | OUTPATIENT
Start: 2018-06-30 | End: 2018-07-17

## 2018-06-30 RX ORDER — METOPROLOL TARTRATE 50 MG
100 TABLET ORAL
Qty: 0 | Refills: 0 | Status: DISCONTINUED | OUTPATIENT
Start: 2018-06-30 | End: 2018-07-17

## 2018-06-30 RX ORDER — ZOLPIDEM TARTRATE 10 MG/1
5 TABLET ORAL AT BEDTIME
Qty: 0 | Refills: 0 | Status: DISCONTINUED | OUTPATIENT
Start: 2018-06-30 | End: 2018-07-02

## 2018-06-30 RX ORDER — RITONAVIR 100 MG/1
100 TABLET, FILM COATED ORAL DAILY
Qty: 0 | Refills: 0 | Status: DISCONTINUED | OUTPATIENT
Start: 2018-06-30 | End: 2018-07-16

## 2018-06-30 RX ORDER — SODIUM CHLORIDE 9 MG/ML
1000 INJECTION, SOLUTION INTRAVENOUS
Qty: 0 | Refills: 0 | Status: DISCONTINUED | OUTPATIENT
Start: 2018-06-30 | End: 2018-07-01

## 2018-06-30 RX ORDER — TENOFOVIR DISOPROXIL FUMARATE 300 MG/1
300 TABLET, FILM COATED ORAL
Qty: 0 | Refills: 0 | Status: DISCONTINUED | OUTPATIENT
Start: 2018-06-30 | End: 2018-07-17

## 2018-06-30 RX ORDER — ALPRAZOLAM 0.25 MG
2 TABLET ORAL THREE TIMES A DAY
Qty: 0 | Refills: 0 | Status: DISCONTINUED | OUTPATIENT
Start: 2018-06-30 | End: 2018-07-02

## 2018-06-30 RX ORDER — ONDANSETRON 8 MG/1
4 TABLET, FILM COATED ORAL EVERY 6 HOURS
Qty: 0 | Refills: 0 | Status: DISCONTINUED | OUTPATIENT
Start: 2018-06-30 | End: 2018-07-09

## 2018-06-30 RX ORDER — FENTANYL CITRATE 50 UG/ML
1 INJECTION INTRAVENOUS
Qty: 0 | Refills: 0 | Status: DISCONTINUED | OUTPATIENT
Start: 2018-06-30 | End: 2018-07-02

## 2018-06-30 RX ORDER — ACETAMINOPHEN 500 MG
650 TABLET ORAL EVERY 6 HOURS
Qty: 0 | Refills: 0 | Status: DISCONTINUED | OUTPATIENT
Start: 2018-06-30 | End: 2018-07-17

## 2018-06-30 RX ORDER — METOCLOPRAMIDE HCL 10 MG
10 TABLET ORAL ONCE
Qty: 0 | Refills: 0 | Status: COMPLETED | OUTPATIENT
Start: 2018-06-30 | End: 2018-06-30

## 2018-06-30 RX ORDER — GLUCAGON INJECTION, SOLUTION 0.5 MG/.1ML
1 INJECTION, SOLUTION SUBCUTANEOUS ONCE
Qty: 0 | Refills: 0 | Status: DISCONTINUED | OUTPATIENT
Start: 2018-06-30 | End: 2018-07-01

## 2018-06-30 RX ORDER — QUETIAPINE FUMARATE 200 MG/1
50 TABLET, FILM COATED ORAL AT BEDTIME
Qty: 0 | Refills: 0 | Status: DISCONTINUED | OUTPATIENT
Start: 2018-06-30 | End: 2018-07-02

## 2018-06-30 RX ORDER — DEXTROSE 50 % IN WATER 50 %
15 SYRINGE (ML) INTRAVENOUS ONCE
Qty: 0 | Refills: 0 | Status: DISCONTINUED | OUTPATIENT
Start: 2018-06-30 | End: 2018-07-01

## 2018-06-30 RX ORDER — LISINOPRIL 2.5 MG/1
20 TABLET ORAL DAILY
Qty: 0 | Refills: 0 | Status: DISCONTINUED | OUTPATIENT
Start: 2018-06-30 | End: 2018-07-17

## 2018-06-30 RX ORDER — ATOVAQUONE 750 MG/5ML
1500 SUSPENSION ORAL DAILY
Qty: 0 | Refills: 0 | Status: DISCONTINUED | OUTPATIENT
Start: 2018-06-30 | End: 2018-07-17

## 2018-06-30 RX ORDER — EMTRICITABINE 200 MG/1
200 CAPSULE ORAL
Qty: 0 | Refills: 0 | Status: DISCONTINUED | OUTPATIENT
Start: 2018-06-30 | End: 2018-07-17

## 2018-06-30 RX ORDER — SODIUM CHLORIDE 9 MG/ML
1000 INJECTION INTRAMUSCULAR; INTRAVENOUS; SUBCUTANEOUS
Qty: 0 | Refills: 0 | Status: COMPLETED | OUTPATIENT
Start: 2018-06-30 | End: 2018-06-30

## 2018-06-30 RX ADMIN — Medication 10 MILLIGRAM(S): at 20:43

## 2018-06-30 RX ADMIN — SODIUM CHLORIDE 500 MILLILITER(S): 9 INJECTION INTRAMUSCULAR; INTRAVENOUS; SUBCUTANEOUS at 20:43

## 2018-06-30 RX ADMIN — Medication 30 MILLIGRAM(S): at 20:43

## 2018-06-30 RX ADMIN — Medication 30 MILLIGRAM(S): at 21:13

## 2018-06-30 NOTE — ED STATDOCS - PROGRESS NOTE DETAILS
Hx of hiv,esrd blindness secondary to hiv.  increasing weakenss with vomiting  for the past week. will transfer to [facility]

## 2018-06-30 NOTE — H&P ADULT - HISTORY OF PRESENT ILLNESS
The patient is a 27 year old male with a history of HIV ( non compliant with treatment), CMV retinitis with vision loss, ESRD on HD MWF, dilated cardiomyopathy, hypertension and seizure disorder who was brought to the ED with complaints of weakness, confusion, enlarging of his fistula with occasional bleeding, and seeing flashes of light (he is blind at baseline). Pt also c/o nausea and wretching. Vomited once today at home. Fell today at home in bathroom but denies hitting head, states tripped. Pt uses wheelchair at baseline. He admits to being non compliant with his medications at home; he had not taken his medications. Pt denies cp, diarrhea, HA, fevers, constipation, numbness, tingling.

## 2018-06-30 NOTE — ED PROVIDER NOTE - CHIEF COMPLAINT
The patient is a 27y Male complaining of weakness.
The patient is a 27y Male complaining of weakness.

## 2018-06-30 NOTE — ED ADULT TRIAGE NOTE - CHIEF COMPLAINT QUOTE
pt has multiply complaints states that he is nauseous, vomited x1 today , weak and fell in bathroom denies hitting his head pt awake and alert legally blind.

## 2018-06-30 NOTE — ED PROVIDER NOTE - OBJECTIVE STATEMENT
Patient with PMH HIV with CD4 count < 200, blindness, ESRD on HD (M/W/F), dilated cardiomyopathy presents with multiple complaints. He states that this year he has been on a downward slide, but over the past few weeks he has had increasing confusion, feels that his fistula in his right AC has been increasing in size, that he is bleeding spontaneously from almost no trauma and has been seeing flashes of light (he normal has no vision at all). He also notes a substantial weight loss and has been nauseas and vomiting multiple times a day. He has not been taking his regular medications because he feels so poorly. He states that he is not depressed, but he also feels that he cannot live this way.   PMD: Shaquille.

## 2018-06-30 NOTE — ED PROVIDER NOTE - NS ED ROS FT
Const: + lethargy, + confusion, + weight loss. Denies fever, chills  HEENT: + flashes of light. Denies sore throat  Neck: Denies neck pain/stiffness  Resp: Denies coughing, SOB  Cardiovascular: + CP, + increasing size of AV fistula, + palpitations. Denies  LE edema  GI: + nausea, + vomiting, + chest pain, + diarrhea, + abdominal pain. Denies constipation, blood in stool  MSK: Denies back pain  Neuro: + HA, + flashes of light. Denies dizziness, numbness, weakness  Skin: Denies rashes.   Heme: + easy bleeding.

## 2018-06-30 NOTE — ED ADULT NURSE NOTE - OBJECTIVE STATEMENT
Received patient in CDU7L, a&ox3, blind. Noted with pink and red id band on right wrist, right av fistula without bleeding. Patient received with left top hand iv, patent, iv site without redness or swelling. Patient has multiple complaints. Patient PMH: Seizure, ESRD, HIV+. Patient states he was nauseous and vomited x 1 today, fell in the bathroom due to weakness, denies LOC and hitting his head. Patient reports he has lsot weight and noncompliant with his medications. Patient denies chest pain, sob, dizziness, headache, fever and chills, To continue to monitor.

## 2018-06-30 NOTE — H&P ADULT - PROBLEM SELECTOR PLAN 1
admit to any bed  zofran prn n/v  advance diet as tolerated , now on liquid diet  f/u AM lab work  strict i/o  doug hydration given in ED

## 2018-06-30 NOTE — ED PROVIDER NOTE - PHYSICAL EXAMINATION
Const: Awake, alert and oriented. In painful distress.  HEENT: NC/AT. Dry mucous membranes.  Eyes: No scleral icterus. Dilated pupils bilaterally with bilateral strabismus.  Neck:. Soft and supple. Full ROM without pain.  Cardiac: Tachycardic rate and regular rhythm. +S1/S2. No murmurs. AV fistula in right AC with palpable thrill, no active bleeding. Peripheral pulses 2+ and symmetric. No LE edema.  Resp: Speaking in full sentences. No evidence of respiratory distress. No wheezes, rales or rhonchi.  Abd: Soft, moderately tender to palpation diffusely, non-distended. Normal bowel sounds in all 4 quadrants. No guarding or rebound.  Back: Spine midline and non-tender. No CVAT.  Skin: No rashes, abrasions or lacerations.  Lymph: No cervical lymphadenopathy.  Neuro: Awake, alert & oriented x 3. Moves all extremities symmetrically.

## 2018-06-30 NOTE — H&P ADULT - ASSESSMENT
27 year old male with a history of HIV ( non compliant with treatment), CMV retinitis with vision loss, ESRD on HD MWF, dilated cardiomyopathy, hypertension and seizure disorder, who is admitted for po intolerance, intractable n/v, and failure to thrive. 27 year old male with a history of HIV ( non compliant with treatment), CMV retinitis with vision loss, ESRD on HD MWF, dilated cardiomyopathy, hypertension and seizure disorder, who is admitted for po intolerance, intractable n/v, and failure to thrive.     consider opthalmology consult for vision changes  renal and ID consult placed

## 2018-06-30 NOTE — ED PROVIDER NOTE - CARE PLAN
Principal Discharge DX:	Failure to thrive in adult  Secondary Diagnosis:	ESRD (end stage renal disease)  Secondary Diagnosis:	HIV disease

## 2018-06-30 NOTE — ED PROVIDER NOTE - MEDICAL DECISION MAKING DETAILS
Patient with low CD4 count presents complaining of worsening generalized weakness, nausea/vomiting, chest pain, abdominal pain, and flashes of light. He was seen in this ED about 2 months ago when he was intubated for severe sepsis with encephalopathy. Will evaluate for infection, check labs and CT head as patient is now having flashes of light, but patient is blind. Patient will likely require admission.

## 2018-07-01 LAB
ALBUMIN SERPL ELPH-MCNC: 3.6 G/DL — SIGNIFICANT CHANGE UP (ref 3.3–5.2)
ALP SERPL-CCNC: 304 U/L — HIGH (ref 40–120)
ALT FLD-CCNC: 6 U/L — SIGNIFICANT CHANGE UP
ANION GAP SERPL CALC-SCNC: 24 MMOL/L — HIGH (ref 5–17)
ANISOCYTOSIS BLD QL: SLIGHT — SIGNIFICANT CHANGE UP
APTT BLD: 33.9 SEC — SIGNIFICANT CHANGE UP (ref 27.5–37.4)
AST SERPL-CCNC: 14 U/L — SIGNIFICANT CHANGE UP
BILIRUB SERPL-MCNC: 0.4 MG/DL — SIGNIFICANT CHANGE UP (ref 0.4–2)
BUN SERPL-MCNC: 59 MG/DL — HIGH (ref 8–20)
CALCIUM SERPL-MCNC: 9.1 MG/DL — SIGNIFICANT CHANGE UP (ref 8.6–10.2)
CHLORIDE SERPL-SCNC: 91 MMOL/L — LOW (ref 98–107)
CO2 SERPL-SCNC: 23 MMOL/L — SIGNIFICANT CHANGE UP (ref 22–29)
CREAT SERPL-MCNC: 12.71 MG/DL — HIGH (ref 0.5–1.3)
ELLIPTOCYTES BLD QL SMEAR: SLIGHT — SIGNIFICANT CHANGE UP
EOSINOPHIL NFR BLD AUTO: 2 % — SIGNIFICANT CHANGE UP (ref 0–6)
GLUCOSE BLDC GLUCOMTR-MCNC: 82 MG/DL — SIGNIFICANT CHANGE UP (ref 70–99)
GLUCOSE BLDC GLUCOMTR-MCNC: 90 MG/DL — SIGNIFICANT CHANGE UP (ref 70–99)
GLUCOSE SERPL-MCNC: 85 MG/DL — SIGNIFICANT CHANGE UP (ref 70–115)
HCT VFR BLD CALC: 33 % — LOW (ref 42–52)
HGB BLD-MCNC: 10.6 G/DL — LOW (ref 14–18)
HYPOCHROMIA BLD QL: SLIGHT — SIGNIFICANT CHANGE UP
INR BLD: 1.08 RATIO — SIGNIFICANT CHANGE UP (ref 0.88–1.16)
LACTATE BLDV-MCNC: 2.1 MMOL/L — HIGH (ref 0.5–2)
LYMPHOCYTES # BLD AUTO: 36 % — SIGNIFICANT CHANGE UP (ref 20–55)
MACROCYTES BLD QL: SLIGHT — SIGNIFICANT CHANGE UP
MAGNESIUM SERPL-MCNC: 2.4 MG/DL — SIGNIFICANT CHANGE UP (ref 1.6–2.6)
MCHC RBC-ENTMCNC: 29.4 PG — SIGNIFICANT CHANGE UP (ref 27–31)
MCHC RBC-ENTMCNC: 32.1 G/DL — SIGNIFICANT CHANGE UP (ref 32–36)
MCV RBC AUTO: 91.7 FL — SIGNIFICANT CHANGE UP (ref 80–94)
MICROCYTES BLD QL: SLIGHT — SIGNIFICANT CHANGE UP
MONOCYTES NFR BLD AUTO: 12 % — HIGH (ref 3–10)
NEUTROPHILS NFR BLD AUTO: 48 % — SIGNIFICANT CHANGE UP (ref 37–73)
OVALOCYTES BLD QL SMEAR: SLIGHT — SIGNIFICANT CHANGE UP
PHOSPHATE SERPL-MCNC: 9 MG/DL — HIGH (ref 2.4–4.7)
PLAT MORPH BLD: NORMAL — SIGNIFICANT CHANGE UP
PLATELET # BLD AUTO: 98 K/UL — LOW (ref 150–400)
POIKILOCYTOSIS BLD QL AUTO: SLIGHT — SIGNIFICANT CHANGE UP
POTASSIUM SERPL-MCNC: 4.5 MMOL/L — SIGNIFICANT CHANGE UP (ref 3.5–5.3)
POTASSIUM SERPL-SCNC: 4.5 MMOL/L — SIGNIFICANT CHANGE UP (ref 3.5–5.3)
PROT SERPL-MCNC: 6.9 G/DL — SIGNIFICANT CHANGE UP (ref 6.6–8.7)
PROTHROM AB SERPL-ACNC: 11.9 SEC — SIGNIFICANT CHANGE UP (ref 9.8–12.7)
RBC # BLD: 3.6 M/UL — LOW (ref 4.6–6.2)
RBC # FLD: 16.8 % — HIGH (ref 11–15.6)
RBC BLD AUTO: ABNORMAL
SODIUM SERPL-SCNC: 138 MMOL/L — SIGNIFICANT CHANGE UP (ref 135–145)
VARIANT LYMPHS # BLD: 2 % — SIGNIFICANT CHANGE UP (ref 0–6)
WBC # BLD: 2 K/UL — LOW (ref 4.8–10.8)
WBC # FLD AUTO: 2 K/UL — LOW (ref 4.8–10.8)

## 2018-07-01 PROCEDURE — 99233 SBSQ HOSP IP/OBS HIGH 50: CPT

## 2018-07-01 RX ORDER — DIPHENHYDRAMINE HCL 50 MG
25 CAPSULE ORAL ONCE
Qty: 0 | Refills: 0 | Status: DISCONTINUED | OUTPATIENT
Start: 2018-07-01 | End: 2018-07-01

## 2018-07-01 RX ORDER — DIPHENHYDRAMINE HCL 50 MG
25 CAPSULE ORAL EVERY 6 HOURS
Qty: 0 | Refills: 0 | Status: DISCONTINUED | OUTPATIENT
Start: 2018-07-01 | End: 2018-07-17

## 2018-07-01 RX ADMIN — AMLODIPINE BESYLATE 10 MILLIGRAM(S): 2.5 TABLET ORAL at 08:09

## 2018-07-01 RX ADMIN — LISINOPRIL 20 MILLIGRAM(S): 2.5 TABLET ORAL at 05:17

## 2018-07-01 RX ADMIN — LEVETIRACETAM 500 MILLIGRAM(S): 250 TABLET, FILM COATED ORAL at 18:02

## 2018-07-01 RX ADMIN — LEVETIRACETAM 500 MILLIGRAM(S): 250 TABLET, FILM COATED ORAL at 05:17

## 2018-07-01 RX ADMIN — HEPARIN SODIUM 5000 UNIT(S): 5000 INJECTION INTRAVENOUS; SUBCUTANEOUS at 05:17

## 2018-07-01 RX ADMIN — SEVELAMER CARBONATE 800 MILLIGRAM(S): 2400 POWDER, FOR SUSPENSION ORAL at 05:17

## 2018-07-01 RX ADMIN — Medication 25 MILLIGRAM(S): at 18:00

## 2018-07-01 RX ADMIN — DARUNAVIR 800 MILLIGRAM(S): 75 TABLET, FILM COATED ORAL at 11:41

## 2018-07-01 RX ADMIN — Medication 100 MILLIGRAM(S): at 18:02

## 2018-07-01 RX ADMIN — HEPARIN SODIUM 5000 UNIT(S): 5000 INJECTION INTRAVENOUS; SUBCUTANEOUS at 18:01

## 2018-07-01 RX ADMIN — RITONAVIR 100 MILLIGRAM(S): 100 TABLET, FILM COATED ORAL at 11:41

## 2018-07-01 RX ADMIN — ONDANSETRON 4 MILLIGRAM(S): 8 TABLET, FILM COATED ORAL at 18:02

## 2018-07-01 RX ADMIN — OXYCODONE HYDROCHLORIDE 10 MILLIGRAM(S): 5 TABLET ORAL at 05:17

## 2018-07-01 RX ADMIN — DOLUTEGRAVIR SODIUM 50 MILLIGRAM(S): 25 TABLET, FILM COATED ORAL at 11:41

## 2018-07-01 RX ADMIN — OXYCODONE HYDROCHLORIDE 10 MILLIGRAM(S): 5 TABLET ORAL at 18:47

## 2018-07-01 RX ADMIN — PANTOPRAZOLE SODIUM 40 MILLIGRAM(S): 20 TABLET, DELAYED RELEASE ORAL at 08:08

## 2018-07-01 RX ADMIN — SERTRALINE 50 MILLIGRAM(S): 25 TABLET, FILM COATED ORAL at 11:33

## 2018-07-01 RX ADMIN — ZOLPIDEM TARTRATE 5 MILLIGRAM(S): 10 TABLET ORAL at 00:35

## 2018-07-01 RX ADMIN — CALCITRIOL 0.5 MICROGRAM(S): 0.5 CAPSULE ORAL at 11:33

## 2018-07-01 RX ADMIN — Medication 100 MILLIGRAM(S): at 05:17

## 2018-07-01 RX ADMIN — OXYCODONE HYDROCHLORIDE 10 MILLIGRAM(S): 5 TABLET ORAL at 05:47

## 2018-07-01 RX ADMIN — ATOVAQUONE 1500 MILLIGRAM(S): 750 SUSPENSION ORAL at 11:33

## 2018-07-01 RX ADMIN — FENTANYL CITRATE 1 PATCH: 50 INJECTION INTRAVENOUS at 11:43

## 2018-07-01 RX ADMIN — OXYCODONE HYDROCHLORIDE 10 MILLIGRAM(S): 5 TABLET ORAL at 18:02

## 2018-07-01 RX ADMIN — Medication 2 MILLIGRAM(S): at 11:44

## 2018-07-01 NOTE — PROGRESS NOTE ADULT - SUBJECTIVE AND OBJECTIVE BOX
seen for gen weakness, nausea.  ROS otherwise negative  complaining of nausea but improved and tolerating liquids, wants diet upgraded.  pain in fistula arm.    MEDICATIONS  (STANDING):  amLODIPine   Tablet 10 milliGRAM(s) Oral daily  atovaquone Suspension 1500 milliGRAM(s) Oral daily  calcitriol   Capsule 0.5 MICROGram(s) Oral daily  darunavir 800 milliGRAM(s) Oral daily  dolutegravir 50 milliGRAM(s) Oral daily  emtricitabine 200 milliGRAM(s) Oral <User Schedule>  fentaNYL   Patch 100 MICROgram(s)/Hr 1 Patch Transdermal every 48 hours  gabapentin 100 milliGRAM(s) Oral at bedtime  heparin  Injectable 5000 Unit(s) SubCutaneous every 12 hours  levETIRAcetam 500 milliGRAM(s) Oral two times a day  levoFLOXacin  Tablet 250 milliGRAM(s) Oral every 48 hours  lisinopril 20 milliGRAM(s) Oral daily  metoprolol tartrate 100 milliGRAM(s) Oral two times a day  oxyCODONE  ER Tablet 10 milliGRAM(s) Oral every 12 hours  pantoprazole    Tablet 40 milliGRAM(s) Oral before breakfast  QUEtiapine 50 milliGRAM(s) Oral at bedtime  ritonavir Tablet 100 milliGRAM(s) Oral daily  sertraline 50 milliGRAM(s) Oral daily  sevelamer hydrochloride 800 milliGRAM(s) Oral three times a day  tenofovir disoproxil fumarate (VIREAD) 300 milliGRAM(s) Oral <User Schedule>    MEDICATIONS  (PRN):  acetaminophen   Tablet 650 milliGRAM(s) Oral every 6 hours PRN For Temp greater than 38 C (100.4 F)  acetaminophen   Tablet. 650 milliGRAM(s) Oral every 6 hours PRN Moderate Pain (4 - 6)  ALPRAZolam 2 milliGRAM(s) Oral three times a day PRN anxiety, agitation  ondansetron Injectable 4 milliGRAM(s) IV Push every 6 hours PRN Nausea  zolpidem 5 milliGRAM(s) Oral at bedtime PRN Insomnia      Allergies    vancomycin (Anaphylaxis)      Vital Signs Last 24 Hrs  T(C): 36.8 (01 Jul 2018 06:34), Max: 36.9 (30 Jun 2018 17:05)  T(F): 98.2 (01 Jul 2018 06:34), Max: 98.5 (01 Jul 2018 04:31)  HR: 70 (01 Jul 2018 09:55) (70 - 72)  BP: 156/90 (01 Jul 2018 09:55) (140/72 - 162/98)  BP(mean): --  RR: 20 (01 Jul 2018 09:55) (19 - 20)  SpO2: 99% (01 Jul 2018 09:55) (99% - 100%)    PHYSICAL EXAM:    GENERAL: NAD  CHEST/LUNG: ctab  HEART: Regular rate and rhythm; S1 S2  ABDOMEN: Soft, Nontender, Nondistended; Bowel sounds present  EXTREMITIES:  no edema  NERVOUS SYSTEM:  Alert & Oriented X3,  nonfocal  LABS:                        10.6   2.0   )-----------( 98       ( 01 Jul 2018 03:21 )             33.0     07-01    138  |  91<L>  |  59.0<H>  ----------------------------<  85  4.5   |  23.0  |  12.71<H>    Ca    9.1      01 Jul 2018 03:21  Phos  9.0     07-01  Mg     2.4     07-01    TPro  6.9  /  Alb  3.6  /  TBili  0.4  /  DBili  x   /  AST  14  /  ALT  6   /  AlkPhos  304<H>  07-01    PT/INR - ( 01 Jul 2018 03:21 )   PT: 11.9 sec;   INR: 1.08 ratio         PTT - ( 01 Jul 2018 03:21 )  PTT:33.9 sec      CAPILLARY BLOOD GLUCOSE      POCT Blood Glucose.: 82 mg/dL (01 Jul 2018 08:11)        RADIOLOGY & ADDITIONAL TESTS:

## 2018-07-01 NOTE — PROGRESS NOTE ADULT - ASSESSMENT
27 year old male with a history of HIV ( non compliant with treatment), CMV retinitis with vision loss, ESRD on HD MWF, dilated cardiomyopathy, hypertension and seizure disorder, who is admitted for po intolerance, intractable n/v, and failure to thrive.     outpatient opthalmology consult for vision changes  renal and ID consult placed

## 2018-07-01 NOTE — ED ADULT NURSE REASSESSMENT NOTE - NS ED NURSE REASSESS COMMENT FT1
Report received from off going RN, charting as noted. Patient A&Ox4, complaining of "itchy" feeling. Respirations even & unlabored, denies any numbness or tingling. Denies any chest pain, shortness of breath, nausea or dizziness. Right upper extremity AV fistula in place, positive thrill, positive bruit. Saline lock in place, patent, negative s/s phlebitis or infiltration. Plan of care discussed, all questions answered. Will monitor. Report received from off going RN, charting as noted. Patient A&Ox4, complaining of "itchy" feeling. Respirations even & unlabored, denies any numbness or tingling. Denies any chest pain, shortness of breath, nausea or dizziness. Right upper extremity AV fistula in place, positive thrill, positive bruit. Pink band in place to left arm for safety. Saline lock in place, patent, negative s/s phlebitis or infiltration. Plan of care discussed, all questions answered. Will monitor.

## 2018-07-02 DIAGNOSIS — R40.0 SOMNOLENCE: ICD-10-CM

## 2018-07-02 LAB
ANION GAP SERPL CALC-SCNC: 21 MMOL/L — HIGH (ref 5–17)
BUN SERPL-MCNC: 78 MG/DL — HIGH (ref 8–20)
CALCIUM SERPL-MCNC: 8.7 MG/DL — SIGNIFICANT CHANGE UP (ref 8.6–10.2)
CHLORIDE SERPL-SCNC: 90 MMOL/L — LOW (ref 98–107)
CO2 SERPL-SCNC: 26 MMOL/L — SIGNIFICANT CHANGE UP (ref 22–29)
CREAT SERPL-MCNC: 14.63 MG/DL — HIGH (ref 0.5–1.3)
GLUCOSE SERPL-MCNC: 88 MG/DL — SIGNIFICANT CHANGE UP (ref 70–115)
POTASSIUM SERPL-MCNC: 4.2 MMOL/L — SIGNIFICANT CHANGE UP (ref 3.5–5.3)
POTASSIUM SERPL-SCNC: 4.2 MMOL/L — SIGNIFICANT CHANGE UP (ref 3.5–5.3)
SODIUM SERPL-SCNC: 137 MMOL/L — SIGNIFICANT CHANGE UP (ref 135–145)

## 2018-07-02 PROCEDURE — 99233 SBSQ HOSP IP/OBS HIGH 50: CPT

## 2018-07-02 PROCEDURE — 99222 1ST HOSP IP/OBS MODERATE 55: CPT

## 2018-07-02 RX ORDER — ALPRAZOLAM 0.25 MG
0.5 TABLET ORAL THREE TIMES A DAY
Qty: 0 | Refills: 0 | Status: DISCONTINUED | OUTPATIENT
Start: 2018-07-02 | End: 2018-07-09

## 2018-07-02 RX ORDER — OXYCODONE HYDROCHLORIDE 5 MG/1
5 TABLET ORAL
Qty: 0 | Refills: 0 | Status: DISCONTINUED | OUTPATIENT
Start: 2018-07-02 | End: 2018-07-05

## 2018-07-02 RX ORDER — FENTANYL CITRATE 50 UG/ML
1 INJECTION INTRAVENOUS
Qty: 0 | Refills: 0 | Status: DISCONTINUED | OUTPATIENT
Start: 2018-07-02 | End: 2018-07-03

## 2018-07-02 RX ADMIN — Medication 0.5 MILLIGRAM(S): at 22:38

## 2018-07-02 RX ADMIN — OXYCODONE HYDROCHLORIDE 5 MILLIGRAM(S): 5 TABLET ORAL at 23:20

## 2018-07-02 RX ADMIN — ONDANSETRON 4 MILLIGRAM(S): 8 TABLET, FILM COATED ORAL at 23:43

## 2018-07-02 RX ADMIN — OXYCODONE HYDROCHLORIDE 5 MILLIGRAM(S): 5 TABLET ORAL at 16:37

## 2018-07-02 RX ADMIN — Medication 2 MILLIGRAM(S): at 00:38

## 2018-07-02 RX ADMIN — ZOLPIDEM TARTRATE 5 MILLIGRAM(S): 10 TABLET ORAL at 01:52

## 2018-07-02 RX ADMIN — Medication 100 MILLIGRAM(S): at 16:37

## 2018-07-02 RX ADMIN — GABAPENTIN 100 MILLIGRAM(S): 400 CAPSULE ORAL at 00:30

## 2018-07-02 RX ADMIN — QUETIAPINE FUMARATE 50 MILLIGRAM(S): 200 TABLET, FILM COATED ORAL at 00:31

## 2018-07-02 RX ADMIN — LEVETIRACETAM 500 MILLIGRAM(S): 250 TABLET, FILM COATED ORAL at 16:37

## 2018-07-02 RX ADMIN — SEVELAMER CARBONATE 800 MILLIGRAM(S): 2400 POWDER, FOR SUSPENSION ORAL at 21:19

## 2018-07-02 RX ADMIN — OXYCODONE HYDROCHLORIDE 5 MILLIGRAM(S): 5 TABLET ORAL at 22:20

## 2018-07-02 RX ADMIN — Medication 25 MILLIGRAM(S): at 23:43

## 2018-07-02 RX ADMIN — OXYCODONE HYDROCHLORIDE 5 MILLIGRAM(S): 5 TABLET ORAL at 16:55

## 2018-07-02 RX ADMIN — Medication 650 MILLIGRAM(S): at 00:31

## 2018-07-02 RX ADMIN — Medication 650 MILLIGRAM(S): at 07:20

## 2018-07-02 NOTE — CONSULT NOTE ADULT - SUBJECTIVE AND OBJECTIVE BOX
Patient is a 27y old  Male who presents with a chief complaint of weakness (30 Jun 2018 23:01)   Acute  renal failure.    HPI:  The patient is a 27 year old male with a history of HIV ( non compliant with treatment), CMV retinitis with vision loss, ESRD on HD MWF, dilated cardiomyopathy, hypertension and seizure disorder who was brought to the ED with complaints of weakness, confusion, enlarging of his fistula with occasional bleeding, and seeing flashes of light (he is blind at baseline). Pt also c/o nausea and wretching. Vomited once today at home. Fell today at home in bathroom but denies hitting head, states tripped. Pt uses wheelchair at baseline. He admits to being non compliant with his medications at home; he had not taken his medications. Pt denies cp, diarrhea, HA, fevers, constipation, numbness, tingling. (30 Jun 2018 23:01)   Hx renal stones x1 not aware of type, no other renal  problems; elevated creatinine on admission.    Consulted for ESRD on HD MWF. The patient denies nausea and vomiting when seen. He also denies chest pain and dyspnea.     PAST MEDICAL & SURGICAL HISTORY:  HIV (human immunodeficiency virus infection)  Seizure disorder  Hypertension  Diabetes  ESRD (end stage renal disease)  Seizure  Pericarditis  Anxiety  Depression  Renal failure (ARF), acute on chronic: dialysis av fistula, RUE  HTN (hypertension)  Cardiomyopathy  HIV disease: born HIV+  AV fistula  S/P tonsillectomy   DM 2, MO, hypothyroidism, prior DVT and IVC filter; Cholecystectomy    FAMILY HISTORY:  No pertinent family history in first degree relatives  No pertinent family history in first degree relatives  [place]    Social History:Non smoker    MEDICATIONS  (STANDING):  amLODIPine   Tablet 10 milliGRAM(s) Oral daily  atovaquone Suspension 1500 milliGRAM(s) Oral daily  calcitriol   Capsule 0.5 MICROGram(s) Oral daily  darunavir 800 milliGRAM(s) Oral daily  dolutegravir 50 milliGRAM(s) Oral daily  emtricitabine 200 milliGRAM(s) Oral <User Schedule>  fentaNYL   Patch 100 MICROgram(s)/Hr 1 Patch Transdermal every 48 hours  gabapentin 100 milliGRAM(s) Oral at bedtime  heparin  Injectable 5000 Unit(s) SubCutaneous every 12 hours  levETIRAcetam 500 milliGRAM(s) Oral two times a day  levoFLOXacin  Tablet 250 milliGRAM(s) Oral every 48 hours  lisinopril 20 milliGRAM(s) Oral daily  metoprolol tartrate 100 milliGRAM(s) Oral two times a day  oxyCODONE  ER Tablet 10 milliGRAM(s) Oral every 12 hours  pantoprazole    Tablet 40 milliGRAM(s) Oral before breakfast  QUEtiapine 50 milliGRAM(s) Oral at bedtime  ritonavir Tablet 100 milliGRAM(s) Oral daily  sertraline 50 milliGRAM(s) Oral daily  sevelamer hydrochloride 800 milliGRAM(s) Oral three times a day  tenofovir disoproxil fumarate (VIREAD) 300 milliGRAM(s) Oral <User Schedule>    MEDICATIONS  (PRN):  acetaminophen   Tablet 650 milliGRAM(s) Oral every 6 hours PRN For Temp greater than 38 C (100.4 F)  acetaminophen   Tablet. 650 milliGRAM(s) Oral every 6 hours PRN Moderate Pain (4 - 6)  ALPRAZolam 2 milliGRAM(s) Oral three times a day PRN anxiety, agitation  diphenhydrAMINE   Capsule 25 milliGRAM(s) Oral every 6 hours PRN Rash and/or Itching  ondansetron Injectable 4 milliGRAM(s) IV Push every 6 hours PRN Nausea  zolpidem 5 milliGRAM(s) Oral at bedtime PRN Insomnia   Meds reviewed    Allergies    vancomycin (Anaphylaxis)    Intolerances         REVIEW OF SYSTEMS:    CONSTITUTIONAL:  neg  EYES: No eye pain, visual disturbances, or discharge  ENMT:  No difficulty hearing, tinnitus, vertigo; No sinus or throat pain  NECK: No pain or stiffness  BREASTS: No pain, masses, or nipple discharge  RESPIRATORY: neg  CARDIOVASCULAR: No chest pain, palpitations, dizziness,   GASTROINTESTINAL: No abdominal or epigastric pain. No nausea, vomiting, or hematemesis; No diarrhea or constipation. No melena   GENITOURINARY: No dysuria, frequency, hematuria, or incontinence  NEUROLOGICAL: No headaches, memory loss, loss of strength, numbness, or tremors  SKIN: neg  LYMPH NODES: No enlarged glands  ENDOCRINE: No heat or cold intolerance; No hair loss  MUSCULOSKELETAL: neg  PSYCHIATRIC: No depression, anxiety, mood swings, or difficulty sleeping  HEME/LYMPH: No easy bruising, or bleeding gums  ALLERGY AND IMMUNOLOGIC: No hives or eczema    Vital Signs Last 24 Hrs  T(C): 36.4 (02 Jul 2018 01:30), Max: 36.4 (02 Jul 2018 01:30)  T(F): 97.6 (02 Jul 2018 01:30), Max: 97.6 (02 Jul 2018 01:30)  HR: 66 (02 Jul 2018 01:30) (66 - 72)  BP: 119/74 (02 Jul 2018 01:30) (119/74 - 156/90)  BP(mean): --  RR: 18 (02 Jul 2018 01:30) (18 - 20)  SpO2: 97% (02 Jul 2018 01:30) (97% - 99%)  Daily     Daily     PHYSICAL EXAM:    GENERAL: No distress, quite fatigued when seen this morning  HEAD:  Atraumatic, Normocephalic  EYES: Conjunctiva and sclera clear  ENMT: No tonsillar erythema, exudates, or enlargement; Moist mucous membranes, Good dentition, No lesions  NECK: Supple  NERVOUS SYSTEM:  Alert & Oriented X3, Follows commands  CHEST/LUNG: Clear to percussion bilaterally; No rales, rhonchi, wheezing, or rubs  HEART: Regular rate and rhythm; No murmurs, rubs, or gallops  ABDOMEN: Soft, Nontender, Nondistended; Bowel sounds present, body wall and flank edema  EXTREMITIES:  No edema B/L; RUE Hypertrophic AVF with palpable thrill  LYMPH: No lymphadenopathy noted  SKIN: No rashes or lesions, pale    LABS:                        10.6   2.0   )-----------( 98       ( 01 Jul 2018 03:21 )             33.0     07-01    138  |  91<L>  |  59.0<H>  ----------------------------<  85  4.5   |  23.0  |  12.71<H>    Ca    9.1      01 Jul 2018 03:21  Phos  9.0     07-01  Mg     2.4     07-01    TPro  6.9  /  Alb  3.6  /  TBili  0.4  /  DBili  x   /  AST  14  /  ALT  6   /  AlkPhos  304<H>  07-01    PT/INR - ( 01 Jul 2018 03:21 )   PT: 11.9 sec;   INR: 1.08 ratio         PTT - ( 01 Jul 2018 03:21 )  PTT:33.9 sec          RADIOLOGY & ADDITIONAL TESTS:

## 2018-07-02 NOTE — PROGRESS NOTE ADULT - SUBJECTIVE AND OBJECTIVE BOX
seen for weakness    patient is lethargic, falling asleep but opens eyes to voice/commands  med list reviewed  ROS limited.    MEDICATIONS  (STANDING):  amLODIPine   Tablet 10 milliGRAM(s) Oral daily  atovaquone Suspension 1500 milliGRAM(s) Oral daily  calcitriol   Capsule 0.5 MICROGram(s) Oral daily  darunavir 800 milliGRAM(s) Oral daily  dolutegravir 50 milliGRAM(s) Oral daily  emtricitabine 200 milliGRAM(s) Oral <User Schedule>  fentaNYL   Patch  50 MICROgram(s)/Hr 1 Patch Transdermal every 72 hours  heparin  Injectable 5000 Unit(s) SubCutaneous every 12 hours  levETIRAcetam 500 milliGRAM(s) Oral two times a day  lisinopril 20 milliGRAM(s) Oral daily  metoprolol tartrate 100 milliGRAM(s) Oral two times a day  pantoprazole    Tablet 40 milliGRAM(s) Oral before breakfast  QUEtiapine 50 milliGRAM(s) Oral at bedtime  ritonavir Tablet 100 milliGRAM(s) Oral daily  sertraline 50 milliGRAM(s) Oral daily  sevelamer hydrochloride 800 milliGRAM(s) Oral three times a day  tenofovir disoproxil fumarate (VIREAD) 300 milliGRAM(s) Oral <User Schedule>    MEDICATIONS  (PRN):  acetaminophen   Tablet 650 milliGRAM(s) Oral every 6 hours PRN For Temp greater than 38 C (100.4 F)  acetaminophen   Tablet. 650 milliGRAM(s) Oral every 6 hours PRN Moderate Pain (4 - 6)  ALPRAZolam 2 milliGRAM(s) Oral three times a day PRN anxiety, agitation  diphenhydrAMINE   Capsule 25 milliGRAM(s) Oral every 6 hours PRN Rash and/or Itching  ondansetron Injectable 4 milliGRAM(s) IV Push every 6 hours PRN Nausea  oxyCODONE    IR 5 milliGRAM(s) Oral four times a day PRN mod to severe pain      Allergies    vancomycin (Anaphylaxis)      Vital Signs Last 24 Hrs  T(C): 36.4 (02 Jul 2018 01:30), Max: 36.4 (02 Jul 2018 01:30)  T(F): 97.6 (02 Jul 2018 01:30), Max: 97.6 (02 Jul 2018 01:30)  HR: 66 (02 Jul 2018 01:30) (66 - 72)  BP: 119/74 (02 Jul 2018 01:30) (119/74 - 145/70)  BP(mean): --  RR: 18 (02 Jul 2018 01:30) (18 - 20)  SpO2: 97% (02 Jul 2018 01:30) (97% - 98%)    PHYSICAL EXAM:    GENERAL: NAD  CHEST/LUNG: ctab  HEART: Regular rate and rhythm; S1 S2  ABDOMEN: Soft, Nontender, Nondistended; Bowel sounds present  EXTREMITIES:  no edema  NERVOUS SYSTEM:  somnolent but opens eyes to voice, moves 4 extremities spontaneously    LABS:                        10.6   2.0   )-----------( 98       ( 01 Jul 2018 03:21 )             33.0     07-02    137  |  90<L>  |  78.0<H>  ----------------------------<  88  4.2   |  26.0  |  14.63<H>    Ca    8.7      02 Jul 2018 07:19  Phos  9.0     07-01  Mg     2.4     07-01    TPro  6.9  /  Alb  3.6  /  TBili  0.4  /  DBili  x   /  AST  14  /  ALT  6   /  AlkPhos  304<H>  07-01    PT/INR - ( 01 Jul 2018 03:21 )   PT: 11.9 sec;   INR: 1.08 ratio         PTT - ( 01 Jul 2018 03:21 )  PTT:33.9 sec      CAPILLARY BLOOD GLUCOSE  90 (01 Jul 2018 12:08)      POCT Blood Glucose.: 90 mg/dL (01 Jul 2018 12:04)        RADIOLOGY & ADDITIONAL TESTS:

## 2018-07-02 NOTE — PROGRESS NOTE ADULT - PROBLEM SELECTOR PLAN 1
due to overmedication  ISTOP reviewed  decrease fentanyl patch to 50mcg and change long acting oxycodone from 10mg BID to oxycodone IR 5mg qid. dc ambien, gabapentin.

## 2018-07-02 NOTE — CONSULT NOTE ADULT - SUBJECTIVE AND OBJECTIVE BOX
[facility]  INFECTIOUS DISEASES AND INTERNAL MEDICINE at [place]  =======================================================  Howie Navarro MD  Diplomates American Board of Internal Medicine and Infectious Diseases  =======================================================    MRN-[id number]  TEDDY CRAWFORD     CC: Nausea/vomiting and weakness     HPI:   26 y/o man with HIV nonadherent to ARV, CMV retinitis with vision loss, ESRD on HD, dilated cardiomyopathy, hypertension and seizure disorder was admitted with weakness, confusion, nausea and vomiting, seeing flashes of light, blind at baseline. Pt uses wheelchair at baseline. He feels slightly better but very sleepy since taken ambien at 2am unable to complete interview due to drowsiness.   Admits to nonadherence to his meds.     PAST MEDICAL & SURGICAL HISTORY:  HIV (human immunodeficiency virus infection)  Seizure disorder  Hypertension  Diabetes  ESRD (end stage renal disease)  Seizure  Pericarditis  Anxiety  Depression  Renal failure (ARF), acute on chronic: dialysis av fistula, RUE  HTN (hypertension)  Cardiomyopathy  HIV disease: born HIV+  AV fistula  S/P tonsillectomy      FAMILY HISTORY:  No pertinent family history in first degree relatives  No pertinent family history in first degree relatives    Allergies  vancomycin (Anaphylaxis)    Antibiotics:  atovaquone Suspension 1500 milliGRAM(s) Oral daily  darunavir 800 milliGRAM(s) Oral daily  dolutegravir 50 milliGRAM(s) Oral daily  emtricitabine 200 milliGRAM(s) Oral <User Schedule>  levoFLOXacin  Tablet 250 milliGRAM(s) Oral every 48 hours  ritonavir Tablet 100 milliGRAM(s) Oral daily  tenofovir disoproxil fumarate (VIREAD) 300 milliGRAM(s) Oral <User Schedule>     REVIEW OF SYSTEMS:  CONSTITUTIONAL:  No Fever or chills very sleepy  HEENT:  No diplopia or blurred vision.  No sore throat or runny nose.  CARDIOVASCULAR:  No chest pain or SOB.  RESPIRATORY:  No cough, shortness of breath, PND or orthopnea.  GASTROINTESTINAL:  +nausea, no vomiting or diarrhea.  GENITOURINARY:  No dysuria, frequency or urgency. No Blood in urine  MUSCULOSKELETAL:  R arm with AV fistula had bleeding that stopped   SKIN:  No change in skin, hair or nails.  ENDOCRINE:  No heat or cold intolerance, polyuria or polydipsia.    Physical Exam:  Vital Signs Last 24 Hrs  T(C): 36.4 (02 Jul 2018 01:30), Max: 36.4 (02 Jul 2018 01:30)  T(F): 97.6 (02 Jul 2018 01:30), Max: 97.6 (02 Jul 2018 01:30)  HR: 66 (02 Jul 2018 01:30) (66 - 72)  BP: 119/74 (02 Jul 2018 01:30) (119/74 - 156/90)  BP(mean): --  RR: 18 (02 Jul 2018 01:30) (18 - 20)  SpO2: 97% (02 Jul 2018 01:30) (97% - 99%)  GEN: NAD, drowsy  HEENT: normocephalic and atraumatic. EOMI. PERRL.    NECK: Supple.  No lymphadenopathy   LUNGS: Clear to auscultation.  HEART: Regular rate and rhythm   ABDOMEN: Soft, nontender, and nondistended.  Positive bowel sounds.    EXTREMITIES: Without any cyanosis, clubbing, rash, lesions or edema. right arm with AV fistula with good thrill and big vessels, no bleeding    NEUROLOGIC: grossly intact.  PSYCHIATRIC: Appropriate affect .  SKIN: No ulceration or induration present.    Labs:  07-02    137  |  90<L>  |  78.0<H>  ----------------------------<  88  4.2   |  26.0  |  14.63<H>    Ca    8.7      02 Jul 2018 07:19  Phos  9.0     07-01  Mg     2.4     07-01    TPro  6.9  /  Alb  3.6  /  TBili  0.4  /  DBili  x   /  AST  14  /  ALT  6   /  AlkPhos  304<H>  07-01                          10.6   2.0   )-----------( 98       ( 01 Jul 2018 03:21 )             33.0       PT/INR - ( 01 Jul 2018 03:21 )   PT: 11.9 sec;   INR: 1.08 ratio    PTT - ( 01 Jul 2018 03:21 )  PTT:33.9 sec    LIVER FUNCTIONS - ( 01 Jul 2018 03:21 )  Alb: 3.6 g/dL / Pro: 6.9 g/dL / ALK PHOS: 304 U/L / ALT: 6 U/L / AST: 14 U/L / GGT: x           CARDIAC MARKERS ( 30 Jun 2018 20:18 )  x     / 0.17 ng/mL / x     / x     / x        CAPILLARY BLOOD GLUCOSE  90 (01 Jul 2018 12:08)    RECENT CULTURES:  None    All imaging and other data have been reviewed.    CXR and head CT with no gross abnormal findings. [facility]  INFECTIOUS DISEASES AND INTERNAL MEDICINE at [place]  =======================================================  Howie Navarro MD  Diplomates American Board of Internal Medicine and Infectious Diseases  =======================================================    MRN-[id number]  TEDDY CRAWFORD     CC: Nausea/vomiting and weakness     HPI:   26 y/o man with HIV nonadherent to ARV, CMV retinitis with vision loss, ESRD on HD, dilated cardiomyopathy, hypertension and seizure disorder was admitted with weakness, confusion, nausea and vomiting, seeing flashes of light, blind at baseline. Pt uses wheelchair at baseline. He feels slightly better but very sleepy since taken ambien at 2am unable to complete interview due to drowsiness.   Admits to nonadherence to his meds.     PAST MEDICAL & SURGICAL HISTORY:  HIV (human immunodeficiency virus infection)  Seizure disorder  Hypertension  Diabetes  ESRD (end stage renal disease)  Seizure  Pericarditis  Anxiety  Depression  Renal failure (ARF), acute on chronic: dialysis av fistula, RUE  HTN (hypertension)  Cardiomyopathy  HIV disease: born HIV+  AV fistula  S/P tonsillectomy      FAMILY HISTORY:  No pertinent family history in first degree relatives  No pertinent family history in first degree relatives    Allergies  vancomycin (Anaphylaxis)    Antibiotics:  atovaquone Suspension 1500 milliGRAM(s) Oral daily  darunavir 800 milliGRAM(s) Oral daily  dolutegravir 50 milliGRAM(s) Oral daily  emtricitabine 200 milliGRAM(s) Oral <User Schedule>  levoFLOXacin  Tablet 250 milliGRAM(s) Oral every 48 hours  ritonavir Tablet 100 milliGRAM(s) Oral daily  tenofovir disoproxil fumarate (VIREAD) 300 milliGRAM(s) Oral <User Schedule>     REVIEW OF SYSTEMS:  CONSTITUTIONAL:  No Fever or chills very sleepy  HEENT:  blind   CARDIOVASCULAR:  No chest pain or SOB.  RESPIRATORY:  No cough, shortness of breath, PND or orthopnea.  GASTROINTESTINAL:  +nausea, no vomiting or diarrhea.  GENITOURINARY:  No dysuria, frequency or urgency. No Blood in urine  MUSCULOSKELETAL:  R arm with AV fistula had bleeding that stopped   SKIN:  No change in skin, hair or nails.  ENDOCRINE:  No heat or cold intolerance, polyuria or polydipsia.    Physical Exam:  Vital Signs Last 24 Hrs  T(C): 36.4 (02 Jul 2018 01:30), Max: 36.4 (02 Jul 2018 01:30)  T(F): 97.6 (02 Jul 2018 01:30), Max: 97.6 (02 Jul 2018 01:30)  HR: 66 (02 Jul 2018 01:30) (66 - 72)  BP: 119/74 (02 Jul 2018 01:30) (119/74 - 156/90)  BP(mean): --  RR: 18 (02 Jul 2018 01:30) (18 - 20)  SpO2: 97% (02 Jul 2018 01:30) (97% - 99%)  GEN: NAD, drowsy  HEENT: normocephalic and atraumatic. EOMI. PERRL.  blind  NECK: Supple.  No lymphadenopathy   LUNGS: Clear to auscultation.  HEART: Regular rate and rhythm   ABDOMEN: Soft, nontender, and nondistended.  Positive bowel sounds.    EXTREMITIES: Without any cyanosis, clubbing, rash, lesions or edema. right arm with AV fistula with good thrill and big vessels, no bleeding    NEUROLOGIC: grossly intact.  PSYCHIATRIC: Appropriate affect .  SKIN: No ulceration or induration present.    Labs:  07-02    137  |  90<L>  |  78.0<H>  ----------------------------<  88  4.2   |  26.0  |  14.63<H>    Ca    8.7      02 Jul 2018 07:19  Phos  9.0     07-01  Mg     2.4     07-01    TPro  6.9  /  Alb  3.6  /  TBili  0.4  /  DBili  x   /  AST  14  /  ALT  6   /  AlkPhos  304<H>  07-01                          10.6   2.0   )-----------( 98       ( 01 Jul 2018 03:21 )             33.0       PT/INR - ( 01 Jul 2018 03:21 )   PT: 11.9 sec;   INR: 1.08 ratio    PTT - ( 01 Jul 2018 03:21 )  PTT:33.9 sec    LIVER FUNCTIONS - ( 01 Jul 2018 03:21 )  Alb: 3.6 g/dL / Pro: 6.9 g/dL / ALK PHOS: 304 U/L / ALT: 6 U/L / AST: 14 U/L / GGT: x           CARDIAC MARKERS ( 30 Jun 2018 20:18 )  x     / 0.17 ng/mL / x     / x     / x        CAPILLARY BLOOD GLUCOSE  90 (01 Jul 2018 12:08)    RECENT CULTURES:  None    All imaging and other data have been reviewed.    CXR and head CT with no gross abnormal findings.

## 2018-07-02 NOTE — CONSULT NOTE ADULT - ASSESSMENT
ESRD on HD MWF  Vomiting  HTN  HIV    -HD MWF; consented and orders placed. To proceed with dialysis today 7/2/18  -Revaclear 300, 3.5 hours, 3k bicarb bath, BFR 450ml/min, and UF of 2kg as tolerated per hemodynamics  -Hemodynamics are stable  -Nausea/vomiting appears to be resolved; assess for diet tolerance  -On HAART therapy  -Renal diet    Thank you    D/W RN and Sister

## 2018-07-02 NOTE — CONSULT NOTE ADULT - ASSESSMENT
26y/o man with HIV on TDF+FTC+DTG+DRV/r nonadherent to meds, with CMV retinitis, CD4 not available.     1-HIV:  -Most likely CD4 will be low due to nonadherence.   -[facility] order CD4 and VL  -Continue Tenofovir 300mg weekly  -Emtricitabine 200mg every 4 days  -Darunavir 800mg daily  -Ritonavir 100mg daily   -Dolutegravir 50mg daily  -Atovaquone 1500mg daily for PCP prophylaxis     2-History of CMV:  -Will start him on valganciclovir 100mg after each HD for treatment and then prophylaxis until CD4 is high.   -Ophthalmology consult     3-ESRD:   -Very high Creat possily causing GI symptoms.   -Needs HD, unclear if missed HD.     4-Pancytopenia:  -High risk for MAC, also has a high ALK Phosph that could be sing of MAC  -Stop levofloxacin that is a partial MAC treatment  -Send blood culture for mycobacterium

## 2018-07-02 NOTE — PROGRESS NOTE ADULT - ASSESSMENT
27 year old male with a history of HIV ( non compliant with treatment), CMV retinitis with vision loss, ESRD on HD MWF, dilated cardiomyopathy, hypertension and seizure disorder, who is admitted for po intolerance, intractable n/v, and failure to thrive.   somnolent---decrease fentanyl patch to 50mcg and change long acting oxycodone from 10mg BID to oxycodone IR 5mg qid. dc ambien, gabapentin.

## 2018-07-02 NOTE — PROGRESS NOTE ADULT - SUBJECTIVE AND OBJECTIVE BOX
The patient is refusing dialysis at this time though it is his regular scheduled day for dialysis. There is no urgency to do it at this time. Will arrange for dialysis to be done first shift on 7/3/18. Thank you.    D/W HD RN

## 2018-07-02 NOTE — CONSULT NOTE ADULT - CONSULT REQUESTED BY NAME
[address] [facility] 
 
 
 [address] [facility] [address] [id number] 
[phone] Patient: Michael Chen MRN: [id number] :1961 Visit Information Date & Time Provider Department Dept. Phone Encounter #  
 2018  3:30 PM MD Jama Steven [facility] Located within [facility] [phone] [id number] Your Appointments 2018  3:00 PM  
Follow Up with Oscar Roldan MD  
[address] [place] [address]) Appt Note: f/up from hemorrhoid exams incontinence. [address] [facility] [address] [facility]  
  
   
 [address] [facility] [address] [facility] [address] Upcoming Health Maintenance Date Due Hepatitis C Screening 1961 DTaP/Tdap/Td series (1 - Tdap) 1982 FOBT Q 1 YEAR AGE 50-75 2011 Influenza Age 5 to Adult 2017 Allergies as of 2018  Review Complete On: 2018 By: Ar Mcclure LPN No Known Allergies Current Immunizations  Never Reviewed No immunizations on file. Not reviewed this visit You Were Diagnosed With   
  
 Codes Comments Internal hemorrhoids    -  Primary ICD-10-CM: P83.7 ICD-9-CM: 455.0 Pruritus ani     ICD-10-CM: L29.0 ICD-9-CM: 698.0 Vitals BP Pulse Temp Resp Height(growth percentile) Weight(growth percentile) 117/77 61 95.4 °F (35.2 °C) (Oral) 18 5' 10\" (1.778 m) 241 lb (109.3 kg) SpO2 BMI Smoking Status 98% 34.58 kg/m2 Never Smoker Vitals History BMI and BSA Data Body Mass Index Body Surface Area 34.58 kg/m 2 2.32 m 2 Your Updated Medication List  
  
   
This list is accurate as of: 18  4:10 PM.  Always use your most recent med list.  
  
  
  
  
 CHOLECALCIFEROL (VITAMIN D3) PO  
600 mg.  
  
 escitalopram oxalate 10 mg tablet Commonly known as:  Malgorzata Apo 10 mg.  
  
 hydrocortisone 2.5 % rectal cream  
 Commonly known as:  PROCTOSOL HC Insert  into rectum three (3) times daily as needed for Hemorrhoids. red yeast rice 600 mg Tab Take  by Mouth. Indications: ELEVATED CHOLESTEROL Prescriptions Printed Refills  
 hydrocortisone (PROCTOSOL HC) 2.5 % rectal cream 2 Sig: Insert  into rectum three (3) times daily as needed for Hemorrhoids. Class: Print Route: Rectal  
  
We Performed the Following NE ANOSCOPY DX W/COLLJ SPEC BR/WA SPX WHEN PRFRMD [id number] CPT(R)] Patient Instructions If you have any questions or concerns about today's appointment, the verbal and/or written instructions you were given for follow up care, please call our office at [phone]. [facility] [place] [phone] office [phone] Introducing [facility]! [facility] introduces Xyo patient portal. Now you can access parts of your medical record, email your doctor's office, and request medication refills online. 1. In your internet browser, go to [email or web address] 2. Click on the First Time User? Click Here link in the Sign In box. You will see the New Member Sign Up page. 3. Enter your Xyo Access Code exactly as it appears below. You will not need to use this code after youve completed the sign-up process. If you do not sign up before the expiration date, you must request a new code. · Xyo Access Code: [email or web address] Expires: 5/15/2018  3:17 PM 
 
4. Enter the last four digits of your Social Security Number (xxxx) and Date of Birth (mm/dd/yyyy) as indicated and click Submit. You will be taken to the next sign-up page. 5. Create a InSite Visiont ID. This will be your Xyo login ID and cannot be changed, so think of one that is secure and easy to remember. 6. Create a [employer]t password. You can change your password at any time. Dr. Fuentes 7. Enter your Password Reset Question and Answer. This can be used at a later time if you forget your password. 8. Enter your e-mail address. You will receive e-mail notification when new information is available in [address] 9. Click Sign Up. You can now view and download portions of your medical record. 10. Click the Download Summary menu link to download a portable copy of your medical information. If you have questions, please visit the Frequently Asked Questions section of the [employer] website. Remember, [employer] is NOT to be used for urgent needs. For medical emergencies, dial 911. Now available from your iPhone and Android! Please provide this summary of care documentation to your next provider. Your primary care clinician is listed as CHRISTOPHER Alfonso. If you have any questions after today's visit, please call [phone].

## 2018-07-03 DIAGNOSIS — I77.0 ARTERIOVENOUS FISTULA, ACQUIRED: ICD-10-CM

## 2018-07-03 LAB
HAV IGG SER QL IA: SIGNIFICANT CHANGE UP
HBV CORE AB SER-ACNC: SIGNIFICANT CHANGE UP
HBV SURFACE AB SER-ACNC: REACTIVE
HBV SURFACE AG SER-ACNC: SIGNIFICANT CHANGE UP
HCV AB S/CO SERPL IA: 0.1 S/CO — SIGNIFICANT CHANGE UP
HCV AB SERPL-IMP: SIGNIFICANT CHANGE UP

## 2018-07-03 PROCEDURE — 99233 SBSQ HOSP IP/OBS HIGH 50: CPT

## 2018-07-03 PROCEDURE — 99221 1ST HOSP IP/OBS SF/LOW 40: CPT

## 2018-07-03 PROCEDURE — 99232 SBSQ HOSP IP/OBS MODERATE 35: CPT

## 2018-07-03 PROCEDURE — 93990 DOPPLER FLOW TESTING: CPT | Mod: 26

## 2018-07-03 RX ORDER — OXYCODONE AND ACETAMINOPHEN 5; 325 MG/1; MG/1
1 TABLET ORAL ONCE
Qty: 0 | Refills: 0 | Status: DISCONTINUED | OUTPATIENT
Start: 2018-07-03 | End: 2018-07-03

## 2018-07-03 RX ORDER — DIPHENHYDRAMINE HCL 50 MG
25 CAPSULE ORAL ONCE
Qty: 0 | Refills: 0 | Status: COMPLETED | OUTPATIENT
Start: 2018-07-03 | End: 2018-07-03

## 2018-07-03 RX ORDER — FENTANYL CITRATE 50 UG/ML
1 INJECTION INTRAVENOUS
Qty: 0 | Refills: 0 | Status: DISCONTINUED | OUTPATIENT
Start: 2018-07-03 | End: 2018-07-10

## 2018-07-03 RX ORDER — HYDROMORPHONE HYDROCHLORIDE 2 MG/ML
1 INJECTION INTRAMUSCULAR; INTRAVENOUS; SUBCUTANEOUS ONCE
Qty: 0 | Refills: 0 | Status: DISCONTINUED | OUTPATIENT
Start: 2018-07-03 | End: 2018-07-03

## 2018-07-03 RX ORDER — HYDROMORPHONE HYDROCHLORIDE 2 MG/ML
1 INJECTION INTRAMUSCULAR; INTRAVENOUS; SUBCUTANEOUS EVERY 6 HOURS
Qty: 0 | Refills: 0 | Status: DISCONTINUED | OUTPATIENT
Start: 2018-07-03 | End: 2018-07-04

## 2018-07-03 RX ORDER — DIPHENHYDRAMINE HCL 50 MG
50 CAPSULE ORAL ONCE
Qty: 0 | Refills: 0 | Status: COMPLETED | OUTPATIENT
Start: 2018-07-03 | End: 2018-07-03

## 2018-07-03 RX ADMIN — OXYCODONE AND ACETAMINOPHEN 1 TABLET(S): 5; 325 TABLET ORAL at 00:35

## 2018-07-03 RX ADMIN — LEVETIRACETAM 500 MILLIGRAM(S): 250 TABLET, FILM COATED ORAL at 22:36

## 2018-07-03 RX ADMIN — OXYCODONE AND ACETAMINOPHEN 1 TABLET(S): 5; 325 TABLET ORAL at 01:36

## 2018-07-03 RX ADMIN — AMLODIPINE BESYLATE 10 MILLIGRAM(S): 2.5 TABLET ORAL at 05:56

## 2018-07-03 RX ADMIN — SERTRALINE 50 MILLIGRAM(S): 25 TABLET, FILM COATED ORAL at 16:35

## 2018-07-03 RX ADMIN — DOLUTEGRAVIR SODIUM 50 MILLIGRAM(S): 25 TABLET, FILM COATED ORAL at 22:35

## 2018-07-03 RX ADMIN — HYDROMORPHONE HYDROCHLORIDE 1 MILLIGRAM(S): 2 INJECTION INTRAMUSCULAR; INTRAVENOUS; SUBCUTANEOUS at 02:03

## 2018-07-03 RX ADMIN — Medication 100 MILLIGRAM(S): at 05:56

## 2018-07-03 RX ADMIN — Medication 50 MILLIGRAM(S): at 11:52

## 2018-07-03 RX ADMIN — DARUNAVIR 800 MILLIGRAM(S): 75 TABLET, FILM COATED ORAL at 16:35

## 2018-07-03 RX ADMIN — OXYCODONE HYDROCHLORIDE 5 MILLIGRAM(S): 5 TABLET ORAL at 20:36

## 2018-07-03 RX ADMIN — HYDROMORPHONE HYDROCHLORIDE 1 MILLIGRAM(S): 2 INJECTION INTRAMUSCULAR; INTRAVENOUS; SUBCUTANEOUS at 22:51

## 2018-07-03 RX ADMIN — HYDROMORPHONE HYDROCHLORIDE 1 MILLIGRAM(S): 2 INJECTION INTRAMUSCULAR; INTRAVENOUS; SUBCUTANEOUS at 16:50

## 2018-07-03 RX ADMIN — PANTOPRAZOLE SODIUM 40 MILLIGRAM(S): 20 TABLET, DELAYED RELEASE ORAL at 05:56

## 2018-07-03 RX ADMIN — RITONAVIR 100 MILLIGRAM(S): 100 TABLET, FILM COATED ORAL at 22:35

## 2018-07-03 RX ADMIN — Medication 0.5 MILLIGRAM(S): at 23:59

## 2018-07-03 RX ADMIN — OXYCODONE HYDROCHLORIDE 5 MILLIGRAM(S): 5 TABLET ORAL at 21:06

## 2018-07-03 RX ADMIN — ONDANSETRON 4 MILLIGRAM(S): 8 TABLET, FILM COATED ORAL at 22:49

## 2018-07-03 RX ADMIN — HYDROMORPHONE HYDROCHLORIDE 1 MILLIGRAM(S): 2 INJECTION INTRAMUSCULAR; INTRAVENOUS; SUBCUTANEOUS at 22:36

## 2018-07-03 RX ADMIN — HYDROMORPHONE HYDROCHLORIDE 1 MILLIGRAM(S): 2 INJECTION INTRAMUSCULAR; INTRAVENOUS; SUBCUTANEOUS at 12:06

## 2018-07-03 RX ADMIN — HYDROMORPHONE HYDROCHLORIDE 1 MILLIGRAM(S): 2 INJECTION INTRAMUSCULAR; INTRAVENOUS; SUBCUTANEOUS at 02:17

## 2018-07-03 RX ADMIN — HEPARIN SODIUM 5000 UNIT(S): 5000 INJECTION INTRAVENOUS; SUBCUTANEOUS at 22:36

## 2018-07-03 RX ADMIN — HYDROMORPHONE HYDROCHLORIDE 1 MILLIGRAM(S): 2 INJECTION INTRAMUSCULAR; INTRAVENOUS; SUBCUTANEOUS at 16:35

## 2018-07-03 RX ADMIN — Medication 25 MILLIGRAM(S): at 13:08

## 2018-07-03 RX ADMIN — LISINOPRIL 20 MILLIGRAM(S): 2.5 TABLET ORAL at 05:56

## 2018-07-03 RX ADMIN — LEVETIRACETAM 500 MILLIGRAM(S): 250 TABLET, FILM COATED ORAL at 05:56

## 2018-07-03 RX ADMIN — Medication 0.5 MILLIGRAM(S): at 11:51

## 2018-07-03 RX ADMIN — OXYCODONE HYDROCHLORIDE 5 MILLIGRAM(S): 5 TABLET ORAL at 07:08

## 2018-07-03 RX ADMIN — Medication 100 MILLIGRAM(S): at 23:58

## 2018-07-03 RX ADMIN — OXYCODONE HYDROCHLORIDE 5 MILLIGRAM(S): 5 TABLET ORAL at 06:08

## 2018-07-03 RX ADMIN — HYDROMORPHONE HYDROCHLORIDE 1 MILLIGRAM(S): 2 INJECTION INTRAMUSCULAR; INTRAVENOUS; SUBCUTANEOUS at 11:51

## 2018-07-03 NOTE — DIETITIAN INITIAL EVALUATION ADULT. - SIGNS/SYMPTOMS
as evidenced by pt meeting <75% est needs > 7 days and mild muscle wasting- shoulder/prominent knees

## 2018-07-03 NOTE — CHART NOTE - NSCHARTNOTEFT_GEN_A_CORE
Upon Nutritional Assessment by the Registered Dietitian your patient was determined to meet criteria / has evidence of the following diagnosis/diagnoses:          [ ]  Mild Protein Calorie Malnutrition        [x ]  Moderate Protein Calorie Malnutrition        [ ] Severe Protein Calorie Malnutrition        [ ] Unspecified Protein Calorie Malnutrition        [ ] Underweight / BMI <19        [ ] Morbid Obesity / BMI > 40      Findings as based on:  •  Comprehensive nutrition assessment and consultation  •  Calorie counts (nutrient intake analysis)  •  Food acceptance and intake status from observations by staff  •  Follow up  •  Patient education  •  Intervention secondary to interdisciplinary rounds  •   concerns      Treatment:    The following diet has been recommended:  Continue with 8oz Nepro TID      PROVIDER Section:     By signing this assessment you are acknowledging and agree with the diagnosis/diagnoses assigned by the Registered Dietitian    Comments:

## 2018-07-03 NOTE — DIETITIAN INITIAL EVALUATION ADULT. - OTHER INFO
Pt reports fair po intake at meals- tolerates Nepro better than food at this time.  Pt with poor po intake pta due to N/V, but was consuming Novosource 1-2 times daily.  Pt reports wt loss pta, however previous wt and current wt (133#) are the same.  Edema not noted at this time.

## 2018-07-03 NOTE — PROGRESS NOTE ADULT - ASSESSMENT
ESRD on HD MWF  Vomiting  HTN  HIV    -HD MWF; Did not want to have done dialysis yesterday 7/2/18; however, now agreeing to proceed with treatment today 7/3/18. Orders placed.   -Benadryl 50mg IVP x 1 first hour of dialysis and 25mg IVP x 1 last hour of dialysis  -Dilaudid 1mg IVP x 1 first hour of dialysis  -Hemodynamics are stable  -Nausea/vomiting appears to be resolved  -On HAART therapy  -Renal diet  -Duplex of AVF  -Consult Dr. Sanchez for follow up of the AVF    Thank you    D/W HD RN and Dr. Fuentes

## 2018-07-03 NOTE — CONSULT NOTE ADULT - SUBJECTIVE AND OBJECTIVE BOX
Vascular Attending:        HPI:  The patient is a 27 year old male with a history of HIV ( non compliant with treatment), CMV retinitis with vision loss, ESRD on HD MWF, dilated cardiomyopathy, hypertension and seizure disorder who was brought to the ED with complaints of weakness, confusion, enlarging of his fistula with occasional bleeding, and seeing flashes of light (he is blind at baseline). Pt also c/o nausea and wretching. Vomited once today at home. Fell today at home in bathroom but denies hitting head, states tripped. Pt uses wheelchair at baseline. He admits to being non compliant with his medications at home; he had not taken his medications. Pt denies cp, diarrhea, HA, fevers, constipation, numbness, tingling. (30 Jun 2018 23:01)    Vascular Surgery HPI:  Admission HPI reviewed.  Patient is a 27 year old male with extensive PMHX significant for HIV/ESRD.  Patient with past surgical history of Right AVF, ?brachial- cephalic, date of surgery unknown, name of surgeon unknown.  Patient reports pain to the right upper extremity with muscle spasms during and after dialysis.  Patient reports that a surgeon at [facility] was planning an intervention (name of surgeon unknown), but that he has yet to see this surgeon.  Denies weakness to the extremity, reports numbness to the fingers at times.  Pt denies cp, diarrhea, HA, fevers, constipation, numbness, tingling.      PAST MEDICAL & SURGICAL HISTORY:  HIV (human immunodeficiency virus infection)  Seizure disorder  Hypertension  Diabetes  ESRD (end stage renal disease)  Seizure  Pericarditis  Anxiety  Depression  Renal failure (ARF), acute on chronic: dialysis av fistula, RUE  HTN (hypertension)  Cardiomyopathy  HIV disease: born HIV+  AV fistula  S/P tonsillectomy      REVIEW OF SYSTEMS  see HPI        	          MEDICATIONS  (STANDING):  amLODIPine   Tablet 10 milliGRAM(s) Oral daily  atovaquone Suspension 1500 milliGRAM(s) Oral daily  calcitriol   Capsule 0.5 MICROGram(s) Oral daily  darunavir 800 milliGRAM(s) Oral daily  dolutegravir 50 milliGRAM(s) Oral daily  emtricitabine 200 milliGRAM(s) Oral <User Schedule>  fentaNYL   Patch 100 MICROgram(s)/Hr. 1 Patch Transdermal every 48 hours  heparin  Injectable 5000 Unit(s) SubCutaneous every 12 hours  levETIRAcetam 500 milliGRAM(s) Oral two times a day  lisinopril 20 milliGRAM(s) Oral daily  metoprolol tartrate 100 milliGRAM(s) Oral two times a day  pantoprazole    Tablet 40 milliGRAM(s) Oral before breakfast  ritonavir Tablet 100 milliGRAM(s) Oral daily  sertraline 50 milliGRAM(s) Oral daily  sevelamer hydrochloride 800 milliGRAM(s) Oral three times a day  tenofovir disoproxil fumarate (VIREAD) 300 milliGRAM(s) Oral <User Schedule>    MEDICATIONS  (PRN):  acetaminophen   Tablet 650 milliGRAM(s) Oral every 6 hours PRN For Temp greater than 38 C (100.4 F)  acetaminophen   Tablet. 650 milliGRAM(s) Oral every 6 hours PRN Moderate Pain (4 - 6)  ALPRAZolam 0.5 milliGRAM(s) Oral three times a day PRN anxiety  diphenhydrAMINE   Capsule 25 milliGRAM(s) Oral every 6 hours PRN Rash and/or Itching  HYDROmorphone  Injectable 1 milliGRAM(s) IV Push every 6 hours PRN breakthrough pain  ondansetron Injectable 4 milliGRAM(s) IV Push every 6 hours PRN Nausea  oxyCODONE    IR 5 milliGRAM(s) Oral four times a day PRN mod to severe pain      Allergies    vancomycin (Anaphylaxis)    Intolerances        SOCIAL HISTORY:  non contributory       Vital Signs Last 24 Hrs  T(C): 37.1 (03 Jul 2018 15:05), Max: 37.2 (02 Jul 2018 17:21)  T(F): 98.8 (03 Jul 2018 15:05), Max: 98.9 (02 Jul 2018 17:21)  HR: 74 (03 Jul 2018 15:05) (74 - 91)  BP: 146/89 (03 Jul 2018 15:05) (131/82 - 146/89)  BP(mean): --  RR: 18 (03 Jul 2018 15:05) (18 - 19)  SpO2: 95% (03 Jul 2018 07:50) (95% - 99%)    PHYSICAL EXAM:      Constitutional:  no distress     Eyes: blind     ENMT:  atraumatic     Neck: no bruits       Respiratory:  clear     Cardiovascular:  s1 s2     Gastrointestinal:  soft, no pulsatile mass       Extremities:  warm, aneurysmal right AVF outflow bicep region , good thrill     Vascular: palpable right radial and brachial , palpable left radial and brachial     Neurological:  no gross distal motor or sensory deficits     Skin:  no ulcers       Psychiatric:  good affect           LABS:    07-02    137  |  90<L>  |  78.0<H>  ----------------------------<  88  4.2   |  26.0  |  14.63<H>    Ca    8.7      02 Jul 2018 07:19            RADIOLOGY & ADDITIONAL STUDIES    Impression and Plan:    Functional Right AVF  Reported Pain with Access/HD  No steal on exam    - Will follow up AVF duplex   - Continue to utilize Right AVF for HD

## 2018-07-03 NOTE — PROGRESS NOTE ADULT - ASSESSMENT
28y/o man with HIV on TDF+FTC+DTG+DRV/r nonadherent to meds, with CMV retinitis, CD4 not available.     1-HIV:  -Most likely CD4 will be low due to nonadherence.   -[facility]ay order CD4 and VL  -Continue Tenofovir 300mg weekly  -Emtricitabine 200mg every 4 days  -Darunavir 800mg daily  -Ritonavir 100mg daily   -Dolutegravir 50mg daily  -Atovaquone 1500mg daily for PCP prophylaxis     2-History of CMV:  -Will start him on valganciclovir 100mg after each HD for treatment and then prophylaxis until CD4 is high.   -Ophthalmology consult     3-ESRD:   -Very high Creat possily causing GI symptoms.   -Needs HD, unclear if missed HD.     4-Pancytopenia:  -could be related to bone marrow suppression in uncontrolled HIV patients.   -High risk for MAC, also has a high ALK Phosph that could be sing of MAC  -Send blood culture for mycobacterium 26y/o man with HIV on TDF+FTC+DTG+DRV/r nonadherent to meds, with CMV retinitis, CD4 not available.   I [facility] pharmacy ([facility] [phone]) only on Edurant 25mg which is very inappropriate regimen.     1-HIV:  -Most likely CD4 will be low due to nonadherence.   -[facility] order CD4 and VL  -Continue Tenofovir 300mg weekly  -Emtricitabine 200mg every 4 days  -Darunavir 800mg daily  -Ritonavir 100mg daily   -Dolutegravir 50mg daily  -Atovaquone 1500mg daily for PCP prophylaxis   -NO MAC prophylaxis until active MAC infection is ruled out.     2-History of CMV:  -Cont valganciclovir 100mg after each HD for treatment and then prophylaxis until CD4 is high.   -Ophthalmology consult     3-ESRD:   -Very high Creat possily causing GI symptoms.   -Needs HD, unclear if missed HD.     4-Pancytopenia:  -could be related to bone marrow suppression in uncontrolled HIV patients.   -High risk for MAC, also has a high ALK Phosph that could be sing of MAC  -Send blood culture for mycobacterium

## 2018-07-03 NOTE — PROGRESS NOTE ADULT - ASSESSMENT
27 year old male with a history of HIV ( non compliant with treatment), CMV retinitis with vision loss, ESRD on HD MWF, dilated cardiomyopathy, hypertension and seizure disorder, who is admitted for po intolerance, intractable n/v, and failure to thrive.   somnolent---change long acting oxycodone from 10mg BID to oxycodone IR 5mg qid. dc ambien, gabapentin.

## 2018-07-03 NOTE — PROGRESS NOTE ADULT - PROBLEM SELECTOR PLAN 1
due to overmedication  ISTOP reviewed  change long acting oxycodone from 10mg BID to oxycodone IR 5mg qid. dc ambien, gabapentin.

## 2018-07-03 NOTE — PROGRESS NOTE ADULT - SUBJECTIVE AND OBJECTIVE BOX
seen for right arm pain, esrd    complaining of right arm pain post HD  tolerated HD  ros otherwise negative     MEDICATIONS  (STANDING):  amLODIPine   Tablet 10 milliGRAM(s) Oral daily  atovaquone Suspension 1500 milliGRAM(s) Oral daily  calcitriol   Capsule 0.5 MICROGram(s) Oral daily  darunavir 800 milliGRAM(s) Oral daily  dolutegravir 50 milliGRAM(s) Oral daily  emtricitabine 200 milliGRAM(s) Oral <User Schedule>  fentaNYL   Patch 100 MICROgram(s)/Hr. 1 Patch Transdermal every 48 hours  heparin  Injectable 5000 Unit(s) SubCutaneous every 12 hours  levETIRAcetam 500 milliGRAM(s) Oral two times a day  lisinopril 20 milliGRAM(s) Oral daily  metoprolol tartrate 100 milliGRAM(s) Oral two times a day  pantoprazole    Tablet 40 milliGRAM(s) Oral before breakfast  ritonavir Tablet 100 milliGRAM(s) Oral daily  sertraline 50 milliGRAM(s) Oral daily  sevelamer hydrochloride 800 milliGRAM(s) Oral three times a day  tenofovir disoproxil fumarate (VIREAD) 300 milliGRAM(s) Oral <User Schedule>    MEDICATIONS  (PRN):  acetaminophen   Tablet 650 milliGRAM(s) Oral every 6 hours PRN For Temp greater than 38 C (100.4 F)  acetaminophen   Tablet. 650 milliGRAM(s) Oral every 6 hours PRN Moderate Pain (4 - 6)  ALPRAZolam 0.5 milliGRAM(s) Oral three times a day PRN anxiety  diphenhydrAMINE   Capsule 25 milliGRAM(s) Oral every 6 hours PRN Rash and/or Itching  HYDROmorphone  Injectable 1 milliGRAM(s) IV Push every 6 hours PRN breakthrough pain  ondansetron Injectable 4 milliGRAM(s) IV Push every 6 hours PRN Nausea  oxyCODONE    IR 5 milliGRAM(s) Oral four times a day PRN mod to severe pain      Allergies    vancomycin (Anaphylaxis)    Vital Signs Last 24 Hrs  T(C): 36.9 (03 Jul 2018 11:20), Max: 37.2 (02 Jul 2018 17:21)  T(F): 98.5 (03 Jul 2018 11:20), Max: 98.9 (02 Jul 2018 17:21)  HR: 80 (03 Jul 2018 11:20) (74 - 91)  BP: 140/98 (03 Jul 2018 11:20) (131/82 - 142/89)  BP(mean): --  RR: 18 (03 Jul 2018 11:20) (18 - 19)  SpO2: 95% (03 Jul 2018 07:50) (95% - 99%)    PHYSICAL EXAM:    GENERAL: NAD  CHEST/LUNG: Clear to percussion bilaterally  HEART: Regular rate and rhythm; S1 S2; no murmurs noted  ABDOMEN: Soft, Nontender, Nondistended; Bowel sounds present  EXTREMITIES:  no edema   RUE fistula    LABS:    07-02    137  |  90<L>  |  78.0<H>  ----------------------------<  88  4.2   |  26.0  |  14.63<H>    Ca    8.7      02 Jul 2018 07:19            CAPILLARY BLOOD GLUCOSE            RADIOLOGY & ADDITIONAL TESTS:

## 2018-07-03 NOTE — PROGRESS NOTE ADULT - SUBJECTIVE AND OBJECTIVE BOX
[facility]  INFECTIOUS DISEASES AND INTERNAL MEDICINE at [place]  =======================================================  Howie Navarro MD  Diplomates American Board of Internal Medicine and Infectious Diseases  =======================================================    TEDDY CRAWFORD [id number]    Follow up:  28 y/o man with HIV nonadherent to ARV, CMV retinitis with vision loss, ESRD on HD, dilated cardiomyopathy, hypertension and seizure disorder was admitted with weakness, confusion, nausea and vomiting, seeing flashes of light, blind at baseline.   Today feels better except that it is very hot but he doesn't have a fever.       PAST MEDICAL & SURGICAL HISTORY:  HIV (human immunodeficiency virus infection)  Seizure disorder  Hypertension  Diabetes  ESRD (end stage renal disease)  Seizure  Pericarditis  Anxiety  Depression  Renal failure (ARF), acute on chronic: dialysis av fistula, RUE  HTN (hypertension)  Cardiomyopathy  HIV disease: born HIV+  AV fistula  S/P tonsillectomy      FAMILY HISTORY:  No pertinent family history in first degree relatives  No pertinent family history in first degree relatives    Allergies  vancomycin (Anaphylaxis)    Antibiotics:  atovaquone Suspension 1500 milliGRAM(s) Oral daily  darunavir 800 milliGRAM(s) Oral daily  dolutegravir 50 milliGRAM(s) Oral daily  emtricitabine 200 milliGRAM(s) Oral <User Schedule>  levoFLOXacin  Tablet 250 milliGRAM(s) Oral every 48 hours  ritonavir Tablet 100 milliGRAM(s) Oral daily  tenofovir disoproxil fumarate (VIREAD) 300 milliGRAM(s) Oral <User Schedule>     REVIEW OF SYSTEMS:  CONSTITUTIONAL:  No Fever or chills   HEENT:  No diplopia or blurred vision.  No sore throat or runny nose.  CARDIOVASCULAR:  No chest pain or SOB.  RESPIRATORY:  No cough, shortness of breath, PND or orthopnea.  GASTROINTESTINAL:  +nausea but better , no vomiting or diarrhea.  GENITOURINARY:  No dysuria, frequency or urgency. No Blood in urine  MUSCULOSKELETAL:  R arm with AV fistula had bleeding that stopped   SKIN:  No change in skin, hair or nails.  ENDOCRINE:  No heat or cold intolerance, polyuria or polydipsia.    Physical Exam:  Vital Signs Last 24 Hrs  T(C): 37.1 (03 Jul 2018 15:05), Max: 37.1 (03 Jul 2018 15:05)  T(F): 98.8 (03 Jul 2018 15:05), Max: 98.8 (03 Jul 2018 15:05)  HR: 74 (03 Jul 2018 15:05) (74 - 82)  BP: 146/89 (03 Jul 2018 15:05) (139/91 - 146/89)  RR: 18 (03 Jul 2018 15:05) (18 - 19)  SpO2: 95% (03 Jul 2018 07:50) (95% - 99%)  GEN: NAD, drowsy  HEENT: normocephalic and atraumatic. EOMI. PERRL.    NECK: Supple.  No lymphadenopathy   LUNGS: Clear to auscultation.  HEART: Regular rate and rhythm   ABDOMEN: Soft, nontender, and nondistended.  Positive bowel sounds.    EXTREMITIES: Without any cyanosis, clubbing, rash, lesions or edema. right arm with AV fistula with good thrill and big vessels, no bleeding    NEUROLOGIC: grossly intact.  PSYCHIATRIC: Appropriate affect .  SKIN: No ulceration or induration present.      Labs:  07-02    137  |  90<L>  |  78.0<H>  ----------------------------<  88  4.2   |  26.0  |  14.63<H>    Ca    8.7      02 Jul 2018 07:19    RECENT CULTURES:  06-30 @ 20:23 .Blood Blood     No growth at 48 hours    06-30 @ 20:22 .Blood Blood     No growth at 48 hours    All imaging and data are reviewed. [facility]  INFECTIOUS DISEASES AND INTERNAL MEDICINE at [place]  =======================================================  Howie Navarro MD  Diplomates American Board of Internal Medicine and Infectious Diseases  =======================================================    TEDDY CRAWFORD [id number]    Follow up:  28 y/o man with HIV nonadherent to ARV, CMV retinitis with vision loss, ESRD on HD, dilated cardiomyopathy, hypertension and seizure disorder was admitted with weakness, confusion, nausea and vomiting, seeing flashes of light, blind at baseline.   Today feels better except that it is very hot but he doesn't have a fever.       PAST MEDICAL & SURGICAL HISTORY:  HIV (human immunodeficiency virus infection)  Seizure disorder  Hypertension  Diabetes  ESRD (end stage renal disease)  Seizure  Pericarditis  Anxiety  Depression  Renal failure (ARF), acute on chronic: dialysis av fistula, RUE  HTN (hypertension)  Cardiomyopathy  HIV disease: born HIV+  AV fistula  S/P tonsillectomy      FAMILY HISTORY:  No pertinent family history in first degree relatives  No pertinent family history in first degree relatives    Allergies  vancomycin (Anaphylaxis)    Antibiotics:  atovaquone Suspension 1500 milliGRAM(s) Oral daily  darunavir 800 milliGRAM(s) Oral daily  dolutegravir 50 milliGRAM(s) Oral daily  emtricitabine 200 milliGRAM(s) Oral <User Schedule>  levoFLOXacin  Tablet 250 milliGRAM(s) Oral every 48 hours  ritonavir Tablet 100 milliGRAM(s) Oral daily  tenofovir disoproxil fumarate (VIREAD) 300 milliGRAM(s) Oral <User Schedule>     REVIEW OF SYSTEMS:  CONSTITUTIONAL:  No Fever or chills   HEENT:  blind  CARDIOVASCULAR:  No chest pain or SOB.  RESPIRATORY:  No cough, shortness of breath, PND or orthopnea.  GASTROINTESTINAL:  +nausea but better , no vomiting or diarrhea.  GENITOURINARY:  No dysuria, frequency or urgency. No Blood in urine  MUSCULOSKELETAL:  R arm with AV fistula had bleeding that stopped   SKIN:  No change in skin, hair or nails.  ENDOCRINE:  No heat or cold intolerance, polyuria or polydipsia.    Physical Exam:  Vital Signs Last 24 Hrs  T(C): 37.1 (03 Jul 2018 15:05), Max: 37.1 (03 Jul 2018 15:05)  T(F): 98.8 (03 Jul 2018 15:05), Max: 98.8 (03 Jul 2018 15:05)  HR: 74 (03 Jul 2018 15:05) (74 - 82)  BP: 146/89 (03 Jul 2018 15:05) (139/91 - 146/89)  RR: 18 (03 Jul 2018 15:05) (18 - 19)  SpO2: 95% (03 Jul 2018 07:50) (95% - 99%)  GEN: NAD, drowsy  HEENT: normocephalic and atraumatic. EOMI. PERRL.    NECK: Supple.  No lymphadenopathy   LUNGS: Clear to auscultation.  HEART: Regular rate and rhythm   ABDOMEN: Soft, nontender, and nondistended.  Positive bowel sounds.    EXTREMITIES: Without any cyanosis, clubbing, rash, lesions or edema. right arm with AV fistula with good thrill and big vessels, no bleeding    NEUROLOGIC: grossly intact.  PSYCHIATRIC: Appropriate affect .  SKIN: No ulceration or induration present.      Labs:  07-02    137  |  90<L>  |  78.0<H>  ----------------------------<  88  4.2   |  26.0  |  14.63<H>    Ca    8.7      02 Jul 2018 07:19    RECENT CULTURES:  06-30 @ 20:23 .Blood Blood     No growth at 48 hours    06-30 @ 20:22 .Blood Blood     No growth at 48 hours    All imaging and data are reviewed.

## 2018-07-03 NOTE — PROGRESS NOTE ADULT - SUBJECTIVE AND OBJECTIVE BOX
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 Pt also c/o nausea and wretching. Vomited once today at home. Fell today at home in bathroom but denies hitting head, states tripped. Pt uses wheelchair at baseline. He admits to being non compliant with his medications at home; he had not taken his medications. Pt denies cp, diarrhea, HA, fevers, constipation, numbness, tingling. (30 Jun 2018 23:01)    Follow up ESRD  Complains of some discomfort from the AVF and feels as if it getting larger than prior    PAST MEDICAL & SURGICAL HISTORY:  HIV (human immunodeficiency virus infection)  Seizure disorder  Hypertension  Diabetes  ESRD (end stage renal disease)  Seizure  Pericarditis  Anxiety  Depression  Renal failure (ARF), acute on chronic: dialysis av fistula, RUE  HTN (hypertension)  Cardiomyopathy  HIV disease: born HIV+  AV fistula  S/P tonsillectomy      MEDICATIONS  (STANDING):  amLODIPine   Tablet 10 milliGRAM(s) Oral daily  atovaquone Suspension 1500 milliGRAM(s) Oral daily  calcitriol   Capsule 0.5 MICROGram(s) Oral daily  darunavir 800 milliGRAM(s) Oral daily  diphenhydrAMINE   Injectable 50 milliGRAM(s) IV Push once  diphenhydrAMINE   Injectable 25 milliGRAM(s) IV Push once  dolutegravir 50 milliGRAM(s) Oral daily  emtricitabine 200 milliGRAM(s) Oral <User Schedule>  fentaNYL   Patch  50 MICROgram(s)/Hr 1 Patch Transdermal every 72 hours  heparin  Injectable 5000 Unit(s) SubCutaneous every 12 hours  HYDROmorphone  Injectable 1 milliGRAM(s) IV Push once  levETIRAcetam 500 milliGRAM(s) Oral two times a day  lisinopril 20 milliGRAM(s) Oral daily  metoprolol tartrate 100 milliGRAM(s) Oral two times a day  pantoprazole    Tablet 40 milliGRAM(s) Oral before breakfast  ritonavir Tablet 100 milliGRAM(s) Oral daily  sertraline 50 milliGRAM(s) Oral daily  sevelamer hydrochloride 800 milliGRAM(s) Oral three times a day  tenofovir disoproxil fumarate (VIREAD) 300 milliGRAM(s) Oral <User Schedule>    MEDICATIONS  (PRN):  acetaminophen   Tablet 650 milliGRAM(s) Oral every 6 hours PRN For Temp greater than 38 C (100.4 F)  acetaminophen   Tablet. 650 milliGRAM(s) Oral every 6 hours PRN Moderate Pain (4 - 6)  ALPRAZolam 0.5 milliGRAM(s) Oral three times a day PRN anxiety  diphenhydrAMINE   Capsule 25 milliGRAM(s) Oral every 6 hours PRN Rash and/or Itching  ondansetron Injectable 4 milliGRAM(s) IV Push every 6 hours PRN Nausea  oxyCODONE    IR 5 milliGRAM(s) Oral four times a day PRN mod to severe pain      Allergies    vancomycin (Anaphylaxis)    Intolerances        Vital Signs Last 24 Hrs  T(C): 36.8 (03 Jul 2018 07:50), Max: 37.2 (02 Jul 2018 17:21)  T(F): 98.3 (03 Jul 2018 07:50), Max: 98.9 (02 Jul 2018 17:21)  HR: 79 (03 Jul 2018 07:50) (74 - 91)  BP: 140/54 (03 Jul 2018 07:50) (131/82 - 142/89)  BP(mean): --  RR: 19 (03 Jul 2018 07:50) (18 - 19)  SpO2: 95% (03 Jul 2018 07:50) (95% - 99%)  Daily     Daily     PHYSICAL EXAM:  GENERAL: No distress, comfortable  HEAD:  Atraumatic, Normocephalic  EYES: Conjunctiva and sclera clear  ENMT: No tonsillar erythema, exudates, or enlargement; Moist mucous membranes, Good dentition, No lesions  NECK: Supple  NERVOUS SYSTEM:  Alert & Oriented X3, Follows commands  CHEST/LUNG: Clear to percussion bilaterally; No rales, rhonchi, wheezing, or rubs  HEART: Regular rate and rhythm; No murmurs, rubs, or gallops  ABDOMEN: Soft, Nontender, Nondistended; Bowel sounds present, body wall and flank edema  EXTREMITIES:  No edema B/L; RUE Hypertrophic AVF with palpable thrill  LYMPH: No lymphadenopathy noted  SKIN: No rashes or lesions, pale    LABS:    07-02    137  |  90<L>  |  78.0<H>  ----------------------------<  88  4.2   |  26.0  |  14.63<H>    Ca    8.7      02 Jul 2018 07:19                RADIOLOGY & ADDITIONAL TESTS:

## 2018-07-03 NOTE — CHART NOTE - NSCHARTNOTEFT_GEN_A_CORE
Medicine PA- Cd. for pt. c/o aching fistula site right arm. Pt. hx ESRD on dialysis, +Hiv, blindness. On fentanyl 100microgram patch, decreased to 50 by M.D. and oxy IR 5mg. Pt. denies trauma/injury to area fistula. VSS, no c/o otherwise. Upon exam fistula R. arm is tortuous, +thrill, no redness or bleeding noted. Reassurance given to pt, ACE wrap applied for support/protection, dilaudid 1mg IVP for pain. Recommend pain management consult.

## 2018-07-04 LAB
ANION GAP SERPL CALC-SCNC: 19 MMOL/L — HIGH (ref 5–17)
ANISOCYTOSIS BLD QL: SLIGHT — SIGNIFICANT CHANGE UP
BASOPHILS # BLD AUTO: 0 K/UL — SIGNIFICANT CHANGE UP (ref 0–0.2)
BASOPHILS NFR BLD AUTO: 0 % — SIGNIFICANT CHANGE UP (ref 0–2)
BUN SERPL-MCNC: 52 MG/DL — HIGH (ref 8–20)
CALCIUM SERPL-MCNC: 9 MG/DL — SIGNIFICANT CHANGE UP (ref 8.6–10.2)
CHLORIDE SERPL-SCNC: 94 MMOL/L — LOW (ref 98–107)
CO2 SERPL-SCNC: 25 MMOL/L — SIGNIFICANT CHANGE UP (ref 22–29)
CREAT SERPL-MCNC: 9.36 MG/DL — HIGH (ref 0.5–1.3)
CRYPTOC AG FLD QL: NEGATIVE — SIGNIFICANT CHANGE UP
DACRYOCYTES BLD QL SMEAR: SLIGHT — SIGNIFICANT CHANGE UP
ELLIPTOCYTES BLD QL SMEAR: SLIGHT — SIGNIFICANT CHANGE UP
EOSINOPHIL # BLD AUTO: 0.2 K/UL — SIGNIFICANT CHANGE UP (ref 0–0.5)
EOSINOPHIL NFR BLD AUTO: 4 % — SIGNIFICANT CHANGE UP (ref 0–6)
GLUCOSE BLDC GLUCOMTR-MCNC: 124 MG/DL — HIGH (ref 70–99)
GLUCOSE SERPL-MCNC: 118 MG/DL — HIGH (ref 70–115)
HCT VFR BLD CALC: 30.8 % — LOW (ref 42–52)
HGB BLD-MCNC: 9.5 G/DL — LOW (ref 14–18)
HIV-1 VIRAL LOAD RESULT: ABNORMAL
HIV1 RNA # SERPL NAA+PROBE: SIGNIFICANT CHANGE UP
HIV1 RNA SER-IMP: SIGNIFICANT CHANGE UP
HIV1 RNA SERPL NAA+PROBE-ACNC: ABNORMAL
HIV1 RNA SERPL NAA+PROBE-LOG#: 4.04 — SIGNIFICANT CHANGE UP
HYPOCHROMIA BLD QL: SLIGHT — SIGNIFICANT CHANGE UP
LYMPHOCYTES # BLD AUTO: 0.9 K/UL — LOW (ref 1–4.8)
LYMPHOCYTES # BLD AUTO: 31 % — SIGNIFICANT CHANGE UP (ref 20–55)
MCHC RBC-ENTMCNC: 28.7 PG — SIGNIFICANT CHANGE UP (ref 27–31)
MCHC RBC-ENTMCNC: 30.8 G/DL — LOW (ref 32–36)
MCV RBC AUTO: 93.1 FL — SIGNIFICANT CHANGE UP (ref 80–94)
MONOCYTES # BLD AUTO: 0.3 K/UL — SIGNIFICANT CHANGE UP (ref 0–0.8)
MONOCYTES NFR BLD AUTO: 9 % — SIGNIFICANT CHANGE UP (ref 3–10)
NEUTROPHILS # BLD AUTO: 1.2 K/UL — LOW (ref 1.8–8)
NEUTROPHILS NFR BLD AUTO: 53 % — SIGNIFICANT CHANGE UP (ref 37–73)
NEUTS BAND # BLD: 2 % — SIGNIFICANT CHANGE UP (ref 0–8)
OVALOCYTES BLD QL SMEAR: SLIGHT — SIGNIFICANT CHANGE UP
PLAT MORPH BLD: NORMAL — SIGNIFICANT CHANGE UP
PLATELET # BLD AUTO: 110 K/UL — LOW (ref 150–400)
POIKILOCYTOSIS BLD QL AUTO: SLIGHT — SIGNIFICANT CHANGE UP
POTASSIUM SERPL-MCNC: 4.4 MMOL/L — SIGNIFICANT CHANGE UP (ref 3.5–5.3)
POTASSIUM SERPL-SCNC: 4.4 MMOL/L — SIGNIFICANT CHANGE UP (ref 3.5–5.3)
RBC # BLD: 3.31 M/UL — LOW (ref 4.6–6.2)
RBC # FLD: 16.9 % — HIGH (ref 11–15.6)
RBC BLD AUTO: ABNORMAL
SCHISTOCYTES BLD QL AUTO: SLIGHT — SIGNIFICANT CHANGE UP
SODIUM SERPL-SCNC: 138 MMOL/L — SIGNIFICANT CHANGE UP (ref 135–145)
T PALLIDUM AB TITR SER: NEGATIVE — SIGNIFICANT CHANGE UP
VARIANT LYMPHS # BLD: 1 % — SIGNIFICANT CHANGE UP (ref 0–6)
WBC # BLD: 2.6 K/UL — LOW (ref 4.8–10.8)
WBC # FLD AUTO: 2.6 K/UL — LOW (ref 4.8–10.8)

## 2018-07-04 PROCEDURE — 99233 SBSQ HOSP IP/OBS HIGH 50: CPT

## 2018-07-04 RX ORDER — ZOLPIDEM TARTRATE 10 MG/1
5 TABLET ORAL AT BEDTIME
Qty: 0 | Refills: 0 | Status: DISCONTINUED | OUTPATIENT
Start: 2018-07-04 | End: 2018-07-09

## 2018-07-04 RX ORDER — DIPHENHYDRAMINE HCL 50 MG
25 CAPSULE ORAL ONCE
Qty: 0 | Refills: 0 | Status: COMPLETED | OUTPATIENT
Start: 2018-07-04 | End: 2018-07-04

## 2018-07-04 RX ORDER — HYDROMORPHONE HYDROCHLORIDE 2 MG/ML
1 INJECTION INTRAMUSCULAR; INTRAVENOUS; SUBCUTANEOUS EVERY 4 HOURS
Qty: 0 | Refills: 0 | Status: DISCONTINUED | OUTPATIENT
Start: 2018-07-04 | End: 2018-07-11

## 2018-07-04 RX ADMIN — HYDROMORPHONE HYDROCHLORIDE 1 MILLIGRAM(S): 2 INJECTION INTRAMUSCULAR; INTRAVENOUS; SUBCUTANEOUS at 05:53

## 2018-07-04 RX ADMIN — ONDANSETRON 4 MILLIGRAM(S): 8 TABLET, FILM COATED ORAL at 11:47

## 2018-07-04 RX ADMIN — Medication 100 MILLIGRAM(S): at 06:18

## 2018-07-04 RX ADMIN — LEVETIRACETAM 500 MILLIGRAM(S): 250 TABLET, FILM COATED ORAL at 06:18

## 2018-07-04 RX ADMIN — SERTRALINE 50 MILLIGRAM(S): 25 TABLET, FILM COATED ORAL at 14:00

## 2018-07-04 RX ADMIN — OXYCODONE HYDROCHLORIDE 5 MILLIGRAM(S): 5 TABLET ORAL at 19:39

## 2018-07-04 RX ADMIN — HYDROMORPHONE HYDROCHLORIDE 1 MILLIGRAM(S): 2 INJECTION INTRAMUSCULAR; INTRAVENOUS; SUBCUTANEOUS at 04:38

## 2018-07-04 RX ADMIN — Medication 100 MILLIGRAM(S): at 18:21

## 2018-07-04 RX ADMIN — Medication 0.5 MILLIGRAM(S): at 22:35

## 2018-07-04 RX ADMIN — AMLODIPINE BESYLATE 10 MILLIGRAM(S): 2.5 TABLET ORAL at 06:18

## 2018-07-04 RX ADMIN — LEVETIRACETAM 500 MILLIGRAM(S): 250 TABLET, FILM COATED ORAL at 18:21

## 2018-07-04 RX ADMIN — FENTANYL CITRATE 1 PATCH: 50 INJECTION INTRAVENOUS at 11:42

## 2018-07-04 RX ADMIN — ONDANSETRON 4 MILLIGRAM(S): 8 TABLET, FILM COATED ORAL at 19:39

## 2018-07-04 RX ADMIN — FENTANYL CITRATE 1 PATCH: 50 INJECTION INTRAVENOUS at 11:51

## 2018-07-04 RX ADMIN — HYDROMORPHONE HYDROCHLORIDE 1 MILLIGRAM(S): 2 INJECTION INTRAMUSCULAR; INTRAVENOUS; SUBCUTANEOUS at 20:57

## 2018-07-04 RX ADMIN — PANTOPRAZOLE SODIUM 40 MILLIGRAM(S): 20 TABLET, DELAYED RELEASE ORAL at 06:18

## 2018-07-04 RX ADMIN — RITONAVIR 100 MILLIGRAM(S): 100 TABLET, FILM COATED ORAL at 11:43

## 2018-07-04 RX ADMIN — Medication 25 MILLIGRAM(S): at 01:22

## 2018-07-04 RX ADMIN — HEPARIN SODIUM 5000 UNIT(S): 5000 INJECTION INTRAVENOUS; SUBCUTANEOUS at 18:27

## 2018-07-04 RX ADMIN — HYDROMORPHONE HYDROCHLORIDE 1 MILLIGRAM(S): 2 INJECTION INTRAMUSCULAR; INTRAVENOUS; SUBCUTANEOUS at 21:07

## 2018-07-04 RX ADMIN — OXYCODONE HYDROCHLORIDE 5 MILLIGRAM(S): 5 TABLET ORAL at 12:43

## 2018-07-04 RX ADMIN — DOLUTEGRAVIR SODIUM 50 MILLIGRAM(S): 25 TABLET, FILM COATED ORAL at 11:44

## 2018-07-04 RX ADMIN — DARUNAVIR 800 MILLIGRAM(S): 75 TABLET, FILM COATED ORAL at 11:44

## 2018-07-04 RX ADMIN — LISINOPRIL 20 MILLIGRAM(S): 2.5 TABLET ORAL at 06:18

## 2018-07-04 RX ADMIN — Medication 25 MILLIGRAM(S): at 18:27

## 2018-07-04 RX ADMIN — HYDROMORPHONE HYDROCHLORIDE 1 MILLIGRAM(S): 2 INJECTION INTRAMUSCULAR; INTRAVENOUS; SUBCUTANEOUS at 14:00

## 2018-07-04 RX ADMIN — Medication 0.5 MILLIGRAM(S): at 15:58

## 2018-07-04 RX ADMIN — OXYCODONE HYDROCHLORIDE 5 MILLIGRAM(S): 5 TABLET ORAL at 11:43

## 2018-07-04 RX ADMIN — OXYCODONE HYDROCHLORIDE 5 MILLIGRAM(S): 5 TABLET ORAL at 20:00

## 2018-07-04 RX ADMIN — HYDROMORPHONE HYDROCHLORIDE 1 MILLIGRAM(S): 2 INJECTION INTRAMUSCULAR; INTRAVENOUS; SUBCUTANEOUS at 14:15

## 2018-07-04 RX ADMIN — CALCITRIOL 0.5 MICROGRAM(S): 0.5 CAPSULE ORAL at 11:44

## 2018-07-04 NOTE — PROGRESS NOTE ADULT - SUBJECTIVE AND OBJECTIVE BOX
seen for weakness, dialysis    complaining of feeling hot (room is warm), right arm fistula pain  wants ambien  ros otherwise negative     MEDICATIONS  (STANDING):  amLODIPine   Tablet 10 milliGRAM(s) Oral daily  atovaquone Suspension 1500 milliGRAM(s) Oral daily  calcitriol   Capsule 0.5 MICROGram(s) Oral daily  darunavir 800 milliGRAM(s) Oral daily  dolutegravir 50 milliGRAM(s) Oral daily  emtricitabine 200 milliGRAM(s) Oral <User Schedule>  fentaNYL   Patch 100 MICROgram(s)/Hr. 1 Patch Transdermal every 48 hours  heparin  Injectable 5000 Unit(s) SubCutaneous every 12 hours  levETIRAcetam 500 milliGRAM(s) Oral two times a day  lisinopril 20 milliGRAM(s) Oral daily  metoprolol tartrate 100 milliGRAM(s) Oral two times a day  pantoprazole    Tablet 40 milliGRAM(s) Oral before breakfast  ritonavir Tablet 100 milliGRAM(s) Oral daily  sertraline 50 milliGRAM(s) Oral daily  sevelamer hydrochloride 800 milliGRAM(s) Oral three times a day  tenofovir disoproxil fumarate (VIREAD) 300 milliGRAM(s) Oral <User Schedule>    MEDICATIONS  (PRN):  acetaminophen   Tablet 650 milliGRAM(s) Oral every 6 hours PRN For Temp greater than 38 C (100.4 F)  acetaminophen   Tablet. 650 milliGRAM(s) Oral every 6 hours PRN Moderate Pain (4 - 6)  ALPRAZolam 0.5 milliGRAM(s) Oral three times a day PRN anxiety  diphenhydrAMINE   Capsule 25 milliGRAM(s) Oral every 6 hours PRN Rash and/or Itching  HYDROmorphone  Injectable 1 milliGRAM(s) IV Push every 4 hours PRN breakthrough pain  ondansetron Injectable 4 milliGRAM(s) IV Push every 6 hours PRN Nausea  oxyCODONE    IR 5 milliGRAM(s) Oral four times a day PRN mod to severe pain  zolpidem 5 milliGRAM(s) Oral at bedtime PRN Insomnia      Allergies    vancomycin (Anaphylaxis)      Vital Signs Last 24 Hrs  T(C): 36.6 (04 Jul 2018 08:07), Max: 37.2 (04 Jul 2018 00:00)  T(F): 97.9 (04 Jul 2018 08:07), Max: 99 (04 Jul 2018 00:00)  HR: 69 (04 Jul 2018 08:07) (69 - 80)  BP: 132/81 (04 Jul 2018 08:07) (132/81 - 151/93)  BP(mean): --  RR: 18 (04 Jul 2018 08:07) (15 - 18)  SpO2: 97% (04 Jul 2018 08:07) (97% - 98%)    PHYSICAL EXAM:    GENERAL: NAD  CHEST/LUNG: Clear to percussion bilaterally  HEART: Regular rate and rhythm; S1 S2;  ABDOMEN: Soft, Nontender, Nondistended; Bowel sounds present  EXTREMITIES:  no LE edema  RUE fistula site   NERVOUS SYSTEM:  Alert & Oriented X3 nonfocal    LABS:                CAPILLARY BLOOD GLUCOSE      POCT Blood Glucose.: 124 mg/dL (04 Jul 2018 08:36)        RADIOLOGY & ADDITIONAL TESTS:

## 2018-07-04 NOTE — PROGRESS NOTE ADULT - PROBLEM SELECTOR PLAN 1
due to overmedication  ISTOP reviewed  change long acting oxycodone from 10mg BID to oxycodone IR 5mg qid.

## 2018-07-04 NOTE — PROGRESS NOTE ADULT - ASSESSMENT
27 year old male with a history of HIV ( non compliant with treatment), CMV retinitis with vision loss, ESRD on HD MWF, dilated cardiomyopathy, hypertension and seizure disorder, who is admitted for po intolerance, intractable n/v, and failure to thrive.

## 2018-07-05 ENCOUNTER — TRANSCRIPTION ENCOUNTER (OUTPATIENT)
Age: 28
End: 2018-07-05

## 2018-07-05 DIAGNOSIS — M79.2 NEURALGIA AND NEURITIS, UNSPECIFIED: ICD-10-CM

## 2018-07-05 DIAGNOSIS — G89.4 CHRONIC PAIN SYNDROME: ICD-10-CM

## 2018-07-05 LAB
ANION GAP SERPL CALC-SCNC: 19 MMOL/L — HIGH (ref 5–17)
BUN SERPL-MCNC: 77 MG/DL — HIGH (ref 8–20)
CALCIUM SERPL-MCNC: 9 MG/DL — SIGNIFICANT CHANGE UP (ref 8.6–10.2)
CHLORIDE SERPL-SCNC: 91 MMOL/L — LOW (ref 98–107)
CMV DNA CSF QL NAA+PROBE: SIGNIFICANT CHANGE UP
CMV DNA SPEC NAA+PROBE-LOG#: SIGNIFICANT CHANGE UP LOGIU/ML
CO2 SERPL-SCNC: 26 MMOL/L — SIGNIFICANT CHANGE UP (ref 22–29)
CREAT SERPL-MCNC: 11.86 MG/DL — HIGH (ref 0.5–1.3)
CULTURE RESULTS: SIGNIFICANT CHANGE UP
CULTURE RESULTS: SIGNIFICANT CHANGE UP
GLUCOSE SERPL-MCNC: 116 MG/DL — HIGH (ref 70–115)
HCT VFR BLD CALC: 27.1 % — LOW (ref 42–52)
HGB BLD-MCNC: 8.8 G/DL — LOW (ref 14–18)
MCHC RBC-ENTMCNC: 29.5 PG — SIGNIFICANT CHANGE UP (ref 27–31)
MCHC RBC-ENTMCNC: 32.5 G/DL — SIGNIFICANT CHANGE UP (ref 32–36)
MCV RBC AUTO: 90.9 FL — SIGNIFICANT CHANGE UP (ref 80–94)
PHOSPHATE SERPL-MCNC: 7.8 MG/DL — HIGH (ref 2.4–4.7)
PLATELET # BLD AUTO: 110 K/UL — LOW (ref 150–400)
POTASSIUM SERPL-MCNC: 4.9 MMOL/L — SIGNIFICANT CHANGE UP (ref 3.5–5.3)
POTASSIUM SERPL-SCNC: 4.9 MMOL/L — SIGNIFICANT CHANGE UP (ref 3.5–5.3)
RBC # BLD: 2.98 M/UL — LOW (ref 4.6–6.2)
RBC # FLD: 16.8 % — HIGH (ref 11–15.6)
SODIUM SERPL-SCNC: 136 MMOL/L — SIGNIFICANT CHANGE UP (ref 135–145)
SPECIMEN SOURCE: SIGNIFICANT CHANGE UP
SPECIMEN SOURCE: SIGNIFICANT CHANGE UP
WBC # BLD: 2.8 K/UL — LOW (ref 4.8–10.8)
WBC # FLD AUTO: 2.8 K/UL — LOW (ref 4.8–10.8)

## 2018-07-05 PROCEDURE — 99232 SBSQ HOSP IP/OBS MODERATE 35: CPT

## 2018-07-05 PROCEDURE — 99233 SBSQ HOSP IP/OBS HIGH 50: CPT

## 2018-07-05 RX ORDER — DIPHENHYDRAMINE HCL 50 MG
25 CAPSULE ORAL ONCE
Qty: 0 | Refills: 0 | Status: COMPLETED | OUTPATIENT
Start: 2018-07-05 | End: 2018-07-05

## 2018-07-05 RX ORDER — OXYCODONE HYDROCHLORIDE 5 MG/1
10 TABLET ORAL
Qty: 0 | Refills: 0 | Status: DISCONTINUED | OUTPATIENT
Start: 2018-07-05 | End: 2018-07-12

## 2018-07-05 RX ORDER — OXYCODONE HYDROCHLORIDE 5 MG/1
10 TABLET ORAL EVERY 12 HOURS
Qty: 0 | Refills: 0 | Status: DISCONTINUED | OUTPATIENT
Start: 2018-07-05 | End: 2018-07-05

## 2018-07-05 RX ORDER — VALGANCICLOVIR 450 MG/1
100 TABLET, FILM COATED ORAL
Qty: 0 | Refills: 0 | Status: DISCONTINUED | OUTPATIENT
Start: 2018-07-05 | End: 2018-07-05

## 2018-07-05 RX ORDER — DIPHENHYDRAMINE HCL 50 MG
50 CAPSULE ORAL ONCE
Qty: 0 | Refills: 0 | Status: COMPLETED | OUTPATIENT
Start: 2018-07-05 | End: 2018-07-05

## 2018-07-05 RX ADMIN — Medication 100 MILLIGRAM(S): at 05:01

## 2018-07-05 RX ADMIN — HYDROMORPHONE HYDROCHLORIDE 1 MILLIGRAM(S): 2 INJECTION INTRAMUSCULAR; INTRAVENOUS; SUBCUTANEOUS at 19:48

## 2018-07-05 RX ADMIN — OXYCODONE HYDROCHLORIDE 5 MILLIGRAM(S): 5 TABLET ORAL at 04:45

## 2018-07-05 RX ADMIN — OXYCODONE HYDROCHLORIDE 5 MILLIGRAM(S): 5 TABLET ORAL at 05:15

## 2018-07-05 RX ADMIN — HYDROMORPHONE HYDROCHLORIDE 1 MILLIGRAM(S): 2 INJECTION INTRAMUSCULAR; INTRAVENOUS; SUBCUTANEOUS at 08:15

## 2018-07-05 RX ADMIN — Medication 0.5 MILLIGRAM(S): at 17:55

## 2018-07-05 RX ADMIN — HYDROMORPHONE HYDROCHLORIDE 1 MILLIGRAM(S): 2 INJECTION INTRAMUSCULAR; INTRAVENOUS; SUBCUTANEOUS at 12:17

## 2018-07-05 RX ADMIN — LISINOPRIL 20 MILLIGRAM(S): 2.5 TABLET ORAL at 05:02

## 2018-07-05 RX ADMIN — Medication 25 MILLIGRAM(S): at 14:18

## 2018-07-05 RX ADMIN — LEVETIRACETAM 500 MILLIGRAM(S): 250 TABLET, FILM COATED ORAL at 16:27

## 2018-07-05 RX ADMIN — OXYCODONE HYDROCHLORIDE 10 MILLIGRAM(S): 5 TABLET ORAL at 16:47

## 2018-07-05 RX ADMIN — OXYCODONE HYDROCHLORIDE 10 MILLIGRAM(S): 5 TABLET ORAL at 23:45

## 2018-07-05 RX ADMIN — HYDROMORPHONE HYDROCHLORIDE 1 MILLIGRAM(S): 2 INJECTION INTRAMUSCULAR; INTRAVENOUS; SUBCUTANEOUS at 08:14

## 2018-07-05 RX ADMIN — HYDROMORPHONE HYDROCHLORIDE 1 MILLIGRAM(S): 2 INJECTION INTRAMUSCULAR; INTRAVENOUS; SUBCUTANEOUS at 20:03

## 2018-07-05 RX ADMIN — ONDANSETRON 4 MILLIGRAM(S): 8 TABLET, FILM COATED ORAL at 09:49

## 2018-07-05 RX ADMIN — HYDROMORPHONE HYDROCHLORIDE 1 MILLIGRAM(S): 2 INJECTION INTRAMUSCULAR; INTRAVENOUS; SUBCUTANEOUS at 12:39

## 2018-07-05 RX ADMIN — OXYCODONE HYDROCHLORIDE 10 MILLIGRAM(S): 5 TABLET ORAL at 23:15

## 2018-07-05 RX ADMIN — Medication 50 MILLIGRAM(S): at 11:49

## 2018-07-05 RX ADMIN — AMLODIPINE BESYLATE 10 MILLIGRAM(S): 2.5 TABLET ORAL at 05:02

## 2018-07-05 RX ADMIN — Medication 100 MILLIGRAM(S): at 16:27

## 2018-07-05 RX ADMIN — OXYCODONE HYDROCHLORIDE 10 MILLIGRAM(S): 5 TABLET ORAL at 16:27

## 2018-07-05 RX ADMIN — LEVETIRACETAM 500 MILLIGRAM(S): 250 TABLET, FILM COATED ORAL at 05:02

## 2018-07-05 RX ADMIN — ZOLPIDEM TARTRATE 5 MILLIGRAM(S): 10 TABLET ORAL at 00:44

## 2018-07-05 RX ADMIN — Medication 0.5 MILLIGRAM(S): at 11:49

## 2018-07-05 RX ADMIN — Medication 25 MILLIGRAM(S): at 00:44

## 2018-07-05 NOTE — PHYSICAL THERAPY INITIAL EVALUATION ADULT - PREDICTED DURATION OF THERAPY (DAYS/WKS), PT EVAL
pt appears at baseline. required verbal and tactile cues due to blindness and in unfamiliar environment. will not follow.

## 2018-07-05 NOTE — PHYSICAL THERAPY INITIAL EVALUATION ADULT - ACTIVE RANGE OF MOTION EXAMINATION, REHAB EVAL
right shoulder flex limited by c/o weakness and fistula discomfort, otherwise WFL at elbow,wrist, hand/Left UE Active ROM was WNL (within normal limits)/bilateral lower extremity Active ROM was WNL (within normal limits)

## 2018-07-05 NOTE — PROGRESS NOTE ADULT - SUBJECTIVE AND OBJECTIVE BOX
PRE OPERATIVE NOTE    Pre-op Diagnosis: Possible central stenosis  Procedure: R AV fistulagram  Surgeon: Dr Kinsey    PAST MEDICAL & SURGICAL HISTORY:  HIV (human immunodeficiency virus infection)  Seizure disorder  Hypertension  Diabetes  ESRD (end stage renal disease)  Seizure  Pericarditis  Anxiety  Depression  Renal failure (ARF), acute on chronic: dialysis av fistula, RUE  HTN (hypertension)  Cardiomyopathy  HIV disease: born HIV+  AV fistula  S/P tonsillectomy    Allergies  vancomycin (Anaphylaxis)        Daily   Vital Signs Last 24 Hrs  T(C): 36.8 (05 Jul 2018 07:42), Max: 37.1 (05 Jul 2018 00:10)  T(F): 98.2 (05 Jul 2018 07:42), Max: 98.8 (05 Jul 2018 00:10)  HR: 67 (05 Jul 2018 07:42) (67 - 71)  BP: 135/78 (05 Jul 2018 07:42) (115/69 - 135/78)  BP(mean): --  RR: 20 (05 Jul 2018 07:42) (18 - 20)  SpO2: 95% (05 Jul 2018 07:42) (95% - 97%)    MEDICATIONS  (STANDING):  amLODIPine   Tablet 10 milliGRAM(s) Oral daily  atovaquone Suspension 1500 milliGRAM(s) Oral daily  calcitriol   Capsule 0.5 MICROGram(s) Oral daily  darunavir 800 milliGRAM(s) Oral daily  dolutegravir 50 milliGRAM(s) Oral daily  emtricitabine 200 milliGRAM(s) Oral <User Schedule>  fentaNYL   Patch 100 MICROgram(s)/Hr. 1 Patch Transdermal every 48 hours  heparin  Injectable 5000 Unit(s) SubCutaneous every 12 hours  levETIRAcetam 500 milliGRAM(s) Oral two times a day  lisinopril 20 milliGRAM(s) Oral daily  metoprolol tartrate 100 milliGRAM(s) Oral two times a day  pantoprazole    Tablet 40 milliGRAM(s) Oral before breakfast  ritonavir Tablet 100 milliGRAM(s) Oral daily  sertraline 50 milliGRAM(s) Oral daily  sevelamer hydrochloride 800 milliGRAM(s) Oral three times a day  tenofovir disoproxil fumarate (VIREAD) 300 milliGRAM(s) Oral <User Schedule>  valGANciclovir 100 milliGRAM(s) Oral <User Schedule>    MEDICATIONS  (PRN):  acetaminophen   Tablet 650 milliGRAM(s) Oral every 6 hours PRN For Temp greater than 38 C (100.4 F)  acetaminophen   Tablet. 650 milliGRAM(s) Oral every 6 hours PRN Moderate Pain (4 - 6)  ALPRAZolam 0.5 milliGRAM(s) Oral three times a day PRN anxiety  diphenhydrAMINE   Capsule 25 milliGRAM(s) Oral every 6 hours PRN Rash and/or Itching  HYDROmorphone  Injectable 1 milliGRAM(s) IV Push every 4 hours PRN breakthrough pain  ondansetron Injectable 4 milliGRAM(s) IV Push every 6 hours PRN Nausea  oxyCODONE    IR 5 milliGRAM(s) Oral four times a day PRN mod to severe pain  zolpidem 5 milliGRAM(s) Oral at bedtime PRN Insomnia                            9.5    2.6   )-----------( 110      ( 04 Jul 2018 09:08 )             30.8     07-04    138  |  94<L>  |  52.0<H>  ----------------------------<  118<H>  4.4   |  25.0  |  9.36<H>    Ca    9.0      04 Jul 2018 09:08        CAPILLARY BLOOD GLUCOSE  POCT Blood Glucose.: 124 mg/dL (04 Jul 2018 08:36)    A/P: 27y Male planned for above procedure  Consent to be obtained by MD in [facility] area  NPO past midnight, except medications  amLODIPine   Tablet  atovaquone Suspension  darunavir  dolutegravir  emtricitabine  lisinopril  metoprolol tartrate  ritonavir Tablet  tenofovir disoproxil fumarate (VIREAD)  valGANciclovir  HD today via R AVF per renal

## 2018-07-05 NOTE — PHYSICAL THERAPY INITIAL EVALUATION ADULT - PERTINENT HX OF CURRENT PROBLEM, REHAB EVAL
The patient is a 27 year old male with a history of HIV ( non compliant with treatment), CMV retinitis with vision loss, ESRD on HD MWF, dilated cardiomyopathy, hypertension and seizure disorder who was brought to the ED with complaints of weakness, confusion, enlarging of his fistula with occasional bleeding, and seeing flashes of light (he is blind at baseline).Fell today at home in bathroom but denies hitting head, states tripped.

## 2018-07-05 NOTE — PROGRESS NOTE ADULT - SUBJECTIVE AND OBJECTIVE BOX
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 Pt also c/o nausea and wretching. Vomited once today at home. Fell today at home in bathroom but denies hitting head, states tripped. Pt uses wheelchair at baseline. He admits to being non compliant with his medications at home; he had not taken his medications. Pt denies cp, diarrhea, HA, fevers, constipation, numbness, tingling. (30 Jun 2018 23:01)    Follow up ESRD  Seen on dialysis  c/o should pain and wants more pain medications       PAST MEDICAL & SURGICAL HISTORY:  HIV (human immunodeficiency virus infection)  Seizure disorder  Hypertension  Diabetes  ESRD (end stage renal disease)  Seizure  Pericarditis  Anxiety  Depression  Renal failure (ARF), acute on chronic: dialysis av fistula, RUE  HTN (hypertension)  Cardiomyopathy  HIV disease: born HIV+  AV fistula  S/P tonsillectomy      MEDICATIONS  (STANDING):  amLODIPine   Tablet 10 milliGRAM(s) Oral daily  atovaquone Suspension 1500 milliGRAM(s) Oral daily  calcitriol   Capsule 0.5 MICROGram(s) Oral daily  darunavir 800 milliGRAM(s) Oral daily  dolutegravir 50 milliGRAM(s) Oral daily  emtricitabine 200 milliGRAM(s) Oral <User Schedule>  fentaNYL   Patch 100 MICROgram(s)/Hr. 1 Patch Transdermal every 48 hours  heparin  Injectable 5000 Unit(s) SubCutaneous every 12 hours  levETIRAcetam 500 milliGRAM(s) Oral two times a day  lisinopril 20 milliGRAM(s) Oral daily  metoprolol tartrate 100 milliGRAM(s) Oral two times a day  pantoprazole    Tablet 40 milliGRAM(s) Oral before breakfast  ritonavir Tablet 100 milliGRAM(s) Oral daily  sertraline 50 milliGRAM(s) Oral daily  sevelamer hydrochloride 800 milliGRAM(s) Oral three times a day  tenofovir disoproxil fumarate (VIREAD) 300 milliGRAM(s) Oral <User Schedule>  valGANciclovir 100 milliGRAM(s) Oral <User Schedule>    MEDICATIONS  (PRN):  acetaminophen   Tablet 650 milliGRAM(s) Oral every 6 hours PRN For Temp greater than 38 C (100.4 F)  acetaminophen   Tablet. 650 milliGRAM(s) Oral every 6 hours PRN Moderate Pain (4 - 6)  ALPRAZolam 0.5 milliGRAM(s) Oral three times a day PRN anxiety  diphenhydrAMINE   Capsule 25 milliGRAM(s) Oral every 6 hours PRN Rash and/or Itching  HYDROmorphone  Injectable 1 milliGRAM(s) IV Push every 4 hours PRN breakthrough pain  ondansetron Injectable 4 milliGRAM(s) IV Push every 6 hours PRN Nausea  oxyCODONE    IR 5 milliGRAM(s) Oral four times a day PRN mod to severe pain  zolpidem 5 milliGRAM(s) Oral at bedtime PRN Insomnia      Allergies    vancomycin (Anaphylaxis)    Intolerances        Vital Signs Last 24 Hrs  T(C): 36.8 (05 Jul 2018 07:42), Max: 37.1 (05 Jul 2018 00:10)  T(F): 98.2 (05 Jul 2018 07:42), Max: 98.8 (05 Jul 2018 00:10)  HR: 67 (05 Jul 2018 07:42) (67 - 71)  BP: 135/78 (05 Jul 2018 07:42) (115/69 - 135/78)  BP(mean): --  RR: 20 (05 Jul 2018 07:42) (18 - 20)  SpO2: 95% (05 Jul 2018 07:42) (95% - 97%)  Daily     Daily     PHYSICAL EXAM:  GENERAL: No distress, comfortable  HEAD:  Atraumatic, Normocephalic  EYES: Conjunctiva and sclera clear  ENMT: No tonsillar erythema, exudates, or enlargement; Moist mucous membranes, Good dentition, No lesions  NECK: Supple  NERVOUS SYSTEM:  Alert & Oriented X3, Follows commands  CHEST/LUNG: Clear to percussion bilaterally; No rales, rhonchi, wheezing, or rubs  HEART: Regular rate and rhythm; No murmurs, rubs, or gallops  ABDOMEN: Soft, Nontender, Nondistended; Bowel sounds present, body wall and flank edema  EXTREMITIES:  No edema B/L; RUE Hypertrophic AVF with palpable thrill  LYMPH: No lymphadenopathy noted  SKIN: No rashes or lesions, pale    LABS:                        9.5    2.6   )-----------( 110      ( 04 Jul 2018 09:08 )             30.8     07-04    138  |  94<L>  |  52.0<H>  ----------------------------<  118<H>  4.4   |  25.0  |  9.36<H>    Ca    9.0      04 Jul 2018 09:08                  RADIOLOGY & ADDITIONAL TESTS:

## 2018-07-05 NOTE — CONSULT NOTE ADULT - ASSESSMENT
A&Ox3, NAD, supine in bed. Normal steady, independent movements without signs of increased or intense pain. No drowsiness or oversedation during this assessment.      [x]  GENE  Reviewed. Reference # [id number]

## 2018-07-05 NOTE — CONSULT NOTE ADULT - SUBJECTIVE AND OBJECTIVE BOX
Patient is presently off the floor to Dialysis. Per RN caring for patient, he sleeps most of the day, and has to be awakened by staff in the mornings. Will re-visit within 24hrs for complete assessment. VERBAL REPORT  "I always had a radiating pain in my [R] arm, but it has been getting increasingly worse over the last 2months. I now also have pain in my right upper back, right shoulder and right chest."  Patient describes a constant pain that feels like an angioplasty balloon that's about to pop. He points to enlarged veins in his right upper chest area, superior to the axilla, that he says are new. He was able to get up out of bed with PT and do stairs, but explains that he needed to stop and rest twice when the pain intensified. On review of his medications, the patient seems to be please with the recent increase in currently ordered Oxycodone IR to 10mg to match his home dose. The patient admits analgesia with the current regimen. He was informed that no changes will be suggested to the opioid doses. Verbalizes understanding.  Per RN caring for patient, he sleeps most of the day, and has to be awakened by staff in the mornings.     PAIN SCORE:   4/10 at rest     SCALE USED: (1-10)    Allergies  vancomycin (Anaphylaxis)      PAST MEDICAL & SURGICAL HISTORY:  HIV (human immunodeficiency virus infection)  Seizure disorder  Hypertension  Diabetes  ESRD (end stage renal disease)  Seizure  Pericarditis  Anxiety  Depression  Renal failure (ARF), acute on chronic: dialysis av fistula, RUE  HTN (hypertension)  Cardiomyopathy  HIV disease: born HIV+  AV fistula  S/P tonsillectomy      PAIN MEDICATIONS:  acetaminophen   Tablet 650 milliGRAM(s) Oral every 6 hours PRN  acetaminophen   Tablet. 650 milliGRAM(s) Oral every 6 hours PRN  ALPRAZolam 0.5 milliGRAM(s) Oral three times a day PRN  diphenhydrAMINE   Capsule 25 milliGRAM(s) Oral every 6 hours PRN  fentaNYL   Patch 100 MICROgram(s)/Hr. 1 Patch Transdermal every 48 hours  HYDROmorphone  Injectable 1 milliGRAM(s) IV Push every 4 hours PRN  levETIRAcetam 500 milliGRAM(s) Oral two times a day  ondansetron Injectable 4 milliGRAM(s) IV Push every 6 hours PRN  oxyCODONE    IR 10 milliGRAM(s) Oral four times a day PRN  sertraline 50 milliGRAM(s) Oral daily  zolpidem 5 milliGRAM(s) Oral at bedtime PRN    Heme:  heparin  Injectable 5000 Unit(s) SubCutaneous every 12 hours    Antibiotics:  atovaquone Suspension 1500 milliGRAM(s) Oral daily  darunavir 800 milliGRAM(s) Oral daily  dolutegravir 50 milliGRAM(s) Oral daily  emtricitabine 200 milliGRAM(s) Oral <User Schedule>  ritonavir Tablet 100 milliGRAM(s) Oral daily  tenofovir disoproxil fumarate (VIREAD) 300 milliGRAM(s) Oral <User Schedule>    Cardiovascular:  amLODIPine   Tablet 10 milliGRAM(s) Oral daily  lisinopril 20 milliGRAM(s) Oral daily  metoprolol tartrate 100 milliGRAM(s) Oral two times a day    GI:  pantoprazole    Tablet 40 milliGRAM(s) Oral before breakfast      All Other Medications:  calcitriol   Capsule 0.5 MICROGram(s) Oral daily  sevelamer hydrochloride 800 milliGRAM(s) Oral three times a day  Valganciclovir 60mg/ml oral Susp. 100 milliGRAM(s) 100 milliGRAM(s) Oral <User Schedule>      REVIEW OF SYSTEMS:  RESPIRATORY: Denies shortness of breath  GASTROINTESTINAL: +intermittent right upper abdominal or epigastric pain. No nausea, vomiting, diarrhea or constipation.  GENITOURINARY: ESRD  MUSCULOSKELETAL: Right upper back, right shoulder RUE pain    PHYSICAL EXAM:  GENERAL: NAD, well-groomed, thin  EYES: Legally blind  NERVOUS SYSTEM:  Alert & Oriented X3, Good concentration; Motor Strength 5/5 B/L upper and lower extremities  ABDOMEN: Soft, flat, Nondistended;  EXTREMITIES: No clubbing, cyanosis, or edema    Vital Signs Last 24 Hrs  T(C): 36.8 (05 Jul 2018 11:30), Max: 37.1 (05 Jul 2018 00:10)  T(F): 98.2 (05 Jul 2018 11:30), Max: 98.8 (05 Jul 2018 00:10)  HR: 69 (05 Jul 2018 11:30) (67 - 71)  BP: 136/79 (05 Jul 2018 11:30) (115/69 - 136/79)  BP(mean): --  RR: 18 (05 Jul 2018 11:30) (18 - 20)  SpO2: 95% (05 Jul 2018 11:30) (95% - 97%)      LABS:                          8.8    2.8   )-----------( 110      ( 05 Jul 2018 12:47 )             27.1     07-05    136  |  91<L>  |  77.0<H>  ----------------------------<  116<H>  4.9   |  26.0  |  11.86<H>    Ca    9.0      05 Jul 2018 12:47  Phos  7.8     07-05

## 2018-07-05 NOTE — CONSULT NOTE ADULT - CONSULT REASON
Chief Complaint:    HPI:  The patient is a 27 year old male with a history of HIV ( non compliant with treatment), CMV retinitis with vision loss, ESRD on HD MWF, dilated cardiomyopathy, hypertension and seizure disorder who was brought to the ED with complaints of weakness, confusion, enlarging of his fistula with occasional bleeding, and seeing flashes of light (he is blind at baseline). Pt also c/o nausea and wretching. Vomited once today at home. Fell today at home in bathroom but denies hitting head, states tripped. Pt uses wheelchair at baseline. He admits to being non compliant with his medications at home; he had not taken his medications. Pt denies cp, diarrhea, HA, fevers, constipation, numbness, tingling. (30 Jun 2018 23:01)    PAIN SERVICE:  Medical Team requests assistance with pain regimen, as patient continues to request higher doses of opioids. Per MD, patient was found to be very lethargic, requiring modification of his home medications. Chief Complaint:    HPI:  The patient is a 27 year old male with a history of HIV ( non compliant with treatment), CMV retinitis with vision loss, ESRD on HD MWF, dilated cardiomyopathy, hypertension and seizure disorder who was brought to the ED with complaints of weakness, confusion, enlarging of his fistula with occasional bleeding, and seeing flashes of light (he is blind at baseline). Pt also c/o nausea and wretching. Vomited once today at home. Fell today at home in bathroom but denies hitting head, states tripped. Pt uses wheelchair at baseline. He admits to being non compliant with his medications at home; he had not taken his medications. Pt denies cp, diarrhea, HA, fevers, constipation, numbness, tingling. (30 Jun 2018 23:01)    PAIN SERVICE:  Medical Team requests assistance with pain regimen, as patient continues to ask for higher doses of opioids for his AV fistula pain. Per MD, patient was found to be very lethargic, requiring modification of his home medications.

## 2018-07-05 NOTE — PROGRESS NOTE ADULT - ASSESSMENT
28y/o man with HIV on TDF+FTC+DTG+DRV/r nonadherent to meds, with CMV retinitis, CD4 not available but VL is very high.   I called his pharmacy ([facility] [phone]) only on Edurant 25mg which is very inappropriate regimen.     1-HIV:  -Most likely CD4 will be low due to nonadherence.   -VL was high so I ordered genotype (regular and integrase inhibitor GT).   -f/up CD4  -Continue Tenofovir 300mg weekly  -Emtricitabine 200mg every 4 days  -Darunavir 800mg daily  -Ritonavir 100mg daily   -Dolutegravir 50mg daily  -Atovaquone 1500mg daily for PCP prophylaxis   -NO MAC prophylaxis until active MAC infection is ruled out.     2-History of CMV:  -Cont valganciclovir 100mg after each HD for treatment and then prophylaxis until CD4 is high.   -Ophthalmology consult     3-ESRD:   -Very high Creat possibly causing GI symptoms that has resolved now.    -Needs HD, unclear if missed HD.     4-Pancytopenia:  -could be related to bone marrow suppression in uncontrolled HIV patients.   -High risk for MAC, also has a high ALK Phosph and pancytopenia that could be a sign of MAC  -f/up blood culture for mycobacterium

## 2018-07-05 NOTE — PHYSICAL THERAPY INITIAL EVALUATION ADULT - MANUAL MUSCLE TESTING RESULTS, REHAB EVAL
except right shoulder flex 3-/5, **elbow flex 3/5(no overpressure given to right UE due to fistula discomfort)/no strength deficits were identified

## 2018-07-05 NOTE — PROGRESS NOTE ADULT - SUBJECTIVE AND OBJECTIVE BOX
seen for HD, chronic pain.    seen in hd suite. tolerating HD.  complaining of poorly controlled pain in right arm. wants higher Dilaudid doses.  ros negative    MEDICATIONS  (STANDING):  amLODIPine   Tablet 10 milliGRAM(s) Oral daily  atovaquone Suspension 1500 milliGRAM(s) Oral daily  calcitriol   Capsule 0.5 MICROGram(s) Oral daily  darunavir 800 milliGRAM(s) Oral daily  dolutegravir 50 milliGRAM(s) Oral daily  emtricitabine 200 milliGRAM(s) Oral <User Schedule>  fentaNYL   Patch 100 MICROgram(s)/Hr. 1 Patch Transdermal every 48 hours  heparin  Injectable 5000 Unit(s) SubCutaneous every 12 hours  levETIRAcetam 500 milliGRAM(s) Oral two times a day  lisinopril 20 milliGRAM(s) Oral daily  metoprolol tartrate 100 milliGRAM(s) Oral two times a day  oxyCODONE  ER Tablet 10 milliGRAM(s) Oral every 12 hours  pantoprazole    Tablet 40 milliGRAM(s) Oral before breakfast  ritonavir Tablet 100 milliGRAM(s) Oral daily  sertraline 50 milliGRAM(s) Oral daily  sevelamer hydrochloride 800 milliGRAM(s) Oral three times a day  tenofovir disoproxil fumarate (VIREAD) 300 milliGRAM(s) Oral <User Schedule>  Valganciclovir 60mg/ml oral Susp. 100 milliGRAM(s) 100 milliGRAM(s) Oral <User Schedule>    MEDICATIONS  (PRN):  acetaminophen   Tablet 650 milliGRAM(s) Oral every 6 hours PRN For Temp greater than 38 C (100.4 F)  acetaminophen   Tablet. 650 milliGRAM(s) Oral every 6 hours PRN Moderate Pain (4 - 6)  ALPRAZolam 0.5 milliGRAM(s) Oral three times a day PRN anxiety  diphenhydrAMINE   Capsule 25 milliGRAM(s) Oral every 6 hours PRN Rash and/or Itching  HYDROmorphone  Injectable 1 milliGRAM(s) IV Push every 4 hours PRN breakthrough pain  ondansetron Injectable 4 milliGRAM(s) IV Push every 6 hours PRN Nausea  oxyCODONE    IR 5 milliGRAM(s) Oral four times a day PRN mod to severe pain  zolpidem 5 milliGRAM(s) Oral at bedtime PRN Insomnia      Allergies    vancomycin (Anaphylaxis)      Vital Signs Last 24 Hrs  T(C): 36.8 (05 Jul 2018 11:30), Max: 37.1 (05 Jul 2018 00:10)  T(F): 98.2 (05 Jul 2018 11:30), Max: 98.8 (05 Jul 2018 00:10)  HR: 69 (05 Jul 2018 11:30) (67 - 71)  BP: 136/79 (05 Jul 2018 11:30) (115/69 - 136/79)  BP(mean): --  RR: 18 (05 Jul 2018 11:30) (18 - 20)  SpO2: 95% (05 Jul 2018 11:30) (95% - 97%)    PHYSICAL EXAM:    GENERAL: NAD  CHEST/LUNG: Clear to percussion bilaterally  HEART: Regular rate and rhythm; S1 S2  ABDOMEN: Soft, Nontender, Nondistended; Bowel sounds present  EXTREMITIES:  no edema  NERVOUS SYSTEM:  Alert & Oriented X3, nonfocal  PSYCH: normal mood, appropriate response.    LABS:                        8.8    2.8   )-----------( 110      ( 05 Jul 2018 12:47 )             27.1     07-05    136  |  91<L>  |  77.0<H>  ----------------------------<  116<H>  4.9   |  26.0  |  11.86<H>    Ca    9.0      05 Jul 2018 12:47  Phos  7.8     07-05            CAPILLARY BLOOD GLUCOSE            RADIOLOGY & ADDITIONAL TESTS:

## 2018-07-05 NOTE — PROGRESS NOTE ADULT - ASSESSMENT
27 year old male with a history of HIV ( non compliant with treatment), CMV retinitis with vision loss, ESRD on HD MWF, dilated cardiomyopathy, hypertension and seizure disorder, who is admitted for po intolerance, intractable n/v, and failure to thrive.   Episode of somnolence during hospital stay, likely due to over medication

## 2018-07-05 NOTE — PROGRESS NOTE ADULT - SUBJECTIVE AND OBJECTIVE BOX
[facility]  INFECTIOUS DISEASES AND INTERNAL MEDICINE at [place]  =======================================================  Howie Navarro MD  Diplomates American Board of Internal Medicine and Infectious Diseases  =======================================================    TEDDY CRAWFORD [id number]    Follow up:  28 y/o man with HIV nonadherent to ARV, CMV retinitis with vision loss, ESRD on HD, dilated cardiomyopathy, hypertension and seizure disorder was admitted with weakness, confusion, nausea and vomiting, seeing flashes of light, blind at baseline.   Feels back to his normal. No fever or any other complaint. Planning for fistulogram by vascular tomorrow.       PAST MEDICAL & SURGICAL HISTORY:  HIV (human immunodeficiency virus infection)  Seizure disorder  Hypertension  Diabetes  ESRD (end stage renal disease)  Seizure  Pericarditis  Anxiety  Depression  Renal failure (ARF), acute on chronic: dialysis av fistula, RUE  HTN (hypertension)  Cardiomyopathy  HIV disease: born HIV+  AV fistula  S/P tonsillectomy      FAMILY HISTORY:  No pertinent family history in first degree relatives  No pertinent family history in first degree relatives    Allergies  vancomycin (Anaphylaxis)    Antibiotics:  atovaquone Suspension 1500 milliGRAM(s) Oral daily  darunavir 800 milliGRAM(s) Oral daily  dolutegravir 50 milliGRAM(s) Oral daily  emtricitabine 200 milliGRAM(s) Oral <User Schedule>  levoFLOXacin  Tablet 250 milliGRAM(s) Oral every 48 hours  ritonavir Tablet 100 milliGRAM(s) Oral daily  tenofovir disoproxil fumarate (VIREAD) 300 milliGRAM(s) Oral <User Schedule>     REVIEW OF SYSTEMS:  CONSTITUTIONAL:  No Fever or chills   HEENT:  blind   CARDIOVASCULAR:  No chest pain or SOB.  RESPIRATORY:  No cough, shortness of breath, PND or orthopnea.  GASTROINTESTINAL:  +nausea but better , no vomiting or diarrhea.  GENITOURINARY:  No dysuria, frequency or urgency. No Blood in urine  MUSCULOSKELETAL:  R arm with AV fistula had bleeding that stopped   SKIN:  No change in skin, hair or nails.  ENDOCRINE:  No heat or cold intolerance, polyuria or polydipsia.    Physical Exam:  Vital Signs Last 24 Hrs  T(C): 36.8 (05 Jul 2018 07:42), Max: 37.1 (05 Jul 2018 00:10)  T(F): 98.2 (05 Jul 2018 07:42), Max: 98.8 (05 Jul 2018 00:10)  HR: 67 (05 Jul 2018 07:42) (67 - 71)  BP: 135/78 (05 Jul 2018 07:42) (115/69 - 135/78)  RR: 20 (05 Jul 2018 07:42) (18 - 20)  SpO2: 95% (05 Jul 2018 07:42) (95% - 97%)  GEN: NAD, drowsy  HEENT: normocephalic and atraumatic. EOMI. PERRL.    NECK: Supple.  No lymphadenopathy   LUNGS: Clear to auscultation.  HEART: Regular rate and rhythm   ABDOMEN: Soft, nontender, and nondistended.  Positive bowel sounds.    EXTREMITIES: Without any cyanosis, clubbing, rash, lesions or edema. right arm with AV fistula with good thrill and big vessels, no bleeding at this time  NEUROLOGIC: grossly intact.  PSYCHIATRIC: Appropriate affect .  SKIN: No ulceration or induration present.      Labs:  07-02    137  |  90<L>  |  78.0<H>  ----------------------------<  88  4.2   |  26.0  |  14.63<H>    Ca    8.7      02 Jul 2018 07:19    RECENT CULTURES:  06-30 @ 20:23 .Blood Blood     No growth at 48 hours    06-30 @ 20:22 .Blood Blood     No growth at 48 hours    All imaging and data are reviewed.

## 2018-07-05 NOTE — PHYSICAL THERAPY INITIAL EVALUATION ADULT - ADDITIONAL COMMENTS
pt [place] states he has 4 steps to enter (+2 rails). lives on main level in house, no further stairs to negotiate. pt states he is independent in the house as he is familiar with his environment. uses white cane in community due to visual impairments. takes ambulette to HD. has shower chair. mother is CDPAP aide.

## 2018-07-05 NOTE — PROGRESS NOTE ADULT - ASSESSMENT
ESRD on HD MWF  Vomiting  HTN  HIV  AVF bleeding   DM    -Seen on HD today. Tolerating.   -Hemodynamics are stable  -Nausea/vomiting appears to be resolved  -On HAART therapy  -Renal diet  -Duplex of AVF  -Vascular follow up for fistulogram tomorrow     Thank you

## 2018-07-05 NOTE — PHYSICAL THERAPY INITIAL EVALUATION ADULT - PASSIVE RANGE OF MOTION EXAMINATION, REHAB EVAL
left shoulder flex testing limited by c/o fistula discomfort, otherwise elbow,wrist,hand WFL/Left UE Passive ROM was WNL (within normal limits)/bilateral lower extremity Passive ROM was WNL

## 2018-07-06 LAB — BLD GP AB SCN SERPL QL: SIGNIFICANT CHANGE UP

## 2018-07-06 PROCEDURE — 36907 BALO ANGIOP CTR DIALYSIS SEG: CPT

## 2018-07-06 PROCEDURE — 99152 MOD SED SAME PHYS/QHP 5/>YRS: CPT

## 2018-07-06 PROCEDURE — 99232 SBSQ HOSP IP/OBS MODERATE 35: CPT

## 2018-07-06 PROCEDURE — 99233 SBSQ HOSP IP/OBS HIGH 50: CPT

## 2018-07-06 PROCEDURE — 36901 INTRO CATH DIALYSIS CIRCUIT: CPT

## 2018-07-06 PROCEDURE — 75827 VEIN X-RAY CHEST: CPT | Mod: 26,59

## 2018-07-06 PROCEDURE — 76937 US GUIDE VASCULAR ACCESS: CPT | Mod: 26

## 2018-07-06 RX ADMIN — AMLODIPINE BESYLATE 10 MILLIGRAM(S): 2.5 TABLET ORAL at 06:20

## 2018-07-06 RX ADMIN — DOLUTEGRAVIR SODIUM 50 MILLIGRAM(S): 25 TABLET, FILM COATED ORAL at 11:22

## 2018-07-06 RX ADMIN — Medication 25 MILLIGRAM(S): at 00:39

## 2018-07-06 RX ADMIN — HYDROMORPHONE HYDROCHLORIDE 1 MILLIGRAM(S): 2 INJECTION INTRAMUSCULAR; INTRAVENOUS; SUBCUTANEOUS at 10:34

## 2018-07-06 RX ADMIN — OXYCODONE HYDROCHLORIDE 10 MILLIGRAM(S): 5 TABLET ORAL at 23:29

## 2018-07-06 RX ADMIN — FENTANYL CITRATE 1 PATCH: 50 INJECTION INTRAVENOUS at 11:21

## 2018-07-06 RX ADMIN — HEPARIN SODIUM 5000 UNIT(S): 5000 INJECTION INTRAVENOUS; SUBCUTANEOUS at 06:20

## 2018-07-06 RX ADMIN — ZOLPIDEM TARTRATE 5 MILLIGRAM(S): 10 TABLET ORAL at 00:39

## 2018-07-06 RX ADMIN — Medication 0.5 MILLIGRAM(S): at 06:20

## 2018-07-06 RX ADMIN — Medication 100 MILLIGRAM(S): at 06:20

## 2018-07-06 RX ADMIN — HYDROMORPHONE HYDROCHLORIDE 1 MILLIGRAM(S): 2 INJECTION INTRAMUSCULAR; INTRAVENOUS; SUBCUTANEOUS at 10:54

## 2018-07-06 RX ADMIN — SERTRALINE 50 MILLIGRAM(S): 25 TABLET, FILM COATED ORAL at 11:23

## 2018-07-06 RX ADMIN — HYDROMORPHONE HYDROCHLORIDE 1 MILLIGRAM(S): 2 INJECTION INTRAMUSCULAR; INTRAVENOUS; SUBCUTANEOUS at 02:20

## 2018-07-06 RX ADMIN — Medication 650 MILLIGRAM(S): at 12:24

## 2018-07-06 RX ADMIN — Medication 25 MILLIGRAM(S): at 13:36

## 2018-07-06 RX ADMIN — Medication 650 MILLIGRAM(S): at 11:24

## 2018-07-06 RX ADMIN — HYDROMORPHONE HYDROCHLORIDE 1 MILLIGRAM(S): 2 INJECTION INTRAMUSCULAR; INTRAVENOUS; SUBCUTANEOUS at 02:05

## 2018-07-06 RX ADMIN — DARUNAVIR 800 MILLIGRAM(S): 75 TABLET, FILM COATED ORAL at 11:22

## 2018-07-06 RX ADMIN — Medication 25 MILLIGRAM(S): at 21:59

## 2018-07-06 RX ADMIN — OXYCODONE HYDROCHLORIDE 10 MILLIGRAM(S): 5 TABLET ORAL at 23:59

## 2018-07-06 RX ADMIN — ONDANSETRON 4 MILLIGRAM(S): 8 TABLET, FILM COATED ORAL at 06:23

## 2018-07-06 RX ADMIN — HYDROMORPHONE HYDROCHLORIDE 1 MILLIGRAM(S): 2 INJECTION INTRAMUSCULAR; INTRAVENOUS; SUBCUTANEOUS at 18:12

## 2018-07-06 RX ADMIN — FENTANYL CITRATE 1 PATCH: 50 INJECTION INTRAVENOUS at 11:33

## 2018-07-06 RX ADMIN — HYDROMORPHONE HYDROCHLORIDE 1 MILLIGRAM(S): 2 INJECTION INTRAMUSCULAR; INTRAVENOUS; SUBCUTANEOUS at 18:42

## 2018-07-06 RX ADMIN — LEVETIRACETAM 500 MILLIGRAM(S): 250 TABLET, FILM COATED ORAL at 06:20

## 2018-07-06 RX ADMIN — PANTOPRAZOLE SODIUM 40 MILLIGRAM(S): 20 TABLET, DELAYED RELEASE ORAL at 06:20

## 2018-07-06 RX ADMIN — RITONAVIR 100 MILLIGRAM(S): 100 TABLET, FILM COATED ORAL at 11:22

## 2018-07-06 RX ADMIN — LISINOPRIL 20 MILLIGRAM(S): 2.5 TABLET ORAL at 06:20

## 2018-07-06 NOTE — PROGRESS NOTE ADULT - SUBJECTIVE AND OBJECTIVE BOX
seen for esrd, fistula stenosis    no acute complaints. chronic pain, wants more IV dilaudid as he wasn't sedated enough during surgery due to his high tolerance  ros otherwise negative     MEDICATIONS  (STANDING):  amLODIPine   Tablet 10 milliGRAM(s) Oral daily  atovaquone Suspension 1500 milliGRAM(s) Oral daily  calcitriol   Capsule 0.5 MICROGram(s) Oral daily  darunavir 800 milliGRAM(s) Oral daily  dolutegravir 50 milliGRAM(s) Oral daily  emtricitabine 200 milliGRAM(s) Oral <User Schedule>  fentaNYL   Patch 100 MICROgram(s)/Hr. 1 Patch Transdermal every 48 hours  heparin  Injectable 5000 Unit(s) SubCutaneous every 12 hours  levETIRAcetam 500 milliGRAM(s) Oral two times a day  lisinopril 20 milliGRAM(s) Oral daily  metoprolol tartrate 100 milliGRAM(s) Oral two times a day  pantoprazole    Tablet 40 milliGRAM(s) Oral before breakfast  ritonavir Tablet 100 milliGRAM(s) Oral daily  sertraline 50 milliGRAM(s) Oral daily  sevelamer hydrochloride 800 milliGRAM(s) Oral three times a day  tenofovir disoproxil fumarate (VIREAD) 300 milliGRAM(s) Oral <User Schedule>  Valganciclovir 50 mg/ 1 ml Oral Solutio 100 milliGRAM(s) 100 milliGRAM(s) Oral <User Schedule>    MEDICATIONS  (PRN):  acetaminophen   Tablet 650 milliGRAM(s) Oral every 6 hours PRN For Temp greater than 38 C (100.4 F)  acetaminophen   Tablet. 650 milliGRAM(s) Oral every 6 hours PRN Moderate Pain (4 - 6)  ALPRAZolam 0.5 milliGRAM(s) Oral three times a day PRN anxiety  diphenhydrAMINE   Capsule 25 milliGRAM(s) Oral every 6 hours PRN Rash and/or Itching  HYDROmorphone  Injectable 1 milliGRAM(s) IV Push every 4 hours PRN breakthrough pain  ondansetron Injectable 4 milliGRAM(s) IV Push every 6 hours PRN Nausea  oxyCODONE    IR 10 milliGRAM(s) Oral four times a day PRN mod to severe pain  zolpidem 5 milliGRAM(s) Oral at bedtime PRN Insomnia      Allergies    vancomycin (Anaphylaxis)    Vital Signs Last 24 Hrs  T(C): 36.8 (05 Jul 2018 23:46), Max: 37 (05 Jul 2018 16:56)  T(F): 98.2 (05 Jul 2018 23:46), Max: 98.6 (05 Jul 2018 16:56)  HR: 79 (05 Jul 2018 23:46) (71 - 79)  BP: 124/79 (05 Jul 2018 23:46) (120/81 - 124/79)  BP(mean): --  RR: 18 (05 Jul 2018 23:46) (18 - 18)  SpO2: 98% (05 Jul 2018 23:46) (98% - 98%)    PHYSICAL EXAM:    GENERAL: NAD  CHEST/LUNG: Clear to percussion bilaterally;  HEART: Regular rate and rhythm; S1 S2;  ABDOMEN: Soft, Nontender, Nondistended; Bowel sounds present  EXTREMITIES: no edema LE   NERVOUS SYSTEM:  Alert & Oriented X3, nonfocal    LABS:                        8.8    2.8   )-----------( 110      ( 05 Jul 2018 12:47 )             27.1     07-05    136  |  91<L>  |  77.0<H>  ----------------------------<  116<H>  4.9   |  26.0  |  11.86<H>    Ca    9.0      05 Jul 2018 12:47  Phos  7.8     07-05            CAPILLARY BLOOD GLUCOSE            RADIOLOGY & ADDITIONAL TESTS:

## 2018-07-06 NOTE — PROGRESS NOTE ADULT - SUBJECTIVE AND OBJECTIVE BOX
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 Pt also c/o nausea and wretching. Vomited once today at home. Fell today at home in bathroom but denies hitting head, states tripped. Pt uses wheelchair at baseline. He admits to being non compliant with his medications at home; he had not taken his medications. Pt denies cp, diarrhea, HA, fevers, constipation, numbness, tingling. (30 Jun 2018 23:01)      PAST MEDICAL & SURGICAL HISTORY:  HIV (human immunodeficiency virus infection)  Seizure disorder  Hypertension  Diabetes  ESRD (end stage renal disease)  Seizure  Pericarditis  Anxiety  Depression  Renal failure (ARF), acute on chronic: dialysis av fistula, RUE  HTN (hypertension)  Cardiomyopathy  HIV disease: born HIV+  AV fistula  S/P tonsillectomy      MEDICATIONS  (STANDING):  amLODIPine   Tablet 10 milliGRAM(s) Oral daily  atovaquone Suspension 1500 milliGRAM(s) Oral daily  calcitriol   Capsule 0.5 MICROGram(s) Oral daily  darunavir 800 milliGRAM(s) Oral daily  dolutegravir 50 milliGRAM(s) Oral daily  emtricitabine 200 milliGRAM(s) Oral <User Schedule>  fentaNYL   Patch 100 MICROgram(s)/Hr. 1 Patch Transdermal every 48 hours  heparin  Injectable 5000 Unit(s) SubCutaneous every 12 hours  levETIRAcetam 500 milliGRAM(s) Oral two times a day  lisinopril 20 milliGRAM(s) Oral daily  metoprolol tartrate 100 milliGRAM(s) Oral two times a day  pantoprazole    Tablet 40 milliGRAM(s) Oral before breakfast  ritonavir Tablet 100 milliGRAM(s) Oral daily  sertraline 50 milliGRAM(s) Oral daily  sevelamer hydrochloride 800 milliGRAM(s) Oral three times a day  tenofovir disoproxil fumarate (VIREAD) 300 milliGRAM(s) Oral <User Schedule>  Valganciclovir 60mg/ml oral Susp. 100 milliGRAM(s) 100 milliGRAM(s) Oral <User Schedule>    MEDICATIONS  (PRN):  acetaminophen   Tablet 650 milliGRAM(s) Oral every 6 hours PRN For Temp greater than 38 C (100.4 F)  acetaminophen   Tablet. 650 milliGRAM(s) Oral every 6 hours PRN Moderate Pain (4 - 6)  ALPRAZolam 0.5 milliGRAM(s) Oral three times a day PRN anxiety  diphenhydrAMINE   Capsule 25 milliGRAM(s) Oral every 6 hours PRN Rash and/or Itching  HYDROmorphone  Injectable 1 milliGRAM(s) IV Push every 4 hours PRN breakthrough pain  ondansetron Injectable 4 milliGRAM(s) IV Push every 6 hours PRN Nausea  oxyCODONE    IR 10 milliGRAM(s) Oral four times a day PRN mod to severe pain  zolpidem 5 milliGRAM(s) Oral at bedtime PRN Insomnia      Allergies    vancomycin (Anaphylaxis)    Intolerances        Vital Signs Last 24 Hrs  T(C): 36.8 (05 Jul 2018 23:46), Max: 37 (05 Jul 2018 16:56)  T(F): 98.2 (05 Jul 2018 23:46), Max: 98.6 (05 Jul 2018 16:56)  HR: 79 (05 Jul 2018 23:46) (71 - 79)  BP: 124/79 (05 Jul 2018 23:46) (120/81 - 124/79)  BP(mean): --  RR: 18 (05 Jul 2018 23:46) (18 - 18)  SpO2: 98% (05 Jul 2018 23:46) (98% - 98%)  Daily     Daily     PHYSICAL EXAM:    GENERAL: NAD, well-groomed, well-developed  HEAD:  Atraumatic, Normocephalic  EYES: EOMI, PERRLA, conjunctiva and sclera clear  ENMT: No tonsillar erythema, exudates, or enlargement; Moist mucous membranes, Good dentition, No lesions  NECK: Supple, No JVD, Normal thyroid  NERVOUS SYSTEM:  Alert & Oriented X3, Good concentration; Motor Strength 5/5 B/L upper and lower extremities; DTRs 2+ intact and symmetric  CHEST/LUNG: Clear to percussion bilaterally; No rales, rhonchi, wheezing, or rubs  HEART: Regular rate and rhythm; No murmurs, rubs, or gallops  ABDOMEN: Soft, Nontender, Nondistended; Bowel sounds present  EXTREMITIES:  2+ Peripheral Pulses, No clubbing, cyanosis, or edema  SKIN: No rashes or lesions    LABS:                        8.8    2.8   )-----------( 110      ( 05 Jul 2018 12:47 )             27.1     07-05    136  |  91<L>  |  77.0<H>  ----------------------------<  116<H>  4.9   |  26.0  |  11.86<H>    Ca    9.0      05 Jul 2018 12:47  Phos  7.8     07-05        RADIOLOGY & ADDITIONAL TESTS:

## 2018-07-06 NOTE — PROGRESS NOTE ADULT - ASSESSMENT
27 year old male with a history of HIV ( non compliant with treatment), CMV retinitis with vision loss, ESRD on HD MWF, dilated cardiomyopathy, hypertension and seizure disorder, who is admitted for po intolerance, intractable n/v, and failure to thrive. Underwent HD per renal. Episode of somnolence during hospital stay, likely due to over medication. patient seen by pain mgmt, medications adjusted but patient does exhibit pain seeking behavior.  ID following for HIV mgmt and vascular surgery performed angioplasty on 7/6/18

## 2018-07-06 NOTE — PROGRESS NOTE ADULT - SUBJECTIVE AND OBJECTIVE BOX
INTERVAL HPI/OVERNIGHT EVENTS:    SUBJECTIVE:  No overnight events.  Patient to go for fistulogram today.      MEDICATIONS  (STANDING):  amLODIPine   Tablet 10 milliGRAM(s) Oral daily  atovaquone Suspension 1500 milliGRAM(s) Oral daily  calcitriol   Capsule 0.5 MICROGram(s) Oral daily  darunavir 800 milliGRAM(s) Oral daily  dolutegravir 50 milliGRAM(s) Oral daily  emtricitabine 200 milliGRAM(s) Oral <User Schedule>  fentaNYL   Patch 100 MICROgram(s)/Hr. 1 Patch Transdermal every 48 hours  heparin  Injectable 5000 Unit(s) SubCutaneous every 12 hours  levETIRAcetam 500 milliGRAM(s) Oral two times a day  lisinopril 20 milliGRAM(s) Oral daily  metoprolol tartrate 100 milliGRAM(s) Oral two times a day  pantoprazole    Tablet 40 milliGRAM(s) Oral before breakfast  ritonavir Tablet 100 milliGRAM(s) Oral daily  sertraline 50 milliGRAM(s) Oral daily  sevelamer hydrochloride 800 milliGRAM(s) Oral three times a day  tenofovir disoproxil fumarate (VIREAD) 300 milliGRAM(s) Oral <User Schedule>  Valganciclovir 60mg/ml oral Susp. 100 milliGRAM(s) 100 milliGRAM(s) Oral <User Schedule>    MEDICATIONS  (PRN):  acetaminophen   Tablet 650 milliGRAM(s) Oral every 6 hours PRN For Temp greater than 38 C (100.4 F)  acetaminophen   Tablet. 650 milliGRAM(s) Oral every 6 hours PRN Moderate Pain (4 - 6)  ALPRAZolam 0.5 milliGRAM(s) Oral three times a day PRN anxiety  diphenhydrAMINE   Capsule 25 milliGRAM(s) Oral every 6 hours PRN Rash and/or Itching  HYDROmorphone  Injectable 1 milliGRAM(s) IV Push every 4 hours PRN breakthrough pain  ondansetron Injectable 4 milliGRAM(s) IV Push every 6 hours PRN Nausea  oxyCODONE    IR 10 milliGRAM(s) Oral four times a day PRN mod to severe pain  zolpidem 5 milliGRAM(s) Oral at bedtime PRN Insomnia      Vital Signs Last 24 Hrs  T(C): 36.8 (05 Jul 2018 23:46), Max: 37 (05 Jul 2018 16:56)  T(F): 98.2 (05 Jul 2018 23:46), Max: 98.6 (05 Jul 2018 16:56)  HR: 79 (05 Jul 2018 23:46) (69 - 79)  BP: 124/79 (05 Jul 2018 23:46) (120/81 - 136/79)  BP(mean): --  RR: 18 (05 Jul 2018 23:46) (18 - 18)  SpO2: 98% (05 Jul 2018 23:46) (95% - 98%)    PE  Gen: no apparent distress  Pulm: no respiratory distress  Vasc: palpable thrill in AV fistula        I&O's Detail      LABS:                        8.8    2.8   )-----------( 110      ( 05 Jul 2018 12:47 )             27.1     07-05    136  |  91<L>  |  77.0<H>  ----------------------------<  116<H>  4.9   |  26.0  |  11.86<H>    Ca    9.0      05 Jul 2018 12:47  Phos  7.8     07-05

## 2018-07-06 NOTE — PROGRESS NOTE ADULT - ASSESSMENT
26y/o man with HIV on TDF+FTC+DTG+DRV/r nonadherent to meds, with CMV retinitis, CD4 not available but VL is very high.   I called his pharmacy ([facility] [phone]) only on Edurant 25mg which is very inappropriate regimen in an ARV experienced HIV pt.     1-HIV:  -Most likely CD4 will be low due to nonadherence.   -VL was high so I ordered genotype (regular and integrase inhibitor GT).   -f/up CD4 (it was sent, please check if not done resend it)  -Continue Tenofovir 300mg weekly  -Emtricitabine 200mg twice weekly  -Darunavir 800mg daily  -Ritonavir 100mg daily   -Dolutegravir 50mg daily  -Atovaquone 1500mg daily for PCP prophylaxis   -NO MAC prophylaxis until active MAC infection is ruled out.     2-History of CMV:  -Cont valganciclovir 100mg after each HD for treatment and then prophylaxis until CD4 is high.   -Ophthalmology consult     3-ESRD:   -Very high Creat possibly causing GI symptoms that has resolved now.    -Needs HD, unclear if missed HD.     4-Pancytopenia:  -could be related to bone marrow suppression in uncontrolled HIV patients.   -High risk for MAC, also has a high ALK Phosph and pancytopenia that could be a sign of MAC  -f/up blood culture for mycobacterium

## 2018-07-06 NOTE — PROGRESS NOTE ADULT - ASSESSMENT
ESRD on HD MWF  Vomiting  HTN  HIV  AVF bleeding   DM    -Seen, s/p fistulogram and baloon angioplasty, -Vascular follow up  - HD tomorrow. Tolerating  well  -Hemodynamics are stable  -Nausea/vomiting appears to be resolved  -On HAART therapy  -Renal diet  -Duplex of AVF      Thank you

## 2018-07-06 NOTE — PROGRESS NOTE ADULT - PROBLEM SELECTOR PLAN 8
cont keppra
FS wnl   poor po intake, dc raiss for comfort  liberate diet
FS wnl   poor po intake, dc raiss for comfort  liberate diet
cont keppra
cont keppra

## 2018-07-06 NOTE — PROGRESS NOTE ADULT - PROBLEM SELECTOR PROBLEM 8
Seizure disorder
Type 2 diabetes mellitus with complication, unspecified whether long term insulin use
Type 2 diabetes mellitus with complication, unspecified whether long term insulin use

## 2018-07-06 NOTE — CHART NOTE - NSCHARTNOTEFT_GEN_A_CORE
Source: Patient [x ]  Family [ ]   other [ ]    Pt with HIV ( non compliant with treatment), CMV retinitis with vision loss, ESRD on HD, dilated cardiomyopathy, hypertension and seizure disorder, who is admitted for po intolerance, intractable n/v, and failure to thrive.     Current Diet: Diet, Regular:   1500mL Fluid Restriction ([id number])  For patients receiving Renal Replacement - No Protein Restr, No Conc K, No Conc Phos, Low  Sodium (RENAL)  Supplement Feeding Modality:  Oral  Nepro Cans or Servings Per Day:  1       Frequency:  Three Times a day (07-03-18 @ 12:35)    PO intake:  Pt reports decreased po intake at meals, but is consuming Nepro tid.  Pt is requesting Nepro qid    Current Weight:   (7/6) 136# per bedscale  (6/30) 133#    % Weight Change no wt loss noted since admission    Pertinent Medications: MEDICATIONS  (STANDING):  amLODIPine   Tablet 10 milliGRAM(s) Oral daily  atovaquone Suspension 1500 milliGRAM(s) Oral daily  calcitriol   Capsule 0.5 MICROGram(s) Oral daily  darunavir 800 milliGRAM(s) Oral daily  dolutegravir 50 milliGRAM(s) Oral daily  emtricitabine 200 milliGRAM(s) Oral <User Schedule>  fentaNYL   Patch 100 MICROgram(s)/Hr. 1 Patch Transdermal every 48 hours  heparin  Injectable 5000 Unit(s) SubCutaneous every 12 hours  levETIRAcetam 500 milliGRAM(s) Oral two times a day  lisinopril 20 milliGRAM(s) Oral daily  metoprolol tartrate 100 milliGRAM(s) Oral two times a day  pantoprazole    Tablet 40 milliGRAM(s) Oral before breakfast  ritonavir Tablet 100 milliGRAM(s) Oral daily  sertraline 50 milliGRAM(s) Oral daily  sevelamer hydrochloride 800 milliGRAM(s) Oral three times a day  tenofovir disoproxil fumarate (VIREAD) 300 milliGRAM(s) Oral <User Schedule>  Valganciclovir 60mg/ml oral Susp. 100 milliGRAM(s) 100 milliGRAM(s) Oral <User Schedule>    MEDICATIONS  (PRN):  acetaminophen   Tablet 650 milliGRAM(s) Oral every 6 hours PRN For Temp greater than 38 C (100.4 F)  acetaminophen   Tablet. 650 milliGRAM(s) Oral every 6 hours PRN Moderate Pain (4 - 6)  ALPRAZolam 0.5 milliGRAM(s) Oral three times a day PRN anxiety  diphenhydrAMINE   Capsule 25 milliGRAM(s) Oral every 6 hours PRN Rash and/or Itching  HYDROmorphone  Injectable 1 milliGRAM(s) IV Push every 4 hours PRN breakthrough pain  ondansetron Injectable 4 milliGRAM(s) IV Push every 6 hours PRN Nausea  oxyCODONE    IR 10 milliGRAM(s) Oral four times a day PRN mod to severe pain  zolpidem 5 milliGRAM(s) Oral at bedtime PRN Insomnia    Pertinent Labs: CBC Full  -  ( 05 Jul 2018 12:47 )  WBC Count : 2.8 K/uL  Hemoglobin : 8.8 g/dL  Hematocrit : 27.1 %  Platelet Count - Automated : 110 K/uL  Mean Cell Volume : 90.9 fl  Mean Cell Hemoglobin : 29.5 pg  Mean Cell Hemoglobin Concentration : 32.5 g/dL  07-05 Na136 mmol/L Glu 116 mg/dL<H> K+ 4.9 mmol/L Cr  11.86 mg/dL<H> BUN 77.0 mg/dL<H> Phos 7.8 mg/dL<H> Alb n/a   PAB n/a       Skin: no skin breakdown noted    Nutrition focused physical exam conducted UPON INITIAL ASSESSMENT - found signs of malnutrition [ ]absent [x ]present    Subcutaneous fat loss: [ ] Orbital fat pads region, [ ]Buccal fat region, [ ]Triceps region,  [ ]Ribs region    Muscle wasting: [ ]Temples region, [ ]Clavicle region, [x ]Shoulder region, [ ]Scapula region, [ ]Interosseous region,  [ ]thigh region, [ ]Calf region    Estimated Needs:   [x ] no change since previous assessment  [ ] recalculated:     Current Nutrition Diagnosis: Pt remains at nutrition risk secondary to moderate protein calorie malnutrition (acute) related to inadequate protein energy intake with nausea/vomiting pta and continued decreased appetite evidenced by pt meeting <75% estimated nutrition needs > 7 days and mild muscle wasting- shoulder/prominent knees    Recommendations:   Provide 8 oz Nepro QID    Monitoring and Evaluation:   [x ] PO intake [x ] Tolerance to diet prescription [X] Weights  [X] Follow up per protocol [X] Labs: Source: Patient [x ]  Family [ ]   other [ ]    Pt with HIV ( non compliant with treatment), CMV retinitis with vision loss, ESRD on HD, dilated cardiomyopathy, hypertension and seizure disorder, who is admitted for po intolerance, intractable n/v, and failure to thrive.     Current Diet: Diet, Regular:   1500mL Fluid Restriction ([id number])  For patients receiving Renal Replacement - No Protein Restr, No Conc K, No Conc Phos, Low  Sodium (RENAL)  Supplement Feeding Modality:  Oral  Nepro Cans or Servings Per Day:  1       Frequency:  Three Times a day (07-03-18 @ 12:35)    PO intake:  Pt reports decreased po intake at meals, but is consuming Nepro tid.  Pt is requesting Nepro qid    Current Weight:   (7/6) 136# per bedscale  (6/30) 133#    % Weight Change no wt loss noted since admission    Pertinent Medications: MEDICATIONS  (STANDING):  amLODIPine   Tablet 10 milliGRAM(s) Oral daily  atovaquone Suspension 1500 milliGRAM(s) Oral daily  calcitriol   Capsule 0.5 MICROGram(s) Oral daily  darunavir 800 milliGRAM(s) Oral daily  dolutegravir 50 milliGRAM(s) Oral daily  emtricitabine 200 milliGRAM(s) Oral <User Schedule>  fentaNYL   Patch 100 MICROgram(s)/Hr. 1 Patch Transdermal every 48 hours  heparin  Injectable 5000 Unit(s) SubCutaneous every 12 hours  levETIRAcetam 500 milliGRAM(s) Oral two times a day  lisinopril 20 milliGRAM(s) Oral daily  metoprolol tartrate 100 milliGRAM(s) Oral two times a day  pantoprazole    Tablet 40 milliGRAM(s) Oral before breakfast  ritonavir Tablet 100 milliGRAM(s) Oral daily  sertraline 50 milliGRAM(s) Oral daily  sevelamer hydrochloride 800 milliGRAM(s) Oral three times a day  tenofovir disoproxil fumarate (VIREAD) 300 milliGRAM(s) Oral <User Schedule>  Valganciclovir 60mg/ml oral Susp. 100 milliGRAM(s) 100 milliGRAM(s) Oral <User Schedule>    MEDICATIONS  (PRN):  acetaminophen   Tablet 650 milliGRAM(s) Oral every 6 hours PRN For Temp greater than 38 C (100.4 F)  acetaminophen   Tablet. 650 milliGRAM(s) Oral every 6 hours PRN Moderate Pain (4 - 6)  ALPRAZolam 0.5 milliGRAM(s) Oral three times a day PRN anxiety  diphenhydrAMINE   Capsule 25 milliGRAM(s) Oral every 6 hours PRN Rash and/or Itching  HYDROmorphone  Injectable 1 milliGRAM(s) IV Push every 4 hours PRN breakthrough pain  ondansetron Injectable 4 milliGRAM(s) IV Push every 6 hours PRN Nausea  oxyCODONE    IR 10 milliGRAM(s) Oral four times a day PRN mod to severe pain  zolpidem 5 milliGRAM(s) Oral at bedtime PRN Insomnia    Pertinent Labs: CBC Full  -  ( 05 Jul 2018 12:47 )  WBC Count : 2.8 K/uL  Hemoglobin : 8.8 g/dL  Hematocrit : 27.1 %  Platelet Count - Automated : 110 K/uL  Mean Cell Volume : 90.9 fl  Mean Cell Hemoglobin : 29.5 pg  Mean Cell Hemoglobin Concentration : 32.5 g/dL  07-05 Na136 mmol/L Glu 116 mg/dL<H> K+ 4.9 mmol/L Cr  11.86 mg/dL<H> BUN 77.0 mg/dL<H> Phos 7.8 mg/dL<H> Alb n/a   PAB n/a       Skin: no skin breakdown noted    Nutrition focused physical exam conducted UPON INITIAL ASSESSMENT - found signs of malnutrition [ ]absent [x ]present    Subcutaneous fat loss: [ ] Orbital fat pads region, [ ]Buccal fat region, [ ]Triceps region,  [ ]Ribs region    Muscle wasting: [ ]Temples region, [ ]Clavicle region, [x ]Shoulder region, [ ]Scapula region, [ ]Interosseous region,  [ ]thigh region, [ ]Calf region    Estimated Needs:   [x ] no change since previous assessment  [ ] recalculated:     Current Nutrition Diagnosis: Pt remains at nutrition risk secondary to moderate protein calorie malnutrition (acute) related to inadequate protein energy intake with nausea/vomiting pta and continued decreased appetite evidenced by pt meeting <75% estimated nutrition needs > 7 days and mild muscle wasting- shoulder/prominent knees    Pt is aware of the importance of increased calorie intake to improve nutrition status and is requesting Nepro qid as that is all he can consume at this time.    Recommendations:   Provide 8 oz Nepro QID    Monitoring and Evaluation:   [x ] PO intake [x ] Tolerance to diet prescription [X] Weights  [X] Follow up per protocol [X] Labs:

## 2018-07-06 NOTE — BRIEF OPERATIVE NOTE - PROCEDURE
<<-----Click on this checkbox to enter Procedure Angioplasty of right subclavian vein  07/06/2018    Active  ILIANA  Fistulogram, AV shunt  07/06/2018    Active  ILIANA

## 2018-07-06 NOTE — PROGRESS NOTE ADULT - SUBJECTIVE AND OBJECTIVE BOX
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   Feels back to his normal. No fever or any other complaint. Planning for fistulogram by vascular today.   Most likely GI symptoms improved with HD.        PAST MEDICAL & SURGICAL HISTORY:  HIV (human immunodeficiency virus infection)  Seizure disorder  Hypertension  Diabetes  ESRD (end stage renal disease)  Seizure  Pericarditis  Anxiety  Depression  Renal failure (ARF), acute on chronic: dialysis av fistula, RUE  HTN (hypertension)  Cardiomyopathy  HIV disease: born HIV+  AV fistula  S/P tonsillectomy      FAMILY HISTORY:  No pertinent family history in first degree relatives  No pertinent family history in first degree relatives    Allergies  vancomycin (Anaphylaxis)    Antibiotics:  atovaquone Suspension 1500 milliGRAM(s) Oral daily  darunavir 800 milliGRAM(s) Oral daily  dolutegravir 50 milliGRAM(s) Oral daily  emtricitabine 200 milliGRAM(s) Oral <User Schedule>  levoFLOXacin  Tablet 250 milliGRAM(s) Oral every 48 hours  ritonavir Tablet 100 milliGRAM(s) Oral daily  tenofovir disoproxil fumarate (VIREAD) 300 milliGRAM(s) Oral <User Schedule>     REVIEW OF SYSTEMS:  CONSTITUTIONAL:  No Fever or chills   HEENT:  blind   CARDIOVASCULAR:  No chest pain or SOB.  RESPIRATORY:  No cough, shortness of breath, PND or orthopnea.  GASTROINTESTINAL:  +nausea but better , no vomiting or diarrhea.  GENITOURINARY:  No dysuria, frequency or urgency. No Blood in urine  MUSCULOSKELETAL:  R arm with AV fistula had bleeding that stopped   SKIN:  No change in skin, hair or nails.  ENDOCRINE:  No heat or cold intolerance, polyuria or polydipsia.    Physical Exam:  Vital Signs Last 24 Hrs  T(C): 36.8 (05 Jul 2018 23:46), Max: 37 (05 Jul 2018 16:56)  T(F): 98.2 (05 Jul 2018 23:46), Max: 98.6 (05 Jul 2018 16:56)  HR: 79 (05 Jul 2018 23:46) (71 - 79)  BP: 124/79 (05 Jul 2018 23:46) (120/81 - 124/79)  RR: 18 (05 Jul 2018 23:46) (18 - 18)  SpO2: 98% (05 Jul 2018 23:46) (98% - 98%)  GEN: NAD, drowsy  HEENT: normocephalic and atraumatic. EOMI. PERRL.    NECK: Supple.  No lymphadenopathy   LUNGS: Clear to auscultation.  HEART: Regular rate and rhythm   ABDOMEN: Soft, nontender, and nondistended.  Positive bowel sounds.    EXTREMITIES: Without any cyanosis, clubbing, rash, lesions or edema. right arm with AV fistula with good thrill and big vessels, no bleeding at this time  NEUROLOGIC: grossly intact.  PSYCHIATRIC: Appropriate affect .  SKIN: No ulceration or induration present.      Labs:  07-05    136  |  91<L>  |  77.0<H>  ----------------------------<  116<H>  4.9   |  26.0  |  11.86<H>    Ca    9.0      05 Jul 2018 12:47  Phos  7.8     07-05                     8.8    2.8   )-----------( 110      ( 05 Jul 2018 12:47 )             27.1     RECENT CULTURES:  06-30 @ 20:23 .Blood Blood     No growth at 5 days.    06-30 @ 20:22 .Blood Blood     No growth at 5 days.      All imaging and data are reviewed.

## 2018-07-06 NOTE — PROGRESS NOTE ADULT - PROBLEM SELECTOR PLAN 9
vascular surgery consulted by renal  fistulogram friday
cont anti HTN as per last d/c summary
cont anti HTN as per last d/c summary
vascular surgery consulted by renal  duplex pending  pain control

## 2018-07-07 DIAGNOSIS — D61.818 OTHER PANCYTOPENIA: ICD-10-CM

## 2018-07-07 DIAGNOSIS — B25.9 CYTOMEGALOVIRAL DISEASE, UNSPECIFIED: ICD-10-CM

## 2018-07-07 DIAGNOSIS — B20 HUMAN IMMUNODEFICIENCY VIRUS [HIV] DISEASE: ICD-10-CM

## 2018-07-07 PROCEDURE — 99232 SBSQ HOSP IP/OBS MODERATE 35: CPT

## 2018-07-07 PROCEDURE — 99233 SBSQ HOSP IP/OBS HIGH 50: CPT

## 2018-07-07 RX ORDER — DIPHENHYDRAMINE HCL 50 MG
50 CAPSULE ORAL ONCE
Qty: 0 | Refills: 0 | Status: COMPLETED | OUTPATIENT
Start: 2018-07-07 | End: 2018-07-07

## 2018-07-07 RX ORDER — HYDROMORPHONE HYDROCHLORIDE 2 MG/ML
1 INJECTION INTRAMUSCULAR; INTRAVENOUS; SUBCUTANEOUS ONCE
Qty: 0 | Refills: 0 | Status: DISCONTINUED | OUTPATIENT
Start: 2018-07-12 | End: 2018-07-12

## 2018-07-07 RX ORDER — ERYTHROPOIETIN 10000 [IU]/ML
10000 INJECTION, SOLUTION INTRAVENOUS; SUBCUTANEOUS ONCE
Qty: 0 | Refills: 0 | Status: COMPLETED | OUTPATIENT
Start: 2018-07-07 | End: 2018-07-07

## 2018-07-07 RX ORDER — DIPHENHYDRAMINE HCL 50 MG
25 CAPSULE ORAL ONCE
Qty: 0 | Refills: 0 | Status: COMPLETED | OUTPATIENT
Start: 2018-07-07 | End: 2018-07-07

## 2018-07-07 RX ORDER — HYDROMORPHONE HYDROCHLORIDE 2 MG/ML
2 INJECTION INTRAMUSCULAR; INTRAVENOUS; SUBCUTANEOUS ONCE
Qty: 0 | Refills: 0 | Status: DISCONTINUED | OUTPATIENT
Start: 2018-07-07 | End: 2018-07-07

## 2018-07-07 RX ADMIN — RITONAVIR 100 MILLIGRAM(S): 100 TABLET, FILM COATED ORAL at 13:40

## 2018-07-07 RX ADMIN — AMLODIPINE BESYLATE 10 MILLIGRAM(S): 2.5 TABLET ORAL at 06:33

## 2018-07-07 RX ADMIN — Medication 0.5 MILLIGRAM(S): at 10:23

## 2018-07-07 RX ADMIN — SERTRALINE 50 MILLIGRAM(S): 25 TABLET, FILM COATED ORAL at 13:40

## 2018-07-07 RX ADMIN — HYDROMORPHONE HYDROCHLORIDE 1 MILLIGRAM(S): 2 INJECTION INTRAMUSCULAR; INTRAVENOUS; SUBCUTANEOUS at 03:55

## 2018-07-07 RX ADMIN — LISINOPRIL 20 MILLIGRAM(S): 2.5 TABLET ORAL at 06:33

## 2018-07-07 RX ADMIN — DARUNAVIR 800 MILLIGRAM(S): 75 TABLET, FILM COATED ORAL at 13:40

## 2018-07-07 RX ADMIN — OXYCODONE HYDROCHLORIDE 10 MILLIGRAM(S): 5 TABLET ORAL at 21:44

## 2018-07-07 RX ADMIN — HYDROMORPHONE HYDROCHLORIDE 1 MILLIGRAM(S): 2 INJECTION INTRAMUSCULAR; INTRAVENOUS; SUBCUTANEOUS at 03:40

## 2018-07-07 RX ADMIN — Medication 0.5 MILLIGRAM(S): at 00:43

## 2018-07-07 RX ADMIN — Medication 50 MILLIGRAM(S): at 19:29

## 2018-07-07 RX ADMIN — HYDROMORPHONE HYDROCHLORIDE 2 MILLIGRAM(S): 2 INJECTION INTRAMUSCULAR; INTRAVENOUS; SUBCUTANEOUS at 13:37

## 2018-07-07 RX ADMIN — HYDROMORPHONE HYDROCHLORIDE 1 MILLIGRAM(S): 2 INJECTION INTRAMUSCULAR; INTRAVENOUS; SUBCUTANEOUS at 19:47

## 2018-07-07 RX ADMIN — OXYCODONE HYDROCHLORIDE 10 MILLIGRAM(S): 5 TABLET ORAL at 11:23

## 2018-07-07 RX ADMIN — HYDROMORPHONE HYDROCHLORIDE 1 MILLIGRAM(S): 2 INJECTION INTRAMUSCULAR; INTRAVENOUS; SUBCUTANEOUS at 20:17

## 2018-07-07 RX ADMIN — Medication 100 MILLIGRAM(S): at 06:33

## 2018-07-07 RX ADMIN — DOLUTEGRAVIR SODIUM 50 MILLIGRAM(S): 25 TABLET, FILM COATED ORAL at 13:40

## 2018-07-07 RX ADMIN — OXYCODONE HYDROCHLORIDE 10 MILLIGRAM(S): 5 TABLET ORAL at 22:14

## 2018-07-07 RX ADMIN — Medication 25 MILLIGRAM(S): at 21:43

## 2018-07-07 RX ADMIN — ERYTHROPOIETIN 10000 UNIT(S): 10000 INJECTION, SOLUTION INTRAVENOUS; SUBCUTANEOUS at 21:39

## 2018-07-07 RX ADMIN — HEPARIN SODIUM 5000 UNIT(S): 5000 INJECTION INTRAVENOUS; SUBCUTANEOUS at 06:33

## 2018-07-07 RX ADMIN — ZOLPIDEM TARTRATE 5 MILLIGRAM(S): 10 TABLET ORAL at 01:54

## 2018-07-07 RX ADMIN — ONDANSETRON 4 MILLIGRAM(S): 8 TABLET, FILM COATED ORAL at 19:29

## 2018-07-07 RX ADMIN — LEVETIRACETAM 500 MILLIGRAM(S): 250 TABLET, FILM COATED ORAL at 06:33

## 2018-07-07 RX ADMIN — CALCITRIOL 0.5 MICROGRAM(S): 0.5 CAPSULE ORAL at 13:39

## 2018-07-07 RX ADMIN — Medication 0.5 MILLIGRAM(S): at 22:45

## 2018-07-07 RX ADMIN — ATOVAQUONE 1500 MILLIGRAM(S): 750 SUSPENSION ORAL at 13:39

## 2018-07-07 RX ADMIN — PANTOPRAZOLE SODIUM 40 MILLIGRAM(S): 20 TABLET, DELAYED RELEASE ORAL at 06:33

## 2018-07-07 RX ADMIN — OXYCODONE HYDROCHLORIDE 10 MILLIGRAM(S): 5 TABLET ORAL at 10:23

## 2018-07-07 RX ADMIN — HYDROMORPHONE HYDROCHLORIDE 2 MILLIGRAM(S): 2 INJECTION INTRAMUSCULAR; INTRAVENOUS; SUBCUTANEOUS at 01:40

## 2018-07-07 NOTE — PROGRESS NOTE ADULT - ASSESSMENT
ESRD on HD MWF  HTN  HIV  AVF bleeding   DM    -HD TTS in Hospital, will proceed with dialysis as ordered for today 7/7/18. To be given Benadryl 50mg during first hour of dialysis and then 25mg during last hour of dialysis. Dilaudid 1mg during first hour of dialysis. To resume MWF schedule starting 7/9/18  -S/p fistulogram and balloon angioplasty 7/6/18, Vascular following closely  -Hemodynamics are stable  -On HAART therapy  -Renal diet; 3 nepro shakes three times per day; he is not really eating the solid food.     Thank you    D/W HD RN

## 2018-07-07 NOTE — PROGRESS NOTE ADULT - SUBJECTIVE AND OBJECTIVE BOX
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   Feels back to his normal. No fever or any other complaint       PAST MEDICAL & SURGICAL HISTORY:  HIV (human immunodeficiency virus infection)  Seizure disorder  Hypertension  Diabetes  ESRD (end stage renal disease)  Seizure  Pericarditis  Anxiety  Depression  Renal failure (ARF), acute on chronic: dialysis av fistula, RUE  HTN (hypertension)  Cardiomyopathy  HIV disease: born HIV+  AV fistula  S/P tonsillectomy      FAMILY HISTORY:  No pertinent family history in first degree relatives  No pertinent family history in first degree relatives    Allergies  vancomycin (Anaphylaxis)    Antibiotics:  atovaquone Suspension 1500 milliGRAM(s) Oral daily  darunavir 800 milliGRAM(s) Oral daily  dolutegravir 50 milliGRAM(s) Oral daily  emtricitabine 200 milliGRAM(s) Oral <User Schedule>  levoFLOXacin  Tablet 250 milliGRAM(s) Oral every 48 hours  ritonavir Tablet 100 milliGRAM(s) Oral daily  tenofovir disoproxil fumarate (VIREAD) 300 milliGRAM(s) Oral <User Schedule>     REVIEW OF SYSTEMS:  CONSTITUTIONAL:  No Fever or chills   HEENT:  blind   CARDIOVASCULAR:  No chest pain or SOB.  RESPIRATORY:  No cough, shortness of breath, PND or orthopnea.  GASTROINTESTINAL:  +nausea but better , no vomiting or diarrhea.  GENITOURINARY:  No dysuria, frequency or urgency. No Blood in urine  MUSCULOSKELETAL:  R arm with AV fistula had bleeding that stopped   SKIN:  No change in skin, hair or nails.  ENDOCRINE:  No heat or cold intolerance, polyuria or polydipsia.    Physical Exam:   Vital Signs Last 24 Hrs  T(C): 36.8 (07 Jul 2018 08:19), Max: 36.8 (07 Jul 2018 08:19)  T(F): 98.2 (07 Jul 2018 08:19), Max: 98.2 (07 Jul 2018 08:19)  HR: 66 (07 Jul 2018 08:19) (66 - 76)  BP: 112/64 (07 Jul 2018 08:19) (112/64 - 119/68)  BP(mean): --  RR: 18 (07 Jul 2018 08:19) (15 - 18)  SpO2: 98% (07 Jul 2018 08:19) (98% - 98%)    GEN: NAD, drowsy  HEENT: normocephalic and atraumatic. EOMI. PERRL.    NECK: Supple.  No lymphadenopathy   LUNGS: Clear to auscultation.  HEART: Regular rate and rhythm   ABDOMEN: Soft, nontender, and nondistended.  Positive bowel sounds.    EXTREMITIES: Without any cyanosis, clubbing, rash, lesions or edema. right arm with AV fistula with good thrill and big vessels, no bleeding at this time  NEUROLOGIC: grossly intact.  PSYCHIATRIC: Appropriate affect .  SKIN: No ulceration or induration present.      Labs:     RECENT CULTURES:  06-30 @ 20:23 .Blood Blood     No growth at 5 days.    06-30 @ 20:22 .Blood Blood     No growth at 5 days.      All imaging and data are reviewed.

## 2018-07-07 NOTE — PROGRESS NOTE ADULT - SUBJECTIVE AND OBJECTIVE BOX
INTERVAL HPI/OVERNIGHT EVENTS:  27yMale    Vital Signs Last 24 Hrs  T(C): 36.4 (07 Jul 2018 00:51), Max: 36.4 (07 Jul 2018 00:51)  T(F): 97.6 (07 Jul 2018 00:51), Max: 97.6 (07 Jul 2018 00:51)  HR: 76 (07 Jul 2018 00:51) (76 - 76)  BP: 119/68 (07 Jul 2018 00:51) (119/68 - 119/68)  BP(mean): --  RR: 15 (07 Jul 2018 00:51) (15 - 15)  SpO2: 98% (07 Jul 2018 00:51) (98% - 98%)    PHYSICAL EXAM:    GENERAL: NAD, well-groomed, well-developed  HEAD:  Atraumatic, Normocephalic  EYES: EOMI, PERRLA, conjunctiva and sclera clear  ENMT: Moist mucous membranes  NECK: Supple, No JVD  NERVOUS SYSTEM:  Alert & Oriented X3, Motor Strength 5/5 B/L upper and lower extremities; DTRs 2+ intact and symmetric  CHEST/LUNG: Clear to auscultation bilaterally; No rales, rhonchi, wheezing, or rubs  HEART: Regular rate and rhythm; No murmurs, rubs, or gallops  ABDOMEN: Soft, Nontender, Nondistended; Bowel sounds present  EXTREMITIES:  2+ Peripheral Pulses, No clubbing, cyanosis, or edema    MEDICATIONS  (STANDING):  amLODIPine   Tablet 10 milliGRAM(s) Oral daily  atovaquone Suspension 1500 milliGRAM(s) Oral daily  calcitriol   Capsule 0.5 MICROGram(s) Oral daily  darunavir 800 milliGRAM(s) Oral daily  dolutegravir 50 milliGRAM(s) Oral daily  emtricitabine 200 milliGRAM(s) Oral <User Schedule>  fentaNYL   Patch 100 MICROgram(s)/Hr. 1 Patch Transdermal every 48 hours  heparin  Injectable 5000 Unit(s) SubCutaneous every 12 hours  levETIRAcetam 500 milliGRAM(s) Oral two times a day  lisinopril 20 milliGRAM(s) Oral daily  metoprolol tartrate 100 milliGRAM(s) Oral two times a day  pantoprazole    Tablet 40 milliGRAM(s) Oral before breakfast  ritonavir Tablet 100 milliGRAM(s) Oral daily  sertraline 50 milliGRAM(s) Oral daily  sevelamer hydrochloride 800 milliGRAM(s) Oral three times a day  tenofovir disoproxil fumarate (VIREAD) 300 milliGRAM(s) Oral <User Schedule>  Valganciclovir 50 mg/ 1 ml Oral Solutio 100 milliGRAM(s) 100 milliGRAM(s) Oral <User Schedule>    MEDICATIONS  (PRN):  acetaminophen   Tablet 650 milliGRAM(s) Oral every 6 hours PRN For Temp greater than 38 C (100.4 F)  acetaminophen   Tablet. 650 milliGRAM(s) Oral every 6 hours PRN Moderate Pain (4 - 6)  ALPRAZolam 0.5 milliGRAM(s) Oral three times a day PRN anxiety  diphenhydrAMINE   Capsule 25 milliGRAM(s) Oral every 6 hours PRN Rash and/or Itching  HYDROmorphone  Injectable 1 milliGRAM(s) IV Push every 4 hours PRN breakthrough pain  ondansetron Injectable 4 milliGRAM(s) IV Push every 6 hours PRN Nausea  oxyCODONE    IR 10 milliGRAM(s) Oral four times a day PRN mod to severe pain  zolpidem 5 milliGRAM(s) Oral at bedtime PRN Insomnia      Allergies    vancomycin (Anaphylaxis)    Intolerances          LABS:                          8.8    2.8   )-----------( 110      ( 05 Jul 2018 12:47 )             27.1     07-05    136  |  91<L>  |  77.0<H>  ----------------------------<  116<H>  4.9   |  26.0  |  11.86<H>    Ca    9.0      05 Jul 2018 12:47  Phos  7.8     07-05            RADIOLOGY & ADDITIONAL TESTS: INTERVAL HPI/OVERNIGHT EVENTS:    CC: ESRD on HD, s/p angioplasty of AVF, CMV retinitis, HIV on ART    Chart and course reviewed. No overnight events, c/o mild arm pain. Not eating solid food.    Vital Signs Last 24 Hrs  T(C): 36.4 (07 Jul 2018 00:51), Max: 36.4 (07 Jul 2018 00:51)  T(F): 97.6 (07 Jul 2018 00:51), Max: 97.6 (07 Jul 2018 00:51)  HR: 76 (07 Jul 2018 00:51) (76 - 76)  BP: 119/68 (07 Jul 2018 00:51) (119/68 - 119/68)  BP(mean): --  RR: 15 (07 Jul 2018 00:51) (15 - 15)  SpO2: 98% (07 Jul 2018 00:51) (98% - 98%)    PHYSICAL EXAM:    GENERAL: poor built, not in distress  CHEST/LUNG: b/l air entry, clear  HEART: Regular  ABDOMEN: Soft, BS+  EXTREMITIES:  No edema, tenderness.     MEDICATIONS  (STANDING):  amLODIPine   Tablet 10 milliGRAM(s) Oral daily  atovaquone Suspension 1500 milliGRAM(s) Oral daily  calcitriol   Capsule 0.5 MICROGram(s) Oral daily  darunavir 800 milliGRAM(s) Oral daily  dolutegravir 50 milliGRAM(s) Oral daily  emtricitabine 200 milliGRAM(s) Oral <User Schedule>  fentaNYL   Patch 100 MICROgram(s)/Hr. 1 Patch Transdermal every 48 hours  heparin  Injectable 5000 Unit(s) SubCutaneous every 12 hours  levETIRAcetam 500 milliGRAM(s) Oral two times a day  lisinopril 20 milliGRAM(s) Oral daily  metoprolol tartrate 100 milliGRAM(s) Oral two times a day  pantoprazole    Tablet 40 milliGRAM(s) Oral before breakfast  ritonavir Tablet 100 milliGRAM(s) Oral daily  sertraline 50 milliGRAM(s) Oral daily  sevelamer hydrochloride 800 milliGRAM(s) Oral three times a day  tenofovir disoproxil fumarate (VIREAD) 300 milliGRAM(s) Oral <User Schedule>  Valganciclovir 50 mg/ 1 ml Oral Solutio 100 milliGRAM(s) 100 milliGRAM(s) Oral <User Schedule>    MEDICATIONS  (PRN):  acetaminophen   Tablet 650 milliGRAM(s) Oral every 6 hours PRN For Temp greater than 38 C (100.4 F)  acetaminophen   Tablet. 650 milliGRAM(s) Oral every 6 hours PRN Moderate Pain (4 - 6)  ALPRAZolam 0.5 milliGRAM(s) Oral three times a day PRN anxiety  diphenhydrAMINE   Capsule 25 milliGRAM(s) Oral every 6 hours PRN Rash and/or Itching  HYDROmorphone  Injectable 1 milliGRAM(s) IV Push every 4 hours PRN breakthrough pain  ondansetron Injectable 4 milliGRAM(s) IV Push every 6 hours PRN Nausea  oxyCODONE    IR 10 milliGRAM(s) Oral four times a day PRN mod to severe pain  zolpidem 5 milliGRAM(s) Oral at bedtime PRN Insomnia      Allergies    vancomycin (Anaphylaxis)    Intolerances          LABS:                          8.8    2.8   )-----------( 110      ( 05 Jul 2018 12:47 )             27.1     07-05    136  |  91<L>  |  77.0<H>  ----------------------------<  116<H>  4.9   |  26.0  |  11.86<H>    Ca    9.0      05 Jul 2018 12:47  Phos  7.8     07-05            RADIOLOGY & ADDITIONAL TESTS:

## 2018-07-07 NOTE — PROGRESS NOTE ADULT - ASSESSMENT
26y/o man with HIV on TDF+FTC+DTG+DRV/r nonadherent to meds, with CMV retinitis,      1-HIV:  -Most likely CD4 will be low due to nonadherence.   WILL CHECK CD4 COUNT  WAS 88 IN FEBRUARY   -   -Continue Tenofovir 300mg weekly  -Emtricitabine 200mg twice weekly  -Darunavir 800mg daily  -Ritonavir 100mg daily   -Dolutegravir 50mg daily  -Atovaquone 1500mg daily for PCP prophylaxis   -NO MAC prophylaxis until active MAC infection is ruled out.     2-History of CMV:  -Cont valganciclovir 100mg after each HD for treatment and then prophylaxis until CD4 is high.   -Ophthalmology consult     3-ESRD:   -     4-Pancytopenia:  -could be related to bone marrow suppression in uncontrolled HIV patients.   -High risk for MAC, also has a high ALK Phosph and pancytopenia that could be a sign of MAC  -f/up blood culture for mycobacterium

## 2018-07-07 NOTE — PROGRESS NOTE ADULT - SUBJECTIVE AND OBJECTIVE BOX
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 Pt also c/o nausea and wretching. Vomited once today at home. Fell today at home in bathroom but denies hitting head, states tripped. Pt uses wheelchair at baseline. He admits to being non compliant with his medications at home; he had not taken his medications. Pt denies cp, diarrhea, HA, fevers, constipation, numbness, tingling. (30 Jun 2018 23:01)    Follow up ESRD  No new complaints    PAST MEDICAL & SURGICAL HISTORY:  HIV (human immunodeficiency virus infection)  Seizure disorder  Hypertension  Diabetes  ESRD (end stage renal disease)  Seizure  Pericarditis  Anxiety  Depression  Renal failure (ARF), acute on chronic: dialysis av fistula, RUE  HTN (hypertension)  Cardiomyopathy  HIV disease: born HIV+  AV fistula  S/P tonsillectomy      MEDICATIONS  (STANDING):  amLODIPine   Tablet 10 milliGRAM(s) Oral daily  atovaquone Suspension 1500 milliGRAM(s) Oral daily  calcitriol   Capsule 0.5 MICROGram(s) Oral daily  darunavir 800 milliGRAM(s) Oral daily  diphenhydrAMINE   Injectable 25 milliGRAM(s) IV Push once  diphenhydrAMINE   Injectable 50 milliGRAM(s) IV Push once  dolutegravir 50 milliGRAM(s) Oral daily  emtricitabine 200 milliGRAM(s) Oral <User Schedule>  fentaNYL   Patch 100 MICROgram(s)/Hr. 1 Patch Transdermal every 48 hours  heparin  Injectable 5000 Unit(s) SubCutaneous every 12 hours  levETIRAcetam 500 milliGRAM(s) Oral two times a day  lisinopril 20 milliGRAM(s) Oral daily  metoprolol tartrate 100 milliGRAM(s) Oral two times a day  pantoprazole    Tablet 40 milliGRAM(s) Oral before breakfast  ritonavir Tablet 100 milliGRAM(s) Oral daily  sertraline 50 milliGRAM(s) Oral daily  sevelamer hydrochloride 800 milliGRAM(s) Oral three times a day  tenofovir disoproxil fumarate (VIREAD) 300 milliGRAM(s) Oral <User Schedule>  Valganciclovir 50 mg/ 1 ml Oral Solutio 100 milliGRAM(s) 100 milliGRAM(s) Oral <User Schedule>    MEDICATIONS  (PRN):  acetaminophen   Tablet 650 milliGRAM(s) Oral every 6 hours PRN For Temp greater than 38 C (100.4 F)  acetaminophen   Tablet. 650 milliGRAM(s) Oral every 6 hours PRN Moderate Pain (4 - 6)  ALPRAZolam 0.5 milliGRAM(s) Oral three times a day PRN anxiety  diphenhydrAMINE   Capsule 25 milliGRAM(s) Oral every 6 hours PRN Rash and/or Itching  HYDROmorphone  Injectable 1 milliGRAM(s) IV Push every 4 hours PRN breakthrough pain  ondansetron Injectable 4 milliGRAM(s) IV Push every 6 hours PRN Nausea  oxyCODONE    IR 10 milliGRAM(s) Oral four times a day PRN mod to severe pain  zolpidem 5 milliGRAM(s) Oral at bedtime PRN Insomnia      Allergies    vancomycin (Anaphylaxis)    Intolerances        Vital Signs Last 24 Hrs  T(C): 36.8 (07 Jul 2018 08:19), Max: 36.8 (07 Jul 2018 08:19)  T(F): 98.2 (07 Jul 2018 08:19), Max: 98.2 (07 Jul 2018 08:19)  HR: 66 (07 Jul 2018 08:19) (66 - 76)  BP: 112/64 (07 Jul 2018 08:19) (112/64 - 119/68)  BP(mean): --  RR: 18 (07 Jul 2018 08:19) (15 - 18)  SpO2: 98% (07 Jul 2018 08:19) (98% - 98%)  Daily     Daily     PHYSICAL EXAM:  GENERAL: NAD, well-groomed, well-developed  HEAD:  Atraumatic, Normocephalic  EYES: EOMI, PERRLA, conjunctiva and sclera clear  ENMT: No tonsillar erythema, exudates, or enlargement; Moist mucous membranes, Good dentition, No lesions  NECK: Supple, No JVD, Normal thyroid  NERVOUS SYSTEM:  Alert & Oriented X3, Good concentration; Motor Strength 5/5 B/L upper and lower extremities; DTRs 2+ intact and symmetric  CHEST/LUNG: Clear to percussion bilaterally; No rales, rhonchi, wheezing, or rubs  HEART: Regular rate and rhythm; No murmurs, rubs, or gallops  ABDOMEN: Soft, Nontender, Nondistended; Bowel sounds present  EXTREMITIES:  2+ Peripheral Pulses, No clubbing, cyanosis, or edema; +hypertrophic RUE AVF with palpable thrill  SKIN: No rashes or lesions    LABS:                        8.8    2.8   )-----------( 110      ( 05 Jul 2018 12:47 )             27.1     07-05    136  |  91<L>  |  77.0<H>  ----------------------------<  116<H>  4.9   |  26.0  |  11.86<H>    Ca    9.0      05 Jul 2018 12:47  Phos  7.8     07-05                RADIOLOGY & ADDITIONAL TESTS:

## 2018-07-07 NOTE — PROGRESS NOTE ADULT - ASSESSMENT
27 yr old male with HIV not compliant with medications, CMV retinitis with vision loss, ESRD on HD,  dilated cardiomyopathy, hypertension and seizure disorder admitted with confusion, weakness and enlarging AV fistula. He was admitted for intractable nausea and vomiting. Renal was consulted. He also had flashes of light, ophthalmology consulted. He was started on valganciclovir 100mg after each HD for treatment for CMV retinitis. HD was continued as per schedule. He complained of pain at AVF site, vascular surgery was consulted. Noted to have pancytopenia, blood culture ordered to rule out MAC infection. 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 Pain management consulted for pain control. 27 yr old male with HIV not compliant with medications, CMV retinitis with vision loss, ESRD on HD,  dilated cardiomyopathy, hypertension and seizure disorder admitted with confusion, weakness and enlarging AV fistula. He was admitted for intractable nausea and vomiting. Renal was consulted. He also had flashes of light, ophthalmology consulted. He was started on valganciclovir 100mg after each HD for treatment for CMV retinitis. HD was continued as per schedule. He complained of pain at AVF site, vascular surgery was consulted. Noted to have pancytopenia, blood culture ordered to rule out MAC infection. 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 Pain management consulted for pain control.

## 2018-07-08 PROCEDURE — 99233 SBSQ HOSP IP/OBS HIGH 50: CPT

## 2018-07-08 RX ADMIN — Medication 100 MILLIGRAM(S): at 06:30

## 2018-07-08 RX ADMIN — HYDROMORPHONE HYDROCHLORIDE 1 MILLIGRAM(S): 2 INJECTION INTRAMUSCULAR; INTRAVENOUS; SUBCUTANEOUS at 12:47

## 2018-07-08 RX ADMIN — Medication 100 MILLIGRAM(S): at 19:01

## 2018-07-08 RX ADMIN — HEPARIN SODIUM 5000 UNIT(S): 5000 INJECTION INTRAVENOUS; SUBCUTANEOUS at 06:30

## 2018-07-08 RX ADMIN — FENTANYL CITRATE 1 PATCH: 50 INJECTION INTRAVENOUS at 11:11

## 2018-07-08 RX ADMIN — RITONAVIR 100 MILLIGRAM(S): 100 TABLET, FILM COATED ORAL at 11:12

## 2018-07-08 RX ADMIN — OXYCODONE HYDROCHLORIDE 10 MILLIGRAM(S): 5 TABLET ORAL at 19:45

## 2018-07-08 RX ADMIN — ONDANSETRON 4 MILLIGRAM(S): 8 TABLET, FILM COATED ORAL at 19:01

## 2018-07-08 RX ADMIN — OXYCODONE HYDROCHLORIDE 10 MILLIGRAM(S): 5 TABLET ORAL at 12:11

## 2018-07-08 RX ADMIN — LISINOPRIL 20 MILLIGRAM(S): 2.5 TABLET ORAL at 06:30

## 2018-07-08 RX ADMIN — HYDROMORPHONE HYDROCHLORIDE 1 MILLIGRAM(S): 2 INJECTION INTRAMUSCULAR; INTRAVENOUS; SUBCUTANEOUS at 03:15

## 2018-07-08 RX ADMIN — PANTOPRAZOLE SODIUM 40 MILLIGRAM(S): 20 TABLET, DELAYED RELEASE ORAL at 06:30

## 2018-07-08 RX ADMIN — DARUNAVIR 800 MILLIGRAM(S): 75 TABLET, FILM COATED ORAL at 11:12

## 2018-07-08 RX ADMIN — LEVETIRACETAM 500 MILLIGRAM(S): 250 TABLET, FILM COATED ORAL at 06:29

## 2018-07-08 RX ADMIN — OXYCODONE HYDROCHLORIDE 10 MILLIGRAM(S): 5 TABLET ORAL at 11:11

## 2018-07-08 RX ADMIN — LEVETIRACETAM 500 MILLIGRAM(S): 250 TABLET, FILM COATED ORAL at 19:00

## 2018-07-08 RX ADMIN — Medication 0.5 MILLIGRAM(S): at 15:33

## 2018-07-08 RX ADMIN — LEVETIRACETAM 500 MILLIGRAM(S): 250 TABLET, FILM COATED ORAL at 00:10

## 2018-07-08 RX ADMIN — DOLUTEGRAVIR SODIUM 50 MILLIGRAM(S): 25 TABLET, FILM COATED ORAL at 11:12

## 2018-07-08 RX ADMIN — FENTANYL CITRATE 1 PATCH: 50 INJECTION INTRAVENOUS at 11:15

## 2018-07-08 RX ADMIN — ONDANSETRON 4 MILLIGRAM(S): 8 TABLET, FILM COATED ORAL at 12:17

## 2018-07-08 RX ADMIN — AMLODIPINE BESYLATE 10 MILLIGRAM(S): 2.5 TABLET ORAL at 06:30

## 2018-07-08 RX ADMIN — HEPARIN SODIUM 5000 UNIT(S): 5000 INJECTION INTRAVENOUS; SUBCUTANEOUS at 19:00

## 2018-07-08 RX ADMIN — HYDROMORPHONE HYDROCHLORIDE 1 MILLIGRAM(S): 2 INJECTION INTRAMUSCULAR; INTRAVENOUS; SUBCUTANEOUS at 03:00

## 2018-07-08 RX ADMIN — HYDROMORPHONE HYDROCHLORIDE 1 MILLIGRAM(S): 2 INJECTION INTRAMUSCULAR; INTRAVENOUS; SUBCUTANEOUS at 12:17

## 2018-07-08 RX ADMIN — OXYCODONE HYDROCHLORIDE 10 MILLIGRAM(S): 5 TABLET ORAL at 22:41

## 2018-07-08 RX ADMIN — OXYCODONE HYDROCHLORIDE 10 MILLIGRAM(S): 5 TABLET ORAL at 19:01

## 2018-07-08 RX ADMIN — OXYCODONE HYDROCHLORIDE 10 MILLIGRAM(S): 5 TABLET ORAL at 23:30

## 2018-07-08 RX ADMIN — SERTRALINE 50 MILLIGRAM(S): 25 TABLET, FILM COATED ORAL at 11:11

## 2018-07-08 RX ADMIN — HEPARIN SODIUM 5000 UNIT(S): 5000 INJECTION INTRAVENOUS; SUBCUTANEOUS at 00:11

## 2018-07-08 NOTE — PROGRESS NOTE ADULT - SUBJECTIVE AND OBJECTIVE BOX
INTERVAL HPI/OVERNIGHT EVENTS:    CC: ESRD on HD, s/p angioplasty of AVF, CMV retinitis, HIV on ART    Patient seen and examined at bedside, no overnight events, no new complaints.     Vital Signs Last 24 Hrs  T(C): 36.2 (07 Jul 2018 23:24), Max: 36.6 (07 Jul 2018 19:23)  T(F): 97.2 (07 Jul 2018 23:24), Max: 97.9 (07 Jul 2018 19:23)  HR: 77 (07 Jul 2018 23:24) (64 - 77)  BP: 133/78 (07 Jul 2018 23:24) (100/56 - 147/83)  BP(mean): --  RR: 18 (07 Jul 2018 23:24) (18 - 18)  SpO2: 100% (07 Jul 2018 23:24) (98% - 100%)    PHYSICAL EXAM:    GENERAL: Not in distress, alert  CHEST/LUNG: b/l air entry, clear  HEART: Regular   ABDOMEN: Soft, BS+  EXTREMITIES:  No edema, tenderness.    MEDICATIONS  (STANDING):  amLODIPine   Tablet 10 milliGRAM(s) Oral daily  atovaquone Suspension 1500 milliGRAM(s) Oral daily  calcitriol   Capsule 0.5 MICROGram(s) Oral daily  darunavir 800 milliGRAM(s) Oral daily  dolutegravir 50 milliGRAM(s) Oral daily  emtricitabine 200 milliGRAM(s) Oral <User Schedule>  fentaNYL   Patch 100 MICROgram(s)/Hr. 1 Patch Transdermal every 48 hours  heparin  Injectable 5000 Unit(s) SubCutaneous every 12 hours  levETIRAcetam 500 milliGRAM(s) Oral two times a day  lisinopril 20 milliGRAM(s) Oral daily  metoprolol tartrate 100 milliGRAM(s) Oral two times a day  pantoprazole    Tablet 40 milliGRAM(s) Oral before breakfast  ritonavir Tablet 100 milliGRAM(s) Oral daily  sertraline 50 milliGRAM(s) Oral daily  sevelamer hydrochloride 800 milliGRAM(s) Oral three times a day  tenofovir disoproxil fumarate (VIREAD) 300 milliGRAM(s) Oral <User Schedule>  Valganciclovir 50 mg/ 1 ml Oral Solutio 100 milliGRAM(s) 100 milliGRAM(s) Oral <User Schedule>    MEDICATIONS  (PRN):  acetaminophen   Tablet 650 milliGRAM(s) Oral every 6 hours PRN For Temp greater than 38 C (100.4 F)  acetaminophen   Tablet. 650 milliGRAM(s) Oral every 6 hours PRN Moderate Pain (4 - 6)  ALPRAZolam 0.5 milliGRAM(s) Oral three times a day PRN anxiety  diphenhydrAMINE   Capsule 25 milliGRAM(s) Oral every 6 hours PRN Rash and/or Itching  HYDROmorphone  Injectable 1 milliGRAM(s) IV Push every 4 hours PRN breakthrough pain  ondansetron Injectable 4 milliGRAM(s) IV Push every 6 hours PRN Nausea  oxyCODONE    IR 10 milliGRAM(s) Oral four times a day PRN mod to severe pain  zolpidem 5 milliGRAM(s) Oral at bedtime PRN Insomnia      Allergies    vancomycin (Anaphylaxis)    Intolerances          LABS:                  RADIOLOGY & ADDITIONAL TESTS:

## 2018-07-08 NOTE — PROGRESS NOTE ADULT - SUBJECTIVE AND OBJECTIVE BOX
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 Pt also c/o nausea and wretching. Vomited once today at home. Fell today at home in bathroom but denies hitting head, states tripped. Pt uses wheelchair at baseline. He admits to being non compliant with his medications at home; he had not taken his medications. Pt denies cp, diarrhea, HA, fevers, constipation, numbness, tingling. (2018 23:01)    Follow up ESRD  No complaints    PAST MEDICAL & SURGICAL HISTORY:  HIV (human immunodeficiency virus infection)  Seizure disorder  Hypertension  Diabetes  ESRD (end stage renal disease)  Seizure  Pericarditis  Anxiety  Depression  Renal failure (ARF), acute on chronic: dialysis av fistula, RUE  HTN (hypertension)  Cardiomyopathy  HIV disease: born HIV+  AV fistula  S/P tonsillectomy      MEDICATIONS  (STANDING):  amLODIPine   Tablet 10 milliGRAM(s) Oral daily  atovaquone Suspension 1500 milliGRAM(s) Oral daily  calcitriol   Capsule 0.5 MICROGram(s) Oral daily  darunavir 800 milliGRAM(s) Oral daily  dolutegravir 50 milliGRAM(s) Oral daily  emtricitabine 200 milliGRAM(s) Oral <User Schedule>  fentaNYL   Patch 100 MICROgram(s)/Hr. 1 Patch Transdermal every 48 hours  heparin  Injectable 5000 Unit(s) SubCutaneous every 12 hours  levETIRAcetam 500 milliGRAM(s) Oral two times a day  lisinopril 20 milliGRAM(s) Oral daily  metoprolol tartrate 100 milliGRAM(s) Oral two times a day  pantoprazole    Tablet 40 milliGRAM(s) Oral before breakfast  ritonavir Tablet 100 milliGRAM(s) Oral daily  sertraline 50 milliGRAM(s) Oral daily  sevelamer hydrochloride 800 milliGRAM(s) Oral three times a day  tenofovir disoproxil fumarate (VIREAD) 300 milliGRAM(s) Oral <User Schedule>  Valganciclovir 50 mg/ 1 ml Oral Solutio 100 milliGRAM(s) 100 milliGRAM(s) Oral <User Schedule>    MEDICATIONS  (PRN):  acetaminophen   Tablet 650 milliGRAM(s) Oral every 6 hours PRN For Temp greater than 38 C (100.4 F)  acetaminophen   Tablet. 650 milliGRAM(s) Oral every 6 hours PRN Moderate Pain (4 - 6)  ALPRAZolam 0.5 milliGRAM(s) Oral three times a day PRN anxiety  diphenhydrAMINE   Capsule 25 milliGRAM(s) Oral every 6 hours PRN Rash and/or Itching  HYDROmorphone  Injectable 1 milliGRAM(s) IV Push every 4 hours PRN breakthrough pain  ondansetron Injectable 4 milliGRAM(s) IV Push every 6 hours PRN Nausea  oxyCODONE    IR 10 milliGRAM(s) Oral four times a day PRN mod to severe pain  zolpidem 5 milliGRAM(s) Oral at bedtime PRN Insomnia      Allergies    vancomycin (Anaphylaxis)    Intolerances        Vital Signs Last 24 Hrs  T(C): 36.2 (2018 23:24), Max: 36.8 (2018 08:19)  T(F): 97.2 (2018 23:24), Max: 98.2 (2018 08:19)  HR: 77 (2018 23:24) (64 - 77)  BP: 133/78 (2018 23:24) (100/56 - 147/83)  BP(mean): --  RR: 18 (2018 23:24) (18 - 18)  SpO2: 100% (2018 23:24) (98% - 100%)  Daily     Daily Weight in k.3 (2018 23:24)    PHYSICAL EXAM:  GENERAL: No distress  HEAD:  Atraumatic, Normocephalic  EYES: conjunctiva and sclera clear  ENMT:  Moist mucous membranes  NECK: Supple, No JVD  NERVOUS SYSTEM:  Alert & Oriented, x 3, follows commands  CHEST/LUNG: Clear to percussion bilaterally; No rales, rhonchi, wheezing, or rubs  HEART: Regular rate and rhythm; No murmurs, rubs, or gallops  ABDOMEN: Soft, Nontender, Nondistended; Bowel sounds present  EXTREMITIES:  2+ Peripheral Pulses, No clubbing, cyanosis, or edema; +hypertrophic RUE AVF with palpable thrill      LABS:                    RADIOLOGY & ADDITIONAL TESTS:

## 2018-07-08 NOTE — PROGRESS NOTE ADULT - ASSESSMENT
27 yr old male with HIV not compliant with medications, CMV retinitis with vision loss, ESRD on HD,  dilated cardiomyopathy, hypertension and seizure disorder admitted with confusion, weakness and enlarging AV fistula. He was admitted for intractable nausea and vomiting. Renal was consulted. He also had flashes of light, ophthalmology consulted. He was started on valganciclovir 100mg after each HD for treatment for CMV retinitis. HD was continued as per schedule. He complained of pain at AVF site, vascular surgery was consulted. Noted to have pancytopenia, blood culture ordered to rule out MAC infection. 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 Pain management consulted for pain control.

## 2018-07-08 NOTE — PROGRESS NOTE ADULT - ASSESSMENT
ESRD on HD MWF  HTN  HIV  AVF bleeding   DM    -HD MWF; s/p HD on 7/7/18 with UF of 1.7L; next dialysis to be done on 7/9/18; orders placed.   -S/p fistulogram and balloon angioplasty 7/6/18, Vascular following closely  -Hemodynamics are stable  -On HAART therapy  -Renal diet; 3 nepro shakes three times per day; he is not really eating the solid food.     Thank you; MAGALY planning

## 2018-07-09 DIAGNOSIS — T82.858S STENOSIS OF OTHER VASCULAR PROSTHETIC DEVICES, IMPLANTS AND GRAFTS, SEQUELA: ICD-10-CM

## 2018-07-09 DIAGNOSIS — F41.9 ANXIETY DISORDER, UNSPECIFIED: ICD-10-CM

## 2018-07-09 LAB
4/8 RATIO: 0.26 RATIO — LOW (ref 0.9–3.6)
ABS CD8: 535 /UL — SIGNIFICANT CHANGE UP (ref 136–757)
ANION GAP SERPL CALC-SCNC: 18 MMOL/L — HIGH (ref 5–17)
BUN SERPL-MCNC: 51 MG/DL — HIGH (ref 8–20)
CALCIUM SERPL-MCNC: 9 MG/DL — SIGNIFICANT CHANGE UP (ref 8.6–10.2)
CD3 BLASTS SPEC-ACNC: 683 /UL — LOW (ref 799–2171)
CD3 BLASTS SPEC-ACNC: 70 % — SIGNIFICANT CHANGE UP (ref 59–85)
CD4 %: 14 % — LOW (ref 36–65)
CD8 %: 55 % — HIGH (ref 11–36)
CHLORIDE SERPL-SCNC: 90 MMOL/L — LOW (ref 98–107)
CO2 SERPL-SCNC: 27 MMOL/L — SIGNIFICANT CHANGE UP (ref 22–29)
CREAT SERPL-MCNC: 7.39 MG/DL — HIGH (ref 0.5–1.3)
GLUCOSE SERPL-MCNC: 99 MG/DL — SIGNIFICANT CHANGE UP (ref 70–115)
HCT VFR BLD CALC: 30.9 % — LOW (ref 42–52)
HGB BLD-MCNC: 9.9 G/DL — LOW (ref 14–18)
MCHC RBC-ENTMCNC: 28.9 PG — SIGNIFICANT CHANGE UP (ref 27–31)
MCHC RBC-ENTMCNC: 32 G/DL — SIGNIFICANT CHANGE UP (ref 32–36)
MCV RBC AUTO: 90.1 FL — SIGNIFICANT CHANGE UP (ref 80–94)
PLATELET # BLD AUTO: 121 K/UL — LOW (ref 150–400)
POTASSIUM SERPL-MCNC: 4.6 MMOL/L — SIGNIFICANT CHANGE UP (ref 3.5–5.3)
POTASSIUM SERPL-SCNC: 4.6 MMOL/L — SIGNIFICANT CHANGE UP (ref 3.5–5.3)
RBC # BLD: 3.43 M/UL — LOW (ref 4.6–6.2)
RBC # FLD: 16.1 % — HIGH (ref 11–15.6)
SODIUM SERPL-SCNC: 135 MMOL/L — SIGNIFICANT CHANGE UP (ref 135–145)
T-CELL CD4 SUBSET PNL BLD: 139 /UL — LOW (ref 489–1457)
WBC # BLD: 2.2 K/UL — LOW (ref 4.8–10.8)
WBC # FLD AUTO: 2.2 K/UL — LOW (ref 4.8–10.8)

## 2018-07-09 PROCEDURE — 99223 1ST HOSP IP/OBS HIGH 75: CPT

## 2018-07-09 PROCEDURE — 99233 SBSQ HOSP IP/OBS HIGH 50: CPT

## 2018-07-09 RX ORDER — ACETAMINOPHEN 500 MG
1000 TABLET ORAL ONCE
Qty: 0 | Refills: 0 | Status: COMPLETED | OUTPATIENT
Start: 2018-07-09 | End: 2018-07-16

## 2018-07-09 RX ORDER — ONDANSETRON 8 MG/1
4 TABLET, FILM COATED ORAL EVERY 6 HOURS
Qty: 0 | Refills: 0 | Status: DISCONTINUED | OUTPATIENT
Start: 2018-07-09 | End: 2018-07-17

## 2018-07-09 RX ORDER — DIPHENHYDRAMINE HCL 50 MG
25 CAPSULE ORAL ONCE
Qty: 0 | Refills: 0 | Status: COMPLETED | OUTPATIENT
Start: 2018-07-09 | End: 2018-07-09

## 2018-07-09 RX ORDER — ERYTHROPOIETIN 10000 [IU]/ML
10000 INJECTION, SOLUTION INTRAVENOUS; SUBCUTANEOUS
Qty: 0 | Refills: 0 | Status: DISCONTINUED | OUTPATIENT
Start: 2018-07-09 | End: 2018-07-17

## 2018-07-09 RX ORDER — DIPHENHYDRAMINE HCL 50 MG
50 CAPSULE ORAL ONCE
Qty: 0 | Refills: 0 | Status: COMPLETED | OUTPATIENT
Start: 2018-07-09 | End: 2018-07-09

## 2018-07-09 RX ADMIN — HEPARIN SODIUM 5000 UNIT(S): 5000 INJECTION INTRAVENOUS; SUBCUTANEOUS at 17:21

## 2018-07-09 RX ADMIN — LEVETIRACETAM 500 MILLIGRAM(S): 250 TABLET, FILM COATED ORAL at 17:21

## 2018-07-09 RX ADMIN — OXYCODONE HYDROCHLORIDE 10 MILLIGRAM(S): 5 TABLET ORAL at 11:21

## 2018-07-09 RX ADMIN — ONDANSETRON 4 MILLIGRAM(S): 8 TABLET, FILM COATED ORAL at 17:24

## 2018-07-09 RX ADMIN — RITONAVIR 100 MILLIGRAM(S): 100 TABLET, FILM COATED ORAL at 16:37

## 2018-07-09 RX ADMIN — HYDROMORPHONE HYDROCHLORIDE 1 MILLIGRAM(S): 2 INJECTION INTRAMUSCULAR; INTRAVENOUS; SUBCUTANEOUS at 13:24

## 2018-07-09 RX ADMIN — HEPARIN SODIUM 5000 UNIT(S): 5000 INJECTION INTRAVENOUS; SUBCUTANEOUS at 16:27

## 2018-07-09 RX ADMIN — LISINOPRIL 20 MILLIGRAM(S): 2.5 TABLET ORAL at 16:26

## 2018-07-09 RX ADMIN — ONDANSETRON 4 MILLIGRAM(S): 8 TABLET, FILM COATED ORAL at 16:38

## 2018-07-09 RX ADMIN — OXYCODONE HYDROCHLORIDE 10 MILLIGRAM(S): 5 TABLET ORAL at 18:43

## 2018-07-09 RX ADMIN — CALCITRIOL 0.5 MICROGRAM(S): 0.5 CAPSULE ORAL at 16:27

## 2018-07-09 RX ADMIN — DOLUTEGRAVIR SODIUM 50 MILLIGRAM(S): 25 TABLET, FILM COATED ORAL at 16:26

## 2018-07-09 RX ADMIN — AMLODIPINE BESYLATE 10 MILLIGRAM(S): 2.5 TABLET ORAL at 16:38

## 2018-07-09 RX ADMIN — ONDANSETRON 4 MILLIGRAM(S): 8 TABLET, FILM COATED ORAL at 09:02

## 2018-07-09 RX ADMIN — OXYCODONE HYDROCHLORIDE 10 MILLIGRAM(S): 5 TABLET ORAL at 17:24

## 2018-07-09 RX ADMIN — OXYCODONE HYDROCHLORIDE 10 MILLIGRAM(S): 5 TABLET ORAL at 17:22

## 2018-07-09 RX ADMIN — ZOLPIDEM TARTRATE 5 MILLIGRAM(S): 10 TABLET ORAL at 01:57

## 2018-07-09 RX ADMIN — SERTRALINE 50 MILLIGRAM(S): 25 TABLET, FILM COATED ORAL at 16:26

## 2018-07-09 RX ADMIN — LEVETIRACETAM 500 MILLIGRAM(S): 250 TABLET, FILM COATED ORAL at 16:26

## 2018-07-09 RX ADMIN — PANTOPRAZOLE SODIUM 40 MILLIGRAM(S): 20 TABLET, DELAYED RELEASE ORAL at 16:26

## 2018-07-09 RX ADMIN — Medication 100 MILLIGRAM(S): at 17:19

## 2018-07-09 RX ADMIN — ONDANSETRON 4 MILLIGRAM(S): 8 TABLET, FILM COATED ORAL at 03:10

## 2018-07-09 RX ADMIN — HYDROMORPHONE HYDROCHLORIDE 1 MILLIGRAM(S): 2 INJECTION INTRAMUSCULAR; INTRAVENOUS; SUBCUTANEOUS at 03:40

## 2018-07-09 RX ADMIN — HYDROMORPHONE HYDROCHLORIDE 1 MILLIGRAM(S): 2 INJECTION INTRAMUSCULAR; INTRAVENOUS; SUBCUTANEOUS at 12:42

## 2018-07-09 RX ADMIN — EMTRICITABINE 200 MILLIGRAM(S): 200 CAPSULE ORAL at 16:38

## 2018-07-09 RX ADMIN — TENOFOVIR DISOPROXIL FUMARATE 300 MILLIGRAM(S): 300 TABLET, FILM COATED ORAL at 16:26

## 2018-07-09 RX ADMIN — HYDROMORPHONE HYDROCHLORIDE 1 MILLIGRAM(S): 2 INJECTION INTRAMUSCULAR; INTRAVENOUS; SUBCUTANEOUS at 23:17

## 2018-07-09 RX ADMIN — Medication 0.5 MILLIGRAM(S): at 22:11

## 2018-07-09 RX ADMIN — Medication 50 MILLIGRAM(S): at 12:41

## 2018-07-09 RX ADMIN — Medication 1 MILLIGRAM(S): at 18:51

## 2018-07-09 RX ADMIN — Medication 25 MILLIGRAM(S): at 14:01

## 2018-07-09 RX ADMIN — ATOVAQUONE 1500 MILLIGRAM(S): 750 SUSPENSION ORAL at 16:27

## 2018-07-09 RX ADMIN — DARUNAVIR 800 MILLIGRAM(S): 75 TABLET, FILM COATED ORAL at 16:26

## 2018-07-09 RX ADMIN — SEVELAMER CARBONATE 800 MILLIGRAM(S): 2400 POWDER, FOR SUSPENSION ORAL at 16:26

## 2018-07-09 RX ADMIN — HYDROMORPHONE HYDROCHLORIDE 1 MILLIGRAM(S): 2 INJECTION INTRAMUSCULAR; INTRAVENOUS; SUBCUTANEOUS at 22:11

## 2018-07-09 RX ADMIN — HYDROMORPHONE HYDROCHLORIDE 1 MILLIGRAM(S): 2 INJECTION INTRAMUSCULAR; INTRAVENOUS; SUBCUTANEOUS at 03:10

## 2018-07-09 RX ADMIN — ERYTHROPOIETIN 10000 UNIT(S): 10000 INJECTION, SOLUTION INTRAVENOUS; SUBCUTANEOUS at 14:02

## 2018-07-09 NOTE — CONSULT NOTE ADULT - ASSESSMENT
Patient with nausea, vomiting, abdominal pain in setting of HIV/AIDS/ESRD/non complaince with the medications. No evidence of thrush    1. Get CT abdomen/pelvis with contrast-once ok with nephrology  2. Recheck lipase  3. Increase PPI to bid  4. If CT abdomen is inconclusive, needs EGD. Patient with nausea, vomiting, abdominal pain in setting of HIV/AIDS/ESRD/non complaince with the medications. No evidence of thrush    1. Get CT abdomen/pelvis with contrast-once ok with nephrology  2. Recheck lipase  3. Increase PPI to bid  4. If CT abdomen is inconclusive, needs EGD. Needs cardiology evaluation for EGD optimization

## 2018-07-09 NOTE — CONSULT NOTE ADULT - SUBJECTIVE AND OBJECTIVE BOX
HPI: This is a 28yo M with PMH of HIV-non compliant with meds; is ESRD on HD admitted with Nausea/vomiting not tolerating solid food. C/O pain refusing short acting Oxycodone 10mg.  IN Dialysis Suite at time of this consult.    The patient is a 27 year old male with a history of HIV ( non compliant with treatment), CMV retinitis with vision loss, ESRD on HD MWF, dilated cardiomyopathy, hypertension and seizure disorder who was brought to the ED with complaints of weakness, confusion, enlarging of his fistula with occasional bleeding, and seeing flashes of light (he is blind at baseline). Pt also c/o nausea and wretching. Vomited once today at home. Fell today at home in bathroom but denies hitting head, states tripped. Pt uses wheelchair at baseline. He admits to being non compliant with his medications at home; he had not taken his medications. Pt denies cp, diarrhea, HA, fevers, constipation, numbness, tingling. (30 Jun 2018 23:01)      PERTINENT PMH REVIEWED: Yes     PAST MEDICAL & SURGICAL HISTORY:  HIV (human immunodeficiency virus infection)  Seizure disorder  Hypertension  Diabetes  ESRD (end stage renal disease)  Seizure  Pericarditis  Anxiety  Depression  Renal failure (ARF), acute on chronic: dialysis av fistula, RUE  HTN (hypertension)  Cardiomyopathy  HIV disease: born HIV+  AV fistula  S/P tonsillectomy      SOCIAL HISTORY:  EtOH    No                                    Drugs   No                                    smoker  nonsmoker                                    Admitted from: home     FAMILY HISTORY:  No pertinent family history in first degree relatives  No pertinent family history in first degree relatives      Allergies    vancomycin (Anaphylaxis)          Baseline ADLs (prior to admission):  Independent/ Dependent      Present Symptoms:     Dyspnea: 0 1 2 3   Nausea/Vomiting: Yes No  Anxiety:  Yes No  Depression: Yes No  Fatigue: Yes No  Loss of appetite: Yes No    Pain:             Character-            Duration-            Effect-            Factors-            Frequency-            Location-            Severity-    Review of Systems: Reviewed                     Negative:                     Positive:  Unable to obtain due to poor mentation   All others negative    MEDICATIONS  (STANDING):  amLODIPine   Tablet 10 milliGRAM(s) Oral daily  atovaquone Suspension 1500 milliGRAM(s) Oral daily  calcitriol   Capsule 0.5 MICROGram(s) Oral daily  darunavir 800 milliGRAM(s) Oral daily  dolutegravir 50 milliGRAM(s) Oral daily  emtricitabine 200 milliGRAM(s) Oral <User Schedule>  epoetin nithin Injectable 84484 Unit(s) IV Push <User Schedule>  fentaNYL   Patch 100 MICROgram(s)/Hr. 1 Patch Transdermal every 48 hours  heparin  Injectable 5000 Unit(s) SubCutaneous every 12 hours  levETIRAcetam 500 milliGRAM(s) Oral two times a day  lisinopril 20 milliGRAM(s) Oral daily  metoprolol tartrate 100 milliGRAM(s) Oral two times a day  ondansetron Injectable 4 milliGRAM(s) IV Push every 6 hours  pantoprazole    Tablet 40 milliGRAM(s) Oral before breakfast  ritonavir Tablet 100 milliGRAM(s) Oral daily  sertraline 50 milliGRAM(s) Oral daily  sevelamer hydrochloride 800 milliGRAM(s) Oral three times a day  tenofovir disoproxil fumarate (VIREAD) 300 milliGRAM(s) Oral <User Schedule>  Valganciclovir 50 mg/ 1 ml Oral Solutio 100 milliGRAM(s) 100 milliGRAM(s) Oral <User Schedule>    MEDICATIONS  (PRN):  acetaminophen   Tablet 650 milliGRAM(s) Oral every 6 hours PRN For Temp greater than 38 C (100.4 F)  acetaminophen   Tablet. 650 milliGRAM(s) Oral every 6 hours PRN Moderate Pain (4 - 6)  ALPRAZolam 0.5 milliGRAM(s) Oral three times a day PRN anxiety  diphenhydrAMINE   Capsule 25 milliGRAM(s) Oral every 6 hours PRN Rash and/or Itching  HYDROmorphone  Injectable 1 milliGRAM(s) IV Push every 4 hours PRN breakthrough pain  oxyCODONE    IR 10 milliGRAM(s) Oral four times a day PRN mod to severe pain  zolpidem 5 milliGRAM(s) Oral at bedtime PRN Insomnia      PHYSICAL EXAM:    Vital Signs Last 24 Hrs  T(C): 36.9 (09 Jul 2018 15:30), Max: 37 (09 Jul 2018 00:00)  T(F): 98.4 (09 Jul 2018 15:30), Max: 98.6 (09 Jul 2018 00:00)  HR: 86 (09 Jul 2018 15:30) (68 - 86)  BP: 139/74 (09 Jul 2018 15:30) (111/74 - 140/82)  BP(mean): --  RR: 18 (09 Jul 2018 15:30) (18 - 19)  SpO2: 100% (09 Jul 2018 15:30) (97% - 100%)    General: alert  oriented x ____ lethargic agitated                  cachexia  nonverbal  coma    Karnofsky:  %    HEENT: normal  dry mouth  ET tube/trach    Lungs: comfortable tachypnea/labored breathing  excessive secretions    CV: normal  tachycardia    GI: normal  distended  tender  no BS               PEG/NG/OG tube  constipation  last BM:     : normal  incontinent  oliguria/anuria  hayward    MSK: normal  weakness  edema             ambulatory  bedbound/wheelchair bound    Skin: normal  pressure ulcers- Stage_____  no rash    LABS:                        9.9    2.2   )-----------( 121      ( 09 Jul 2018 13:39 )             30.9     07-09    135  |  90<L>  |  51.0<H>  ----------------------------<  99  4.6   |  27.0  |  7.39<H>    Ca    9.0      09 Jul 2018 13:39          I&O's Summary    08 Jul 2018 07:01  -  09 Jul 2018 07:00  --------------------------------------------------------  IN: 237 mL / OUT: 0 mL / NET: 237 mL        RADIOLOGY & ADDITIONAL STUDIES:    ADVANCE DIRECTIVES:   DNR YES NO  Completed on:                     HAYLEE  YES NO   Completed on:  Living Will  YES NO   Completed on: HPI: This is a 28yo M with PMH of HIV-non compliant with meds; is ESRD on HD, HTN, Seizure disorder  and Cardiomyopathy.   Admitted with Nausea/vomiting not tolerating solid food.. Exhausted from HD RX today, talkative and anxious about his nutritional status and persistent nausea.   C/O acute pain in L upper arm at fistula site and above. He has been passing loose stool-denies CP, palpitations, HA, SOB, constipation or trouble swallowing.  Admitted with Nausea/vomiting not tolerating solid food.  Very thin, no edema.  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 Pt also c/o nausea and wretching. Vomited once today at home. Fell today at home in bathroom but denies hitting head, states tripped. Pt uses wheelchair at baseline. He admits to being non compliant with his medications at home; he had not taken his medications. Pt denies cp, diarrhea, HA, fevers, constipation, numbness, tingling. (30 Jun 2018 23:01)      PERTINENT PMH REVIEWED: Yes     PAST MEDICAL & SURGICAL HISTORY:  HIV (human immunodeficiency virus infection)  Seizure disorder  Hypertension  Diabetes  ESRD (end stage renal disease)  Seizure  Pericarditis  Anxiety  Depression  Renal failure (ARF), acute on chronic: dialysis av fistula, RUE  HTN (hypertension)  Cardiomyopathy  HIV disease: born HIV+  AV fistula  S/P tonsillectomy      SOCIAL HISTORY:  EtOH    No                                    Drugs   No                                    smoker  nonsmoker                                    Admitted from: home: HCP's Mother: Jimmy Savage     FAMILY HISTORY:  No pertinent family history in first degree relatives  No pertinent family history in first degree relatives    Allergies  vancomycin (Anaphylaxis)    Baseline ADLs (prior to admission):   Dependent legally blind- wheelchair bound     Present Symptoms:     Dyspnea: 0   Nausea/Vomiting: Yes   Anxiety:  Yes   Depression: Yes   Fatigue: Yes   Loss of appetite: Yes-Not tolerating solid food    Pain:             Character-            Duration-chronic diffuse Musculoskeletal pain/joints            Effect-            Factors-movement, HD access            Frequency-intermittent            Location-RUE AV Fistula stenosis- s/p angiplasty            Severity-moderate to severe    Review of Systems: Reviewed                       All others negative    MEDICATIONS  (STANDING):  amLODIPine   Tablet 10 milliGRAM(s) Oral daily  atovaquone Suspension 1500 milliGRAM(s) Oral daily  calcitriol   Capsule 0.5 MICROGram(s) Oral daily  darunavir 800 milliGRAM(s) Oral daily  dolutegravir 50 milliGRAM(s) Oral daily  emtricitabine 200 milliGRAM(s) Oral <User Schedule>  epoetin nithin Injectable 02142 Unit(s) IV Push <User Schedule>  fentaNYL   Patch 100 MICROgram(s)/Hr. 1 Patch Transdermal every 48 hours  heparin  Injectable 5000 Unit(s) SubCutaneous every 12 hours  levETIRAcetam 500 milliGRAM(s) Oral two times a day  lisinopril 20 milliGRAM(s) Oral daily  metoprolol tartrate 100 milliGRAM(s) Oral two times a day  ondansetron Injectable 4 milliGRAM(s) IV Push every 6 hours  pantoprazole    Tablet 40 milliGRAM(s) Oral before breakfast  ritonavir Tablet 100 milliGRAM(s) Oral daily  sertraline 50 milliGRAM(s) Oral daily  sevelamer hydrochloride 800 milliGRAM(s) Oral three times a day  tenofovir disoproxil fumarate (VIREAD) 300 milliGRAM(s) Oral <User Schedule>  Valganciclovir 50 mg/ 1 ml Oral Solutio 100 milliGRAM(s) 100 milliGRAM(s) Oral <User Schedule>    MEDICATIONS  (PRN):  acetaminophen   Tablet 650 milliGRAM(s) Oral every 6 hours PRN For Temp greater than 38 C (100.4 F)  acetaminophen   Tablet. 650 milliGRAM(s) Oral every 6 hours PRN Moderate Pain (4 - 6)  ALPRAZolam 0.5 milliGRAM(s) Oral three times a day PRN anxiety  diphenhydrAMINE   Capsule 25 milliGRAM(s) Oral every 6 hours PRN Rash and/or Itching  HYDROmorphone  Injectable 1 milliGRAM(s) IV Push every 4 hours PRN breakthrough pain  oxyCODONE    IR 10 milliGRAM(s) Oral four times a day PRN mod to severe pain  zolpidem 5 milliGRAM(s) Oral at bedtime PRN Insomnia      PHYSICAL EXAM:    Vital Signs Last 24 Hrs  T(C): 36.9 (09 Jul 2018 15:30), Max: 37 (09 Jul 2018 00:00)  T(F): 98.4 (09 Jul 2018 15:30), Max: 98.6 (09 Jul 2018 00:00)  HR: 86 (09 Jul 2018 15:30) (68 - 86)  BP: 139/74 (09 Jul 2018 15:30) (111/74 - 140/82)  BP(mean): --  RR: 18 (09 Jul 2018 15:30) (18 - 19)  SpO2: 100% (09 Jul 2018 15:30) (97% - 100%)    General: alert  oriented x __3__ lethargic anxious                 thin Blind: Retinitis mycoplasma    HEENT:  dry mouth      Lungs: comfortable     CV: normal   GI: normal                 Loose stool last BM:     : oliguria/anuria      MSK: weakness very thin and malnourished            ambulatory  wheelchair bound    Skin: normal  _  no rash pruritis- renal    LABS:                        9.9    2.2   )-----------( 121      ( 09 Jul 2018 13:39 )             30.9     07-09    135  |  90<L>  |  51.0<H>  ----------------------------<  99  4.6   |  27.0  |  7.39<H>    Ca    9.0      09 Jul 2018 13:39    I&O's Summary    08 Jul 2018 07:01  -  09 Jul 2018 07:00  --------------------------------------------------------  IN: 237 mL / OUT: 0 mL / NET: 237 mL    RADIOLOGY & ADDITIONAL STUDIES:    ADVANCE DIRECTIVES:   DNR NO  Completed on:                     MOLST  NO   Completed on:  Living Will   NO   Completed on: HPI: This is a 26yo M with PMH of HIV-non compliant with meds; is ESRD on HD, HTN, Seizure disorder  and Cardiomyopathy.   Admitted with Nausea/vomiting not tolerating solid food.. Exhausted from HD RX today, talkative and anxious about his nutritional status and persistent nausea.   C/O acute pain in L upper arm at fistula site and above. He has been passing loose stool-denies CP, palpitations, HA, SOB, constipation or trouble swallowing.  Admitted with Nausea/vomiting not tolerating solid food.  Very thin, no edema.  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 Pt also c/o nausea and wretching. Vomited once today at home. Fell today at home in bathroom but denies hitting head, states tripped. Pt uses wheelchair at baseline. He admits to being non compliant with his medications at home; he had not taken his medications. Pt denies cp, diarrhea, HA, fevers, constipation, numbness, tingling. (30 Jun 2018 23:01)      PERTINENT PMH REVIEWED: Yes     PAST MEDICAL & SURGICAL HISTORY:  HIV (human immunodeficiency virus infection)  Seizure disorder  Hypertension  Diabetes  ESRD (end stage renal disease)  Seizure  Pericarditis  Anxiety  Depression  Renal failure (ARF), acute on chronic: dialysis av fistula, RUE  HTN (hypertension)  Cardiomyopathy  HIV disease: born HIV+  AV fistula  S/P tonsillectomy      SOCIAL HISTORY:  EtOH    No                                    Drugs   No                                    smoker  nonsmoker                                    Admitted from: home: HCP's Mother: Jimmy Savage:[phone]                                                                              Sister  Shaye Savage [phone]     FAMILY HISTORY:  No pertinent family history in first degree relatives  No pertinent family history in first degree relatives    Allergies  vancomycin (Anaphylaxis)    Baseline ADLs (prior to admission):   Dependent legally blind- wheelchair bound     Present Symptoms:     Dyspnea: 0   Nausea/Vomiting: Yes   Anxiety:  Yes   Depression: Yes   Fatigue: Yes   Loss of appetite: Yes-Not tolerating solid food    Pain:             Character-            Duration-chronic diffuse Musculoskeletal pain/joints            Effect-            Factors-movement, HD access            Frequency-intermittent            Location-RUE AV Fistula stenosis- s/p angioplasty            Severity-moderate to severe    Review of Systems: Reviewed                       All others negative    MEDICATIONS  (STANDING):  amLODIPine   Tablet 10 milliGRAM(s) Oral daily  atovaquone Suspension 1500 milliGRAM(s) Oral daily  calcitriol   Capsule 0.5 MICROGram(s) Oral daily  darunavir 800 milliGRAM(s) Oral daily  dolutegravir 50 milliGRAM(s) Oral daily  emtricitabine 200 milliGRAM(s) Oral <User Schedule>  epoetin nithin Injectable 54332 Unit(s) IV Push <User Schedule>  fentaNYL   Patch 100 MICROgram(s)/Hr. 1 Patch Transdermal every 48 hours  heparin  Injectable 5000 Unit(s) SubCutaneous every 12 hours  levETIRAcetam 500 milliGRAM(s) Oral two times a day  lisinopril 20 milliGRAM(s) Oral daily  metoprolol tartrate 100 milliGRAM(s) Oral two times a day  ondansetron Injectable 4 milliGRAM(s) IV Push every 6 hours  pantoprazole    Tablet 40 milliGRAM(s) Oral before breakfast  ritonavir Tablet 100 milliGRAM(s) Oral daily  sertraline 50 milliGRAM(s) Oral daily  sevelamer hydrochloride 800 milliGRAM(s) Oral three times a day  tenofovir disoproxil fumarate (VIREAD) 300 milliGRAM(s) Oral <User Schedule>  Valganciclovir 50 mg/ 1 ml Oral Solutio 100 milliGRAM(s) 100 milliGRAM(s) Oral <User Schedule>    MEDICATIONS  (PRN):  acetaminophen   Tablet 650 milliGRAM(s) Oral every 6 hours PRN For Temp greater than 38 C (100.4 F)  acetaminophen   Tablet. 650 milliGRAM(s) Oral every 6 hours PRN Moderate Pain (4 - 6)  ALPRAZolam 0.5 milliGRAM(s) Oral three times a day PRN anxiety  diphenhydrAMINE   Capsule 25 milliGRAM(s) Oral every 6 hours PRN Rash and/or Itching  HYDROmorphone  Injectable 1 milliGRAM(s) IV Push every 4 hours PRN breakthrough pain  oxyCODONE    IR 10 milliGRAM(s) Oral four times a day PRN mod to severe pain  zolpidem 5 milliGRAM(s) Oral at bedtime PRN Insomnia      PHYSICAL EXAM:    Vital Signs Last 24 Hrs  T(C): 36.9 (09 Jul 2018 15:30), Max: 37 (09 Jul 2018 00:00)  T(F): 98.4 (09 Jul 2018 15:30), Max: 98.6 (09 Jul 2018 00:00)  HR: 86 (09 Jul 2018 15:30) (68 - 86)  BP: 139/74 (09 Jul 2018 15:30) (111/74 - 140/82)  BP(mean): --  RR: 18 (09 Jul 2018 15:30) (18 - 19)  SpO2: 100% (09 Jul 2018 15:30) (97% - 100%)    General: alert  oriented x __3__ awake anxious                 thin Blind: CMV Retinitis     HEENT:  dry mouth      Lungs: comfortable     CV: normal   GI: normal                 Loose stool last BM:     : oliguria/anuria      MSK: weakness very thin and malnourished            ambulatory  wheelchair bound    Skin: normal  _  no rash pruritis- renal    LABS:                        9.9    2.2   )-----------( 121      ( 09 Jul 2018 13:39 )             30.9     07-09    135  |  90<L>  |  51.0<H>  ----------------------------<  99  4.6   |  27.0  |  7.39<H>    Ca    9.0      09 Jul 2018 13:39    I&O's Summary    08 Jul 2018 07:01  -  09 Jul 2018 07:00  --------------------------------------------------------  IN: 237 mL / OUT: 0 mL / NET: 237 mL    RADIOLOGY & ADDITIONAL STUDIES:    ADVANCE DIRECTIVES:   DNR NO  Completed on:                     MOLST  NO   Completed on:  Living Will   NO   Completed on:

## 2018-07-09 NOTE — PROGRESS NOTE ADULT - ASSESSMENT
ESRD on HD MWF  HTN  HIV  AVF bleeding   DM    -HD MWF; Proceed with dialysis today 7/9/18 as ordered; Benadryl with dialysis ordered; Dilaudid PRN  -S/p fistulogram and balloon angioplasty 7/6/18, Vascular following closely  -Hemodynamics are stable  -On ART therapy; work up and management per ID  -Renal diet; 3 nepro shakes three times per day; he is not really eating the solid food.     Thank you; DC planning when cleared by ID

## 2018-07-09 NOTE — CONSULT NOTE ADULT - PROBLEM SELECTOR RECOMMENDATION 9
1. Continue Fentanyl patch as ordered  2. Continue Oxycodone IR as ordered. Increase frequency to q4hrs PRN moderate and severe pain, if patient continues to report inadequate analgesia without signs of oversedation.  3. Continue IV Dilaudid as ordered. Not in less than 1 hour of Oxycodone IR.  4. Add Lidoderm patches, 1 for right shoulder/upper back and 1 for right upper chest.  5. Maximize use of non-opioids, such as Tylenol 650mg PO q8hrs standing x 2 days, PRN thereafter.  6. Consider Gabapentin 100mg PO at bedtime for radicular pain. Alternatively, Lyrica 25mg may be offered. Hold for oversedation.  7. No Oxycontin (Oxycodone ER) is to be added  8. Consider Fistulogram if team suspects it may assist with pin-pointing the underlying cause for increased pain.  9. Psychiatry consultation for opioid seeking behavior  10. Continue with PT involvement  Patient is to follow up with his outpatient pain management doctor at discharge.   Pain service signed off  [phone]
Zofran > to Q6h ATC  Ativan 1mg PO now and 0.5mg Q8h before meals  Attempting to introduce solid foods-which are not tolerated  Nephro supplement > to 6 servings a day

## 2018-07-09 NOTE — CONSULT NOTE ADULT - ATTENDING COMMENTS
COUNSELING:    Face to face meeting to discuss Advanced Care Planning - Time Spent ______ Minutes.  See goals of care note.    More than 50% time spent in counseling and coordinating care. ______ Minutes.     Thank you for the opportunity to assist with the care of this patient.   [facility] Consult [facility] [phone]. COUNSELING:    Face to face meeting to discuss Advanced Care Planning - Time Spent ______ Minutes.  See goals of care note.    More than 50% time spent in counseling and coordinating care. __60____ Minutes.     Thank you for the opportunity to assist with the care of this patient.   [facility] Consult [facility] [phone].    Clarified HCP's  Will try to discuss Directives when more calm and comfortable.  Goal is to continue treatment

## 2018-07-09 NOTE — CONSULT NOTE ADULT - PROBLEM SELECTOR RECOMMENDATION 3
HD 3x/w  Fistula now working GFR improving  Very tired after HD treatment-allow to rest  Fluid restricted-disussed strategies to tolerate and adhere to restriction

## 2018-07-09 NOTE — PROGRESS NOTE ADULT - SUBJECTIVE AND OBJECTIVE BOX
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 Pt also c/o nausea and wretching. Vomited once today at home. Fell today at home in bathroom but denies hitting head, states tripped. Pt uses wheelchair at baseline. He admits to being non compliant with his medications at home; he had not taken his medications. Pt denies cp, diarrhea, HA, fevers, constipation, numbness, tingling. (30 Jun 2018 23:01)    Follow up ESRD  NO new complaints    PAST MEDICAL & SURGICAL HISTORY:  HIV (human immunodeficiency virus infection)  Seizure disorder  Hypertension  Diabetes  ESRD (end stage renal disease)  Seizure  Pericarditis  Anxiety  Depression  Renal failure (ARF), acute on chronic: dialysis av fistula, RUE  HTN (hypertension)  Cardiomyopathy  HIV disease: born HIV+  AV fistula  S/P tonsillectomy      MEDICATIONS  (STANDING):  amLODIPine   Tablet 10 milliGRAM(s) Oral daily  atovaquone Suspension 1500 milliGRAM(s) Oral daily  calcitriol   Capsule 0.5 MICROGram(s) Oral daily  darunavir 800 milliGRAM(s) Oral daily  diphenhydrAMINE   Injectable 25 milliGRAM(s) IV Push once  diphenhydrAMINE   Injectable 50 milliGRAM(s) IV Push once  dolutegravir 50 milliGRAM(s) Oral daily  emtricitabine 200 milliGRAM(s) Oral <User Schedule>  fentaNYL   Patch 100 MICROgram(s)/Hr. 1 Patch Transdermal every 48 hours  heparin  Injectable 5000 Unit(s) SubCutaneous every 12 hours  levETIRAcetam 500 milliGRAM(s) Oral two times a day  lisinopril 20 milliGRAM(s) Oral daily  metoprolol tartrate 100 milliGRAM(s) Oral two times a day  pantoprazole    Tablet 40 milliGRAM(s) Oral before breakfast  ritonavir Tablet 100 milliGRAM(s) Oral daily  sertraline 50 milliGRAM(s) Oral daily  sevelamer hydrochloride 800 milliGRAM(s) Oral three times a day  tenofovir disoproxil fumarate (VIREAD) 300 milliGRAM(s) Oral <User Schedule>  Valganciclovir 50 mg/ 1 ml Oral Solutio 100 milliGRAM(s) 100 milliGRAM(s) Oral <User Schedule>    MEDICATIONS  (PRN):  acetaminophen   Tablet 650 milliGRAM(s) Oral every 6 hours PRN For Temp greater than 38 C (100.4 F)  acetaminophen   Tablet. 650 milliGRAM(s) Oral every 6 hours PRN Moderate Pain (4 - 6)  ALPRAZolam 0.5 milliGRAM(s) Oral three times a day PRN anxiety  diphenhydrAMINE   Capsule 25 milliGRAM(s) Oral every 6 hours PRN Rash and/or Itching  HYDROmorphone  Injectable 1 milliGRAM(s) IV Push every 4 hours PRN breakthrough pain  ondansetron Injectable 4 milliGRAM(s) IV Push every 6 hours PRN Nausea  oxyCODONE    IR 10 milliGRAM(s) Oral four times a day PRN mod to severe pain  zolpidem 5 milliGRAM(s) Oral at bedtime PRN Insomnia      Allergies    vancomycin (Anaphylaxis)    Intolerances        Vital Signs Last 24 Hrs  T(C): 37 (09 Jul 2018 00:00), Max: 37.1 (08 Jul 2018 16:18)  T(F): 98.6 (09 Jul 2018 00:00), Max: 98.7 (08 Jul 2018 16:18)  HR: 69 (09 Jul 2018 05:28) (68 - 75)  BP: 121/73 (09 Jul 2018 05:28) (111/74 - 128/70)  BP(mean): --  RR: 19 (09 Jul 2018 00:00) (18 - 19)  SpO2: 97% (09 Jul 2018 00:00) (97% - 99%)  Daily     Daily     PHYSICAL EXAM:  GENERAL: No distress  HEAD:  Atraumatic, Normocephalic  EYES: conjunctiva and sclera clear  ENMT:  Moist mucous membranes  NECK: Supple, No JVD  NERVOUS SYSTEM:  Alert & Oriented, x 3, follows commands  CHEST/LUNG: Clear to percussion bilaterally; No rales, rhonchi, wheezing, or rubs  HEART: Regular rate and rhythm; No murmurs, rubs, or gallops  ABDOMEN: Soft, Nontender, Nondistended; Bowel sounds present  EXTREMITIES:  2+ Peripheral Pulses, No clubbing, cyanosis, or edema; +hypertrophic RUE AVF with palpable thrill      LABS:                    RADIOLOGY & ADDITIONAL TESTS:

## 2018-07-09 NOTE — CONSULT NOTE ADULT - PROBLEM/RECOMMENDATION-2
9/9/2024          To Whom It May Concern:    Jeremias Arana may return to school on 09/09/2024 .  Activity is restricted as follows: none.    If you require additional information please contact our office.        Sincerely,    Miladys Snyder CNM          Document generated by:  Annette RIBEIRO MA     
DISPLAY PLAN FREE TEXT

## 2018-07-09 NOTE — PROGRESS NOTE ADULT - ASSESSMENT
27 yr old male with HIV not compliant with medications, CMV retinitis with vision loss, ESRD on HD,  dilated cardiomyopathy, hypertension and seizure disorder admitted with confusion, weakness and enlarging AV fistula. He was admitted for intractable nausea and vomiting. Renal was consulted. He also had flashes of light, ophthalmology consulted. He was started on valganciclovir 100mg after each HD for treatment for CMV retinitis. HD was continued as per schedule. He complained of pain at AVF site, vascular surgery was consulted. Noted to have pancytopenia, blood culture ordered to rule out MAC infection. 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 Pain management consulted for pain control. Given persistent nausea and inability to tolerate solid food, GI consulted for possible endoscopic work up.

## 2018-07-09 NOTE — CONSULT NOTE ADULT - PROBLEM SELECTOR RECOMMENDATION 4
Dressing dry and clean  upper end of fistula with aneurysm baloon visible  Pre treatment pain relief- Dilaudid 1mg IV until tolerating diet better

## 2018-07-09 NOTE — CONSULT NOTE ADULT - PROBLEM SELECTOR RECOMMENDATION 6
DC'd Xanax 0.5mg prn    Switching to Ativan 1mg PO stat and 0.5mg Q8h including bedtime  Discussed starting on Cymbalta once his N/V are resolved.

## 2018-07-09 NOTE — CONSULT NOTE ADULT - SUBJECTIVE AND OBJECTIVE BOX
HPI:  The patient is a 27 year old male with a history of HIV (non-compliant with treatment), CMV retinitis with vision loss, ESRD on HD MWF, dilated cardiomyopathy, hypertension and seizure disorder who was brought to the ED with complaints of weakness, confusion, enlarging of his fistula with occasional bleeding, and seeing flashes of light (he is blind at baseline). Pt also c/o nausea and wretching. Vomited once today at home. Fell today at home in bathroom but denies hitting head, states tripped. Pt uses wheelchair at baseline. He admits to being non compliant with his medications at home; he had not taken his medications. Pt denies cp, diarrhea, HA, fevers, constipation, numbness, tingling. (30 Jun 2018 23:01)      PAST MEDICAL & SURGICAL HISTORY:  HIV (human immunodeficiency virus infection)  Seizure disorder  Hypertension  Diabetes  ESRD (end stage renal disease)  Seizure  Pericarditis  Anxiety  Depression  Renal failure (ARF), acute on chronic: dialysis av fistula, RUE  HTN (hypertension)  Cardiomyopathy  HIV disease: born HIV+  AV fistula  S/P tonsillectomy      ROS:  No Heartburn, regurgitation, dysphagia, odynophagia.  No dyspepsia  No abdominal pain.    No Nausea, vomiting.  No Bleeding.  No hematemesis.   No diarrhea.    No hematochesia.  No weight loss, anorexia.  No edema.      MEDICATIONS  (STANDING):  acetaminophen  IVPB. 1000 milliGRAM(s) IV Intermittent once  amLODIPine   Tablet 10 milliGRAM(s) Oral daily  atovaquone Suspension 1500 milliGRAM(s) Oral daily  calcitriol   Capsule 0.5 MICROGram(s) Oral daily  darunavir 800 milliGRAM(s) Oral daily  dolutegravir 50 milliGRAM(s) Oral daily  emtricitabine 200 milliGRAM(s) Oral <User Schedule>  epoetin nithin Injectable 16515 Unit(s) IV Push <User Schedule>  fentaNYL   Patch 100 MICROgram(s)/Hr. 1 Patch Transdermal every 48 hours  heparin  Injectable 5000 Unit(s) SubCutaneous every 12 hours  levETIRAcetam 500 milliGRAM(s) Oral two times a day  lisinopril 20 milliGRAM(s) Oral daily  LORazepam     Tablet 1 milliGRAM(s) Oral once  LORazepam     Tablet 0.5 milliGRAM(s) Oral every 8 hours  metoprolol tartrate 100 milliGRAM(s) Oral two times a day  ondansetron Injectable 4 milliGRAM(s) IV Push every 6 hours  pantoprazole    Tablet 40 milliGRAM(s) Oral before breakfast  ritonavir Tablet 100 milliGRAM(s) Oral daily  sertraline 50 milliGRAM(s) Oral daily  sevelamer hydrochloride 800 milliGRAM(s) Oral three times a day  tenofovir disoproxil fumarate (VIREAD) 300 milliGRAM(s) Oral <User Schedule>  Valganciclovir 50 mg/ 1 ml Oral Solutio 100 milliGRAM(s) 100 milliGRAM(s) Oral <User Schedule>    MEDICATIONS  (PRN):  acetaminophen   Tablet 650 milliGRAM(s) Oral every 6 hours PRN For Temp greater than 38 C (100.4 F)  acetaminophen   Tablet. 650 milliGRAM(s) Oral every 6 hours PRN Moderate Pain (4 - 6)  diphenhydrAMINE   Capsule 25 milliGRAM(s) Oral every 6 hours PRN Rash and/or Itching  HYDROmorphone  Injectable 1 milliGRAM(s) IV Push every 4 hours PRN breakthrough pain  oxyCODONE    IR 10 milliGRAM(s) Oral four times a day PRN mod to severe pain  zolpidem 5 milliGRAM(s) Oral at bedtime PRN Insomnia      Allergies    vancomycin (Anaphylaxis)    Intolerances        SOCIAL HISTORY:denies x 3    ENDOSCOPIC/GI HISTORY:No EGD and colonoscopy    FAMILY HISTORY:  No pertinent family history in first degree relatives  No pertinent family history in first degree relatives      Vital Signs Last 24 Hrs  T(C): 36.8 (09 Jul 2018 16:37), Max: 37 (09 Jul 2018 00:00)  T(F): 98.2 (09 Jul 2018 16:37), Max: 98.6 (09 Jul 2018 00:00)  HR: 83 (09 Jul 2018 16:37) (68 - 86)  BP: 141/90 (09 Jul 2018 16:37) (111/74 - 141/90)  BP(mean): --  RR: 18 (09 Jul 2018 16:37) (18 - 19)  SpO2: 99% (09 Jul 2018 16:37) (97% - 100%)    PHYSICAL EXAM:    GENERAL: NAD, well-groomed, well-developed  HEAD:  Atraumatic, Normocephalic  EYES: EOMI, PERRLA, conjunctiva and sclera clear  ENMT: No tonsillar erythema, exudates, or enlargement; Moist mucous membranes, Good dentition, No lesions  NECK: Supple, No JVD, Normal thyroid  CHEST/LUNG: Clear to percussion bilaterally; No rales, rhonchi, wheezing, or rubs  HEART: Regular rate and rhythm; No murmurs, rubs, or gallops  ABDOMEN: Soft, Nontender, Nondistended; Bowel sounds present  EXTREMITIES:  2+ Peripheral Pulses, No clubbing, cyanosis, or edema  LYMPH: No lymphadenopathy noted  SKIN: No rashes or lesions      LABS:                        9.9    2.2   )-----------( 121      ( 09 Jul 2018 13:39 )             30.9     07-09    135  |  90<L>  |  51.0<H>  ----------------------------<  99  4.6   |  27.0  |  7.39<H>    Ca    9.0      09 Jul 2018 13:39                   RADIOLOGY & ADDITIONAL STUDIES: HPI:  The patient is a 27 year old male with a history of HIV (non-compliant with treatment), CMV retinitis with vision loss, ESRD on HD MWF, dilated cardiomyopathy, hypertension and seizure disorder who was brought to the ED with complaints of weakness, confusion, enlarging of his fistula with occasional bleeding, and seeing flashes of light (he is blind at baseline). Pt also c/o nausea and wretching. Pt denies cp, diarrhea, HA, fevers, constipation, numbness, tingling.     c/o Nausea and vomiting on eating. He also complains of abdominal pain. No constipation. No previous EGD or colonoscopy. [id number]. CD4-    PAST MEDICAL & SURGICAL HISTORY:  HIV (human immunodeficiency virus infection)  Seizure disorder  Hypertension  Diabetes  ESRD (end stage renal disease)  Seizure  Pericarditis  Anxiety  Depression  Renal failure (ARF), acute on chronic: dialysis av fistula, RUE  HTN (hypertension)  Cardiomyopathy  HIV disease: born HIV+  AV fistula  S/P tonsillectomy      ROS:  No Heartburn, regurgitation, dysphagia, odynophagia.  No dyspepsia  + abdominal pain.    +Nausea, vomiting.  No Bleeding.  No hematemesis.   No diarrhea.    No hematochesia.  No weight loss, anorexia.  No edema.      MEDICATIONS  (STANDING):  acetaminophen  IVPB. 1000 milliGRAM(s) IV Intermittent once  amLODIPine   Tablet 10 milliGRAM(s) Oral daily  atovaquone Suspension 1500 milliGRAM(s) Oral daily  calcitriol   Capsule 0.5 MICROGram(s) Oral daily  darunavir 800 milliGRAM(s) Oral daily  dolutegravir 50 milliGRAM(s) Oral daily  emtricitabine 200 milliGRAM(s) Oral <User Schedule>  epoetin nithin Injectable 74986 Unit(s) IV Push <User Schedule>  fentaNYL   Patch 100 MICROgram(s)/Hr. 1 Patch Transdermal every 48 hours  heparin  Injectable 5000 Unit(s) SubCutaneous every 12 hours  levETIRAcetam 500 milliGRAM(s) Oral two times a day  lisinopril 20 milliGRAM(s) Oral daily  LORazepam     Tablet 1 milliGRAM(s) Oral once  LORazepam     Tablet 0.5 milliGRAM(s) Oral every 8 hours  metoprolol tartrate 100 milliGRAM(s) Oral two times a day  ondansetron Injectable 4 milliGRAM(s) IV Push every 6 hours  pantoprazole    Tablet 40 milliGRAM(s) Oral before breakfast  ritonavir Tablet 100 milliGRAM(s) Oral daily  sertraline 50 milliGRAM(s) Oral daily  sevelamer hydrochloride 800 milliGRAM(s) Oral three times a day  tenofovir disoproxil fumarate (VIREAD) 300 milliGRAM(s) Oral <User Schedule>  Valganciclovir 50 mg/ 1 ml Oral Solutio 100 milliGRAM(s) 100 milliGRAM(s) Oral <User Schedule>    MEDICATIONS  (PRN):  acetaminophen   Tablet 650 milliGRAM(s) Oral every 6 hours PRN For Temp greater than 38 C (100.4 F)  acetaminophen   Tablet. 650 milliGRAM(s) Oral every 6 hours PRN Moderate Pain (4 - 6)  diphenhydrAMINE   Capsule 25 milliGRAM(s) Oral every 6 hours PRN Rash and/or Itching  HYDROmorphone  Injectable 1 milliGRAM(s) IV Push every 4 hours PRN breakthrough pain  oxyCODONE    IR 10 milliGRAM(s) Oral four times a day PRN mod to severe pain  zolpidem 5 milliGRAM(s) Oral at bedtime PRN Insomnia      Allergies    vancomycin (Anaphylaxis)    Intolerances        SOCIAL HISTORY:denies x 3    ENDOSCOPIC/GI HISTORY:No EGD and colonoscopy    FAMILY HISTORY:  No pertinent family history in first degree relatives  No pertinent family history in first degree relatives      Vital Signs Last 24 Hrs  T(C): 36.8 (09 Jul 2018 16:37), Max: 37 (09 Jul 2018 00:00)  T(F): 98.2 (09 Jul 2018 16:37), Max: 98.6 (09 Jul 2018 00:00)  HR: 83 (09 Jul 2018 16:37) (68 - 86)  BP: 141/90 (09 Jul 2018 16:37) (111/74 - 141/90)  BP(mean): --  RR: 18 (09 Jul 2018 16:37) (18 - 19)  SpO2: 99% (09 Jul 2018 16:37) (97% - 100%)    PHYSICAL EXAM:    GENERAL: NAD, well-groomed, well-developed. Legally blind  HEAD:  Atraumatic, Normocephalic  EYES: EOMI, PERRLA, conjunctiva and sclera clear  ENMT: No tonsillar erythema, exudates, or enlargement; Moist mucous membranes, Good dentition, No lesions  NECK: Supple, No JVD, Normal thyroid  CHEST/LUNG: Clear to percussion bilaterally; No rales, rhonchi, wheezing, or rubs  HEART: Regular rate and rhythm; No murmurs, rubs, or gallops  ABDOMEN: Soft, tender, Nondistended; Bowel sounds present  EXTREMITIES:  2+ Peripheral Pulses, No clubbing, cyanosis, or edema  LYMPH: No lymphadenopathy noted  SKIN: No rashes or lesions      LABS:                        9.9    2.2   )-----------( 121      ( 09 Jul 2018 13:39 )             30.9     07-09    135  |  90<L>  |  51.0<H>  ----------------------------<  99  4.6   |  27.0  |  7.39<H>    Ca    9.0      09 Jul 2018 13:39      Lipase, Serum (06.30.18 @ 20:18)    Lipase, Serum: 62 U/L      RADIOLOGY & ADDITIONAL STUDIES:  < from: CT Head No Cont (06.30.18 @ 20:25) >     EXAM:  CT BRAIN                          PROCEDURE DATE:  06/30/2018          INTERPRETATION:  NONCONTRAST CT OF THE BRAIN.    CLINICAL HISTORY:  AMS; vision changes.    TECHNIQUE: Axial CT images are obtained from the cranial vertex to the   skull base without the administration of IV contrast.    Comparison is made to the prior CT head 4/13/2018.    FINDINGS:    There is no acute intra-axial or extra-axial hemorrhage. There is no mass   effect or shift of the midline. The basal cisterns are not effaced.   Ventricles and sulci are appropriate for patient's stated age. Gray-white   matter differentiation is grossly preserved. There is no CT evidence of a   large vascular territorial infarct.    Continued diffuse thickening with abnormal groundglass appearance to the   calvarium and maxillofacial bones. Differential diagnosis remains fibrous   dysplasia, renal osteodystrophy, hyperparathyroidism. The visualized   orbits, paranasal sinuses and mastoid air cells appear normal.    IMPRESSION:    No acute findings on noncontrast CT of the brain. Unchanged diffuse   osseous abnormality as before. If clinically indicated, short-term   follow-up or MRI may be obtained for further evaluation provided no MR   contraindications. Ophthalmologyconsultation advised for vision changes.    < end of copied text > HPI:  The patient is a 27 year old male with a history of HIV (non-compliant with treatment), CMV retinitis with vision loss, ESRD on HD MWF, dilated cardiomyopathy, hypertension and seizure disorder who was brought to the ED with complaints of weakness, confusion, enlarging of his fistula with occasional bleeding, and seeing flashes of light (he is blind at baseline). Pt also c/o nausea and wretching. Pt denies cp, diarrhea, HA, fevers, constipation, numbness, tingling.     c/o Nausea and vomiting on eating. He also complains of abdominal pain. No constipation. No previous EGD or colonoscopy. [id number]. CD4-139.     PAST MEDICAL & SURGICAL HISTORY:  HIV (human immunodeficiency virus infection)  Seizure disorder  Hypertension  Diabetes  ESRD (end stage renal disease)  Seizure  Pericarditis  Anxiety  Depression  Renal failure (ARF), acute on chronic: dialysis av fistula, RUE  HTN (hypertension)  Cardiomyopathy  HIV disease: born HIV+  AV fistula  S/P tonsillectomy      ROS:  No Heartburn, regurgitation, dysphagia, odynophagia.  No dyspepsia  + abdominal pain.    +Nausea, vomiting.  No Bleeding.  No hematemesis.   No diarrhea.    No hematochesia.  No weight loss, anorexia.  No edema.      MEDICATIONS  (STANDING):  acetaminophen  IVPB. 1000 milliGRAM(s) IV Intermittent once  amLODIPine   Tablet 10 milliGRAM(s) Oral daily  atovaquone Suspension 1500 milliGRAM(s) Oral daily  calcitriol   Capsule 0.5 MICROGram(s) Oral daily  darunavir 800 milliGRAM(s) Oral daily  dolutegravir 50 milliGRAM(s) Oral daily  emtricitabine 200 milliGRAM(s) Oral <User Schedule>  epoetin nithin Injectable 05464 Unit(s) IV Push <User Schedule>  fentaNYL   Patch 100 MICROgram(s)/Hr. 1 Patch Transdermal every 48 hours  heparin  Injectable 5000 Unit(s) SubCutaneous every 12 hours  levETIRAcetam 500 milliGRAM(s) Oral two times a day  lisinopril 20 milliGRAM(s) Oral daily  LORazepam     Tablet 1 milliGRAM(s) Oral once  LORazepam     Tablet 0.5 milliGRAM(s) Oral every 8 hours  metoprolol tartrate 100 milliGRAM(s) Oral two times a day  ondansetron Injectable 4 milliGRAM(s) IV Push every 6 hours  pantoprazole    Tablet 40 milliGRAM(s) Oral before breakfast  ritonavir Tablet 100 milliGRAM(s) Oral daily  sertraline 50 milliGRAM(s) Oral daily  sevelamer hydrochloride 800 milliGRAM(s) Oral three times a day  tenofovir disoproxil fumarate (VIREAD) 300 milliGRAM(s) Oral <User Schedule>  Valganciclovir 50 mg/ 1 ml Oral Solutio 100 milliGRAM(s) 100 milliGRAM(s) Oral <User Schedule>    MEDICATIONS  (PRN):  acetaminophen   Tablet 650 milliGRAM(s) Oral every 6 hours PRN For Temp greater than 38 C (100.4 F)  acetaminophen   Tablet. 650 milliGRAM(s) Oral every 6 hours PRN Moderate Pain (4 - 6)  diphenhydrAMINE   Capsule 25 milliGRAM(s) Oral every 6 hours PRN Rash and/or Itching  HYDROmorphone  Injectable 1 milliGRAM(s) IV Push every 4 hours PRN breakthrough pain  oxyCODONE    IR 10 milliGRAM(s) Oral four times a day PRN mod to severe pain  zolpidem 5 milliGRAM(s) Oral at bedtime PRN Insomnia      Allergies    vancomycin (Anaphylaxis)    Intolerances        SOCIAL HISTORY:denies x 3    ENDOSCOPIC/GI HISTORY:No EGD and colonoscopy    FAMILY HISTORY:  No pertinent family history in first degree relatives  No pertinent family history in first degree relatives      Vital Signs Last 24 Hrs  T(C): 36.8 (09 Jul 2018 16:37), Max: 37 (09 Jul 2018 00:00)  T(F): 98.2 (09 Jul 2018 16:37), Max: 98.6 (09 Jul 2018 00:00)  HR: 83 (09 Jul 2018 16:37) (68 - 86)  BP: 141/90 (09 Jul 2018 16:37) (111/74 - 141/90)  BP(mean): --  RR: 18 (09 Jul 2018 16:37) (18 - 19)  SpO2: 99% (09 Jul 2018 16:37) (97% - 100%)    PHYSICAL EXAM:    GENERAL: NAD, well-groomed, well-developed. Legally blind  HEAD:  Atraumatic, Normocephalic  EYES: EOMI, PERRLA, conjunctiva and sclera clear  ENMT: No tonsillar erythema, exudates, or enlargement; Moist mucous membranes, Good dentition, No lesions  NECK: Supple, No JVD, Normal thyroid  CHEST/LUNG: Clear to percussion bilaterally; No rales, rhonchi, wheezing, or rubs  HEART: Regular rate and rhythm; No murmurs, rubs, or gallops  ABDOMEN: Soft, tender, Nondistended; Bowel sounds present  EXTREMITIES:  2+ Peripheral Pulses, No clubbing, cyanosis, or edema  LYMPH: No lymphadenopathy noted  SKIN: No rashes or lesions      LABS:                        9.9    2.2   )-----------( 121      ( 09 Jul 2018 13:39 )             30.9     07-09    135  |  90<L>  |  51.0<H>  ----------------------------<  99  4.6   |  27.0  |  7.39<H>    Ca    9.0      09 Jul 2018 13:39      Lipase, Serum (06.30.18 @ 20:18)    Lipase, Serum: 62 U/L      RADIOLOGY & ADDITIONAL STUDIES:  < from: CT Head No Cont (06.30.18 @ 20:25) >     EXAM:  CT BRAIN                          PROCEDURE DATE:  06/30/2018          INTERPRETATION:  NONCONTRAST CT OF THE BRAIN.    CLINICAL HISTORY:  AMS; vision changes.    TECHNIQUE: Axial CT images are obtained from the cranial vertex to the   skull base without the administration of IV contrast.    Comparison is made to the prior CT head 4/13/2018.    FINDINGS:    There is no acute intra-axial or extra-axial hemorrhage. There is no mass   effect or shift of the midline. The basal cisterns are not effaced.   Ventricles and sulci are appropriate for patient's stated age. Gray-white   matter differentiation is grossly preserved. There is no CT evidence of a   large vascular territorial infarct.    Continued diffuse thickening with abnormal groundglass appearance to the   calvarium and maxillofacial bones. Differential diagnosis remains fibrous   dysplasia, renal osteodystrophy, hyperparathyroidism. The visualized   orbits, paranasal sinuses and mastoid air cells appear normal.    IMPRESSION:    No acute findings on noncontrast CT of the brain. Unchanged diffuse   osseous abnormality as before. If clinically indicated, short-term   follow-up or MRI may be obtained for further evaluation provided no MR   contraindications. Ophthalmologyconsultation advised for vision changes.    < end of copied text >    < from: TTE Echo Complete w/Doppler (04.14.18 @ 13:34) >    EXAM:  ECHO TRANSTHORACIC COMP W DOPP      PROCEDURE DATE:  Apr 14 2018   .      INTERPRETATION:  REPORT:    TRANSTHORACIC ECHOCARDIOGRAM REPORT         Patient Name:   TEDDY CRAWFORD Patient Location: [facility] Rec #:  [id number]   Accession #:      [id number]  Account #:                         Height:           70.1 in 178.0 cm  YOB: 1990          Weight:           165.3 lb 75.00 kg  Patient Age:    27 years           BSA:              1.93 m²  Patient Gender: M                  BP:               179/104 mmHg       Date of Exam:        4/14/2018 1:34:25 PM  Sonographer:         Lamin Zee Jr  Referring Physician: Polly Bertrand MD    Procedure:   2D Echo/Doppler/Color Doppler Complete.  Indications: Cardiomyopathy, unspecified - I42.9  Diagnosis:   Cardiomyopathy, unspecified - I42.9         2D AND M-MODE MEASUREMENTS (normal ranges within parentheses):  Left                Normal    Aorta/Left           Normal  Ventricle:                    Atrium:  IVSd (2D):    0.87  (0.7-1.1) Aortic Root  3.81 cm (2.4-3.7)                cm              (2D):  LVPWd (2D):   1.18  (0.7-1.1) Left Atrium  4.56 cm (1.9-4.0)                cm              (2D):  LVIDd (2D):   6.17  (3.4-5.7) LA Volume    46.2              cm              Index        ml/m²  LVIDs (2D):   4.53            Right Ventricle:                cm              TAPSE:           1.87 cm  LV FS (2D):   26.6  (>25%)                %  Relative Wall 0.38  (<0.42)  Thickness    LV SYSTOLICFUNCTION BY 2D PLANIMETRY (MOD):  EF-Biplane: 51 %    LV DIASTOLIC FUNCTION:  MV Peak E: 1.40 m/s E/e' Ratio: 17.30  MV Peak A: 1.18 m/s Decel Time: 127 msec  E/A Ratio: 1.19    SPECTRAL DOPPLER ANALYSIS (where applicable):  Mitral Valve:  MV P1/2 Time: 36.83 msec  MV Area, PHT: 5.97 cm²         Tricuspid Valve and PA/RV Systolic Pressure: TR Max Velocity: 3.42 m/s RA   Pressure: 5 mmHg RVSP/PASP: 51.8 mmHg       PHYSICIAN INTERPRETATION:  Left Ventricle: The left ventricular internal cavity sizeis mildly   increased.  Global LV systolic function was mildly decreased. Left ventricular   ejection fraction, by visual estimation, is 45 to 50%. Spectral Doppler   shows impaired relaxation pattern of left ventricular myocardial filling   (Grade Idiastolic dysfunction). Elevated mean left atrial pressure.  Right Ventricle: Normal right ventricular size and function. TV S' 0.1   m/s.  Left Atrium: Moderate to severe left atrial enlargement.  Right Atrium: Severely enlarged right atrium.  Pericardium: There is no evidence of pericardial effusion.  Mitral Valve: The mitral valve is normal in structure. Thickening and   calcification of the anterior and posterior mitral valve leaflets. There   is moderate mitral annular calcification. No evidence of mitral stenosis.   Mild mitral valve regurgitation is seen. The MR jet is centrally-directed.  Tricuspid Valve: Structurally normal tricuspid valve, with normal leaflet   excursion. Moderate tricuspid regurgitation is visualized. Estimated   pulmonary artery systolic pressure is 51.8 mmHg assuming a right atrial   pressure of 5 mmHg, which is consistent with moderate pulmonary   hypertension.  Aortic Valve: The aortic valve is trileaflet. Sclerotic aortic valve with   normal opening. Trivial aortic valve regurgitation is seen. Sclerotic   aortic valve with normal opening.  Pulmonic Valve: Mild to moderate pulmonic valve regurgitation.  Aorta: The aortic root is normal in size and structure.  Venous: The inferior vena cava was normal sized, with respiratory size   variation greater than 50%.       Summary:   1. Left ventricular ejection fraction, by visual estimation, is 45 to   50%.   2. Mildly decreased global left ventricular systolic function.   3. Spectral Doppler shows impaired relaxation pattern of left   ventricular myocardial filling (Grade I diastolic dysfunction). Elevated   mean left atrial pressure.   4. Mildly increased left ventricular internal cavity size.   5. Normal right ventricular size and function.   6. Moderate mitral annular calcification.   7. Thickening and calcification of the anterior and posterior mitral   valve leaflets.   8. Mild mitral valve regurgitation.   9. Sclerotic aortic valve with normal opening.  10. Moderate tricuspid regurgitation.  11. Mild to moderate pulmonic valve regurgitation.  12. Estimated pulmonary artery systolic pressure is 51.8 mmHg assuming a   right atrial pressure of 5 mmHg, which is consistent with moderate   pulmonary hypertension.  13. There is no evidence of pericardial effusion.    < end of copied text >

## 2018-07-09 NOTE — CONSULT NOTE ADULT - PROBLEM SELECTOR RECOMMENDATION 5
Try not to administer all oral medications at once, avoid on an empty stomach.  ID working with medications  Ordered CD4 count

## 2018-07-09 NOTE — CONSULT NOTE ADULT - PROBLEM SELECTOR RECOMMENDATION 2
Fentanyl 100mcg/hr patch-stable on this dose for years  Oxycontin ER 20mg BID-not ordered during this stay  Acute on Chronic Pain-RUE Fistula post angioplasy pain-  Oxycodone 10mg PO-causing nausea on empty stomach  Dilaudid 1mg IVP for BTP-not abusing the parenteral route; taken before dialysis

## 2018-07-09 NOTE — PROGRESS NOTE ADULT - ASSESSMENT
27M POD3 s/p R fistulogram and angioplasty  -f/u outpatient with either Dr. Dahl in Vascular Surgery clinic or [place] vascular surgeon  -will need to be compliant with medications, have improved nutrition, better control of HIV and be medically optimized prior to any operative intervention  -discussed with Dr. Dahl 27M POD3 s/p R fistulogram and angioplasty  -no acute vascular surgical intervention indicated at this time, please reconsult as needed if clinical status changes  -will need to be compliant with medications, have improved nutrition, better control of HIV and be medically optimized prior to any operative intervention  -f/u outpatient with either Dr. Dahl in Vascular Surgery clinic or [place] vascular surgeon  -rest of care per primary team  -discussed with Dr. Dahl

## 2018-07-09 NOTE — PROGRESS NOTE ADULT - SUBJECTIVE AND OBJECTIVE BOX
INTERVAL HPI/OVERNIGHT EVENTS:    CC: HIV, ESRD on HD, nausea/vomiting, failure to thrive.    Complains of nausea, unable to eat solid food, one episode of vomiting this morning.    Vital Signs Last 24 Hrs  T(C): 36.9 (09 Jul 2018 12:15), Max: 37.1 (08 Jul 2018 16:18)  T(F): 98.4 (09 Jul 2018 12:15), Max: 98.7 (08 Jul 2018 16:18)  HR: 77 (09 Jul 2018 12:15) (68 - 77)  BP: 140/82 (09 Jul 2018 12:15) (111/74 - 140/82)  BP(mean): --  RR: 18 (09 Jul 2018 12:15) (18 - 19)  SpO2: 98% (09 Jul 2018 12:15) (97% - 98%)    PHYSICAL EXAM:    GENERAL: Not in distress, poor built  CHEST/LUNG: b/l air entry, clear  HEART: Regular   ABDOMEN: Soft, BS+  EXTREMITIES:  No edema, tenderness    MEDICATIONS  (STANDING):  amLODIPine   Tablet 10 milliGRAM(s) Oral daily  atovaquone Suspension 1500 milliGRAM(s) Oral daily  calcitriol   Capsule 0.5 MICROGram(s) Oral daily  darunavir 800 milliGRAM(s) Oral daily  dolutegravir 50 milliGRAM(s) Oral daily  emtricitabine 200 milliGRAM(s) Oral <User Schedule>  epoetin nithin Injectable 90215 Unit(s) IV Push <User Schedule>  fentaNYL   Patch 100 MICROgram(s)/Hr. 1 Patch Transdermal every 48 hours  heparin  Injectable 5000 Unit(s) SubCutaneous every 12 hours  levETIRAcetam 500 milliGRAM(s) Oral two times a day  lisinopril 20 milliGRAM(s) Oral daily  metoprolol tartrate 100 milliGRAM(s) Oral two times a day  ondansetron Injectable 4 milliGRAM(s) IV Push every 6 hours  pantoprazole    Tablet 40 milliGRAM(s) Oral before breakfast  ritonavir Tablet 100 milliGRAM(s) Oral daily  sertraline 50 milliGRAM(s) Oral daily  sevelamer hydrochloride 800 milliGRAM(s) Oral three times a day  tenofovir disoproxil fumarate (VIREAD) 300 milliGRAM(s) Oral <User Schedule>  Valganciclovir 50 mg/ 1 ml Oral Solutio 100 milliGRAM(s) 100 milliGRAM(s) Oral <User Schedule>    MEDICATIONS  (PRN):  acetaminophen   Tablet 650 milliGRAM(s) Oral every 6 hours PRN For Temp greater than 38 C (100.4 F)  acetaminophen   Tablet. 650 milliGRAM(s) Oral every 6 hours PRN Moderate Pain (4 - 6)  ALPRAZolam 0.5 milliGRAM(s) Oral three times a day PRN anxiety  diphenhydrAMINE   Capsule 25 milliGRAM(s) Oral every 6 hours PRN Rash and/or Itching  HYDROmorphone  Injectable 1 milliGRAM(s) IV Push every 4 hours PRN breakthrough pain  oxyCODONE    IR 10 milliGRAM(s) Oral four times a day PRN mod to severe pain  zolpidem 5 milliGRAM(s) Oral at bedtime PRN Insomnia      Allergies    vancomycin (Anaphylaxis)    Intolerances          LABS:                          9.9    2.2   )-----------( 121      ( 09 Jul 2018 13:39 )             30.9     07-09    135  |  90<L>  |  51.0<H>  ----------------------------<  99  4.6   |  27.0  |  7.39<H>    Ca    9.0      09 Jul 2018 13:39            RADIOLOGY & ADDITIONAL TESTS:

## 2018-07-09 NOTE — PROGRESS NOTE ADULT - SUBJECTIVE AND OBJECTIVE BOX
Vascular Surgery PROGRESS NOTE:     SUBJECTIVE: Pt seen and examined at bedside. No acute overnight events. HD went smoothly 7/7, no issues.    Vital Signs Last 24 Hrs  T(C): 37 (09 Jul 2018 00:00), Max: 37.1 (08 Jul 2018 16:18)  T(F): 98.6 (09 Jul 2018 00:00), Max: 98.7 (08 Jul 2018 16:18)  HR: 69 (09 Jul 2018 05:28) (68 - 75)  BP: 121/73 (09 Jul 2018 05:28) (111/74 - 128/70)  BP(mean): --  RR: 19 (09 Jul 2018 00:00) (18 - 19)  SpO2: 97% (09 Jul 2018 00:00) (97% - 99%)    OBJECTIVE:  NAD  NC/AT  RRR  No respiratory distress  RUE hypertrophic AVF, +palpable thrill, 2+ radial pulse, neurovascular intact    I&O's Detail    08 Jul 2018 07:01  -  09 Jul 2018 07:00  --------------------------------------------------------  IN:    Oral Fluid: 237 mL  Total IN: 237 mL    OUT:  Total OUT: 0 mL    Total NET: 237 mL          MEDICATIONS  (STANDING):  amLODIPine   Tablet 10 milliGRAM(s) Oral daily  atovaquone Suspension 1500 milliGRAM(s) Oral daily  calcitriol   Capsule 0.5 MICROGram(s) Oral daily  darunavir 800 milliGRAM(s) Oral daily  diphenhydrAMINE   Injectable 25 milliGRAM(s) IV Push once  diphenhydrAMINE   Injectable 50 milliGRAM(s) IV Push once  dolutegravir 50 milliGRAM(s) Oral daily  emtricitabine 200 milliGRAM(s) Oral <User Schedule>  fentaNYL   Patch 100 MICROgram(s)/Hr. 1 Patch Transdermal every 48 hours  heparin  Injectable 5000 Unit(s) SubCutaneous every 12 hours  levETIRAcetam 500 milliGRAM(s) Oral two times a day  lisinopril 20 milliGRAM(s) Oral daily  metoprolol tartrate 100 milliGRAM(s) Oral two times a day  pantoprazole    Tablet 40 milliGRAM(s) Oral before breakfast  ritonavir Tablet 100 milliGRAM(s) Oral daily  sertraline 50 milliGRAM(s) Oral daily  sevelamer hydrochloride 800 milliGRAM(s) Oral three times a day  tenofovir disoproxil fumarate (VIREAD) 300 milliGRAM(s) Oral <User Schedule>  Valganciclovir 50 mg/ 1 ml Oral Solutio 100 milliGRAM(s) 100 milliGRAM(s) Oral <User Schedule>    MEDICATIONS  (PRN):  acetaminophen   Tablet 650 milliGRAM(s) Oral every 6 hours PRN For Temp greater than 38 C (100.4 F)  acetaminophen   Tablet. 650 milliGRAM(s) Oral every 6 hours PRN Moderate Pain (4 - 6)  ALPRAZolam 0.5 milliGRAM(s) Oral three times a day PRN anxiety  diphenhydrAMINE   Capsule 25 milliGRAM(s) Oral every 6 hours PRN Rash and/or Itching  HYDROmorphone  Injectable 1 milliGRAM(s) IV Push every 4 hours PRN breakthrough pain  ondansetron Injectable 4 milliGRAM(s) IV Push every 6 hours PRN Nausea  oxyCODONE    IR 10 milliGRAM(s) Oral four times a day PRN mod to severe pain  zolpidem 5 milliGRAM(s) Oral at bedtime PRN Insomnia      LABS:                  RADIOLOGY & ADDITIONAL STUDIES:

## 2018-07-10 DIAGNOSIS — R10.9 UNSPECIFIED ABDOMINAL PAIN: ICD-10-CM

## 2018-07-10 PROCEDURE — 99232 SBSQ HOSP IP/OBS MODERATE 35: CPT

## 2018-07-10 PROCEDURE — 99233 SBSQ HOSP IP/OBS HIGH 50: CPT

## 2018-07-10 RX ORDER — DIPHENHYDRAMINE HCL 50 MG
25 CAPSULE ORAL ONCE
Qty: 0 | Refills: 0 | Status: DISCONTINUED | OUTPATIENT
Start: 2018-07-11 | End: 2018-07-16

## 2018-07-10 RX ORDER — DIPHENHYDRAMINE HCL 50 MG
50 CAPSULE ORAL ONCE
Qty: 0 | Refills: 0 | Status: COMPLETED | OUTPATIENT
Start: 2018-07-11 | End: 2018-07-11

## 2018-07-10 RX ORDER — PANTOPRAZOLE SODIUM 20 MG/1
40 TABLET, DELAYED RELEASE ORAL
Qty: 0 | Refills: 0 | Status: DISCONTINUED | OUTPATIENT
Start: 2018-07-10 | End: 2018-07-17

## 2018-07-10 RX ADMIN — OXYCODONE HYDROCHLORIDE 10 MILLIGRAM(S): 5 TABLET ORAL at 23:13

## 2018-07-10 RX ADMIN — OXYCODONE HYDROCHLORIDE 10 MILLIGRAM(S): 5 TABLET ORAL at 16:52

## 2018-07-10 RX ADMIN — HYDROMORPHONE HYDROCHLORIDE 1 MILLIGRAM(S): 2 INJECTION INTRAMUSCULAR; INTRAVENOUS; SUBCUTANEOUS at 02:57

## 2018-07-10 RX ADMIN — ONDANSETRON 4 MILLIGRAM(S): 8 TABLET, FILM COATED ORAL at 23:15

## 2018-07-10 RX ADMIN — OXYCODONE HYDROCHLORIDE 10 MILLIGRAM(S): 5 TABLET ORAL at 06:31

## 2018-07-10 RX ADMIN — HYDROMORPHONE HYDROCHLORIDE 1 MILLIGRAM(S): 2 INJECTION INTRAMUSCULAR; INTRAVENOUS; SUBCUTANEOUS at 11:40

## 2018-07-10 RX ADMIN — ONDANSETRON 4 MILLIGRAM(S): 8 TABLET, FILM COATED ORAL at 01:10

## 2018-07-10 RX ADMIN — HEPARIN SODIUM 5000 UNIT(S): 5000 INJECTION INTRAVENOUS; SUBCUTANEOUS at 06:25

## 2018-07-10 RX ADMIN — Medication 100 MILLIGRAM(S): at 16:35

## 2018-07-10 RX ADMIN — HYDROMORPHONE HYDROCHLORIDE 1 MILLIGRAM(S): 2 INJECTION INTRAMUSCULAR; INTRAVENOUS; SUBCUTANEOUS at 11:25

## 2018-07-10 RX ADMIN — ONDANSETRON 4 MILLIGRAM(S): 8 TABLET, FILM COATED ORAL at 11:16

## 2018-07-10 RX ADMIN — HYDROMORPHONE HYDROCHLORIDE 1 MILLIGRAM(S): 2 INJECTION INTRAMUSCULAR; INTRAVENOUS; SUBCUTANEOUS at 18:42

## 2018-07-10 RX ADMIN — LEVETIRACETAM 500 MILLIGRAM(S): 250 TABLET, FILM COATED ORAL at 06:25

## 2018-07-10 RX ADMIN — HYDROMORPHONE HYDROCHLORIDE 1 MILLIGRAM(S): 2 INJECTION INTRAMUSCULAR; INTRAVENOUS; SUBCUTANEOUS at 02:41

## 2018-07-10 RX ADMIN — HYDROMORPHONE HYDROCHLORIDE 1 MILLIGRAM(S): 2 INJECTION INTRAMUSCULAR; INTRAVENOUS; SUBCUTANEOUS at 19:42

## 2018-07-10 RX ADMIN — FENTANYL CITRATE 1 PATCH: 50 INJECTION INTRAVENOUS at 13:19

## 2018-07-10 RX ADMIN — FENTANYL CITRATE 1 PATCH: 50 INJECTION INTRAVENOUS at 11:16

## 2018-07-10 RX ADMIN — OXYCODONE HYDROCHLORIDE 10 MILLIGRAM(S): 5 TABLET ORAL at 16:32

## 2018-07-10 RX ADMIN — HYDROMORPHONE HYDROCHLORIDE 1 MILLIGRAM(S): 2 INJECTION INTRAMUSCULAR; INTRAVENOUS; SUBCUTANEOUS at 23:57

## 2018-07-10 RX ADMIN — Medication 0.5 MILLIGRAM(S): at 21:37

## 2018-07-10 RX ADMIN — ONDANSETRON 4 MILLIGRAM(S): 8 TABLET, FILM COATED ORAL at 06:26

## 2018-07-10 RX ADMIN — Medication 0.5 MILLIGRAM(S): at 06:26

## 2018-07-10 RX ADMIN — AMLODIPINE BESYLATE 10 MILLIGRAM(S): 2.5 TABLET ORAL at 06:25

## 2018-07-10 RX ADMIN — PANTOPRAZOLE SODIUM 40 MILLIGRAM(S): 20 TABLET, DELAYED RELEASE ORAL at 06:26

## 2018-07-10 RX ADMIN — Medication 100 MILLIGRAM(S): at 06:26

## 2018-07-10 RX ADMIN — ONDANSETRON 4 MILLIGRAM(S): 8 TABLET, FILM COATED ORAL at 16:35

## 2018-07-10 RX ADMIN — LISINOPRIL 20 MILLIGRAM(S): 2.5 TABLET ORAL at 06:25

## 2018-07-10 RX ADMIN — LEVETIRACETAM 500 MILLIGRAM(S): 250 TABLET, FILM COATED ORAL at 16:35

## 2018-07-10 NOTE — PROGRESS NOTE ADULT - SUBJECTIVE AND OBJECTIVE BOX
INTERVAL HPI/OVERNIGHT EVENTS:    CC: HIV/AIDS, failure to thrive, nausea, ESRD on HD    No overnight events, nausea and poor oral intake persist.    Vital Signs Last 24 Hrs  T(C): 36.8 (10 Jul 2018 08:40), Max: 36.9 (09 Jul 2018 12:15)  T(F): 98.3 (10 Jul 2018 08:40), Max: 98.4 (09 Jul 2018 12:15)  HR: 73 (10 Jul 2018 08:40) (72 - 86)  BP: 116/64 (10 Jul 2018 08:40) (109/64 - 141/90)  BP(mean): --  RR: 19 (10 Jul 2018 08:40) (17 - 19)  SpO2: 99% (10 Jul 2018 08:40) (96% - 100%)    PHYSICAL EXAM:    GENERAL: Not in distress, alert  CHEST/LUNG: b/l air entry  HEART: Regular   ABDOMEN: Soft, BS+  EXTREMITIES:  No edema, tenderness.    MEDICATIONS  (STANDING):  acetaminophen  IVPB. 1000 milliGRAM(s) IV Intermittent once  amLODIPine   Tablet 10 milliGRAM(s) Oral daily  atovaquone Suspension 1500 milliGRAM(s) Oral daily  calcitriol   Capsule 0.5 MICROGram(s) Oral daily  darunavir 800 milliGRAM(s) Oral daily  dolutegravir 50 milliGRAM(s) Oral daily  emtricitabine 200 milliGRAM(s) Oral <User Schedule>  epoetin nithin Injectable 87565 Unit(s) IV Push <User Schedule>  heparin  Injectable 5000 Unit(s) SubCutaneous every 12 hours  levETIRAcetam 500 milliGRAM(s) Oral two times a day  lisinopril 20 milliGRAM(s) Oral daily  LORazepam     Tablet 0.5 milliGRAM(s) Oral every 8 hours  metoprolol tartrate 100 milliGRAM(s) Oral two times a day  ondansetron Injectable 4 milliGRAM(s) IV Push every 6 hours  pantoprazole    Tablet 40 milliGRAM(s) Oral two times a day  ritonavir Tablet 100 milliGRAM(s) Oral daily  sertraline 50 milliGRAM(s) Oral daily  sevelamer hydrochloride 800 milliGRAM(s) Oral three times a day  tenofovir disoproxil fumarate (VIREAD) 300 milliGRAM(s) Oral <User Schedule>  Valganciclovir 50 mg/ 1 ml Oral Solutio 100 milliGRAM(s) 100 milliGRAM(s) Oral <User Schedule>    MEDICATIONS  (PRN):  acetaminophen   Tablet 650 milliGRAM(s) Oral every 6 hours PRN For Temp greater than 38 C (100.4 F)  acetaminophen   Tablet. 650 milliGRAM(s) Oral every 6 hours PRN Moderate Pain (4 - 6)  diphenhydrAMINE   Capsule 25 milliGRAM(s) Oral every 6 hours PRN Rash and/or Itching  HYDROmorphone  Injectable 1 milliGRAM(s) IV Push every 4 hours PRN breakthrough pain  oxyCODONE    IR 10 milliGRAM(s) Oral four times a day PRN mod to severe pain  zolpidem 5 milliGRAM(s) Oral at bedtime PRN Insomnia      Allergies    vancomycin (Anaphylaxis)    Intolerances          LABS:                          9.9    2.2   )-----------( 121      ( 09 Jul 2018 13:39 )             30.9     07-09    135  |  90<L>  |  51.0<H>  ----------------------------<  99  4.6   |  27.0  |  7.39<H>    Ca    9.0      09 Jul 2018 13:39            RADIOLOGY & ADDITIONAL TESTS:

## 2018-07-10 NOTE — PROGRESS NOTE ADULT - SUBJECTIVE AND OBJECTIVE BOX
Patient seen and examined; chart reviewed.  Seen yesterday by Dr Ruelas.  Had Balloon dilitation of the AV Fistula yesterday.  Had HD yesterday.    Afebrile.  No bleeding.  Still complains of diffuse gaseous abdominal pain.  Lower to upper.  Some belching.  Having difficulty moving bowels.  No distention.  Still with anorexia.  Able only to tolerate NEPRO shakes.  Requesting 6/ day.  No diarrhea.  No dysphagia or odynophagia.      PAST MEDICAL & SURGICAL HISTORY:  HIV (human immunodeficiency virus infection)  Seizure disorder  Hypertension  Diabetes  ESRD (end stage renal disease)  Seizure  Pericarditis  Anxiety  Depression  Renal failure (ARF), acute on chronic: dialysis av fistula, RUE  HTN (hypertension)  Cardiomyopathy  HIV disease: born HIV+  AV fistula  S/P tonsillectomy      ROS:  No Heartburn, regurgitation, dysphagia, odynophagia.  No dyspepsia  No abdominal pain.    No Nausea, vomiting.  No Bleeding.  No hematemesis.   No diarrhea.    No hematochesia.  No weight loss, anorexia.  No edema.      MEDICATIONS  (STANDING):  acetaminophen  IVPB. 1000 milliGRAM(s) IV Intermittent once  amLODIPine   Tablet 10 milliGRAM(s) Oral daily  atovaquone Suspension 1500 milliGRAM(s) Oral daily  calcitriol   Capsule 0.5 MICROGram(s) Oral daily  darunavir 800 milliGRAM(s) Oral daily  dolutegravir 50 milliGRAM(s) Oral daily  emtricitabine 200 milliGRAM(s) Oral <User Schedule>  epoetin nithin Injectable 67459 Unit(s) IV Push <User Schedule>  fentaNYL   Patch 100 MICROgram(s)/Hr. 1 Patch Transdermal every 48 hours  heparin  Injectable 5000 Unit(s) SubCutaneous every 12 hours  levETIRAcetam 500 milliGRAM(s) Oral two times a day  lisinopril 20 milliGRAM(s) Oral daily  LORazepam     Tablet 0.5 milliGRAM(s) Oral every 8 hours  metoprolol tartrate 100 milliGRAM(s) Oral two times a day  ondansetron Injectable 4 milliGRAM(s) IV Push every 6 hours  pantoprazole    Tablet 40 milliGRAM(s) Oral before breakfast  ritonavir Tablet 100 milliGRAM(s) Oral daily  sertraline 50 milliGRAM(s) Oral daily  sevelamer hydrochloride 800 milliGRAM(s) Oral three times a day  tenofovir disoproxil fumarate (VIREAD) 300 milliGRAM(s) Oral <User Schedule>  Valganciclovir 50 mg/ 1 ml Oral Solutio 100 milliGRAM(s) 100 milliGRAM(s) Oral <User Schedule>    MEDICATIONS  (PRN):  acetaminophen   Tablet 650 milliGRAM(s) Oral every 6 hours PRN For Temp greater than 38 C (100.4 F)  acetaminophen   Tablet. 650 milliGRAM(s) Oral every 6 hours PRN Moderate Pain (4 - 6)  diphenhydrAMINE   Capsule 25 milliGRAM(s) Oral every 6 hours PRN Rash and/or Itching  HYDROmorphone  Injectable 1 milliGRAM(s) IV Push every 4 hours PRN breakthrough pain  oxyCODONE    IR 10 milliGRAM(s) Oral four times a day PRN mod to severe pain  zolpidem 5 milliGRAM(s) Oral at bedtime PRN Insomnia      Allergies    vancomycin (Anaphylaxis)    Intolerances        Vital Signs Last 24 Hrs  T(C): 36.8 (10 Jul 2018 08:40), Max: 36.9 (09 Jul 2018 12:15)  T(F): 98.3 (10 Jul 2018 08:40), Max: 98.4 (09 Jul 2018 12:15)  HR: 73 (10 Jul 2018 08:40) (72 - 86)  BP: 116/64 (10 Jul 2018 08:40) (109/64 - 141/90)  BP(mean): --  RR: 19 (10 Jul 2018 08:40) (17 - 19)  SpO2: 99% (10 Jul 2018 08:40) (96% - 100%)    PHYSICAL EXAM:    GENERAL: NAD, well-groomed, well-developed  HEAD:  Atraumatic, Normocephalic  EYES: EOMI, PERRLA, conjunctiva and sclera clear  ENMT: No tonsillar erythema, exudates, or enlargement; Moist mucous membranes, Good dentition, No lesions  NECK: Supple, No JVD, Normal thyroid  CHEST/LUNG: Clear to percussion bilaterally; No rales, rhonchi, wheezing, or rubs  HEART: Regular rate and rhythm; No murmurs, rubs, or gallops  ABDOMEN: Soft, Nontender, Nondistended; Bowel sounds present;  No scars.  No HSM.  No Mass;  Guards in suprapubic region and over the RLQ.    EXTREMITIES:  2+ Peripheral Pulses, No clubbing, cyanosis, or edema;  Markedly Dilated AV Fistula.  RUE.    LYMPH: No lymphadenopathy noted  SKIN: No rashes or lesions      LABS:                        9.9    2.2   )-----------( 121      ( 09 Jul 2018 13:39 )             30.9     07-09    135  |  90<L>  |  51.0<H>  ----------------------------<  99  4.6   |  27.0  |  7.39<H>    Ca    9.0      09 Jul 2018 13:39    RADIOLOGY & ADDITIONAL STUDIES:  Old CT Scan:  4/13/18.  HM.  small Ascites.  thick wall of the GB; Shrunken kidneys.

## 2018-07-10 NOTE — PROGRESS NOTE ADULT - SUBJECTIVE AND OBJECTIVE BOX
Pt seen and examined f/u abdominal pain, nausea and vomiting  This morning he notes occasional "gas pain" but no pain at this time. Still with some nausea but no vomiting and tolerating oral intake although his appetite is poor. The history is compliacated by the fact that he is very vague about his complaints and frequently tangiential. Cat scan still pending.  REVIEW OF SYSTEMS:    CONSTITUTIONAL: No fever, weight loss, or fatigue  EYES: No eye pain, visual disturbances, or discharge  ENMT:  No difficulty hearing, tinnitus, vertigo; No sinus or throat pain  RESPIRATORY: No cough, wheezing, chills or hemoptysis; No shortness of breath  CARDIOVASCULAR: No chest pain, palpitations, dizziness, or leg swelling  GASTROINTESTINAL: as above    MEDICATIONS:  MEDICATIONS  (STANDING):  acetaminophen  IVPB. 1000 milliGRAM(s) IV Intermittent once  amLODIPine   Tablet 10 milliGRAM(s) Oral daily  atovaquone Suspension 1500 milliGRAM(s) Oral daily  calcitriol   Capsule 0.5 MICROGram(s) Oral daily  darunavir 800 milliGRAM(s) Oral daily  dolutegravir 50 milliGRAM(s) Oral daily  emtricitabine 200 milliGRAM(s) Oral <User Schedule>  epoetin nithin Injectable 56345 Unit(s) IV Push <User Schedule>  fentaNYL   Patch 100 MICROgram(s)/Hr. 1 Patch Transdermal every 48 hours  heparin  Injectable 5000 Unit(s) SubCutaneous every 12 hours  levETIRAcetam 500 milliGRAM(s) Oral two times a day  lisinopril 20 milliGRAM(s) Oral daily  LORazepam     Tablet 0.5 milliGRAM(s) Oral every 8 hours  metoprolol tartrate 100 milliGRAM(s) Oral two times a day  ondansetron Injectable 4 milliGRAM(s) IV Push every 6 hours  pantoprazole    Tablet 40 milliGRAM(s) Oral before breakfast  ritonavir Tablet 100 milliGRAM(s) Oral daily  sertraline 50 milliGRAM(s) Oral daily  sevelamer hydrochloride 800 milliGRAM(s) Oral three times a day  tenofovir disoproxil fumarate (VIREAD) 300 milliGRAM(s) Oral <User Schedule>  Valganciclovir 50 mg/ 1 ml Oral Solutio 100 milliGRAM(s) 100 milliGRAM(s) Oral <User Schedule>    MEDICATIONS  (PRN):  acetaminophen   Tablet 650 milliGRAM(s) Oral every 6 hours PRN For Temp greater than 38 C (100.4 F)  acetaminophen   Tablet. 650 milliGRAM(s) Oral every 6 hours PRN Moderate Pain (4 - 6)  diphenhydrAMINE   Capsule 25 milliGRAM(s) Oral every 6 hours PRN Rash and/or Itching  HYDROmorphone  Injectable 1 milliGRAM(s) IV Push every 4 hours PRN breakthrough pain  oxyCODONE    IR 10 milliGRAM(s) Oral four times a day PRN mod to severe pain  zolpidem 5 milliGRAM(s) Oral at bedtime PRN Insomnia      Allergies    vancomycin (Anaphylaxis)    Intolerances        Vital Signs Last 24 Hrs  T(C): 36.8 (10 Jul 2018 08:40), Max: 36.9 (09 Jul 2018 12:15)  T(F): 98.3 (10 Jul 2018 08:40), Max: 98.4 (09 Jul 2018 12:15)  HR: 73 (10 Jul 2018 08:40) (72 - 86)  BP: 116/64 (10 Jul 2018 08:40) (109/64 - 141/90)  BP(mean): --  RR: 19 (10 Jul 2018 08:40) (17 - 19)  SpO2: 99% (10 Jul 2018 08:40) (96% - 100%)      PHYSICAL EXAM:    General: Well developed; well nourished; in no acute distress  HEENT: MMM, conjunctiva and sclera clear  Gastrointestinal:Abdomen: Soft non-tender non-distended; Normal bowel sounds; No hepatosplenomegaly  Extremities: no cyanosis, clubbing or edema.  Skin: Warm and dry. No obvious rash    LABS:      CBC Full  -  ( 09 Jul 2018 13:39 )  WBC Count : 2.2 K/uL  Hemoglobin : 9.9 g/dL  Hematocrit : 30.9 %  Platelet Count - Automated : 121 K/uL  Mean Cell Volume : 90.1 fl  Mean Cell Hemoglobin : 28.9 pg  Mean Cell Hemoglobin Concentration : 32.0 g/dL  Auto Neutrophil # : x  Auto Lymphocyte # : x  Auto Monocyte # : x  Auto Eosinophil # : x  Auto Basophil # : x  Auto Neutrophil % : x  Auto Lymphocyte % : x  Auto Monocyte % : x  Auto Eosinophil % : x  Auto Basophil % : x    07-09    135  |  90<L>  |  51.0<H>  ----------------------------<  99  4.6   |  27.0  |  7.39<H>    Ca    9.0      09 Jul 2018 13:39

## 2018-07-10 NOTE — PROGRESS NOTE ADULT - ASSESSMENT
ESRD on HD MWF  HTN  HIV  AVF bleeding   DM    -HD MWF; Tolerated dialysis well on 7/9/18; next dialysis session to be done on 7/11/18; orders placed. Benadryl ordered for dialysis on 7/11/18  -Nausea/Vomiting is not likely to be due to ESRD in this case as the BUN is 50s and this a typical BUN for these patients which should not contribute to GI symptoms. Also, if it was the BUN, it would more likely be a constant nausea and he would tolerate the Nepro either which is able to do  -GI eval noted; OKAY to proceed with CT with IV/PO contrast today 7/10/18; will set up for dialysis first shift on 7/11/18  -S/p fistulogram and balloon angioplasty 7/6/18, Vascular following closely  -Hemodynamics are stable  -On ART therapy; work up and management per ID  -Renal diet; 3 nepro shakes three times per day; he is not really eating the solid food.     Thank you    D/W Dr. Vuong

## 2018-07-10 NOTE — PROGRESS NOTE ADULT - ASSESSMENT
27 yr old male with HIV not compliant with medications, CMV retinitis with vision loss, ESRD on HD,  dilated cardiomyopathy, hypertension and seizure disorder admitted with confusion, weakness and enlarging AV fistula. He was admitted for intractable nausea and vomiting. Renal was consulted. He also had flashes of light, ophthalmology consulted. He was started on valganciclovir 100mg after each HD for treatment for CMV retinitis. HD was continued as per schedule. He complained of pain at AVF site, vascular surgery was consulted. Noted to have pancytopenia, blood culture ordered to rule out MAC infection. 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 Pain management consulted for pain control. Given persistent nausea and inability to tolerate solid food, GI consulted for possible endoscopic work up. GI advised, CT abdomen/pelvis with contrast for further evaluation.

## 2018-07-10 NOTE — PROGRESS NOTE ADULT - SUBJECTIVE AND OBJECTIVE BOX
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 Pt also c/o nausea and wretching. Vomited once today at home. Fell today at home in bathroom but denies hitting head, states tripped. Pt uses wheelchair at baseline. He admits to being non compliant with his medications at home; he had not taken his medications. Pt denies cp, diarrhea, HA, fevers, constipation, numbness, tingling. (2018 23:01)    Follow up ESRD  No new complaints; still with nausea and vomiting if tries to eat solid food. Tolerating Nepro shakes well thus far    PAST MEDICAL & SURGICAL HISTORY:  HIV (human immunodeficiency virus infection)  Seizure disorder  Hypertension  Diabetes  ESRD (end stage renal disease)  Seizure  Pericarditis  Anxiety  Depression  Renal failure (ARF), acute on chronic: dialysis av fistula, RUE  HTN (hypertension)  Cardiomyopathy  HIV disease: born HIV+  AV fistula  S/P tonsillectomy      MEDICATIONS  (STANDING):  acetaminophen  IVPB. 1000 milliGRAM(s) IV Intermittent once  amLODIPine   Tablet 10 milliGRAM(s) Oral daily  atovaquone Suspension 1500 milliGRAM(s) Oral daily  calcitriol   Capsule 0.5 MICROGram(s) Oral daily  darunavir 800 milliGRAM(s) Oral daily  dolutegravir 50 milliGRAM(s) Oral daily  emtricitabine 200 milliGRAM(s) Oral <User Schedule>  epoetin nithin Injectable 33402 Unit(s) IV Push <User Schedule>  fentaNYL   Patch 100 MICROgram(s)/Hr. 1 Patch Transdermal every 48 hours  heparin  Injectable 5000 Unit(s) SubCutaneous every 12 hours  levETIRAcetam 500 milliGRAM(s) Oral two times a day  lisinopril 20 milliGRAM(s) Oral daily  LORazepam     Tablet 0.5 milliGRAM(s) Oral every 8 hours  metoprolol tartrate 100 milliGRAM(s) Oral two times a day  ondansetron Injectable 4 milliGRAM(s) IV Push every 6 hours  pantoprazole    Tablet 40 milliGRAM(s) Oral before breakfast  ritonavir Tablet 100 milliGRAM(s) Oral daily  sertraline 50 milliGRAM(s) Oral daily  sevelamer hydrochloride 800 milliGRAM(s) Oral three times a day  tenofovir disoproxil fumarate (VIREAD) 300 milliGRAM(s) Oral <User Schedule>  Valganciclovir 50 mg/ 1 ml Oral Solutio 100 milliGRAM(s) 100 milliGRAM(s) Oral <User Schedule>    MEDICATIONS  (PRN):  acetaminophen   Tablet 650 milliGRAM(s) Oral every 6 hours PRN For Temp greater than 38 C (100.4 F)  acetaminophen   Tablet. 650 milliGRAM(s) Oral every 6 hours PRN Moderate Pain (4 - 6)  diphenhydrAMINE   Capsule 25 milliGRAM(s) Oral every 6 hours PRN Rash and/or Itching  HYDROmorphone  Injectable 1 milliGRAM(s) IV Push every 4 hours PRN breakthrough pain  oxyCODONE    IR 10 milliGRAM(s) Oral four times a day PRN mod to severe pain  zolpidem 5 milliGRAM(s) Oral at bedtime PRN Insomnia      Allergies    vancomycin (Anaphylaxis)    Intolerances        Vital Signs Last 24 Hrs  T(C): 36.8 (10 Jul 2018 08:40), Max: 36.9 (2018 12:15)  T(F): 98.3 (10 Jul 2018 08:40), Max: 98.4 (2018 12:15)  HR: 73 (10 Jul 2018 08:40) (72 - 86)  BP: 116/64 (10 Jul 2018 08:40) (109/64 - 141/90)  BP(mean): --  RR: 19 (10 Jul 2018 08:40) (17 - 19)  SpO2: 99% (10 Jul 2018 08:40) (96% - 100%)  Daily     Daily Weight in k.3 (2018 15:30)    PHYSICAL EXAM:  GENERAL: No distress, comfortable  HEAD:  Atraumatic, Normocephalic  EYES: Conjunctiva clear  ENMT: Moist mucous membranes  NECK: Supple, No JVD  NERVOUS SYSTEM:  Alert & Oriented X3, Good concentration; Motor Strength 5/5 B/L upper and lower extremities; DTRs 2+ intact and symmetric  CHEST/LUNG: Clear to percussion bilaterally; No rales, rhonchi, wheezing, or rubs  HEART: Regular rate and rhythm; No murmurs, rubs, or gallops  ABDOMEN: Soft, Nontender, Nondistended; Bowel sounds present  EXTREMITIES:  2+ Peripheral Pulses, No clubbing, cyanosis, or edema; +RUE Hypertrophic AVF with +ve bruit  SKIN: No rashes or lesions    LABS:                        9.9    2.2   )-----------( 121      ( 2018 13:39 )             30.9         135  |  90<L>  |  51.0<H>  ----------------------------<  99  4.6   |  27.0  |  7.39<H>    Ca    9.0      2018 13:39                RADIOLOGY & ADDITIONAL TESTS:

## 2018-07-10 NOTE — PROGRESS NOTE ADULT - PROBLEM SELECTOR PLAN 1
appears to be resolving quickly. Etiology unclear but there are multiple underlying medical consditions I this patient that could be responsible for occasional nausea and vomiting

## 2018-07-10 NOTE — PROGRESS NOTE ADULT - ASSESSMENT
Unclear source for abdominal pain.  Thin but not cachetic.  Tenderness in RLQ and guarding.    Bizzare affect and apparently restarted on Anti-seizure meds and HIV meds.  Diet in place with PO Nepro.    Planned for HD tomorrow.    Plan:  CT A/P with oral contrast, requested and eventual EGD.

## 2018-07-11 ENCOUNTER — TRANSCRIPTION ENCOUNTER (OUTPATIENT)
Age: 28
End: 2018-07-11

## 2018-07-11 LAB
ABACAVIR ISLT GENOTYP: SIGNIFICANT CHANGE UP
ATAZANAVIR+RITONAVIR ISLT GENOTYP: SIGNIFICANT CHANGE UP
DARUNAVIR+RITONAVIR ISLT GENOTYP: SIGNIFICANT CHANGE UP
DIDANOSINE ISLT GENOTYP: SIGNIFICANT CHANGE UP
EFAVIRENZ ISLT GENOTYP: SIGNIFICANT CHANGE UP
EMTRICITABINE ISLT GENOTYP: SIGNIFICANT CHANGE UP
ETRAVIRINE ISLT GENOTYP: SIGNIFICANT CHANGE UP
FOSAMPRENAVIR+RITONAVIR ISLT GENOTYP: SIGNIFICANT CHANGE UP
HIV NNRTI GENE MUT DET ISLT: SIGNIFICANT CHANGE UP
HIV NRTI GENE MUT DET ISLT: SIGNIFICANT CHANGE UP
HIV PI GENE MUT DET ISLT: SIGNIFICANT CHANGE UP
HIV RT+PR MUT TESTED ISLT: SIGNIFICANT CHANGE UP
INDINAVIR+RITONAVIR ISLT GENOTYP: SIGNIFICANT CHANGE UP
LAMIVUDINE ISLT GENOTYP: SIGNIFICANT CHANGE UP
LOPINAVIR+RITONAVIR ISLT GENOTYP: SIGNIFICANT CHANGE UP
NELFINAVIR ISLT GENOTYP: SIGNIFICANT CHANGE UP
NEVIRAPINE ISLT GENOTYP: SIGNIFICANT CHANGE UP
RILPIVIRINE ISLT GENOTYP: SIGNIFICANT CHANGE UP
SAQUINAVIR+RITONAVIR ISLT GENOTYP: SIGNIFICANT CHANGE UP
STAVUDINE ISLT GENOTYP: SIGNIFICANT CHANGE UP
TENOFOVIR ISLT GENOTYP: SIGNIFICANT CHANGE UP
TIPRANAVIR+RITONAVIR ISLT GENOTYP: SIGNIFICANT CHANGE UP
ZIDOVUDINE ISLT GENOTYP: SIGNIFICANT CHANGE UP

## 2018-07-11 PROCEDURE — 99233 SBSQ HOSP IP/OBS HIGH 50: CPT

## 2018-07-11 PROCEDURE — 93010 ELECTROCARDIOGRAM REPORT: CPT

## 2018-07-11 PROCEDURE — 74176 CT ABD & PELVIS W/O CONTRAST: CPT | Mod: 26

## 2018-07-11 RX ADMIN — OXYCODONE HYDROCHLORIDE 10 MILLIGRAM(S): 5 TABLET ORAL at 19:29

## 2018-07-11 RX ADMIN — Medication 0.5 MILLIGRAM(S): at 18:07

## 2018-07-11 RX ADMIN — OXYCODONE HYDROCHLORIDE 10 MILLIGRAM(S): 5 TABLET ORAL at 20:20

## 2018-07-11 RX ADMIN — DARUNAVIR 800 MILLIGRAM(S): 75 TABLET, FILM COATED ORAL at 17:58

## 2018-07-11 RX ADMIN — HYDROMORPHONE HYDROCHLORIDE 1 MILLIGRAM(S): 2 INJECTION INTRAMUSCULAR; INTRAVENOUS; SUBCUTANEOUS at 00:12

## 2018-07-11 RX ADMIN — LISINOPRIL 20 MILLIGRAM(S): 2.5 TABLET ORAL at 05:02

## 2018-07-11 RX ADMIN — DOLUTEGRAVIR SODIUM 50 MILLIGRAM(S): 25 TABLET, FILM COATED ORAL at 18:02

## 2018-07-11 RX ADMIN — ERYTHROPOIETIN 10000 UNIT(S): 10000 INJECTION, SOLUTION INTRAVENOUS; SUBCUTANEOUS at 14:40

## 2018-07-11 RX ADMIN — LEVETIRACETAM 500 MILLIGRAM(S): 250 TABLET, FILM COATED ORAL at 17:59

## 2018-07-11 RX ADMIN — Medication 25 MILLIGRAM(S): at 14:26

## 2018-07-11 RX ADMIN — RITONAVIR 100 MILLIGRAM(S): 100 TABLET, FILM COATED ORAL at 18:01

## 2018-07-11 RX ADMIN — SERTRALINE 50 MILLIGRAM(S): 25 TABLET, FILM COATED ORAL at 18:00

## 2018-07-11 RX ADMIN — HYDROMORPHONE HYDROCHLORIDE 1 MILLIGRAM(S): 2 INJECTION INTRAMUSCULAR; INTRAVENOUS; SUBCUTANEOUS at 14:26

## 2018-07-11 RX ADMIN — Medication 50 MILLIGRAM(S): at 14:35

## 2018-07-11 RX ADMIN — PANTOPRAZOLE SODIUM 40 MILLIGRAM(S): 20 TABLET, DELAYED RELEASE ORAL at 18:07

## 2018-07-11 RX ADMIN — Medication 100 MILLIGRAM(S): at 18:01

## 2018-07-11 RX ADMIN — HEPARIN SODIUM 5000 UNIT(S): 5000 INJECTION INTRAVENOUS; SUBCUTANEOUS at 18:00

## 2018-07-11 RX ADMIN — LEVETIRACETAM 500 MILLIGRAM(S): 250 TABLET, FILM COATED ORAL at 05:02

## 2018-07-11 RX ADMIN — ONDANSETRON 4 MILLIGRAM(S): 8 TABLET, FILM COATED ORAL at 18:07

## 2018-07-11 RX ADMIN — OXYCODONE HYDROCHLORIDE 10 MILLIGRAM(S): 5 TABLET ORAL at 00:13

## 2018-07-11 RX ADMIN — Medication 0.5 MILLIGRAM(S): at 05:02

## 2018-07-11 RX ADMIN — Medication 100 MILLIGRAM(S): at 05:02

## 2018-07-11 RX ADMIN — ONDANSETRON 4 MILLIGRAM(S): 8 TABLET, FILM COATED ORAL at 05:02

## 2018-07-11 RX ADMIN — Medication 0.5 MILLIGRAM(S): at 21:35

## 2018-07-11 RX ADMIN — CALCITRIOL 0.5 MICROGRAM(S): 0.5 CAPSULE ORAL at 18:01

## 2018-07-11 RX ADMIN — ATOVAQUONE 1500 MILLIGRAM(S): 750 SUSPENSION ORAL at 18:03

## 2018-07-11 RX ADMIN — SEVELAMER CARBONATE 800 MILLIGRAM(S): 2400 POWDER, FOR SUSPENSION ORAL at 17:59

## 2018-07-11 NOTE — PROGRESS NOTE ADULT - SUBJECTIVE AND OBJECTIVE BOX
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 Pt also c/o nausea and wretching. Vomited once today at home. Fell today at home in bathroom but denies hitting head, states tripped. Pt uses wheelchair at baseline. He admits to being non compliant with his medications at home; he had not taken his medications. Pt denies cp, diarrhea, HA, fevers, constipation, numbness, tingling. (2018 23:01)      PAST MEDICAL & SURGICAL HISTORY:  HIV (human immunodeficiency virus infection)  Seizure disorder  Hypertension  Diabetes  ESRD (end stage renal disease)  Seizure  Pericarditis  Anxiety  Depression  Renal failure (ARF), acute on chronic: dialysis av fistula, RUE  HTN (hypertension)  Cardiomyopathy  HIV disease: born HIV+  AV fistula  S/P tonsillectomy      MEDICATIONS  (STANDING):  acetaminophen  IVPB. 1000 milliGRAM(s) IV Intermittent once  amLODIPine   Tablet 10 milliGRAM(s) Oral daily  atovaquone Suspension 1500 milliGRAM(s) Oral daily  calcitriol   Capsule 0.5 MICROGram(s) Oral daily  darunavir 800 milliGRAM(s) Oral daily  diphenhydrAMINE   Injectable 25 milliGRAM(s) IV Push once  diphenhydrAMINE   Injectable 50 milliGRAM(s) IV Push once  dolutegravir 50 milliGRAM(s) Oral daily  emtricitabine 200 milliGRAM(s) Oral <User Schedule>  epoetin nithin Injectable 63901 Unit(s) IV Push <User Schedule>  heparin  Injectable 5000 Unit(s) SubCutaneous every 12 hours  levETIRAcetam 500 milliGRAM(s) Oral two times a day  lisinopril 20 milliGRAM(s) Oral daily  LORazepam     Tablet 0.5 milliGRAM(s) Oral every 8 hours  metoprolol tartrate 100 milliGRAM(s) Oral two times a day  ondansetron Injectable 4 milliGRAM(s) IV Push every 6 hours  pantoprazole    Tablet 40 milliGRAM(s) Oral two times a day  ritonavir Tablet 100 milliGRAM(s) Oral daily  sertraline 50 milliGRAM(s) Oral daily  sevelamer hydrochloride 800 milliGRAM(s) Oral three times a day  tenofovir disoproxil fumarate (VIREAD) 300 milliGRAM(s) Oral <User Schedule>  Valganciclovir 50 mg/ 1 ml Oral Solutio 100 milliGRAM(s) 100 milliGRAM(s) Oral <User Schedule>    MEDICATIONS  (PRN):  acetaminophen   Tablet 650 milliGRAM(s) Oral every 6 hours PRN For Temp greater than 38 C (100.4 F)  acetaminophen   Tablet. 650 milliGRAM(s) Oral every 6 hours PRN Moderate Pain (4 - 6)  diphenhydrAMINE   Capsule 25 milliGRAM(s) Oral every 6 hours PRN Rash and/or Itching  HYDROmorphone  Injectable 1 milliGRAM(s) IV Push every 4 hours PRN breakthrough pain  oxyCODONE    IR 10 milliGRAM(s) Oral four times a day PRN mod to severe pain  zolpidem 5 milliGRAM(s) Oral at bedtime PRN Insomnia      Allergies    vancomycin (Anaphylaxis)    Intolerances        Vital Signs Last 24 Hrs  T(C): 37.2 (2018 07:46), Max: 37.2 (2018 07:46)  T(F): 98.9 (2018 07:46), Max: 98.9 (2018 07:46)  HR: 67 (2018 07:46) (67 - 81)  BP: 131/76 (2018 07:46) (110/74 - 140/88)  BP(mean): --  RR: 18 (2018 07:46) (18 - 18)  SpO2: 98% (2018 07:46) (98% - 99%)  Daily     Daily Weight in k (2018 07:46)    PHYSICAL EXAM:    GENERAL: NAD, well-groomed, well-developed  HEAD:  Atraumatic, Normocephalic  EYES: EOMI, PERRLA, conjunctiva and sclera clear  ENMT: No tonsillar erythema, exudates, or enlargement; Moist mucous membranes, Good dentition, No lesions  NECK: Supple, No JVD, Normal thyroid  NERVOUS SYSTEM:  Alert & Oriented X3, Good concentration; Motor Strength 5/5 B/L upper and lower extremities; DTRs 2+ intact and symmetric  CHEST/LUNG: Clear to percussion bilaterally; No rales, rhonchi, wheezing, or rubs  HEART: Regular rate and rhythm; No murmurs, rubs, or gallops  ABDOMEN: Soft, Nontender, Nondistended; Bowel sounds present  EXTREMITIES:  2+ Peripheral Pulses, No clubbing, cyanosis, or edema  SKIN: No rashes or lesions    LABS:                        9.9    2.2   )-----------( 121      ( 2018 13:39 )             30.9     -    135  |  90<L>  |  51.0<H>  ----------------------------<  99  4.6   |  27.0  |  7.39<H>    Ca    9.0      2018 13:39                RADIOLOGY & ADDITIONAL TESTS:

## 2018-07-11 NOTE — PROGRESS NOTE ADULT - ASSESSMENT
ESRD on HD MWF  HTN  HIV  AVF bleeding   DM  anemia  MIKE    -HD MWF; Tolerated dialysis well on 7/9/18; next dialysis session to be done on 7/11/18; orders placed. Benadryl ordered for dialysis on 7/11/18  -Nausea/Vomiting is not likely to be due to ESRD in this case as the BUN is 50s and this a typical BUN for these patients which should not contribute to GI symptoms. Also, if it was the BUN, it would more likely be a constant nausea and he would tolerate the Nepro either which is able to do  -GI eval noted; OKAY to proceed with CT with IV/PO contrast today 7/10/18; will set up for dialysis first shift on 7/11/18  -S/p fistulogram and balloon angioplasty 7/6/18, Vascular following closely  -Hemodynamics are stable  -On ART therapy; work up and management per ID  -Renal diet; 3 nepro shakes three times per day; he is not really eating the solid food.     Thank you    D/W Dr. Vuong ESRD on HD MWF  HTN  HIV  AVF bleeding   DM  anemia  MIKE    -HD MWF; Tolerated dialysis well on 7/9/18; next dialysis session to be done on 7/11/18; orders placed. Benadryl ordered for dialysis on 7/11/18, will get MIKE  -Nausea/Vomiting is not likely to be due to ESRD in this case as the BUN is 50s and this a typical BUN for these patients which should not contribute to GI symptoms. Also, if it was the BUN, it would more likely be a constant nausea and he would tolerate the Nepro either which is able to do  -GI eval noted; OKAY to proceed with CT with IV/PO contrast, (did not have it yesterday) today 7/11/18; will set up for dialysis later on 7/11/18  -S/p fistulogram and balloon angioplasty 7/6/18, Vascular following closely  -Hemodynamics are stable  -On ART therapy; work up and management per ID  -Renal diet; 3 nepro shakes three times per day; he is not really eating the solid food.     Thank you    D/W Dr. Vuong

## 2018-07-11 NOTE — PROGRESS NOTE ADULT - ASSESSMENT
27 yr old male with HIV not compliant with medications, CMV retinitis with vision loss, ESRD on HD,  dilated cardiomyopathy, hypertension and seizure disorder admitted with confusion, weakness and enlarging AV fistula. He was admitted for intractable nausea and vomiting. Renal was consulted. He also had flashes of light, ophthalmology consulted. He was started on valganciclovir 100mg after each HD for treatment for CMV retinitis. HD was continued as per schedule. He complained of pain at AVF site, vascular surgery was consulted. Noted to have pancytopenia, blood culture ordered to rule out MAC infection. 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 Pain management consulted for pain control. Given persistent nausea and inability to tolerate solid food, GI consulted for possible endoscopic work up. GI advised, CT abdomen/pelvis with contrast for further evaluation. Cardiology consulted for clearance for possible EGD.

## 2018-07-11 NOTE — PROGRESS NOTE ADULT - SUBJECTIVE AND OBJECTIVE BOX
Patient seen and examined;  chart reviewed.  Attempted to drink PO contrast for CT scan, but became "sick".  Alleges to have vomited the oral contrast.   Still with vague diffuse abdominal pain and bloating and dyspepsia.  Some heartburn.  Poor po intake persists.  Very tangential.      PAST MEDICAL & SURGICAL HISTORY:  HIV (human immunodeficiency virus infection)  Seizure disorder  Hypertension  Diabetes  ESRD (end stage renal disease)  Seizure  Pericarditis  Anxiety  Depression  Renal failure (ARF), acute on chronic: dialysis av fistula, RUE  HTN (hypertension)  Cardiomyopathy  HIV disease: born HIV+  AV fistula  S/P tonsillectomy      ROS:  No Heartburn, regurgitation, dysphagia, odynophagia.  No dyspepsia  No abdominal pain.    No Nausea, vomiting.  No Bleeding.  No hematemesis.   No diarrhea.    No hematochesia.  No weight loss, anorexia.  No edema.      MEDICATIONS  (STANDING):  acetaminophen  IVPB. 1000 milliGRAM(s) IV Intermittent once  amLODIPine   Tablet 10 milliGRAM(s) Oral daily  atovaquone Suspension 1500 milliGRAM(s) Oral daily  calcitriol   Capsule 0.5 MICROGram(s) Oral daily  darunavir 800 milliGRAM(s) Oral daily  diphenhydrAMINE   Injectable 25 milliGRAM(s) IV Push once  diphenhydrAMINE   Injectable 50 milliGRAM(s) IV Push once  dolutegravir 50 milliGRAM(s) Oral daily  emtricitabine 200 milliGRAM(s) Oral <User Schedule>  epoetin nithin Injectable 46710 Unit(s) IV Push <User Schedule>  heparin  Injectable 5000 Unit(s) SubCutaneous every 12 hours  levETIRAcetam 500 milliGRAM(s) Oral two times a day  lisinopril 20 milliGRAM(s) Oral daily  LORazepam     Tablet 0.5 milliGRAM(s) Oral every 8 hours  metoprolol tartrate 100 milliGRAM(s) Oral two times a day  ondansetron Injectable 4 milliGRAM(s) IV Push every 6 hours  pantoprazole    Tablet 40 milliGRAM(s) Oral two times a day  ritonavir Tablet 100 milliGRAM(s) Oral daily  sertraline 50 milliGRAM(s) Oral daily  sevelamer hydrochloride 800 milliGRAM(s) Oral three times a day  tenofovir disoproxil fumarate (VIREAD) 300 milliGRAM(s) Oral <User Schedule>  Valganciclovir 50 mg/ 1 ml Oral Solutio 100 milliGRAM(s) 100 milliGRAM(s) Oral <User Schedule>    MEDICATIONS  (PRN):  acetaminophen   Tablet 650 milliGRAM(s) Oral every 6 hours PRN For Temp greater than 38 C (100.4 F)  acetaminophen   Tablet. 650 milliGRAM(s) Oral every 6 hours PRN Moderate Pain (4 - 6)  diphenhydrAMINE   Capsule 25 milliGRAM(s) Oral every 6 hours PRN Rash and/or Itching  HYDROmorphone  Injectable 1 milliGRAM(s) IV Push every 4 hours PRN breakthrough pain  oxyCODONE    IR 10 milliGRAM(s) Oral four times a day PRN mod to severe pain  zolpidem 5 milliGRAM(s) Oral at bedtime PRN Insomnia      Allergies    vancomycin (Anaphylaxis)    Intolerances        Vital Signs Last 24 Hrs  T(C): 37.2 (11 Jul 2018 07:46), Max: 37.2 (11 Jul 2018 07:46)  T(F): 98.9 (11 Jul 2018 07:46), Max: 98.9 (11 Jul 2018 07:46)  HR: 67 (11 Jul 2018 07:46) (67 - 81)  BP: 131/76 (11 Jul 2018 07:46) (110/74 - 140/88)  BP(mean): --  RR: 18 (11 Jul 2018 07:46) (18 - 18)  SpO2: 98% (11 Jul 2018 07:46) (98% - 99%)    PHYSICAL EXAM:    GENERAL: NAD, well-groomed, well-developed  HEAD:  Atraumatic, Normocephalic  EYES: EOMI, PERRLA, conjunctiva and sclera clear  ENMT: No tonsillar erythema, exudates, or enlargement; Moist mucous membranes, Good dentition, No lesions  NECK: Supple, No JVD, Normal thyroid  CHEST/LUNG: Clear to percussion bilaterally; No rales, rhonchi, wheezing, or rubs  HEART: Regular rate and rhythm; No murmurs, rubs, or gallops  ABDOMEN: Soft, Nontender, Nondistended; Bowel sounds present.  No HSM.  No MTR.  No distention.  No scars.    EXTREMITIES:  2+ Peripheral Pulses, No clubbing, cyanosis, or edema  LYMPH: No lymphadenopathy noted  SKIN: No rashes or lesions      LABS:                        9.9    2.2   )-----------( 121      ( 09 Jul 2018 13:39 )             30.9     07-09    135  |  90<L>  |  51.0<H>  ----------------------------<  99  4.6   |  27.0  |  7.39<H>    Ca    9.0      09 Jul 2018 13:39  RADIOLOGY & ADDITIONAL STUDIES:

## 2018-07-11 NOTE — PROGRESS NOTE ADULT - SUBJECTIVE AND OBJECTIVE BOX
INTERVAL HPI/OVERNIGHT EVENTS:    CC: failure to thrive, HIV/AIDS, CMV retinitis ESRD on HD, hypertension.    Vital Signs Last 24 Hrs  T(C): 37.2 (11 Jul 2018 07:46), Max: 37.2 (11 Jul 2018 07:46)  T(F): 98.9 (11 Jul 2018 07:46), Max: 98.9 (11 Jul 2018 07:46)  HR: 67 (11 Jul 2018 07:46) (67 - 81)  BP: 131/76 (11 Jul 2018 07:46) (110/74 - 140/88)  BP(mean): --  RR: 18 (11 Jul 2018 07:46) (18 - 18)  SpO2: 98% (11 Jul 2018 07:46) (98% - 99%)    PHYSICAL EXAM:    GENERAL: Not in distress, alert  CHEST/LUNG:  b/l air entry  HEART: Regular   ABDOMEN: Soft, bs+  EXTREMITIES: no edema    MEDICATIONS  (STANDING):  acetaminophen  IVPB. 1000 milliGRAM(s) IV Intermittent once  amLODIPine   Tablet 10 milliGRAM(s) Oral daily  atovaquone Suspension 1500 milliGRAM(s) Oral daily  calcitriol   Capsule 0.5 MICROGram(s) Oral daily  darunavir 800 milliGRAM(s) Oral daily  diphenhydrAMINE   Injectable 25 milliGRAM(s) IV Push once  diphenhydrAMINE   Injectable 50 milliGRAM(s) IV Push once  dolutegravir 50 milliGRAM(s) Oral daily  emtricitabine 200 milliGRAM(s) Oral <User Schedule>  epoetin nithin Injectable 15580 Unit(s) IV Push <User Schedule>  heparin  Injectable 5000 Unit(s) SubCutaneous every 12 hours  levETIRAcetam 500 milliGRAM(s) Oral two times a day  lisinopril 20 milliGRAM(s) Oral daily  LORazepam     Tablet 0.5 milliGRAM(s) Oral every 8 hours  metoprolol tartrate 100 milliGRAM(s) Oral two times a day  ondansetron Injectable 4 milliGRAM(s) IV Push every 6 hours  pantoprazole    Tablet 40 milliGRAM(s) Oral two times a day  ritonavir Tablet 100 milliGRAM(s) Oral daily  sertraline 50 milliGRAM(s) Oral daily  sevelamer hydrochloride 800 milliGRAM(s) Oral three times a day  tenofovir disoproxil fumarate (VIREAD) 300 milliGRAM(s) Oral <User Schedule>  Valganciclovir 50 mg/ 1 ml Oral Solutio 100 milliGRAM(s) 100 milliGRAM(s) Oral <User Schedule>    MEDICATIONS  (PRN):  acetaminophen   Tablet 650 milliGRAM(s) Oral every 6 hours PRN For Temp greater than 38 C (100.4 F)  acetaminophen   Tablet. 650 milliGRAM(s) Oral every 6 hours PRN Moderate Pain (4 - 6)  diphenhydrAMINE   Capsule 25 milliGRAM(s) Oral every 6 hours PRN Rash and/or Itching  HYDROmorphone  Injectable 1 milliGRAM(s) IV Push every 4 hours PRN breakthrough pain  oxyCODONE    IR 10 milliGRAM(s) Oral four times a day PRN mod to severe pain  zolpidem 5 milliGRAM(s) Oral at bedtime PRN Insomnia      Allergies    vancomycin (Anaphylaxis)    Intolerances          LABS:                          9.9    2.2   )-----------( 121      ( 09 Jul 2018 13:39 )             30.9     07-09    135  |  90<L>  |  51.0<H>  ----------------------------<  99  4.6   |  27.0  |  7.39<H>    Ca    9.0      09 Jul 2018 13:39            RADIOLOGY & ADDITIONAL TESTS:

## 2018-07-11 NOTE — PROGRESS NOTE ADULT - ASSESSMENT
HIV, noncompliant with medications.  ESRD on HD,  Due today.  Pancytopenia.  Still + HIV viral titers.    CT scan/ EGD both explained in detail and now "understands" importance.  Agreeable to retry the PO prep for CT scan.  Will reattempt today.   No Dysphagia or odynophagia.  No oral thrush.  Will schedule for EGD/ BX in am.   ASA #3.  P/R/B/Prep explained and questions answered.  Agrees.

## 2018-07-12 ENCOUNTER — RESULT REVIEW (OUTPATIENT)
Age: 28
End: 2018-07-12

## 2018-07-12 PROCEDURE — 99233 SBSQ HOSP IP/OBS HIGH 50: CPT

## 2018-07-12 PROCEDURE — 88305 TISSUE EXAM BY PATHOLOGIST: CPT | Mod: 26

## 2018-07-12 PROCEDURE — 43239 EGD BIOPSY SINGLE/MULTIPLE: CPT

## 2018-07-12 PROCEDURE — 88342 IMHCHEM/IMCYTCHM 1ST ANTB: CPT | Mod: 26

## 2018-07-12 RX ORDER — HYDROMORPHONE HYDROCHLORIDE 2 MG/ML
1 INJECTION INTRAMUSCULAR; INTRAVENOUS; SUBCUTANEOUS ONCE
Qty: 0 | Refills: 0 | Status: DISCONTINUED | OUTPATIENT
Start: 2018-07-12 | End: 2018-07-12

## 2018-07-12 RX ORDER — SUCRALFATE 1 G
1 TABLET ORAL
Qty: 0 | Refills: 0 | Status: DISCONTINUED | OUTPATIENT
Start: 2018-07-12 | End: 2018-07-17

## 2018-07-12 RX ADMIN — HYDROMORPHONE HYDROCHLORIDE 1 MILLIGRAM(S): 2 INJECTION INTRAMUSCULAR; INTRAVENOUS; SUBCUTANEOUS at 02:16

## 2018-07-12 RX ADMIN — PANTOPRAZOLE SODIUM 40 MILLIGRAM(S): 20 TABLET, DELAYED RELEASE ORAL at 06:45

## 2018-07-12 RX ADMIN — ONDANSETRON 4 MILLIGRAM(S): 8 TABLET, FILM COATED ORAL at 11:38

## 2018-07-12 RX ADMIN — HYDROMORPHONE HYDROCHLORIDE 1 MILLIGRAM(S): 2 INJECTION INTRAMUSCULAR; INTRAVENOUS; SUBCUTANEOUS at 02:31

## 2018-07-12 RX ADMIN — Medication 1 GRAM(S): at 11:38

## 2018-07-12 RX ADMIN — LISINOPRIL 20 MILLIGRAM(S): 2.5 TABLET ORAL at 06:45

## 2018-07-12 RX ADMIN — RITONAVIR 100 MILLIGRAM(S): 100 TABLET, FILM COATED ORAL at 11:37

## 2018-07-12 RX ADMIN — HYDROMORPHONE HYDROCHLORIDE 1 MILLIGRAM(S): 2 INJECTION INTRAMUSCULAR; INTRAVENOUS; SUBCUTANEOUS at 09:54

## 2018-07-12 RX ADMIN — HYDROMORPHONE HYDROCHLORIDE 1 MILLIGRAM(S): 2 INJECTION INTRAMUSCULAR; INTRAVENOUS; SUBCUTANEOUS at 09:34

## 2018-07-12 RX ADMIN — Medication 100 MILLIGRAM(S): at 06:45

## 2018-07-12 RX ADMIN — Medication 0.5 MILLIGRAM(S): at 06:45

## 2018-07-12 RX ADMIN — LEVETIRACETAM 500 MILLIGRAM(S): 250 TABLET, FILM COATED ORAL at 06:45

## 2018-07-12 RX ADMIN — OXYCODONE HYDROCHLORIDE 10 MILLIGRAM(S): 5 TABLET ORAL at 22:00

## 2018-07-12 RX ADMIN — OXYCODONE HYDROCHLORIDE 10 MILLIGRAM(S): 5 TABLET ORAL at 16:41

## 2018-07-12 RX ADMIN — ONDANSETRON 4 MILLIGRAM(S): 8 TABLET, FILM COATED ORAL at 00:53

## 2018-07-12 RX ADMIN — ATOVAQUONE 1500 MILLIGRAM(S): 750 SUSPENSION ORAL at 11:37

## 2018-07-12 RX ADMIN — ONDANSETRON 4 MILLIGRAM(S): 8 TABLET, FILM COATED ORAL at 06:45

## 2018-07-12 RX ADMIN — CALCITRIOL 0.5 MICROGRAM(S): 0.5 CAPSULE ORAL at 11:39

## 2018-07-12 RX ADMIN — OXYCODONE HYDROCHLORIDE 10 MILLIGRAM(S): 5 TABLET ORAL at 17:00

## 2018-07-12 RX ADMIN — OXYCODONE HYDROCHLORIDE 10 MILLIGRAM(S): 5 TABLET ORAL at 12:00

## 2018-07-12 RX ADMIN — SEVELAMER CARBONATE 800 MILLIGRAM(S): 2400 POWDER, FOR SUSPENSION ORAL at 11:38

## 2018-07-12 RX ADMIN — AMLODIPINE BESYLATE 10 MILLIGRAM(S): 2.5 TABLET ORAL at 06:45

## 2018-07-12 RX ADMIN — DOLUTEGRAVIR SODIUM 50 MILLIGRAM(S): 25 TABLET, FILM COATED ORAL at 11:37

## 2018-07-12 RX ADMIN — Medication 0.5 MILLIGRAM(S): at 21:06

## 2018-07-12 RX ADMIN — OXYCODONE HYDROCHLORIDE 10 MILLIGRAM(S): 5 TABLET ORAL at 11:45

## 2018-07-12 RX ADMIN — DARUNAVIR 800 MILLIGRAM(S): 75 TABLET, FILM COATED ORAL at 11:37

## 2018-07-12 RX ADMIN — SERTRALINE 50 MILLIGRAM(S): 25 TABLET, FILM COATED ORAL at 11:37

## 2018-07-12 RX ADMIN — OXYCODONE HYDROCHLORIDE 10 MILLIGRAM(S): 5 TABLET ORAL at 21:05

## 2018-07-12 NOTE — PROGRESS NOTE ADULT - SUBJECTIVE AND OBJECTIVE BOX
INTERVAL HPI/OVERNIGHT EVENTS:    CC: failure to thrive, HIV/AIDS, CMV retinitis ESRD on HD, hypertension, antral gastritis    No overnight events, EGD today. Less nauseous.    Vital Signs Last 24 Hrs  T(C): 37.5 (12 Jul 2018 07:39), Max: 37.5 (12 Jul 2018 07:39)  T(F): 99.5 (12 Jul 2018 07:39), Max: 99.5 (12 Jul 2018 07:39)  HR: 77 (12 Jul 2018 07:39) (72 - 87)  BP: 117/67 (12 Jul 2018 07:39) (107/68 - 124/82)  BP(mean): --  RR: 19 (12 Jul 2018 07:39) (18 - 20)  SpO2: 96% (12 Jul 2018 07:39) (96% - 100%)    PHYSICAL EXAM:    GENERAL: Not in distress, alert  CHEST/LUNG: b/l air entry  HEART: Regular   ABDOMEN: Soft, BS+  EXTREMITIES:  No edema, tenderness.    MEDICATIONS  (STANDING):  acetaminophen  IVPB. 1000 milliGRAM(s) IV Intermittent once  amLODIPine   Tablet 10 milliGRAM(s) Oral daily  atovaquone Suspension 1500 milliGRAM(s) Oral daily  calcitriol   Capsule 0.5 MICROGram(s) Oral daily  darunavir 800 milliGRAM(s) Oral daily  diphenhydrAMINE   Injectable 25 milliGRAM(s) IV Push once  dolutegravir 50 milliGRAM(s) Oral daily  emtricitabine 200 milliGRAM(s) Oral <User Schedule>  epoetin nithin Injectable 39477 Unit(s) IV Push <User Schedule>  heparin  Injectable 5000 Unit(s) SubCutaneous every 12 hours  levETIRAcetam 500 milliGRAM(s) Oral two times a day  lisinopril 20 milliGRAM(s) Oral daily  LORazepam     Tablet 0.5 milliGRAM(s) Oral every 8 hours  metoprolol tartrate 100 milliGRAM(s) Oral two times a day  ondansetron Injectable 4 milliGRAM(s) IV Push every 6 hours  pantoprazole    Tablet 40 milliGRAM(s) Oral two times a day  ritonavir Tablet 100 milliGRAM(s) Oral daily  sertraline 50 milliGRAM(s) Oral daily  sevelamer hydrochloride 800 milliGRAM(s) Oral three times a day  sucralfate suspension 1 Gram(s) Oral four times a day  tenofovir disoproxil fumarate (VIREAD) 300 milliGRAM(s) Oral <User Schedule>  Valganciclovir 50 mg/ 1 ml Oral Solutio 100 milliGRAM(s) 100 milliGRAM(s) Oral <User Schedule>    MEDICATIONS  (PRN):  acetaminophen   Tablet 650 milliGRAM(s) Oral every 6 hours PRN For Temp greater than 38 C (100.4 F)  acetaminophen   Tablet. 650 milliGRAM(s) Oral every 6 hours PRN Moderate Pain (4 - 6)  diphenhydrAMINE   Capsule 25 milliGRAM(s) Oral every 6 hours PRN Rash and/or Itching  oxyCODONE    IR 10 milliGRAM(s) Oral four times a day PRN mod to severe pain      Allergies    vancomycin (Anaphylaxis)    Intolerances          LABS:                  RADIOLOGY & ADDITIONAL TESTS:

## 2018-07-12 NOTE — BRIEF OPERATIVE NOTE - SPECIMENS
Antral lesions, antrum and body biopsies for H. Pylori and duodenal biopsies taken in D2 for celiac disease.

## 2018-07-12 NOTE — BRIEF OPERATIVE NOTE - POST-OP DX
Gastric lesion  07/12/2018    Active  Castillo Gabriel  Gastritis and duodenitis  07/12/2018    Castillo Dillard  Subclavian vein stenosis, right  07/06/2018    Active  Omar Andujar

## 2018-07-12 NOTE — PROGRESS NOTE ADULT - SUBJECTIVE AND OBJECTIVE BOX
The patient is a 27 year old blind HIV+ male with E.S.R.D. on dialysis who presents with a chief   complaint of overwhelming nausea and vomiting after eating solid food.  He only tolerates   liquid food.  He feels like he has recently lost 15 to 20 lbs. He is scheduled to have an EGD.      (30 Jun 2018 23:01)      PAST MEDICAL HISTORY:  L.V.E.F. = 45 to 50%  HIV+  Hypertension  Diabetes  End stage renal disease on dialysis x 10 years  Seizure - dialysis related on Keppra  Pericarditis  Anxiety  Depression  Dilated Cardiomyopathy  Blind  Overactive parathyroid    PAST SURGICAL HISTORY:  AV fistula - left arm  Tonsillectomy      MEDICATIONS  (STANDING):  acetaminophen  IVPB. 1000 milliGRAM(s) IV Intermittent once  amLODIPine   Tablet 10 milliGRAM(s) Oral daily  atovaquone Suspension 1500 milliGRAM(s) Oral daily  calcitriol   Capsule 0.5 MICROGram(s) Oral daily  darunavir 800 milliGRAM(s) Oral daily  diphenhydrAMINE   Injectable 25 milliGRAM(s) IV Push once  dolutegravir 50 milliGRAM(s) Oral daily  emtricitabine 200 milliGRAM(s) Oral <User Schedule>  epoetin nithin Injectable 77051 Unit(s) IV Push <User Schedule>  heparin  Injectable 5000 Unit(s) SubCutaneous every 12 hours  levETIRAcetam 500 milliGRAM(s) Oral two times a day  lisinopril 20 milliGRAM(s) Oral daily  LORazepam     Tablet 0.5 milliGRAM(s) Oral every 8 hours  metoprolol tartrate 100 milliGRAM(s) Oral two times a day  ondansetron Injectable 4 milliGRAM(s) IV Push every 6 hours  pantoprazole    Tablet 40 milliGRAM(s) Oral two times a day  ritonavir Tablet 100 milliGRAM(s) Oral daily  sertraline 50 milliGRAM(s) Oral daily  sevelamer hydrochloride 800 milliGRAM(s) Oral three times a day  tenofovir disoproxil fumarate (VIREAD) 300 milliGRAM(s) Oral <User Schedule>  Valganciclovir 50 mg/ 1 ml Oral Solutio 100 milliGRAM(s) 100 milliGRAM(s) Oral <User Schedule>    MEDICATIONS  (PRN):  acetaminophen   Tablet 650 milliGRAM(s) Oral every 6 hours PRN For Temp greater than 38 C (100.4 F)  acetaminophen   Tablet. 650 milliGRAM(s) Oral every 6 hours PRN Moderate Pain (4 - 6)  diphenhydrAMINE   Capsule 25 milliGRAM(s) Oral every 6 hours PRN Rash and/or Itching  oxyCODONE    IR 10 milliGRAM(s) Oral four times a day PRN mod to severe pain      Allergies:     Vancomycin (Anaphylaxis)      SOCIAL HISTORY:    He said that he drinks very little alcohol.   He doesn't smoke and he uses                                marijuana for pain as needed.     PT/INR - ( 01 Jul 2018 03:21 )   PT: 11.9 sec;   INR: 1.08 ratio      PTT - ( 01 Jul 2018 03:21 )  PTT:33.9 sec    EKG:  -  7/11/2018  Normal sinus rhythm  Voltage criteria for left ventricular hypertrophy  Nonspecific ST abnormality  Prolonged QT  Abnormal ECG    TT ECHO:  -  4/14/2018  Summary:   1. Left ventricular ejection fraction, by visual estimation, is 45 to        50%.   2. Mildly decreased global left ventricular systolic function.   3. Spectral Doppler shows impaired relaxation pattern of left         ventricular myocardial filling (Grade I diastolic dysfunction). Elevated         mean left atrial pressure.   4. Mildly increased left ventricular internal cavity size.   5. Normal right ventricular size and function.   6. Moderate mitral annular calcification.   7. Thickening and calcification of the anterior and posterior mitral        valve leaflets.   8. Mild mitral valve regurgitation.   9. Sclerotic aortic valve with normal opening.  10. Moderate tricuspid regurgitation.  11. Mild to moderate pulmonic valve regurgitation.  12. Estimated pulmonary artery systolic pressure is 51.8 mmHg assuming a         right atrial pressure of 5 mmHg, which is consistent with moderate         pulmonary hypertension.  13. There is no evidence of pericardial effusion.    CHEST X-RAY  -  6/30/2018  IMPRESSION: No acute cardiopulmonary disease process. Cardiomegaly.    ASA # = 3  Mallampati # = 3

## 2018-07-12 NOTE — PROGRESS NOTE ADULT - ASSESSMENT
27 yr old male with HIV not compliant with medications, CMV retinitis with vision loss, ESRD on HD,  dilated cardiomyopathy, hypertension and seizure disorder admitted with confusion, weakness and enlarging AV fistula. He was admitted for intractable nausea and vomiting. Renal was consulted. He also had flashes of light, ophthalmology consulted. He was started on valganciclovir 100mg after each HD for treatment for CMV retinitis. HD was continued as per schedule. He complained of pain at AVF site, vascular surgery was consulted. Noted to have pancytopenia, blood culture ordered to rule out MAC infection. 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 Pain management consulted for pain control. Given persistent nausea and inability to tolerate solid food, GI consulted for possible endoscopic work up. GI advised, CT abdomen/pelvis with contrast for further evaluation. Cardiology consulted for clearance for possible EGD. EGD showed antral gastritis, biopsies sent for H pylori.

## 2018-07-12 NOTE — BRIEF OPERATIVE NOTE - PROCEDURE
<<-----Click on this checkbox to enter Procedure EGD with biopsy  07/12/2018    Active  [facility]

## 2018-07-12 NOTE — BRIEF OPERATIVE NOTE - PRE-OP DX
Arteriovenous fistula stenosis, initial encounter  07/06/2018    Active  Partha Baron  Epigastric abdominal pain  07/12/2018    Active  Castillo Gabriel  Nausea and vomiting in adult  07/12/2018    Active  Castillo Gabriel

## 2018-07-13 LAB
ALBUMIN SERPL ELPH-MCNC: 4.1 G/DL — SIGNIFICANT CHANGE UP (ref 3.3–5.2)
ANION GAP SERPL CALC-SCNC: 20 MMOL/L — HIGH (ref 5–17)
BUN SERPL-MCNC: 89 MG/DL — HIGH (ref 8–20)
CALCIUM SERPL-MCNC: 9.2 MG/DL — SIGNIFICANT CHANGE UP (ref 8.6–10.2)
CHLORIDE SERPL-SCNC: 87 MMOL/L — LOW (ref 98–107)
CO2 SERPL-SCNC: 27 MMOL/L — SIGNIFICANT CHANGE UP (ref 22–29)
CREAT SERPL-MCNC: 8.72 MG/DL — HIGH (ref 0.5–1.3)
GLUCOSE SERPL-MCNC: 92 MG/DL — SIGNIFICANT CHANGE UP (ref 70–115)
HCT VFR BLD CALC: 32.6 % — LOW (ref 42–52)
HGB BLD-MCNC: 10.4 G/DL — LOW (ref 14–18)
MCHC RBC-ENTMCNC: 29.8 PG — SIGNIFICANT CHANGE UP (ref 27–31)
MCHC RBC-ENTMCNC: 31.9 G/DL — LOW (ref 32–36)
MCV RBC AUTO: 93.4 FL — SIGNIFICANT CHANGE UP (ref 80–94)
PHOSPHATE SERPL-MCNC: 5.4 MG/DL — HIGH (ref 2.4–4.7)
PLATELET # BLD AUTO: 193 K/UL — SIGNIFICANT CHANGE UP (ref 150–400)
POTASSIUM SERPL-MCNC: 4.8 MMOL/L — SIGNIFICANT CHANGE UP (ref 3.5–5.3)
POTASSIUM SERPL-SCNC: 4.8 MMOL/L — SIGNIFICANT CHANGE UP (ref 3.5–5.3)
RBC # BLD: 3.49 M/UL — LOW (ref 4.6–6.2)
RBC # FLD: 16.7 % — HIGH (ref 11–15.6)
SODIUM SERPL-SCNC: 134 MMOL/L — LOW (ref 135–145)
WBC # BLD: 4.3 K/UL — LOW (ref 4.8–10.8)
WBC # FLD AUTO: 4.3 K/UL — LOW (ref 4.8–10.8)

## 2018-07-13 PROCEDURE — 99233 SBSQ HOSP IP/OBS HIGH 50: CPT

## 2018-07-13 RX ORDER — DIPHENHYDRAMINE HCL 50 MG
25 CAPSULE ORAL ONCE
Qty: 0 | Refills: 0 | Status: COMPLETED | OUTPATIENT
Start: 2018-07-13 | End: 2018-07-13

## 2018-07-13 RX ORDER — OXYCODONE HYDROCHLORIDE 5 MG/1
10 TABLET ORAL ONCE
Qty: 0 | Refills: 0 | Status: DISCONTINUED | OUTPATIENT
Start: 2018-07-13 | End: 2018-07-13

## 2018-07-13 RX ORDER — FENTANYL CITRATE 50 UG/ML
1 INJECTION INTRAVENOUS
Qty: 0 | Refills: 0 | Status: DISCONTINUED | OUTPATIENT
Start: 2018-07-13 | End: 2018-07-17

## 2018-07-13 RX ORDER — HYDROMORPHONE HYDROCHLORIDE 2 MG/ML
1 INJECTION INTRAMUSCULAR; INTRAVENOUS; SUBCUTANEOUS ONCE
Qty: 0 | Refills: 0 | Status: DISCONTINUED | OUTPATIENT
Start: 2018-07-13 | End: 2018-07-13

## 2018-07-13 RX ORDER — OXYCODONE HYDROCHLORIDE 5 MG/1
10 TABLET ORAL
Qty: 0 | Refills: 0 | Status: DISCONTINUED | OUTPATIENT
Start: 2018-07-13 | End: 2018-07-15

## 2018-07-13 RX ORDER — METOCLOPRAMIDE HCL 10 MG
10 TABLET ORAL EVERY 8 HOURS
Qty: 0 | Refills: 0 | Status: DISCONTINUED | OUTPATIENT
Start: 2018-07-13 | End: 2018-07-14

## 2018-07-13 RX ADMIN — ERYTHROPOIETIN 10000 UNIT(S): 10000 INJECTION, SOLUTION INTRAVENOUS; SUBCUTANEOUS at 16:07

## 2018-07-13 RX ADMIN — Medication 100 MILLIGRAM(S): at 06:00

## 2018-07-13 RX ADMIN — OXYCODONE HYDROCHLORIDE 10 MILLIGRAM(S): 5 TABLET ORAL at 06:33

## 2018-07-13 RX ADMIN — OXYCODONE HYDROCHLORIDE 10 MILLIGRAM(S): 5 TABLET ORAL at 07:01

## 2018-07-13 RX ADMIN — PANTOPRAZOLE SODIUM 40 MILLIGRAM(S): 20 TABLET, DELAYED RELEASE ORAL at 21:05

## 2018-07-13 RX ADMIN — HYDROMORPHONE HYDROCHLORIDE 1 MILLIGRAM(S): 2 INJECTION INTRAMUSCULAR; INTRAVENOUS; SUBCUTANEOUS at 15:37

## 2018-07-13 RX ADMIN — LEVETIRACETAM 500 MILLIGRAM(S): 250 TABLET, FILM COATED ORAL at 21:05

## 2018-07-13 RX ADMIN — ONDANSETRON 4 MILLIGRAM(S): 8 TABLET, FILM COATED ORAL at 14:23

## 2018-07-13 RX ADMIN — SERTRALINE 50 MILLIGRAM(S): 25 TABLET, FILM COATED ORAL at 21:05

## 2018-07-13 RX ADMIN — HYDROMORPHONE HYDROCHLORIDE 1 MILLIGRAM(S): 2 INJECTION INTRAMUSCULAR; INTRAVENOUS; SUBCUTANEOUS at 16:00

## 2018-07-13 RX ADMIN — HYDROMORPHONE HYDROCHLORIDE 1 MILLIGRAM(S): 2 INJECTION INTRAMUSCULAR; INTRAVENOUS; SUBCUTANEOUS at 02:02

## 2018-07-13 RX ADMIN — FENTANYL CITRATE 1 PATCH: 50 INJECTION INTRAVENOUS at 14:23

## 2018-07-13 RX ADMIN — LISINOPRIL 20 MILLIGRAM(S): 2.5 TABLET ORAL at 06:00

## 2018-07-13 RX ADMIN — LEVETIRACETAM 500 MILLIGRAM(S): 250 TABLET, FILM COATED ORAL at 06:00

## 2018-07-13 RX ADMIN — OXYCODONE HYDROCHLORIDE 10 MILLIGRAM(S): 5 TABLET ORAL at 21:14

## 2018-07-13 RX ADMIN — DARUNAVIR 800 MILLIGRAM(S): 75 TABLET, FILM COATED ORAL at 21:06

## 2018-07-13 RX ADMIN — Medication 0.5 MILLIGRAM(S): at 14:23

## 2018-07-13 RX ADMIN — RITONAVIR 100 MILLIGRAM(S): 100 TABLET, FILM COATED ORAL at 21:05

## 2018-07-13 RX ADMIN — ONDANSETRON 4 MILLIGRAM(S): 8 TABLET, FILM COATED ORAL at 00:21

## 2018-07-13 RX ADMIN — DOLUTEGRAVIR SODIUM 50 MILLIGRAM(S): 25 TABLET, FILM COATED ORAL at 21:05

## 2018-07-13 RX ADMIN — Medication 25 MILLIGRAM(S): at 15:38

## 2018-07-13 RX ADMIN — PANTOPRAZOLE SODIUM 40 MILLIGRAM(S): 20 TABLET, DELAYED RELEASE ORAL at 06:00

## 2018-07-13 RX ADMIN — OXYCODONE HYDROCHLORIDE 10 MILLIGRAM(S): 5 TABLET ORAL at 22:12

## 2018-07-13 RX ADMIN — AMLODIPINE BESYLATE 10 MILLIGRAM(S): 2.5 TABLET ORAL at 06:00

## 2018-07-13 RX ADMIN — Medication 10 MILLIGRAM(S): at 21:05

## 2018-07-13 RX ADMIN — Medication 10 MILLIGRAM(S): at 10:45

## 2018-07-13 RX ADMIN — Medication 0.5 MILLIGRAM(S): at 06:00

## 2018-07-13 RX ADMIN — HYDROMORPHONE HYDROCHLORIDE 1 MILLIGRAM(S): 2 INJECTION INTRAMUSCULAR; INTRAVENOUS; SUBCUTANEOUS at 01:47

## 2018-07-13 RX ADMIN — CALCITRIOL 0.5 MICROGRAM(S): 0.5 CAPSULE ORAL at 14:23

## 2018-07-13 RX ADMIN — ONDANSETRON 4 MILLIGRAM(S): 8 TABLET, FILM COATED ORAL at 06:00

## 2018-07-13 RX ADMIN — FENTANYL CITRATE 1 PATCH: 50 INJECTION INTRAVENOUS at 12:18

## 2018-07-13 RX ADMIN — Medication 25 MILLIGRAM(S): at 01:50

## 2018-07-13 NOTE — CHART NOTE - NSCHARTNOTEFT_GEN_A_CORE
Source: Patient [x ]  Family [ ]   other [ ]    Current Diet: Diet, Regular:   For patients receiving Renal Replacement - No Protein Restr, No Conc K, No Conc Phos, Low  Sodium (RENAL)  Supplement Feeding Modality:  Oral  Nepro Cans or Servings Per Day:  7       Frequency:  Three Times a day (07-10-18 @ 09:10)    PO intake:  < 50% [x ]   50-75%  [ ]   %  [ ]  other : however drinks all Nepro shakes     Source for PO intake [x ] Patient [ ] family [x ] chart [ ] staff [ ] other    Current Weight: 7/11: 56kg  6/30: 60.5kg     % Weight Change 10# wt loss (7%)  unsure of accuracy; will continue to monitor trends     Pertinent Medications: MEDICATIONS  (STANDING):  acetaminophen  IVPB. 1000 milliGRAM(s) IV Intermittent once  amLODIPine   Tablet 10 milliGRAM(s) Oral daily  atovaquone Suspension 1500 milliGRAM(s) Oral daily  calcitriol   Capsule 0.5 MICROGram(s) Oral daily  darunavir 800 milliGRAM(s) Oral daily  diphenhydrAMINE   Injectable 25 milliGRAM(s) IV Push once  dolutegravir 50 milliGRAM(s) Oral daily  emtricitabine 200 milliGRAM(s) Oral <User Schedule>  epoetin nithin Injectable 99168 Unit(s) IV Push <User Schedule>  fentaNYL   Patch 100 MICROgram(s)/Hr. 1 Patch Transdermal every 48 hours  heparin  Injectable 5000 Unit(s) SubCutaneous every 12 hours  levETIRAcetam 500 milliGRAM(s) Oral two times a day  lisinopril 20 milliGRAM(s) Oral daily  LORazepam     Tablet 0.5 milliGRAM(s) Oral every 8 hours  metoclopramide Injectable 10 milliGRAM(s) IV Push every 8 hours  metoprolol tartrate 100 milliGRAM(s) Oral two times a day  ondansetron Injectable 4 milliGRAM(s) IV Push every 6 hours  pantoprazole    Tablet 40 milliGRAM(s) Oral two times a day  ritonavir Tablet 100 milliGRAM(s) Oral daily  sertraline 50 milliGRAM(s) Oral daily  sevelamer hydrochloride 800 milliGRAM(s) Oral three times a day  sucralfate suspension 1 Gram(s) Oral four times a day  tenofovir disoproxil fumarate (VIREAD) 300 milliGRAM(s) Oral <User Schedule>  Valganciclovir 50 mg/ 1 ml Oral Solutio 100 milliGRAM(s) 100 milliGRAM(s) Oral <User Schedule>    MEDICATIONS  (PRN):  acetaminophen   Tablet 650 milliGRAM(s) Oral every 6 hours PRN For Temp greater than 38 C (100.4 F)  acetaminophen   Tablet. 650 milliGRAM(s) Oral every 6 hours PRN Moderate Pain (4 - 6)  diphenhydrAMINE   Capsule 25 milliGRAM(s) Oral every 6 hours PRN Rash and/or Itching  oxyCODONE    IR 10 milliGRAM(s) Oral four times a day PRN mod to severe pain    Pertinent Labs:       Skin: no breakdown noted.     Nutrition focused physical exam conducted - found signs of malnutrition [ ]absent [x ]present  (upon admission)   Subcutaneous fat loss: [ ] Orbital fat pads region, [ ]Buccal fat region, [ ]Triceps region,  [ ]Ribs region    Muscle wasting: [ ]Temples region, [ ]Clavicle region, [x ]Shoulder region, [ ]Scapula region, [ ]Interosseous region,  [ ]thigh region, [ ]Calf region    Estimated Needs:   [ ] no change since previous assessment  [x ] recalculated:   56kg  30-35kcal/kg   2624-4662 kcal,     2.0gm/kg 112gm pro    Current Nutrition Diagnosis:  Pt remains at nutrition risk secondary to moderate protein calorie malnutrition (acute) related to inadequate protein energy intake with nausea/vomiting pta and continued decreased appetite evidenced by pt meeting <75% estimated nutrition needs > 7 days and mild muscle wasting- shoulder/prominent knees.   Pt continues to eat poorly at meals, however likes and drinks Nepro shakes (receiving 7/day); provides 3325 kcal, 119gm protein, if consuming all 7 cans meeting/exceeds estimated caloric needs.  Recommendations below:       Recommendations:   1. Rx: nephrovite and folic acid daily   2. Continue with Nepro 7 cans/day   3. encourage with meals.   4. check wt daily to monitor trends  5. monitor Renal labs     Monitoring and Evaluation:   [x ] PO intake [x ] Tolerance to diet prescription [X] Weights  [X] Follow up per protocol [X] Labs:

## 2018-07-13 NOTE — PROGRESS NOTE ADULT - SUBJECTIVE AND OBJECTIVE BOX
Patient is a 27y old  Male who presents with a chief complaint of weakness (30 Jun 2018 23:01)      HPI:  The patient is a 27 year old male with a history of HIV ( non compliant with treatment), CMV retinitis with vision loss, ESRD on HD MWF, dilated cardiomyopathy, hypertension and seizure disorder who was brought to the ED with complaints of weakness, confusion.     Still with nausea, not taking much po    REVIEW OF SYSTEMS:  Constitutional: No fever, weight loss or fatigue  ENMT:  No difficulty hearing, tinnitus, vertigo; No sinus or throat pain  Respiratory: No cough, wheezing, chills or hemoptysis  Cardiovascular: No chest pain, palpitations, dizziness or leg swelling  Gastrointestinal: No abdominal or epigastric pain. + nausea, vomiting or hematemesis; No diarrhea or constipation. No melena or hematochezia.  Skin: No itching, burning, rashes or lesions   Musculoskeletal: No joint pain or swelling; No muscle, back or extremity pain    PAST MEDICAL & SURGICAL HISTORY:  HIV (human immunodeficiency virus infection)  Seizure disorder  Hypertension  Diabetes  ESRD (end stage renal disease)  Seizure  Pericarditis  Anxiety  Depression  Renal failure (ARF), acute on chronic: dialysis av fistula, RUE  HTN (hypertension)  Cardiomyopathy  HIV disease: born HIV+  AV fistula  S/P tonsillectomy      FAMILY HISTORY:  No pertinent family history in first degree relatives  No pertinent family history in first degree relatives      SOCIAL HISTORY:  Smoking Status: [ ] Current, [ ] Former, [ ] Never  Pack Years:  [  ] EtOH-no  [  ] IVDA-no    MEDICATIONS:  MEDICATIONS  (STANDING):  acetaminophen  IVPB. 1000 milliGRAM(s) IV Intermittent once  amLODIPine   Tablet 10 milliGRAM(s) Oral daily  atovaquone Suspension 1500 milliGRAM(s) Oral daily  calcitriol   Capsule 0.5 MICROGram(s) Oral daily  darunavir 800 milliGRAM(s) Oral daily  diphenhydrAMINE   Injectable 25 milliGRAM(s) IV Push once  dolutegravir 50 milliGRAM(s) Oral daily  emtricitabine 200 milliGRAM(s) Oral <User Schedule>  epoetin nithin Injectable 40294 Unit(s) IV Push <User Schedule>  heparin  Injectable 5000 Unit(s) SubCutaneous every 12 hours  levETIRAcetam 500 milliGRAM(s) Oral two times a day  lisinopril 20 milliGRAM(s) Oral daily  LORazepam     Tablet 0.5 milliGRAM(s) Oral every 8 hours  metoclopramide Injectable 10 milliGRAM(s) IV Push every 8 hours  metoprolol tartrate 100 milliGRAM(s) Oral two times a day  ondansetron Injectable 4 milliGRAM(s) IV Push every 6 hours  pantoprazole    Tablet 40 milliGRAM(s) Oral two times a day  ritonavir Tablet 100 milliGRAM(s) Oral daily  sertraline 50 milliGRAM(s) Oral daily  sevelamer hydrochloride 800 milliGRAM(s) Oral three times a day  sucralfate suspension 1 Gram(s) Oral four times a day  tenofovir disoproxil fumarate (VIREAD) 300 milliGRAM(s) Oral <User Schedule>  Valganciclovir 50 mg/ 1 ml Oral Solutio 100 milliGRAM(s) 100 milliGRAM(s) Oral <User Schedule>    MEDICATIONS  (PRN):  acetaminophen   Tablet 650 milliGRAM(s) Oral every 6 hours PRN For Temp greater than 38 C (100.4 F)  acetaminophen   Tablet. 650 milliGRAM(s) Oral every 6 hours PRN Moderate Pain (4 - 6)  diphenhydrAMINE   Capsule 25 milliGRAM(s) Oral every 6 hours PRN Rash and/or Itching      Allergies    vancomycin (Anaphylaxis)    Intolerances        Vital Signs Last 24 Hrs  T(C): 36.5 (12 Jul 2018 16:01), Max: 37.5 (12 Jul 2018 07:39)  T(F): 97.7 (12 Jul 2018 16:01), Max: 99.5 (12 Jul 2018 07:39)  HR: 67 (12 Jul 2018 16:01) (67 - 77)  BP: 107/64 (12 Jul 2018 16:01) (107/64 - 117/67)  BP(mean): --  RR: 18 (12 Jul 2018 16:01) (18 - 19)  SpO2: 98% (12 Jul 2018 16:01) (96% - 98%)    07-12 @ 07:01  -  07-13 @ 07:00  --------------------------------------------------------  IN: 480 mL / OUT: 0 mL / NET: 480 mL          PHYSICAL EXAM:    General: Well developed; well nourished; in no acute distress  HEENT: MMM, conjunctiva and sclera clear  H- RRR  L- CTA  Gastrointestinal: Soft, non-tender non-distended; Normal bowel sounds; No rebound or guarding  Extremities: Normal range of motion, No clubbing, cyanosis or edema  Neurological: Alert and oriented x3  Skin: Warm and dry. No obvious rash      LABS:                    RADIOLOGY & ADDITIONAL STUDIES:

## 2018-07-13 NOTE — PROGRESS NOTE ADULT - SUBJECTIVE AND OBJECTIVE BOX
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 Pt also c/o nausea and wretching. Vomited once today at home. Fell today at home in bathroom but denies hitting head, states tripped. Pt uses wheelchair at baseline. He admits to being non compliant with his medications at home; he had not taken his medications. Pt denies cp, diarrhea, HA, fevers, constipation, numbness, tingling. (2018 23:01)    Follow up ESRD  Seen on HD    PAST MEDICAL & SURGICAL HISTORY:  HIV (human immunodeficiency virus infection)  Seizure disorder  Hypertension  Diabetes  ESRD (end stage renal disease)  Seizure  Pericarditis  Anxiety  Depression  Renal failure (ARF), acute on chronic: dialysis av fistula, RUE  HTN (hypertension)  Cardiomyopathy  HIV disease: born HIV+  AV fistula  S/P tonsillectomy      MEDICATIONS  (STANDING):  acetaminophen  IVPB. 1000 milliGRAM(s) IV Intermittent once  amLODIPine   Tablet 10 milliGRAM(s) Oral daily  atovaquone Suspension 1500 milliGRAM(s) Oral daily  calcitriol   Capsule 0.5 MICROGram(s) Oral daily  darunavir 800 milliGRAM(s) Oral daily  diphenhydrAMINE   Injectable 25 milliGRAM(s) IV Push once  diphenhydrAMINE   Injectable 50 milliGRAM(s) IV Push once  dolutegravir 50 milliGRAM(s) Oral daily  emtricitabine 200 milliGRAM(s) Oral <User Schedule>  epoetin nithin Injectable 45654 Unit(s) IV Push <User Schedule>  heparin  Injectable 5000 Unit(s) SubCutaneous every 12 hours  levETIRAcetam 500 milliGRAM(s) Oral two times a day  lisinopril 20 milliGRAM(s) Oral daily  LORazepam     Tablet 0.5 milliGRAM(s) Oral every 8 hours  metoprolol tartrate 100 milliGRAM(s) Oral two times a day  ondansetron Injectable 4 milliGRAM(s) IV Push every 6 hours  pantoprazole    Tablet 40 milliGRAM(s) Oral two times a day  ritonavir Tablet 100 milliGRAM(s) Oral daily  sertraline 50 milliGRAM(s) Oral daily  sevelamer hydrochloride 800 milliGRAM(s) Oral three times a day  tenofovir disoproxil fumarate (VIREAD) 300 milliGRAM(s) Oral <User Schedule>  Valganciclovir 50 mg/ 1 ml Oral Solutio 100 milliGRAM(s) 100 milliGRAM(s) Oral <User Schedule>    MEDICATIONS  (PRN):  acetaminophen   Tablet 650 milliGRAM(s) Oral every 6 hours PRN For Temp greater than 38 C (100.4 F)  acetaminophen   Tablet. 650 milliGRAM(s) Oral every 6 hours PRN Moderate Pain (4 - 6)  diphenhydrAMINE   Capsule 25 milliGRAM(s) Oral every 6 hours PRN Rash and/or Itching  HYDROmorphone  Injectable 1 milliGRAM(s) IV Push every 4 hours PRN breakthrough pain  oxyCODONE    IR 10 milliGRAM(s) Oral four times a day PRN mod to severe pain  zolpidem 5 milliGRAM(s) Oral at bedtime PRN Insomnia      Allergies    vancomycin (Anaphylaxis)    Intolerances        Vital Signs Last 24 Hrs  T(C): 37.2 (2018 07:46), Max: 37.2 (2018 07:46)  T(F): 98.9 (2018 07:46), Max: 98.9 (2018 07:46)  HR: 67 (2018 07:46) (67 - 81)  BP: 131/76 (2018 07:46) (110/74 - 140/88)  BP(mean): --  RR: 18 (2018 07:46) (18 - 18)  SpO2: 98% (2018 07:46) (98% - 99%)  Daily     Daily Weight in k (2018 07:46)    PHYSICAL EXAM:  GENERAL: No distress, comfortable  HEAD:  Atraumatic, Normocephalic  EYES: Conjunctiva clear  ENMT: Moist mucous membranes  NECK: Supple, No JVD  NERVOUS SYSTEM:  Alert & Oriented X3, Good concentration; Motor Strength 5/5 B/L upper and lower extremities; DTRs 2+ intact and symmetric  CHEST/LUNG: Clear to percussion bilaterally; No rales, rhonchi, wheezing, or rubs  HEART: Regular rate and rhythm; No murmurs, rubs, or gallops  ABDOMEN: Soft, Nontender, Nondistended; Bowel sounds present  EXTREMITIES:  2+ Peripheral Pulses, No clubbing, cyanosis, or edema; +RUE Hypertrophic AVF with +ve bruit  SKIN: No rashes or lesions      LABS:  Reviewed

## 2018-07-13 NOTE — PROGRESS NOTE ADULT - PROBLEM SELECTOR PLAN 1
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 Will check biopsy results  continue zofran, protonix bid. Add reglan  advance diet as tolerated Pt with HIV  gastritis, duodenitits, nodules in stomach. Will check biopsy results  continue zofran, protonix bid. Add reglan  Patient is requesting to speak to ID  advance diet as tolerated

## 2018-07-13 NOTE — PROGRESS NOTE ADULT - SUBJECTIVE AND OBJECTIVE BOX
INTERVAL HPI/OVERNIGHT EVENTS:    CC: HIV/AIDS, antral gastritis, CMV retinitis, failure to thrive, ESRD on HD    No overnight events, nausea improved.    Vital Signs Last 24 Hrs  T(C): 36.9 (13 Jul 2018 08:04), Max: 36.9 (13 Jul 2018 08:04)  T(F): 98.5 (13 Jul 2018 08:04), Max: 98.5 (13 Jul 2018 08:04)  HR: 80 (13 Jul 2018 08:04) (67 - 80)  BP: 103/60 (13 Jul 2018 08:04) (103/60 - 107/64)  BP(mean): --  RR: 19 (13 Jul 2018 08:04) (18 - 19)  SpO2: 98% (12 Jul 2018 16:01) (98% - 98%)    PHYSICAL EXAM:    GENERAL: Not in distress, alert  CHEST/LUNG: b/l air entry  HEART: Regular  ABDOMEN: Soft, BS+  EXTREMITIES:  No edema, tenderness.    MEDICATIONS  (STANDING):  acetaminophen  IVPB. 1000 milliGRAM(s) IV Intermittent once  amLODIPine   Tablet 10 milliGRAM(s) Oral daily  atovaquone Suspension 1500 milliGRAM(s) Oral daily  calcitriol   Capsule 0.5 MICROGram(s) Oral daily  darunavir 800 milliGRAM(s) Oral daily  diphenhydrAMINE   Injectable 25 milliGRAM(s) IV Push once  dolutegravir 50 milliGRAM(s) Oral daily  emtricitabine 200 milliGRAM(s) Oral <User Schedule>  epoetin nithin Injectable 99215 Unit(s) IV Push <User Schedule>  fentaNYL   Patch 100 MICROgram(s)/Hr. 1 Patch Transdermal every 48 hours  heparin  Injectable 5000 Unit(s) SubCutaneous every 12 hours  levETIRAcetam 500 milliGRAM(s) Oral two times a day  lisinopril 20 milliGRAM(s) Oral daily  LORazepam     Tablet 0.5 milliGRAM(s) Oral every 8 hours  metoclopramide Injectable 10 milliGRAM(s) IV Push every 8 hours  metoprolol tartrate 100 milliGRAM(s) Oral two times a day  ondansetron Injectable 4 milliGRAM(s) IV Push every 6 hours  pantoprazole    Tablet 40 milliGRAM(s) Oral two times a day  ritonavir Tablet 100 milliGRAM(s) Oral daily  sertraline 50 milliGRAM(s) Oral daily  sevelamer hydrochloride 800 milliGRAM(s) Oral three times a day  sucralfate suspension 1 Gram(s) Oral four times a day  tenofovir disoproxil fumarate (VIREAD) 300 milliGRAM(s) Oral <User Schedule>  Valganciclovir 50 mg/ 1 ml Oral Solutio 100 milliGRAM(s) 100 milliGRAM(s) Oral <User Schedule>    MEDICATIONS  (PRN):  acetaminophen   Tablet 650 milliGRAM(s) Oral every 6 hours PRN For Temp greater than 38 C (100.4 F)  acetaminophen   Tablet. 650 milliGRAM(s) Oral every 6 hours PRN Moderate Pain (4 - 6)  diphenhydrAMINE   Capsule 25 milliGRAM(s) Oral every 6 hours PRN Rash and/or Itching  oxyCODONE    IR 10 milliGRAM(s) Oral four times a day PRN mod to severe pain      Allergies    vancomycin (Anaphylaxis)    Intolerances          LABS:                  RADIOLOGY & ADDITIONAL TESTS:

## 2018-07-13 NOTE — PROGRESS NOTE ADULT - ASSESSMENT
27 yr old male with HIV not compliant with medications, CMV retinitis with vision loss, ESRD on HD,  dilated cardiomyopathy, hypertension and seizure disorder admitted with confusion, weakness and enlarging AV fistula. He was admitted for intractable nausea and vomiting. Renal was consulted. He also had flashes of light, ophthalmology consulted. He was started on valganciclovir 100mg after each HD for treatment for CMV retinitis. HD was continued as per schedule. He complained of pain at AVF site, vascular surgery was consulted. Noted to have pancytopenia, blood culture ordered to rule out MAC infection. 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 Pain management consulted for pain control. Given persistent nausea and inability to tolerate solid food, GI consulted for possible endoscopic work up. GI advised, CT abdomen/pelvis with contrast for further evaluation. EGD was scheduled by GI,  showed antral gastritis, biopsies sent for H pylori. He was started on Carafate.

## 2018-07-13 NOTE — PROGRESS NOTE ADULT - ASSESSMENT
ESRD on HD MWF  HTN  HIV  AVF bleeding   DM  anemia  MIKE    -HD MWF  -S/p fistulogram and balloon angioplasty 7/6/18, Vascular following closely  -Hemodynamics are stable  -On ART therapy; work up and management per ID  -Renal diet; 3 nepro shakes three times per day; he is not really eating the solid food.     Thank you

## 2018-07-14 DIAGNOSIS — R11.2 NAUSEA WITH VOMITING, UNSPECIFIED: ICD-10-CM

## 2018-07-14 LAB
DOLUTEGRAVIR ISLT GENOTYP: SIGNIFICANT CHANGE UP
ELVITEGRAVIR ISLT GENOTYP: SIGNIFICANT CHANGE UP
RALTEGRAVIR ISLT GENOTYP: SIGNIFICANT CHANGE UP

## 2018-07-14 PROCEDURE — 99232 SBSQ HOSP IP/OBS MODERATE 35: CPT

## 2018-07-14 PROCEDURE — 99233 SBSQ HOSP IP/OBS HIGH 50: CPT

## 2018-07-14 RX ORDER — METOCLOPRAMIDE HCL 10 MG
10 TABLET ORAL
Qty: 0 | Refills: 0 | Status: DISCONTINUED | OUTPATIENT
Start: 2018-07-14 | End: 2018-07-17

## 2018-07-14 RX ORDER — FOLIC ACID 0.8 MG
1 TABLET ORAL DAILY
Qty: 0 | Refills: 0 | Status: DISCONTINUED | OUTPATIENT
Start: 2018-07-14 | End: 2018-07-17

## 2018-07-14 RX ORDER — HYDROMORPHONE HYDROCHLORIDE 2 MG/ML
1 INJECTION INTRAMUSCULAR; INTRAVENOUS; SUBCUTANEOUS ONCE
Qty: 0 | Refills: 0 | Status: DISCONTINUED | OUTPATIENT
Start: 2018-07-14 | End: 2018-07-14

## 2018-07-14 RX ADMIN — Medication 10 MILLIGRAM(S): at 06:46

## 2018-07-14 RX ADMIN — Medication 0.5 MILLIGRAM(S): at 00:42

## 2018-07-14 RX ADMIN — Medication 0.5 MILLIGRAM(S): at 14:37

## 2018-07-14 RX ADMIN — Medication 10 MILLIGRAM(S): at 17:52

## 2018-07-14 RX ADMIN — PANTOPRAZOLE SODIUM 40 MILLIGRAM(S): 20 TABLET, DELAYED RELEASE ORAL at 09:15

## 2018-07-14 RX ADMIN — DOLUTEGRAVIR SODIUM 50 MILLIGRAM(S): 25 TABLET, FILM COATED ORAL at 11:20

## 2018-07-14 RX ADMIN — Medication 0.5 MILLIGRAM(S): at 06:46

## 2018-07-14 RX ADMIN — OXYCODONE HYDROCHLORIDE 10 MILLIGRAM(S): 5 TABLET ORAL at 10:10

## 2018-07-14 RX ADMIN — SEVELAMER CARBONATE 800 MILLIGRAM(S): 2400 POWDER, FOR SUSPENSION ORAL at 11:22

## 2018-07-14 RX ADMIN — Medication 25 MILLIGRAM(S): at 23:57

## 2018-07-14 RX ADMIN — DARUNAVIR 800 MILLIGRAM(S): 75 TABLET, FILM COATED ORAL at 14:39

## 2018-07-14 RX ADMIN — Medication 1 TABLET(S): at 14:37

## 2018-07-14 RX ADMIN — Medication 100 MILLIGRAM(S): at 17:52

## 2018-07-14 RX ADMIN — AMLODIPINE BESYLATE 10 MILLIGRAM(S): 2.5 TABLET ORAL at 09:19

## 2018-07-14 RX ADMIN — ONDANSETRON 4 MILLIGRAM(S): 8 TABLET, FILM COATED ORAL at 17:52

## 2018-07-14 RX ADMIN — HYDROMORPHONE HYDROCHLORIDE 1 MILLIGRAM(S): 2 INJECTION INTRAMUSCULAR; INTRAVENOUS; SUBCUTANEOUS at 02:02

## 2018-07-14 RX ADMIN — OXYCODONE HYDROCHLORIDE 10 MILLIGRAM(S): 5 TABLET ORAL at 09:29

## 2018-07-14 RX ADMIN — OXYCODONE HYDROCHLORIDE 10 MILLIGRAM(S): 5 TABLET ORAL at 21:33

## 2018-07-14 RX ADMIN — ONDANSETRON 4 MILLIGRAM(S): 8 TABLET, FILM COATED ORAL at 00:43

## 2018-07-14 RX ADMIN — ONDANSETRON 4 MILLIGRAM(S): 8 TABLET, FILM COATED ORAL at 09:19

## 2018-07-14 RX ADMIN — Medication 1 MILLIGRAM(S): at 14:37

## 2018-07-14 RX ADMIN — LEVETIRACETAM 500 MILLIGRAM(S): 250 TABLET, FILM COATED ORAL at 09:14

## 2018-07-14 RX ADMIN — Medication 10 MILLIGRAM(S): at 14:37

## 2018-07-14 RX ADMIN — LEVETIRACETAM 500 MILLIGRAM(S): 250 TABLET, FILM COATED ORAL at 17:52

## 2018-07-14 RX ADMIN — Medication 0.5 MILLIGRAM(S): at 21:33

## 2018-07-14 RX ADMIN — PANTOPRAZOLE SODIUM 40 MILLIGRAM(S): 20 TABLET, DELAYED RELEASE ORAL at 17:52

## 2018-07-14 RX ADMIN — Medication 25 MILLIGRAM(S): at 02:02

## 2018-07-14 RX ADMIN — CALCITRIOL 0.5 MICROGRAM(S): 0.5 CAPSULE ORAL at 11:20

## 2018-07-14 RX ADMIN — RITONAVIR 100 MILLIGRAM(S): 100 TABLET, FILM COATED ORAL at 11:23

## 2018-07-14 RX ADMIN — ONDANSETRON 4 MILLIGRAM(S): 8 TABLET, FILM COATED ORAL at 23:55

## 2018-07-14 RX ADMIN — OXYCODONE HYDROCHLORIDE 10 MILLIGRAM(S): 5 TABLET ORAL at 22:15

## 2018-07-14 RX ADMIN — Medication 100 MILLIGRAM(S): at 09:14

## 2018-07-14 RX ADMIN — LISINOPRIL 20 MILLIGRAM(S): 2.5 TABLET ORAL at 09:13

## 2018-07-14 RX ADMIN — SERTRALINE 50 MILLIGRAM(S): 25 TABLET, FILM COATED ORAL at 11:21

## 2018-07-14 RX ADMIN — HYDROMORPHONE HYDROCHLORIDE 1 MILLIGRAM(S): 2 INJECTION INTRAMUSCULAR; INTRAVENOUS; SUBCUTANEOUS at 02:17

## 2018-07-14 NOTE — PROGRESS NOTE ADULT - ASSESSMENT
ESRD on HD MWF  HTN  HIV  AVF bleeding   DM  Anemia  Nausea/Vomiting     -HD MWF  -S/p fistulogram and balloon angioplasty 7/6/18, Vascular following closely  -Hemodynamics are stable  -On KEENE therapy; work up and management per ID  -Renal diet; 3 nepro shakes three times per day; he is not really eating the solid food.

## 2018-07-14 NOTE — PROGRESS NOTE ADULT - PROBLEM SELECTOR PLAN 1
S/p angioplasty, outpatient vascular follow up.  c/w PO opiates  No indication for IV Dilaudid. Appears comfortable

## 2018-07-14 NOTE — PROGRESS NOTE ADULT - SUBJECTIVE AND OBJECTIVE BOX
Pt seen and examined f/u nausea and vomiting in patinet with HIV/AIDS and renal failure on HD.  Yesterday he was tolerating liquids and is now starting solid food. At this time he denies any nause or vomiting thus far. Gastric biopsies pending.    REVIEW OF SYSTEMS:    CONSTITUTIONAL: No fever, weight loss, or fatigue  EYES: No eye pain, visual disturbances, or discharge  ENMT:  No difficulty hearing, tinnitus, vertigo; No sinus or throat pain  RESPIRATORY: No cough, wheezing, chills or hemoptysis; No shortness of breath  CARDIOVASCULAR: No chest pain, palpitations, dizziness, or leg swelling  GASTROINTESTINAL: No abdominal or epigastric pain. No nausea, vomiting, or hematemesis; No diarrhea or constipation. No melena or hematochezia.    MEDICATIONS:  MEDICATIONS  (STANDING):  acetaminophen  IVPB. 1000 milliGRAM(s) IV Intermittent once  amLODIPine   Tablet 10 milliGRAM(s) Oral daily  atovaquone Suspension 1500 milliGRAM(s) Oral daily  calcitriol   Capsule 0.5 MICROGram(s) Oral daily  darunavir 800 milliGRAM(s) Oral daily  diphenhydrAMINE   Injectable 25 milliGRAM(s) IV Push once  dolutegravir 50 milliGRAM(s) Oral daily  emtricitabine 200 milliGRAM(s) Oral <User Schedule>  epoetin nithin Injectable 15787 Unit(s) IV Push <User Schedule>  fentaNYL   Patch 100 MICROgram(s)/Hr. 1 Patch Transdermal every 48 hours  heparin  Injectable 5000 Unit(s) SubCutaneous every 12 hours  levETIRAcetam 500 milliGRAM(s) Oral two times a day  lisinopril 20 milliGRAM(s) Oral daily  LORazepam     Tablet 0.5 milliGRAM(s) Oral every 8 hours  metoclopramide Injectable 10 milliGRAM(s) IV Push every 8 hours  metoprolol tartrate 100 milliGRAM(s) Oral two times a day  ondansetron Injectable 4 milliGRAM(s) IV Push every 6 hours  pantoprazole    Tablet 40 milliGRAM(s) Oral two times a day  ritonavir Tablet 100 milliGRAM(s) Oral daily  sertraline 50 milliGRAM(s) Oral daily  sevelamer hydrochloride 800 milliGRAM(s) Oral three times a day  sucralfate suspension 1 Gram(s) Oral four times a day  tenofovir disoproxil fumarate (VIREAD) 300 milliGRAM(s) Oral <User Schedule>  Valganciclovir 50 mg/ 1 ml Oral Solutio 100 milliGRAM(s) 100 milliGRAM(s) Oral <User Schedule>    MEDICATIONS  (PRN):  acetaminophen   Tablet 650 milliGRAM(s) Oral every 6 hours PRN For Temp greater than 38 C (100.4 F)  acetaminophen   Tablet. 650 milliGRAM(s) Oral every 6 hours PRN Moderate Pain (4 - 6)  diphenhydrAMINE   Capsule 25 milliGRAM(s) Oral every 6 hours PRN Rash and/or Itching  oxyCODONE    IR 10 milliGRAM(s) Oral four times a day PRN mod to severe pain      Allergies    vancomycin (Anaphylaxis)    Intolerances        Vital Signs Last 24 Hrs  T(C): 36.4 (14 Jul 2018 08:01), Max: 36.9 (14 Jul 2018 00:09)  T(F): 97.5 (14 Jul 2018 08:01), Max: 98.5 (14 Jul 2018 00:09)  HR: 72 (14 Jul 2018 08:01) (72 - 80)  BP: 118/75 (14 Jul 2018 08:01) (108/74 - 121/76)  BP(mean): --  RR: 16 (14 Jul 2018 08:01) (16 - 18)  SpO2: 95% (14 Jul 2018 08:01) (95% - 100%)    07-13 @ 07:01  -  07-14 @ 07:00  --------------------------------------------------------  IN: 1840 mL / OUT: 1500 mL / NET: 340 mL        PHYSICAL EXAM:    General: Well developed; well nourished; in no acute distress  HEENT: MMM, conjunctiva and sclera clear, patient is blind  Gastrointestinal:Abdomen: Soft non-tender non-distended; Normal bowel sounds; No hepatosplenomegaly  Extremities: no cyanosis, clubbing or edema.  Skin: Warm and dry. No obvious rash    LABS:      CBC Full  -  ( 13 Jul 2018 17:05 )  WBC Count : 4.3 K/uL  Hemoglobin : 10.4 g/dL  Hematocrit : 32.6 %  Platelet Count - Automated : 193 K/uL  Mean Cell Volume : 93.4 fl  Mean Cell Hemoglobin : 29.8 pg  Mean Cell Hemoglobin Concentration : 31.9 g/dL  Auto Neutrophil # : x  Auto Lymphocyte # : x  Auto Monocyte # : x  Auto Eosinophil # : x  Auto Basophil # : x  Auto Neutrophil % : x  Auto Lymphocyte % : x  Auto Monocyte % : x  Auto Eosinophil % : x  Auto Basophil % : x    07-13    134<L>  |  87<L>  |  89.0<H>  ----------------------------<  92  4.8   |  27.0  |  8.72<H>    Ca    9.2      13 Jul 2018 17:05  Phos  5.4     07-13    TPro  x   /  Alb  4.1  /  TBili  x   /  DBili  x   /  AST  x   /  ALT  x   /  AlkPhos  x   07-13

## 2018-07-14 NOTE — PROGRESS NOTE ADULT - PROBLEM SELECTOR PLAN 1
probably due to combination of his underlying major medical problems. but appears to be improving. May have underlying gastroparesis. For the preseent suggest low fat renal diet and will change reglan to PO. Await biopsy results fo stomach.

## 2018-07-14 NOTE — PROGRESS NOTE ADULT - SUBJECTIVE AND OBJECTIVE BOX
CHIEF COMPLAINT/INTERVAL HISTORY:    Patient is a 27y old  Male who presents with a chief complaint of weakness (30 Jun 2018 23:01)      HPI:  The patient is a 27 year old male with a history of HIV ( non compliant with treatment), CMV retinitis with vision loss, ESRD on HD MWF, dilated cardiomyopathy, hypertension and seizure disorder who was brought to the ED with complaints of weakness, confusion, enlarging of his fistula with occasional bleeding, and seeing flashes of light (he is blind at baseline). Pt also c/o nausea and wretching. Vomited once today at home. Fell today at home in bathroom but denies hitting head, states tripped. Pt uses wheelchair at baseline. He admits to being non compliant with his medications at home; he had not taken his medications. Pt denies cp, diarrhea, HA, fevers, constipation, numbness, tingling. (30 Jun 2018 23:01)      SUBJECTIVE & OBJECTIVE/ ROS: Pt seen and examined at bedside. Appears comfortable, in no distress, talking on the phone without any discomfort. Asking for Dilaudid IV. Was nauseous earlier but no vomiting.     ICU Vital Signs Last 24 Hrs  T(C): 36.4 (14 Jul 2018 08:01), Max: 36.9 (14 Jul 2018 00:09)  T(F): 97.5 (14 Jul 2018 08:01), Max: 98.5 (14 Jul 2018 00:09)  HR: 72 (14 Jul 2018 08:01) (72 - 80)  BP: 118/75 (14 Jul 2018 08:01) (108/74 - 121/76)  BP(mean): --  ABP: --  ABP(mean): --  RR: 16 (14 Jul 2018 08:01) (16 - 18)  SpO2: 95% (14 Jul 2018 08:01) (95% - 100%)        MEDICATIONS  (STANDING):  acetaminophen  IVPB. 1000 milliGRAM(s) IV Intermittent once  amLODIPine   Tablet 10 milliGRAM(s) Oral daily  atovaquone Suspension 1500 milliGRAM(s) Oral daily  calcitriol   Capsule 0.5 MICROGram(s) Oral daily  darunavir 800 milliGRAM(s) Oral daily  diphenhydrAMINE   Injectable 25 milliGRAM(s) IV Push once  dolutegravir 50 milliGRAM(s) Oral daily  emtricitabine 200 milliGRAM(s) Oral <User Schedule>  epoetin nithin Injectable 38723 Unit(s) IV Push <User Schedule>  fentaNYL   Patch 100 MICROgram(s)/Hr. 1 Patch Transdermal every 48 hours  folic acid 1 milliGRAM(s) Oral daily  heparin  Injectable 5000 Unit(s) SubCutaneous every 12 hours  levETIRAcetam 500 milliGRAM(s) Oral two times a day  lisinopril 20 milliGRAM(s) Oral daily  LORazepam     Tablet 0.5 milliGRAM(s) Oral every 8 hours  metoclopramide 10 milliGRAM(s) Oral three times a day with meals  metoprolol tartrate 100 milliGRAM(s) Oral two times a day  Nephro-dayron 1 Tablet(s) Oral daily  ondansetron Injectable 4 milliGRAM(s) IV Push every 6 hours  pantoprazole    Tablet 40 milliGRAM(s) Oral two times a day  ritonavir Tablet 100 milliGRAM(s) Oral daily  sertraline 50 milliGRAM(s) Oral daily  sevelamer hydrochloride 800 milliGRAM(s) Oral three times a day  sucralfate suspension 1 Gram(s) Oral four times a day  tenofovir disoproxil fumarate (VIREAD) 300 milliGRAM(s) Oral <User Schedule>  Valganciclovir 50 mg/ 1 ml Oral Solutio 100 milliGRAM(s) 100 milliGRAM(s) Oral <User Schedule>    MEDICATIONS  (PRN):  acetaminophen   Tablet 650 milliGRAM(s) Oral every 6 hours PRN For Temp greater than 38 C (100.4 F)  acetaminophen   Tablet. 650 milliGRAM(s) Oral every 6 hours PRN Moderate Pain (4 - 6)  diphenhydrAMINE   Capsule 25 milliGRAM(s) Oral every 6 hours PRN Rash and/or Itching  oxyCODONE    IR 10 milliGRAM(s) Oral four times a day PRN mod to severe pain      LABS:                        10.4   4.3   )-----------( 193      ( 13 Jul 2018 17:05 )             32.6     07-13    134<L>  |  87<L>  |  89.0<H>  ----------------------------<  92  4.8   |  27.0  |  8.72<H>    Ca    9.2      13 Jul 2018 17:05  Phos  5.4     07-13    TPro  x   /  Alb  4.1  /  TBili  x   /  DBili  x   /  AST  x   /  ALT  x   /  AlkPhos  x   07-13          CAPILLARY BLOOD GLUCOSE          RECENT CULTURES:      RADIOLOGY & ADDITIONAL TESTS:      PHYSICAL EXAM:    GENERAL: Not in distress, alert  CHEST/LUNG: b/l air entry  HEART: Regular  ABDOMEN: Soft, NT, ND, BS+  EXTREMITIES:  No edema, tenderness.

## 2018-07-15 DIAGNOSIS — R07.9 CHEST PAIN, UNSPECIFIED: ICD-10-CM

## 2018-07-15 LAB
ANION GAP SERPL CALC-SCNC: 20 MMOL/L — HIGH (ref 5–17)
BASOPHILS # BLD AUTO: 0 K/UL — SIGNIFICANT CHANGE UP (ref 0–0.2)
BASOPHILS NFR BLD AUTO: 0.3 % — SIGNIFICANT CHANGE UP (ref 0–2)
BUN SERPL-MCNC: 104 MG/DL — HIGH (ref 8–20)
CALCIUM SERPL-MCNC: 9.2 MG/DL — SIGNIFICANT CHANGE UP (ref 8.6–10.2)
CHLORIDE SERPL-SCNC: 90 MMOL/L — LOW (ref 98–107)
CO2 SERPL-SCNC: 27 MMOL/L — SIGNIFICANT CHANGE UP (ref 22–29)
CREAT SERPL-MCNC: 8.36 MG/DL — HIGH (ref 0.5–1.3)
EOSINOPHIL # BLD AUTO: 0.2 K/UL — SIGNIFICANT CHANGE UP (ref 0–0.5)
EOSINOPHIL NFR BLD AUTO: 5 % — SIGNIFICANT CHANGE UP (ref 0–6)
GLUCOSE SERPL-MCNC: 106 MG/DL — SIGNIFICANT CHANGE UP (ref 70–115)
HCT VFR BLD CALC: 34 % — LOW (ref 42–52)
HGB BLD-MCNC: 10.7 G/DL — LOW (ref 14–18)
LYMPHOCYTES # BLD AUTO: 1.4 K/UL — SIGNIFICANT CHANGE UP (ref 1–4.8)
LYMPHOCYTES # BLD AUTO: 36.3 % — SIGNIFICANT CHANGE UP (ref 20–55)
MAGNESIUM SERPL-MCNC: 3.5 MG/DL — HIGH (ref 1.6–2.6)
MCHC RBC-ENTMCNC: 29.3 PG — SIGNIFICANT CHANGE UP (ref 27–31)
MCHC RBC-ENTMCNC: 31.5 G/DL — LOW (ref 32–36)
MCV RBC AUTO: 93.2 FL — SIGNIFICANT CHANGE UP (ref 80–94)
MONOCYTES # BLD AUTO: 0.4 K/UL — SIGNIFICANT CHANGE UP (ref 0–0.8)
MONOCYTES NFR BLD AUTO: 11.5 % — HIGH (ref 3–10)
NEUTROPHILS # BLD AUTO: 1.8 K/UL — SIGNIFICANT CHANGE UP (ref 1.8–8)
NEUTROPHILS NFR BLD AUTO: 46.9 % — SIGNIFICANT CHANGE UP (ref 37–73)
PHOSPHATE SERPL-MCNC: 4.8 MG/DL — HIGH (ref 2.4–4.7)
PLATELET # BLD AUTO: 190 K/UL — SIGNIFICANT CHANGE UP (ref 150–400)
POTASSIUM SERPL-MCNC: 5 MMOL/L — SIGNIFICANT CHANGE UP (ref 3.5–5.3)
POTASSIUM SERPL-SCNC: 5 MMOL/L — SIGNIFICANT CHANGE UP (ref 3.5–5.3)
RBC # BLD: 3.65 M/UL — LOW (ref 4.6–6.2)
RBC # FLD: 17.1 % — HIGH (ref 11–15.6)
SODIUM SERPL-SCNC: 137 MMOL/L — SIGNIFICANT CHANGE UP (ref 135–145)
TROPONIN T SERPL-MCNC: 0.2 NG/ML — HIGH (ref 0–0.06)
TROPONIN T SERPL-MCNC: 0.2 NG/ML — HIGH (ref 0–0.06)
WBC # BLD: 3.8 K/UL — LOW (ref 4.8–10.8)
WBC # FLD AUTO: 3.8 K/UL — LOW (ref 4.8–10.8)

## 2018-07-15 PROCEDURE — 99233 SBSQ HOSP IP/OBS HIGH 50: CPT

## 2018-07-15 PROCEDURE — 99232 SBSQ HOSP IP/OBS MODERATE 35: CPT

## 2018-07-15 PROCEDURE — 93010 ELECTROCARDIOGRAM REPORT: CPT

## 2018-07-15 RX ORDER — OXYCODONE HYDROCHLORIDE 5 MG/1
10 TABLET ORAL EVERY 4 HOURS
Qty: 0 | Refills: 0 | Status: DISCONTINUED | OUTPATIENT
Start: 2018-07-15 | End: 2018-07-17

## 2018-07-15 RX ORDER — LIDOCAINE 4 G/100G
1 CREAM TOPICAL DAILY
Qty: 0 | Refills: 0 | Status: DISCONTINUED | OUTPATIENT
Start: 2018-07-15 | End: 2018-07-17

## 2018-07-15 RX ADMIN — CALCITRIOL 0.5 MICROGRAM(S): 0.5 CAPSULE ORAL at 10:35

## 2018-07-15 RX ADMIN — SEVELAMER CARBONATE 800 MILLIGRAM(S): 2400 POWDER, FOR SUSPENSION ORAL at 10:35

## 2018-07-15 RX ADMIN — DOLUTEGRAVIR SODIUM 50 MILLIGRAM(S): 25 TABLET, FILM COATED ORAL at 10:35

## 2018-07-15 RX ADMIN — Medication 1 MILLIGRAM(S): at 10:34

## 2018-07-15 RX ADMIN — Medication 0.5 MILLIGRAM(S): at 04:57

## 2018-07-15 RX ADMIN — LEVETIRACETAM 500 MILLIGRAM(S): 250 TABLET, FILM COATED ORAL at 17:59

## 2018-07-15 RX ADMIN — Medication 100 MILLIGRAM(S): at 17:59

## 2018-07-15 RX ADMIN — Medication 25 MILLIGRAM(S): at 22:57

## 2018-07-15 RX ADMIN — SERTRALINE 50 MILLIGRAM(S): 25 TABLET, FILM COATED ORAL at 10:35

## 2018-07-15 RX ADMIN — OXYCODONE HYDROCHLORIDE 10 MILLIGRAM(S): 5 TABLET ORAL at 22:57

## 2018-07-15 RX ADMIN — DARUNAVIR 800 MILLIGRAM(S): 75 TABLET, FILM COATED ORAL at 10:34

## 2018-07-15 RX ADMIN — OXYCODONE HYDROCHLORIDE 10 MILLIGRAM(S): 5 TABLET ORAL at 04:56

## 2018-07-15 RX ADMIN — Medication 1 TABLET(S): at 10:34

## 2018-07-15 RX ADMIN — OXYCODONE HYDROCHLORIDE 10 MILLIGRAM(S): 5 TABLET ORAL at 14:37

## 2018-07-15 RX ADMIN — Medication 10 MILLIGRAM(S): at 10:35

## 2018-07-15 RX ADMIN — OXYCODONE HYDROCHLORIDE 10 MILLIGRAM(S): 5 TABLET ORAL at 10:33

## 2018-07-15 RX ADMIN — HEPARIN SODIUM 5000 UNIT(S): 5000 INJECTION INTRAVENOUS; SUBCUTANEOUS at 04:57

## 2018-07-15 RX ADMIN — LEVETIRACETAM 500 MILLIGRAM(S): 250 TABLET, FILM COATED ORAL at 04:57

## 2018-07-15 RX ADMIN — ONDANSETRON 4 MILLIGRAM(S): 8 TABLET, FILM COATED ORAL at 18:00

## 2018-07-15 RX ADMIN — OXYCODONE HYDROCHLORIDE 10 MILLIGRAM(S): 5 TABLET ORAL at 11:15

## 2018-07-15 RX ADMIN — Medication 10 MILLIGRAM(S): at 17:59

## 2018-07-15 RX ADMIN — ONDANSETRON 4 MILLIGRAM(S): 8 TABLET, FILM COATED ORAL at 10:33

## 2018-07-15 RX ADMIN — OXYCODONE HYDROCHLORIDE 10 MILLIGRAM(S): 5 TABLET ORAL at 15:30

## 2018-07-15 RX ADMIN — FENTANYL CITRATE 1 PATCH: 50 INJECTION INTRAVENOUS at 13:11

## 2018-07-15 RX ADMIN — PANTOPRAZOLE SODIUM 40 MILLIGRAM(S): 20 TABLET, DELAYED RELEASE ORAL at 18:00

## 2018-07-15 RX ADMIN — LIDOCAINE 1 PATCH: 4 CREAM TOPICAL at 13:12

## 2018-07-15 RX ADMIN — Medication 10 MILLIGRAM(S): at 06:29

## 2018-07-15 RX ADMIN — PANTOPRAZOLE SODIUM 40 MILLIGRAM(S): 20 TABLET, DELAYED RELEASE ORAL at 04:57

## 2018-07-15 RX ADMIN — FENTANYL CITRATE 1 PATCH: 50 INJECTION INTRAVENOUS at 14:00

## 2018-07-15 RX ADMIN — OXYCODONE HYDROCHLORIDE 10 MILLIGRAM(S): 5 TABLET ORAL at 05:30

## 2018-07-15 RX ADMIN — Medication 0.5 MILLIGRAM(S): at 22:57

## 2018-07-15 RX ADMIN — Medication 25 MILLIGRAM(S): at 06:29

## 2018-07-15 RX ADMIN — Medication 0.5 MILLIGRAM(S): at 13:13

## 2018-07-15 RX ADMIN — RITONAVIR 100 MILLIGRAM(S): 100 TABLET, FILM COATED ORAL at 10:36

## 2018-07-15 RX ADMIN — ONDANSETRON 4 MILLIGRAM(S): 8 TABLET, FILM COATED ORAL at 04:56

## 2018-07-15 NOTE — PROGRESS NOTE ADULT - ASSESSMENT
ESRD on HD MWF  HTN  HIV  AVF bleeding   DM  Anemia  Nausea/Vomiting     -HD MWF  -S/p fistulogram and balloon angioplasty 7/6/18, Vascular following closely  -Hemodynamics are stable  -On KEENE therapy; work up and management per ID  -Renal diet; 3 nepro shakes three times per day; he is not really eating the solid food  -GI follow up

## 2018-07-15 NOTE — PROGRESS NOTE ADULT - SUBJECTIVE AND OBJECTIVE BOX
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 Pt also c/o nausea and wretching. Vomited once today at home. Fell today at home in bathroom but denies hitting head, states tripped. Pt uses wheelchair at baseline. He admits to being non compliant with his medications at home; he had not taken his medications. Pt denies cp, diarrhea, HA, fevers, constipation, numbness, tingling. (2018 23:01)    Follow up ESRD  No acute overnight event     PAST MEDICAL & SURGICAL HISTORY:  HIV (human immunodeficiency virus infection)  Seizure disorder  Hypertension  Diabetes  ESRD (end stage renal disease)  Seizure  Pericarditis  Anxiety  Depression  Renal failure (ARF), acute on chronic: dialysis av fistula, RUE  HTN (hypertension)  Cardiomyopathy  HIV disease: born HIV+  AV fistula  S/P tonsillectomy      MEDICATIONS  (STANDING):  acetaminophen  IVPB. 1000 milliGRAM(s) IV Intermittent once  amLODIPine   Tablet 10 milliGRAM(s) Oral daily  atovaquone Suspension 1500 milliGRAM(s) Oral daily  calcitriol   Capsule 0.5 MICROGram(s) Oral daily  darunavir 800 milliGRAM(s) Oral daily  diphenhydrAMINE   Injectable 25 milliGRAM(s) IV Push once  dolutegravir 50 milliGRAM(s) Oral daily  emtricitabine 200 milliGRAM(s) Oral <User Schedule>  epoetin nithin Injectable 03959 Unit(s) IV Push <User Schedule>  fentaNYL   Patch 100 MICROgram(s)/Hr. 1 Patch Transdermal every 48 hours  folic acid 1 milliGRAM(s) Oral daily  heparin  Injectable 5000 Unit(s) SubCutaneous every 12 hours  levETIRAcetam 500 milliGRAM(s) Oral two times a day  lisinopril 20 milliGRAM(s) Oral daily  LORazepam     Tablet 0.5 milliGRAM(s) Oral every 8 hours  metoclopramide 10 milliGRAM(s) Oral three times a day with meals  metoprolol tartrate 100 milliGRAM(s) Oral two times a day  Nephro-dayron 1 Tablet(s) Oral daily  ondansetron Injectable 4 milliGRAM(s) IV Push every 6 hours  pantoprazole    Tablet 40 milliGRAM(s) Oral two times a day  ritonavir Tablet 100 milliGRAM(s) Oral daily  sertraline 50 milliGRAM(s) Oral daily  sevelamer hydrochloride 800 milliGRAM(s) Oral three times a day  sucralfate suspension 1 Gram(s) Oral four times a day  tenofovir disoproxil fumarate (VIREAD) 300 milliGRAM(s) Oral <User Schedule>  Valganciclovir 50 mg/ 1 ml Oral Solutio 100 milliGRAM(s) 100 milliGRAM(s) Oral <User Schedule>    MEDICATIONS  (PRN):  acetaminophen   Tablet 650 milliGRAM(s) Oral every 6 hours PRN For Temp greater than 38 C (100.4 F)  acetaminophen   Tablet. 650 milliGRAM(s) Oral every 6 hours PRN Moderate Pain (4 - 6)  diphenhydrAMINE   Capsule 25 milliGRAM(s) Oral every 6 hours PRN Rash and/or Itching  oxyCODONE    IR 10 milliGRAM(s) Oral four times a day PRN mod to severe pain      Allergies    vancomycin (Anaphylaxis)    Intolerances        Vital Signs Last 24 Hrs  T(C): 36.4 (2018 08:01), Max: 36.9 (2018 00:09)  T(F): 97.5 (2018 08:01), Max: 98.5 (2018 00:09)  HR: 72 (2018 08:01) (72 - 80)  BP: 118/75 (2018 08:01) (108/74 - 121/76)  BP(mean): --  RR: 16 (2018 08:01) (16 - 18)  SpO2: 95% (2018 08:01) (95% - 100%)  Daily     Daily Weight in k.5 (2018 18:45)    PHYSICAL EXAM:    GENERAL: NAD, well-groomed, well-developed  HEAD:  Atraumatic, Normocephalic  EYES: EOMI, PERRLA, conjunctiva and sclera clear  ENMT: No tonsillar erythema, exudates, or enlargement; Moist mucous membranes, Good dentition, No lesions  NECK: Supple, No JVD, Normal thyroid  NERVOUS SYSTEM:  Alert & Oriented X3, Good concentration; Motor Strength 5/5 B/L upper and lower extremities; DTRs 2+ intact and symmetric  CHEST/LUNG: Clear to percussion bilaterally; No rales, rhonchi, wheezing, or rubs  HEART: Regular rate and rhythm; No murmurs, rubs, or gallops  ABDOMEN: Soft, Nontender, Nondistended; Bowel sounds present  EXTREMITIES:  2+ Peripheral Pulses, No clubbing, cyanosis, or edema  SKIN: No rashes or lesions    LABS:  Reviewed

## 2018-07-15 NOTE — PROVIDER CONTACT NOTE (CRITICAL VALUE NOTIFICATION) - SITUATION
md patel made aware pts ecg is abnormal. pts vss no s/s resp distress. pt denies chest pain at this time

## 2018-07-15 NOTE — PROGRESS NOTE ADULT - SUBJECTIVE AND OBJECTIVE BOX
Pt seen and examined f/u in patient with HIV/AIDS, nausea and abdominal pain.  This morning he denies vomiting but still with nausea and likes to eat "lite food". He has a poor appetite. He also has an occasional loose stool. Patient still with vague intermittant mid and upper abdominal yann. Path from EGD still pending.    REVIEW OF SYSTEMS:    CONSTITUTIONAL: No fever, weight loss, or fatigue  EYES: No eye pain, visual disturbances, or discharge  ENMT:  No difficulty hearing, tinnitus, vertigo; No sinus or throat pain  RESPIRATORY: No cough, wheezing, chills or hemoptysis; No shortness of breath  CARDIOVASCULAR: No chest pain, palpitations, dizziness, or leg swelling  GASTROINTESTINAL: as above    MEDICATIONS:  MEDICATIONS  (STANDING):  acetaminophen  IVPB. 1000 milliGRAM(s) IV Intermittent once  amLODIPine   Tablet 10 milliGRAM(s) Oral daily  atovaquone Suspension 1500 milliGRAM(s) Oral daily  calcitriol   Capsule 0.5 MICROGram(s) Oral daily  darunavir 800 milliGRAM(s) Oral daily  diphenhydrAMINE   Injectable 25 milliGRAM(s) IV Push once  dolutegravir 50 milliGRAM(s) Oral daily  emtricitabine 200 milliGRAM(s) Oral <User Schedule>  epoetin nithin Injectable 57914 Unit(s) IV Push <User Schedule>  fentaNYL   Patch 100 MICROgram(s)/Hr. 1 Patch Transdermal every 48 hours  folic acid 1 milliGRAM(s) Oral daily  heparin  Injectable 5000 Unit(s) SubCutaneous every 12 hours  levETIRAcetam 500 milliGRAM(s) Oral two times a day  lidocaine   Patch 1 Patch Transdermal daily  lisinopril 20 milliGRAM(s) Oral daily  LORazepam     Tablet 0.5 milliGRAM(s) Oral every 8 hours  metoclopramide 10 milliGRAM(s) Oral three times a day with meals  metoprolol tartrate 100 milliGRAM(s) Oral two times a day  Nephro-dayron 1 Tablet(s) Oral daily  ondansetron Injectable 4 milliGRAM(s) IV Push every 6 hours  pantoprazole    Tablet 40 milliGRAM(s) Oral two times a day  ritonavir Tablet 100 milliGRAM(s) Oral daily  sertraline 50 milliGRAM(s) Oral daily  sevelamer hydrochloride 800 milliGRAM(s) Oral three times a day  sucralfate suspension 1 Gram(s) Oral four times a day  tenofovir disoproxil fumarate (VIREAD) 300 milliGRAM(s) Oral <User Schedule>  Valganciclovir 50 mg/ 1 ml Oral Solutio 100 milliGRAM(s) 100 milliGRAM(s) Oral <User Schedule>    MEDICATIONS  (PRN):  acetaminophen   Tablet 650 milliGRAM(s) Oral every 6 hours PRN For Temp greater than 38 C (100.4 F)  acetaminophen   Tablet. 650 milliGRAM(s) Oral every 6 hours PRN Moderate Pain (4 - 6)  diphenhydrAMINE   Capsule 25 milliGRAM(s) Oral every 6 hours PRN Rash and/or Itching  oxyCODONE    IR 10 milliGRAM(s) Oral every 4 hours PRN Severe Pain (7 - 10)      Allergies    vancomycin (Anaphylaxis)    Intolerances        Vital Signs Last 24 Hrs  T(C): 36.9 (15 Jul 2018 08:52), Max: 37.1 (14 Jul 2018 18:00)  T(F): 98.4 (15 Jul 2018 08:52), Max: 98.7 (14 Jul 2018 18:00)  HR: 87 (15 Jul 2018 08:52) (75 - 87)  BP: 104/64 (15 Jul 2018 08:52) (96/54 - 104/64)  BP(mean): --  RR: 19 (15 Jul 2018 08:52) (14 - 19)  SpO2: 99% (15 Jul 2018 00:09) (99% - 100%)    07-14 @ 07:01  -  07-15 @ 07:00  --------------------------------------------------------  IN: 1896 mL / OUT: 0 mL / NET: 1896 mL        PHYSICAL EXAM:    General: Well developed; well nourished; in no acute distress  HEENT: MMM, conjunctiva and sclera clear, he is blind  Gastrointestinal:Abdomen: Soft non-tender non-distended; Normal bowel sounds; No hepatosplenomegaly  Extremities: no cyanosis, clubbing or edema.  Skin: Warm and dry. No obvious rash    LABS:      CBC Full  -  ( 15 Jul 2018 09:06 )  WBC Count : 3.8 K/uL  Hemoglobin : 10.7 g/dL  Hematocrit : 34.0 %  Platelet Count - Automated : 190 K/uL  Mean Cell Volume : 93.2 fl  Mean Cell Hemoglobin : 29.3 pg  Mean Cell Hemoglobin Concentration : 31.5 g/dL  Auto Neutrophil # : 1.8 K/uL  Auto Lymphocyte # : 1.4 K/uL  Auto Monocyte # : 0.4 K/uL  Auto Eosinophil # : 0.2 K/uL  Auto Basophil # : 0.0 K/uL  Auto Neutrophil % : 46.9 %  Auto Lymphocyte % : 36.3 %  Auto Monocyte % : 11.5 %  Auto Eosinophil % : 5.0 %  Auto Basophil % : 0.3 %    07-15    137  |  90<L>  |  104.0<H>  ----------------------------<  106  5.0   |  27.0  |  8.36<H>    Ca    9.2      15 Jul 2018 09:06  Phos  4.8     07-15  Mg     3.5     07-15    TPro  x   /  Alb  4.1  /  TBili  x   /  DBili  x   /  AST  x   /  ALT  x   /  AlkPhos  x   07-13

## 2018-07-15 NOTE — PROGRESS NOTE ADULT - PROBLEM SELECTOR PLAN 1
S/p angioplasty, outpatient vascular follow up.  c/w PO opiates (dose adjusted as per pain management), tylenol, lidoderm patch, fentanyl patch  No indication for IV Dilaudid. Appears comfortable

## 2018-07-15 NOTE — PROGRESS NOTE ADULT - PROBLEM SELECTOR PLAN 1
probably multifactorial related to his renal failure with high BUN as well as medication and HIV infection. Currently on pantoprazole, carafate and reglan. Continue current therapu and await biopsies from EGD

## 2018-07-15 NOTE — PROGRESS NOTE ADULT - SUBJECTIVE AND OBJECTIVE BOX
CHIEF COMPLAINT/INTERVAL HISTORY:    Patient is a 27y old  Male who presents with a chief complaint of weakness (30 Jun 2018 23:01)      HPI:  The patient is a 27 year old male with a history of HIV ( non compliant with treatment), CMV retinitis with vision loss, ESRD on HD MWF, dilated cardiomyopathy, hypertension and seizure disorder who was brought to the ED with complaints of weakness, confusion, enlarging of his fistula with occasional bleeding, and seeing flashes of light (he is blind at baseline). Pt also c/o nausea and wretching. Vomited once today at home. Fell today at home in bathroom but denies hitting head, states tripped. Pt uses wheelchair at baseline. He admits to being non compliant with his medications at home; he had not taken his medications. Pt denies cp, diarrhea, HA, fevers, constipation, numbness, tingling. (30 Jun 2018 23:01)      SUBJECTIVE & OBJECTIVE/ ROS: Pt seen and examined at bedside. Pt walking from the bathroom to his bed without any distress. Talking to me without any distress. But c/o severe pain in his stomach, chest, right AVF. "I feel like my QTc is prolonged"    ICU Vital Signs Last 24 Hrs  T(C): 36.9 (15 Jul 2018 08:52), Max: 37.1 (14 Jul 2018 18:00)  T(F): 98.4 (15 Jul 2018 08:52), Max: 98.7 (14 Jul 2018 18:00)  HR: 87 (15 Jul 2018 08:52) (75 - 87)  BP: 104/64 (15 Jul 2018 08:52) (96/54 - 104/64)  BP(mean): --  ABP: --  ABP(mean): --  RR: 19 (15 Jul 2018 08:52) (14 - 19)  SpO2: 99% (15 Jul 2018 00:09) (99% - 100%)        MEDICATIONS  (STANDING):  acetaminophen  IVPB. 1000 milliGRAM(s) IV Intermittent once  amLODIPine   Tablet 10 milliGRAM(s) Oral daily  atovaquone Suspension 1500 milliGRAM(s) Oral daily  calcitriol   Capsule 0.5 MICROGram(s) Oral daily  darunavir 800 milliGRAM(s) Oral daily  diphenhydrAMINE   Injectable 25 milliGRAM(s) IV Push once  dolutegravir 50 milliGRAM(s) Oral daily  emtricitabine 200 milliGRAM(s) Oral <User Schedule>  epoetin nithin Injectable 62024 Unit(s) IV Push <User Schedule>  fentaNYL   Patch 100 MICROgram(s)/Hr. 1 Patch Transdermal every 48 hours  folic acid 1 milliGRAM(s) Oral daily  heparin  Injectable 5000 Unit(s) SubCutaneous every 12 hours  levETIRAcetam 500 milliGRAM(s) Oral two times a day  lidocaine   Patch 1 Patch Transdermal daily  lisinopril 20 milliGRAM(s) Oral daily  LORazepam     Tablet 0.5 milliGRAM(s) Oral every 8 hours  metoclopramide 10 milliGRAM(s) Oral three times a day with meals  metoprolol tartrate 100 milliGRAM(s) Oral two times a day  Nephro-dayron 1 Tablet(s) Oral daily  ondansetron Injectable 4 milliGRAM(s) IV Push every 6 hours  pantoprazole    Tablet 40 milliGRAM(s) Oral two times a day  ritonavir Tablet 100 milliGRAM(s) Oral daily  sertraline 50 milliGRAM(s) Oral daily  sevelamer hydrochloride 800 milliGRAM(s) Oral three times a day  sucralfate suspension 1 Gram(s) Oral four times a day  tenofovir disoproxil fumarate (VIREAD) 300 milliGRAM(s) Oral <User Schedule>  Valganciclovir 50 mg/ 1 ml Oral Solutio 100 milliGRAM(s) 100 milliGRAM(s) Oral <User Schedule>    MEDICATIONS  (PRN):  acetaminophen   Tablet 650 milliGRAM(s) Oral every 6 hours PRN For Temp greater than 38 C (100.4 F)  acetaminophen   Tablet. 650 milliGRAM(s) Oral every 6 hours PRN Moderate Pain (4 - 6)  diphenhydrAMINE   Capsule 25 milliGRAM(s) Oral every 6 hours PRN Rash and/or Itching  oxyCODONE    IR 10 milliGRAM(s) Oral every 4 hours PRN Severe Pain (7 - 10)      LABS:                        10.7   3.8   )-----------( 190      ( 15 Jul 2018 09:06 )             34.0     07-15    137  |  90<L>  |  104.0<H>  ----------------------------<  106  5.0   |  27.0  |  8.36<H>    Ca    9.2      15 Jul 2018 09:06  Phos  4.8     07-15  Mg     3.5     07-15    TPro  x   /  Alb  4.1  /  TBili  x   /  DBili  x   /  AST  x   /  ALT  x   /  AlkPhos  x   07-13          CAPILLARY BLOOD GLUCOSE          RECENT CULTURES:      RADIOLOGY & ADDITIONAL TESTS:      PHYSICAL EXAM:    GENERAL: Not in distress, alert  CHEST/LUNG: b/l air entry  HEART: Regular, no MRG  ABDOMEN: Soft, tender to even gentle touch, ND, BS+  EXTREMITIES:  No edema, tenderness.

## 2018-07-16 DIAGNOSIS — K31.9 DISEASE OF STOMACH AND DUODENUM, UNSPECIFIED: ICD-10-CM

## 2018-07-16 DIAGNOSIS — K29.90 GASTRODUODENITIS, UNSPECIFIED, WITHOUT BLEEDING: ICD-10-CM

## 2018-07-16 LAB
ANION GAP SERPL CALC-SCNC: 25 MMOL/L — HIGH (ref 5–17)
BASOPHILS # BLD AUTO: 0 K/UL — SIGNIFICANT CHANGE UP (ref 0–0.2)
BASOPHILS NFR BLD AUTO: 0.3 % — SIGNIFICANT CHANGE UP (ref 0–2)
BUN SERPL-MCNC: 156 MG/DL — HIGH (ref 8–20)
CALCIUM SERPL-MCNC: 8.9 MG/DL — SIGNIFICANT CHANGE UP (ref 8.6–10.2)
CHLORIDE SERPL-SCNC: 87 MMOL/L — LOW (ref 98–107)
CO2 SERPL-SCNC: 24 MMOL/L — SIGNIFICANT CHANGE UP (ref 22–29)
CREAT SERPL-MCNC: 10.82 MG/DL — HIGH (ref 0.5–1.3)
EOSINOPHIL # BLD AUTO: 0.1 K/UL — SIGNIFICANT CHANGE UP (ref 0–0.5)
EOSINOPHIL NFR BLD AUTO: 3.5 % — SIGNIFICANT CHANGE UP (ref 0–5)
GLUCOSE SERPL-MCNC: 127 MG/DL — HIGH (ref 70–115)
HCT VFR BLD CALC: 31.1 % — LOW (ref 42–52)
HGB BLD-MCNC: 10 G/DL — LOW (ref 14–18)
LYMPHOCYTES # BLD AUTO: 1.4 K/UL — SIGNIFICANT CHANGE UP (ref 1–4.8)
LYMPHOCYTES # BLD AUTO: 36.4 % — SIGNIFICANT CHANGE UP (ref 20–55)
MAGNESIUM SERPL-MCNC: 3.7 MG/DL — HIGH (ref 1.6–2.6)
MCHC RBC-ENTMCNC: 29.2 PG — SIGNIFICANT CHANGE UP (ref 27–31)
MCHC RBC-ENTMCNC: 32.2 G/DL — SIGNIFICANT CHANGE UP (ref 32–36)
MCV RBC AUTO: 90.7 FL — SIGNIFICANT CHANGE UP (ref 80–94)
MONOCYTES # BLD AUTO: 0.3 K/UL — SIGNIFICANT CHANGE UP (ref 0–0.8)
MONOCYTES NFR BLD AUTO: 8.6 % — SIGNIFICANT CHANGE UP (ref 3–10)
NEUTROPHILS # BLD AUTO: 1.9 K/UL — SIGNIFICANT CHANGE UP (ref 1.8–8)
NEUTROPHILS NFR BLD AUTO: 51.2 % — SIGNIFICANT CHANGE UP (ref 37–73)
PHOSPHATE SERPL-MCNC: 4.9 MG/DL — HIGH (ref 2.4–4.7)
PLATELET # BLD AUTO: 176 K/UL — SIGNIFICANT CHANGE UP (ref 150–400)
POTASSIUM SERPL-MCNC: 5.4 MMOL/L — HIGH (ref 3.5–5.3)
POTASSIUM SERPL-SCNC: 5.4 MMOL/L — HIGH (ref 3.5–5.3)
RBC # BLD: 3.43 M/UL — LOW (ref 4.6–6.2)
RBC # FLD: 17.5 % — HIGH (ref 11–15.6)
SODIUM SERPL-SCNC: 136 MMOL/L — SIGNIFICANT CHANGE UP (ref 135–145)
TROPONIN T SERPL-MCNC: 0.23 NG/ML — HIGH (ref 0–0.06)
WBC # BLD: 3.7 K/UL — LOW (ref 4.8–10.8)
WBC # FLD AUTO: 3.7 K/UL — LOW (ref 4.8–10.8)

## 2018-07-16 PROCEDURE — 99233 SBSQ HOSP IP/OBS HIGH 50: CPT

## 2018-07-16 PROCEDURE — 99232 SBSQ HOSP IP/OBS MODERATE 35: CPT

## 2018-07-16 PROCEDURE — 93306 TTE W/DOPPLER COMPLETE: CPT | Mod: 26

## 2018-07-16 RX ORDER — RITONAVIR 100 MG/1
100 TABLET, FILM COATED ORAL
Qty: 0 | Refills: 0 | Status: DISCONTINUED | OUTPATIENT
Start: 2018-07-16 | End: 2018-07-17

## 2018-07-16 RX ORDER — DIPHENHYDRAMINE HCL 50 MG
50 CAPSULE ORAL ONCE
Qty: 0 | Refills: 0 | Status: COMPLETED | OUTPATIENT
Start: 2018-07-16 | End: 2018-07-16

## 2018-07-16 RX ORDER — DIPHENHYDRAMINE HCL 50 MG
25 CAPSULE ORAL ONCE
Qty: 0 | Refills: 0 | Status: COMPLETED | OUTPATIENT
Start: 2018-07-16 | End: 2018-07-16

## 2018-07-16 RX ORDER — HYDROMORPHONE HYDROCHLORIDE 2 MG/ML
2 INJECTION INTRAMUSCULAR; INTRAVENOUS; SUBCUTANEOUS ONCE
Qty: 0 | Refills: 0 | Status: DISCONTINUED | OUTPATIENT
Start: 2018-07-16 | End: 2018-07-16

## 2018-07-16 RX ORDER — DARUNAVIR 75 MG/1
600 TABLET, FILM COATED ORAL EVERY 12 HOURS
Qty: 0 | Refills: 0 | Status: DISCONTINUED | OUTPATIENT
Start: 2018-07-16 | End: 2018-07-17

## 2018-07-16 RX ORDER — AZITHROMYCIN 500 MG/1
1200 TABLET, FILM COATED ORAL
Qty: 0 | Refills: 0 | Status: DISCONTINUED | OUTPATIENT
Start: 2018-07-16 | End: 2018-07-17

## 2018-07-16 RX ORDER — ETRAVIRINE 200 MG/1
200 TABLET ORAL
Qty: 0 | Refills: 0 | Status: DISCONTINUED | OUTPATIENT
Start: 2018-07-16 | End: 2018-07-17

## 2018-07-16 RX ORDER — OXYCODONE HYDROCHLORIDE 5 MG/1
10 TABLET ORAL EVERY 12 HOURS
Qty: 0 | Refills: 0 | Status: DISCONTINUED | OUTPATIENT
Start: 2018-07-16 | End: 2018-07-17

## 2018-07-16 RX ORDER — GABAPENTIN 400 MG/1
100 CAPSULE ORAL AT BEDTIME
Qty: 0 | Refills: 0 | Status: DISCONTINUED | OUTPATIENT
Start: 2018-07-16 | End: 2018-07-17

## 2018-07-16 RX ORDER — DOLUTEGRAVIR SODIUM 25 MG/1
50 TABLET, FILM COATED ORAL
Qty: 0 | Refills: 0 | Status: DISCONTINUED | OUTPATIENT
Start: 2018-07-16 | End: 2018-07-17

## 2018-07-16 RX ADMIN — LEVETIRACETAM 500 MILLIGRAM(S): 250 TABLET, FILM COATED ORAL at 06:16

## 2018-07-16 RX ADMIN — SERTRALINE 50 MILLIGRAM(S): 25 TABLET, FILM COATED ORAL at 11:51

## 2018-07-16 RX ADMIN — TENOFOVIR DISOPROXIL FUMARATE 300 MILLIGRAM(S): 300 TABLET, FILM COATED ORAL at 07:58

## 2018-07-16 RX ADMIN — Medication 10 MILLIGRAM(S): at 07:58

## 2018-07-16 RX ADMIN — Medication 400 MILLIGRAM(S): at 18:27

## 2018-07-16 RX ADMIN — OXYCODONE HYDROCHLORIDE 10 MILLIGRAM(S): 5 TABLET ORAL at 21:00

## 2018-07-16 RX ADMIN — OXYCODONE HYDROCHLORIDE 10 MILLIGRAM(S): 5 TABLET ORAL at 08:30

## 2018-07-16 RX ADMIN — LISINOPRIL 20 MILLIGRAM(S): 2.5 TABLET ORAL at 06:16

## 2018-07-16 RX ADMIN — AMLODIPINE BESYLATE 10 MILLIGRAM(S): 2.5 TABLET ORAL at 06:16

## 2018-07-16 RX ADMIN — LIDOCAINE 1 PATCH: 4 CREAM TOPICAL at 01:26

## 2018-07-16 RX ADMIN — PANTOPRAZOLE SODIUM 40 MILLIGRAM(S): 20 TABLET, DELAYED RELEASE ORAL at 06:15

## 2018-07-16 RX ADMIN — OXYCODONE HYDROCHLORIDE 10 MILLIGRAM(S): 5 TABLET ORAL at 02:45

## 2018-07-16 RX ADMIN — Medication 50 MILLIGRAM(S): at 15:37

## 2018-07-16 RX ADMIN — OXYCODONE HYDROCHLORIDE 10 MILLIGRAM(S): 5 TABLET ORAL at 02:44

## 2018-07-16 RX ADMIN — HYDROMORPHONE HYDROCHLORIDE 2 MILLIGRAM(S): 2 INJECTION INTRAMUSCULAR; INTRAVENOUS; SUBCUTANEOUS at 11:50

## 2018-07-16 RX ADMIN — Medication 0.5 MILLIGRAM(S): at 06:16

## 2018-07-16 RX ADMIN — OXYCODONE HYDROCHLORIDE 10 MILLIGRAM(S): 5 TABLET ORAL at 07:57

## 2018-07-16 RX ADMIN — HYDROMORPHONE HYDROCHLORIDE 2 MILLIGRAM(S): 2 INJECTION INTRAMUSCULAR; INTRAVENOUS; SUBCUTANEOUS at 12:20

## 2018-07-16 RX ADMIN — Medication 25 MILLIGRAM(S): at 11:51

## 2018-07-16 RX ADMIN — GABAPENTIN 100 MILLIGRAM(S): 400 CAPSULE ORAL at 22:37

## 2018-07-16 RX ADMIN — ONDANSETRON 4 MILLIGRAM(S): 8 TABLET, FILM COATED ORAL at 06:16

## 2018-07-16 RX ADMIN — ERYTHROPOIETIN 10000 UNIT(S): 10000 INJECTION, SOLUTION INTRAVENOUS; SUBCUTANEOUS at 15:37

## 2018-07-16 RX ADMIN — Medication 10 MILLIGRAM(S): at 11:51

## 2018-07-16 RX ADMIN — OXYCODONE HYDROCHLORIDE 10 MILLIGRAM(S): 5 TABLET ORAL at 22:37

## 2018-07-16 RX ADMIN — OXYCODONE HYDROCHLORIDE 10 MILLIGRAM(S): 5 TABLET ORAL at 23:30

## 2018-07-16 RX ADMIN — HYDROMORPHONE HYDROCHLORIDE 2 MILLIGRAM(S): 2 INJECTION INTRAMUSCULAR; INTRAVENOUS; SUBCUTANEOUS at 15:37

## 2018-07-16 RX ADMIN — ONDANSETRON 4 MILLIGRAM(S): 8 TABLET, FILM COATED ORAL at 00:28

## 2018-07-16 RX ADMIN — ONDANSETRON 4 MILLIGRAM(S): 8 TABLET, FILM COATED ORAL at 11:52

## 2018-07-16 RX ADMIN — Medication 25 MILLIGRAM(S): at 17:54

## 2018-07-16 RX ADMIN — Medication 0.5 MILLIGRAM(S): at 22:37

## 2018-07-16 RX ADMIN — Medication 0.5 MILLIGRAM(S): at 11:51

## 2018-07-16 RX ADMIN — HYDROMORPHONE HYDROCHLORIDE 2 MILLIGRAM(S): 2 INJECTION INTRAMUSCULAR; INTRAVENOUS; SUBCUTANEOUS at 16:35

## 2018-07-16 RX ADMIN — OXYCODONE HYDROCHLORIDE 10 MILLIGRAM(S): 5 TABLET ORAL at 00:13

## 2018-07-16 RX ADMIN — OXYCODONE HYDROCHLORIDE 10 MILLIGRAM(S): 5 TABLET ORAL at 20:09

## 2018-07-16 NOTE — PROGRESS NOTE ADULT - ASSESSMENT
26yo M chronically Ill  ESRD on HD    AV Fistula Stenosis Post angioplasty  Pain at RUE Fistula site  Multiple herniations visible  Vascular team called to evaulate    Chest Pain Atypical  Cardiology assessed not related  Most likely anxiety related  Ativan 0.5mg PO ordered to be given ASAP  Cymbalta to be titrated    Chronic Pain Syndrome  Opiod Tolerant and Dependent  Given Dilaudid 2mg IVP x 1 for severe pain  Continue plan as ordered  Treat anxiety and Depression    Nausea Vomiting  Subsided at present  Chronic Abdominal Pain- GI Workup has not uncovered anything new    HIV  Medications are being reassessed  Not currently taking

## 2018-07-16 NOTE — PROGRESS NOTE ADULT - PROBLEM SELECTOR PROBLEM 7
Essential hypertension
Chest pain, unspecified type
Chest pain, unspecified type
ESRD (end stage renal disease)
ESRD (end stage renal disease)
Essential hypertension
Essential hypertension
Seizure disorder

## 2018-07-16 NOTE — PROGRESS NOTE ADULT - PROBLEM SELECTOR PROBLEM 2
Abdominal pain in male
Intractable vomiting with nausea, unspecified vomiting type
Abdominal pain in male
Gastritis and duodenitis
Chronic pain syndrome
Chronic pain syndrome
Failure to thrive in adult
Intractable vomiting with nausea, unspecified vomiting type
Intractable vomiting with nausea, unspecified vomiting type
Failure to thrive in adult
Failure to thrive in adult

## 2018-07-16 NOTE — PROGRESS NOTE ADULT - ATTENDING COMMENTS
COUNSELING:    Face to face meeting to discuss Advanced Care Planning - Time Spent ______ Minutes.  See goals of care note.    More than 50% time spent in counseling and coordinating care. ______ Minutes.     Thank you for the opportunity to assist with the care of this patient.   [facility] [phone].
Will follow up
Discussed with patient and RN.
Discussed with patient.
Plan of care discussed with patient and RN.
Spoke with patient.
Spoke with patient.
Spoke with patients.
Spoke with patient at bedside.
Spoke with patient.
palliative care
palliative care

## 2018-07-16 NOTE — PROGRESS NOTE ADULT - SUBJECTIVE AND OBJECTIVE BOX
INTERVAL HPI/OVERNIGHT EVENTS: 28yo Male Patient admitted with N/V weakness. ESRD AV Fistula stenosis is S/P fistula stenosis angioplasty. PMH of Chronic Pain and HIV, non compliant with medications.  He is anxious and depressed. Crying at time of my visit. He insists he is having R arm pain from fistula site radiating to R chest area. Abdominal pain is chronic. Cardiology called to evaluate chest pain and  Vascular team has been called to assess fistula site which has multiple herniations noted. Ordered Ativan 0.5mg PO and Dilaudid 2mg IVP now for severe pain.    CC: Anxiety, depression RUE Fistula painful causing severe anxiety. End stage Renal Failure for HD treatment later today  27y old  Male who presents with a chief complaint of weakness (30 Jun 2018 23:01)      Present Symptoms:     Dyspnea: 0    Nausea/Vomiting:No  Anxiety:  Yes   Depression: Yes   Fatigue: Yes   Loss of appetite: Yes     Pain: RUE AV Fistula site            Character-            Duration-            Effect-            Factors-            Frequency-            Location-chronic abdominal pain            Severity-AV Fistula aching throbbing    Review of Systems: Reviewed                                          Unable to obtain due tovanxiety and catastrophyzing       MEDICATIONS  (STANDING):  acetaminophen  IVPB. 1000 milliGRAM(s) IV Intermittent once  amLODIPine   Tablet 10 milliGRAM(s) Oral daily  atovaquone Suspension 1500 milliGRAM(s) Oral daily  azithromycin   Tablet 1200 milliGRAM(s) Oral <User Schedule>  calcitriol   Capsule 0.5 MICROGram(s) Oral daily  darunavir 600 milliGRAM(s) Oral every 12 hours  diphenhydrAMINE   Injectable 25 milliGRAM(s) IV Push once  diphenhydrAMINE   Injectable 50 milliGRAM(s) IV Push once  dolutegravir 50 milliGRAM(s) Oral two times a day  emtricitabine 200 milliGRAM(s) Oral <User Schedule>  epoetin nithin Injectable 97450 Unit(s) IV Push <User Schedule>  etravirine 200 milliGRAM(s) Oral two times a day after meals  fentaNYL   Patch 100 MICROgram(s)/Hr. 1 Patch Transdermal every 48 hours  folic acid 1 milliGRAM(s) Oral daily  gabapentin 100 milliGRAM(s) Oral at bedtime  heparin  Injectable 5000 Unit(s) SubCutaneous every 12 hours  HYDROmorphone  Injectable 2 milliGRAM(s) IV Push once  levETIRAcetam 500 milliGRAM(s) Oral two times a day  lidocaine   Patch 1 Patch Transdermal daily  lisinopril 20 milliGRAM(s) Oral daily  LORazepam     Tablet 0.5 milliGRAM(s) Oral every 8 hours  metoclopramide 10 milliGRAM(s) Oral three times a day with meals  metoprolol tartrate 100 milliGRAM(s) Oral two times a day  Nephro-dayron 1 Tablet(s) Oral daily  ondansetron Injectable 4 milliGRAM(s) IV Push every 6 hours  pantoprazole    Tablet 40 milliGRAM(s) Oral two times a day  ritonavir Tablet 100 milliGRAM(s) Oral two times a day  sertraline 50 milliGRAM(s) Oral daily  sevelamer hydrochloride 800 milliGRAM(s) Oral three times a day  sucralfate suspension 1 Gram(s) Oral four times a day  tenofovir disoproxil fumarate (VIREAD) 300 milliGRAM(s) Oral <User Schedule>  Valganciclovir 50 mg/ 1 ml Oral Solutio 100 milliGRAM(s) 100 milliGRAM(s) Oral <User Schedule>    MEDICATIONS  (PRN):  acetaminophen   Tablet 650 milliGRAM(s) Oral every 6 hours PRN For Temp greater than 38 C (100.4 F)  acetaminophen   Tablet. 650 milliGRAM(s) Oral every 6 hours PRN Moderate Pain (4 - 6)  diphenhydrAMINE   Capsule 25 milliGRAM(s) Oral every 6 hours PRN Rash and/or Itching  oxyCODONE    IR 10 milliGRAM(s) Oral every 4 hours PRN Severe Pain (7 - 10)      PHYSICAL EXAM:    Vital Signs Last 24 Hrs  T(C): 36.5 (16 Jul 2018 08:00), Max: 36.7 (15 Jul 2018 23:10)  T(F): 97.7 (16 Jul 2018 08:00), Max: 98 (15 Jul 2018 23:10)  HR: 71 (16 Jul 2018 08:00) (71 - 87)  BP: 100/58 (16 Jul 2018 08:00) (100/58 - 130/82)  BP(mean): --  RR: 18 (16 Jul 2018 08:00) (16 - 18)  SpO2: 99% (16 Jul 2018 08:00) (99% - 100%)    General: alert  oriented x _3___ lethargic agitated                  cachexia      HEENT:  dry mouth      Lungs: comfortable     CV: normal     GI: distended                    :  oliguria/anuria ESRD on HD    MSK: weakness  edema              bedbound/wheelchair bound    Skin: normal   no rash    LABS:                        10.7   3.8   )-----------( 190      ( 15 Jul 2018 09:06 )             34.0     07-15    137  |  90<L>  |  104.0<H>  ----------------------------<  106  5.0   |  27.0  |  8.36<H>    Ca    9.2      15 Jul 2018 09:06  Phos  4.8     07-15  Mg     3.5     07-15    I&O's Summary    15 Jul 2018 07:01  -  16 Jul 2018 07:00  --------------------------------------------------------  IN: 1422 mL / OUT: 0 mL / NET: 1422 mL    RADIOLOGY & ADDITIONAL STUDIES:    ADVANCE DIRECTIVES:   DNR  NO     NO   Completed on:  Living Will  NO   Completed on:

## 2018-07-16 NOTE — PROGRESS NOTE ADULT - PROBLEM SELECTOR PROBLEM 1
Failure to thrive in adult
Nausea and vomiting in adult
AV fistula
Intractable vomiting with nausea, unspecified vomiting type
Somnolence
Somnolence
AV fistula
Intractable vomiting with nausea, unspecified vomiting type
AV fistula
Somnolence

## 2018-07-16 NOTE — PROGRESS NOTE ADULT - PROBLEM SELECTOR PLAN 1
Possible gastroparesis but he states that he had a negative gastric emptying study for this problem in the past. He had no response to IV Reglan which he is no longer on. Await EGD biopsy results.

## 2018-07-16 NOTE — PROGRESS NOTE ADULT - SUBJECTIVE AND OBJECTIVE BOX
[facility]  INFECTIOUS DISEASES AND INTERNAL MEDICINE at [place]  =======================================================  Howie Navarro MD  Diplomates American Board of Internal Medicine and Infectious Diseases  =======================================================    TEDDY CRAWFORD [id number]    Follow up:  26 y/o man with HIV nonadherent to ARV, CMV retinitis with vision loss, ESRD on HD, dilated cardiomyopathy, hypertension and seizure disorder was admitted with weakness, confusion, nausea and vomiting, seeing flashes of light, blind at baseline.   No new complaint, asking for pain management. s/p angioplasty.     PAST MEDICAL & SURGICAL HISTORY:  HIV (human immunodeficiency virus infection)  Seizure disorder  Hypertension  Diabetes  ESRD (end stage renal disease)  Seizure  Pericarditis  Anxiety  Depression  Renal failure (ARF), acute on chronic: dialysis av fistula, RUE  HTN (hypertension)  Cardiomyopathy  HIV disease: born HIV+  AV fistula  S/P tonsillectomy      FAMILY HISTORY:  No pertinent family history in first degree relatives  No pertinent family history in first degree relatives    Allergies  vancomycin (Anaphylaxis)    Antibiotics:  atovaquone Suspension 1500 milliGRAM(s) Oral daily  darunavir 800 milliGRAM(s) Oral daily  dolutegravir 50 milliGRAM(s) Oral daily  emtricitabine 200 milliGRAM(s) Oral <User Schedule>  levoFLOXacin  Tablet 250 milliGRAM(s) Oral every 48 hours  ritonavir Tablet 100 milliGRAM(s) Oral daily  tenofovir disoproxil fumarate (VIREAD) 300 milliGRAM(s) Oral <User Schedule>     REVIEW OF SYSTEMS:  CONSTITUTIONAL:  No Fever or chills   HEENT:  blind   CARDIOVASCULAR:  No chest pain or SOB.  RESPIRATORY:  No cough, shortness of breath, PND or orthopnea.  GASTROINTESTINAL:  no nausea , no vomiting or diarrhea.  GENITOURINARY:  No dysuria, frequency or urgency. No Blood in urine  MUSCULOSKELETAL:  R arm with AV fistula had bleeding that stopped   SKIN:  No change in skin, hair or nails.  ENDOCRINE:  No heat or cold intolerance, polyuria or polydipsia.    Physical Exam:  Vital Signs Last 24 Hrs  T(C): 36.5 (16 Jul 2018 08:00), Max: 36.7 (15 Jul 2018 23:10)  T(F): 97.7 (16 Jul 2018 08:00), Max: 98 (15 Jul 2018 23:10)  HR: 71 (16 Jul 2018 08:00) (71 - 87)  BP: 100/58 (16 Jul 2018 08:00) (100/58 - 130/82)  RR: 18 (16 Jul 2018 08:00) (16 - 18)  SpO2: 99% (16 Jul 2018 08:00) (99% - 100%)  GEN: NAD  HEENT: normocephalic and atraumatic. EOMI. PERRL.    NECK: Supple.  No lymphadenopathy   LUNGS: Clear to auscultation.  HEART: Regular rate and rhythm   ABDOMEN: Soft, nontender, and nondistended.  Positive bowel sounds.    EXTREMITIES: Without any cyanosis, clubbing, rash, lesions or edema. right arm with AV fistula with good thrill and big vessels, no bleeding at this time  NEUROLOGIC: grossly intact.  PSYCHIATRIC: Appropriate affect .  SKIN: No ulceration or induration present.    Labs:  07-15    137  |  90<L>  |  104.0<H>  ----------------------------<  106  5.0   |  27.0  |  8.36<H>    Ca    9.2      15 Jul 2018 09:06  Phos  4.8     07-15  Mg     3.5     07-15                        10.7   3.8   )-----------( 190      ( 15 Jul 2018 09:06 )             34.0     CARDIAC MARKERS ( 15 Jul 2018 19:01 )  x     / 0.20 ng/mL / x     / x     / x      CARDIAC MARKERS ( 15 Jul 2018 13:08 )  x     / 0.20 ng/mL / x     / x     / x        RECENT CULTURES:  None       All imaging and data are reviewed.

## 2018-07-16 NOTE — PROGRESS NOTE ADULT - SUBJECTIVE AND OBJECTIVE BOX
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 Pt also c/o nausea and wretching. Vomited once today at home. Fell today at home in bathroom but denies hitting head, states tripped. Pt uses wheelchair at baseline. He admits to being non compliant with his medications at home; he had not taken his medications. Pt denies cp, diarrhea, HA, fevers, constipation, numbness, tingling. (30 Jun 2018 23:01)    Follow up ESRD  No new complaints  felling better but still dependent on Nepro drinks, little solid food    PAST MEDICAL & SURGICAL HISTORY:  HIV (human immunodeficiency virus infection)  Seizure disorder  Hypertension  Diabetes  ESRD (end stage renal disease)  Seizure  Pericarditis  Anxiety  Depression  Renal failure (ARF), acute on chronic: dialysis av fistula, RUE  HTN (hypertension)  Cardiomyopathy  HIV disease: born HIV+  AV fistula  S/P tonsillectomy      MEDICATIONS  (STANDING):  acetaminophen  IVPB. 1000 milliGRAM(s) IV Intermittent once  amLODIPine   Tablet 10 milliGRAM(s) Oral daily  atovaquone Suspension 1500 milliGRAM(s) Oral daily  azithromycin   Tablet 1200 milliGRAM(s) Oral <User Schedule>  calcitriol   Capsule 0.5 MICROGram(s) Oral daily  darunavir 600 milliGRAM(s) Oral every 12 hours  diphenhydrAMINE   Injectable 25 milliGRAM(s) IV Push once  diphenhydrAMINE   Injectable 50 milliGRAM(s) IV Push once  dolutegravir 50 milliGRAM(s) Oral two times a day  emtricitabine 200 milliGRAM(s) Oral <User Schedule>  epoetin nithin Injectable 27951 Unit(s) IV Push <User Schedule>  etravirine 200 milliGRAM(s) Oral two times a day after meals  fentaNYL   Patch 100 MICROgram(s)/Hr. 1 Patch Transdermal every 48 hours  folic acid 1 milliGRAM(s) Oral daily  heparin  Injectable 5000 Unit(s) SubCutaneous every 12 hours  HYDROmorphone  Injectable 2 milliGRAM(s) IV Push once  levETIRAcetam 500 milliGRAM(s) Oral two times a day  lidocaine   Patch 1 Patch Transdermal daily  lisinopril 20 milliGRAM(s) Oral daily  LORazepam     Tablet 0.5 milliGRAM(s) Oral every 8 hours  metoclopramide 10 milliGRAM(s) Oral three times a day with meals  metoprolol tartrate 100 milliGRAM(s) Oral two times a day  Nephro-dayron 1 Tablet(s) Oral daily  ondansetron Injectable 4 milliGRAM(s) IV Push every 6 hours  pantoprazole    Tablet 40 milliGRAM(s) Oral two times a day  ritonavir Tablet 100 milliGRAM(s) Oral two times a day  sertraline 50 milliGRAM(s) Oral daily  sevelamer hydrochloride 800 milliGRAM(s) Oral three times a day  sucralfate suspension 1 Gram(s) Oral four times a day  tenofovir disoproxil fumarate (VIREAD) 300 milliGRAM(s) Oral <User Schedule>  Valganciclovir 50 mg/ 1 ml Oral Solutio 100 milliGRAM(s) 100 milliGRAM(s) Oral <User Schedule>    MEDICATIONS  (PRN):  acetaminophen   Tablet 650 milliGRAM(s) Oral every 6 hours PRN For Temp greater than 38 C (100.4 F)  acetaminophen   Tablet. 650 milliGRAM(s) Oral every 6 hours PRN Moderate Pain (4 - 6)  diphenhydrAMINE   Capsule 25 milliGRAM(s) Oral every 6 hours PRN Rash and/or Itching  oxyCODONE    IR 10 milliGRAM(s) Oral every 4 hours PRN Severe Pain (7 - 10)      Allergies    vancomycin (Anaphylaxis)    Intolerances        Vital Signs Last 24 Hrs  T(C): 36.5 (16 Jul 2018 08:00), Max: 36.7 (15 Jul 2018 23:10)  T(F): 97.7 (16 Jul 2018 08:00), Max: 98 (15 Jul 2018 23:10)  HR: 71 (16 Jul 2018 08:00) (71 - 87)  BP: 100/58 (16 Jul 2018 08:00) (100/58 - 130/82)  BP(mean): --  RR: 18 (16 Jul 2018 08:00) (16 - 18)  SpO2: 99% (16 Jul 2018 08:00) (99% - 100%)  Daily     Daily     PHYSICAL EXAM:  GENERAL: NAD, well-groomed, well-developed  HEAD:  Atraumatic, Normocephalic  EYES: EOMI, PERRLA, conjunctiva and sclera clear  ENMT: No tonsillar erythema, exudates, or enlargement; Moist mucous membranes, Good dentition, No lesions  NECK: Supple, No JVD, Normal thyroid  NERVOUS SYSTEM:  Alert & Oriented X3, Good concentration; Motor Strength 5/5 B/L upper and lower extremities; DTRs 2+ intact and symmetric  CHEST/LUNG: Clear to percussion bilaterally; No rales, rhonchi, wheezing, or rubs  HEART: Regular rate and rhythm; No murmurs, rubs, or gallops  ABDOMEN: Soft, Nontender, Nondistended; Bowel sounds present  EXTREMITIES:  2+ Peripheral Pulses, No clubbing, cyanosis, or edema; RUE AVF that is hypertrophic  SKIN: No rashes or lesions      LABS:                        10.7   3.8   )-----------( 190      ( 15 Jul 2018 09:06 )             34.0     07-15    137  |  90<L>  |  104.0<H>  ----------------------------<  106  5.0   |  27.0  |  8.36<H>    Ca    9.2      15 Jul 2018 09:06  Phos  4.8     07-15  Mg     3.5     07-15                RADIOLOGY & ADDITIONAL TESTS:

## 2018-07-16 NOTE — PROGRESS NOTE ADULT - ASSESSMENT
26y/o man with HIV on TDF+FTC+DTG+DRV/r nonadherent to meds, with CMV retinitis, NI2=355 and VL is very high. 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   Can salvage with some meds that his HIV is intermediate and with bid dosing of DRV/r and DTG, until new treatments are available. very poor prognosis.     1-HIV:  -Continue TDF 300mg once weekly   -Emtricitabine 200mg twice weekly  -Switch Darunavir to 600mg BID  -Switch Ritonavir 100mg BID  -Dolutegravir 50mg BID  -Add Etravirine 200mg bid   -Atovaquone 1500mg daily for PCP prophylaxis   -Can start MAV prophylaxis since he has no LN in abdominal CT but if develops IRIS will do more work up.   Blood culture for MAC still pending.   -Azithromycin 1200mg once weekly.     2-History of CMV:  -Cont valganciclovir 100mg after each HD for treatment and then prophylaxis until CD4 is high.   -Ophthalmology consult     3-ESRD:   -Doing better     4-Pancytopenia:  -could be related to bone marrow suppression in uncontrolled HIV patients.   -f/up blood culture for mycobacterium

## 2018-07-16 NOTE — PROGRESS NOTE ADULT - PROBLEM SELECTOR PLAN 6
HD as per schedule.
FS wnl   poor po intake, dc raiss for comfort  liberate diet
HD as per schedule.
ID following
ID following
cont antiHTN as per last d/c summary
HD as per schedule.

## 2018-07-16 NOTE — PROGRESS NOTE ADULT - PROBLEM SELECTOR PLAN 2
etiology unclear but appears minimal at this time. Awaiting cat scan. Will increase PPI to twice daily.
zofran prn n/v  advance diet as tolerated  resolved
Same IV Pantoprazole and liquid Carafate therapies. Await EGD biopsy results.
etiology unclear
GI eval noted, for CT abdomen ordered. EGD showing antral gastritis, Carafate ordered. Continue PPI & reglan.  low fat renal diet  Await biopsy results fo stomach.   Renal diet; 3 nepro shakes three times per day; he is not really eating the solid food.
GI eval noted, for CT abdomen ordered. EGD showing antral gastritis, Carafate ordered. Continue PPI & reglan.  low fat renal diet  Await biopsy results fo stomach.   Renal diet; 3 nepro shakes three times per day; he is not really eating the solid food.
GI eval noted, for CT abdomen ordered. EGD showing antral gastritis, Carafate ordered. Continue PPI & reglan.  low fat renal diet  Await biopsy results fo stomach.   Renal diet; 3 nepro shakes three times per day; he is not really eating the solid food.  Disccussed with GI about pt's diffuse abd pain which as per pt is tender to even gentle touch, no cause for this significant amount of pain found on endoscopy, await bx results, EUS to be done as outpt, okay to d/c pt from GI stand point
Nutrition follow up, on Nepro TID.
On Nepro, GI consulted for possible endoscopic work up, given immunocompromised status.
On Nepro, GI eval noted, for CT abdomen ordered.
On Nepro, GI eval noted, for CT abdomen ordered. EGD showing antral gastritis, Carafate ordered.
On Nepro, GI eval noted, for CT abdomen ordered. EGD showing antral gastritis, Carafate ordered. Continue PPI.
c/w fentanyl patch  pain mgmt consulted
c/w fentanyl patch  pain mgmt eval appreciated
social work consult  dietary consult
zofran prn n/v  advance diet as tolerated  resolved
zofran prn n/v  advance diet as tolerated , now on liquid diet
Nutrition follow up, on Nepro TID.
On Nepro, GI eval noted, for CT abdomen ordered. Possible EGD in am.

## 2018-07-16 NOTE — PROGRESS NOTE ADULT - PROBLEM SELECTOR PLAN 5
On valganciclovir 100mg
Dr. Nam consulted  HD as per renal
FS wnl   poor po intake, dc raiss for comfort  liberate diet
On valganciclovir 100mg
social work consult  dietary consult
social work consult  dietary consult
On valganciclovir 100mg

## 2018-07-16 NOTE — PROGRESS NOTE ADULT - NSHPATTENDINGPLANDISCUSS_GEN_ALL_CORE
Dr. Box
Patient
pt, rn
patient
patient
pt, rn, cardio, GI, Vascular sx, palliative
pt, rn
patient

## 2018-07-16 NOTE — PROGRESS NOTE ADULT - PROBLEM SELECTOR PLAN 7
cont anti HTN as per last d/c summary
Dr. Nam consulted  HD as per renal
Dr. Nam consulted  HD as per renal
check EKG, TnI  Recent Echo with EF 50%, Grade 1 DD, mod PHTN
check EKG, TnI  Recent Echo with EF 50%, Grade 1 DD, mod PHTN  Repeat TTE stable see above and EKG NSR LVH without ischemic changes   Cardio signed off with out pt follow up if needed
cont antiHTN as per last d/c summary
cont antiHTN as per last d/c summary
cont keppra

## 2018-07-16 NOTE — PROGRESS NOTE ADULT - PROBLEM SELECTOR PROBLEM 4
HIV disease
ESRD (end stage renal disease)
HIV disease
HIV disease
Intractable vomiting with nausea, unspecified vomiting type
Intractable vomiting with nausea, unspecified vomiting type
Pancytopenia

## 2018-07-16 NOTE — PROGRESS NOTE ADULT - PROBLEM SELECTOR PLAN 4
ID following
Blood cultures negative so far.
Dr. Nam consulted  HD as per renal
ID evaluation
ID following
Stable. Likely from underlying HIV/AIDS.
zofran prn n/v  advance diet as tolerated  resolved
zofran prn n/v  advance diet as tolerated  resolved
Monitor CBC, await DANIEL blood cultures.
Blood cultures negative so far.

## 2018-07-16 NOTE — PROGRESS NOTE ADULT - SUBJECTIVE AND OBJECTIVE BOX
CHIEF COMPLAINT/INTERVAL HISTORY:    Patient is a 27y old  Male who presents with a chief complaint of weakness (30 Jun 2018 23:01)      HPI:  The patient is a 27 year old male with a history of HIV ( non compliant with treatment), CMV retinitis with vision loss, ESRD on HD MWF, dilated cardiomyopathy, hypertension and seizure disorder who was brought to the ED with complaints of weakness, confusion, enlarging of his fistula with occasional bleeding, and seeing flashes of light (he is blind at baseline). Pt also c/o nausea and wretching. Vomited once today at home. Fell today at home in bathroom but denies hitting head, states tripped. Pt uses wheelchair at baseline. He admits to being non compliant with his medications at home; he had not taken his medications. Pt denies cp, diarrhea, HA, fevers, constipation, numbness, tingling. (30 Jun 2018 23:01)      SUBJECTIVE & OBJECTIVE/ ROS: Pt seen and examined at bedside. Pt asking for IV dilaudid once again. I told him that i did increased the oxycodone IR 10mg from q6 hrs to q4 hrs yesterday as per his request but I dont see a reason to put him back on the IV Dilaudid once again to which he becomes agitated and uses foul language. During his episode of agitation pt appears comfortable not bending over in pain.     ICU Vital Signs Last 24 Hrs  T(C): 36.5 (16 Jul 2018 08:00), Max: 36.7 (15 Jul 2018 23:10)  T(F): 97.7 (16 Jul 2018 08:00), Max: 98 (15 Jul 2018 23:10)  HR: 71 (16 Jul 2018 08:00) (71 - 87)  BP: 100/58 (16 Jul 2018 08:00) (100/58 - 130/82)  BP(mean): --  ABP: --  ABP(mean): --  RR: 18 (16 Jul 2018 08:00) (16 - 18)  SpO2: 99% (16 Jul 2018 08:00) (99% - 100%)        MEDICATIONS  (STANDING):  acetaminophen  IVPB. 1000 milliGRAM(s) IV Intermittent once  amLODIPine   Tablet 10 milliGRAM(s) Oral daily  atovaquone Suspension 1500 milliGRAM(s) Oral daily  azithromycin   Tablet 1200 milliGRAM(s) Oral <User Schedule>  calcitriol   Capsule 0.5 MICROGram(s) Oral daily  darunavir 600 milliGRAM(s) Oral every 12 hours  diphenhydrAMINE   Injectable 25 milliGRAM(s) IV Push once  diphenhydrAMINE   Injectable 50 milliGRAM(s) IV Push once  dolutegravir 50 milliGRAM(s) Oral two times a day  emtricitabine 200 milliGRAM(s) Oral <User Schedule>  epoetin nithin Injectable 11334 Unit(s) IV Push <User Schedule>  etravirine 200 milliGRAM(s) Oral two times a day after meals  fentaNYL   Patch 100 MICROgram(s)/Hr. 1 Patch Transdermal every 48 hours  folic acid 1 milliGRAM(s) Oral daily  gabapentin 100 milliGRAM(s) Oral at bedtime  heparin  Injectable 5000 Unit(s) SubCutaneous every 12 hours  HYDROmorphone  Injectable 2 milliGRAM(s) IV Push once  levETIRAcetam 500 milliGRAM(s) Oral two times a day  lidocaine   Patch 1 Patch Transdermal daily  lisinopril 20 milliGRAM(s) Oral daily  LORazepam     Tablet 0.5 milliGRAM(s) Oral every 8 hours  metoclopramide 10 milliGRAM(s) Oral three times a day with meals  metoprolol tartrate 100 milliGRAM(s) Oral two times a day  Nephro-dayron 1 Tablet(s) Oral daily  ondansetron Injectable 4 milliGRAM(s) IV Push every 6 hours  pantoprazole    Tablet 40 milliGRAM(s) Oral two times a day  ritonavir Tablet 100 milliGRAM(s) Oral two times a day  sertraline 50 milliGRAM(s) Oral daily  sevelamer hydrochloride 800 milliGRAM(s) Oral three times a day  sucralfate suspension 1 Gram(s) Oral four times a day  tenofovir disoproxil fumarate (VIREAD) 300 milliGRAM(s) Oral <User Schedule>  Valganciclovir 50 mg/ 1 ml Oral Solutio 100 milliGRAM(s) 100 milliGRAM(s) Oral <User Schedule>    MEDICATIONS  (PRN):  acetaminophen   Tablet 650 milliGRAM(s) Oral every 6 hours PRN For Temp greater than 38 C (100.4 F)  acetaminophen   Tablet. 650 milliGRAM(s) Oral every 6 hours PRN Moderate Pain (4 - 6)  diphenhydrAMINE   Capsule 25 milliGRAM(s) Oral every 6 hours PRN Rash and/or Itching  oxyCODONE    IR 10 milliGRAM(s) Oral every 4 hours PRN Severe Pain (7 - 10)      LABS:                        10.7   3.8   )-----------( 190      ( 15 Jul 2018 09:06 )             34.0     07-15    137  |  90<L>  |  104.0<H>  ----------------------------<  106  5.0   |  27.0  |  8.36<H>    Ca    9.2      15 Jul 2018 09:06  Phos  4.8     07-15  Mg     3.5     07-15            CAPILLARY BLOOD GLUCOSE          RECENT CULTURES:      RADIOLOGY & ADDITIONAL TESTS:      PHYSICAL EXAM:    Refuses physical exam

## 2018-07-16 NOTE — CONSULT NOTE ADULT - SUBJECTIVE AND OBJECTIVE BOX
[facility]                                   [address] [place] [address]                                                      PHONE: [phone]                                                         FAX: [phone]  [email or web address]  ---------------------------------------------------------------------------------------------------------------------------------    Reason for Consult: +trop     HPI:  TEDDY CRAWFORD is an 27y Male with history of HIV none complaint with treatment CMV retinitis with loss of vision GERD ESRD on HD HTN seizure who presents to ED with weakness confusion nausea vomiting s/p GI work up gastritis now C/O of sharp and burning 5/10 chest pain none exertional.  Feeling much better now     PAST MEDICAL & SURGICAL HISTORY:  HIV (human immunodeficiency virus infection)  Seizure disorder  Hypertension  Diabetes  ESRD (end stage renal disease)  Seizure  Pericarditis  Anxiety  Depression  Renal failure (ARF), acute on chronic: dialysis av fistula, RUE  HTN (hypertension)  Cardiomyopathy  HIV disease: born HIV+  AV fistula  S/P tonsillectomy      vancomycin (Anaphylaxis)      MEDICATIONS  (STANDING):  acetaminophen  IVPB. 1000 milliGRAM(s) IV Intermittent once  amLODIPine   Tablet 10 milliGRAM(s) Oral daily  atovaquone Suspension 1500 milliGRAM(s) Oral daily  calcitriol   Capsule 0.5 MICROGram(s) Oral daily  darunavir 800 milliGRAM(s) Oral daily  diphenhydrAMINE   Injectable 25 milliGRAM(s) IV Push once  diphenhydrAMINE   Injectable 50 milliGRAM(s) IV Push once  dolutegravir 50 milliGRAM(s) Oral daily  emtricitabine 200 milliGRAM(s) Oral <User Schedule>  epoetin nithin Injectable 60567 Unit(s) IV Push <User Schedule>  fentaNYL   Patch 100 MICROgram(s)/Hr. 1 Patch Transdermal every 48 hours  folic acid 1 milliGRAM(s) Oral daily  heparin  Injectable 5000 Unit(s) SubCutaneous every 12 hours  HYDROmorphone  Injectable 2 milliGRAM(s) IV Push once  levETIRAcetam 500 milliGRAM(s) Oral two times a day  lidocaine   Patch 1 Patch Transdermal daily  lisinopril 20 milliGRAM(s) Oral daily  LORazepam     Tablet 0.5 milliGRAM(s) Oral every 8 hours  metoclopramide 10 milliGRAM(s) Oral three times a day with meals  metoprolol tartrate 100 milliGRAM(s) Oral two times a day  Nephro-dayron 1 Tablet(s) Oral daily  ondansetron Injectable 4 milliGRAM(s) IV Push every 6 hours  pantoprazole    Tablet 40 milliGRAM(s) Oral two times a day  ritonavir Tablet 100 milliGRAM(s) Oral daily  sertraline 50 milliGRAM(s) Oral daily  sevelamer hydrochloride 800 milliGRAM(s) Oral three times a day  sucralfate suspension 1 Gram(s) Oral four times a day  tenofovir disoproxil fumarate (VIREAD) 300 milliGRAM(s) Oral <User Schedule>  Valganciclovir 50 mg/ 1 ml Oral Solutio 100 milliGRAM(s) 100 milliGRAM(s) Oral <User Schedule>    MEDICATIONS  (PRN):  acetaminophen   Tablet 650 milliGRAM(s) Oral every 6 hours PRN For Temp greater than 38 C (100.4 F)  acetaminophen   Tablet. 650 milliGRAM(s) Oral every 6 hours PRN Moderate Pain (4 - 6)  diphenhydrAMINE   Capsule 25 milliGRAM(s) Oral every 6 hours PRN Rash and/or Itching  oxyCODONE    IR 10 milliGRAM(s) Oral every 4 hours PRN Severe Pain (7 - 10)      Social History:  Cigarettes:        none            Alchohol:    none             Illicit Drug Abuse:  none    ROS: Negative other than as mentioned in HPI.    Vital Signs Last 24 Hrs  T(C): 36.5 (16 Jul 2018 08:00), Max: 36.7 (15 Jul 2018 23:10)  T(F): 97.7 (16 Jul 2018 08:00), Max: 98 (15 Jul 2018 23:10)  HR: 71 (16 Jul 2018 08:00) (71 - 87)  BP: 100/58 (16 Jul 2018 08:00) (100/58 - 130/82)  BP(mean): --  RR: 18 (16 Jul 2018 08:00) (16 - 18)  SpO2: 99% (16 Jul 2018 08:00) (99% - 100%)  ICU Vital Signs Last 24 Hrs  TEDDY CRAWFORD  I&O's Detail    15 Jul 2018 07:01  -  16 Jul 2018 07:00  --------------------------------------------------------  IN:    Oral Fluid: 1422 mL  Total IN: 1422 mL    OUT:  Total OUT: 0 mL    Total NET: 1422 mL        I&O's Summary    15 Jul 2018 07:01  -  16 Jul 2018 07:00  --------------------------------------------------------  IN: 1422 mL / OUT: 0 mL / NET: 1422 mL      Drug Dosing Weight  TEDDY CRAWFORD      PHYSICAL EXAM:  General: Appears well developed, well nourished alert and cooperative.  HEENT: Head; normocephalic, atraumatic.  Eyes: Pupils reactive, cornea wnl.  Neck: Supple, no nodes adenopathy, no NVD or carotid bruit or thyromegaly.  CARDIOVASCULAR: Normal S1 and S2, No murmur, rub, gallop or lift.   LUNGS: No rales, rhonchi or wheeze. Normal breath sounds bilaterally.  ABDOMEN: Soft, nontender without mass or organomegaly. bowel sounds normoactive.  EXTREMITIES: No clubbing, cyanosis or edema. Distal pulses wnl.   SKIN: warm and dry with normal turgor.  NEURO: Alert/oriented x 3/normal motor exam. No pathologic reflexes.    PSYCH: normal affect.        LABS:                        10.7   3.8   )-----------( 190      ( 15 Jul 2018 09:06 )             34.0     07-15    137  |  90<L>  |  104.0<H>  ----------------------------<  106  5.0   |  27.0  |  8.36<H>    Ca    9.2      15 Jul 2018 09:06  Phos  4.8     07-15  Mg     3.5     07-15      TEDDY CRAWFORD  CARDIAC MARKERS ( 15 Jul 2018 19:01 )  x     / 0.20 ng/mL / x     / x     / x      CARDIAC MARKERS ( 15 Jul 2018 13:08 )  x     / 0.20 ng/mL / x     / x     / x                RADIOLOGY & ADDITIONAL STUDIES:    INTERPRETATION OF TELEMETRY (personally reviewed):    ECG:  Diagnosis Line Normal sinus rhythm  Possible Left atrial enlargement  Left ventricular hypertrophy with repolarization abnormality  Abnormal ECG      ECHO:  Summary:   1. Technically adequate study.   2. Normal global left ventricular systolic function. Left ventricular   ejection fraction, by visual estimation, is 55%.   3. Mildly increased left ventricular internal cavity size.   4. Mildly increased LV wall thickness.   5. Moderate mitral annular calcification.   6. Mild tricuspid regurgitation.   7. Biatrial enlargement.   8. There is no evidence of pericardial effusion.   9. Dilated pulmonary artery.  10. ** Compared to TTE dated 4/14/18, TR less prominent, LV systolic   function mildly improved.      Assessment and Plan:  In summary, TEDDY CRAWFORD is an 27y Male with past medical history significant for 27y Male with history of HIV none complaint with treatment CMV retinitis with loss of vision GERD ESRD on HD HTN seizure who presents to ED with weakness confusion nausea vomiting s/p GI work up gastritis now C/O of sharp and burning 5/10 chest pain none exertional; atypical chest pain and + trop not C/W with ACS due to renal clearness; TTE stable see above and EKG NSR LVH without ischemic changes     out pt follow up     please recall if needed     D/W with PMD

## 2018-07-16 NOTE — PROGRESS NOTE ADULT - PROBLEM SELECTOR PLAN 1
S/p angioplasty, outpatient vascular follow up.  c/w PO opiates (dose adjusted as per pain management), tylenol, lidoderm patch, fentanyl patch, gabapentin  No indication for IV Dilaudid. Appears comfortable at this time  Disccussed with Vascular sx, recent angio should not cause this amount of pain, although pt does have calcifications in the AVF which can cause pain but he does need to be switched to PO inorder to be discharged early this week  Pain management re-called to re-address his pain

## 2018-07-16 NOTE — PROGRESS NOTE ADULT - ASSESSMENT
ESRD on HD MWF  HTN  HIV  AVF bleeding   DM  Anemia  Nausea/Vomiting     -HD MWF; proceed with dialysis today 7/16/18; orders reviewed; benadryl and dilaudid ordered with dialysis  -S/p fistulogram and balloon angioplasty 7/6/18, Vascular following closely  -Hemodynamics are stable  -On KEENE therapy; work up and management per ID  -Renal diet; 3 nepro shakes three times per day; he is not really eating the solid food  -GI follow up; S/p EGD; awaiting biopsy results    Thank you    D/W KARY

## 2018-07-16 NOTE — PROGRESS NOTE ADULT - PROBLEM SELECTOR PROBLEM 3
Failure to thrive in adult
Gastric lesion
Failure to thrive in adult
Failure to thrive in adult
HIV (human immunodeficiency virus infection)
HIV disease
Somnolence
Somnolence
HIV (human immunodeficiency virus infection)
HIV (human immunodeficiency virus infection)

## 2018-07-16 NOTE — PROGRESS NOTE ADULT - PROBLEM SELECTOR PROBLEM 5
CMV retinitis
ESRD (end stage renal disease)
CMV retinitis
ESRD (end stage renal disease)
ESRD (end stage renal disease)
Failure to thrive in adult
Failure to thrive in adult
Type 2 diabetes mellitus with complication, unspecified whether long term insulin use
CMV retinitis

## 2018-07-16 NOTE — PROGRESS NOTE ADULT - PROBLEM SELECTOR PROBLEM 6
ESRD (end stage renal disease)
Type 2 diabetes mellitus with complication, unspecified whether long term insulin use
ESRD (end stage renal disease)
Essential hypertension
HIV disease
HIV disease
Type 2 diabetes mellitus with complication, unspecified whether long term insulin use
Type 2 diabetes mellitus with complication, unspecified whether long term insulin use
ESRD (end stage renal disease)

## 2018-07-16 NOTE — PROGRESS NOTE ADULT - SUBJECTIVE AND OBJECTIVE BOX
Pt seen and examined. He continues to c/o post prandial nausea and vomiting. EGD biopsy results pending. He thinks that he had a negative gastric emptying study done @ [facility] roughly 1 and a half years ago for the exact same problem. EGD Thursday showed gastritis and duodenitis with two erythematous pre-pyloric lesions which were biopsied. results of biopsie still pending.     REVIEW OF SYSTEMS:  Constitutional: No fever, weight loss or fatigue  Cardiovascular: No chest pain, palpitations, dizziness or leg swelling  Gastrointestinal: As noted above.  Skin: No itching, burning, rashes or lesions       MEDICATIONS:  MEDICATIONS  (STANDING):  acetaminophen  IVPB. 1000 milliGRAM(s) IV Intermittent once  amLODIPine   Tablet 10 milliGRAM(s) Oral daily  atovaquone Suspension 1500 milliGRAM(s) Oral daily  calcitriol   Capsule 0.5 MICROGram(s) Oral daily  darunavir 800 milliGRAM(s) Oral daily  diphenhydrAMINE   Injectable 25 milliGRAM(s) IV Push once  diphenhydrAMINE   Injectable 50 milliGRAM(s) IV Push once  dolutegravir 50 milliGRAM(s) Oral daily  emtricitabine 200 milliGRAM(s) Oral <User Schedule>  epoetin nithin Injectable 63728 Unit(s) IV Push <User Schedule>  fentaNYL   Patch 100 MICROgram(s)/Hr. 1 Patch Transdermal every 48 hours  folic acid 1 milliGRAM(s) Oral daily  heparin  Injectable 5000 Unit(s) SubCutaneous every 12 hours  HYDROmorphone  Injectable 2 milliGRAM(s) IV Push once  levETIRAcetam 500 milliGRAM(s) Oral two times a day  lidocaine   Patch 1 Patch Transdermal daily  lisinopril 20 milliGRAM(s) Oral daily  LORazepam     Tablet 0.5 milliGRAM(s) Oral every 8 hours  metoclopramide 10 milliGRAM(s) Oral three times a day with meals  metoprolol tartrate 100 milliGRAM(s) Oral two times a day  Nephro-dayron 1 Tablet(s) Oral daily  ondansetron Injectable 4 milliGRAM(s) IV Push every 6 hours  pantoprazole    Tablet 40 milliGRAM(s) Oral two times a day  ritonavir Tablet 100 milliGRAM(s) Oral daily  sertraline 50 milliGRAM(s) Oral daily  sevelamer hydrochloride 800 milliGRAM(s) Oral three times a day  sucralfate suspension 1 Gram(s) Oral four times a day  tenofovir disoproxil fumarate (VIREAD) 300 milliGRAM(s) Oral <User Schedule>  Valganciclovir 50 mg/ 1 ml Oral Solutio 100 milliGRAM(s) 100 milliGRAM(s) Oral <User Schedule>    MEDICATIONS  (PRN):  acetaminophen   Tablet 650 milliGRAM(s) Oral every 6 hours PRN For Temp greater than 38 C (100.4 F)  acetaminophen   Tablet. 650 milliGRAM(s) Oral every 6 hours PRN Moderate Pain (4 - 6)  diphenhydrAMINE   Capsule 25 milliGRAM(s) Oral every 6 hours PRN Rash and/or Itching  oxyCODONE    IR 10 milliGRAM(s) Oral every 4 hours PRN Severe Pain (7 - 10)      Allergies    vancomycin (Anaphylaxis)    Intolerances        Vital Signs Last 24 Hrs  T(C): 36.7 (15 Jul 2018 23:10), Max: 36.9 (15 Jul 2018 08:52)  T(F): 98 (15 Jul 2018 23:10), Max: 98.4 (15 Jul 2018 08:52)  HR: 81 (16 Jul 2018 06:10) (74 - 87)  BP: 130/82 (16 Jul 2018 06:10) (104/64 - 130/82)  BP(mean): --  RR: 16 (15 Jul 2018 23:10) (16 - 19)  SpO2: 100% (15 Jul 2018 23:10) (99% - 100%)    07-15 @ 07:01  -  07-16 @ 07:00  --------------------------------------------------------  IN: 1422 mL / OUT: 0 mL / NET: 1422 mL        PHYSICAL EXAM:    General: Well developed; well nourished; in no acute distress  HEENT: MMM, conjunctiva pink and sclera anicteric  Lungs: clear to auscultation bilaterally.  Cor: RRR S1, S2 only.  Gastrointestinal: Abdomen: Soft non-tender non-distended; Normal bowel sounds; No hepatosplenomegaly  Extremities: no cyanosis, clubbing or edema.  Skin: Warm and dry. No obvious rash  Neuro: Pt. a + o x 3.    LABS:  CBC Full  -  ( 15 Jul 2018 09:06 )  WBC Count : 3.8 K/uL  Hemoglobin : 10.7 g/dL  Hematocrit : 34.0 %  Platelet Count - Automated : 190 K/uL  Mean Cell Volume : 93.2 fl  Mean Cell Hemoglobin : 29.3 pg  Mean Cell Hemoglobin Concentration : 31.5 g/dL  Auto Neutrophil # : 1.8 K/uL  Auto Lymphocyte # : 1.4 K/uL  Auto Monocyte # : 0.4 K/uL  Auto Eosinophil # : 0.2 K/uL  Auto Basophil # : 0.0 K/uL  Auto Neutrophil % : 46.9 %  Auto Lymphocyte % : 36.3 %  Auto Monocyte % : 11.5 %  Auto Eosinophil % : 5.0 %  Auto Basophil % : 0.3 %    07-15    137  |  90<L>  |  104.0<H>  ----------------------------<  106  5.0   |  27.0  |  8.36<H>    Ca    9.2      15 Jul 2018 09:06  Phos  4.8     07-15  Mg     3.5     07-15                        RADIOLOGY & ADDITIONAL STUDIES (The following images were personally reviewed):

## 2018-07-17 ENCOUNTER — TRANSCRIPTION ENCOUNTER (OUTPATIENT)
Age: 28
End: 2018-07-17

## 2018-07-17 VITALS
SYSTOLIC BLOOD PRESSURE: 98 MMHG | RESPIRATION RATE: 18 BRPM | TEMPERATURE: 98 F | DIASTOLIC BLOOD PRESSURE: 350 MMHG | HEART RATE: 72 BPM | OXYGEN SATURATION: 98 %

## 2018-07-17 LAB
ALBUMIN SERPL ELPH-MCNC: 4 G/DL — SIGNIFICANT CHANGE UP (ref 3.3–5.2)
ALP SERPL-CCNC: 416 U/L — HIGH (ref 40–120)
ALT FLD-CCNC: 16 U/L — SIGNIFICANT CHANGE UP
ANION GAP SERPL CALC-SCNC: 16 MMOL/L — SIGNIFICANT CHANGE UP (ref 5–17)
AST SERPL-CCNC: 18 U/L — SIGNIFICANT CHANGE UP
BASOPHILS # BLD AUTO: 0 K/UL — SIGNIFICANT CHANGE UP (ref 0–0.2)
BASOPHILS NFR BLD AUTO: 0.4 % — SIGNIFICANT CHANGE UP (ref 0–2)
BILIRUB SERPL-MCNC: 0.3 MG/DL — LOW (ref 0.4–2)
BUN SERPL-MCNC: 70 MG/DL — HIGH (ref 8–20)
CALCIUM SERPL-MCNC: 9.2 MG/DL — SIGNIFICANT CHANGE UP (ref 8.6–10.2)
CEA SERPL-MCNC: 1.6 NG/ML — SIGNIFICANT CHANGE UP (ref 0–3.8)
CHLORIDE SERPL-SCNC: 91 MMOL/L — LOW (ref 98–107)
CO2 SERPL-SCNC: 28 MMOL/L — SIGNIFICANT CHANGE UP (ref 22–29)
CREAT SERPL-MCNC: 6.57 MG/DL — HIGH (ref 0.5–1.3)
EOSINOPHIL # BLD AUTO: 0.1 K/UL — SIGNIFICANT CHANGE UP (ref 0–0.5)
EOSINOPHIL NFR BLD AUTO: 3.3 % — SIGNIFICANT CHANGE UP (ref 0–5)
GLUCOSE SERPL-MCNC: 109 MG/DL — SIGNIFICANT CHANGE UP (ref 70–115)
HCT VFR BLD CALC: 32.9 % — LOW (ref 42–52)
HGB BLD-MCNC: 10.3 G/DL — LOW (ref 14–18)
LYMPHOCYTES # BLD AUTO: 0.9 K/UL — LOW (ref 1–4.8)
LYMPHOCYTES # BLD AUTO: 38.5 % — SIGNIFICANT CHANGE UP (ref 20–55)
MAGNESIUM SERPL-MCNC: 3.1 MG/DL — HIGH (ref 1.8–2.6)
MCHC RBC-ENTMCNC: 28.9 PG — SIGNIFICANT CHANGE UP (ref 27–31)
MCHC RBC-ENTMCNC: 31.3 G/DL — LOW (ref 32–36)
MCV RBC AUTO: 92.2 FL — SIGNIFICANT CHANGE UP (ref 80–94)
MONOCYTES # BLD AUTO: 0.3 K/UL — SIGNIFICANT CHANGE UP (ref 0–0.8)
MONOCYTES NFR BLD AUTO: 11.1 % — HIGH (ref 3–10)
NEUTROPHILS # BLD AUTO: 1.1 K/UL — LOW (ref 1.8–8)
NEUTROPHILS NFR BLD AUTO: 46.7 % — SIGNIFICANT CHANGE UP (ref 37–73)
PHOSPHATE SERPL-MCNC: 5.1 MG/DL — HIGH (ref 2.4–4.7)
PLATELET # BLD AUTO: 149 K/UL — LOW (ref 150–400)
POTASSIUM SERPL-MCNC: 5.1 MMOL/L — SIGNIFICANT CHANGE UP (ref 3.5–5.3)
POTASSIUM SERPL-SCNC: 5.1 MMOL/L — SIGNIFICANT CHANGE UP (ref 3.5–5.3)
PROT SERPL-MCNC: 7.3 G/DL — SIGNIFICANT CHANGE UP (ref 6.6–8.7)
RBC # BLD: 3.57 M/UL — LOW (ref 4.6–6.2)
RBC # FLD: 17.5 % — HIGH (ref 11–15.6)
SODIUM SERPL-SCNC: 135 MMOL/L — SIGNIFICANT CHANGE UP (ref 135–145)
SURGICAL PATHOLOGY FINAL REPORT - CH: SIGNIFICANT CHANGE UP
WBC # BLD: 2.4 K/UL — LOW (ref 4.8–10.8)
WBC # FLD AUTO: 2.4 K/UL — LOW (ref 4.8–10.8)

## 2018-07-17 PROCEDURE — 85610 PROTHROMBIN TIME: CPT

## 2018-07-17 PROCEDURE — 84295 ASSAY OF SERUM SODIUM: CPT

## 2018-07-17 PROCEDURE — 86708 HEPATITIS A ANTIBODY: CPT

## 2018-07-17 PROCEDURE — 86704 HEP B CORE ANTIBODY TOTAL: CPT

## 2018-07-17 PROCEDURE — 86706 HEP B SURFACE ANTIBODY: CPT

## 2018-07-17 PROCEDURE — 97163 PT EVAL HIGH COMPLEX 45 MIN: CPT

## 2018-07-17 PROCEDURE — 84484 ASSAY OF TROPONIN QUANT: CPT

## 2018-07-17 PROCEDURE — 87340 HEPATITIS B SURFACE AG IA: CPT

## 2018-07-17 PROCEDURE — 93990 DOPPLER FLOW TESTING: CPT

## 2018-07-17 PROCEDURE — 84132 ASSAY OF SERUM POTASSIUM: CPT

## 2018-07-17 PROCEDURE — 88305 TISSUE EXAM BY PATHOLOGIST: CPT

## 2018-07-17 PROCEDURE — 82330 ASSAY OF CALCIUM: CPT

## 2018-07-17 PROCEDURE — 87906 NFCT AGT GNTYP ALYS HIV1: CPT

## 2018-07-17 PROCEDURE — 76080 X-RAY EXAM OF FISTULA: CPT

## 2018-07-17 PROCEDURE — 86803 HEPATITIS C AB TEST: CPT

## 2018-07-17 PROCEDURE — 84100 ASSAY OF PHOSPHORUS: CPT

## 2018-07-17 PROCEDURE — 85027 COMPLETE CBC AUTOMATED: CPT

## 2018-07-17 PROCEDURE — 86901 BLOOD TYPING SEROLOGIC RH(D): CPT

## 2018-07-17 PROCEDURE — 86900 BLOOD TYPING SEROLOGIC ABO: CPT

## 2018-07-17 PROCEDURE — 86360 T CELL ABSOLUTE COUNT/RATIO: CPT

## 2018-07-17 PROCEDURE — 87536 HIV-1 QUANT&REVRSE TRNSCRPJ: CPT

## 2018-07-17 PROCEDURE — C1769: CPT

## 2018-07-17 PROCEDURE — C1894: CPT

## 2018-07-17 PROCEDURE — 93306 TTE W/DOPPLER COMPLETE: CPT

## 2018-07-17 PROCEDURE — 83690 ASSAY OF LIPASE: CPT

## 2018-07-17 PROCEDURE — 71045 X-RAY EXAM CHEST 1 VIEW: CPT

## 2018-07-17 PROCEDURE — 87040 BLOOD CULTURE FOR BACTERIA: CPT

## 2018-07-17 PROCEDURE — 85014 HEMATOCRIT: CPT

## 2018-07-17 PROCEDURE — 88342 IMHCHEM/IMCYTCHM 1ST ANTB: CPT

## 2018-07-17 PROCEDURE — 99233 SBSQ HOSP IP/OBS HIGH 50: CPT

## 2018-07-17 PROCEDURE — 96374 THER/PROPH/DIAG INJ IV PUSH: CPT

## 2018-07-17 PROCEDURE — 83735 ASSAY OF MAGNESIUM: CPT

## 2018-07-17 PROCEDURE — 76942 ECHO GUIDE FOR BIOPSY: CPT

## 2018-07-17 PROCEDURE — 83605 ASSAY OF LACTIC ACID: CPT

## 2018-07-17 PROCEDURE — 96375 TX/PRO/DX INJ NEW DRUG ADDON: CPT

## 2018-07-17 PROCEDURE — 87901 NFCT AGT GNTYP ALYS HIV1 REV: CPT

## 2018-07-17 PROCEDURE — 86403 PARTICLE AGGLUT ANTBDY SCRN: CPT

## 2018-07-17 PROCEDURE — 82803 BLOOD GASES ANY COMBINATION: CPT

## 2018-07-17 PROCEDURE — 82378 CARCINOEMBRYONIC ANTIGEN: CPT

## 2018-07-17 PROCEDURE — 93005 ELECTROCARDIOGRAM TRACING: CPT

## 2018-07-17 PROCEDURE — 36415 COLL VENOUS BLD VENIPUNCTURE: CPT

## 2018-07-17 PROCEDURE — 80053 COMPREHEN METABOLIC PANEL: CPT

## 2018-07-17 PROCEDURE — 99239 HOSP IP/OBS DSCHRG MGMT >30: CPT

## 2018-07-17 PROCEDURE — 99261: CPT

## 2018-07-17 PROCEDURE — 86780 TREPONEMA PALLIDUM: CPT

## 2018-07-17 PROCEDURE — 99285 EMERGENCY DEPT VISIT HI MDM: CPT | Mod: 25

## 2018-07-17 PROCEDURE — 82435 ASSAY OF BLOOD CHLORIDE: CPT

## 2018-07-17 PROCEDURE — 70450 CT HEAD/BRAIN W/O DYE: CPT

## 2018-07-17 PROCEDURE — 74176 CT ABD & PELVIS W/O CONTRAST: CPT

## 2018-07-17 PROCEDURE — 80048 BASIC METABOLIC PNL TOTAL CA: CPT

## 2018-07-17 PROCEDURE — 82962 GLUCOSE BLOOD TEST: CPT

## 2018-07-17 PROCEDURE — C1725: CPT

## 2018-07-17 PROCEDURE — 80069 RENAL FUNCTION PANEL: CPT

## 2018-07-17 PROCEDURE — 85730 THROMBOPLASTIN TIME PARTIAL: CPT

## 2018-07-17 PROCEDURE — 86850 RBC ANTIBODY SCREEN: CPT

## 2018-07-17 PROCEDURE — 82947 ASSAY GLUCOSE BLOOD QUANT: CPT

## 2018-07-17 RX ORDER — DOLUTEGRAVIR SODIUM 25 MG/1
1 TABLET, FILM COATED ORAL
Qty: 60 | Refills: 0 | OUTPATIENT
Start: 2018-07-17 | End: 2018-08-15

## 2018-07-17 RX ORDER — DARUNAVIR 75 MG/1
1 TABLET, FILM COATED ORAL
Qty: 60 | Refills: 0 | OUTPATIENT
Start: 2018-07-17 | End: 2018-08-15

## 2018-07-17 RX ORDER — ETRAVIRINE 200 MG/1
1 TABLET ORAL
Qty: 60 | Refills: 0 | OUTPATIENT
Start: 2018-07-17 | End: 2018-08-15

## 2018-07-17 RX ORDER — SUCRALFATE 1 G
10 TABLET ORAL
Qty: 500 | Refills: 0 | OUTPATIENT
Start: 2018-07-17 | End: 2018-08-15

## 2018-07-17 RX ORDER — AZITHROMYCIN 500 MG/1
2 TABLET, FILM COATED ORAL
Qty: 6 | Refills: 0 | OUTPATIENT
Start: 2018-07-17

## 2018-07-17 RX ORDER — ATOVAQUONE 750 MG/5ML
10 SUSPENSION ORAL
Qty: 300 | Refills: 0 | OUTPATIENT
Start: 2018-07-17 | End: 2018-08-15

## 2018-07-17 RX ORDER — VALGANCICLOVIR 450 MG/1
2 TABLET, FILM COATED ORAL
Qty: 24 | Refills: 0 | OUTPATIENT
Start: 2018-07-17 | End: 2018-08-15

## 2018-07-17 RX ORDER — ZOLPIDEM TARTRATE 10 MG/1
0.5 TABLET ORAL
Qty: 0 | Refills: 0 | COMMUNITY

## 2018-07-17 RX ORDER — PANTOPRAZOLE SODIUM 20 MG/1
1 TABLET, DELAYED RELEASE ORAL
Qty: 60 | Refills: 0 | OUTPATIENT
Start: 2018-07-17 | End: 2018-08-15

## 2018-07-17 RX ORDER — FENTANYL CITRATE 50 UG/ML
1 INJECTION INTRAVENOUS
Qty: 0 | Refills: 0 | COMMUNITY

## 2018-07-17 RX ORDER — RITONAVIR 100 MG/1
1 TABLET, FILM COATED ORAL
Qty: 60 | Refills: 0 | OUTPATIENT
Start: 2018-07-17 | End: 2018-08-15

## 2018-07-17 RX ORDER — SUCRALFATE 1 G
1 TABLET ORAL
Qty: 0 | Refills: 0 | Status: DISCONTINUED | OUTPATIENT
Start: 2018-07-17 | End: 2018-07-17

## 2018-07-17 RX ORDER — ACETAMINOPHEN 500 MG
2 TABLET ORAL
Qty: 0 | Refills: 0 | COMMUNITY
Start: 2018-07-17

## 2018-07-17 RX ORDER — METOCLOPRAMIDE HCL 10 MG
1 TABLET ORAL
Qty: 15 | Refills: 0 | OUTPATIENT
Start: 2018-07-17 | End: 2018-07-21

## 2018-07-17 RX ORDER — ALPRAZOLAM 0.25 MG
0.5 TABLET ORAL THREE TIMES A DAY
Qty: 0 | Refills: 0 | Status: DISCONTINUED | OUTPATIENT
Start: 2018-07-17 | End: 2018-07-17

## 2018-07-17 RX ORDER — FOLIC ACID 0.8 MG
1 TABLET ORAL
Qty: 30 | Refills: 0 | OUTPATIENT
Start: 2018-07-17 | End: 2018-08-15

## 2018-07-17 RX ADMIN — OXYCODONE HYDROCHLORIDE 10 MILLIGRAM(S): 5 TABLET ORAL at 04:15

## 2018-07-17 RX ADMIN — Medication 10 MILLIGRAM(S): at 16:09

## 2018-07-17 RX ADMIN — PANTOPRAZOLE SODIUM 40 MILLIGRAM(S): 20 TABLET, DELAYED RELEASE ORAL at 17:03

## 2018-07-17 RX ADMIN — OXYCODONE HYDROCHLORIDE 10 MILLIGRAM(S): 5 TABLET ORAL at 17:13

## 2018-07-17 RX ADMIN — FENTANYL CITRATE 1 PATCH: 50 INJECTION INTRAVENOUS at 16:59

## 2018-07-17 RX ADMIN — ONDANSETRON 4 MILLIGRAM(S): 8 TABLET, FILM COATED ORAL at 17:02

## 2018-07-17 RX ADMIN — OXYCODONE HYDROCHLORIDE 10 MILLIGRAM(S): 5 TABLET ORAL at 10:52

## 2018-07-17 RX ADMIN — AMLODIPINE BESYLATE 10 MILLIGRAM(S): 2.5 TABLET ORAL at 06:40

## 2018-07-17 RX ADMIN — LEVETIRACETAM 500 MILLIGRAM(S): 250 TABLET, FILM COATED ORAL at 17:01

## 2018-07-17 RX ADMIN — DARUNAVIR 600 MILLIGRAM(S): 75 TABLET, FILM COATED ORAL at 17:04

## 2018-07-17 RX ADMIN — Medication 100 MILLIGRAM(S): at 06:40

## 2018-07-17 RX ADMIN — DOLUTEGRAVIR SODIUM 50 MILLIGRAM(S): 25 TABLET, FILM COATED ORAL at 17:01

## 2018-07-17 RX ADMIN — RITONAVIR 100 MILLIGRAM(S): 100 TABLET, FILM COATED ORAL at 06:40

## 2018-07-17 RX ADMIN — RITONAVIR 100 MILLIGRAM(S): 100 TABLET, FILM COATED ORAL at 17:03

## 2018-07-17 RX ADMIN — OXYCODONE HYDROCHLORIDE 10 MILLIGRAM(S): 5 TABLET ORAL at 06:43

## 2018-07-17 RX ADMIN — Medication 10 MILLIGRAM(S): at 11:16

## 2018-07-17 RX ADMIN — ETRAVIRINE 200 MILLIGRAM(S): 200 TABLET ORAL at 17:04

## 2018-07-17 RX ADMIN — PANTOPRAZOLE SODIUM 40 MILLIGRAM(S): 20 TABLET, DELAYED RELEASE ORAL at 06:39

## 2018-07-17 RX ADMIN — OXYCODONE HYDROCHLORIDE 10 MILLIGRAM(S): 5 TABLET ORAL at 05:10

## 2018-07-17 RX ADMIN — SERTRALINE 50 MILLIGRAM(S): 25 TABLET, FILM COATED ORAL at 11:15

## 2018-07-17 RX ADMIN — OXYCODONE HYDROCHLORIDE 10 MILLIGRAM(S): 5 TABLET ORAL at 17:15

## 2018-07-17 RX ADMIN — Medication 0.5 MILLIGRAM(S): at 11:13

## 2018-07-17 RX ADMIN — OXYCODONE HYDROCHLORIDE 10 MILLIGRAM(S): 5 TABLET ORAL at 15:00

## 2018-07-17 RX ADMIN — DARUNAVIR 600 MILLIGRAM(S): 75 TABLET, FILM COATED ORAL at 06:39

## 2018-07-17 RX ADMIN — Medication 10 MILLIGRAM(S): at 08:56

## 2018-07-17 RX ADMIN — ETRAVIRINE 200 MILLIGRAM(S): 200 TABLET ORAL at 08:55

## 2018-07-17 RX ADMIN — OXYCODONE HYDROCHLORIDE 10 MILLIGRAM(S): 5 TABLET ORAL at 09:04

## 2018-07-17 RX ADMIN — ONDANSETRON 4 MILLIGRAM(S): 8 TABLET, FILM COATED ORAL at 11:16

## 2018-07-17 RX ADMIN — ONDANSETRON 4 MILLIGRAM(S): 8 TABLET, FILM COATED ORAL at 06:39

## 2018-07-17 RX ADMIN — Medication 1 TABLET(S): at 11:15

## 2018-07-17 RX ADMIN — OXYCODONE HYDROCHLORIDE 10 MILLIGRAM(S): 5 TABLET ORAL at 08:09

## 2018-07-17 RX ADMIN — OXYCODONE HYDROCHLORIDE 10 MILLIGRAM(S): 5 TABLET ORAL at 14:02

## 2018-07-17 RX ADMIN — ONDANSETRON 4 MILLIGRAM(S): 8 TABLET, FILM COATED ORAL at 00:02

## 2018-07-17 RX ADMIN — HEPARIN SODIUM 5000 UNIT(S): 5000 INJECTION INTRAVENOUS; SUBCUTANEOUS at 17:01

## 2018-07-17 RX ADMIN — CALCITRIOL 0.5 MICROGRAM(S): 0.5 CAPSULE ORAL at 11:13

## 2018-07-17 RX ADMIN — Medication 1 MILLIGRAM(S): at 11:15

## 2018-07-17 RX ADMIN — DOLUTEGRAVIR SODIUM 50 MILLIGRAM(S): 25 TABLET, FILM COATED ORAL at 06:39

## 2018-07-17 NOTE — DISCHARGE NOTE ADULT - MEDICATION SUMMARY - MEDICATIONS TO STOP TAKING
I will STOP taking the medications listed below when I get home from the hospital:    QUEtiapine 50 mg oral tablet  -- 1 tab(s) by mouth once a day (at bedtime)    levoFLOXacin 250 mg oral tablet  -- 1 tab(s) by mouth every 48 hours    oxyCODONE 5 mg oral tablet  -- 1 tab oral for Mild pain Pain, 2 tabs oral for Moderate pain, 3 tabs oral for severe pain every 6 hours as needed MDD:12 tabs    zolpidem 10 mg oral tablet  -- 0.5 tab(s) by mouth once a day (at bedtime)

## 2018-07-17 NOTE — PROGRESS NOTE ADULT - PROVIDER SPECIALTY LIST ADULT
Anesthesia
Gastroenterology
Hospitalist
Infectious Disease
Nephrology
Vascular Surgery
Infectious Disease
Nephrology
Gastroenterology
Infectious Disease
Palliative Care
Hospitalist

## 2018-07-17 NOTE — DISCHARGE NOTE ADULT - PATIENT PORTAL LINK FT
You can access the [facility] Patient Portal, offered by [facility], by registering with the following website: [email or web address]

## 2018-07-17 NOTE — DISCHARGE NOTE ADULT - SECONDARY DIAGNOSIS.
Chronic pain syndrome CMV retinitis HIV (human immunodeficiency virus infection) AV fistula stenosis, sequela Chest pain, unspecified type Anxiety and depression

## 2018-07-17 NOTE — PROGRESS NOTE ADULT - ASSESSMENT
ESRD on HD MWF  HTN  HIV  AVF bleeding   DM  Anemia  Nausea/Vomiting     -HD MWF; Next dialysis to be done on 7/18/18; orders reviewed. Benadryl and Dilaudid to be given with dialysis  -S/p fistulogram and balloon angioplasty 7/6/18, Vascular follow up  -Hemodynamics are stable  -On KEENE therapy; work up and management per ID  -Renal diet; 3 nepro shakes three times per day; he is not really eating the solid food  -GI follow up; S/p EGD; awaiting biopsy results    Thank you    D/W KARY

## 2018-07-17 NOTE — DISCHARGE NOTE ADULT - COMMUNITY RESOURCES
Transportation to your dialysis has been arranged for tomorrow with [facility], authorization received from insurance, MEDICAID AUTH #[id number]

## 2018-07-17 NOTE — PROGRESS NOTE ADULT - SUBJECTIVE AND OBJECTIVE BOX
INTERVAL HPI/OVERNIGHT EVENTS:FU for abdominal pain, nausea. CT abdomen showed no clear etiology. EGD revealed antral nodules. Pathology results are pending. Nausea has mildly improved. Still has abdominal pain. On PPI bid. On reglan. On carafate. No BM for 2 days.     MEDICATIONS  (STANDING):  amLODIPine   Tablet 10 milliGRAM(s) Oral daily  atovaquone Suspension 1500 milliGRAM(s) Oral daily  azithromycin   Tablet 1200 milliGRAM(s) Oral <User Schedule>  calcitriol   Capsule 0.5 MICROGram(s) Oral daily  darunavir 600 milliGRAM(s) Oral every 12 hours  dolutegravir 50 milliGRAM(s) Oral two times a day  emtricitabine 200 milliGRAM(s) Oral <User Schedule>  epoetin nithin Injectable 36978 Unit(s) IV Push <User Schedule>  etravirine 200 milliGRAM(s) Oral two times a day after meals  fentaNYL   Patch 100 MICROgram(s)/Hr. 1 Patch Transdermal every 48 hours  folic acid 1 milliGRAM(s) Oral daily  gabapentin 100 milliGRAM(s) Oral at bedtime  heparin  Injectable 5000 Unit(s) SubCutaneous every 12 hours  levETIRAcetam 500 milliGRAM(s) Oral two times a day  lidocaine   Patch 1 Patch Transdermal daily  lisinopril 20 milliGRAM(s) Oral daily  metoclopramide 10 milliGRAM(s) Oral three times a day with meals  metoprolol tartrate 100 milliGRAM(s) Oral two times a day  Nephro-dayron 1 Tablet(s) Oral daily  ondansetron Injectable 4 milliGRAM(s) IV Push every 6 hours  oxyCODONE  ER Tablet 10 milliGRAM(s) Oral every 12 hours  pantoprazole    Tablet 40 milliGRAM(s) Oral two times a day  ritonavir Tablet 100 milliGRAM(s) Oral two times a day  sertraline 50 milliGRAM(s) Oral daily  sevelamer hydrochloride 800 milliGRAM(s) Oral three times a day  sucralfate suspension 1 Gram(s) Oral four times a day  tenofovir disoproxil fumarate (VIREAD) 300 milliGRAM(s) Oral <User Schedule>  Valganciclovir 50 mg/ 1 ml Oral Solutio 100 milliGRAM(s) 100 milliGRAM(s) Oral <User Schedule>    MEDICATIONS  (PRN):  acetaminophen   Tablet 650 milliGRAM(s) Oral every 6 hours PRN For Temp greater than 38 C (100.4 F)  acetaminophen   Tablet. 650 milliGRAM(s) Oral every 6 hours PRN Moderate Pain (4 - 6)  diphenhydrAMINE   Capsule 25 milliGRAM(s) Oral every 6 hours PRN Rash and/or Itching  oxyCODONE    IR 10 milliGRAM(s) Oral every 4 hours PRN Severe Pain (7 - 10)      Allergies    vancomycin (Anaphylaxis)    Intolerances        Vital Signs Last 24 Hrs  T(C): 36.9 (16 Jul 2018 23:42), Max: 36.9 (16 Jul 2018 23:42)  T(F): 98.5 (16 Jul 2018 23:42), Max: 98.5 (16 Jul 2018 23:42)  HR: 80 (17 Jul 2018 06:40) (80 - 94)  BP: 126/64 (17 Jul 2018 06:40) (112/68 - 135/81)  BP(mean): --  RR: 15 (16 Jul 2018 23:42) (15 - 18)  SpO2: 99% (16 Jul 2018 23:42) (97% - 99%)    LABS:                        10.0   3.7   )-----------( 176      ( 16 Jul 2018 16:46 )             31.1     07-16    136  |  87<L>  |  156.0<H>  ----------------------------<  127<H>  5.4<H>   |  24.0  |  10.82<H>    Ca    8.9      16 Jul 2018 16:46  Phos  4.9     07-16  Mg     3.7     07-16      Lipase, Serum (06.30.18 @ 20:18)    Lipase, Serum: 62 U/L          RADIOLOGY & ADDITIONAL TESTS:  < from: CT Abdomen and Pelvis w/ Oral Cont (07.11.18 @ 11:56) >   EXAM:  CT ABDOMEN AND PELVIS OC                          PROCEDURE DATE:  07/11/2018          INTERPRETATION:  HISTORY:    Abdominal pain with vomiting. End-stage   renal disease on hemodialysis..    DATE and TIME of EXAM: 7/11/2018 11:52 AM    TECHNIQUE:  Sections were obtained from the diaphragm to the pubic   symphysis without oral or IV contrast.     COMPARISON EXAMINATION:   No prior exam.    FINDINGS:    Cardiomegaly. Linear atelectasis in the left lower lobe.    Multiple calcified granulomas in the right lower lobe.    The liver and gallbladder demonstrate no abnormality. The spleen is not   enlarged and demonstrates no focal abnormality. The pancreatic contour is   unremarkable without evidence of mass, inflammation or ductal dilatation.    The adrenal glands demonstrate normal size and contour.    Small, atrophic kidneys.    No evidence of retroperitoneal or pelvic lymphadenopathy. The prostate,   seminal vesicles, and bladder demonstrate no abnormality.    No evidence of intestinal obstruction.    Bones demonstrate increased density.    IMPRESSION:    Cardiomegaly.    Atrophic kidneys..    < end of copied text >

## 2018-07-17 NOTE — DISCHARGE NOTE ADULT - CARE PROVIDERS DIRECT ADDRESSES
,DirectAddress_Unknown,[email or web address],DirectAddress_Unknown,DirectAddress_Unknown

## 2018-07-17 NOTE — PROGRESS NOTE ADULT - ASSESSMENT
Patient with HIV/AIDS, CMV retinitis, ESRD on HD, with nausea and abdominal pain. No clear etiology noted. Pathology results are pending. Recommended to restart the solid diet trial.   Continue PPI. Can decrease carafate to 1 gram po bid.   GI will follow up

## 2018-07-17 NOTE — PROGRESS NOTE ADULT - SUBJECTIVE AND OBJECTIVE BOX
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 Pt also c/o nausea and wretching. Vomited once today at home. Fell today at home in bathroom but denies hitting head, states tripped. Pt uses wheelchair at baseline. He admits to being non compliant with his medications at home; he had not taken his medications. Pt denies cp, diarrhea, HA, fevers, constipation, numbness, tingling. (2018 23:01)    Follow up ESRD  No new complaints  Still has arm pain where the AVF is located    PAST MEDICAL & SURGICAL HISTORY:  HIV (human immunodeficiency virus infection)  Seizure disorder  Hypertension  Diabetes  ESRD (end stage renal disease)  Seizure  Pericarditis  Anxiety  Depression  Renal failure (ARF), acute on chronic: dialysis av fistula, RUE  HTN (hypertension)  Cardiomyopathy  HIV disease: born HIV+  AV fistula  S/P tonsillectomy      MEDICATIONS  (STANDING):  amLODIPine   Tablet 10 milliGRAM(s) Oral daily  atovaquone Suspension 1500 milliGRAM(s) Oral daily  azithromycin   Tablet 1200 milliGRAM(s) Oral <User Schedule>  calcitriol   Capsule 0.5 MICROGram(s) Oral daily  darunavir 600 milliGRAM(s) Oral every 12 hours  dolutegravir 50 milliGRAM(s) Oral two times a day  emtricitabine 200 milliGRAM(s) Oral <User Schedule>  epoetin nithin Injectable 46260 Unit(s) IV Push <User Schedule>  etravirine 200 milliGRAM(s) Oral two times a day after meals  fentaNYL   Patch 100 MICROgram(s)/Hr. 1 Patch Transdermal every 48 hours  folic acid 1 milliGRAM(s) Oral daily  gabapentin 100 milliGRAM(s) Oral at bedtime  heparin  Injectable 5000 Unit(s) SubCutaneous every 12 hours  levETIRAcetam 500 milliGRAM(s) Oral two times a day  lidocaine   Patch 1 Patch Transdermal daily  lisinopril 20 milliGRAM(s) Oral daily  metoclopramide 10 milliGRAM(s) Oral three times a day with meals  metoprolol tartrate 100 milliGRAM(s) Oral two times a day  Nephro-dayron 1 Tablet(s) Oral daily  ondansetron Injectable 4 milliGRAM(s) IV Push every 6 hours  oxyCODONE  ER Tablet 10 milliGRAM(s) Oral every 12 hours  pantoprazole    Tablet 40 milliGRAM(s) Oral two times a day  ritonavir Tablet 100 milliGRAM(s) Oral two times a day  sertraline 50 milliGRAM(s) Oral daily  sevelamer hydrochloride 800 milliGRAM(s) Oral three times a day  sucralfate suspension 1 Gram(s) Oral four times a day  tenofovir disoproxil fumarate (VIREAD) 300 milliGRAM(s) Oral <User Schedule>  Valganciclovir 50 mg/ 1 ml Oral Solutio 100 milliGRAM(s) 100 milliGRAM(s) Oral <User Schedule>    MEDICATIONS  (PRN):  acetaminophen   Tablet 650 milliGRAM(s) Oral every 6 hours PRN For Temp greater than 38 C (100.4 F)  acetaminophen   Tablet. 650 milliGRAM(s) Oral every 6 hours PRN Moderate Pain (4 - 6)  diphenhydrAMINE   Capsule 25 milliGRAM(s) Oral every 6 hours PRN Rash and/or Itching  oxyCODONE    IR 10 milliGRAM(s) Oral every 4 hours PRN Severe Pain (7 - 10)      Allergies    vancomycin (Anaphylaxis)    Intolerances        Vital Signs Last 24 Hrs  T(C): 36.9 (2018 23:42), Max: 36.9 (2018 23:42)  T(F): 98.5 (2018 23:42), Max: 98.5 (2018 23:42)  HR: 80 (2018 06:40) (71 - 94)  BP: 126/64 (2018 06:40) (100/58 - 135/81)  BP(mean): --  RR: 15 (2018 23:42) (15 - 18)  SpO2: 99% (2018 23:42) (97% - 99%)  Daily     Daily Weight in k.5 (2018 19:20)    PHYSICAL EXAM:  GENERAL: No distress  HEAD:  Atraumatic, Normocephalic  EYES: Conjunctiva and sclera clear  ENMT: Moist mucous membranes  NECK: Supple, No JVD  NERVOUS SYSTEM:  Alert & Oriented X3, Good concentration; Motor Strength 5/5 B/L upper and lower extremities; DTRs 2+ intact and symmetric  CHEST/LUNG: Clear to percussion bilaterally; No rales, rhonchi, wheezing, or rubs  HEART: Regular rate and rhythm; No murmurs, rubs, or gallops  ABDOMEN: Soft, Nontender, Nondistended; Bowel sounds present  EXTREMITIES:  2+ Peripheral Pulses, No clubbing, cyanosis, or edema; RUE AVF that is hypertrophic  SKIN: No rashes or lesions      LABS:                        10.0   3.7   )-----------( 176      ( 2018 16:46 )             31.1     -    136  |  87<L>  |  156.0<H>  ----------------------------<  127<H>  5.4<H>   |  24.0  |  10.82<H>    Ca    8.9      2018 16:46  Phos  4.9       Mg     3.7               Magnesium, Serum: 3.7 mg/dL ( @ 16:46)  Phosphorus Level, Serum: 4.9 mg/dL ( @ 16:46)        RADIOLOGY & ADDITIONAL TESTS:

## 2018-07-17 NOTE — DISCHARGE NOTE ADULT - PLAN OF CARE
with opiate dependance. Avoid all opiates. Rehab suggested. Follow up with your primary doctor within 1 week of discharge Continue with home meds as directed. Follow up with your primary doctor & cardiology Continue with meds as directed. Follow up with Infectious disease in 1 week resolving Continue with home meds as directed. Follow up with your primary doctor & GI after discharge for biopsy results and possible EUS. Follow up with your primary doctor & vascular surgery

## 2018-07-17 NOTE — DISCHARGE NOTE ADULT - HOSPITAL COURSE
27 yr old male with HIV not compliant with medications, CMV retinitis with vision loss, ESRD on HD,  dilated cardiomyopathy, hypertension and seizure disorder admitted with confusion, weakness and enlarging AV fistula. He was admitted for intractable nausea and vomiting. Renal was consulted. He also had flashes of light, ophthalmology consulted. He was started on valganciclovir 100mg after each HD for treatment for CMV retinitis. HD was continued as per schedule. He complained of pain at AVF site, vascular surgery was consulted. Noted to have pancytopenia, blood culture ordered to rule out MAC infection. 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 Pain management consulted for pain control. Given persistent nausea and inability to tolerate solid food, GI consulted for possible endoscopic work up. GI advised, CT abdomen/pelvis with contrast for further evaluation. EGD was scheduled by GI,  showed antral gastritis, biopsies sent for H pylori. He was started on Carafate.     Problem/Plan - 1:  ·  Problem: AV fistula.  Plan: S/p angioplasty, outpatient vascular follow up.  c/w PO opiates (dose adjusted as per pain management), tylenol, lidoderm patch, fentanyl patch, gabapentin  No indication for IV Dilaudid. Appears comfortable at this time  Disccussed with Vascular sx, no further surgical intervention planned, recent angio should not cause this amount of pain, although pt does have calcifications in the AVF which can cause pain but he does need to be switched to PO inorder to be discharged today  Pain management re-called to re-address his pain but not seen the pt yet. Pt agreeable to be discharged on his home regimen      Problem/Plan - 2:  ·  Problem: Failure to thrive in adult.  Plan: GI eval noted, for CT abdomen ordered. EGD showing antral gastritis, Carafate ordered. Continue PPI & reglan.  low fat renal diet  Await biopsy results fo stomach.   Renal diet; 3 nepro shakes three times per day; he is not really eating the solid food.  Disccussed with GI about pt's diffuse abd pain which as per pt is tender to even gentle touch, no cause for this significant amount of pain found on endoscopy, await bx results, EUS to be done as outpt, okay to d/c pt from GI stand point.      Problem/Plan - 3:  ·  Problem: HIV (human immunodeficiency virus infection).  Plan: ID following. Continue ART & ppx. Blood cultures negative. Follow up with your primary doctor within 1 & ID week of discharge for further care     Problem/Plan - 4:  ·  Problem: Pancytopenia.  Plan: Stable. Likely from underlying HIV/AIDS.      Problem/Plan - 5:  ·  Problem: CMV retinitis.  Plan: On valganciclovir 100mg.      Problem/Plan - 6:  Problem: ESRD (end stage renal disease). Plan: HD as per schedule.     Problem/Plan - 7:  ·  Problem: Chest pain, unspecified type.  Plan: check EKG, TnI  Recent Echo with EF 50%, Grade 1 DD, mod PHTN  Repeat TTE stable see above and EKG NSR LVH without ischemic changes   Cardio signed off with out pt follow up if needed.       PHYSICAL EXAM:    GENERAL: Not in distress, alert  CHEST/LUNG: b/l air entry  HEART: Regular, no MRG  ABDOMEN: Soft, tender to even gentle touch, ND, BS+  EXTREMITIES:  No edema, tenderness.    VSS. Medically stable to be d/cherri home

## 2018-07-17 NOTE — DISCHARGE NOTE ADULT - CARE PLAN
Principal Discharge DX:	Gastritis and duodenitis  Goal:	resolving  Assessment and plan of treatment:	Continue with home meds as directed. Follow up with your primary doctor & GI after discharge for biopsy results and possible EUS.  Secondary Diagnosis:	AV fistula stenosis, sequela  Assessment and plan of treatment:	Follow up with your primary doctor & vascular surgery  Secondary Diagnosis:	Chest pain, unspecified type  Assessment and plan of treatment:	Continue with home meds as directed. Follow up with your primary doctor & cardiology  Secondary Diagnosis:	Anxiety and depression  Assessment and plan of treatment:	Continue with home meds as directed. Follow up with your primary doctor & cardiology  Secondary Diagnosis:	Chronic pain syndrome  Assessment and plan of treatment:	with opiate dependance. Avoid all opiates. Rehab suggested. Follow up with your primary doctor within 1 week of discharge  Secondary Diagnosis:	CMV retinitis  Assessment and plan of treatment:	Continue with home meds as directed. Follow up with your primary doctor & cardiology  Secondary Diagnosis:	HIV (human immunodeficiency virus infection)  Assessment and plan of treatment:	Continue with meds as directed. Follow up with Infectious disease in 1 week

## 2018-07-17 NOTE — DISCHARGE NOTE ADULT - MEDICATION SUMMARY - MEDICATIONS TO TAKE
I will START or STAY ON the medications listed below when I get home from the hospital:    oxyCODONE 10 mg oral tablet, extended release  -- 1 tab(s) by mouth every 12 hours MDD:2 tabs  -- Indication: For Chronic pain syndrome    acetaminophen 325 mg oral tablet  -- 2 tab(s) by mouth every 6 hours, As needed, For Temp greater than 38 C (100.4 F)  -- Indication: For Chronic pain syndrome    acetaminophen 325 mg oral tablet  -- 2 tab(s) by mouth every 6 hours, As needed, Moderate Pain (4 - 6)  -- Indication: For Chronic pain syndrome    lisinopril 20 mg oral tablet  -- 1 tab(s) by mouth once a day  -- Indication: For Htn    levETIRAcetam 500 mg oral tablet  -- 1 tab(s) by mouth 2 times a day  -- Indication: For Seizure disorder    gabapentin 100 mg oral capsule  -- 1 cap(s) by mouth once a day (at bedtime)  -- Indication: For Neuropathy    sertraline 50 mg oral tablet  -- 1 tab(s) by mouth once a day  -- Indication: For depression    metoclopramide 10 mg oral tablet  -- 1 tab(s) by mouth 3 times a day (with meals)  -- Indication: For N/V    darunavir 600 mg oral tablet  -- 1 tab(s) by mouth 2 times a day  -- Indication: For HIV (human immunodeficiency virus infection)    dolutegravir 50 mg oral tablet  -- 1 tab(s) by mouth 2 times a day   -- Indication: For HIV (human immunodeficiency virus infection)    etravirine 200 mg oral tablet  -- 1 tab(s) by mouth 2 times a day (after meals)  -- Indication: For HIV (human immunodeficiency virus infection)    ritonavir 100 mg oral tablet  -- 1 tab(s) by mouth 2 times a day  -- Indication: For HIV (human immunodeficiency virus infection)    emtricitabine 200 mg oral capsule  -- 1 cap(s) by mouth 2 times a week after HD Monday/Thursday  -- Indication: For HIV (human immunodeficiency virus infection)    tenofovir disoproxil fumarate 300 mg oral tablet  -- 1 tab(s) by mouth once a week on Monday  post HD  -- Indication: For HIV (human immunodeficiency virus infection)    valGANciclovir 50 mg/mL oral liquid  -- 2 milliliter(s) by mouth 3 times a week on Tue, Thurs, saturday 15:00 hrs post-HD  -- Do not take this drug if you are pregnant.  Expires___________________  Keep in refrigerator.  Do not freeze.  May cause drowsiness or dizziness.  Obtain medical advice before taking any non-prescription drugs as some may affect the action of this medication.  Shake well before use.  Take with food.  This drug may impair the ability to drive or operate machinery.  Use care until you become familiar with its effects.    -- Indication: For CMV retinitis    ALPRAZolam 2 mg oral tablet  -- 0.5 tab(s) by mouth 3 times a day, As Needed  -- Indication: For Anxiety and depression    metoprolol tartrate 50 mg oral tablet  -- 2 tab(s) by mouth 2 times a day  -- Indication: For Htn    amLODIPine 10 mg oral tablet  -- 1 tab(s) by mouth once a day  -- Indication: For Htn    azithromycin 600 mg oral tablet  -- 2 tab(s) by mouth every 7 days (on Monday 9 am)  -- Indication: For AIDS ppx    atovaquone 750 mg/5 mL oral suspension  -- 10 milliliter(s) by mouth once a day  -- Indication: For AIDS ppx    sucralfate 1 g/10 mL oral suspension  -- 10 milliliter(s) by mouth 2 times a day  -- Indication: For ulcer    sevelamer carbonate 800 mg oral tablet  -- 1 tab(s) by mouth 3 times a day (with meals)  -- Indication: For ESRD (end stage renal disease)    pantoprazole 40 mg oral delayed release tablet  -- 1 tab(s) by mouth 2 times a day before meals  -- Indication: For Gerd    Multiple Vitamins oral tablet  -- 1 tab(s) by mouth once a day  -- Indication: For Nutrition    folic acid 1 mg oral tablet  -- 1 tab(s) by mouth once a day  -- Indication: For Nutrition    calcitriol 0.5 mcg oral capsule  -- 1 cap(s) by mouth once a day  -- Indication: For ESRD (end stage renal disease)

## 2018-07-17 NOTE — DISCHARGE NOTE ADULT - CARE PROVIDER_API CALL
Josh Navarro), Infectious Disease; Internal Medicine  [address]  [address]  [place] [address]  Phone: [phone]  Fax: [phone]    Isaac Dahl), Vascular Surgery  [address]  [place] [address]  Phone: [phone]  Fax: [phone]    Arpit Ruelas), Gastroenterology; Internal Medicine  [address]  [place] [address]  Phone: [phone]  Fax: [phone]    Manuel Nam), Nephrology  [facility]  [address]  [place] [address]  Phone: [phone]  Fax: [phone]

## 2018-08-01 ENCOUNTER — OUTPATIENT (OUTPATIENT)
Dept: OUTPATIENT SERVICES | Facility: HOSPITAL | Age: 28
LOS: 1 days | End: 2018-08-01
Payer: MEDICAID

## 2018-08-01 DIAGNOSIS — I77.0 ARTERIOVENOUS FISTULA, ACQUIRED: Chronic | ICD-10-CM

## 2018-08-01 PROCEDURE — G9001: CPT

## 2018-08-12 ENCOUNTER — EMERGENCY (EMERGENCY)
Facility: HOSPITAL | Age: 28
LOS: 1 days | Discharge: HOME | End: 2018-08-12
Attending: EMERGENCY MEDICINE
Payer: COMMERCIAL

## 2018-08-12 VITALS
OXYGEN SATURATION: 99 % | WEIGHT: 125 LBS | RESPIRATION RATE: 16 BRPM | DIASTOLIC BLOOD PRESSURE: 123 MMHG | TEMPERATURE: 99 F | HEIGHT: 65 IN | HEART RATE: 87 BPM | SYSTOLIC BLOOD PRESSURE: 171 MMHG

## 2018-08-12 DIAGNOSIS — I77.0 ARTERIOVENOUS FISTULA, ACQUIRED: Chronic | ICD-10-CM

## 2018-08-12 PROBLEM — G40.909 EPILEPSY, UNSPECIFIED, NOT INTRACTABLE, WITHOUT STATUS EPILEPTICUS: Chronic | Status: ACTIVE | Noted: 2018-01-12

## 2018-08-12 LAB
ANION GAP SERPL CALC-SCNC: 25 MMOL/L — HIGH (ref 5–17)
ANISOCYTOSIS BLD QL: SLIGHT — SIGNIFICANT CHANGE UP
BUN SERPL-MCNC: 82 MG/DL — HIGH (ref 8–20)
CALCIUM SERPL-MCNC: 9.5 MG/DL — SIGNIFICANT CHANGE UP (ref 8.6–10.2)
CHLORIDE SERPL-SCNC: 88 MMOL/L — LOW (ref 98–107)
CK MB CFR SERPL CALC: 2.2 NG/ML — SIGNIFICANT CHANGE UP (ref 0–6.7)
CK SERPL-CCNC: 212 U/L — HIGH (ref 30–200)
CO2 SERPL-SCNC: 23 MMOL/L — SIGNIFICANT CHANGE UP (ref 22–29)
CREAT SERPL-MCNC: 13.63 MG/DL — HIGH (ref 0.5–1.3)
EOSINOPHIL NFR BLD AUTO: 2 % — SIGNIFICANT CHANGE UP (ref 0–6)
GLUCOSE SERPL-MCNC: 102 MG/DL — SIGNIFICANT CHANGE UP (ref 70–115)
HCT VFR BLD CALC: 38.8 % — LOW (ref 42–52)
HGB BLD-MCNC: 12.5 G/DL — LOW (ref 14–18)
LYMPHOCYTES # BLD AUTO: 28 % — SIGNIFICANT CHANGE UP (ref 20–55)
MCHC RBC-ENTMCNC: 29.8 PG — SIGNIFICANT CHANGE UP (ref 27–31)
MCHC RBC-ENTMCNC: 32.2 G/DL — SIGNIFICANT CHANGE UP (ref 32–36)
MCV RBC AUTO: 92.6 FL — SIGNIFICANT CHANGE UP (ref 80–94)
MONOCYTES NFR BLD AUTO: 10 % — SIGNIFICANT CHANGE UP (ref 3–10)
NEUTROPHILS NFR BLD AUTO: 59 % — SIGNIFICANT CHANGE UP (ref 37–73)
NEUTS BAND # BLD: 1 % — SIGNIFICANT CHANGE UP (ref 0–8)
PLAT MORPH BLD: NORMAL — SIGNIFICANT CHANGE UP
PLATELET # BLD AUTO: 98 K/UL — LOW (ref 150–400)
POTASSIUM SERPL-MCNC: 5.5 MMOL/L — HIGH (ref 3.5–5.3)
POTASSIUM SERPL-SCNC: 5.5 MMOL/L — HIGH (ref 3.5–5.3)
RBC # BLD: 4.19 M/UL — LOW (ref 4.6–6.2)
RBC # FLD: 16.7 % — HIGH (ref 11–15.6)
RBC BLD AUTO: PRESENT — SIGNIFICANT CHANGE UP
SODIUM SERPL-SCNC: 136 MMOL/L — SIGNIFICANT CHANGE UP (ref 135–145)
WBC # BLD: 3.7 K/UL — LOW (ref 4.8–10.8)
WBC # FLD AUTO: 3.7 K/UL — LOW (ref 4.8–10.8)

## 2018-08-12 PROCEDURE — 73620 X-RAY EXAM OF FOOT: CPT | Mod: 26,RT

## 2018-08-12 PROCEDURE — 99284 EMERGENCY DEPT VISIT MOD MDM: CPT

## 2018-08-12 RX ORDER — DIAZEPAM 5 MG
2 TABLET ORAL ONCE
Qty: 0 | Refills: 0 | Status: DISCONTINUED | OUTPATIENT
Start: 2018-08-12 | End: 2018-08-12

## 2018-08-12 RX ORDER — ACETAMINOPHEN WITH CODEINE 300MG-30MG
1 TABLET ORAL EVERY 4 HOURS
Qty: 0 | Refills: 0 | Status: DISCONTINUED | OUTPATIENT
Start: 2018-08-12 | End: 2018-08-12

## 2018-08-12 RX ORDER — TETANUS TOXOID, REDUCED DIPHTHERIA TOXOID AND ACELLULAR PERTUSSIS VACCINE, ADSORBED 5; 2.5; 8; 8; 2.5 [IU]/.5ML; [IU]/.5ML; UG/.5ML; UG/.5ML; UG/.5ML
0.5 SUSPENSION INTRAMUSCULAR ONCE
Qty: 0 | Refills: 0 | Status: COMPLETED | OUTPATIENT
Start: 2018-08-12 | End: 2018-08-12

## 2018-08-12 RX ORDER — HYDROMORPHONE HYDROCHLORIDE 2 MG/ML
1 INJECTION INTRAMUSCULAR; INTRAVENOUS; SUBCUTANEOUS ONCE
Qty: 0 | Refills: 0 | Status: DISCONTINUED | OUTPATIENT
Start: 2018-08-12 | End: 2018-08-12

## 2018-08-12 RX ORDER — LEVETIRACETAM 250 MG/1
500 TABLET, FILM COATED ORAL EVERY 12 HOURS
Qty: 0 | Refills: 0 | Status: DISCONTINUED | OUTPATIENT
Start: 2018-08-12 | End: 2018-08-17

## 2018-08-12 RX ADMIN — HYDROMORPHONE HYDROCHLORIDE 1 MILLIGRAM(S): 2 INJECTION INTRAMUSCULAR; INTRAVENOUS; SUBCUTANEOUS at 16:41

## 2018-08-12 RX ADMIN — Medication 1 TABLET(S): at 19:53

## 2018-08-12 RX ADMIN — LEVETIRACETAM 500 MILLIGRAM(S): 250 TABLET, FILM COATED ORAL at 17:10

## 2018-08-12 RX ADMIN — HYDROMORPHONE HYDROCHLORIDE 1 MILLIGRAM(S): 2 INJECTION INTRAMUSCULAR; INTRAVENOUS; SUBCUTANEOUS at 17:10

## 2018-08-12 RX ADMIN — LEVETIRACETAM 420 MILLIGRAM(S): 250 TABLET, FILM COATED ORAL at 16:42

## 2018-08-12 RX ADMIN — TETANUS TOXOID, REDUCED DIPHTHERIA TOXOID AND ACELLULAR PERTUSSIS VACCINE, ADSORBED 0.5 MILLILITER(S): 5; 2.5; 8; 8; 2.5 SUSPENSION INTRAMUSCULAR at 18:56

## 2018-08-12 NOTE — ED PROVIDER NOTE - PROGRESS NOTE DETAILS
spoke with Dr Nam of nephrology who knows the patient well and feels that he can wait till tomorrow for dialysis

## 2018-08-12 NOTE — ED PROVIDER NOTE - MEDICAL DECISION MAKING DETAILS
27 y.o pt with recurrent seizure disorder presents with right foot pain and superficial abrasion, does not regularly take Keppra. Plan to give IV dose Keppra, check x-ray, electrolytes for possible hypokalemia, give pain med and reevaluate.

## 2018-08-12 NOTE — ED ADULT NURSE NOTE - NSIMPLEMENTINTERV_GEN_ALL_ED
Implemented All Fall Risk Interventions:  [place] to call system. Call bell, personal items and telephone within reach. Instruct patient to call for assistance. Room bathroom lighting operational. Non-slip footwear when patient is off stretcher. Physically safe environment: no spills, clutter or unnecessary equipment. Stretcher in lowest position, wheels locked, appropriate side rails in place. Provide visual cue, wrist band, yellow gown, etc. Monitor gait and stability. Monitor for mental status changes and reorient to person, place, and time. Review medications for side effects contributing to fall risk. Reinforce activity limits and safety measures with patient and family.

## 2018-08-12 NOTE — ED PROVIDER NOTE - CARE PLAN
Principal Discharge DX:	Seizure disorder  Secondary Diagnosis:	Foot abrasion, right, initial encounter  Secondary Diagnosis:	Contusion of right foot, initial encounter

## 2018-08-12 NOTE — ED ADULT NURSE NOTE - OBJECTIVE STATEMENT
Patient BIBA from home with mother, patients mother states that he had a seizure last night at home, patient states that he missed his last dose of Keppra. Patient is a dialysis patient and gets dialyzed Mon/Wed/Fri. Patient c/o cramps in his legs at this time.

## 2018-08-12 NOTE — ED ADULT NURSE NOTE - CHPI ED NUR SYMPTOMS NEG
no nausea/no fever/no vomiting/no weakness/no numbness/no blurred vision/no dizziness/no change in level of consciousness/no confusion/no loss of consciousness

## 2018-08-12 NOTE — ED PROVIDER NOTE - OBJECTIVE STATEMENT
27 y.o M with hx of seizure disorder, DM ESRD on dialysis, pericarditis, HIV presents to ED c/o right foot pain and cramping since last night. As per pt mother, pt had a seizure last night and banged his foot into a glass mirror, sustained a cut to his foot. Pt states he takes Keppra only before dialysis, has had a number of seizures in the last year.

## 2018-08-12 NOTE — ED ADULT TRIAGE NOTE - CHIEF COMPLAINT QUOTE
pt biba from home c/o right foot and right leg pain and cramping, states he had a seizure last night, takes keppra but has been non compliant, dialysis patient

## 2018-08-14 ENCOUNTER — INPATIENT (INPATIENT)
Facility: HOSPITAL | Age: 28
LOS: 8 days | Discharge: HOME | DRG: 562 | End: 2018-08-23
Admitting: HOSPITALIST
Payer: COMMERCIAL

## 2018-08-14 VITALS
HEART RATE: 94 BPM | OXYGEN SATURATION: 99 % | DIASTOLIC BLOOD PRESSURE: 98 MMHG | RESPIRATION RATE: 20 BRPM | TEMPERATURE: 99 F | HEIGHT: 69 IN | WEIGHT: 134.48 LBS | SYSTOLIC BLOOD PRESSURE: 149 MMHG

## 2018-08-14 DIAGNOSIS — I10 ESSENTIAL (PRIMARY) HYPERTENSION: ICD-10-CM

## 2018-08-14 DIAGNOSIS — B20 HUMAN IMMUNODEFICIENCY VIRUS [HIV] DISEASE: ICD-10-CM

## 2018-08-14 DIAGNOSIS — E11.9 TYPE 2 DIABETES MELLITUS WITHOUT COMPLICATIONS: ICD-10-CM

## 2018-08-14 DIAGNOSIS — F32.9 MAJOR DEPRESSIVE DISORDER, SINGLE EPISODE, UNSPECIFIED: ICD-10-CM

## 2018-08-14 DIAGNOSIS — G40.909 EPILEPSY, UNSPECIFIED, NOT INTRACTABLE, WITHOUT STATUS EPILEPTICUS: ICD-10-CM

## 2018-08-14 DIAGNOSIS — S92.909A UNSPECIFIED FRACTURE OF UNSPECIFIED FOOT, INITIAL ENCOUNTER FOR CLOSED FRACTURE: ICD-10-CM

## 2018-08-14 DIAGNOSIS — N18.6 END STAGE RENAL DISEASE: ICD-10-CM

## 2018-08-14 DIAGNOSIS — Z29.9 ENCOUNTER FOR PROPHYLACTIC MEASURES, UNSPECIFIED: ICD-10-CM

## 2018-08-14 DIAGNOSIS — F41.9 ANXIETY DISORDER, UNSPECIFIED: ICD-10-CM

## 2018-08-14 DIAGNOSIS — I77.0 ARTERIOVENOUS FISTULA, ACQUIRED: Chronic | ICD-10-CM

## 2018-08-14 LAB
ALBUMIN SERPL ELPH-MCNC: 3.6 G/DL — SIGNIFICANT CHANGE UP (ref 3.3–5.2)
ALP SERPL-CCNC: 330 U/L — HIGH (ref 40–120)
ALT FLD-CCNC: 11 U/L — SIGNIFICANT CHANGE UP
ANION GAP SERPL CALC-SCNC: 22 MMOL/L — HIGH (ref 5–17)
ANISOCYTOSIS BLD QL: SLIGHT — SIGNIFICANT CHANGE UP
AST SERPL-CCNC: 19 U/L — SIGNIFICANT CHANGE UP
BASOPHILS # BLD AUTO: 0 K/UL — SIGNIFICANT CHANGE UP (ref 0–0.2)
BILIRUB SERPL-MCNC: 0.4 MG/DL — SIGNIFICANT CHANGE UP (ref 0.4–2)
BUN SERPL-MCNC: 67 MG/DL — HIGH (ref 8–20)
CALCIUM SERPL-MCNC: 9.4 MG/DL — SIGNIFICANT CHANGE UP (ref 8.6–10.2)
CHLORIDE SERPL-SCNC: 89 MMOL/L — LOW (ref 98–107)
CK MB CFR SERPL CALC: 1.6 NG/ML — SIGNIFICANT CHANGE UP (ref 0–6.7)
CK SERPL-CCNC: 181 U/L — SIGNIFICANT CHANGE UP (ref 30–200)
CO2 SERPL-SCNC: 24 MMOL/L — SIGNIFICANT CHANGE UP (ref 22–29)
CREAT SERPL-MCNC: 11.85 MG/DL — HIGH (ref 0.5–1.3)
ELLIPTOCYTES BLD QL SMEAR: SLIGHT — SIGNIFICANT CHANGE UP
EOSINOPHIL # BLD AUTO: 0 K/UL — SIGNIFICANT CHANGE UP (ref 0–0.5)
GLUCOSE SERPL-MCNC: 88 MG/DL — SIGNIFICANT CHANGE UP (ref 70–115)
HCT VFR BLD CALC: 36.1 % — LOW (ref 42–52)
HGB BLD-MCNC: 11.6 G/DL — LOW (ref 14–18)
LACTATE SERPL-SCNC: 1.3 MMOL/L — SIGNIFICANT CHANGE UP (ref 0.5–2)
LYMPHOCYTES # BLD AUTO: 0.7 K/UL — LOW (ref 1–4.8)
LYMPHOCYTES # BLD AUTO: 33 % — SIGNIFICANT CHANGE UP (ref 20–55)
MACROCYTES BLD QL: SLIGHT — SIGNIFICANT CHANGE UP
MCHC RBC-ENTMCNC: 29.9 PG — SIGNIFICANT CHANGE UP (ref 27–31)
MCHC RBC-ENTMCNC: 32.1 G/DL — SIGNIFICANT CHANGE UP (ref 32–36)
MCV RBC AUTO: 93 FL — SIGNIFICANT CHANGE UP (ref 80–94)
MICROCYTES BLD QL: SLIGHT — SIGNIFICANT CHANGE UP
MONOCYTES # BLD AUTO: 0.3 K/UL — SIGNIFICANT CHANGE UP (ref 0–0.8)
MONOCYTES NFR BLD AUTO: 14 % — HIGH (ref 3–10)
NEUTROPHILS # BLD AUTO: 1.2 K/UL — LOW (ref 1.8–8)
NEUTROPHILS NFR BLD AUTO: 51 % — SIGNIFICANT CHANGE UP (ref 37–73)
OVALOCYTES BLD QL SMEAR: SLIGHT — SIGNIFICANT CHANGE UP
PLAT MORPH BLD: ABNORMAL
PLATELET # BLD AUTO: 68 K/UL — LOW (ref 150–400)
POIKILOCYTOSIS BLD QL AUTO: SLIGHT — SIGNIFICANT CHANGE UP
POTASSIUM SERPL-MCNC: 5.3 MMOL/L — SIGNIFICANT CHANGE UP (ref 3.5–5.3)
POTASSIUM SERPL-SCNC: 5.3 MMOL/L — SIGNIFICANT CHANGE UP (ref 3.5–5.3)
PROT SERPL-MCNC: 7.5 G/DL — SIGNIFICANT CHANGE UP (ref 6.6–8.7)
RBC # BLD: 3.88 M/UL — LOW (ref 4.6–6.2)
RBC # FLD: 16.5 % — HIGH (ref 11–15.6)
RBC BLD AUTO: ABNORMAL
SODIUM SERPL-SCNC: 135 MMOL/L — SIGNIFICANT CHANGE UP (ref 135–145)
VARIANT LYMPHS # BLD: 2 % — SIGNIFICANT CHANGE UP (ref 0–6)
WBC # BLD: 2.3 K/UL — LOW (ref 4.8–10.8)
WBC # FLD AUTO: 2.3 K/UL — LOW (ref 4.8–10.8)

## 2018-08-14 PROCEDURE — 73630 X-RAY EXAM OF FOOT: CPT | Mod: 26,RT

## 2018-08-14 PROCEDURE — 99284 EMERGENCY DEPT VISIT MOD MDM: CPT

## 2018-08-14 PROCEDURE — 99223 1ST HOSP IP/OBS HIGH 75: CPT

## 2018-08-14 PROCEDURE — 73700 CT LOWER EXTREMITY W/O DYE: CPT | Mod: 26,RT

## 2018-08-14 RX ORDER — DEXTROSE 50 % IN WATER 50 %
15 SYRINGE (ML) INTRAVENOUS ONCE
Qty: 0 | Refills: 0 | Status: DISCONTINUED | OUTPATIENT
Start: 2018-08-14 | End: 2018-08-14

## 2018-08-14 RX ORDER — FOLIC ACID 0.8 MG
1 TABLET ORAL DAILY
Qty: 0 | Refills: 0 | Status: DISCONTINUED | OUTPATIENT
Start: 2018-08-14 | End: 2018-08-14

## 2018-08-14 RX ORDER — OXYCODONE HYDROCHLORIDE 5 MG/1
10 TABLET ORAL EVERY 12 HOURS
Qty: 0 | Refills: 0 | Status: DISCONTINUED | OUTPATIENT
Start: 2018-08-14 | End: 2018-08-14

## 2018-08-14 RX ORDER — METOPROLOL TARTRATE 50 MG
2 TABLET ORAL
Qty: 0 | Refills: 0 | COMMUNITY

## 2018-08-14 RX ORDER — MORPHINE SULFATE 50 MG/1
4 CAPSULE, EXTENDED RELEASE ORAL EVERY 4 HOURS
Qty: 0 | Refills: 0 | Status: DISCONTINUED | OUTPATIENT
Start: 2018-08-14 | End: 2018-08-14

## 2018-08-14 RX ORDER — METOCLOPRAMIDE HCL 10 MG
10 TABLET ORAL
Qty: 0 | Refills: 0 | Status: DISCONTINUED | OUTPATIENT
Start: 2018-08-14 | End: 2018-08-14

## 2018-08-14 RX ORDER — RITONAVIR 100 MG/1
100 TABLET, FILM COATED ORAL
Qty: 0 | Refills: 0 | Status: DISCONTINUED | OUTPATIENT
Start: 2018-08-14 | End: 2018-08-14

## 2018-08-14 RX ORDER — VALGANCICLOVIR 450 MG/1
100 TABLET, FILM COATED ORAL
Qty: 0 | Refills: 0 | Status: DISCONTINUED | OUTPATIENT
Start: 2018-08-14 | End: 2018-08-14

## 2018-08-14 RX ORDER — HEPARIN SODIUM 5000 [USP'U]/ML
5000 INJECTION INTRAVENOUS; SUBCUTANEOUS EVERY 12 HOURS
Qty: 0 | Refills: 0 | Status: DISCONTINUED | OUTPATIENT
Start: 2018-08-14 | End: 2018-08-14

## 2018-08-14 RX ORDER — DARUNAVIR 75 MG/1
600 TABLET, FILM COATED ORAL EVERY 12 HOURS
Qty: 0 | Refills: 0 | Status: DISCONTINUED | OUTPATIENT
Start: 2018-08-14 | End: 2018-08-14

## 2018-08-14 RX ORDER — ALPRAZOLAM 0.25 MG
0.5 TABLET ORAL
Qty: 0 | Refills: 0 | COMMUNITY

## 2018-08-14 RX ORDER — LEVETIRACETAM 250 MG/1
1 TABLET, FILM COATED ORAL
Qty: 0 | Refills: 0 | COMMUNITY

## 2018-08-14 RX ORDER — FENTANYL CITRATE 50 UG/ML
1 INJECTION INTRAVENOUS
Qty: 0 | Refills: 0 | COMMUNITY

## 2018-08-14 RX ORDER — LABETALOL HCL 100 MG
200 TABLET ORAL
Qty: 0 | Refills: 0 | Status: DISCONTINUED | OUTPATIENT
Start: 2018-08-14 | End: 2018-08-14

## 2018-08-14 RX ORDER — LEVETIRACETAM 250 MG/1
1000 TABLET, FILM COATED ORAL
Qty: 0 | Refills: 0 | Status: DISCONTINUED | OUTPATIENT
Start: 2018-08-14 | End: 2018-08-14

## 2018-08-14 RX ORDER — MORPHINE SULFATE 50 MG/1
6 CAPSULE, EXTENDED RELEASE ORAL ONCE
Qty: 0 | Refills: 0 | Status: DISCONTINUED | OUTPATIENT
Start: 2018-08-14 | End: 2018-08-14

## 2018-08-14 RX ORDER — ATOVAQUONE 750 MG/5ML
1500 SUSPENSION ORAL DAILY
Qty: 0 | Refills: 0 | Status: DISCONTINUED | OUTPATIENT
Start: 2018-08-14 | End: 2018-08-14

## 2018-08-14 RX ORDER — ALPRAZOLAM 0.25 MG
2 TABLET ORAL DAILY
Qty: 0 | Refills: 0 | Status: DISCONTINUED | OUTPATIENT
Start: 2018-08-14 | End: 2018-08-14

## 2018-08-14 RX ORDER — GLUCAGON INJECTION, SOLUTION 0.5 MG/.1ML
1 INJECTION, SOLUTION SUBCUTANEOUS ONCE
Qty: 0 | Refills: 0 | Status: DISCONTINUED | OUTPATIENT
Start: 2018-08-14 | End: 2018-08-14

## 2018-08-14 RX ORDER — PANTOPRAZOLE SODIUM 20 MG/1
40 TABLET, DELAYED RELEASE ORAL EVERY 12 HOURS
Qty: 0 | Refills: 0 | Status: DISCONTINUED | OUTPATIENT
Start: 2018-08-14 | End: 2018-08-14

## 2018-08-14 RX ORDER — SODIUM CHLORIDE 9 MG/ML
1000 INJECTION, SOLUTION INTRAVENOUS
Qty: 0 | Refills: 0 | Status: DISCONTINUED | OUTPATIENT
Start: 2018-08-14 | End: 2018-08-14

## 2018-08-14 RX ORDER — INSULIN LISPRO 100/ML
VIAL (ML) SUBCUTANEOUS
Qty: 0 | Refills: 0 | Status: DISCONTINUED | OUTPATIENT
Start: 2018-08-14 | End: 2018-08-14

## 2018-08-14 RX ORDER — OXYCODONE AND ACETAMINOPHEN 5; 325 MG/1; MG/1
2 TABLET ORAL EVERY 6 HOURS
Qty: 0 | Refills: 0 | Status: DISCONTINUED | OUTPATIENT
Start: 2018-08-14 | End: 2018-08-14

## 2018-08-14 RX ORDER — SUCRALFATE 1 G
1 TABLET ORAL
Qty: 0 | Refills: 0 | Status: DISCONTINUED | OUTPATIENT
Start: 2018-08-14 | End: 2018-08-14

## 2018-08-14 RX ORDER — DEXTROSE 50 % IN WATER 50 %
12.5 SYRINGE (ML) INTRAVENOUS ONCE
Qty: 0 | Refills: 0 | Status: DISCONTINUED | OUTPATIENT
Start: 2018-08-14 | End: 2018-08-14

## 2018-08-14 RX ADMIN — Medication 10 MILLIGRAM(S): at 18:00

## 2018-08-14 RX ADMIN — MORPHINE SULFATE 6 MILLIGRAM(S): 50 CAPSULE, EXTENDED RELEASE ORAL at 12:01

## 2018-08-14 RX ADMIN — LEVETIRACETAM 1000 MILLIGRAM(S): 250 TABLET, FILM COATED ORAL at 17:47

## 2018-08-14 RX ADMIN — OXYCODONE AND ACETAMINOPHEN 2 TABLET(S): 5; 325 TABLET ORAL at 17:35

## 2018-08-14 RX ADMIN — Medication 200 MILLIGRAM(S): at 19:44

## 2018-08-14 RX ADMIN — Medication 2 MILLIGRAM(S): at 21:22

## 2018-08-14 RX ADMIN — OXYCODONE HYDROCHLORIDE 10 MILLIGRAM(S): 5 TABLET ORAL at 17:56

## 2018-08-14 RX ADMIN — HEPARIN SODIUM 5000 UNIT(S): 5000 INJECTION INTRAVENOUS; SUBCUTANEOUS at 19:34

## 2018-08-14 NOTE — CONSULT NOTE ADULT - SUBJECTIVE AND OBJECTIVE BOX
Patient is a 27y old  Male who presents with a chief complaint of      HPI:26 y/o M with extensive PMH including, HIV+. seizure, renal failure on dialysis M-W-F, blind, came to ED s/p seizure with c/o foot pain yesterday.  Was d/c home.  Official radiology reading today stated that he has a 2nd, 3rd, and 4th linear, nondisplaced fractures and was called to return to the ED.  He states that he had to walk on it yesterday and is in severe pain.        PAST MEDICAL & SURGICAL HISTORY:  Seizure disorder  Hypertension  Diabetes  ESRD (end stage renal disease)  Seizure  Pericarditis  Anxiety  Depression  Renal failure (ARF), acute on chronic: dialysis av fistula, RUE  HTN (hypertension)  Cardiomyopathy  HIV disease: born HIV+  AV fistula  S/P tonsillectomy      MEDICATIONS  (STANDING):  morphine  - Injectable 6 milliGRAM(s) SubCutaneous Once    MEDICATIONS  (PRN):      Allergies    vancomycin (Anaphylaxis)    Intolerances        FAMILY HISTORY:  No pertinent family history in first degree relatives  No pertinent family history in first degree relatives                            12.5   3.7   )-----------( 98       ( 12 Aug 2018 16:20 )             38.8                                                  08-12    136  |  88<L>  |  82.0<H>  ----------------------------<  102  5.5<H>   |  23.0  |  13.63<H>    Ca    9.5      12 Aug 2018 16:20            Medical Imaging:       PE:   GEN: Patient is a 27y well developed, well nourished Male, alert, awake and oriented to person, place and time in no acute distress.       Extremities   Vascular:    Left:   DP- 1/4    PT-  1/4        Right: DP- 1/4          PT- 1/4  Derm: right foot with no opening on the skin with no eactive signs of infection with +1 edema, mild erythema, pop on 1st MCJ   Neuro: grossly intact        A/P:  27yMale presents with 2-4 met fracture       P:  pt evaluated and chart reviewed.  Pt's xrays and CT scans reviewed. Results show multiple fractures.  Discussed with pt x-ray and CT scan findings.   Green compression and posterior splint applied.  Pt is non WB to right foot with crutches.   Pt understands to keep the foot elevated, iced, and compressed.  Discussed with pt risks, benefits, and complications of surgery.   Pt understands that he may require surgery in the near future based on CT scan findings.  Pt understands to return to ER if he experiences any n/v/c/sob/f.  Pt understands that he may develop traumatic arthritis in the area of the fractures  Pt will followup with Dr. Thompson. Patient is a 27y old  Male who presents with a chief complaint of      HPI:26 y/o M with extensive PMH including, HIV+. seizure, renal failure on dialysis M-W-F, blind, came to ED s/p seizure with c/o foot pain yesterday.  Was d/c home.  Official radiology reading today stated that he has a 2nd, 3rd, and 4th linear, nondisplaced fractures and was called to return to the ED.  He states that he had to walk on it yesterday and is in severe pain.        PAST MEDICAL & SURGICAL HISTORY:  Seizure disorder  Hypertension  Diabetes  ESRD (end stage renal disease)  Seizure  Pericarditis  Anxiety  Depression  Renal failure (ARF), acute on chronic: dialysis av fistula, RUE  HTN (hypertension)  Cardiomyopathy  HIV disease: born HIV+  AV fistula  S/P tonsillectomy      MEDICATIONS  (STANDING):  morphine  - Injectable 6 milliGRAM(s) SubCutaneous Once    MEDICATIONS  (PRN):      Allergies    vancomycin (Anaphylaxis)    Intolerances        FAMILY HISTORY:  No pertinent family history in first degree relatives  No pertinent family history in first degree relatives                            12.5   3.7   )-----------( 98       ( 12 Aug 2018 16:20 )             38.8                                                  08-12    136  |  88<L>  |  82.0<H>  ----------------------------<  102  5.5<H>   |  23.0  |  13.63<H>    Ca    9.5      12 Aug 2018 16:20            Medical Imaging:       PE:   GEN: Patient is a 27y well developed, well nourished Male, alert, awake and oriented to person, place and time in no acute distress.       Extremities   Vascular:    Left:   DP- 1/4    PT-  1/4        Right: DP- 1/4          PT- 1/4  Derm: right foot with no opening on the skin with no active signs of infection with +1 edema, mild erythema,  pop medial aspect of the 1st mcj  Neuro: grossly intact        A/P:  27yMale presents with 2-4 met fracture       P:  pt evaluated and chart reviewed.  Pt's xrays and CT scans reviewed. Results show multiple fractures.  Discussed with pt x-ray and CT scan findings.   Green compression and posterior splint applied.  Pt is non WB to right foot with crutches but, due PMHx of the pt is unable to use the crutches  pt states his house is not handicap accessible    pt is recommenced to admitted for rehab   Pt understands to keep the foot elevated, iced, and compressed.  Discussed with pt risks, benefits, and complications of surgery.   Pt understands that he may require surgery in the future based on CT scan findings.  Pt understands to return to ER if he experiences any n/v/c/sob/f.  Pt understands that he may develop traumatic arthritis in the area of the fractures  Pt will followup with Dr. Thompson as pt

## 2018-08-14 NOTE — CONSULT NOTE ADULT - ASSESSMENT
ESRD  HTN  Anemia  SHELLEY  HIV  Metatarsal fracture     HD MWF  We will dialyze patient here tomorrow if he stays  Consent obtained  Orders written    D/w HD RN

## 2018-08-14 NOTE — H&P ADULT - ASSESSMENT
26 y/o M with extensive PMH including, HIV+. seizure, renal failure on dialysis M-W-F, blind, came to ED s/p seizure at home (unwitnessed) with c/o foot pain yesterday since a mirror fell on him.  Was d/c home since prelim x rays were negative.  Official radiology reading today stated that he has a 2nd, 3rd, and 4th linear, nondisplaced fractures and was called to return to the ED.  He states that he had to walk on it yesterday and is in severe pain.  Took Oxycodone for the pain.  Took more than usual b/c he usually takes it for his chronic pain. Pt is known for non compliance with his meds. He admits to being non compliant with his HIV & seizure meds. Last HD was yesterday. No podiatry intervention planned as per them. Pt could have been d/cherri home but could not get crutches due to AVF, could not get a wheelchair as has steps at home, no now being admitted as might require FESTUS. Poor historian. No chest pain, palpitations, sob, light headedness/dizziness, difficulty breathing/cough, fevers/chills, abdominal pain, n/v, constipation, dysuria or increased urinary frequency. Admits to having watery diarrhea (non bloody)        >Right 2-4 metatarsal fractures-    >Breakthrough seizure- likely from med non compliance, will call neuro, c/w keppra    >Diarrhea-stool studies    >AV fistula.  S/p angioplasty, outpatient vascular follow up.  c/w PO opiates, tylenol, lidoderm patch, fentanyl patch, gabapentin    >Failure to thrive in adult.  Plan: GI eval noted, for CT abdomen ordered. EGD showing antral gastritis, Carafate, PPI & reglan.  low fat renal diet  Await biopsy results fo stomach.   Renal diet; 3 nepro shakes three times per day; he is not really eating the solid food.  EUS to be done as outpt    >HIV (human immunodeficiency virus infection).  Plan: Pt non compliant with ART, will re-call ID, CD 4    >Pancytopenia.  Plan: Stable. Likely from underlying HIV/AIDS, f/u CBC    >CMV retinitis.  Plan: On valganciclovir 100mg.     >ESRD (end stage renal disease). Plan: HD as per schedule. Renal called    DVT ppx 28 y/o M with extensive PMH including, HIV+. seizure, renal failure on dialysis M-W-F, blind, came to ED s/p seizure at home (unwitnessed) with c/o foot pain yesterday since a mirror fell on him.  Was d/c home since prelim x rays were negative.  Official radiology reading today stated that he has a 2nd, 3rd, and 4th linear, nondisplaced fractures and was called to return to the ED.  He states that he had to walk on it yesterday and is in severe pain.  Took Oxycodone for the pain.  Took more than usual b/c he usually takes it for his chronic pain. Pt is known for non compliance with his meds. He admits to being non compliant with his HIV & seizure meds. Last HD was yesterday. No podiatry intervention planned as per them. Pt could have been d/cherri home but could not get crutches due to AVF, could not get a wheelchair as has steps at home, no now being admitted as might require FESTUS. Poor historian. No chest pain, palpitations, sob, light headedness/dizziness, difficulty breathing/cough, fevers/chills, abdominal pain, n/v, constipation, dysuria or increased urinary frequency. Admits to having watery diarrhea (non bloody)        >Right 2-4 metatarsal fractures-  Pt's xrays and CT scans reviewed. Results show multiple fractures.  Splint applied by podiatry  Pt is non WB to right foot with crutches but, due PMHx of AVF the pt is unable to use the crutches  pt states his house is not wheelchair accessible and has stairs  Podiatry recommended pt to be admitted for FESTUS, discussed with MIGUEL Mcarthur in the ED. PT eval requested  Keep the foot elevated, iced, and compressed.  Pt may require surgery in the future based on CT scan findings as per podiatry but no plans for surgery inhouse as per podiatryPain meds      >Breakthrough seizure- likely from med non compliance, neuro consult called, CPK, lactic acid, prolactin, c/w keppra for now    >Diarrhea-stool studies, stool count    >AV fistula.  S/p angioplasty, outpatient vascular follow up.  c/w PO opiates, tylenol, lidoderm patch, fentanyl patch, gabapentin    >Failure to thrive in adult.  Plan: GI eval on last admission noted. EGD showing antral gastritis, Carafate, PPI & reglan.  low fat renal diet  Await biopsy results fo stomach.   Renal diet; 3 nepro shakes three times per day; he is not really eating the solid food.  EUS to be done as outpt    >HIV (human immunodeficiency virus infection).  Plan: Pt non compliant with ART (outpatient pharmacy was not dispensing new regimen as there were several drug interactions), will re-call ID prior to resuming ART, CD 4 & viral load    >Pancytopenia.  Plan: Stable. Likely from underlying HIV/AIDS, f/u CBC    >CMV retinitis.  Plan: On valganciclovir 100mg.     >ESRD (end stage renal disease). Plan: HD as per schedule. Renal called for HD in am    DVT ppx

## 2018-08-14 NOTE — H&P ADULT - HISTORY OF PRESENT ILLNESS
28 y/o M with extensive PMH including, HIV+. seizure, renal failure on dialysis M-W-F, blind, came to ED s/p seizure at home (unwitnessed) with c/o foot pain yesterday since a mirror fell on him.  Was d/c home since prelim x rays were negative.  Official radiology reading today stated that he has a 2nd, 3rd, and 4th linear, nondisplaced fractures and was called to return to the ED.  He states that he had to walk on it yesterday and is in severe pain.  Took Oxycodone for the pain.  Took more than usual b/c he usually takes it for his chronic pain. Pt is known for non compliance with his meds. He admits to being non compliant with his HIV & seizure meds. Last HD was yesterday. No podiatry intervention planned as per them. Pt could have been d/cherri home but could not get crutches due to AVF, could not get a wheelchair as has steps at home, no now being admitted as might require FESTUS. Poor historian. No chest pain, palpitations, sob, light headedness/dizziness, difficulty breathing/cough, fevers/chills, abdominal pain, n/v, constipation, dysuria or increased urinary frequency. Admits to having watery diarrhea (non bloody)

## 2018-08-14 NOTE — CONSULT NOTE ADULT - ATTENDING COMMENTS
I reviewed the above assessment and documentation completed by the resident physician.  I verbally discussed the evaluation and treatment plan with resident.  The podiatry team will continue to follow patient while in house.

## 2018-08-14 NOTE — ED POST DISCHARGE NOTE - RESULT SUMMARY
Transverse linear non-displaced fractures in the 2nd, 3rd, and 4th metatarsals.  I s/w patient and advised him to come back to the ED for splinting and podiatry consult

## 2018-08-14 NOTE — CONSULT NOTE ADULT - SUBJECTIVE AND OBJECTIVE BOX
Patient is a 27y old  Male who presents with a chief complaint of      HPI: M with hx of seizure disorder, DM ESRD on dialysis, pericarditis, HIV presents to ED c/o right foot pain and cramping since last night. As per pt mother, pt had a seizure last night and banged his foot into a glass mirror, sustained a cut to his foot. Pt states he takes Keppra only before dialysis, has had a number of seizures     Renal is being consulted for ESRD. HD MWF at [facility]. No other c/o        PAST MEDICAL & SURGICAL HISTORY:  Seizure disorder  Hypertension  Diabetes  ESRD (end stage renal disease)  Seizure  Pericarditis  Anxiety  Depression  Renal failure (ARF), acute on chronic: dialysis av fistula, RUE  HTN (hypertension)  Cardiomyopathy  HIV disease: born HIV+  AV fistula  S/P tonsillectomy       FAMILY HISTORY:  No pertinent family history in first degree relatives  No pertinent family history in first degree relatives  [place]    Social History:Non smoker    MEDICATIONS  (STANDING):  levETIRAcetam  IVPB 500 milliGRAM(s) IV Intermittent every 12 hours    MEDICATIONS  (PRN):  acetaminophen 300 mG/codeine 30 mG 1 Tablet(s) Oral every 4 hours PRN Moderate Pain (4 - 6)   Meds reviewed    Allergies    vancomycin (Anaphylaxis)    Intolerances         REVIEW OF SYSTEMS: As per HPI, otherwise negative       Vital Signs Last 24 Hrs  T(C): 37.3 (14 Aug 2018 10:31), Max: 37.3 (14 Aug 2018 10:31)  T(F): 99.2 (14 Aug 2018 10:31), Max: 99.2 (14 Aug 2018 10:31)  HR: 94 (14 Aug 2018 10:31) (94 - 94)  BP: 149/98 (14 Aug 2018 10:31) (149/98 - 149/98)  BP(mean): --  RR: 20 (14 Aug 2018 10:31) (20 - 20)  SpO2: 99% (14 Aug 2018 10:31) (99% - 99%)  Daily     Daily     PHYSICAL EXAM:    GENERAL: NAD  HEAD:  Atraumatic, Normocephalic  NECK: Supple, neck  veins full  NERVOUS SYSTEM:  Alert & Oriented X3  CHEST/LUNG: Clear to percussion bilaterally; No rales, rhonchi, wheezing, or rubs  HEART: Regular rate and rhythm; No murmurs, rubs, or gallops  ABDOMEN: Soft, Nontender, Nondistended; Bowel sounds present, body wall and flank edema  EXTREMITIES:  Edema -      LABS:                        11.6   2.3   )-----------( 68       ( 14 Aug 2018 13:11 )             36.1     08-14    135  |  89<L>  |  67.0<H>  ----------------------------<  88  5.3   |  24.0  |  11.85<H>    Ca    9.4      14 Aug 2018 13:11    TPro  7.5  /  Alb  3.6  /  TBili  0.4  /  DBili  x   /  AST  19  /  ALT  11  /  AlkPhos  330<H>  08-14                RADIOLOGY & ADDITIONAL TESTS:

## 2018-08-14 NOTE — CONSULT NOTE ADULT - PROBLEM SELECTOR RECOMMENDATION 9
Admit to F  Multiple fractures on foot. Podiatry consulted. Recs appreciated.   Continue management set by podiatry. Will possibly need surgery 2/2 CT findings.  Rehab needed 2/2 blindness and inability to take care of himself at home. Heparin BID

## 2018-08-14 NOTE — ED STATDOCS - PMH
Anxiety    Cardiomyopathy    Depression    Diabetes    ESRD (end stage renal disease)    HIV disease  born HIV+  HTN (hypertension)    Hypertension    Pericarditis    Renal failure (ARF), acute on chronic  dialysis av fistula, RUE  Seizure    Seizure disorder

## 2018-08-14 NOTE — CONSULT NOTE ADULT - SUBJECTIVE AND OBJECTIVE BOX
Patient is a 27y old  Male who presents with a chief complaint of     HPI: 28 yo m pmh seizure disorder, HTN, DM, ESRD, Depresison, HTN, Blindness, HIV  Seizure this past sunday.  Something heavy fell(antique mirror) on his foot.  Bleeding, when he came to.   Returned today because hospital called him back to be evaluated.   Right foot is where the fractures happened.  Sharp pain, radiating up leg.  Took Oxycodone for the pain.  Took more than usual b/c he usually takes it for his chronic pain.  Causing spasms.     In the ER, pt was given morphine. CT foot was done, it showed:  1.  Avulsion fractures at the medial cuneiform and lateral proximal   second metatarsal base. The location of these fractures raising concern   for Lisfranc ligamentous injury. Correlate clinically. MRI can be   performed for further evaluation.  2.  Transverse fractures at the second, third, and fourth metatarsal   bases.  3.  Avulsion fracture at the medial base of the first proximal phalanx.  4.  Small chip fractures of the cuboid and lateral cuneiform.  5.  Small chip fracture at the medial base of the first proximal   metatarsal.      Pt gets HD on M [facility] F.     Pt Feels spasm around body that started around 2 hours ago while he was in the ER.   He Didn't take Keppra or any medications this morning since he was called into the hospital to be admitted.   He doesn't feel a strong presence of aura before a seizure.     PAST MEDICAL & SURGICAL HISTORY:  Seizure disorder  Hypertension  Diabetes  ESRD (end stage renal disease)  Seizure  Pericarditis  Anxiety  Depression  Renal failure (ARF), acute on chronic: dialysis av fistula, RUE  HTN (hypertension)  Cardiomyopathy  HIV disease: born HIV+  AV fistula  S/P tonsillectomy      REVIEW OF SYSTEMS:    CONSTITUTIONAL: felt subjective feverish, feels exhausted no weight loss   EYES: Feels twitching of both eyes. Blindness on both eyes. No eye pain or discharge  ENMT:  No difficulty hearing, tinnitus, vertigo; No sinus or throat pain  NECK: +neck pain since sunday, possibly from trauma.   RESPIRATORY: No cough, wheezing, chills or hemoptysis; No shortness of breath  CARDIOVASCULAR: Feels spasms on his chest. No chest pain, palpitations, dizziness, or leg swelling  GASTROINTESTINAL: +nausea vomited x 3, No abdominal or epigastric pain.  vomiting, or hematemesis; No diarrhea or constipation. No melena or hematochezia.  GENITOURINARY: ON HD, does not urinate.   NEUROLOGICAL: + headaches, +memory loss from seizure, + loss of strength on right foot,+ numbness RLE.  No tremors  SKIN: chronic itching, no burning, rashes, or lesions   MUSCULOSKELETAL: No muscle or back pain  PSYCHIATRIC: No depression, anxiety, mood swings, or difficulty sleeping  ALLERGY AND IMMUNOLOGIC: No hives or eczema    Allergies    vancomycin (Anaphylaxis)    Intolerances    SOCIAL HISTORY:    Denies Tobacco, Denies Etoh, Denies Drugs    FAMILY HISTORY:  No pertinent family history in first degree relatives    Vital Signs Last 24 Hrs  T(C): 37.3 (14 Aug 2018 10:31), Max: 37.3 (14 Aug 2018 10:31)  T(F): 99.2 (14 Aug 2018 10:31), Max: 99.2 (14 Aug 2018 10:31)  HR: 94 (14 Aug 2018 10:31) (94 - 94)  BP: 149/98 (14 Aug 2018 10:31) (149/98 - 149/98)  BP(mean): --  RR: 20 (14 Aug 2018 10:31) (20 - 20)  SpO2: 99% (14 Aug 2018 10:31) (99% - 99%)    PHYSICAL EXAM:    GENERAL: NAD, well-groomed, well-developed  rest of PE limited 2/2 telemed    LABS:                        11.6   2.3   )-----------( x        ( 14 Aug 2018 13:11 )             36.1     08-14    135  |  89<L>  |  67.0<H>  ----------------------------<  88  5.3   |  24.0  |  11.85<H>    Ca    9.4      14 Aug 2018 13:11    TPro  7.5  /  Alb  3.6  /  TBili  0.4  /  DBili  x   /  AST  19  /  ALT  11  /  AlkPhos  330<H>  08-14      RADIOLOGY & ADDITIONAL STUDIES:  CT results discussed above Patient is a 27y old  Male who presents with a chief complaint of     HPI: 26 yo m pmh seizure disorder, HTN, DM, ESRD, Depresison, HTN, Blindness, HIV  Seizure this past sunday.  Something heavy fell(antique mirror) on his foot.  Bleeding, when he came to.   Returned today because hospital called him back to be evaluated.   Right foot is where the fractures happened.  Sharp pain, radiating up leg.  Took Oxycodone for the pain.  Took more than usual b/c he usually takes it for his chronic pain.  Causing spasms.     In the ER, pt was given morphine. CT foot was done, it showed:  1.  Avulsion fractures at the medial cuneiform and lateral proximal   second metatarsal base. The location of these fractures raising concern   for Lisfranc ligamentous injury. Correlate clinically. MRI can be   performed for further evaluation.  2.  Transverse fractures at the second, third, and fourth metatarsal   bases.  3.  Avulsion fracture at the medial base of the first proximal phalanx.  4.  Small chip fractures of the cuboid and lateral cuneiform.  5.  Small chip fracture at the medial base of the first proximal   metatarsal.      Pt gets HD on M W F.     Pt Feels spasm around body that started around 2 hours ago while he was in the ER.   He Didn't take Keppra or any medications this morning since he was called into the hospital to be admitted.   He doesn't feel a strong presence of aura before a seizure.     I called his pharmacy to obtain medication list because patient doesn't know what he's on exactly.     PAST MEDICAL & SURGICAL HISTORY:  Seizure disorder  Hypertension  Diabetes  ESRD (end stage renal disease)  Seizure  Pericarditis  Anxiety  Depression  Renal failure (ARF), acute on chronic: dialysis av fistula, RUE  HTN (hypertension)  Cardiomyopathy  HIV disease: born HIV+  AV fistula  S/P tonsillectomy      REVIEW OF SYSTEMS:    CONSTITUTIONAL: felt subjective feverish, feels exhausted no weight loss   EYES: Feels twitching of both eyes. Blindness on both eyes. No eye pain or discharge  ENMT:  No difficulty hearing, tinnitus, vertigo; No sinus or throat pain  NECK: +neck pain since sunday, possibly from trauma.   RESPIRATORY: No cough, wheezing, chills or hemoptysis; No shortness of breath  CARDIOVASCULAR: Feels spasms on his chest. No chest pain, palpitations, dizziness, or leg swelling  GASTROINTESTINAL: +nausea vomited x 3, No abdominal or epigastric pain.  vomiting, or hematemesis; No diarrhea or constipation. No melena or hematochezia.  GENITOURINARY: ON HD, does not urinate.   NEUROLOGICAL: + headaches, +memory loss from seizure, + loss of strength on right foot,+ numbness RLE.  No tremors  SKIN: chronic itching, no burning, rashes, or lesions   MUSCULOSKELETAL: No muscle or back pain  PSYCHIATRIC: No depression, anxiety, mood swings, or difficulty sleeping  ALLERGY AND IMMUNOLOGIC: No hives or eczema    Allergies    vancomycin (Anaphylaxis)    Intolerances    SOCIAL HISTORY:    Denies Tobacco, Denies Etoh, Denies Drugs    FAMILY HISTORY:  No pertinent family history in first degree relatives    Vital Signs Last 24 Hrs  T(C): 37.3 (14 Aug 2018 10:31), Max: 37.3 (14 Aug 2018 10:31)  T(F): 99.2 (14 Aug 2018 10:31), Max: 99.2 (14 Aug 2018 10:31)  HR: 94 (14 Aug 2018 10:31) (94 - 94)  BP: 149/98 (14 Aug 2018 10:31) (149/98 - 149/98)  BP(mean): --  RR: 20 (14 Aug 2018 10:31) (20 - 20)  SpO2: 99% (14 Aug 2018 10:31) (99% - 99%)    PHYSICAL EXAM:    GENERAL: NAD, well-groomed, well-developed  rest of PE limited 2/2 telemed    LABS:                        11.6   2.3   )-----------( x        ( 14 Aug 2018 13:11 )             36.1     08-14    135  |  89<L>  |  67.0<H>  ----------------------------<  88  5.3   |  24.0  |  11.85<H>    Ca    9.4      14 Aug 2018 13:11    TPro  7.5  /  Alb  3.6  /  TBili  0.4  /  DBili  x   /  AST  19  /  ALT  11  /  AlkPhos  330<H>  08-14      RADIOLOGY & ADDITIONAL STUDIES:  CT results discussed above

## 2018-08-14 NOTE — CONSULT NOTE ADULT - PROBLEM SELECTOR RECOMMENDATION 2
Has not taken his seizure medications this morning, will need his medications stat to prevent seizures today.  Ordered Keppra Stat

## 2018-08-14 NOTE — ED STATDOCS - ATTENDING CONTRIBUTION TO CARE
28 y/o Male seen and evaluated with ACP  returns to Ed s/p call back for variance  variance = MT frx to MT #2/3/4  increase pain with ambulation  vitals reviewed, exam as documented  podiatry consult, follow recommendations

## 2018-08-14 NOTE — ED STATDOCS - OBJECTIVE STATEMENT
26 y/o M with extensive PMH including, HIV+. seizure, renal failure on dialysis M-W-F, blind, came to ED s/p seizure with c/o foot pain yesterday.  Was d/c home.  Official radiology reading today stated that he has a 2nd, 3rd, and 4th linear, nondisplaced fractures and was called to return to the ED.  He states that he had to walk on it yesterday and is in severe pain.

## 2018-08-14 NOTE — ED STATDOCS - PROGRESS NOTE DETAILS
NP NOTE:  More extensive fx on Ct including cuneiform and possible Lisfranc.  Splinted by podiatry.  Patient will be unable to use crutches as he has an AV fistula in his right upper arm.  Home is not wheelchair accessible.   Will need to be admitted for rehab placement.  [facility] declined admission requesting labs first. NP NOTE:  Labs reviewed, Report and care of patient given to Dr. Osuna of [facility].

## 2018-08-14 NOTE — ED STATDOCS - PHYSICAL EXAMINATION
Musculoskeletal:  right foot swollen, 2+ distal pulse, toes warm and mobile.  AV Fistula: + Bruit, + Thrill

## 2018-08-14 NOTE — PATIENT PROFILE ADULT. - PAIN LOCATION, PROFILE
Requesting 2 different scripts instead of just vimoo needs 2 different scripts written for  naproxen and Nexium back, right arm

## 2018-08-14 NOTE — ED STATDOCS - MEDICAL DECISION MAKING DETAILS
Will obtain repeat x-ray as patient walked on foot.  Podiatry consult called, medicate for pain and reevaluate.

## 2018-08-14 NOTE — ED ADULT TRIAGE NOTE - CHIEF COMPLAINT QUOTE
'I was here yesterday and was called to come back for foot fracture. " Pt A & OX4, fistula in right arm.

## 2018-08-14 NOTE — ED ADULT NURSE REASSESSMENT NOTE - NS ED NURSE REASSESS COMMENT FT1
Podiatry consult done, splint applied to rt foot with ace wrap.  Foot elevated on pillow. Toes warm and mobile. Pt admitted to medical service.

## 2018-08-15 VITALS
RESPIRATION RATE: 18 BRPM | TEMPERATURE: 98 F | OXYGEN SATURATION: 100 % | SYSTOLIC BLOOD PRESSURE: 127 MMHG | HEART RATE: 61 BPM | DIASTOLIC BLOOD PRESSURE: 76 MMHG

## 2018-08-15 DIAGNOSIS — Z71.89 OTHER SPECIFIED COUNSELING: ICD-10-CM

## 2018-08-15 LAB
ANION GAP SERPL CALC-SCNC: 25 MMOL/L — HIGH (ref 5–17)
ANISOCYTOSIS BLD QL: SLIGHT — SIGNIFICANT CHANGE UP
BASOPHILS # BLD AUTO: 0 K/UL — SIGNIFICANT CHANGE UP (ref 0–0.2)
BASOPHILS NFR BLD AUTO: 1 % — SIGNIFICANT CHANGE UP (ref 0–2)
BUN SERPL-MCNC: 83 MG/DL — HIGH (ref 8–20)
CALCIUM SERPL-MCNC: 9.2 MG/DL — SIGNIFICANT CHANGE UP (ref 8.6–10.2)
CHLORIDE SERPL-SCNC: 88 MMOL/L — LOW (ref 98–107)
CO2 SERPL-SCNC: 20 MMOL/L — LOW (ref 22–29)
CREAT SERPL-MCNC: 13.43 MG/DL — HIGH (ref 0.5–1.3)
ELLIPTOCYTES BLD QL SMEAR: SLIGHT — SIGNIFICANT CHANGE UP
EOSINOPHIL # BLD AUTO: 0 K/UL — SIGNIFICANT CHANGE UP (ref 0–0.5)
EOSINOPHIL NFR BLD AUTO: 2 % — SIGNIFICANT CHANGE UP (ref 0–6)
GLUCOSE BLDC GLUCOMTR-MCNC: 129 MG/DL — HIGH (ref 70–99)
GLUCOSE SERPL-MCNC: 76 MG/DL — SIGNIFICANT CHANGE UP (ref 70–115)
HBA1C BLD-MCNC: 4.9 % — SIGNIFICANT CHANGE UP (ref 4–5.6)
HCT VFR BLD CALC: 32.4 % — LOW (ref 42–52)
HCT VFR BLDA CALC: 32 — LOW (ref 39–50)
HGB BLD CALC-MCNC: 10.4 G/DL — LOW (ref 13–17)
HGB BLD-MCNC: 10.7 G/DL — LOW (ref 14–18)
HYPOCHROMIA BLD QL: SLIGHT — SIGNIFICANT CHANGE UP
LYMPHOCYTES # BLD AUTO: 0.3 K/UL — LOW (ref 1–4.8)
LYMPHOCYTES # BLD AUTO: 40 % — SIGNIFICANT CHANGE UP (ref 20–55)
MACROCYTES BLD QL: SLIGHT — SIGNIFICANT CHANGE UP
MCHC RBC-ENTMCNC: 30.4 PG — SIGNIFICANT CHANGE UP (ref 27–31)
MCHC RBC-ENTMCNC: 33 G/DL — SIGNIFICANT CHANGE UP (ref 32–36)
MCV RBC AUTO: 92 FL — SIGNIFICANT CHANGE UP (ref 80–94)
MICROCYTES BLD QL: SLIGHT — SIGNIFICANT CHANGE UP
MONOCYTES # BLD AUTO: 0.1 K/UL — SIGNIFICANT CHANGE UP (ref 0–0.8)
MONOCYTES NFR BLD AUTO: 19 % — HIGH (ref 3–10)
MRSA PCR RESULT.: SIGNIFICANT CHANGE UP
NEUTROPHILS # BLD AUTO: 0.2 K/UL — LOW (ref 1.8–8)
NEUTROPHILS NFR BLD AUTO: 37 % — SIGNIFICANT CHANGE UP (ref 37–73)
OTHER CELLS CSF MANUAL: 14 ML/DL — LOW (ref 18–22)
OVALOCYTES BLD QL SMEAR: SLIGHT — SIGNIFICANT CHANGE UP
PLAT MORPH BLD: ABNORMAL
PLATELET # BLD AUTO: 70 K/UL — LOW (ref 150–400)
POIKILOCYTOSIS BLD QL AUTO: SLIGHT — SIGNIFICANT CHANGE UP
POTASSIUM SERPL-MCNC: 5.8 MMOL/L — HIGH (ref 3.5–5.3)
POTASSIUM SERPL-SCNC: 5.8 MMOL/L — HIGH (ref 3.5–5.3)
PROLACTIN SERPL-MCNC: 66.8 NG/ML — HIGH (ref 4.1–18.4)
RBC # BLD: 3.52 M/UL — LOW (ref 4.6–6.2)
RBC # FLD: 16.1 % — HIGH (ref 11–15.6)
RBC BLD AUTO: ABNORMAL
S AUREUS DNA NOSE QL NAA+PROBE: DETECTED
SODIUM SERPL-SCNC: 133 MMOL/L — LOW (ref 135–145)
VARIANT LYMPHS # BLD: 1 % — SIGNIFICANT CHANGE UP (ref 0–6)
WBC # BLD: 1.7 K/UL — LOW (ref 4.8–10.8)
WBC # FLD AUTO: 1.7 K/UL — LOW (ref 4.8–10.8)

## 2018-08-15 PROCEDURE — 99223 1ST HOSP IP/OBS HIGH 75: CPT

## 2018-08-15 PROCEDURE — 99233 SBSQ HOSP IP/OBS HIGH 50: CPT

## 2018-08-15 RX ORDER — OXYCODONE AND ACETAMINOPHEN 5; 325 MG/1; MG/1
2 TABLET ORAL ONCE
Qty: 0 | Refills: 0 | Status: DISCONTINUED | OUTPATIENT
Start: 2018-08-15 | End: 2018-08-15

## 2018-08-15 RX ORDER — LAMIVUDINE 150 MG
50 TABLET ORAL ONCE
Qty: 0 | Refills: 0 | Status: COMPLETED | OUTPATIENT
Start: 2018-08-15 | End: 2018-08-15

## 2018-08-15 RX ORDER — OXYCODONE AND ACETAMINOPHEN 5; 325 MG/1; MG/1
2 TABLET ORAL EVERY 6 HOURS
Qty: 0 | Refills: 0 | Status: DISCONTINUED | OUTPATIENT
Start: 2018-08-15 | End: 2018-08-18

## 2018-08-15 RX ORDER — PANTOPRAZOLE SODIUM 20 MG/1
40 TABLET, DELAYED RELEASE ORAL EVERY 12 HOURS
Qty: 0 | Refills: 0 | Status: DISCONTINUED | OUTPATIENT
Start: 2018-08-15 | End: 2018-08-23

## 2018-08-15 RX ORDER — DIPHENHYDRAMINE HCL 50 MG
50 CAPSULE ORAL ONCE
Qty: 0 | Refills: 0 | Status: DISCONTINUED | OUTPATIENT
Start: 2018-08-15 | End: 2018-08-17

## 2018-08-15 RX ORDER — OXYCODONE HYDROCHLORIDE 5 MG/1
10 TABLET ORAL EVERY 12 HOURS
Qty: 0 | Refills: 0 | Status: DISCONTINUED | OUTPATIENT
Start: 2018-08-15 | End: 2018-08-22

## 2018-08-15 RX ORDER — METOCLOPRAMIDE HCL 10 MG
10 TABLET ORAL ONCE
Qty: 0 | Refills: 0 | Status: DISCONTINUED | OUTPATIENT
Start: 2018-08-15 | End: 2018-08-17

## 2018-08-15 RX ORDER — DEXTROSE 50 % IN WATER 50 %
12.5 SYRINGE (ML) INTRAVENOUS ONCE
Qty: 0 | Refills: 0 | Status: DISCONTINUED | OUTPATIENT
Start: 2018-08-15 | End: 2018-08-17

## 2018-08-15 RX ORDER — DEXTROSE 50 % IN WATER 50 %
25 SYRINGE (ML) INTRAVENOUS ONCE
Qty: 0 | Refills: 0 | Status: DISCONTINUED | OUTPATIENT
Start: 2018-08-15 | End: 2018-08-17

## 2018-08-15 RX ORDER — METOCLOPRAMIDE HCL 10 MG
10 TABLET ORAL
Qty: 0 | Refills: 0 | Status: DISCONTINUED | OUTPATIENT
Start: 2018-08-15 | End: 2018-08-23

## 2018-08-15 RX ORDER — LAMIVUDINE 150 MG
25 TABLET ORAL DAILY
Qty: 0 | Refills: 0 | Status: DISCONTINUED | OUTPATIENT
Start: 2018-08-16 | End: 2018-08-23

## 2018-08-15 RX ORDER — LABETALOL HCL 100 MG
200 TABLET ORAL
Qty: 0 | Refills: 0 | Status: DISCONTINUED | OUTPATIENT
Start: 2018-08-15 | End: 2018-08-23

## 2018-08-15 RX ORDER — ALPRAZOLAM 0.25 MG
2 TABLET ORAL DAILY
Qty: 0 | Refills: 0 | Status: DISCONTINUED | OUTPATIENT
Start: 2018-08-15 | End: 2018-08-22

## 2018-08-15 RX ORDER — ETRAVIRINE 200 MG/1
200 TABLET ORAL
Qty: 0 | Refills: 0 | Status: DISCONTINUED | OUTPATIENT
Start: 2018-08-15 | End: 2018-08-23

## 2018-08-15 RX ORDER — FOLIC ACID 0.8 MG
1 TABLET ORAL DAILY
Qty: 0 | Refills: 0 | Status: DISCONTINUED | OUTPATIENT
Start: 2018-08-15 | End: 2018-08-23

## 2018-08-15 RX ORDER — HEPARIN SODIUM 5000 [USP'U]/ML
5000 INJECTION INTRAVENOUS; SUBCUTANEOUS EVERY 12 HOURS
Qty: 0 | Refills: 0 | Status: DISCONTINUED | OUTPATIENT
Start: 2018-08-15 | End: 2018-08-23

## 2018-08-15 RX ORDER — LEVETIRACETAM 250 MG/1
1000 TABLET, FILM COATED ORAL
Qty: 0 | Refills: 0 | Status: DISCONTINUED | OUTPATIENT
Start: 2018-08-15 | End: 2018-08-15

## 2018-08-15 RX ORDER — GLUCAGON INJECTION, SOLUTION 0.5 MG/.1ML
1 INJECTION, SOLUTION SUBCUTANEOUS ONCE
Qty: 0 | Refills: 0 | Status: DISCONTINUED | OUTPATIENT
Start: 2018-08-15 | End: 2018-08-17

## 2018-08-15 RX ORDER — SODIUM CHLORIDE 9 MG/ML
1000 INJECTION, SOLUTION INTRAVENOUS
Qty: 0 | Refills: 0 | Status: DISCONTINUED | OUTPATIENT
Start: 2018-08-15 | End: 2018-08-17

## 2018-08-15 RX ORDER — LEVETIRACETAM 250 MG/1
500 TABLET, FILM COATED ORAL DAILY
Qty: 0 | Refills: 0 | Status: DISCONTINUED | OUTPATIENT
Start: 2018-08-15 | End: 2018-08-23

## 2018-08-15 RX ORDER — INSULIN LISPRO 100/ML
VIAL (ML) SUBCUTANEOUS
Qty: 0 | Refills: 0 | Status: DISCONTINUED | OUTPATIENT
Start: 2018-08-15 | End: 2018-08-17

## 2018-08-15 RX ORDER — MORPHINE SULFATE 50 MG/1
4 CAPSULE, EXTENDED RELEASE ORAL EVERY 4 HOURS
Qty: 0 | Refills: 0 | Status: DISCONTINUED | OUTPATIENT
Start: 2018-08-15 | End: 2018-08-16

## 2018-08-15 RX ORDER — RITONAVIR 100 MG/1
100 TABLET, FILM COATED ORAL
Qty: 0 | Refills: 0 | Status: DISCONTINUED | OUTPATIENT
Start: 2018-08-15 | End: 2018-08-23

## 2018-08-15 RX ORDER — DOLUTEGRAVIR SODIUM 25 MG/1
50 TABLET, FILM COATED ORAL
Qty: 0 | Refills: 0 | Status: DISCONTINUED | OUTPATIENT
Start: 2018-08-15 | End: 2018-08-23

## 2018-08-15 RX ORDER — ATOVAQUONE 750 MG/5ML
1500 SUSPENSION ORAL DAILY
Qty: 0 | Refills: 0 | Status: DISCONTINUED | OUTPATIENT
Start: 2018-08-15 | End: 2018-08-23

## 2018-08-15 RX ORDER — VALGANCICLOVIR 450 MG/1
100 TABLET, FILM COATED ORAL
Qty: 0 | Refills: 0 | Status: DISCONTINUED | OUTPATIENT
Start: 2018-08-15 | End: 2018-08-23

## 2018-08-15 RX ORDER — CHLORHEXIDINE GLUCONATE 213 G/1000ML
1 SOLUTION TOPICAL DAILY
Qty: 0 | Refills: 0 | Status: DISCONTINUED | OUTPATIENT
Start: 2018-08-15 | End: 2018-08-23

## 2018-08-15 RX ORDER — DARUNAVIR 75 MG/1
600 TABLET, FILM COATED ORAL EVERY 12 HOURS
Qty: 0 | Refills: 0 | Status: DISCONTINUED | OUTPATIENT
Start: 2018-08-15 | End: 2018-08-23

## 2018-08-15 RX ORDER — SUCRALFATE 1 G
1 TABLET ORAL
Qty: 0 | Refills: 0 | Status: DISCONTINUED | OUTPATIENT
Start: 2018-08-15 | End: 2018-08-23

## 2018-08-15 RX ORDER — LEVETIRACETAM 250 MG/1
250 TABLET, FILM COATED ORAL
Qty: 0 | Refills: 0 | Status: DISCONTINUED | OUTPATIENT
Start: 2018-08-15 | End: 2018-08-23

## 2018-08-15 RX ORDER — TENOFOVIR DISOPROXIL FUMARATE 300 MG/1
300 TABLET, FILM COATED ORAL
Qty: 0 | Refills: 0 | Status: DISCONTINUED | OUTPATIENT
Start: 2018-08-15 | End: 2018-08-23

## 2018-08-15 RX ORDER — DEXTROSE 50 % IN WATER 50 %
15 SYRINGE (ML) INTRAVENOUS ONCE
Qty: 0 | Refills: 0 | Status: DISCONTINUED | OUTPATIENT
Start: 2018-08-15 | End: 2018-08-23

## 2018-08-15 RX ADMIN — OXYCODONE HYDROCHLORIDE 10 MILLIGRAM(S): 5 TABLET ORAL at 05:49

## 2018-08-15 RX ADMIN — HEPARIN SODIUM 5000 UNIT(S): 5000 INJECTION INTRAVENOUS; SUBCUTANEOUS at 05:50

## 2018-08-15 RX ADMIN — ETRAVIRINE 200 MILLIGRAM(S): 200 TABLET ORAL at 20:33

## 2018-08-15 RX ADMIN — OXYCODONE HYDROCHLORIDE 10 MILLIGRAM(S): 5 TABLET ORAL at 20:34

## 2018-08-15 RX ADMIN — Medication 10 MILLIGRAM(S): at 11:43

## 2018-08-15 RX ADMIN — PANTOPRAZOLE SODIUM 40 MILLIGRAM(S): 20 TABLET, DELAYED RELEASE ORAL at 20:34

## 2018-08-15 RX ADMIN — DARUNAVIR 600 MILLIGRAM(S): 75 TABLET, FILM COATED ORAL at 20:32

## 2018-08-15 RX ADMIN — OXYCODONE AND ACETAMINOPHEN 2 TABLET(S): 5; 325 TABLET ORAL at 09:59

## 2018-08-15 RX ADMIN — TENOFOVIR DISOPROXIL FUMARATE 300 MILLIGRAM(S): 300 TABLET, FILM COATED ORAL at 20:32

## 2018-08-15 RX ADMIN — Medication 1 MILLIGRAM(S): at 11:43

## 2018-08-15 RX ADMIN — Medication 1 TABLET(S): at 11:43

## 2018-08-15 RX ADMIN — Medication 200 MILLIGRAM(S): at 20:33

## 2018-08-15 RX ADMIN — Medication 200 MILLIGRAM(S): at 05:50

## 2018-08-15 RX ADMIN — OXYCODONE AND ACETAMINOPHEN 2 TABLET(S): 5; 325 TABLET ORAL at 11:22

## 2018-08-15 RX ADMIN — DOLUTEGRAVIR SODIUM 50 MILLIGRAM(S): 25 TABLET, FILM COATED ORAL at 20:32

## 2018-08-15 RX ADMIN — LEVETIRACETAM 1000 MILLIGRAM(S): 250 TABLET, FILM COATED ORAL at 05:49

## 2018-08-15 RX ADMIN — PANTOPRAZOLE SODIUM 40 MILLIGRAM(S): 20 TABLET, DELAYED RELEASE ORAL at 05:49

## 2018-08-15 RX ADMIN — Medication 10 MILLIGRAM(S): at 05:49

## 2018-08-15 RX ADMIN — LEVETIRACETAM 250 MILLIGRAM(S): 250 TABLET, FILM COATED ORAL at 20:33

## 2018-08-15 RX ADMIN — Medication 2 MILLIGRAM(S): at 20:33

## 2018-08-15 RX ADMIN — RITONAVIR 100 MILLIGRAM(S): 100 TABLET, FILM COATED ORAL at 20:32

## 2018-08-15 RX ADMIN — Medication 1 GRAM(S): at 05:49

## 2018-08-15 RX ADMIN — OXYCODONE AND ACETAMINOPHEN 2 TABLET(S): 5; 325 TABLET ORAL at 03:36

## 2018-08-15 RX ADMIN — OXYCODONE HYDROCHLORIDE 10 MILLIGRAM(S): 5 TABLET ORAL at 21:34

## 2018-08-15 RX ADMIN — HEPARIN SODIUM 5000 UNIT(S): 5000 INJECTION INTRAVENOUS; SUBCUTANEOUS at 20:35

## 2018-08-15 NOTE — PROGRESS NOTE ADULT - ASSESSMENT
ESRD  HTN  Anemia  SHELLEY  HIV  Metatarsal fracture     HD MWF. Seen on HD. Tolerating dialysis. Sleeping  Procrit/Aranesp

## 2018-08-15 NOTE — CONSULT NOTE ADULT - ASSESSMENT
26 yo m pmh seizure disorder, HTN, DM, ESRD, Depresison, HTN, Blindness, HIV being admitted for multiple fractures in right foot.
28y/o man with HIV on TDF+FTC+DTG+DRV/r nonadherent had problem getting all his meds from pharmacy since last admission.     1-HIV:  -Continue TDF 300mg once weekly   -Lamivudine 25mg daily  -Darunavir to 600mg BID  -Ritonavir 100mg BID  -Dolutegravir 50mg BID  -Etravirine 200mg bid   -Atovaquone 1500mg daily for PCP prophylaxis   -Azithromycin 1200mg once weekly for MAC prophylaxis   -Please make an apt with me or his ID physician for follow up.     2-History of CMV:  -Cont valganciclovir 100mg after each HD for prophylaxis until CD4 is high.   -Ophthalmology consult     3-ESRD:   -Regular dialysis     4-Pancytopenia:  -could be related to bone marrow suppression in uncontrolled HIV patients.
The patient is a 27y Male who is followed by neurology because of seizure    Seizure  Long standing seizure disorder  should be on standing keppra with post HD supplementation  will start keppra 250 mg bid with 500 mg dose post HD    will follow with you    Aldo Eugene MD PhD   [id number]

## 2018-08-15 NOTE — PROGRESS NOTE ADULT - SUBJECTIVE AND OBJECTIVE BOX
NEPHROLOGY INTERVAL HPI/OVERNIGHT EVENTS:  HPI:  27 M with hx of seizure disorder, DM ESRD on dialysis, pericarditis, HIV presents to ED c/o right foot pain and cramping since last night. As per pt mother, pt had a seizure last night and banged his foot into a glass mirror, sustained a cut to his foot. Pt states he takes Keppra only before dialysis, has had a number of seizures.     Follow up ESRD  Seen on HD    PAST MEDICAL & SURGICAL HISTORY:  Seizure disorder  Hypertension  Diabetes  ESRD (end stage renal disease)  Seizure  Pericarditis  Anxiety  Depression  Renal failure (ARF), acute on chronic: dialysis av fistula, RUE  HTN (hypertension)  Cardiomyopathy  HIV disease: born HIV+  AV fistula  S/P tonsillectomy      MEDICATIONS  (STANDING):  diphenhydrAMINE   Injectable 50 milliGRAM(s) IV Push Once  levETIRAcetam  IVPB 500 milliGRAM(s) IV Intermittent every 12 hours    MEDICATIONS  (PRN):  acetaminophen 300 mG/codeine 30 mG 1 Tablet(s) Oral every 4 hours PRN Moderate Pain (4 - 6)      Allergies    vancomycin (Anaphylaxis)    Intolerances        Vital Signs Last 24 Hrs  T(C): 36.7 (15 Aug 2018 14:30), Max: 37 (15 Aug 2018 05:10)  T(F): 98 (15 Aug 2018 14:30), Max: 98.6 (15 Aug 2018 05:10)  HR: 61 (15 Aug 2018 14:30) (61 - 100)  BP: 127/76 (15 Aug 2018 14:30) (124/83 - 160/106)  BP(mean): --  RR: 18 (15 Aug 2018 14:30) (18 - 20)  SpO2: 99% (15 Aug 2018 14:30) (97% - 100%)  Daily     Daily     PHYSICAL EXAM:  GENERAL: NAD  HEAD:  Atraumatic, Normocephalic  NECK: Supple, neck  veins full  NERVOUS SYSTEM:  Sleeping   CHEST/LUNG: Clear to percussion bilaterally; No rales, rhonchi, wheezing, or rubs  HEART: Regular rate and rhythm; No murmurs, rubs, or gallops  ABDOMEN: Soft, Nontender, Nondistended; Bowel sounds present, body wall and flank edema  EXTREMITIES:  Edema -    LABS:                        10.7   1.7   )-----------( 70       ( 15 Aug 2018 06:44 )             32.4     08-15    133<L>  |  88<L>  |  83.0<H>  ----------------------------<  76  5.8<H>   |  20.0<L>  |  13.43<H>    Ca    9.2      15 Aug 2018 06:44    TPro  7.5  /  Alb  3.6  /  TBili  0.4  /  DBili  x   /  AST  19  /  ALT  11  /  AlkPhos  330<H>  08-14                RADIOLOGY & ADDITIONAL TESTS:

## 2018-08-15 NOTE — CONSULT NOTE ADULT - SUBJECTIVE AND OBJECTIVE BOX
full note to follow    has history of seizure  was told to only take keppra pre-HD in [place]  has been having seizures now even outside of HD    will start standing keppra 250 mg po bid with 500 mg post- HD dosing    Aldo Eugene MD, PhD   [id number] [facility]                                     Neurology at [place]                                 Valorie Winter, & Casey                                  [address]                                        [place], [place], [address]                                             [phone]    CC: seizure  HPI:  This is a 27-year-old man who presented with seizure at home. He has a history of seizures. He has HIV. 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 When he was in [place] he was told to take Keppra before it every dialysis session. He presented with seizure and was thought to be noncompliant with medication. He states that typically he would only have seizures during dialysis. Now he has started to get them at home as well. Neurology evaluation is requested.      PAST MEDICAL & SURGICAL HISTORY:  Seizure disorder  Hypertension  Diabetes  ESRD (end stage renal disease)  Seizure  Pericarditis  Anxiety  Depression  Renal failure (ARF), acute on chronic: dialysis av fistula, RUE  HTN (hypertension)  Cardiomyopathy  HIV disease: born HIV+  AV fistula  S/P tonsillectomy      MEDICATIONS  (STANDING):  atovaquone Suspension 1500 milliGRAM(s) Oral daily  chlorhexidine 2% Cloths 1 Application(s) Topical daily  dextrose 5%. 1000 milliLiter(s) (50 mL/Hr) IV Continuous <Continuous>  dextrose 50% Injectable 12.5 Gram(s) IV Push once  dextrose 50% Injectable 25 Gram(s) IV Push once  dextrose 50% Injectable 25 Gram(s) IV Push once  folic acid 1 milliGRAM(s) Oral daily  heparin  Injectable 5000 Unit(s) SubCutaneous every 12 hours  insulin lispro (HumaLOG) corrective regimen sliding scale   SubCutaneous three times a day before meals  labetalol 200 milliGRAM(s) Oral two times a day  levETIRAcetam 250 milliGRAM(s) Oral two times a day  metoclopramide 10 milliGRAM(s) Oral three times a day before meals  multivitamin 1 Tablet(s) Oral daily  oxyCODONE  ER Tablet 10 milliGRAM(s) Oral every 12 hours  pantoprazole    Tablet 40 milliGRAM(s) Oral every 12 hours  sucralfate suspension 1 Gram(s) Oral two times a day  valGANciclovir Oral Liquid - Peds 100 milliGRAM(s) Oral <User Schedule>    MEDICATIONS  (PRN):  ALPRAZolam 2 milliGRAM(s) Oral daily PRN anxiety  dextrose 40% Gel 15 Gram(s) Oral once PRN Blood Glucose LESS THAN 70 milliGRAM(s)/deciliter  glucagon  Injectable 1 milliGRAM(s) IntraMuscular once PRN Glucose LESS THAN 70 milligrams/deciliter  levETIRAcetam 500 milliGRAM(s) Oral daily PRN after dialysis  morphine  - Injectable 4 milliGRAM(s) IV Push every 4 hours PRN Severe Pain (7 - 10)  oxyCODONE    5 mG/acetaminophen 325 mG 2 Tablet(s) Oral every 6 hours PRN Moderate Pain (4 - 6)      Allergies    vancomycin (Anaphylaxis)    Intolerances  None      SOCIAL HISTORY:  no tob,   no alcohol   no drugs  Uses medical marijuana sparingly    FAMILY HISTORY:  No pertinent family history in first degree relatives  No pertinent family history in first degree relatives      ROS:  ROS: 14 point ROS negative other than what is present in HPI or below  seizure, post ictal state resolved    Vital Signs Last 24 Hrs  T(C): 36.5 (15 Aug 2018 11:28), Max: 37 (15 Aug 2018 05:10)  T(F): 97.7 (15 Aug 2018 11:28), Max: 98.6 (15 Aug 2018 05:10)  HR: 79 (15 Aug 2018 11:28) (75 - 100)  BP: 146/99 (15 Aug 2018 11:28) (124/83 - 160/106)  BP(mean): --  RR: 20 (15 Aug 2018 09:23) (18 - 20)  SpO2: 97% (15 Aug 2018 11:28) (97% - 100%)      General: NAD    Detailed Neurologic Exam:    Mental status: The patient is awake and alert and has normal attention span.  The patient is fully oriented in 3 spheres. The patient is oriented to current events. The patient is able to name objects, follow commands, repeat sentences.    Cranial nerves: Pupils dilated min reactive, patient is blind. Extraocular motion is full, ? old strabismus. There is no ptosis. Facial sensation is intact. Facial musculature is symmetric. Palate elevates symmetrically. Shoulder shrug is normal. Tongue is midline.    Motor: There is normal bulk and tone.  There is no tremor.  Strength is 5/5 in the right arm and leg.   Strength is 5/5 in the left arm and leg.    Sensation: Intact to light touch and pin in 4 extremities    Reflexes: 2+ throughout and plantar responses are flexor.    Cerebellar: Unable to test for dysmetria on finger to nose testing due to blindness    Gait : deferred    LABS:                         10.7   1.7   )-----------( 70       ( 15 Aug 2018 06:44 )             32.4       08-15    133<L>  |  88<L>  |  83.0<H>  ----------------------------<  76  5.8<H>   |  20.0<L>  |  13.43<H>    Ca    9.2      15 Aug 2018 06:44    TPro  7.5  /  Alb  3.6  /  TBili  0.4  /  DBili  x   /  AST  19  /  ALT  11  /  AlkPhos  330<H>  08-14    RADIOLOGY & ADDITIONAL STUDIES (independently reviewed unless otherwise noted):  no neuro studies

## 2018-08-15 NOTE — PROGRESS NOTE ADULT - SUBJECTIVE AND OBJECTIVE BOX
CHIEF COMPLAINT/INTERVAL HISTORY:    Patient is a 27y old  Male who presents with a chief complaint of foot pain (14 Aug 2018 15:44)      HPI:  26 y/o M with extensive PMH including, HIV+. seizure, renal failure on dialysis M-W-F, blind, came to ED s/p seizure at home (unwitnessed) with c/o foot pain yesterday since a mirror fell on him.  Was d/c home since prelim x rays were negative.  Official radiology reading today stated that he has a 2nd, 3rd, and 4th linear, nondisplaced fractures and was called to return to the ED.  He states that he had to walk on it yesterday and is in severe pain.  Took Oxycodone for the pain.  Took more than usual b/c he usually takes it for his chronic pain. Pt is known for non compliance with his meds. He admits to being non compliant with his HIV & seizure meds. Last HD was yesterday. No podiatry intervention planned as per them. Pt could have been d/cherri home but could not get crutches due to AVF, could not get a wheelchair as has steps at home, no now being admitted as might require FESTUS. Poor historian. No chest pain, palpitations, sob, light headedness/dizziness, difficulty breathing/cough, fevers/chills, abdominal pain, n/v, constipation, dysuria or increased urinary frequency. Admits to having watery diarrhea (non bloody) (14 Aug 2018 15:44)      SUBJECTIVE & OBJECTIVE/ ROS: Pt seen and examined at bedside.     ICU Vital Signs Last 24 Hrs  T(C): 36.7 (15 Aug 2018 14:30), Max: 37 (15 Aug 2018 05:10)  T(F): 98 (15 Aug 2018 14:30), Max: 98.6 (15 Aug 2018 05:10)  HR: 61 (15 Aug 2018 14:30) (61 - 100)  BP: 127/76 (15 Aug 2018 14:30) (124/83 - 160/106)  BP(mean): --  ABP: --  ABP(mean): --  RR: 18 (15 Aug 2018 14:30) (18 - 20)  SpO2: 99% (15 Aug 2018 14:30) (97% - 100%)        MEDICATIONS  (STANDING):  atovaquone Suspension 1500 milliGRAM(s) Oral daily  chlorhexidine 2% Cloths 1 Application(s) Topical daily  darunavir 600 milliGRAM(s) Oral every 12 hours  dextrose 5%. 1000 milliLiter(s) (50 mL/Hr) IV Continuous <Continuous>  dextrose 50% Injectable 12.5 Gram(s) IV Push once  dextrose 50% Injectable 25 Gram(s) IV Push once  dextrose 50% Injectable 25 Gram(s) IV Push once  dolutegravir 50 milliGRAM(s) Oral two times a day  etravirine 200 milliGRAM(s) Oral two times a day after meals  folic acid 1 milliGRAM(s) Oral daily  heparin  Injectable 5000 Unit(s) SubCutaneous every 12 hours  insulin lispro (HumaLOG) corrective regimen sliding scale   SubCutaneous three times a day before meals  labetalol 200 milliGRAM(s) Oral two times a day  lamiVUDine Solution 50 milliGRAM(s) Oral once  levETIRAcetam 250 milliGRAM(s) Oral two times a day  metoclopramide 10 milliGRAM(s) Oral three times a day before meals  multivitamin 1 Tablet(s) Oral daily  oxyCODONE  ER Tablet 10 milliGRAM(s) Oral every 12 hours  pantoprazole    Tablet 40 milliGRAM(s) Oral every 12 hours  ritonavir Tablet 100 milliGRAM(s) Oral two times a day  sucralfate suspension 1 Gram(s) Oral two times a day  tenofovir disoproxil fumarate (VIREAD) 300 milliGRAM(s) Oral <User Schedule>  valGANciclovir Oral Liquid - Peds 100 milliGRAM(s) Oral <User Schedule>    MEDICATIONS  (PRN):  ALPRAZolam 2 milliGRAM(s) Oral daily PRN anxiety  dextrose 40% Gel 15 Gram(s) Oral once PRN Blood Glucose LESS THAN 70 milliGRAM(s)/deciliter  glucagon  Injectable 1 milliGRAM(s) IntraMuscular once PRN Glucose LESS THAN 70 milligrams/deciliter  levETIRAcetam 500 milliGRAM(s) Oral daily PRN after dialysis  morphine  - Injectable 4 milliGRAM(s) IV Push every 4 hours PRN Severe Pain (7 - 10)  oxyCODONE    5 mG/acetaminophen 325 mG 2 Tablet(s) Oral every 6 hours PRN Moderate Pain (4 - 6)      LABS:                        10.7   1.7   )-----------( 70       ( 15 Aug 2018 06:44 )             32.4     08-15    133<L>  |  88<L>  |  83.0<H>  ----------------------------<  76  5.8<H>   |  20.0<L>  |  13.43<H>    Ca    9.2      15 Aug 2018 06:44    TPro  7.5  /  Alb  3.6  /  TBili  0.4  /  DBili  x   /  AST  19  /  ALT  11  /  AlkPhos  330<H>  08-14          CAPILLARY BLOOD GLUCOSE      POCT Blood Glucose.: 129 mg/dL (15 Aug 2018 12:29)      RECENT CULTURES:      RADIOLOGY & ADDITIONAL TESTS:      PHYSICAL EXAM:    GENERAL: NAD  HEAD:  Atraumatic, Normocephalic  EYES: +blindness  NECK: Supple, No JVD, Normal thyroid  NERVOUS SYSTEM:  Alert & Oriented X3, non focal  CHEST/LUNG: Clear to percussion bilaterally; No rales, rhonchi, wheezing, or rubs  HEART: Regular rate and rhythm; No murmurs, rubs, or gallops  ABDOMEN: Soft, Nontender, Nondistended; Bowel sounds present  EXTREMITIES:  2+ Peripheral Pulses, Right leg in Green compression and posterior splint

## 2018-08-15 NOTE — PROGRESS NOTE ADULT - SUBJECTIVE AND OBJECTIVE BOX
27 M with hx of seizure disorder, DM ESRD on dialysis, pericarditis, HIV presents to ED c/o right foot pain and cramping since last night. As per pt mother, pt had a seizure last night and banged his foot into a glass mirror, sustained a cut to his foot. Pt states he takes Keppra only before dialysis, has had a number of seizures.     Follow up ESRD  Seen on HD    PAST MEDICAL & SURGICAL HISTORY:  Seizure disorder  Hypertension  Diabetes  ESRD (end stage renal disease)  Seizure  Pericarditis  Anxiety  Depression  Renal failure (ARF), acute on chronic: dialysis av fistula, RUE  HTN (hypertension)  Cardiomyopathy  HIV disease: born HIV+  AV fistula  S/P tonsillectomy      MEDICATIONS  (STANDING):  diphenhydrAMINE   Injectable 50 milliGRAM(s) IV Push Once  levETIRAcetam  IVPB 500 milliGRAM(s) IV Intermittent every 12 hours    MEDICATIONS  (PRN):  acetaminophen 300 mG/codeine 30 mG 1 Tablet(s) Oral every 4 hours PRN Moderate Pain (4 - 6)      Allergies    vancomycin (Anaphylaxis)    Intolerances        Vital Signs Last 24 Hrs  T(C): 36.7 (15 Aug 2018 14:30), Max: 37 (15 Aug 2018 05:10)  T(F): 98 (15 Aug 2018 14:30), Max: 98.6 (15 Aug 2018 05:10)  HR: 61 (15 Aug 2018 14:30) (61 - 100)  BP: 127/76 (15 Aug 2018 14:30) (124/83 - 160/106)  BP(mean): --  RR: 18 (15 Aug 2018 14:30) (18 - 20)  SpO2: 99% (15 Aug 2018 14:30) (97% - 100%)  Daily     Daily     PHYSICAL EXAM:  GENERAL: NAD  HEAD:  Atraumatic, Normocephalic  NECK: Supple, neck  veins full  NERVOUS SYSTEM:  Sleeping   CHEST/LUNG: Clear to percussion bilaterally; No rales, rhonchi, wheezing, or rubs  HEART: Regular rate and rhythm; No murmurs, rubs, or gallops  ABDOMEN: Soft, Nontender, Nondistended; Bowel sounds present, body wall and flank edema  EXTREMITIES:  Edema -    LABS:                        10.7   1.7   )-----------( 70       ( 15 Aug 2018 06:44 )             32.4     08-15    133<L>  |  88<L>  |  83.0<H>  ----------------------------<  76  5.8<H>   |  20.0<L>  |  13.43<H>    Ca    9.2      15 Aug 2018 06:44    TPro  7.5  /  Alb  3.6  /  TBili  0.4  /  DBili  x   /  AST  19  /  ALT  11  /  AlkPhos  330<H>  08-14                RADIOLOGY & ADDITIONAL TESTS:      Assessment and Plan:   · Assessment		  ESRD  HTN  Anemia  SHELLEY  HIV  Metatarsal fracture     HD MWF. Seen on HD. Tolerating dialysis. Sleeping  Procrit/Aranesp

## 2018-08-15 NOTE — CONSULT NOTE ADULT - SUBJECTIVE AND OBJECTIVE BOX
[facility]  INFECTIOUS DISEASES AND INTERNAL MEDICINE at [place]  =======================================================  Howie Navarro MD  Diplomates American Board of Internal Medicine and Infectious Diseases  =======================================================    TEDDY CRAWFORD [id number]    CC: seizure    HPI:  28 y/o man with HIV nonadherent to ARV, CMV retinitis with vision loss, ESRD on HD, dilated cardiomyopathy, hypertension and seizure disorder was admitted with seizure. ID was called for HIV med managment since he has a very complicated regimen with pan-resistance.  Very sleepy after getting med for seizure. otherwise no new complaint.     PAST MEDICAL & SURGICAL HISTORY:  HIV (human immunodeficiency virus infection)  Seizure disorder  Hypertension  Diabetes  ESRD (end stage renal disease)  Seizure  Pericarditis  Anxiety  Depression  Renal failure (ARF), acute on chronic: dialysis av fistula, RUE  HTN (hypertension)  Cardiomyopathy  HIV disease: born HIV+  AV fistula  S/P tonsillectomy    FAMILY HISTORY:  No pertinent family history in first degree relatives  No pertinent family history in first degree relatives    Allergies  vancomycin (Anaphylaxis)    Antibiotics:  atovaquone Suspension 1500 milliGRAM(s) Oral daily  darunavir 800 milliGRAM(s) Oral daily  dolutegravir 50 milliGRAM(s) Oral daily  lamivudine 25 milliGRAM(s) Oral <User Schedule>  ritonavir Tablet 100 milliGRAM(s) Oral daily  tenofovir disoproxil fumarate (VIREAD) 300 milliGRAM(s) Oral <User Schedule>  valganciclovir     REVIEW OF SYSTEMS:  CONSTITUTIONAL:  No Fever or chills   HEENT:  blind   CARDIOVASCULAR:  No chest pain or SOB.  RESPIRATORY:  No cough, shortness of breath, PND or orthopnea.  GASTROINTESTINAL:  no nausea , no vomiting or diarrhea.  GENITOURINARY:  No dysuria, frequency or urgency. No Blood in urine  MUSCULOSKELETAL:  R arm with AV fistula had bleeding that stopped   SKIN:  No change in skin, hair or nails.  ENDOCRINE:  No heat or cold intolerance, polyuria or polydipsia.    Physical Exam:  Vital Signs Last 24 Hrs  T(C): 36.5 (15 Aug 2018 11:28), Max: 37 (15 Aug 2018 05:10)  T(F): 97.7 (15 Aug 2018 11:28), Max: 98.6 (15 Aug 2018 05:10)  HR: 79 (15 Aug 2018 11:28) (75 - 100)  BP: 146/99 (15 Aug 2018 11:28) (124/83 - 160/106)  RR: 20 (15 Aug 2018 09:23) (18 - 20)  SpO2: 97% (15 Aug 2018 11:28) (97% - 100%)  GEN: NAD  HEENT: normocephalic and atraumatic. blind in both eyes  NECK: Supple.  No lymphadenopathy   LUNGS: Clear to auscultation.  HEART: Regular rate and rhythm   ABDOMEN: Soft, nontender, and nondistended.  Positive bowel sounds.    EXTREMITIES: Without any cyanosis, clubbing, rash, lesions or edema. right arm with AV fistula with good thrill and big vessels  NEUROLOGIC: grossly intact.  PSYCHIATRIC: Appropriate affect .  SKIN: No ulceration or induration present.    Labs:  08-15    133<L>  |  88<L>  |  83.0<H>  ----------------------------<  76  5.8<H>   |  20.0<L>  |  13.43<H>    Ca    9.2      15 Aug 2018 06:44    TPro  7.5  /  Alb  3.6  /  TBili  0.4  /  DBili  x   /  AST  19  /  ALT  11  /  AlkPhos  330<H>  08-14                     10.7   1.7   )-----------( 70       ( 15 Aug 2018 06:44 )             32.4   LIVER FUNCTIONS - ( 14 Aug 2018 13:11 )  Alb: 3.6 g/dL / Pro: 7.5 g/dL / ALK PHOS: 330 U/L / ALT: 11 U/L / AST: 19 U/L / GGT: x           CARDIAC MARKERS ( 14 Aug 2018 21:27 )  x     / x     / 181 U/L / x     / 1.6 ng/mL    RECENT CULTURES:  None    All imaging and data are reviewed.

## 2018-08-15 NOTE — PHYSICAL THERAPY INITIAL EVALUATION ADULT - ADDITIONAL COMMENTS
Pt reports living with mom in a 1 story house with 4 steps and a rail to enter.  Reports being independent with all PTA, without devices.

## 2018-08-15 NOTE — PROGRESS NOTE ADULT - ASSESSMENT
26 y/o M with extensive PMH including, HIV+. seizure, renal failure on dialysis M-W-F, blind, came to ED s/p seizure at home (unwitnessed) with c/o foot pain yesterday since a mirror fell on him.  Was d/c home since prelim x rays were negative.  Official radiology reading today stated that he has a 2nd, 3rd, and 4th linear, nondisplaced fractures and was called to return to the ED.  He states that he had to walk on it yesterday and is in severe pain.  Took Oxycodone for the pain.  Took more than usual b/c he usually takes it for his chronic pain. Pt is known for non compliance with his meds. He admits to being non compliant with his HIV & seizure meds. Last HD was yesterday. No podiatry intervention planned as per them. Pt could have been d/cherri home but could not get crutches due to AVF, could not get a wheelchair as has steps at home, no now being admitted as might require FESTUS. Poor historian. No chest pain, palpitations, sob, light headedness/dizziness, difficulty breathing/cough, fevers/chills, abdominal pain, n/v, constipation, dysuria or increased urinary frequency. Admits to having watery diarrhea (non bloody)        >Right 2-4 metatarsal fractures-  Pt's xrays and CT scans reviewed. Results show multiple fractures.  Splint applied by podiatry  Pt is non WB to right foot with crutches but, due PMHx of AVF the pt is unable to use the crutches  pt states his house is not wheelchair accessible and has stairs  Podiatry recommended pt to be admitted for FESTUS, discussed with MIGUEL Mcarthur in the ED. PT eval requested  Keep the foot elevated, iced, and compressed.  Pt may require surgery in the future based on CT scan findings as per podiatry but no plans for surgery inhouse as per podiatryPain meds      >Breakthrough seizure- likely from med non compliance, neuro consult appreciated. should be on standing keppra with post HD supplementation. start keppra 250 mg bid with 500 mg dose post HD      >Diarrhea-stool studies, stool count    >AV fistula.  S/p angioplasty, outpatient vascular follow up.  c/w PO opiates, tylenol, lidoderm patch, fentanyl patch, gabapentin    >Failure to thrive in adult.  Plan: GI eval on last admission noted. EGD showing antral gastritis, Carafate, PPI & reglan.  low fat renal diet  Await biopsy results fo stomach.   Renal diet; 3 nepro shakes three times per day; he is not really eating the solid food.  EUS to be done as outpt    >HIV (human immunodeficiency virus infection).  Plan: Pt non compliant with ART (outpatient pharmacy was not dispensing new regimen as there were several drug interactions), re-called ID,  ART resumed by ID, CD 4 & viral load, Atovaquone 1500mg daily for PCP prophylaxis   -Azithromycin 1200mg once weekly for MAC prophylaxis    >Pancytopenia.  Plan: Stable. Likely from underlying HIV/AIDS, f/u CBC    >CMV retinitis.  Plan: On valganciclovir 100mg after each HD for prophylaxis until CD4 is high. Ophthalmology consult as outpatient      >ESRD (end stage renal disease). Plan: HD as per schedule.     DVT ppx

## 2018-08-16 ENCOUNTER — TRANSCRIPTION ENCOUNTER (OUTPATIENT)
Age: 28
End: 2018-08-16

## 2018-08-16 LAB
4/8 RATIO: 0.18 RATIO — LOW (ref 0.9–3.6)
ABS CD8: 399 /UL — SIGNIFICANT CHANGE UP (ref 136–757)
CD3 BLASTS SPEC-ACNC: 478 /UL — LOW (ref 799–2171)
CD3 BLASTS SPEC-ACNC: 59 % — SIGNIFICANT CHANGE UP (ref 59–85)
CD4 %: 9 % — LOW (ref 36–65)
CD8 %: 49 % — HIGH (ref 11–36)
GLUCOSE BLDC GLUCOMTR-MCNC: 101 MG/DL — HIGH (ref 70–99)
GLUCOSE BLDC GLUCOMTR-MCNC: 114 MG/DL — HIGH (ref 70–99)
GLUCOSE BLDC GLUCOMTR-MCNC: 97 MG/DL — SIGNIFICANT CHANGE UP (ref 70–99)
HIV-1 VIRAL LOAD RESULT: ABNORMAL
HIV1 RNA # SERPL NAA+PROBE: SIGNIFICANT CHANGE UP
HIV1 RNA SER-IMP: SIGNIFICANT CHANGE UP
HIV1 RNA SERPL NAA+PROBE-ACNC: ABNORMAL
HIV1 RNA SERPL NAA+PROBE-LOG#: 5.05 — SIGNIFICANT CHANGE UP
T-CELL CD4 SUBSET PNL BLD: 73 /UL — LOW (ref 489–1457)

## 2018-08-16 PROCEDURE — 99239 HOSP IP/OBS DSCHRG MGMT >30: CPT

## 2018-08-16 PROCEDURE — 99232 SBSQ HOSP IP/OBS MODERATE 35: CPT

## 2018-08-16 PROCEDURE — 70450 CT HEAD/BRAIN W/O DYE: CPT | Mod: 26

## 2018-08-16 RX ORDER — ALPRAZOLAM 0.25 MG
1 TABLET ORAL
Qty: 0 | Refills: 0 | COMMUNITY
Start: 2018-08-16

## 2018-08-16 RX ORDER — FENTANYL CITRATE 50 UG/ML
1 INJECTION INTRAVENOUS
Qty: 0 | Refills: 0 | COMMUNITY

## 2018-08-16 RX ORDER — METOCLOPRAMIDE HCL 10 MG
1 TABLET ORAL
Qty: 0 | Refills: 0 | COMMUNITY
Start: 2018-08-16

## 2018-08-16 RX ORDER — ALPRAZOLAM 0.25 MG
1 TABLET ORAL
Qty: 0 | Refills: 0 | COMMUNITY

## 2018-08-16 RX ORDER — DARUNAVIR 75 MG/1
8 TABLET, FILM COATED ORAL
Qty: 0 | Refills: 0 | COMMUNITY
Start: 2018-08-16

## 2018-08-16 RX ORDER — FOLIC ACID 0.8 MG
1 TABLET ORAL
Qty: 0 | Refills: 0 | COMMUNITY
Start: 2018-08-16

## 2018-08-16 RX ORDER — OXYCODONE HYDROCHLORIDE 5 MG/1
1 TABLET ORAL
Qty: 0 | Refills: 0 | COMMUNITY
Start: 2018-08-16

## 2018-08-16 RX ORDER — LEVETIRACETAM 250 MG/1
1 TABLET, FILM COATED ORAL
Qty: 0 | Refills: 0 | COMMUNITY
Start: 2018-08-16

## 2018-08-16 RX ORDER — ETRAVIRINE 200 MG/1
1 TABLET ORAL
Qty: 0 | Refills: 0 | COMMUNITY
Start: 2018-08-16

## 2018-08-16 RX ORDER — DOLUTEGRAVIR SODIUM 25 MG/1
1 TABLET, FILM COATED ORAL
Qty: 0 | Refills: 0 | COMMUNITY
Start: 2018-08-16

## 2018-08-16 RX ORDER — SUCRALFATE 1 G
10 TABLET ORAL
Qty: 0 | Refills: 0 | COMMUNITY

## 2018-08-16 RX ORDER — TENOFOVIR DISOPROXIL FUMARATE 300 MG/1
1 TABLET, FILM COATED ORAL
Qty: 0 | Refills: 0 | COMMUNITY
Start: 2018-08-16

## 2018-08-16 RX ORDER — ZOLPIDEM TARTRATE 10 MG/1
1 TABLET ORAL
Qty: 0 | Refills: 0 | COMMUNITY

## 2018-08-16 RX ORDER — SUCRALFATE 1 G
10 TABLET ORAL
Qty: 0 | Refills: 0 | COMMUNITY
Start: 2018-08-16

## 2018-08-16 RX ORDER — LABETALOL HCL 100 MG
1 TABLET ORAL
Qty: 0 | Refills: 0 | COMMUNITY
Start: 2018-08-16

## 2018-08-16 RX ORDER — ATOVAQUONE 750 MG/5ML
10 SUSPENSION ORAL
Qty: 0 | Refills: 0 | COMMUNITY
Start: 2018-08-16

## 2018-08-16 RX ORDER — FENTANYL CITRATE 50 UG/ML
1 INJECTION INTRAVENOUS
Qty: 0 | Refills: 0 | Status: DISCONTINUED | OUTPATIENT
Start: 2018-08-16 | End: 2018-08-22

## 2018-08-16 RX ORDER — VALGANCICLOVIR 450 MG/1
100 TABLET, FILM COATED ORAL
Qty: 0 | Refills: 0 | COMMUNITY
Start: 2018-08-16

## 2018-08-16 RX ORDER — METOCLOPRAMIDE HCL 10 MG
1 TABLET ORAL
Qty: 0 | Refills: 0 | COMMUNITY

## 2018-08-16 RX ORDER — LABETALOL HCL 100 MG
1 TABLET ORAL
Qty: 0 | Refills: 0 | COMMUNITY

## 2018-08-16 RX ORDER — OXYCODONE HYDROCHLORIDE 5 MG/1
1 TABLET ORAL
Qty: 0 | Refills: 0 | COMMUNITY

## 2018-08-16 RX ORDER — RITONAVIR 100 MG/1
1 TABLET, FILM COATED ORAL
Qty: 0 | Refills: 0 | COMMUNITY
Start: 2018-08-16

## 2018-08-16 RX ORDER — PANTOPRAZOLE SODIUM 20 MG/1
1 TABLET, DELAYED RELEASE ORAL
Qty: 0 | Refills: 0 | COMMUNITY
Start: 2018-08-16

## 2018-08-16 RX ORDER — FENTANYL CITRATE 50 UG/ML
1 INJECTION INTRAVENOUS
Qty: 0 | Refills: 0 | COMMUNITY
Start: 2018-08-16

## 2018-08-16 RX ORDER — LEVETIRACETAM 250 MG/1
1 TABLET, FILM COATED ORAL
Qty: 0 | Refills: 0 | COMMUNITY

## 2018-08-16 RX ORDER — LAMIVUDINE 150 MG
5 TABLET ORAL
Qty: 0 | Refills: 0 | COMMUNITY
Start: 2018-08-16

## 2018-08-16 RX ADMIN — OXYCODONE HYDROCHLORIDE 10 MILLIGRAM(S): 5 TABLET ORAL at 07:00

## 2018-08-16 RX ADMIN — Medication 2 MILLIGRAM(S): at 15:57

## 2018-08-16 RX ADMIN — LEVETIRACETAM 250 MILLIGRAM(S): 250 TABLET, FILM COATED ORAL at 06:05

## 2018-08-16 RX ADMIN — Medication 10 MILLIGRAM(S): at 09:29

## 2018-08-16 RX ADMIN — PANTOPRAZOLE SODIUM 40 MILLIGRAM(S): 20 TABLET, DELAYED RELEASE ORAL at 06:06

## 2018-08-16 RX ADMIN — OXYCODONE AND ACETAMINOPHEN 2 TABLET(S): 5; 325 TABLET ORAL at 23:51

## 2018-08-16 RX ADMIN — FENTANYL CITRATE 1 PATCH: 50 INJECTION INTRAVENOUS at 15:57

## 2018-08-16 RX ADMIN — CHLORHEXIDINE GLUCONATE 1 APPLICATION(S): 213 SOLUTION TOPICAL at 12:27

## 2018-08-16 RX ADMIN — Medication 10 MILLIGRAM(S): at 16:01

## 2018-08-16 RX ADMIN — MORPHINE SULFATE 4 MILLIGRAM(S): 50 CAPSULE, EXTENDED RELEASE ORAL at 09:19

## 2018-08-16 RX ADMIN — MORPHINE SULFATE 4 MILLIGRAM(S): 50 CAPSULE, EXTENDED RELEASE ORAL at 03:32

## 2018-08-16 RX ADMIN — MORPHINE SULFATE 4 MILLIGRAM(S): 50 CAPSULE, EXTENDED RELEASE ORAL at 03:45

## 2018-08-16 RX ADMIN — MORPHINE SULFATE 4 MILLIGRAM(S): 50 CAPSULE, EXTENDED RELEASE ORAL at 09:34

## 2018-08-16 RX ADMIN — Medication 200 MILLIGRAM(S): at 06:00

## 2018-08-16 RX ADMIN — OXYCODONE HYDROCHLORIDE 10 MILLIGRAM(S): 5 TABLET ORAL at 06:00

## 2018-08-16 NOTE — PROGRESS NOTE ADULT - SUBJECTIVE AND OBJECTIVE BOX
27 M with hx of seizure disorder, DM ESRD on dialysis, pericarditis, HIV presents to ED c/o right foot pain and cramping since last night. As per pt mother, pt had a seizure last night and banged his foot into a glass mirror, sustained a cut to his foot. Pt states he takes Keppra only before dialysis, has had a number of seizures.     Follow up ESRD    PAST MEDICAL & SURGICAL HISTORY:  Seizure disorder  Hypertension  Diabetes  ESRD (end stage renal disease)  Seizure  Pericarditis  Anxiety  Depression  Renal failure (ARF), acute on chronic: dialysis av fistula, RUE  HTN (hypertension)  Cardiomyopathy  HIV disease: born HIV+  AV fistula  S/P tonsillectomy      MEDICATIONS  (STANDING):  diphenhydrAMINE   Injectable 50 milliGRAM(s) IV Push Once  levETIRAcetam  IVPB 500 milliGRAM(s) IV Intermittent every 12 hours    MEDICATIONS  (PRN):  acetaminophen 300 mG/codeine 30 mG 1 Tablet(s) Oral every 4 hours PRN Moderate Pain (4 - 6)      Allergies    vancomycin (Anaphylaxis)    Intolerances        Vital Signs Last 24 Hrs  T(C): 36.7 (15 Aug 2018 14:30), Max: 37 (15 Aug 2018 05:10)  T(F): 98 (15 Aug 2018 14:30), Max: 98.6 (15 Aug 2018 05:10)  HR: 61 (15 Aug 2018 14:30) (61 - 100)  BP: 127/76 (15 Aug 2018 14:30) (124/83 - 160/106)  BP(mean): --  RR: 18 (15 Aug 2018 14:30) (18 - 20)  SpO2: 99% (15 Aug 2018 14:30) (97% - 100%)  Daily     Daily     PHYSICAL EXAM:  GENERAL: NAD  HEAD:  Atraumatic, Normocephalic  NECK: Supple, neck  veins full  NERVOUS SYSTEM:  Sleeping   CHEST/LUNG: Clear to percussion bilaterally; No rales, rhonchi, wheezing, or rubs  HEART: Regular rate and rhythm; No murmurs, rubs, or gallops  ABDOMEN: Soft, Nontender, Nondistended; Bowel sounds present, body wall and flank edema  EXTREMITIES:  Edema -    LABS:  Reviewed

## 2018-08-16 NOTE — DISCHARGE NOTE ADULT - HOSPITAL COURSE
26 y/o M with extensive PMH including, HIV+. seizure, renal failure on dialysis M-W-F, blind, came to ED s/p seizure at home (unwitnessed) with c/o foot pain  since a mirror fell on him.  Was d/c home since prelim x rays were negative.  Official radiology reading today stated that he has a 2nd, 3rd, and 4th linear, nondisplaced fractures and was called to return to the ED.  He states that he had to walk on it yesterday and is in severe pain.  Took Oxycodone for the pain.  Took more than usual b/c he usually takes it for his chronic pain. Pt is known for non compliance with his meds. He admits to being non compliant with his HIV & seizure meds. No podiatry intervention planned as per them. Pt could have been d/cherri home but could not get crutches due to AVF, could not get a wheelchair as has steps at home, was admitted for FESTUS placement. 28 y/o M with extensive PMH including, HIV+. seizure, renal failure on dialysis M-W-F, blind, came to ED s/p seizure at home (unwitnessed) with c/o foot pain  since a mirror fell on him.  Was d/c home since prelim x rays were negative.  Official radiology reading today stated that he has a 2nd, 3rd, and 4th linear, nondisplaced fractures and was called to return to the ED.  He states that he had to walk on it yesterday and is in severe pain.  Took Oxycodone for the pain.  Took more than usual b/c he usually takes it for his chronic pain. Pt is known for non compliance with his meds. He admits to being non compliant with his HIV & seizure meds. No podiatry intervention planned as per them. Pt could have been d/cherri home but could not get crutches due to AVF, could not get a wheelchair as has steps at home, was admitted for FESTUS placement.     >Right 2-4 metatarsal fractures-  Pt's xrays and CT scans reviewed. Results show multiple fractures.  Splint applied by podiatry  Pt is non WB to right foot with crutches but, due PMHx of AVF the pt is unable to use the crutches  pt states his house is not wheelchair accessible and has stairs  Podiatry recommended pt to be admitted for FESTUS  Keep the foot elevated, iced, and compressed.  Pt may require surgery in the future based on CT scan findings as per podiatry but no plans for surgery inhouse as per podiatryPain meds      >Breakthrough seizure- likely from med non compliance, neuro consult appreciated. should be on standing keppra with post HD supplementation. start keppra 250 mg bid with 500 mg dose post HD, now with "head spasms" will get CTH today, Neurology following      >Diarrhea-stool studies, stool count    >AV fistula.  S/p angioplasty, outpatient vascular follow up.  c/w PO opiates, tylenol, lidoderm patch, fentanyl patch, gabapentin    >Failure to thrive in adult.  Plan: GI eval on last admission noted. EGD showing antral gastritis, Carafate, PPI & reglan.  low fat renal diet  Await biopsy results fo stomach.   Renal diet; 3 nepro shakes three times per day; he is not really eating the solid food.  EUS to be done as outpt    >HIV (human immunodeficiency virus infection).  Plan: Pt non compliant with ART (outpatient pharmacy was not dispensing new regimen as there were several drug interactions), re-called ID,  ART resumed by ID, CD 4 & viral load, Atovaquone 1500mg daily for PCP prophylaxis   -Azithromycin 1200mg once weekly for MAC prophylaxis    >Pancytopenia.  Plan: Stable. Likely from underlying HIV/AIDS, f/u CBC    >CMV retinitis.  Plan: On valganciclovir 100mg after each HD for prophylaxis until CD4 is high. Ophthalmology consult as outpatient      >ESRD (end stage renal disease). Plan: HD as per schedule.     DVT ppx    Dispo: possible dc to [facility] pending [facility] results 28 y/o M with extensive PMH including, HIV+. seizure, renal failure on dialysis M-W-F, blind, came to ED s/p seizure at home (unwitnessed) with c/o foot pain  since a mirror fell on him.  Was d/c home since prelim x rays were negative.  Official radiology reading today stated that he has a 2nd, 3rd, and 4th linear, nondisplaced fractures and was called to return to the ED.  He states that he had to walk on it yesterday and is in severe pain.  Took Oxycodone for the pain.  Took more than usual b/c he usually takes it for his chronic pain. Pt is known for non compliance with his meds. He admits to being non compliant with his HIV & seizure meds. No podiatry intervention planned as per them. Pt could have been d/cherri home but could not get crutches due to AVF, could not get a wheelchair as has steps at home, was admitted for FESTUS placement.     >Right 2-4 metatarsal fractures-  Pt's xrays and CT scans reviewed. Results show multiple fractures.  Splint applied by podiatry  Pt is non WB to right foot with crutches but, due PMHx of AVF the pt is unable to use the crutches  pt states his house is not wheelchair accessible and has stairs  Podiatry recommended pt to be admitted for FESTUS  Keep the foot elevated, iced, and compressed.  Pt may require surgery in the future based on CT scan findings as per podiatry but no plans for surgery inhouse as per podiatryPain meds      >Breakthrough seizure- likely from med non compliance, neuro consult appreciated. should be on standing keppra with post HD supplementation. start keppra 250 mg bid with 500 mg dose post HD, repeat CTH no acute findings      >Diarrhea-resolved    >AV fistula.  S/p angioplasty, outpatient vascular follow up.  c/w PO opiates, tylenol, lidoderm patch, fentanyl patch, gabapentin    >Failure to thrive in adult.  Plan: GI eval on last admission noted. EGD showing antral gastritis, Carafate, PPI & reglan.  low fat renal diet  Await biopsy results fo stomach.   Renal diet; 3 nepro shakes three times per day; he is not really eating the solid food.  EUS to be done as outpt    >HIV (human immunodeficiency virus infection).  Plan: Pt non compliant with ART (outpatient pharmacy was not dispensing new regimen as there were several drug interactions), re-called ID,  ART resumed by ID, CD 4 & viral load, Atovaquone 1500mg daily for PCP prophylaxis   -Azithromycin 1200mg once weekly for MAC prophylaxis    >Pancytopenia.  Plan: Stable. Likely from underlying HIV/AIDS, f/u CBC    >CMV retinitis.  Plan: On valganciclovir 100mg after each HD for prophylaxis until CD4 is high. Ophthalmology consult as outpatient      >ESRD (end stage renal disease). Plan: HD as per schedule.     DVT ppx 26 y/o M with extensive PMH including, HIV+. seizure, renal failure on dialysis M-W-F, blind, came to ED s/p seizure at home (unwitnessed) with c/o foot pain  since a mirror fell on him.  Was d/c home since prelim x rays were negative.  Official radiology reading today stated that he has a 2nd, 3rd, and 4th linear, nondisplaced fractures and was called to return to the ED.  He states that he had to walk on it yesterday and is in severe pain.  Took Oxycodone for the pain.  Took more than usual b/c he usually takes it for his chronic pain. Pt is known for non compliance with his meds. He admits to being non compliant with his HIV & seizure meds. No podiatry intervention planned as per them. Pt could have been d/cherri home but could not get crutches due to AVF, could not get a wheelchair as has steps at home, was admitted for FESTUS placement. Patient developed isolated elevated temperature, work up negative, no further fevers noted. The patient is stable for discharge.     >Right 2-4 metatarsal fractures-  Pt's xrays and CT scans reviewed. Results show multiple fractures.  Splint applied by podiatry  Pt is non WB to right foot with crutches but, due PMHx of AVF the pt is unable to use the crutches  pt states his house is not wheelchair accessible and has stairs  Podiatry recommended pt to be admitted for FESTUS  Keep the foot elevated, iced, and compressed.  Pt may require surgery in the future based on CT scan findings as per podiatry but no plans for surgery inhouse as per podiatryPain meds      >Breakthrough seizure- likely from med non compliance, neuro consult appreciated. should be on standing keppra with post HD supplementation. start keppra 250 mg bid with 500 mg dose post HD, repeat CTH no acute findings      >Diarrhea-resolved    >AV fistula.  S/p angioplasty, outpatient vascular follow up.  c/w PO opiates, tylenol, lidoderm patch, fentanyl patch, gabapentin    >Failure to thrive in adult.  Plan: GI eval on last admission noted. EGD showing antral gastritis, Carafate, PPI & reglan.  low fat renal diet  Await biopsy results fo stomach.   Renal diet; 3 nepro shakes three times per day; he is not really eating the solid food.  EUS to be done as outpt    >HIV (human immunodeficiency virus infection).  Plan: Pt non compliant with ART (outpatient pharmacy was not dispensing new regimen as there were several drug interactions), re-called ID,  ART resumed by ID, CD 4 & viral load, Atovaquone 1500mg daily for PCP prophylaxis   -Azithromycin 1200mg once weekly for MAC prophylaxis    >Pancytopenia.  Plan: Stable. Likely from underlying HIV/AIDS, f/u CBC    >CMV retinitis.  Plan: On valganciclovir 100mg after each HD for prophylaxis until CD4 is high. Ophthalmology consult as outpatient      >ESRD (end stage renal disease). Plan: HD as per schedule.     DVT ppx 28 y/o M with extensive PMH including, HIV+. seizure, renal failure on dialysis M-W-F, blind, came to ED s/p seizure at home (unwitnessed) with c/o foot pain  since a mirror fell on him.  Was d/c home since prelim x rays were negative.  Official radiology reading today stated that he has a 2nd, 3rd, and 4th linear, nondisplaced fractures and was called to return to the ED.  He states that he had to walk on it yesterday and is in severe pain.  Took Oxycodone for the pain.  Took more than usual b/c he usually takes it for his chronic pain. Pt is known for non compliance with his meds. He admits to being non compliant with his HIV & seizure meds. No podiatry intervention planned as per them. Pt could have been d/cherri home but could not get crutches due to AVF, could not get a wheelchair as has steps at home, was admitted for FESTUS placement. Patient developed isolated elevated temperature, work up negative, no further fevers noted. The patient is stable for discharge.     >Right 2-4 metatarsal fractures-  Pt's xrays and CT scans reviewed. Results show multiple fractures.  Splint applied by podiatry  Pt is non WB to right foot with crutches but, due PMHx of AVF the pt is unable to use the crutches  pt states his house is not wheelchair accessible and has stairs  Podiatry recommended pt to be admitted for FESTUS  Keep the foot elevated, iced, and compressed.  Pt may require surgery in the future based on CT scan findings as per podiatry but no plans for surgery inhouse as per podiatryPain meds      >Breakthrough seizure- likely from med non compliance, neuro consult appreciated. should be on standing keppra with post HD supplementation. start keppra 250 mg bid with 500 mg dose post HD, repeat CTH no acute findings      >Diarrhea-resolved    >AV fistula.  S/p angioplasty, outpatient vascular follow up.  c/w PO opiates, tylenol, lidoderm patch, fentanyl patch, gabapentin    >Failure to thrive in adult.  Plan: GI eval on last admission noted. EGD showing antral gastritis, Carafate, PPI & reglan.  low fat renal diet  Await biopsy results fo stomach.   Renal diet; 3 nepro shakes three times per day; he is not really eating the solid food.  EUS to be done as outpt    >HIV (human immunodeficiency virus infection).  Plan: Pt non compliant with ART (outpatient pharmacy was not dispensing new regimen as there were several drug interactions), re-called ID,  ART resumed by ID, CD 4 & viral load, Atovaquone 1500mg daily for PCP prophylaxis   -Azithromycin 1200mg once weekly for MAC prophylaxis    >Pancytopenia.  Plan: Stable. Likely from underlying HIV/AIDS, f/u CBC    >CMV retinitis.  Plan: On valganciclovir 100mg after each HD for prophylaxis until CD4 is high. Ophthalmology consult as outpatient      >ESRD (end stage renal disease). Plan: HD as per schedule.         Patient stable for discharge to [facility]    Spent > 35 mins in discharge planning and documentation.

## 2018-08-16 NOTE — PROGRESS NOTE ADULT - SUBJECTIVE AND OBJECTIVE BOX
CC: foot fracture    INTERVAL HPI/OVERNIGHT EVENTS: Patient seen and examined. C/O nausea this am, states he needs to take his medications with food. C/O right foot pain and pain at fistula which is chronic. Denies chest pain, SOB, dizziness, fever, chills. C/O "head spasms" as per Neuro.     Vital Signs Last 24 Hrs  T(C): 36.9 (16 Aug 2018 09:04), Max: 37.1 (15 Aug 2018 23:45)  T(F): 98.4 (16 Aug 2018 09:04), Max: 98.8 (15 Aug 2018 23:45)  HR: 83 (16 Aug 2018 09:04) (61 - 95)  BP: 133/87 (16 Aug 2018 09:04) (127/76 - 146/90)  BP(mean): --  RR: 18 (16 Aug 2018 09:04) (18 - 18)  SpO2: 97% (16 Aug 2018 09:04) (97% - 100%)    PHYSICAL EXAM:    GENERAL: NAD  HEAD:  Atraumatic, Normocephalic  EYES: +blindness  NECK: Supple, No JVD, Normal thyroid  NERVOUS SYSTEM:  Alert & Oriented X3, non focal  CHEST/LUNG: Clear to percussion bilaterally; No rales, rhonchi, wheezing, or rubs  HEART: Regular rate and rhythm; No murmurs, rubs, or gallops  ABDOMEN: Soft, Nontender, Nondistended; Bowel sounds present  EXTREMITIES:  2+ Peripheral Pulses, Right leg in Green compression and posterior splint  I&O's Detail      CARDIAC MARKERS ( 14 Aug 2018 21:27 )  x     / x     / 181 U/L / x     / 1.6 ng/mL                            10.7   1.7   )-----------( 70       ( 15 Aug 2018 06:44 )             32.4     15 Aug 2018 06:44    133    |  88     |  83.0   ----------------------------<  76     5.8     |  20.0   |  13.43    Ca    9.2        15 Aug 2018 06:44        CAPILLARY BLOOD GLUCOSE      POCT Blood Glucose.: 97 mg/dL (16 Aug 2018 12:26)  POCT Blood Glucose.: 101 mg/dL (16 Aug 2018 08:12)        Hemoglobin A1C, Whole Blood: 4.9 % (08-15-18 @ 06:44)    MEDICATIONS  (STANDING):  atovaquone Suspension 1500 milliGRAM(s) Oral daily  chlorhexidine 2% Cloths 1 Application(s) Topical daily  darunavir 600 milliGRAM(s) Oral every 12 hours  dextrose 5%. 1000 milliLiter(s) (50 mL/Hr) IV Continuous <Continuous>  dextrose 50% Injectable 12.5 Gram(s) IV Push once  dextrose 50% Injectable 25 Gram(s) IV Push once  dextrose 50% Injectable 25 Gram(s) IV Push once  dolutegravir 50 milliGRAM(s) Oral two times a day  etravirine 200 milliGRAM(s) Oral two times a day after meals  fentaNYL   Patch  50 MICROgram(s)/Hr. 1 Patch Transdermal every 48 hours  folic acid 1 milliGRAM(s) Oral daily  heparin  Injectable 5000 Unit(s) SubCutaneous every 12 hours  insulin lispro (HumaLOG) corrective regimen sliding scale   SubCutaneous three times a day before meals  labetalol 200 milliGRAM(s) Oral two times a day  lamiVUDine Solution 25 milliGRAM(s) Oral daily  levETIRAcetam 250 milliGRAM(s) Oral two times a day  metoclopramide 10 milliGRAM(s) Oral three times a day before meals  metoclopramide 10 milliGRAM(s) Oral once  multivitamin 1 Tablet(s) Oral daily  oxyCODONE  ER Tablet 10 milliGRAM(s) Oral every 12 hours  pantoprazole    Tablet 40 milliGRAM(s) Oral every 12 hours  ritonavir Tablet 100 milliGRAM(s) Oral two times a day  sucralfate suspension 1 Gram(s) Oral two times a day  tenofovir disoproxil fumarate (VIREAD) 300 milliGRAM(s) Oral <User Schedule>  valGANciclovir Oral Liquid - Peds 100 milliGRAM(s) Oral <User Schedule>    MEDICATIONS  (PRN):  ALPRAZolam 2 milliGRAM(s) Oral daily PRN anxiety  dextrose 40% Gel 15 Gram(s) Oral once PRN Blood Glucose LESS THAN 70 milliGRAM(s)/deciliter  glucagon  Injectable 1 milliGRAM(s) IntraMuscular once PRN Glucose LESS THAN 70 milligrams/deciliter  levETIRAcetam 500 milliGRAM(s) Oral daily PRN after dialysis  oxyCODONE    5 mG/acetaminophen 325 mG 2 Tablet(s) Oral every 6 hours PRN Moderate Pain (4 - 6)      RADIOLOGY & ADDITIONAL TESTS: CC: foot fracture    INTERVAL HPI/OVERNIGHT EVENTS: Patient seen and examined. C/O nausea this am, states he needs to take his medications with food. C/O right foot pain and pain at fistula which is chronic. Denies chest pain, SOB, dizziness, fever, chills. C/O "head spasms" as per Neuro.     Vital Signs Last 24 Hrs  T(C): 36.9 (16 Aug 2018 09:04), Max: 37.1 (15 Aug 2018 23:45)  T(F): 98.4 (16 Aug 2018 09:04), Max: 98.8 (15 Aug 2018 23:45)  HR: 83 (16 Aug 2018 09:04) (61 - 95)  BP: 133/87 (16 Aug 2018 09:04) (127/76 - 146/90)  BP(mean): --  RR: 18 (16 Aug 2018 09:04) (18 - 18)  SpO2: 97% (16 Aug 2018 09:04) (97% - 100%)    PHYSICAL EXAM:    GENERAL: NAD  HEAD:  Atraumatic, Normocephalic  EYES: +blindness  NECK: Supple, No JVD  NERVOUS SYSTEM:  Alert & Oriented X3, non focal  CHEST/LUNG: Clear to percussion bilaterally; No rales, rhonchi, wheezing, or rubs  HEART: Regular rate and rhythm; No murmurs, rubs, or gallops  ABDOMEN: Soft, Nontender, Nondistended; Bowel sounds present  EXTREMITIES:  2+ Peripheral Pulses, Right leg in Green compression and posterior splint  I&O's Detail      CARDIAC MARKERS ( 14 Aug 2018 21:27 )  x     / x     / 181 U/L / x     / 1.6 ng/mL                            10.7   1.7   )-----------( 70       ( 15 Aug 2018 06:44 )             32.4     15 Aug 2018 06:44    133    |  88     |  83.0   ----------------------------<  76     5.8     |  20.0   |  13.43    Ca    9.2        15 Aug 2018 06:44        CAPILLARY BLOOD GLUCOSE      POCT Blood Glucose.: 97 mg/dL (16 Aug 2018 12:26)  POCT Blood Glucose.: 101 mg/dL (16 Aug 2018 08:12)        Hemoglobin A1C, Whole Blood: 4.9 % (08-15-18 @ 06:44)    MEDICATIONS  (STANDING):  atovaquone Suspension 1500 milliGRAM(s) Oral daily  chlorhexidine 2% Cloths 1 Application(s) Topical daily  darunavir 600 milliGRAM(s) Oral every 12 hours  dextrose 5%. 1000 milliLiter(s) (50 mL/Hr) IV Continuous <Continuous>  dextrose 50% Injectable 12.5 Gram(s) IV Push once  dextrose 50% Injectable 25 Gram(s) IV Push once  dextrose 50% Injectable 25 Gram(s) IV Push once  dolutegravir 50 milliGRAM(s) Oral two times a day  etravirine 200 milliGRAM(s) Oral two times a day after meals  fentaNYL   Patch  50 MICROgram(s)/Hr. 1 Patch Transdermal every 48 hours  folic acid 1 milliGRAM(s) Oral daily  heparin  Injectable 5000 Unit(s) SubCutaneous every 12 hours  insulin lispro (HumaLOG) corrective regimen sliding scale   SubCutaneous three times a day before meals  labetalol 200 milliGRAM(s) Oral two times a day  lamiVUDine Solution 25 milliGRAM(s) Oral daily  levETIRAcetam 250 milliGRAM(s) Oral two times a day  metoclopramide 10 milliGRAM(s) Oral three times a day before meals  metoclopramide 10 milliGRAM(s) Oral once  multivitamin 1 Tablet(s) Oral daily  oxyCODONE  ER Tablet 10 milliGRAM(s) Oral every 12 hours  pantoprazole    Tablet 40 milliGRAM(s) Oral every 12 hours  ritonavir Tablet 100 milliGRAM(s) Oral two times a day  sucralfate suspension 1 Gram(s) Oral two times a day  tenofovir disoproxil fumarate (VIREAD) 300 milliGRAM(s) Oral <User Schedule>  valGANciclovir Oral Liquid - Peds 100 milliGRAM(s) Oral <User Schedule>    MEDICATIONS  (PRN):  ALPRAZolam 2 milliGRAM(s) Oral daily PRN anxiety  dextrose 40% Gel 15 Gram(s) Oral once PRN Blood Glucose LESS THAN 70 milliGRAM(s)/deciliter  glucagon  Injectable 1 milliGRAM(s) IntraMuscular once PRN Glucose LESS THAN 70 milligrams/deciliter  levETIRAcetam 500 milliGRAM(s) Oral daily PRN after dialysis  oxyCODONE    5 mG/acetaminophen 325 mG 2 Tablet(s) Oral every 6 hours PRN Moderate Pain (4 - 6)      RADIOLOGY & ADDITIONAL TESTS:

## 2018-08-16 NOTE — PROGRESS NOTE ADULT - ASSESSMENT
26 y/o M with extensive PMH including, HIV+. seizure, renal failure on dialysis M-W-F, blind, came to ED s/p seizure at home (unwitnessed) with c/o foot pain yesterday since a mirror fell on him.  Was d/c home since prelim x rays were negative.  Official radiology reading today stated that he has a 2nd, 3rd, and 4th linear, nondisplaced fractures and was called to return to the ED.  He states that he had to walk on it yesterday and is in severe pain.  Took Oxycodone for the pain.  Took more than usual b/c he usually takes it for his chronic pain. Pt is known for non compliance with his meds. He admits to being non compliant with his HIV & seizure meds. Last HD was yesterday. No podiatry intervention planned as per them. Pt could have been d/cherri home but could not get crutches due to AVF, could not get a wheelchair as has steps at home and will require FESTUS.         >Right 2-4 metatarsal fractures-  Pt's xrays and CT scans reviewed. Results show multiple fractures.  Splint applied by podiatry  Pt is non WB to right foot with crutches but, due PMHx of AVF the pt is unable to use the crutches  pt states his house is not wheelchair accessible and has stairs  Podiatry recommended pt to be admitted for FESTUS  Keep the foot elevated, iced, and compressed.  Pt may require surgery in the future based on CT scan findings as per podiatry but no plans for surgery inhouse as per podiatryPain meds      >Breakthrough seizure- likely from med non compliance, neuro consult appreciated. should be on standing keppra with post HD supplementation. start keppra 250 mg bid with 500 mg dose post HD, now with "head spasms" will get CTH today, Neurology following      >Diarrhea-stool studies, stool count    >AV fistula.  S/p angioplasty, outpatient vascular follow up.  c/w PO opiates, tylenol, lidoderm patch, fentanyl patch, gabapentin    >Failure to thrive in adult.  Plan: GI eval on last admission noted. EGD showing antral gastritis, Carafate, PPI & reglan.  low fat renal diet  Await biopsy results fo stomach.   Renal diet; 3 nepro shakes three times per day; he is not really eating the solid food.  EUS to be done as outpt    >HIV (human immunodeficiency virus infection).  Plan: Pt non compliant with ART (outpatient pharmacy was not dispensing new regimen as there were several drug interactions), re-called ID,  ART resumed by ID, CD 4 & viral load, Atovaquone 1500mg daily for PCP prophylaxis   -Azithromycin 1200mg once weekly for MAC prophylaxis    >Pancytopenia.  Plan: Stable. Likely from underlying HIV/AIDS, f/u CBC    >CMV retinitis.  Plan: On valganciclovir 100mg after each HD for prophylaxis until CD4 is high. Ophthalmology consult as outpatient      >ESRD (end stage renal disease). Plan: HD as per schedule.     DVT ppx    Dispo: possible dc to [facility] pending [facility] results

## 2018-08-16 NOTE — DISCHARGE NOTE ADULT - PATIENT PORTAL LINK FT
You can access the [facility] Patient Portal, offered by [facility], by registering with the following website: [email or web address]

## 2018-08-16 NOTE — PROGRESS NOTE ADULT - ASSESSMENT
ESRD  HTN  Anemia  SHELLEY  HIV  Metatarsal fracture   Seizure     HD MWF  Procrit/Aranesp if Hgb<10  Follow up neurology      D/w NP

## 2018-08-16 NOTE — DISCHARGE NOTE ADULT - MEDICATION SUMMARY - MEDICATIONS TO TAKE
I will START or STAY ON the medications listed below when I get home from the hospital:    fentaNYL 50 mcg/hr transdermal film, extended release  -- 1 patch by transdermal patch every 48 hours  -- Indication: For Chronic pain    oxyCODONE 10 mg oral tablet, extended release  -- 1 tab(s) by mouth every 12 hours  -- Indication: For Pain    oxyCODONE-acetaminophen 5 mg-325 mg oral tablet  -- 2 tab(s) by mouth every 6 hours, As needed, Moderate Pain (4 - 6)  -- Indication: For Pain    levETIRAcetam 250 mg oral tablet  -- 1 tab(s) by mouth 2 times a day  -- Indication: For Seizure disorder    levETIRAcetam 500 mg oral tablet  -- 1 tab(s) by mouth once a day, As needed, after dialysis  -- Indication: For Seizure disorder    metoclopramide 10 mg oral tablet  -- 1 tab(s) by mouth 3 times a day (before meals)  -- Indication: For Nausea    lamiVUDine 5 mg/mL oral solution  -- 5 milliliter(s) by mouth once a day  -- Indication: For HIV disease    ritonavir 100 mg oral tablet  -- 1 tab(s) by mouth 2 times a day  -- Indication: For HIV disease    valGANciclovir 50 mg/mL oral liquid  -- 100 milligram(s) by mouth  3 x week Tuesday, Thursday, Saturday  -- Indication: For HIV disease    tenofovir disoproxil fumarate 300 mg oral tablet  -- 1 tab(s) by mouth once a week on Wednesday  -- Indication: For HIV disease    darunavir 75 mg oral tablet  -- 8 tab(s) by mouth every 12 hours  -- Indication: For HIV disease    etravirine 200 mg oral tablet  -- 1 tab(s) by mouth 2 times a day (after meals)  -- Indication: For HIV disease    dolutegravir 50 mg oral tablet  -- 1 tab(s) by mouth 2 times a day  -- Indication: For HIV disease    ALPRAZolam 2 mg oral tablet  -- 1 tab(s) by mouth once a day, As needed, anxiety  -- Indication: For Anxiety    labetalol 200 mg oral tablet  -- 1 tab(s) by mouth 2 times a day  -- Indication: For Essential hypertension    atovaquone 750 mg/5 mL oral suspension  -- 10 milliliter(s) by mouth once a day  -- Indication: For HIV disease    sucralfate 1 g/10 mL oral suspension  -- 10 milliliter(s) by mouth 2 times a day  -- Indication: For GERD    pantoprazole 40 mg oral delayed release tablet  -- 1 tab(s) by mouth every 12 hours  -- Indication: For GERD    Multiple Vitamins oral tablet  -- 1 tab(s) by mouth once a day  -- Indication: For vitamin    folic acid 1 mg oral tablet  -- 1 tab(s) by mouth once a day  -- Indication: For Supplement I will START or STAY ON the medications listed below when I get home from the hospital:    oxyCODONE 10 mg oral tablet, extended release  -- 1 tab(s) by mouth every 12 hours  -- Indication: For Pain    acetaminophen 160 mg/5 mL oral suspension  -- 20.31 milliliter(s) by mouth every 6 hours, As needed, Mild Pain (1 - 3)  -- Indication: For Pain    fentaNYL 100 mcg/hr transdermal film, extended release  -- 1 patch by transdermal patch every 72 hours  -- Indication: For PAin    oxyCODONE 5 mg oral tablet  -- 1 tab(s) by mouth every 4 hours, As needed, Moderate Pain (4 - 6)  -- Indication: For Pain    levETIRAcetam 250 mg oral tablet  -- 1 tab(s) by mouth 2 times a day  -- Indication: For Seizure disorder    levETIRAcetam 500 mg oral tablet  -- 1 tab(s) by mouth once a day, As needed, after dialysis  -- Indication: For Seizure disorder    metoclopramide 10 mg oral tablet  -- 1 tab(s) by mouth 3 times a day (before meals)  -- Indication: For Nausea    lamiVUDine 5 mg/mL oral solution  -- 5 milliliter(s) by mouth once a day  -- Indication: For HIV disease    ritonavir 100 mg oral tablet  -- 1 tab(s) by mouth 2 times a day  -- Indication: For HIV disease    valGANciclovir 50 mg/mL oral liquid  -- 100 milligram(s) by mouth  3 x week Tuesday, Thursday, Saturday  -- Indication: For HIV disease    tenofovir disoproxil fumarate 300 mg oral tablet  -- 1 tab(s) by mouth once a week on Wednesday  -- Indication: For HIV disease    darunavir 75 mg oral tablet  -- 8 tab(s) by mouth every 12 hours  -- Indication: For HIV disease    etravirine 200 mg oral tablet  -- 1 tab(s) by mouth 2 times a day (after meals)  -- Indication: For HIV disease    dolutegravir 50 mg oral tablet  -- 1 tab(s) by mouth 2 times a day  -- Indication: For HIV disease    ALPRAZolam 2 mg oral tablet  -- 1 tab(s) by mouth once a day, As needed, anxiety  -- Indication: For Anxiety    labetalol 200 mg oral tablet  -- 1 tab(s) by mouth 2 times a day  -- Indication: For Essential hypertension    atovaquone 750 mg/5 mL oral suspension  -- 10 milliliter(s) by mouth once a day  -- Indication: For HIV disease    sucralfate 1 g/10 mL oral suspension  -- 10 milliliter(s) by mouth 2 times a day  -- Indication: For GERD    pantoprazole 40 mg oral delayed release tablet  -- 1 tab(s) by mouth every 12 hours  -- Indication: For GERD    Multiple Vitamins oral tablet  -- 1 tab(s) by mouth once a day  -- Indication: For vitamin    folic acid 1 mg oral tablet  -- 1 tab(s) by mouth once a day  -- Indication: For Supplement I will START or STAY ON the medications listed below when I get home from the hospital:    oxyCODONE 10 mg oral tablet, extended release  -- 1 tab(s) by mouth every 12 hours  -- Indication: For Pain    acetaminophen 160 mg/5 mL oral suspension  -- 20.31 milliliter(s) by mouth every 6 hours, As needed, Mild Pain (1 - 3)  -- Indication: For Pain    fentaNYL 100 mcg/hr transdermal film, extended release  -- 1 patch by transdermal patch every 72 hours  -- Indication: For PAin    oxyCODONE 5 mg oral tablet  -- 1 tab(s) by mouth every 4 hours, As needed, Moderate Pain (4 - 6)  -- Indication: For Pain    levETIRAcetam 250 mg oral tablet  -- 1 tab(s) by mouth 2 times a day  -- Indication: For Seizure disorder    levETIRAcetam 500 mg oral tablet  -- 1 tab(s) by mouth once a day, As needed, after dialysis  -- Indication: For Seizure disorder    metoclopramide 10 mg oral tablet  -- 1 tab(s) by mouth 3 times a day (before meals)  -- Indication: For Nausea    lamiVUDine 5 mg/mL oral solution  -- 5 milliliter(s) by mouth once a day  -- Indication: For HIV disease    ritonavir 100 mg oral tablet  -- 1 tab(s) by mouth 2 times a day  -- Indication: For HIV disease    valGANciclovir 50 mg/mL oral liquid  -- 100 milligram(s) by mouth  3 x week Tuesday, Thursday, Saturday  -- Indication: For HIV disease    tenofovir disoproxil fumarate 300 mg oral tablet  -- 1 tab(s) by mouth once a week on Wednesday  -- Indication: For HIV disease    darunavir 75 mg oral tablet  -- 8 tab(s) by mouth every 12 hours  -- Indication: For HIV disease    etravirine 200 mg oral tablet  -- 1 tab(s) by mouth 2 times a day (after meals)  -- Indication: For HIV disease    dolutegravir 50 mg oral tablet  -- 1 tab(s) by mouth 2 times a day  -- Indication: For HIV disease    ALPRAZolam 2 mg oral tablet  -- 1 tab(s) by mouth once a day, As needed, anxiety  -- Indication: For Anxiety    labetalol 200 mg oral tablet  -- 1 tab(s) by mouth 2 times a day  -- Indication: For Essential hypertension    mupirocin 2% nasal ointment  -- 1 application into nose 2 times a day thru 8/26 the [facility]  -- Indication: For MRSA nares    atovaquone 750 mg/5 mL oral suspension  -- 10 milliliter(s) by mouth once a day  -- Indication: For HIV disease    sucralfate 1 g/10 mL oral suspension  -- 10 milliliter(s) by mouth 2 times a day  -- Indication: For GERD    pantoprazole 40 mg oral delayed release tablet  -- 1 tab(s) by mouth every 12 hours  -- Indication: For GERD    Multiple Vitamins oral tablet  -- 1 tab(s) by mouth once a day  -- Indication: For vitamin    folic acid 1 mg oral tablet  -- 1 tab(s) by mouth once a day  -- Indication: For Supplement

## 2018-08-16 NOTE — DISCHARGE NOTE ADULT - MEDICATION SUMMARY - MEDICATIONS TO CHANGE
I will SWITCH the dose or number of times a day I take the medications listed below when I get home from the hospital:    levETIRAcetam 1000 mg oral tablet  -- 1 tab(s) by mouth 2 times a day    fentaNYL 100 mcg/hr transdermal film, extended release  -- 1 film(s) by transdermal patch every 72 hours    fentaNYL 100 mcg/hr transdermal film, extended release  -- 1 film(s) by transdermal patch every 48 hours I will SWITCH the dose or number of times a day I take the medications listed below when I get home from the hospital:    darunavir 600 mg oral tablet  -- 1 tab(s) by mouth 2 times a day    ritonavir 100 mg oral tablet  -- 1 tab(s) by mouth 2 times a day    atovaquone 750 mg/5 mL oral suspension  -- 10 milliliter(s) by mouth once a day    levETIRAcetam 1000 mg oral tablet  -- 1 tab(s) by mouth 2 times a day    fentaNYL 100 mcg/hr transdermal film, extended release  -- 1 film(s) by transdermal patch every 48 hours

## 2018-08-16 NOTE — DISCHARGE NOTE ADULT - MEDICATION SUMMARY - MEDICATIONS TO STOP TAKING
I will STOP taking the medications listed below when I get home from the hospital:    Percocet 10/325 oral tablet  -- 2 tab(s) by mouth every 6 hours    zolpidem 10 mg oral tablet  -- 1 tab(s) by mouth once a day (at bedtime)

## 2018-08-16 NOTE — PROGRESS NOTE ADULT - SUBJECTIVE AND OBJECTIVE BOX
[facility]                                     Neu[facility] [place]                                 Valorie Winter, & Casey                                  [address]                                        [place], [place], [address]                                             [phone]    CC: seizure  HPI:  This is a 27-year-old man who presented with seizure at home. He has a history of seizures. He has HIV. 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 When he was in [place] he was told to take Keppra before it every dialysis session. He presented with seizure and was thought to be noncompliant with medication. He states that typically he would only have seizures during dialysis. Now he has started to get them at home as well. Neurology evaluation is requested.    Interval history: no seizures, c/o pain in AV fistula site, c/o "head spasms"    ROS neurology: Denies headache or dizziness. Denies weakness/numbness.  Denies speech/language deficits. Denies diplopia/blurred vision.  Denies confusion    MEDICATIONS  (STANDING):  atovaquone Suspension 1500 milliGRAM(s) Oral daily  chlorhexidine 2% Cloths 1 Application(s) Topical daily  darunavir 600 milliGRAM(s) Oral every 12 hours  dextrose 5%. 1000 milliLiter(s) (50 mL/Hr) IV Continuous <Continuous>  dextrose 50% Injectable 12.5 Gram(s) IV Push once  dextrose 50% Injectable 25 Gram(s) IV Push once  dextrose 50% Injectable 25 Gram(s) IV Push once  dolutegravir 50 milliGRAM(s) Oral two times a day  etravirine 200 milliGRAM(s) Oral two times a day after meals  fentaNYL   Patch  50 MICROgram(s)/Hr. 1 Patch Transdermal every 48 hours  folic acid 1 milliGRAM(s) Oral daily  heparin  Injectable 5000 Unit(s) SubCutaneous every 12 hours  insulin lispro (HumaLOG) corrective regimen sliding scale   SubCutaneous three times a day before meals  labetalol 200 milliGRAM(s) Oral two times a day  lamiVUDine Solution 25 milliGRAM(s) Oral daily  levETIRAcetam 250 milliGRAM(s) Oral two times a day  metoclopramide 10 milliGRAM(s) Oral three times a day before meals  metoclopramide 10 milliGRAM(s) Oral once  multivitamin 1 Tablet(s) Oral daily  oxyCODONE  ER Tablet 10 milliGRAM(s) Oral every 12 hours  pantoprazole    Tablet 40 milliGRAM(s) Oral every 12 hours  ritonavir Tablet 100 milliGRAM(s) Oral two times a day  sucralfate suspension 1 Gram(s) Oral two times a day  tenofovir disoproxil fumarate (VIREAD) 300 milliGRAM(s) Oral <User Schedule>  valGANciclovir Oral Liquid - Peds 100 milliGRAM(s) Oral <User Schedule>    MEDICATIONS  (PRN):  ALPRAZolam 2 milliGRAM(s) Oral daily PRN anxiety  dextrose 40% Gel 15 Gram(s) Oral once PRN Blood Glucose LESS THAN 70 milliGRAM(s)/deciliter  glucagon  Injectable 1 milliGRAM(s) IntraMuscular once PRN Glucose LESS THAN 70 milligrams/deciliter  levETIRAcetam 500 milliGRAM(s) Oral daily PRN after dialysis  oxyCODONE    5 mG/acetaminophen 325 mG 2 Tablet(s) Oral every 6 hours PRN Moderate Pain (4 - 6)      Vital Signs Last 24 Hrs  T(C): 36.9 (16 Aug 2018 09:04), Max: 37.1 (15 Aug 2018 23:45)  T(F): 98.4 (16 Aug 2018 09:04), Max: 98.8 (15 Aug 2018 23:45)  HR: 83 (16 Aug 2018 09:04) (61 - 95)  BP: 133/87 (16 Aug 2018 09:04) (127/76 - 146/90)  BP(mean): --  RR: 18 (16 Aug 2018 09:04) (18 - 18)  SpO2: 97% (16 Aug 2018 09:04) (97% - 100%)      Detailed Neurologic Exam:    Mental status: The patient is awake and alert and has normal attention span.  The patient is fully oriented in 3 spheres. The patient is oriented to current events. The patient is able to name objects, follow commands, repeat sentences.    Cranial nerves: Pupils dilated min reactive, patient is blind. Extraocular motion is full, likely old strabismus. There is no ptosis. Facial sensation is intact. Facial musculature is symmetric. Palate elevates symmetrically. Shoulder shrug is normal. Tongue is midline.    Motor: There is normal bulk and tone.  There is no tremor.  Strength is 5/5 in the right arm and leg.   Strength is 5/5 in the left arm and leg.    Sensation: Intact to light touch and pin in 4 extremities    Reflexes: 2+ throughout and plantar responses are flexor.    Cerebellar: Unable to test for dysmetria on finger to nose testing due to blindness    Gait : deferred    LABS:                                    10.7   1.7   )-----------( 70       ( 15 Aug 2018 06:44 )             32.4     08-15    133<L>  |  88<L>  |  83.0<H>  ----------------------------<  76  5.8<H>   |  20.0<L>  |  13.43<H>    Ca    9.2      15 Aug 2018 06:44      RADIOLOGY & ADDITIONAL STUDIES (independently reviewed unless otherwise noted):  no neuro studies

## 2018-08-16 NOTE — DISCHARGE NOTE ADULT - PLAN OF CARE
Optimal healing xrays and CT scans show multiple fractures.  Green compression and posterior splint   Pt is non WB to right foot with crutches but, due PMHx of the pt is unable to use the crutches   pt is recommenced to admitted for rehab   keep the foot elevated, iced, and compressed.  he may require surgery in the future based on CT scan findings.   followup Dr. Thompson for further evaluation. followed by Neurology  Long standing seizure disorder   on standing keppra with post HD supplementation  continue  keppra 250 mg bid with 500 mg dose post HD -Continue TDF 300mg once weekly   -Lamivudine 25mg daily  -Darunavir to 600mg BID  -Ritonavir 100mg BID  -Dolutegravir 50mg BID  -Etravirine 200mg bid   -Atovaquone 1500mg daily for PCP prophylaxis   -Azithromycin 1200mg once weekly for MAC prophylaxis HD MWF HGA1c 4.9 Follow up with outpatient pain management  Continue Fentanyl patch  Oxy PRN Xanax as needed xrays and CT scans show multiple fractures.  Green compression and posterior splint   Pt is non WB to right foot with crutches but, due PMHx of the pt is unable to use the crutches   pt is recommenced to admitted for rehab   keep the foot elevated, iced, and compressed.  he may require surgery in the future based on CT scan findings.   followup Dr. Thompson for further evaluation.  NWB of SRIRAM HGA1c 4.9 in non diabetic range Follow up with outpatient pain management  Continue Fentanyl patch  Oxy PRN  Pls taper as tolerated due to dependance Follow up with outpatient pain management  Continue Fentanyl patch at 50 mcg  Oxy PRN  Pls taper as tolerated due to dependance Follow up with outpatient pain management  Continue Fentanyl patch at 100 mcg  Oxy PRN  Pls taper as tolerated due to dependance

## 2018-08-16 NOTE — PROGRESS NOTE ADULT - ASSESSMENT
The patient is a 27y Male who is followed by neurology because of seizure    Seizure  Long standing seizure disorder  should be on standing keppra with post HD supplementation  continue keppra 250 mg bid with 500 mg dose post HD    Headache  would check head CT  analgesia prn with tylenol, NSAIDs if OK with renal    will follow with you    Aldo Eugene MD PhD   [id number]

## 2018-08-16 NOTE — DISCHARGE NOTE ADULT - CARE PLAN
Principal Discharge DX:	Fracture of foot  Goal:	Optimal healing  Assessment and plan of treatment:	xrays and CT scans show multiple fractures.  Green compression and posterior splint   Pt is non WB to right foot with crutches but, due PMHx of the pt is unable to use the crutches   pt is recommenced to admitted for rehab   keep the foot elevated, iced, and compressed.  he may require surgery in the future based on CT scan findings.   followup Dr. Thompson for further evaluation.  Secondary Diagnosis:	Seizure disorder  Assessment and plan of treatment:	followed by Neurology  Long standing seizure disorder   on standing keppra with post HD supplementation  continue  keppra 250 mg bid with 500 mg dose post HD  Secondary Diagnosis:	HIV disease  Assessment and plan of treatment:	-Continue TDF 300mg once weekly   -Lamivudine 25mg daily  -Darunavir to 600mg BID  -Ritonavir 100mg BID  -Dolutegravir 50mg BID  -Etravirine 200mg bid   -Atovaquone 1500mg daily for PCP prophylaxis   -Azithromycin 1200mg once weekly for MAC prophylaxis  Secondary Diagnosis:	ESRD (end stage renal disease)  Assessment and plan of treatment:	HD MWF  Secondary Diagnosis:	Diabetes  Assessment and plan of treatment:	HGA1c 4.9  Secondary Diagnosis:	Chronic pain  Assessment and plan of treatment:	Follow up with outpatient pain management  Continue Fentanyl patch  Oxy PRN  Secondary Diagnosis:	Anxiety  Assessment and plan of treatment:	Xanax as needed Principal Discharge DX:	Fracture of foot  Goal:	Optimal healing  Assessment and plan of treatment:	xrays and CT scans show multiple fractures.  Green compression and posterior splint   Pt is non WB to right foot with crutches but, due PMHx of the pt is unable to use the crutches   pt is recommenced to admitted for rehab   keep the foot elevated, iced, and compressed.  he may require surgery in the future based on CT scan findings.   followup Dr. Thompson for further evaluation.  NWB of RLE  Secondary Diagnosis:	Seizure disorder  Assessment and plan of treatment:	followed by Neurology  Long standing seizure disorder   on standing keppra with post HD supplementation  continue  keppra 250 mg bid with 500 mg dose post HD  Secondary Diagnosis:	HIV disease  Assessment and plan of treatment:	-Continue TDF 300mg once weekly   -Lamivudine 25mg daily  -Darunavir to 600mg BID  -Ritonavir 100mg BID  -Dolutegravir 50mg BID  -Etravirine 200mg bid   -Atovaquone 1500mg daily for PCP prophylaxis   -Azithromycin 1200mg once weekly for MAC prophylaxis  Secondary Diagnosis:	ESRD (end stage renal disease)  Assessment and plan of treatment:	HD MWF  Secondary Diagnosis:	Diabetes  Assessment and plan of treatment:	HGA1c 4.9 in non diabetic range  Secondary Diagnosis:	Chronic pain  Assessment and plan of treatment:	Follow up with outpatient pain management  Continue Fentanyl patch  Oxy PRN  Pls taper as tolerated due to dependance  Secondary Diagnosis:	Anxiety  Assessment and plan of treatment:	Xanax as needed Principal Discharge DX:	Fracture of foot  Goal:	Optimal healing  Assessment and plan of treatment:	xrays and CT scans show multiple fractures.  Green compression and posterior splint   Pt is non WB to right foot with crutches but, due PMHx of the pt is unable to use the crutches   pt is recommenced to admitted for rehab   keep the foot elevated, iced, and compressed.  he may require surgery in the future based on CT scan findings.   followup Dr. Thompson for further evaluation.  NWB of RLE  Secondary Diagnosis:	Seizure disorder  Assessment and plan of treatment:	followed by Neurology  Long standing seizure disorder   on standing keppra with post HD supplementation  continue  keppra 250 mg bid with 500 mg dose post HD  Secondary Diagnosis:	HIV disease  Assessment and plan of treatment:	-Continue TDF 300mg once weekly   -Lamivudine 25mg daily  -Darunavir to 600mg BID  -Ritonavir 100mg BID  -Dolutegravir 50mg BID  -Etravirine 200mg bid   -Atovaquone 1500mg daily for PCP prophylaxis   -Azithromycin 1200mg once weekly for MAC prophylaxis  Secondary Diagnosis:	ESRD (end stage renal disease)  Assessment and plan of treatment:	HD MWF  Secondary Diagnosis:	Diabetes  Assessment and plan of treatment:	HGA1c 4.9 in non diabetic range  Secondary Diagnosis:	Chronic pain  Assessment and plan of treatment:	Follow up with outpatient pain management  Continue Fentanyl patch at 50 mcg  Oxy PRN  Pls taper as tolerated due to dependance  Secondary Diagnosis:	Anxiety  Assessment and plan of treatment:	Xanax as needed Principal Discharge DX:	Fracture of foot  Goal:	Optimal healing  Assessment and plan of treatment:	xrays and CT scans show multiple fractures.  Green compression and posterior splint   Pt is non WB to right foot with crutches but, due PMHx of the pt is unable to use the crutches   pt is recommenced to admitted for rehab   keep the foot elevated, iced, and compressed.  he may require surgery in the future based on CT scan findings.   followup Dr. Thompson for further evaluation.  NWB of RLE  Secondary Diagnosis:	Seizure disorder  Assessment and plan of treatment:	followed by Neurology  Long standing seizure disorder   on standing keppra with post HD supplementation  continue  keppra 250 mg bid with 500 mg dose post HD  Secondary Diagnosis:	HIV disease  Assessment and plan of treatment:	-Continue TDF 300mg once weekly   -Lamivudine 25mg daily  -Darunavir to 600mg BID  -Ritonavir 100mg BID  -Dolutegravir 50mg BID  -Etravirine 200mg bid   -Atovaquone 1500mg daily for PCP prophylaxis   -Azithromycin 1200mg once weekly for MAC prophylaxis  Secondary Diagnosis:	ESRD (end stage renal disease)  Assessment and plan of treatment:	HD MWF  Secondary Diagnosis:	Diabetes  Assessment and plan of treatment:	HGA1c 4.9 in non diabetic range  Secondary Diagnosis:	Chronic pain  Assessment and plan of treatment:	Follow up with outpatient pain management  Continue Fentanyl patch at 100 mcg  Oxy PRN  Pls taper as tolerated due to dependance  Secondary Diagnosis:	Anxiety  Assessment and plan of treatment:	Xanax as needed

## 2018-08-16 NOTE — PROGRESS NOTE ADULT - SUBJECTIVE AND OBJECTIVE BOX
Patient is a 27y old  Male who presents with a chief complaint of      HPI:26 y/o M with extensive PMH including, HIV+. seizure, renal failure on dialysis M-W-F, blind, came to ED s/p seizure with c/o foot pain yesterday. Patient was seen bedside today for treatment for 2-4 metatarsal factures. Patient states he has pain in right foot. Patient states he has been keeping the foot elevated.      PAST MEDICAL & SURGICAL HISTORY:  Seizure disorder  Hypertension  Diabetes  ESRD (end stage renal disease)  Seizure  Pericarditis  Anxiety  Depression  Renal failure (ARF), acute on chronic: dialysis av fistula, RUE  HTN (hypertension)  Cardiomyopathy  HIV disease: born HIV+  AV fistula  S/P tonsillectomy      MEDICATIONS  (STANDING):  morphine  - Injectable 6 milliGRAM(s) SubCutaneous Once    MEDICATIONS  (PRN):      Allergies    vancomycin (Anaphylaxis)    Intolerances        FAMILY HISTORY:  No pertinent family history in first degree relatives  No pertinent family history in first degree relatives        Medical Imaging:       PE:   GEN: Patient is a 27y well developed, well nourished Male, alert, awake and oriented to person, place and time in no acute distress.       Extremities     Posterior splint remains intact and dry. Capillary refill time < 3 sec x 5 R foot. ROM intact R foot.       A/P:  27yMale presents with 2-4 met fracture       P:  pt evaluated and chart reviewed.  Pt's xrays and CT scans reviewed. Results show multiple fractures.  Discussed with pt x-ray and CT scan findings.   Green compression and posterior splint intact.  Pt is non WB to right foot with crutches but, due PMHx of the pt is unable to use the crutches   pt is recommenced to admitted for rehab   Pt understands to keep the foot elevated, iced, and compressed.  Discussed with pt risks, benefits, and complications of surgery.   Pt understands that he may require surgery in the future based on CT scan findings.  Pt understands to return to ER if he experiences any n/v/c/sob/f.  Pt understands that he may develop traumatic arthritis in the area of the fractures  Pt is stable from podiatry standpoint for discharge on pain medication once medically stable.  Pt will followup Dr. Thompson for further evaluation.

## 2018-08-16 NOTE — DISCHARGE NOTE ADULT - CARE PROVIDER_API CALL
Manuel Nam), Nephrology  [address]  [place] [address]  Phone: [phone]  Fax: [phone]    Sukhdev Thompson (SHARRI), Podiatric Medicine and Surgery  [address] [place] [address]  Phone: [phone]  Fax: [phone]    Josh Navarro), Infectious Disease; Internal Medicine  [address] [place] [address]  Phone: [phone]  Fax: [phone]

## 2018-08-17 LAB
ANION GAP SERPL CALC-SCNC: 21 MMOL/L — HIGH (ref 5–17)
BUN SERPL-MCNC: 59 MG/DL — HIGH (ref 8–20)
CALCIUM SERPL-MCNC: 9.2 MG/DL — SIGNIFICANT CHANGE UP (ref 8.6–10.2)
CHLORIDE SERPL-SCNC: 92 MMOL/L — LOW (ref 98–107)
CO2 SERPL-SCNC: 26 MMOL/L — SIGNIFICANT CHANGE UP (ref 22–29)
CREAT SERPL-MCNC: 11.04 MG/DL — HIGH (ref 0.5–1.3)
GLUCOSE BLDC GLUCOMTR-MCNC: 103 MG/DL — HIGH (ref 70–99)
GLUCOSE BLDC GLUCOMTR-MCNC: 85 MG/DL — SIGNIFICANT CHANGE UP (ref 70–99)
GLUCOSE SERPL-MCNC: 103 MG/DL — SIGNIFICANT CHANGE UP (ref 70–115)
HCT VFR BLD CALC: 33.1 % — LOW (ref 42–52)
HGB BLD-MCNC: 10.4 G/DL — LOW (ref 14–18)
MCHC RBC-ENTMCNC: 29.2 PG — SIGNIFICANT CHANGE UP (ref 27–31)
MCHC RBC-ENTMCNC: 31.4 G/DL — LOW (ref 32–36)
MCV RBC AUTO: 93 FL — SIGNIFICANT CHANGE UP (ref 80–94)
PHOSPHATE SERPL-MCNC: 7.4 MG/DL — HIGH (ref 2.4–4.7)
PLATELET # BLD AUTO: 89 K/UL — LOW (ref 150–400)
POTASSIUM SERPL-MCNC: 4.3 MMOL/L — SIGNIFICANT CHANGE UP (ref 3.5–5.3)
POTASSIUM SERPL-SCNC: 4.3 MMOL/L — SIGNIFICANT CHANGE UP (ref 3.5–5.3)
RBC # BLD: 3.56 M/UL — LOW (ref 4.6–6.2)
RBC # FLD: 16.4 % — HIGH (ref 11–15.6)
SODIUM SERPL-SCNC: 139 MMOL/L — SIGNIFICANT CHANGE UP (ref 135–145)
WBC # BLD: 2.6 K/UL — LOW (ref 4.8–10.8)
WBC # FLD AUTO: 2.6 K/UL — LOW (ref 4.8–10.8)

## 2018-08-17 PROCEDURE — 99233 SBSQ HOSP IP/OBS HIGH 50: CPT

## 2018-08-17 PROCEDURE — 99232 SBSQ HOSP IP/OBS MODERATE 35: CPT

## 2018-08-17 RX ORDER — DIPHENHYDRAMINE HCL 50 MG
50 CAPSULE ORAL ONCE
Qty: 0 | Refills: 0 | Status: COMPLETED | OUTPATIENT
Start: 2018-08-17 | End: 2018-08-17

## 2018-08-17 RX ORDER — CHLORHEXIDINE GLUCONATE 213 G/1000ML
1 SOLUTION TOPICAL
Qty: 0 | Refills: 0 | Status: DISCONTINUED | OUTPATIENT
Start: 2018-08-17 | End: 2018-08-23

## 2018-08-17 RX ORDER — HYDROMORPHONE HYDROCHLORIDE 2 MG/ML
1 INJECTION INTRAMUSCULAR; INTRAVENOUS; SUBCUTANEOUS ONCE
Qty: 0 | Refills: 0 | Status: DISCONTINUED | OUTPATIENT
Start: 2018-08-17 | End: 2018-08-17

## 2018-08-17 RX ORDER — TUBERCULIN PURIFIED PROTEIN DERIVATIVE 5 [IU]/.1ML
5 INJECTION, SOLUTION INTRADERMAL ONCE
Qty: 0 | Refills: 0 | Status: COMPLETED | OUTPATIENT
Start: 2018-08-17 | End: 2018-08-18

## 2018-08-17 RX ADMIN — OXYCODONE HYDROCHLORIDE 10 MILLIGRAM(S): 5 TABLET ORAL at 18:37

## 2018-08-17 RX ADMIN — OXYCODONE HYDROCHLORIDE 10 MILLIGRAM(S): 5 TABLET ORAL at 19:14

## 2018-08-17 RX ADMIN — Medication 10 MILLIGRAM(S): at 06:05

## 2018-08-17 RX ADMIN — ETRAVIRINE 200 MILLIGRAM(S): 200 TABLET ORAL at 09:19

## 2018-08-17 RX ADMIN — Medication 200 MILLIGRAM(S): at 18:40

## 2018-08-17 RX ADMIN — DOLUTEGRAVIR SODIUM 50 MILLIGRAM(S): 25 TABLET, FILM COATED ORAL at 18:41

## 2018-08-17 RX ADMIN — OXYCODONE AND ACETAMINOPHEN 2 TABLET(S): 5; 325 TABLET ORAL at 23:58

## 2018-08-17 RX ADMIN — Medication 1 TABLET(S): at 18:38

## 2018-08-17 RX ADMIN — PANTOPRAZOLE SODIUM 40 MILLIGRAM(S): 20 TABLET, DELAYED RELEASE ORAL at 18:38

## 2018-08-17 RX ADMIN — DARUNAVIR 600 MILLIGRAM(S): 75 TABLET, FILM COATED ORAL at 09:18

## 2018-08-17 RX ADMIN — OXYCODONE AND ACETAMINOPHEN 2 TABLET(S): 5; 325 TABLET ORAL at 00:30

## 2018-08-17 RX ADMIN — RITONAVIR 100 MILLIGRAM(S): 100 TABLET, FILM COATED ORAL at 18:37

## 2018-08-17 RX ADMIN — Medication 2 MILLIGRAM(S): at 19:45

## 2018-08-17 RX ADMIN — DARUNAVIR 600 MILLIGRAM(S): 75 TABLET, FILM COATED ORAL at 18:38

## 2018-08-17 RX ADMIN — HEPARIN SODIUM 5000 UNIT(S): 5000 INJECTION INTRAVENOUS; SUBCUTANEOUS at 06:05

## 2018-08-17 RX ADMIN — LEVETIRACETAM 250 MILLIGRAM(S): 250 TABLET, FILM COATED ORAL at 06:05

## 2018-08-17 RX ADMIN — RITONAVIR 100 MILLIGRAM(S): 100 TABLET, FILM COATED ORAL at 09:19

## 2018-08-17 RX ADMIN — DOLUTEGRAVIR SODIUM 50 MILLIGRAM(S): 25 TABLET, FILM COATED ORAL at 09:19

## 2018-08-17 RX ADMIN — Medication 50 MILLIGRAM(S): at 12:09

## 2018-08-17 RX ADMIN — OXYCODONE HYDROCHLORIDE 10 MILLIGRAM(S): 5 TABLET ORAL at 06:04

## 2018-08-17 RX ADMIN — HYDROMORPHONE HYDROCHLORIDE 1 MILLIGRAM(S): 2 INJECTION INTRAMUSCULAR; INTRAVENOUS; SUBCUTANEOUS at 12:40

## 2018-08-17 RX ADMIN — ETRAVIRINE 200 MILLIGRAM(S): 200 TABLET ORAL at 18:38

## 2018-08-17 RX ADMIN — PANTOPRAZOLE SODIUM 40 MILLIGRAM(S): 20 TABLET, DELAYED RELEASE ORAL at 06:17

## 2018-08-17 RX ADMIN — LEVETIRACETAM 250 MILLIGRAM(S): 250 TABLET, FILM COATED ORAL at 18:38

## 2018-08-17 RX ADMIN — Medication 10 MILLIGRAM(S): at 17:34

## 2018-08-17 RX ADMIN — HYDROMORPHONE HYDROCHLORIDE 1 MILLIGRAM(S): 2 INJECTION INTRAMUSCULAR; INTRAVENOUS; SUBCUTANEOUS at 12:21

## 2018-08-17 RX ADMIN — Medication 200 MILLIGRAM(S): at 06:04

## 2018-08-17 NOTE — PROGRESS NOTE ADULT - SUBJECTIVE AND OBJECTIVE BOX
CC:  foot fracture    INTERVAL HPI/OVERNIGHT EVENTS: Patient seen and examined. Requesting IV Benedryl and Dilaudid to be given at HD, states "this is what I receive when I go to HD". Tolerated am medications timed with meal. Right foot pain controlled on current medications. No nausea, vomiting, fever, chills.     Vital Signs Last 24 Hrs  T(C): 37 (17 Aug 2018 08:34), Max: 37 (17 Aug 2018 08:34)  T(F): 98.6 (17 Aug 2018 08:34), Max: 98.6 (17 Aug 2018 08:34)  HR: 86 (17 Aug 2018 08:34) (76 - 90)  BP: 127/87 (17 Aug 2018 08:34) (124/79 - 136/90)  BP(mean): --  RR: 18 (17 Aug 2018 08:34) (17 - 18)  SpO2: 98% (17 Aug 2018 08:34) (97% - 99%)    PHYSICAL EXAM:    GENERAL: NAD  HEAD:  Atraumatic, Normocephalic  EYES: +blindness  NECK: Supple, No JVD  NERVOUS SYSTEM:  Alert & Oriented X3, non focal  CHEST/LUNG: Clear to percussion bilaterally; No rales, rhonchi, wheezing, or rubs  HEART: Regular rate and rhythm; No murmurs, rubs, or gallops  ABDOMEN: Soft, Nontender, Nondistended; Bowel sounds present  EXTREMITIES:  2+ Peripheral Pulses, Right leg in Green compression and posterior splint    I&O's Detail                    CAPILLARY BLOOD GLUCOSE      POCT Blood Glucose.: 85 mg/dL (17 Aug 2018 08:23)  POCT Blood Glucose.: 103 mg/dL (17 Aug 2018 02:14)  POCT Blood Glucose.: 114 mg/dL (16 Aug 2018 17:08)  POCT Blood Glucose.: 97 mg/dL (16 Aug 2018 12:26)        Hemoglobin A1C, Whole Blood: 4.9 % (08-15-18 @ 06:44)    MEDICATIONS  (STANDING):  atovaquone Suspension 1500 milliGRAM(s) Oral daily  chlorhexidine 2% Cloths 1 Application(s) Topical daily  chlorhexidine 4% Liquid 1 Application(s) Topical <User Schedule>  darunavir 600 milliGRAM(s) Oral every 12 hours  diphenhydrAMINE   Injectable 50 milliGRAM(s) IV Push once  dolutegravir 50 milliGRAM(s) Oral two times a day  etravirine 200 milliGRAM(s) Oral two times a day after meals  fentaNYL   Patch  50 MICROgram(s)/Hr. 1 Patch Transdermal every 48 hours  folic acid 1 milliGRAM(s) Oral daily  heparin  Injectable 5000 Unit(s) SubCutaneous every 12 hours  labetalol 200 milliGRAM(s) Oral two times a day  lamiVUDine Solution 25 milliGRAM(s) Oral daily  levETIRAcetam 250 milliGRAM(s) Oral two times a day  metoclopramide 10 milliGRAM(s) Oral three times a day before meals  multivitamin 1 Tablet(s) Oral daily  oxyCODONE  ER Tablet 10 milliGRAM(s) Oral every 12 hours  pantoprazole    Tablet 40 milliGRAM(s) Oral every 12 hours  ritonavir Tablet 100 milliGRAM(s) Oral two times a day  sucralfate suspension 1 Gram(s) Oral two times a day  tenofovir disoproxil fumarate (VIREAD) 300 milliGRAM(s) Oral <User Schedule>  valGANciclovir Oral Liquid - Peds 100 milliGRAM(s) Oral <User Schedule>    MEDICATIONS  (PRN):  ALPRAZolam 2 milliGRAM(s) Oral daily PRN anxiety  dextrose 40% Gel 15 Gram(s) Oral once PRN Blood Glucose LESS THAN 70 milliGRAM(s)/deciliter  levETIRAcetam 500 milliGRAM(s) Oral daily PRN after dialysis  oxyCODONE    5 mG/acetaminophen 325 mG 2 Tablet(s) Oral every 6 hours PRN Moderate Pain (4 - 6)      RADIOLOGY & ADDITIONAL TESTS:       EXAM:  CT BRAIN                          PROCEDURE DATE:  08/16/2018          INTERPRETATION:  CT BRAIN WITHOUT CONTRAST:    History: Seizure and visual disturbance.    Date and time of exam: 8/16/2018 6:24 PM.    Comparison exam: 6/30/2018.    Technique: Sections were obtained through the brain without IV contrast.    Findings: The ventricles are of normal size and contour.  No masses are   identified.  There is no shift of midline structures.  No abnormal   calcifications or low dense regions are seen.  There is no evidence of   intracerebral or extra axial bleed. The calvarium demonstrates diffuse   thickening and groundglass appearance unchanged since the prior study.   Multiple small lucencies within the calvarium also unchanged. The   paranasal sinuses and mastoid air cells are normally pneumatized.    Impression: No evidence of acute intracranial pathology. No change since   6/30/2018..                  STUART HATFIELD M.D., ATTENDING RADIOLOGIST  This document has been electronically signed. Aug 16 2018  7:22PM

## 2018-08-17 NOTE — PROGRESS NOTE ADULT - ASSESSMENT
26 y/o M with extensive PMH including, HIV+. seizure, renal failure on dialysis M-W-F, blind, came to ED s/p seizure at home (unwitnessed) with c/o foot pain yesterday since a mirror fell on him.  Was d/c home since prelim x rays were negative.  Official radiology reading today stated that he has a 2nd, 3rd, and 4th linear, nondisplaced fractures and was called to return to the ED.  He states that he had to walk on it yesterday and is in severe pain.  Took Oxycodone for the pain.  Took more than usual b/c he usually takes it for his chronic pain. Pt is known for non compliance with his meds. He admits to being non compliant with his HIV & seizure meds. Last HD was yesterday. No podiatry intervention planned as per them. Pt could have been d/cherri home but could not get crutches due to AVF, could not get a wheelchair as has steps at home and will require FESTUS.         >Right 2-4 metatarsal fractures-  Pt's xrays and CT scans reviewed. Results show multiple fractures.  Splint applied by podiatry  Pt is non WB to right foot with crutches but, due PMHx of AVF the pt is unable to use the crutches  pt states his house is not wheelchair accessible and has stairs  Podiatry recommended pt to be admitted for FESTUS  Keep the foot elevated, iced, and compressed.  Pt may require surgery in the future based on CT scan findings as per podiatry but no plans for surgery inhouse as per podiatryPain meds      >Breakthrough seizure- likely from med non compliance, neuro consult appreciated. should be on standing keppra with post HD supplementation. start keppra 250 mg bid with 500 mg dose post HD, no "head spasms" reported, CTH no acute pathology .       >Diarrhea-resolved    >AV fistula.  S/p angioplasty, outpatient vascular follow up.  c/w PO opiates, tylenol, lidoderm patch, fentanyl patch 50 mcg, gabapentin    >Failure to thrive in adult.  Plan: GI eval on last admission noted. EGD showing antral gastritis, Carafate, PPI & reglan.  low fat renal diet  Await biopsy results fo stomach.   Renal diet; 3 nepro shakes three times per day  EUS to be done as outpt    >HIV (human immunodeficiency virus infection).  Plan: Pt non compliant with ART (outpatient pharmacy was not dispensing new regimen as there were several drug interactions), re-called ID,  ART resumed by ID, SAMMY 4 & viral load, Atovaquone 1500mg daily for PCP prophylaxis   -Azithromycin 1200mg once weekly for MAC prophylaxis    >Pancytopenia.  Plan: Stable. Likely from underlying HIV/AIDS, f/u CBC    >CMV retinitis.  Plan: On valganciclovir 100mg after each HD for prophylaxis until CD4 is high. Ophthalmology consult as outpatient      >ESRD (end stage renal disease). Plan: HD as per schedule. Spoke with Dr. Nam, Benedryl 50 mg IV ordered for HD today    DVT ppx    Dispo: FESTUS when receive authorization

## 2018-08-17 NOTE — PROGRESS NOTE ADULT - ASSESSMENT
ESRD  HTN  Anemia  SHELLEY  HIV  Metatarsal fracture   Seizure     HD MWF. Orders written.   Procrit/Aranesp if Hgb<10  Follow up neurology      D/w NP

## 2018-08-17 NOTE — PROGRESS NOTE ADULT - SUBJECTIVE AND OBJECTIVE BOX
[facility]                                     Neurology at [place]                                 Valorie Winter, & Casey                                  [address]                                        [place], [place], [address]                                             [phone]    CC: seizure  HPI:  This is a 27-year-old man who presented with seizure at home. He has a history of seizures. He has HIV. 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 When he was in [place] he was told to take Keppra before it every dialysis session. He presented with seizure and was thought to be noncompliant with medication. He states that typically he would only have seizures during dialysis. Now he has started to get them at home as well. Neurology evaluation is requested.    Interval history: no seizures, c/o pain in AV fistula site,     ROS neurology: Denies headache or dizziness. Denies weakness/numbness.  Denies speech/language deficits. Denies diplopia/blurred vision.  Denies confusion      MEDICATIONS  (STANDING):  atovaquone Suspension 1500 milliGRAM(s) Oral daily  chlorhexidine 2% Cloths 1 Application(s) Topical daily  chlorhexidine 4% Liquid 1 Application(s) Topical <User Schedule>  darunavir 600 milliGRAM(s) Oral every 12 hours  dolutegravir 50 milliGRAM(s) Oral two times a day  etravirine 200 milliGRAM(s) Oral two times a day after meals  fentaNYL   Patch  50 MICROgram(s)/Hr. 1 Patch Transdermal every 48 hours  folic acid 1 milliGRAM(s) Oral daily  heparin  Injectable 5000 Unit(s) SubCutaneous every 12 hours  labetalol 200 milliGRAM(s) Oral two times a day  lamiVUDine Solution 25 milliGRAM(s) Oral daily  levETIRAcetam 250 milliGRAM(s) Oral two times a day  metoclopramide 10 milliGRAM(s) Oral three times a day before meals  multivitamin 1 Tablet(s) Oral daily  oxyCODONE  ER Tablet 10 milliGRAM(s) Oral every 12 hours  pantoprazole    Tablet 40 milliGRAM(s) Oral every 12 hours  ritonavir Tablet 100 milliGRAM(s) Oral two times a day  sucralfate suspension 1 Gram(s) Oral two times a day  tenofovir disoproxil fumarate (VIREAD) 300 milliGRAM(s) Oral <User Schedule>  valGANciclovir Oral Liquid - Peds 100 milliGRAM(s) Oral <User Schedule>    MEDICATIONS  (PRN):  ALPRAZolam 2 milliGRAM(s) Oral daily PRN anxiety  dextrose 40% Gel 15 Gram(s) Oral once PRN Blood Glucose LESS THAN 70 milliGRAM(s)/deciliter  levETIRAcetam 500 milliGRAM(s) Oral daily PRN after dialysis  oxyCODONE    5 mG/acetaminophen 325 mG 2 Tablet(s) Oral every 6 hours PRN Moderate Pain (4 - 6)    Vital Signs Last 24 Hrs  T(C): 36.7 (17 Aug 2018 11:45), Max: 37 (17 Aug 2018 08:34)  T(F): 98 (17 Aug 2018 11:45), Max: 98.6 (17 Aug 2018 08:34)  HR: 86 (17 Aug 2018 11:45) (76 - 90)  BP: 140/86 (17 Aug 2018 11:45) (124/79 - 140/86)  BP(mean): --  RR: 98 (17 Aug 2018 11:45) (17 - 98)  SpO2: 98% (17 Aug 2018 08:34) (97% - 99%)      Detailed Neurologic Exam:    Mental status: The patient is awake and alert and has normal attention span.  The patient is fully oriented in 3 spheres. The patient is oriented to current events. The patient is able to name objects, follow commands, repeat sentences.    Cranial nerves: Pupils dilated min reactive, patient is blind. Extraocular motion is full, likely old strabismus. There is no ptosis. Facial sensation is intact. Facial musculature is symmetric. Palate elevates symmetrically. Shoulder shrug is normal. Tongue is midline.    Motor: There is normal bulk and tone.  There is no tremor.  Strength is 5/5 in the right arm and leg.   Strength is 5/5 in the left arm and leg.    Sensation: Intact to light touch and pin in 4 extremities    Reflexes: 2+ throughout and plantar responses are flexor.    Cerebellar: Unable to test for dysmetria on finger to nose testing due to blindness    Gait : deferred    LABS:                                                   10.4   2.6   )-----------( 89       ( 17 Aug 2018 13:13 )             33.1             RADIOLOGY & ADDITIONAL STUDIES (independently reviewed unless otherwise noted):  CT head- no acute CVA, mass or bleed

## 2018-08-17 NOTE — PROGRESS NOTE ADULT - SUBJECTIVE AND OBJECTIVE BOX
Patient is a 27y old  Male who presents with a chief complaint of      HPI:26 y/o M with extensive PMH including, HIV+. seizure, renal failure on dialysis M-W-F, blind, came to ED s/p seizure with c/o foot pain yesterday. Patient was seen bedside today for treatment for 2-4 metatarsal factures. Patient states he has pain in right foot. Patient states he has been keeping the foot elevated.      PAST MEDICAL & SURGICAL HISTORY:  Seizure disorder  Hypertension  Diabetes  ESRD (end stage renal disease)  Seizure  Pericarditis  Anxiety  Depression  Renal failure (ARF), acute on chronic: dialysis av fistula, RUE  HTN (hypertension)  Cardiomyopathy  HIV disease: born HIV+  AV fistula  S/P tonsillectomy      MEDICATIONS  (STANDING):  morphine  - Injectable 6 milliGRAM(s) SubCutaneous Once    MEDICATIONS  (PRN):      Allergies    vancomycin (Anaphylaxis)    Intolerances        FAMILY HISTORY:  No pertinent family history in first degree relatives  No pertinent family history in first degree relatives        Medical Imaging:       PE:   GEN: Patient is a 27y well developed, well nourished Male, alert, awake and oriented to person, place and time in no acute distress.       Extremities     Left:   DP- 1/4    PT-  1/4        Right: DP- 1/4          PT- 1/4  Derm: right foot with no opening on the skin with no active signs of infection with +1 edema, mild erythema,  pop dorsal aspect of the foot  Neuro: grossly intact        A/P:  27yMale presents with 2-4 met fracture       P:  pt evaluated and chart reviewed.  Pt's xrays and CT scans reviewed. Results show multiple fractures.  Green compression and posterior splint reapplied   Pt is non WB to right foot with crutches but, due PMHx of the pt is unable to use the crutches    Pt understands to keep the foot elevated, compressed.  Discussed with pt risks, benefits, and complications of surgery.   Pt understands that he may require surgery in the future based on CT scan findings.  Pt understands to return to ER if he experiences any n/v/c/sob/f.  Pt understands that he may develop traumatic arthritis in the area of the fractures  Pt is stable from podiatry standpoint for discharge on pain medication once medically stable.  Pt will followup Dr. Thompson for further evaluation.

## 2018-08-17 NOTE — PROGRESS NOTE ADULT - ASSESSMENT
The patient is a 27y Male who is followed by neurology because of seizure    Seizure  Long standing seizure disorder  should be on standing keppra with post HD supplementation  continue keppra 250 mg bid with 500 mg dose post HD    Headache  head CT did not reveal significant pathology  analgesia prn with tylenol, NSAIDs if OK with renal    No further neuro work up necessary  please recall prn    Thank you for allowing me to participate in the care of your patient    Aldo Eugene MD, PhD   [phone]

## 2018-08-18 PROCEDURE — 99233 SBSQ HOSP IP/OBS HIGH 50: CPT

## 2018-08-18 RX ORDER — ACETAMINOPHEN 500 MG
650 TABLET ORAL EVERY 6 HOURS
Qty: 0 | Refills: 0 | Status: DISCONTINUED | OUTPATIENT
Start: 2018-08-18 | End: 2018-08-23

## 2018-08-18 RX ORDER — OXYCODONE HYDROCHLORIDE 5 MG/1
5 TABLET ORAL EVERY 4 HOURS
Qty: 0 | Refills: 0 | Status: DISCONTINUED | OUTPATIENT
Start: 2018-08-18 | End: 2018-08-23

## 2018-08-18 RX ORDER — LANOLIN ALCOHOL/MO/W.PET/CERES
3 CREAM (GRAM) TOPICAL ONCE
Qty: 0 | Refills: 0 | Status: COMPLETED | OUTPATIENT
Start: 2018-08-18 | End: 2018-08-18

## 2018-08-18 RX ADMIN — ETRAVIRINE 200 MILLIGRAM(S): 200 TABLET ORAL at 18:30

## 2018-08-18 RX ADMIN — FENTANYL CITRATE 1 PATCH: 50 INJECTION INTRAVENOUS at 18:00

## 2018-08-18 RX ADMIN — LEVETIRACETAM 250 MILLIGRAM(S): 250 TABLET, FILM COATED ORAL at 18:29

## 2018-08-18 RX ADMIN — FENTANYL CITRATE 1 PATCH: 50 INJECTION INTRAVENOUS at 18:27

## 2018-08-18 RX ADMIN — Medication 1 MILLIGRAM(S): at 14:38

## 2018-08-18 RX ADMIN — Medication 10 MILLIGRAM(S): at 05:50

## 2018-08-18 RX ADMIN — OXYCODONE HYDROCHLORIDE 10 MILLIGRAM(S): 5 TABLET ORAL at 06:37

## 2018-08-18 RX ADMIN — OXYCODONE HYDROCHLORIDE 10 MILLIGRAM(S): 5 TABLET ORAL at 19:00

## 2018-08-18 RX ADMIN — OXYCODONE HYDROCHLORIDE 5 MILLIGRAM(S): 5 TABLET ORAL at 19:00

## 2018-08-18 RX ADMIN — RITONAVIR 100 MILLIGRAM(S): 100 TABLET, FILM COATED ORAL at 18:31

## 2018-08-18 RX ADMIN — DARUNAVIR 600 MILLIGRAM(S): 75 TABLET, FILM COATED ORAL at 18:28

## 2018-08-18 RX ADMIN — DOLUTEGRAVIR SODIUM 50 MILLIGRAM(S): 25 TABLET, FILM COATED ORAL at 18:30

## 2018-08-18 RX ADMIN — OXYCODONE HYDROCHLORIDE 5 MILLIGRAM(S): 5 TABLET ORAL at 18:38

## 2018-08-18 RX ADMIN — OXYCODONE HYDROCHLORIDE 10 MILLIGRAM(S): 5 TABLET ORAL at 18:38

## 2018-08-18 RX ADMIN — CHLORHEXIDINE GLUCONATE 1 APPLICATION(S): 213 SOLUTION TOPICAL at 18:27

## 2018-08-18 RX ADMIN — LEVETIRACETAM 250 MILLIGRAM(S): 250 TABLET, FILM COATED ORAL at 05:50

## 2018-08-18 RX ADMIN — OXYCODONE HYDROCHLORIDE 10 MILLIGRAM(S): 5 TABLET ORAL at 07:02

## 2018-08-18 RX ADMIN — Medication 10 MILLIGRAM(S): at 14:37

## 2018-08-18 RX ADMIN — OXYCODONE HYDROCHLORIDE 5 MILLIGRAM(S): 5 TABLET ORAL at 23:49

## 2018-08-18 RX ADMIN — Medication 3 MILLIGRAM(S): at 00:28

## 2018-08-18 RX ADMIN — OXYCODONE AND ACETAMINOPHEN 2 TABLET(S): 5; 325 TABLET ORAL at 06:43

## 2018-08-18 RX ADMIN — PANTOPRAZOLE SODIUM 40 MILLIGRAM(S): 20 TABLET, DELAYED RELEASE ORAL at 05:50

## 2018-08-18 RX ADMIN — OXYCODONE HYDROCHLORIDE 5 MILLIGRAM(S): 5 TABLET ORAL at 15:30

## 2018-08-18 RX ADMIN — HEPARIN SODIUM 5000 UNIT(S): 5000 INJECTION INTRAVENOUS; SUBCUTANEOUS at 05:51

## 2018-08-18 RX ADMIN — OXYCODONE AND ACETAMINOPHEN 2 TABLET(S): 5; 325 TABLET ORAL at 05:50

## 2018-08-18 RX ADMIN — TUBERCULIN PURIFIED PROTEIN DERIVATIVE 5 UNIT(S): 5 INJECTION, SOLUTION INTRADERMAL at 14:38

## 2018-08-18 RX ADMIN — OXYCODONE AND ACETAMINOPHEN 2 TABLET(S): 5; 325 TABLET ORAL at 00:30

## 2018-08-18 RX ADMIN — Medication 10 MILLIGRAM(S): at 18:29

## 2018-08-18 RX ADMIN — Medication 1 TABLET(S): at 14:38

## 2018-08-18 RX ADMIN — PANTOPRAZOLE SODIUM 40 MILLIGRAM(S): 20 TABLET, DELAYED RELEASE ORAL at 18:29

## 2018-08-18 RX ADMIN — OXYCODONE HYDROCHLORIDE 5 MILLIGRAM(S): 5 TABLET ORAL at 14:36

## 2018-08-18 RX ADMIN — CHLORHEXIDINE GLUCONATE 1 APPLICATION(S): 213 SOLUTION TOPICAL at 06:42

## 2018-08-18 RX ADMIN — Medication 2 MILLIGRAM(S): at 04:24

## 2018-08-18 NOTE — PROGRESS NOTE ADULT - ASSESSMENT
ESRD on HD MWF  HTN  Anemia  SHELLEY  HIV  Metatarsal fracture   Seizure     HD MWF. Orders written for 8/20/18; no acute indication for additional dialysis to be done at this time  Procrit/Aranesp if Hgb<10  Neuro follow up noted; Recommend to avoid use of NSAIDs or significantly reduce the use of them in ESRD; also not dialyzable   Renal diet    Thank you

## 2018-08-18 NOTE — PROGRESS NOTE ADULT - SUBJECTIVE AND OBJECTIVE BOX
NEPHROLOGY INTERVAL HPI/OVERNIGHT EVENTS:  HPI:  28 y/o M with extensive PMH including, HIV+. seizure, renal failure on dialysis M-W-F, blind, came to ED s/p seizure at home (unwitnessed) with c/o foot pain yesterday since a mirror fell on him.  Was d/c home since prelim x rays were negative.  Official radiology reading today stated that he has a 2nd, 3rd, and 4th linear, nondisplaced fractures and was called to return to the ED.  He states that he had to walk on it yesterday and is in severe pain.  Took Oxycodone for the pain.  Took more than usual b/c he usually takes it for his chronic pain. Pt is known for non compliance with his meds. He admits to being non compliant with his HIV & seizure meds. Last HD was yesterday. No podiatry intervention planned as per them. Pt could have been d/cherri home but could not get crutches due to AVF, could not get a wheelchair as has steps at home, no now being admitted as might require FESTUS. Poor historian. No chest pain, palpitations, sob, light headedness/dizziness, difficulty breathing/cough, fevers/chills, abdominal pain, n/v, constipation, dysuria or increased urinary frequency. Admits to having watery diarrhea (non bloody) (14 Aug 2018 15:44)      Follow up ESRD  No new complaints    PAST MEDICAL & SURGICAL HISTORY:  Seizure disorder  Hypertension  Diabetes  ESRD (end stage renal disease)  Seizure  Pericarditis  Anxiety  Depression  Renal failure (ARF), acute on chronic: dialysis av fistula, RUE  HTN (hypertension)  Cardiomyopathy  HIV disease: born HIV+  AV fistula  S/P tonsillectomy      MEDICATIONS  (STANDING):  atovaquone Suspension 1500 milliGRAM(s) Oral daily  chlorhexidine 2% Cloths 1 Application(s) Topical daily  chlorhexidine 4% Liquid 1 Application(s) Topical <User Schedule>  darunavir 600 milliGRAM(s) Oral every 12 hours  dolutegravir 50 milliGRAM(s) Oral two times a day  etravirine 200 milliGRAM(s) Oral two times a day after meals  fentaNYL   Patch  50 MICROgram(s)/Hr. 1 Patch Transdermal every 48 hours  folic acid 1 milliGRAM(s) Oral daily  heparin  Injectable 5000 Unit(s) SubCutaneous every 12 hours  labetalol 200 milliGRAM(s) Oral two times a day  lamiVUDine Solution 25 milliGRAM(s) Oral daily  levETIRAcetam 250 milliGRAM(s) Oral two times a day  metoclopramide 10 milliGRAM(s) Oral three times a day before meals  multivitamin 1 Tablet(s) Oral daily  oxyCODONE  ER Tablet 10 milliGRAM(s) Oral every 12 hours  pantoprazole    Tablet 40 milliGRAM(s) Oral every 12 hours  PPD  5 Tuberculin Unit(s) Injectable 5 Unit(s) IntraDermal once  ritonavir Tablet 100 milliGRAM(s) Oral two times a day  sucralfate suspension 1 Gram(s) Oral two times a day  tenofovir disoproxil fumarate (VIREAD) 300 milliGRAM(s) Oral <User Schedule>  valGANciclovir Oral Liquid - Peds 100 milliGRAM(s) Oral <User Schedule>    MEDICATIONS  (PRN):  ALPRAZolam 2 milliGRAM(s) Oral daily PRN anxiety  dextrose 40% Gel 15 Gram(s) Oral once PRN Blood Glucose LESS THAN 70 milliGRAM(s)/deciliter  levETIRAcetam 500 milliGRAM(s) Oral daily PRN after dialysis  oxyCODONE    5 mG/acetaminophen 325 mG 2 Tablet(s) Oral every 6 hours PRN Moderate Pain (4 - 6)      Allergies    vancomycin (Anaphylaxis)    Intolerances        Vital Signs Last 24 Hrs  T(C): 37.2 (17 Aug 2018 23:50), Max: 37.2 (17 Aug 2018 23:50)  T(F): 98.9 (17 Aug 2018 23:50), Max: 98.9 (17 Aug 2018 23:50)  HR: 97 (17 Aug 2018 23:50) (86 - 105)  BP: 101/67 (17 Aug 2018 23:50) (101/67 - 140/86)  BP(mean): --  RR: 18 (17 Aug 2018 23:50) (18 - 98)  SpO2: 96% (17 Aug 2018 23:50) (96% - 98%)  Daily     Daily Weight in k (17 Aug 2018 11:45)    PHYSICAL EXAM:  GENERAL: NAD  HEAD:  Atraumatic, Normocephalic  NECK: Supple, neck  veins full  NERVOUS SYSTEM:  AAOx3, follows commands  CHEST/LUNG: Clear to percussion bilaterally; No rales, rhonchi, wheezing, or rubs  HEART: Regular rate and rhythm; No murmurs, rubs, or gallops  ABDOMEN: Soft, Nontender, Nondistended; Bowel sounds present, body wall and flank edema  EXTREMITIES:  Edema -; +Hypertrophic AVF to RUE      LABS:                        10.4   2.6   )-----------( 89       ( 17 Aug 2018 13:13 )             33.1         139  |  92<L>  |  59.0<H>  ----------------------------<  103  4.3   |  26.0  |  11.04<H>    Ca    9.2      17 Aug 2018 13:13  Phos  7.4               Phosphorus Level, Serum: 7.4 mg/dL ( @ 13:13)        RADIOLOGY & ADDITIONAL TESTS:

## 2018-08-18 NOTE — PROGRESS NOTE ADULT - SUBJECTIVE AND OBJECTIVE BOX
CHIEF COMPLAINT/INTERVAL HISTORY:    Patient is a 28y old  Male who presents with a chief complaint of foot pain (16 Aug 2018 13:13)      HPI:  26 y/o M with extensive PMH including, HIV+. seizure, renal failure on dialysis M-W-F, blind, came to ED s/p seizure at home (unwitnessed) with c/o foot pain yesterday since a mirror fell on him.  Was d/c home since prelim x rays were negative.  Official radiology reading today stated that he has a 2nd, 3rd, and 4th linear, nondisplaced fractures and was called to return to the ED.  He states that he had to walk on it yesterday and is in severe pain.  Took Oxycodone for the pain.  Took more than usual b/c he usually takes it for his chronic pain. Pt is known for non compliance with his meds. He admits to being non compliant with his HIV & seizure meds. Last HD was yesterday. No podiatry intervention planned as per them. Pt could have been d/cherri home but could not get crutches due to AVF, could not get a wheelchair as has steps at home, no now being admitted as might require FESTUS. Poor historian. No chest pain, palpitations, sob, light headedness/dizziness, difficulty breathing/cough, fevers/chills, abdominal pain, n/v, constipation, dysuria or increased urinary frequency. Admits to having watery diarrhea (non bloody) (14 Aug 2018 15:44)      SUBJECTIVE & OBJECTIVE/ ROS: Pt seen and examined at bedside. c/o pain in right foot.  Denies any chest pain, palpitations, sob, light headedness/dizziness, difficulty breathing/cough, fevers/chills, abdominal pain, n/v, diarrhea/constipation, dysuria or increased urinary frequency.     ICU Vital Signs Last 24 Hrs  T(C): 36.6 (18 Aug 2018 08:06), Max: 37.2 (17 Aug 2018 23:50)  T(F): 97.8 (18 Aug 2018 08:06), Max: 98.9 (17 Aug 2018 23:50)  HR: 86 (18 Aug 2018 08:06) (86 - 105)  BP: 110/66 (18 Aug 2018 08:06) (101/67 - 140/86)  BP(mean): --  ABP: --  ABP(mean): --  RR: 18 (18 Aug 2018 08:06) (18 - 98)  SpO2: 95% (18 Aug 2018 08:06) (95% - 98%)        MEDICATIONS  (STANDING):  atovaquone Suspension 1500 milliGRAM(s) Oral daily  chlorhexidine 2% Cloths 1 Application(s) Topical daily  chlorhexidine 4% Liquid 1 Application(s) Topical <User Schedule>  darunavir 600 milliGRAM(s) Oral every 12 hours  dolutegravir 50 milliGRAM(s) Oral two times a day  etravirine 200 milliGRAM(s) Oral two times a day after meals  fentaNYL   Patch  50 MICROgram(s)/Hr. 1 Patch Transdermal every 48 hours  folic acid 1 milliGRAM(s) Oral daily  heparin  Injectable 5000 Unit(s) SubCutaneous every 12 hours  labetalol 200 milliGRAM(s) Oral two times a day  lamiVUDine Solution 25 milliGRAM(s) Oral daily  levETIRAcetam 250 milliGRAM(s) Oral two times a day  metoclopramide 10 milliGRAM(s) Oral three times a day before meals  multivitamin 1 Tablet(s) Oral daily  oxyCODONE  ER Tablet 10 milliGRAM(s) Oral every 12 hours  pantoprazole    Tablet 40 milliGRAM(s) Oral every 12 hours  PPD  5 Tuberculin Unit(s) Injectable 5 Unit(s) IntraDermal once  ritonavir Tablet 100 milliGRAM(s) Oral two times a day  sucralfate suspension 1 Gram(s) Oral two times a day  tenofovir disoproxil fumarate (VIREAD) 300 milliGRAM(s) Oral <User Schedule>  valGANciclovir Oral Liquid - Peds 100 milliGRAM(s) Oral <User Schedule>    MEDICATIONS  (PRN):  ALPRAZolam 2 milliGRAM(s) Oral daily PRN anxiety  dextrose 40% Gel 15 Gram(s) Oral once PRN Blood Glucose LESS THAN 70 milliGRAM(s)/deciliter  levETIRAcetam 500 milliGRAM(s) Oral daily PRN after dialysis  oxyCODONE    5 mG/acetaminophen 325 mG 2 Tablet(s) Oral every 6 hours PRN Moderate Pain (4 - 6)      LABS:                        10.4   2.6   )-----------( 89       ( 17 Aug 2018 13:13 )             33.1     08-17    139  |  92<L>  |  59.0<H>  ----------------------------<  103  4.3   |  26.0  |  11.04<H>    Ca    9.2      17 Aug 2018 13:13  Phos  7.4     08-17            CAPILLARY BLOOD GLUCOSE          RECENT CULTURES:      RADIOLOGY & ADDITIONAL TESTS:      PHYSICAL EXAM:    GENERAL: NAD  HEAD:  Atraumatic, Normocephalic  EYES: +blindness  NECK: Supple, No JVD  NERVOUS SYSTEM:  Alert & Oriented X3, non focal  CHEST/LUNG: Clear to percussion bilaterally; No rales, rhonchi, wheezing, or rubs  HEART: Regular rate and rhythm; No murmurs, rubs, or gallops  ABDOMEN: Soft, Nontender, Nondistended; Bowel sounds present  EXTREMITIES:  2+ Peripheral Pulses, Right leg in Green compression and posterior splint

## 2018-08-19 LAB
HAV IGM SER-ACNC: SIGNIFICANT CHANGE UP
HBV CORE IGM SER-ACNC: SIGNIFICANT CHANGE UP
HBV SURFACE AG SER-ACNC: SIGNIFICANT CHANGE UP
HCV AB S/CO SERPL IA: 0.08 S/CO — SIGNIFICANT CHANGE UP
HCV AB SERPL-IMP: SIGNIFICANT CHANGE UP
MRSA PCR RESULT.: SIGNIFICANT CHANGE UP
S AUREUS DNA NOSE QL NAA+PROBE: DETECTED

## 2018-08-19 PROCEDURE — 99233 SBSQ HOSP IP/OBS HIGH 50: CPT

## 2018-08-19 RX ADMIN — DOLUTEGRAVIR SODIUM 50 MILLIGRAM(S): 25 TABLET, FILM COATED ORAL at 18:48

## 2018-08-19 RX ADMIN — Medication 10 MILLIGRAM(S): at 09:30

## 2018-08-19 RX ADMIN — OXYCODONE HYDROCHLORIDE 5 MILLIGRAM(S): 5 TABLET ORAL at 14:40

## 2018-08-19 RX ADMIN — Medication 10 MILLIGRAM(S): at 18:48

## 2018-08-19 RX ADMIN — OXYCODONE HYDROCHLORIDE 5 MILLIGRAM(S): 5 TABLET ORAL at 06:29

## 2018-08-19 RX ADMIN — OXYCODONE HYDROCHLORIDE 10 MILLIGRAM(S): 5 TABLET ORAL at 05:24

## 2018-08-19 RX ADMIN — CHLORHEXIDINE GLUCONATE 1 APPLICATION(S): 213 SOLUTION TOPICAL at 05:26

## 2018-08-19 RX ADMIN — DARUNAVIR 600 MILLIGRAM(S): 75 TABLET, FILM COATED ORAL at 10:10

## 2018-08-19 RX ADMIN — OXYCODONE HYDROCHLORIDE 5 MILLIGRAM(S): 5 TABLET ORAL at 07:32

## 2018-08-19 RX ADMIN — Medication 1 TABLET(S): at 13:40

## 2018-08-19 RX ADMIN — Medication 10 MILLIGRAM(S): at 13:41

## 2018-08-19 RX ADMIN — ETRAVIRINE 200 MILLIGRAM(S): 200 TABLET ORAL at 10:09

## 2018-08-19 RX ADMIN — DOLUTEGRAVIR SODIUM 50 MILLIGRAM(S): 25 TABLET, FILM COATED ORAL at 10:10

## 2018-08-19 RX ADMIN — Medication 200 MILLIGRAM(S): at 18:52

## 2018-08-19 RX ADMIN — Medication 200 MILLIGRAM(S): at 05:25

## 2018-08-19 RX ADMIN — Medication 1 MILLIGRAM(S): at 13:41

## 2018-08-19 RX ADMIN — RITONAVIR 100 MILLIGRAM(S): 100 TABLET, FILM COATED ORAL at 18:51

## 2018-08-19 RX ADMIN — OXYCODONE HYDROCHLORIDE 5 MILLIGRAM(S): 5 TABLET ORAL at 13:40

## 2018-08-19 RX ADMIN — OXYCODONE HYDROCHLORIDE 5 MILLIGRAM(S): 5 TABLET ORAL at 18:42

## 2018-08-19 RX ADMIN — LEVETIRACETAM 500 MILLIGRAM(S): 250 TABLET, FILM COATED ORAL at 18:49

## 2018-08-19 RX ADMIN — LEVETIRACETAM 250 MILLIGRAM(S): 250 TABLET, FILM COATED ORAL at 18:51

## 2018-08-19 RX ADMIN — RITONAVIR 100 MILLIGRAM(S): 100 TABLET, FILM COATED ORAL at 10:11

## 2018-08-19 RX ADMIN — OXYCODONE HYDROCHLORIDE 5 MILLIGRAM(S): 5 TABLET ORAL at 01:00

## 2018-08-19 RX ADMIN — HEPARIN SODIUM 5000 UNIT(S): 5000 INJECTION INTRAVENOUS; SUBCUTANEOUS at 18:52

## 2018-08-19 RX ADMIN — PANTOPRAZOLE SODIUM 40 MILLIGRAM(S): 20 TABLET, DELAYED RELEASE ORAL at 18:50

## 2018-08-19 RX ADMIN — CHLORHEXIDINE GLUCONATE 1 APPLICATION(S): 213 SOLUTION TOPICAL at 13:40

## 2018-08-19 RX ADMIN — ETRAVIRINE 200 MILLIGRAM(S): 200 TABLET ORAL at 18:50

## 2018-08-19 RX ADMIN — Medication 2 MILLIGRAM(S): at 09:28

## 2018-08-19 RX ADMIN — OXYCODONE HYDROCHLORIDE 10 MILLIGRAM(S): 5 TABLET ORAL at 18:42

## 2018-08-19 RX ADMIN — PANTOPRAZOLE SODIUM 40 MILLIGRAM(S): 20 TABLET, DELAYED RELEASE ORAL at 10:11

## 2018-08-19 RX ADMIN — DARUNAVIR 600 MILLIGRAM(S): 75 TABLET, FILM COATED ORAL at 18:48

## 2018-08-19 RX ADMIN — OXYCODONE HYDROCHLORIDE 10 MILLIGRAM(S): 5 TABLET ORAL at 06:32

## 2018-08-19 RX ADMIN — LEVETIRACETAM 250 MILLIGRAM(S): 250 TABLET, FILM COATED ORAL at 05:25

## 2018-08-19 NOTE — PROVIDER CONTACT NOTE (OTHER) - SITUATION
Pt chart reviewed, contents noted, given pts extensive medical history and present functional limitations coupled with the architectural barriers in his home, pt will benefit from an Acute In-Patient

## 2018-08-19 NOTE — PROGRESS NOTE ADULT - SUBJECTIVE AND OBJECTIVE BOX
INTERVAL HPI/OVERNIGHT EVENTS:    CC: ESRD on HD, metatarsal fracture, HIV/AIDS, seizures    No overnight events, chart and course reviewed.     Vital Signs Last 24 Hrs  T(C): 36.9 (19 Aug 2018 15:58), Max: 37.1 (18 Aug 2018 23:56)  T(F): 98.4 (19 Aug 2018 15:58), Max: 98.7 (18 Aug 2018 23:56)  HR: 106 (19 Aug 2018 15:58) (89 - 106)  BP: 123/75 (19 Aug 2018 15:58) (100/66 - 133/85)  BP(mean): --  RR: 20 (19 Aug 2018 15:58) (17 - 20)  SpO2: 96% (19 Aug 2018 15:58) (96% - 100%)    PHYSICAL EXAM:    GENERAL: Not in distress  LUNGS: b/l air entry  HEART: Regular  ABDOMEN: Soft, BS+  EXTREMITIES:  no edema, tenderness.    MEDICATIONS  (STANDING):  atovaquone Suspension 1500 milliGRAM(s) Oral daily  chlorhexidine 2% Cloths 1 Application(s) Topical daily  chlorhexidine 4% Liquid 1 Application(s) Topical <User Schedule>  darunavir 600 milliGRAM(s) Oral every 12 hours  dolutegravir 50 milliGRAM(s) Oral two times a day  etravirine 200 milliGRAM(s) Oral two times a day after meals  fentaNYL   Patch  50 MICROgram(s)/Hr. 1 Patch Transdermal every 48 hours  folic acid 1 milliGRAM(s) Oral daily  heparin  Injectable 5000 Unit(s) SubCutaneous every 12 hours  labetalol 200 milliGRAM(s) Oral two times a day  lamiVUDine Solution 25 milliGRAM(s) Oral daily  levETIRAcetam 250 milliGRAM(s) Oral two times a day  metoclopramide 10 milliGRAM(s) Oral three times a day before meals  multivitamin 1 Tablet(s) Oral daily  oxyCODONE  ER Tablet 10 milliGRAM(s) Oral every 12 hours  pantoprazole    Tablet 40 milliGRAM(s) Oral every 12 hours  ritonavir Tablet 100 milliGRAM(s) Oral two times a day  sucralfate suspension 1 Gram(s) Oral two times a day  tenofovir disoproxil fumarate (VIREAD) 300 milliGRAM(s) Oral <User Schedule>  valGANciclovir Oral Liquid - Peds 100 milliGRAM(s) Oral <User Schedule>    MEDICATIONS  (PRN):  acetaminophen    Suspension. 650 milliGRAM(s) Oral every 6 hours PRN Mild Pain (1 - 3)  ALPRAZolam 2 milliGRAM(s) Oral daily PRN anxiety  dextrose 40% Gel 15 Gram(s) Oral once PRN Blood Glucose LESS THAN 70 milliGRAM(s)/deciliter  levETIRAcetam 500 milliGRAM(s) Oral daily PRN after dialysis  oxyCODONE    IR 5 milliGRAM(s) Oral every 4 hours PRN Moderate Pain (4 - 6)      Allergies    vancomycin (Anaphylaxis)    Intolerances          LABS:                  RADIOLOGY & ADDITIONAL TESTS:

## 2018-08-19 NOTE — PROGRESS NOTE ADULT - ASSESSMENT
28 yr old male with HIV/AIDS, ESRD on HD, seizures admitted with foot pain, after a mirror fell on his foot. Noted to have right 2-4 metatarsal fractures. Patient non compliant with medications. ID consulted, his medications were resumed. Podiatry consulted, cast applied, he is NWB on right LE, unable to use crutches given AVF and his home is not wheelchair accessible. Neurology consulted for seizure management and nephrology consulted for HD. Patient likely needs FESTUS.

## 2018-08-19 NOTE — PROGRESS NOTE ADULT - SUBJECTIVE AND OBJECTIVE BOX
NEPHROLOGY INTERVAL HPI/OVERNIGHT EVENTS:  HPI:  26 y/o M with extensive PMH including, HIV+. seizure, renal failure on dialysis M-W-F, blind, came to ED s/p seizure at home (unwitnessed) with c/o foot pain yesterday since a mirror fell on him.  Was d/c home since prelim x rays were negative.  Official radiology reading today stated that he has a 2nd, 3rd, and 4th linear, nondisplaced fractures and was called to return to the ED.  He states that he had to walk on it yesterday and is in severe pain.  Took Oxycodone for the pain.  Took more than usual b/c he usually takes it for his chronic pain. Pt is known for non compliance with his meds. He admits to being non compliant with his HIV & seizure meds. Last HD was yesterday. No podiatry intervention planned as per them. Pt could have been d/cherri home but could not get crutches due to AVF, could not get a wheelchair as has steps at home, no now being admitted as might require FESTUS. Poor historian. No chest pain, palpitations, sob, light headedness/dizziness, difficulty breathing/cough, fevers/chills, abdominal pain, n/v, constipation, dysuria or increased urinary frequency. Admits to having watery diarrhea (non bloody) (14 Aug 2018 15:44)    Follow up ESRD  No complaints    PAST MEDICAL & SURGICAL HISTORY:  Seizure disorder  Hypertension  Diabetes  ESRD (end stage renal disease)  Seizure  Pericarditis  Anxiety  Depression  Renal failure (ARF), acute on chronic: dialysis av fistula, RUE  HTN (hypertension)  Cardiomyopathy  HIV disease: born HIV+  AV fistula  S/P tonsillectomy      MEDICATIONS  (STANDING):  atovaquone Suspension 1500 milliGRAM(s) Oral daily  chlorhexidine 2% Cloths 1 Application(s) Topical daily  chlorhexidine 4% Liquid 1 Application(s) Topical <User Schedule>  darunavir 600 milliGRAM(s) Oral every 12 hours  dolutegravir 50 milliGRAM(s) Oral two times a day  etravirine 200 milliGRAM(s) Oral two times a day after meals  fentaNYL   Patch  50 MICROgram(s)/Hr. 1 Patch Transdermal every 48 hours  folic acid 1 milliGRAM(s) Oral daily  heparin  Injectable 5000 Unit(s) SubCutaneous every 12 hours  labetalol 200 milliGRAM(s) Oral two times a day  lamiVUDine Solution 25 milliGRAM(s) Oral daily  levETIRAcetam 250 milliGRAM(s) Oral two times a day  metoclopramide 10 milliGRAM(s) Oral three times a day before meals  multivitamin 1 Tablet(s) Oral daily  oxyCODONE  ER Tablet 10 milliGRAM(s) Oral every 12 hours  pantoprazole    Tablet 40 milliGRAM(s) Oral every 12 hours  ritonavir Tablet 100 milliGRAM(s) Oral two times a day  sucralfate suspension 1 Gram(s) Oral two times a day  tenofovir disoproxil fumarate (VIREAD) 300 milliGRAM(s) Oral <User Schedule>  valGANciclovir Oral Liquid - Peds 100 milliGRAM(s) Oral <User Schedule>    MEDICATIONS  (PRN):  acetaminophen    Suspension. 650 milliGRAM(s) Oral every 6 hours PRN Mild Pain (1 - 3)  ALPRAZolam 2 milliGRAM(s) Oral daily PRN anxiety  dextrose 40% Gel 15 Gram(s) Oral once PRN Blood Glucose LESS THAN 70 milliGRAM(s)/deciliter  levETIRAcetam 500 milliGRAM(s) Oral daily PRN after dialysis  oxyCODONE    IR 5 milliGRAM(s) Oral every 4 hours PRN Moderate Pain (4 - 6)      Allergies    vancomycin (Anaphylaxis)    Intolerances        Vital Signs Last 24 Hrs  T(C): 36.8 (19 Aug 2018 09:21), Max: 37.1 (18 Aug 2018 16:32)  T(F): 98.3 (19 Aug 2018 09:21), Max: 98.8 (18 Aug 2018 16:32)  HR: 99 (19 Aug 2018 09:21) (89 - 106)  BP: 117/66 (19 Aug 2018 09:21) (100/66 - 133/85)  BP(mean): --  RR: 17 (19 Aug 2018 09:21) (17 - 18)  SpO2: 96% (19 Aug 2018 09:21) (96% - 100%)  Daily     Daily     PHYSICAL EXAM:  GENERAL: ill appearing  HEAD:  Atraumatic, Normocephalic  EYES: Conjunctiva and sclera clear  ENMT: Moist mucous membranes, Good dentition, No lesions  NECK: Supple, No JVD, Normal thyroid  NERVOUS SYSTEM:  Alert & Oriented X3, Good concentration; Motor Strength 5/5 B/L upper and lower extremities; DTRs 2+ intact and symmetric  CHEST/LUNG: Clear to percussion bilaterally; No rales, rhonchi, wheezing, or rubs  HEART: Regular rate and rhythm; No murmurs, rubs, or gallops  ABDOMEN: Soft, Nontender, Nondistended; Bowel sounds present  EXTREMITIES:  2+ Peripheral Pulses, No clubbing, cyanosis, or edema; +Hypertrophic AVF to RUE  SKIN: No rashes or lesions    LABS:                    RADIOLOGY & ADDITIONAL TESTS:

## 2018-08-20 LAB
ANION GAP SERPL CALC-SCNC: 22 MMOL/L — HIGH (ref 5–17)
BUN SERPL-MCNC: 104 MG/DL — HIGH (ref 8–20)
CALCIUM SERPL-MCNC: 9 MG/DL — SIGNIFICANT CHANGE UP (ref 8.6–10.2)
CHLORIDE SERPL-SCNC: 87 MMOL/L — LOW (ref 98–107)
CO2 SERPL-SCNC: 24 MMOL/L — SIGNIFICANT CHANGE UP (ref 22–29)
CREAT SERPL-MCNC: 11.4 MG/DL — HIGH (ref 0.5–1.3)
GLUCOSE SERPL-MCNC: 106 MG/DL — SIGNIFICANT CHANGE UP (ref 70–115)
HCT VFR BLD CALC: 31.7 % — LOW (ref 42–52)
HGB BLD-MCNC: 10.2 G/DL — LOW (ref 14–18)
HIV 2 PROVIRAL DNA SERPL QL NAA+PROBE: SIGNIFICANT CHANGE UP
LACTATE BLDV-MCNC: 1.2 MMOL/L — SIGNIFICANT CHANGE UP (ref 0.5–2)
MCHC RBC-ENTMCNC: 29.5 PG — SIGNIFICANT CHANGE UP (ref 27–31)
MCHC RBC-ENTMCNC: 32.2 G/DL — SIGNIFICANT CHANGE UP (ref 32–36)
MCV RBC AUTO: 91.6 FL — SIGNIFICANT CHANGE UP (ref 80–94)
PLATELET # BLD AUTO: 88 K/UL — LOW (ref 150–400)
POTASSIUM SERPL-MCNC: 5.2 MMOL/L — SIGNIFICANT CHANGE UP (ref 3.5–5.3)
POTASSIUM SERPL-SCNC: 5.2 MMOL/L — SIGNIFICANT CHANGE UP (ref 3.5–5.3)
PROCALCITONIN SERPL-MCNC: 1.49 NG/ML — HIGH (ref 0–0.04)
RBC # BLD: 3.46 M/UL — LOW (ref 4.6–6.2)
RBC # FLD: 15.6 % — SIGNIFICANT CHANGE UP (ref 11–15.6)
SODIUM SERPL-SCNC: 133 MMOL/L — LOW (ref 135–145)
WBC # BLD: 5.3 K/UL — SIGNIFICANT CHANGE UP (ref 4.8–10.8)
WBC # FLD AUTO: 5.3 K/UL — SIGNIFICANT CHANGE UP (ref 4.8–10.8)

## 2018-08-20 PROCEDURE — 90471 IMMUNIZATION ADMIN: CPT

## 2018-08-20 PROCEDURE — 80048 BASIC METABOLIC PNL TOTAL CA: CPT

## 2018-08-20 PROCEDURE — 99284 EMERGENCY DEPT VISIT MOD MDM: CPT | Mod: 25

## 2018-08-20 PROCEDURE — 73620 X-RAY EXAM OF FOOT: CPT

## 2018-08-20 PROCEDURE — 99222 1ST HOSP IP/OBS MODERATE 55: CPT

## 2018-08-20 PROCEDURE — 90715 TDAP VACCINE 7 YRS/> IM: CPT

## 2018-08-20 PROCEDURE — 82553 CREATINE MB FRACTION: CPT

## 2018-08-20 PROCEDURE — 85027 COMPLETE CBC AUTOMATED: CPT

## 2018-08-20 PROCEDURE — 71045 X-RAY EXAM CHEST 1 VIEW: CPT | Mod: 26

## 2018-08-20 PROCEDURE — 96375 TX/PRO/DX INJ NEW DRUG ADDON: CPT

## 2018-08-20 PROCEDURE — 99261: CPT

## 2018-08-20 PROCEDURE — 99233 SBSQ HOSP IP/OBS HIGH 50: CPT

## 2018-08-20 PROCEDURE — 36415 COLL VENOUS BLD VENIPUNCTURE: CPT

## 2018-08-20 PROCEDURE — 82550 ASSAY OF CK (CPK): CPT

## 2018-08-20 PROCEDURE — 96365 THER/PROPH/DIAG IV INF INIT: CPT

## 2018-08-20 RX ORDER — DIPHENHYDRAMINE HCL 50 MG
25 CAPSULE ORAL ONCE
Qty: 0 | Refills: 0 | Status: COMPLETED | OUTPATIENT
Start: 2018-08-20 | End: 2018-08-20

## 2018-08-20 RX ORDER — HYDROMORPHONE HYDROCHLORIDE 2 MG/ML
2 INJECTION INTRAMUSCULAR; INTRAVENOUS; SUBCUTANEOUS ONCE
Qty: 0 | Refills: 0 | Status: DISCONTINUED | OUTPATIENT
Start: 2018-08-20 | End: 2018-08-20

## 2018-08-20 RX ORDER — ACETAMINOPHEN 500 MG
650 TABLET ORAL ONCE
Qty: 0 | Refills: 0 | Status: COMPLETED | OUTPATIENT
Start: 2018-08-20 | End: 2018-08-20

## 2018-08-20 RX ORDER — DIPHENHYDRAMINE HCL 50 MG
50 CAPSULE ORAL ONCE
Qty: 0 | Refills: 0 | Status: COMPLETED | OUTPATIENT
Start: 2018-08-20 | End: 2018-08-20

## 2018-08-20 RX ADMIN — PANTOPRAZOLE SODIUM 40 MILLIGRAM(S): 20 TABLET, DELAYED RELEASE ORAL at 18:54

## 2018-08-20 RX ADMIN — FENTANYL CITRATE 1 PATCH: 50 INJECTION INTRAVENOUS at 18:51

## 2018-08-20 RX ADMIN — Medication 1 MILLIGRAM(S): at 17:56

## 2018-08-20 RX ADMIN — Medication 10 MILLIGRAM(S): at 06:01

## 2018-08-20 RX ADMIN — Medication 200 MILLIGRAM(S): at 06:01

## 2018-08-20 RX ADMIN — PANTOPRAZOLE SODIUM 40 MILLIGRAM(S): 20 TABLET, DELAYED RELEASE ORAL at 06:01

## 2018-08-20 RX ADMIN — ETRAVIRINE 200 MILLIGRAM(S): 200 TABLET ORAL at 17:55

## 2018-08-20 RX ADMIN — OXYCODONE HYDROCHLORIDE 5 MILLIGRAM(S): 5 TABLET ORAL at 11:33

## 2018-08-20 RX ADMIN — OXYCODONE HYDROCHLORIDE 5 MILLIGRAM(S): 5 TABLET ORAL at 10:59

## 2018-08-20 RX ADMIN — DARUNAVIR 600 MILLIGRAM(S): 75 TABLET, FILM COATED ORAL at 17:55

## 2018-08-20 RX ADMIN — LEVETIRACETAM 250 MILLIGRAM(S): 250 TABLET, FILM COATED ORAL at 06:01

## 2018-08-20 RX ADMIN — CHLORHEXIDINE GLUCONATE 1 APPLICATION(S): 213 SOLUTION TOPICAL at 06:02

## 2018-08-20 RX ADMIN — OXYCODONE HYDROCHLORIDE 10 MILLIGRAM(S): 5 TABLET ORAL at 19:15

## 2018-08-20 RX ADMIN — OXYCODONE HYDROCHLORIDE 5 MILLIGRAM(S): 5 TABLET ORAL at 18:52

## 2018-08-20 RX ADMIN — RITONAVIR 100 MILLIGRAM(S): 100 TABLET, FILM COATED ORAL at 18:54

## 2018-08-20 RX ADMIN — TUBERCULIN PURIFIED PROTEIN DERIVATIVE 5 UNIT(S): 5 INJECTION, SOLUTION INTRADERMAL at 20:57

## 2018-08-20 RX ADMIN — Medication 25 MILLIGRAM(S): at 12:03

## 2018-08-20 RX ADMIN — DOLUTEGRAVIR SODIUM 50 MILLIGRAM(S): 25 TABLET, FILM COATED ORAL at 06:01

## 2018-08-20 RX ADMIN — LEVETIRACETAM 250 MILLIGRAM(S): 250 TABLET, FILM COATED ORAL at 18:52

## 2018-08-20 RX ADMIN — CHLORHEXIDINE GLUCONATE 1 APPLICATION(S): 213 SOLUTION TOPICAL at 19:01

## 2018-08-20 RX ADMIN — HEPARIN SODIUM 5000 UNIT(S): 5000 INJECTION INTRAVENOUS; SUBCUTANEOUS at 06:02

## 2018-08-20 RX ADMIN — HEPARIN SODIUM 5000 UNIT(S): 5000 INJECTION INTRAVENOUS; SUBCUTANEOUS at 17:55

## 2018-08-20 RX ADMIN — FENTANYL CITRATE 1 PATCH: 50 INJECTION INTRAVENOUS at 18:00

## 2018-08-20 RX ADMIN — OXYCODONE HYDROCHLORIDE 5 MILLIGRAM(S): 5 TABLET ORAL at 00:57

## 2018-08-20 RX ADMIN — OXYCODONE HYDROCHLORIDE 5 MILLIGRAM(S): 5 TABLET ORAL at 19:15

## 2018-08-20 RX ADMIN — Medication 10 MILLIGRAM(S): at 14:14

## 2018-08-20 RX ADMIN — OXYCODONE HYDROCHLORIDE 5 MILLIGRAM(S): 5 TABLET ORAL at 02:00

## 2018-08-20 RX ADMIN — Medication 650 MILLIGRAM(S): at 00:55

## 2018-08-20 RX ADMIN — DOLUTEGRAVIR SODIUM 50 MILLIGRAM(S): 25 TABLET, FILM COATED ORAL at 17:55

## 2018-08-20 RX ADMIN — HYDROMORPHONE HYDROCHLORIDE 2 MILLIGRAM(S): 2 INJECTION INTRAMUSCULAR; INTRAVENOUS; SUBCUTANEOUS at 09:50

## 2018-08-20 RX ADMIN — OXYCODONE HYDROCHLORIDE 10 MILLIGRAM(S): 5 TABLET ORAL at 06:01

## 2018-08-20 RX ADMIN — DARUNAVIR 600 MILLIGRAM(S): 75 TABLET, FILM COATED ORAL at 06:01

## 2018-08-20 RX ADMIN — Medication 50 MILLIGRAM(S): at 09:19

## 2018-08-20 RX ADMIN — RITONAVIR 100 MILLIGRAM(S): 100 TABLET, FILM COATED ORAL at 06:01

## 2018-08-20 RX ADMIN — OXYCODONE HYDROCHLORIDE 10 MILLIGRAM(S): 5 TABLET ORAL at 18:52

## 2018-08-20 RX ADMIN — HYDROMORPHONE HYDROCHLORIDE 2 MILLIGRAM(S): 2 INJECTION INTRAMUSCULAR; INTRAVENOUS; SUBCUTANEOUS at 09:36

## 2018-08-20 NOTE — PROGRESS NOTE ADULT - ASSESSMENT
28 yr old male with HIV/AIDS, ESRD on HD, seizures admitted with foot pain, after a mirror fell on his foot. Noted to have right 2-4 metatarsal fractures. Patient non compliant with medications. ID consulted, his medications were resumed. Podiatry consulted, cast applied, he is NWB on right LE, unable to use crutches given AVF and his home is not wheelchair accessible. Neurology consulted for seizure management and nephrology consulted for HD. Patient likely needs FESTUS. New fever overnight.      Problem/Plan - 1:  ·  Problem: Fracture of foot.  Plan: Pain control, PMR eval for acute rehab.     Problem/Plan-2:  Problem: Fever. Plan: No clear source. WBC 5.3, BC pending, chest xray results pending. Podiatry recalled to assess foot. Dressing removed, no visible signs of infection.      Problem/Plan - 3:  ·  Problem: ESRD (end stage renal disease).  Plan: HD per schedule.      Problem/Plan - 4:  ·  Problem: HIV disease.  Plan: Continue ART.      Problem/Plan - 5:  ·  Problem: Seizure disorder.  Plan: Continue Keppra, neurology following.    Dispo: Discharge held due to fever work up.

## 2018-08-20 NOTE — PROGRESS NOTE ADULT - SUBJECTIVE AND OBJECTIVE BOX
:26 y/o M with extensive PMH including, HIV+. seizure, renal failure on dialysis M-W-F, blind, came to ED s/p seizure seen by podiatry for r foot injury  treatment for 2-4 metatarsal factures. Patient states he has pain in right foot but, is improving    PAST MEDICAL & SURGICAL HISTORY:  Seizure disorder  Hypertension  Diabetes  ESRD (end stage renal disease)  Seizure  Pericarditis  Anxiety  Depression  Renal failure (ARF), acute on chronic: dialysis av fistula, RUE  HTN (hypertension)  Cardiomyopathy  HIV disease: born HIV+  AV fistula  S/P tonsillectomy      MEDICATIONS  (STANDING):  morphine  - Injectable 6 milliGRAM(s) SubCutaneous Once    MEDICATIONS  (PRN):      Allergies    vancomycin (Anaphylaxis)    Intolerances        FAMILY HISTORY:  No pertinent family history in first degree relatives  No pertinent family history in first degree relatives        Medical Imaging:       PE:   GEN: Patient is a 27y well developed, well nourished Male, alert, awake and oriented to person, place and time in no acute distress.       Extremities     Left:   DP- 1/4    PT-  1/4        Right: DP- 1/4          PT- 1/4  Derm: right foot with no opening on the skin with no active signs of infection with +1 edema, no erythema,  pop dorsal aspect of the foot  Neuro: grossly intact        A/P:  27yMale presents with 2-4 met fracture       P:  pt evaluated and chart reviewed.  Pt's xrays and CT scans reviewed. Results show multiple fractures.  ace and posterior splint reapplied   Pt is non WB to right foot with crutches but, due PMHx of the pt is unable to use the crutches    Pt understands to keep the foot elevated, compressed.  Pt understands that he may require surgery in the future based on CT scan findings.  Pt understands to return to ER if he experiences any n/v/c/sob/f.  Pt understands that he may develop traumatic arthritis in the area of the fractures  Pt is stable from podiatry standpoint for discharge on pain medication to rehab once medically stable.  Pt will followup Dr. Thompson for further evaluation.

## 2018-08-20 NOTE — DIETITIAN INITIAL EVALUATION ADULT. - OTHER INFO
Pt reports history of wt loss, but has been maintaining wt 61kg (taken at HD), pt reports having poor appetite, however likes and drinks Nepro shakes, would like more on tray

## 2018-08-20 NOTE — DIETITIAN INITIAL EVALUATION ADULT. - PERTINENT LABORATORY DATA
08-20 Na133 mmol/L<L> Glu 106 mg/dL K+ 5.2 mmol/L Cr  11.40 mg/dL<H> .0 mg/dL<H> Phos n/a   Alb n/a   PAB n/a

## 2018-08-20 NOTE — PROGRESS NOTE ADULT - SUBJECTIVE AND OBJECTIVE BOX
NEPHROLOGY INTERVAL HPI/OVERNIGHT EVENTS:  HPI:  28 y/o M with extensive PMH including, HIV+. seizure, renal failure on dialysis M-W-F, blind, came to ED s/p seizure at home (unwitnessed) with c/o foot pain yesterday since a mirror fell on him.  Was d/c home since prelim x rays were negative.  Official radiology reading today stated that he has a 2nd, 3rd, and 4th linear, nondisplaced fractures and was called to return to the ED.  He states that he had to walk on it yesterday and is in severe pain.  Took Oxycodone for the pain.  Took more than usual b/c he usually takes it for his chronic pain. Pt is known for non compliance with his meds. He admits to being non compliant with his HIV & seizure meds. Last HD was yesterday. No podiatry intervention planned as per them. Pt could have been d/cherri home but could not get crutches due to AVF, could not get a wheelchair as has steps at home, no now being admitted as might require FESTUS. Catrachito historian. No chest pain, palpitations, sob, light headedness/dizziness, difficulty breathing/cough, fevers/chills, abdominal pain, n/v, constipation, dysuria or increased urinary frequency. Admits to having watery diarrhea (non bloody) (14 Aug 2018 15:44)      PAST MEDICAL & SURGICAL HISTORY:  Seizure disorder  Hypertension  Diabetes  ESRD (end stage renal disease)  Seizure  Pericarditis  Anxiety  Depression  Renal failure (ARF), acute on chronic: dialysis av fistula, RUE  HTN (hypertension)  Cardiomyopathy  HIV disease: born HIV+  AV fistula  S/P tonsillectomy      MEDICATIONS  (STANDING):  atovaquone Suspension 1500 milliGRAM(s) Oral daily  chlorhexidine 2% Cloths 1 Application(s) Topical daily  chlorhexidine 4% Liquid 1 Application(s) Topical <User Schedule>  darunavir 600 milliGRAM(s) Oral every 12 hours  dolutegravir 50 milliGRAM(s) Oral two times a day  etravirine 200 milliGRAM(s) Oral two times a day after meals  fentaNYL   Patch  50 MICROgram(s)/Hr. 1 Patch Transdermal every 48 hours  folic acid 1 milliGRAM(s) Oral daily  heparin  Injectable 5000 Unit(s) SubCutaneous every 12 hours  labetalol 200 milliGRAM(s) Oral two times a day  lamiVUDine Solution 25 milliGRAM(s) Oral daily  levETIRAcetam 250 milliGRAM(s) Oral two times a day  metoclopramide 10 milliGRAM(s) Oral three times a day before meals  multivitamin 1 Tablet(s) Oral daily  oxyCODONE  ER Tablet 10 milliGRAM(s) Oral every 12 hours  pantoprazole    Tablet 40 milliGRAM(s) Oral every 12 hours  ritonavir Tablet 100 milliGRAM(s) Oral two times a day  sucralfate suspension 1 Gram(s) Oral two times a day  tenofovir disoproxil fumarate (VIREAD) 300 milliGRAM(s) Oral <User Schedule>  valGANciclovir Oral Liquid - Peds 100 milliGRAM(s) Oral <User Schedule>    MEDICATIONS  (PRN):  acetaminophen    Suspension. 650 milliGRAM(s) Oral every 6 hours PRN Mild Pain (1 - 3)  ALPRAZolam 2 milliGRAM(s) Oral daily PRN anxiety  dextrose 40% Gel 15 Gram(s) Oral once PRN Blood Glucose LESS THAN 70 milliGRAM(s)/deciliter  levETIRAcetam 500 milliGRAM(s) Oral daily PRN after dialysis  oxyCODONE    IR 5 milliGRAM(s) Oral every 4 hours PRN Moderate Pain (4 - 6)      Allergies    vancomycin (Anaphylaxis)    Intolerances    prefers vanilla or butter pecan nepro x 2 TID RDOK (Unknown)      Vital Signs Last 24 Hrs  T(C): 37.6 (20 Aug 2018 14:07), Max: 38.8 (20 Aug 2018 00:26)  T(F): 99.7 (20 Aug 2018 14:07), Max: 101.8 (20 Aug 2018 00:26)  HR: 96 (20 Aug 2018 14:07) (62 - 121)  BP: 112/70 (20 Aug 2018 14:07) (100/63 - 133/84)  BP(mean): --  RR: 18 (20 Aug 2018 14:07) (16 - 20)  SpO2: 97% (20 Aug 2018 14:07) (94% - 99%)  Daily     Daily Weight in k (20 Aug 2018 11:10)    PHYSICAL EXAM:  GENERAL: ill appearing  HEAD:  Atraumatic, Normocephalic  EYES: Conjunctiva and sclera clear  ENMT: Moist mucous membranes, Good dentition, No lesions  NECK: Supple, No JVD, Normal thyroid  NERVOUS SYSTEM:  Alert & Oriented X3, Good concentration; Motor Strength 5/5 B/L upper and lower extremities; DTRs 2+ intact and symmetric  CHEST/LUNG: Clear to percussion bilaterally; No rales, rhonchi, wheezing, or rubs  HEART: Regular rate and rhythm; No murmurs, rubs, or gallops  ABDOMEN: Soft, Nontender, Nondistended; Bowel sounds present  EXTREMITIES:  2+ Peripheral Pulses, No clubbing, cyanosis, or edema; +Hypertrophic AVF to RUE  SKIN: No rashes or lesions    LABS:                        10.2   5.3   )-----------( 88       ( 20 Aug 2018 01:24 )             31.7     08-20    133<L>  |  87<L>  |  104.0<H>  ----------------------------<  106  5.2   |  24.0  |  11.40<H>    Ca    9.0      20 Aug 2018 01:24                  RADIOLOGY & ADDITIONAL TESTS:

## 2018-08-20 NOTE — CONSULT NOTE ADULT - SUBJECTIVE AND OBJECTIVE BOX
cc: Patient is a 28y old  Male who presents with a chief complaint of foot pain and with metatarsal fractures      HPI:  26 y/o M with extensive PMH including, HIV+. seizure, renal failure on dialysis M-W-F, blind, came to ED s/p seizure at home (unwitnessed) with c/o foot pain since a mirror fell on him.  Was d/c home since prelim x rays were negative.  Official radiology reading on 8/14/18 with 2nd, 3rd, and 4th linear, nondisplaced fractures and was called to return to the ED.  He states that he had to walk on it yesterday and is in severe pain.   He admits to being non compliant with his HIV & seizure meds due to pharmacy stating drug interactions. Patient seen by Podiatry. Compression and posterior splint applied by Podiatry. NWB on RLE. Patient unable to use crutches due to AVF on RUE and unable to be discharged home and hence admitted.   When seen this am, patient states foot pain and wants one dose of IV morphine for pain relief. Had fever overnight.     PCP: Willa Venegas      PAST MEDICAL & SURGICAL HISTORY:  Seizure disorder  Hypertension  Diabetes  ESRD (end stage renal disease)  Seizure  Pericarditis  Anxiety  Depression  Renal failure (ARF), acute on chronic: dialysis av fistula, RUE  HTN (hypertension)  Cardiomyopathy  HIV disease: born HIV+  AV fistula  S/P tonsillectomy      FAMILY HISTORY:  No pertinent family history.       SOCIAL HISTORY:  TOBACCO: denies history  ALCOHOL: denies abuse      FUNCTIONAL, ENVIRONMENTAL HISTORY:lives with mother.   4 large steps to enter house.   states that he was independent PTA and       Allergies    vancomycin (Anaphylaxis)    Intolerances        MEDICATIONS  (STANDING):  atovaquone Suspension 1500 milliGRAM(s) Oral daily  chlorhexidine 2% Cloths 1 Application(s) Topical daily  chlorhexidine 4% Liquid 1 Application(s) Topical <User Schedule>  darunavir 600 milliGRAM(s) Oral every 12 hours  diphenhydrAMINE   Injectable 50 milliGRAM(s) IV Push once  diphenhydrAMINE   Injectable 25 milliGRAM(s) IV Push once  dolutegravir 50 milliGRAM(s) Oral two times a day  etravirine 200 milliGRAM(s) Oral two times a day after meals  fentaNYL   Patch  50 MICROgram(s)/Hr. 1 Patch Transdermal every 48 hours  folic acid 1 milliGRAM(s) Oral daily  heparin  Injectable 5000 Unit(s) SubCutaneous every 12 hours  HYDROmorphone  Injectable 2 milliGRAM(s) IV Push once  labetalol 200 milliGRAM(s) Oral two times a day  lamiVUDine Solution 25 milliGRAM(s) Oral daily  levETIRAcetam 250 milliGRAM(s) Oral two times a day  metoclopramide 10 milliGRAM(s) Oral three times a day before meals  multivitamin 1 Tablet(s) Oral daily  oxyCODONE  ER Tablet 10 milliGRAM(s) Oral every 12 hours  pantoprazole    Tablet 40 milliGRAM(s) Oral every 12 hours  ritonavir Tablet 100 milliGRAM(s) Oral two times a day  sucralfate suspension 1 Gram(s) Oral two times a day  tenofovir disoproxil fumarate (VIREAD) 300 milliGRAM(s) Oral <User Schedule>  valGANciclovir Oral Liquid - Peds 100 milliGRAM(s) Oral <User Schedule>    MEDICATIONS  (PRN):  acetaminophen    Suspension. 650 milliGRAM(s) Oral every 6 hours PRN Mild Pain (1 - 3)  ALPRAZolam 2 milliGRAM(s) Oral daily PRN anxiety  dextrose 40% Gel 15 Gram(s) Oral once PRN Blood Glucose LESS THAN 70 milliGRAM(s)/deciliter  levETIRAcetam 500 milliGRAM(s) Oral daily PRN after dialysis  oxyCODONE    IR 5 milliGRAM(s) Oral every 4 hours PRN Moderate Pain (4 - 6)      REVIEW OF SYSTEMS:    CONSTITUTIONAL: + fever,  EYES: + blind  RESPIRATORY: No cough, denies sore throat  CARDIOVASCULAR: No chest pain,   GASTROINTESTINAL: No abdominal or epigastric pain. + loose stools  GENITOURINARY: anuric  NEUROLOGICAL: No headaches,  SKIN: No itching,   MUSCULOSKELETAL: No joint pain , + RUE fistula area pain   PSYCHIATRIC: No depression,     Vital Signs Last 24 Hrs  T(C): 36.4 (20 Aug 2018 05:50), Max: 38.8 (20 Aug 2018 00:26)  T(F): 97.5 (20 Aug 2018 05:50), Max: 101.8 (20 Aug 2018 00:26)  HR: 100 (20 Aug 2018 05:50) (62 - 121)  BP: 120/70 (20 Aug 2018 05:50) (115/64 - 133/84)  BP(mean): --  RR: 18 (20 Aug 2018 01:56) (16 - 20)  SpO2: 99% (20 Aug 2018 05:50) (94% - 99%)    PHYSICAL EXAM:    GENERAL: NAD,   HEAD:  Atraumatic, Normocephalic  HEART: S1S2+  CHEST/LUNG: Clear  ABDOMEN: Soft, Nontender, Nondistended;   EXTREMITIES: RUE HD access site with dressing, one suture, swelling +,  RLE in splint and ace wrap.   Motor UE 4/5, LLE 4/5, right HF 4/5, distal not tested due to splint      FUNCTIONAL EXAM:   Sit-Stand Transfer Training  Sit-to-Stand Transfer Training Rehab Potential: good, to achieve stated therapy goals  Sit-to-Stand Transfer Training Symptoms Noted During/After Treatment: increased pain  Transfer Training Sit-to-Stand Transfer: minimum assist (75% patient effort);  1 person assist;  verbal cues;  weight-bearing as tolerated   rolling walker  Transfer Training Stand-to-Sit Transfer: rolling walker;  weight-bearing as tolerated   supervision;  verbal cues;  1 person assist;  minimum assist (75% patient effort)  Sit-to-Stand Transfer Training Transfer Safety Analysis: decreased step length;  decreased weight-shifting ability;  decreased sequencing ability;  decreased ROM;  decreased strength;  pain;  rolling walker    Gait Training  Gait Training Rehab Potential: good, to achieve stated therapy goals  Gait Training Symptoms Noted During/After Treatment: increased pain  Gait Training: minimum assist (75% patient effort);  1 person assist;  verbal cues;  weight-bearing as tolerated   rolling walker;  15 feet      LABS:                        10.2   5.3   )-----------( 88       ( 20 Aug 2018 01:24 )             31.7     08-20    133<L>  |  87<L>  |  104.0<H>  ----------------------------<  106  5.2   |  24.0  |  11.40<H>    Ca    9.0      20 Aug 2018 01:24      RADIOLOGY & ADDITIONAL STUDIES:    < from: Xray Foot AP + Lateral + Oblique, Right (08.14.18 @ 11:52) >  EXAM:  FOOT-RIGHT                          PROCEDURE DATE:  08/14/2018          INTERPRETATION:  CLINICAL HISTORY: Right foot pain for 2 days.    TECHNIQUE: AP, oblique and lateral views of the right foot.    COMPARISON: AP, oblique and lateral views of the right foot from 8/12/2018    FINDINGS:  There are horizontally orientated nondisplaced fractures again seen   through the bases of the second through fourth metatarsals. There is no   widening of the intermetatarsal spaces. No new fracturesare identified.  The joint spaces are preserved with smooth articular margins.  There is no anterior ankle joint effusion.  There are no lytic or blastic bony lesions.  Slight interval decreased soft tissue swelling. The Achilles fat pad is   clear.    IMPRESSION:   Stable exam. Nondisplaced fractures bases of the second through fourth   right metatarsals. No acute fracture.      < end of copied text >        < from: CT Foot No Cont, Right (08.14.18 @ 12:03) >  MPRESSION:    1.  Avulsion fractures at the medial cuneiform and lateral proximal   second metatarsal base. The location of these fractures raising concern   for Lisfranc ligamentous injury. Correlate clinically. MRI can be   performed for further evaluation.  2.  Transverse fractures at the second, third, and fourth metatarsal   bases.  3.  Avulsion fracture at the medial base of the first proximal phalanx.  4.  Small chip fractures of the cuboid and lateral cuneiform.  5.  Small chip fracture at the medial base of the first proximal   metatarsal.    < end of copied text >        A/P:    28 year old male with medical comorbidities as listed above,  admitted with findings of right foot fractures as listed above.  NWB RLE  Patient with RLE splint  Overnight with fever    Rehab: Recommend course of FESTUS to improve overall function, as no on going medical issues that warrant daily physician oversight  continue PT while in house  D/W Dr. Vuong.  Thank you. will sign off

## 2018-08-20 NOTE — CHART NOTE - NSCHARTNOTEFT_GEN_A_CORE
PA called by RN to report pt with elevated temp. T 101.8F rectal, manual /80, manual HR 62, RR 20, O2 sat 94% RA. Ordered Tylenol 650mg PO x1 dose for temp, STAT CBC, BMP, procalcitonin, lactate, blood culture x2, U/A C&S, will F/U on results. C/O right foot pain, oxycodone administered for pain relief, otherwise no other c/o. Instructed RN to continue to monitor pt and notify provider of any changes in pt status.

## 2018-08-20 NOTE — PROGRESS NOTE ADULT - SUBJECTIVE AND OBJECTIVE BOX
CC: foot fracture/Fever    INTERVAL HPI/OVERNIGHT EVENTS: Patient seen and examined. had HD this am. Temp 101.8 overnight, BC, chest xray, ordered. Patient denies chest pain, SOB, no cough, nausea, vomiting, diarrhea, chills. C/O right foot pain and pain at AVF site.     Vital Signs Last 24 Hrs  T(C): 37.6 (20 Aug 2018 14:07), Max: 38.8 (20 Aug 2018 00:26)  T(F): 99.7 (20 Aug 2018 14:07), Max: 101.8 (20 Aug 2018 00:26)  HR: 96 (20 Aug 2018 14:07) (62 - 121)  BP: 112/70 (20 Aug 2018 14:07) (100/63 - 133/84)  BP(mean): --  RR: 18 (20 Aug 2018 14:07) (16 - 20)  SpO2: 97% (20 Aug 2018 14:07) (94% - 99%)    [PHYSICAL EXAM:    GENERAL: Not in distress  LUNGS: b/l air entry  HEART: Regular  ABDOMEN: Soft, BS+  EXTREMITIES: Right foot dressing removed. +pedal edema, no erythema, no wounds noted, +pulses    I&O's Detail    20 Aug 2018 07:01  -  20 Aug 2018 15:33  --------------------------------------------------------  IN:    Other: 900 mL  Total IN: 900 mL    OUT:    Other: 2400 mL  Total OUT: 2400 mL    Total NET: -1500 mL                                    10.2   5.3   )-----------( 88       ( 20 Aug 2018 01:24 )             31.7     20 Aug 2018 01:24    133    |  87     |  104.0  ----------------------------<  106    5.2     |  24.0   |  11.40    Ca    9.0        20 Aug 2018 01:24        CAPILLARY BLOOD GLUCOSE            Hemoglobin A1C, Whole Blood: 4.9 % (08-15-18 @ 06:44)    MEDICATIONS  (STANDING):  atovaquone Suspension 1500 milliGRAM(s) Oral daily  chlorhexidine 2% Cloths 1 Application(s) Topical daily  chlorhexidine 4% Liquid 1 Application(s) Topical <User Schedule>  darunavir 600 milliGRAM(s) Oral every 12 hours  dolutegravir 50 milliGRAM(s) Oral two times a day  etravirine 200 milliGRAM(s) Oral two times a day after meals  fentaNYL   Patch  50 MICROgram(s)/Hr. 1 Patch Transdermal every 48 hours  folic acid 1 milliGRAM(s) Oral daily  heparin  Injectable 5000 Unit(s) SubCutaneous every 12 hours  labetalol 200 milliGRAM(s) Oral two times a day  lamiVUDine Solution 25 milliGRAM(s) Oral daily  levETIRAcetam 250 milliGRAM(s) Oral two times a day  metoclopramide 10 milliGRAM(s) Oral three times a day before meals  multivitamin 1 Tablet(s) Oral daily  oxyCODONE  ER Tablet 10 milliGRAM(s) Oral every 12 hours  pantoprazole    Tablet 40 milliGRAM(s) Oral every 12 hours  ritonavir Tablet 100 milliGRAM(s) Oral two times a day  sucralfate suspension 1 Gram(s) Oral two times a day  tenofovir disoproxil fumarate (VIREAD) 300 milliGRAM(s) Oral <User Schedule>  valGANciclovir Oral Liquid - Peds 100 milliGRAM(s) Oral <User Schedule>    MEDICATIONS  (PRN):  acetaminophen    Suspension. 650 milliGRAM(s) Oral every 6 hours PRN Mild Pain (1 - 3)  ALPRAZolam 2 milliGRAM(s) Oral daily PRN anxiety  dextrose 40% Gel 15 Gram(s) Oral once PRN Blood Glucose LESS THAN 70 milliGRAM(s)/deciliter  levETIRAcetam 500 milliGRAM(s) Oral daily PRN after dialysis  oxyCODONE    IR 5 milliGRAM(s) Oral every 4 hours PRN Moderate Pain (4 - 6)      RADIOLOGY & ADDITIONAL TESTS:

## 2018-08-20 NOTE — PROGRESS NOTE ADULT - ASSESSMENT
ESRD on HD MWF  HTN  Anemia  SHELLEY  HIV  Metatarsal fracture   Seizure     HD MWF.   Procrit/Aranesp if Hgb<10  Neuro follow up noted  Renal diet    Thank you

## 2018-08-20 NOTE — DIETITIAN INITIAL EVALUATION ADULT. - SIGNS/SYMPTOMS
as evidenced by increased protein losses via HD, physical signs of muscle mass loss in shoulder, evelinee

## 2018-08-21 LAB
ANION GAP SERPL CALC-SCNC: 17 MMOL/L — SIGNIFICANT CHANGE UP (ref 5–17)
BUN SERPL-MCNC: 66 MG/DL — HIGH (ref 8–20)
CALCIUM SERPL-MCNC: 9.1 MG/DL — SIGNIFICANT CHANGE UP (ref 8.6–10.2)
CHLORIDE SERPL-SCNC: 90 MMOL/L — LOW (ref 98–107)
CO2 SERPL-SCNC: 28 MMOL/L — SIGNIFICANT CHANGE UP (ref 22–29)
CREAT SERPL-MCNC: 7.47 MG/DL — HIGH (ref 0.5–1.3)
GLUCOSE BLDC GLUCOMTR-MCNC: 116 MG/DL — HIGH (ref 70–99)
GLUCOSE SERPL-MCNC: 94 MG/DL — SIGNIFICANT CHANGE UP (ref 70–115)
HCT VFR BLD CALC: 31.2 % — LOW (ref 42–52)
HGB BLD-MCNC: 9.9 G/DL — LOW (ref 14–18)
MAGNESIUM SERPL-MCNC: 2.7 MG/DL — HIGH (ref 1.6–2.6)
MCHC RBC-ENTMCNC: 29.7 PG — SIGNIFICANT CHANGE UP (ref 27–31)
MCHC RBC-ENTMCNC: 31.7 G/DL — LOW (ref 32–36)
MCV RBC AUTO: 93.7 FL — SIGNIFICANT CHANGE UP (ref 80–94)
PHOSPHATE SERPL-MCNC: 4.8 MG/DL — HIGH (ref 2.4–4.7)
PLATELET # BLD AUTO: 90 K/UL — LOW (ref 150–400)
POTASSIUM SERPL-MCNC: 4.6 MMOL/L — SIGNIFICANT CHANGE UP (ref 3.5–5.3)
POTASSIUM SERPL-SCNC: 4.6 MMOL/L — SIGNIFICANT CHANGE UP (ref 3.5–5.3)
RBC # BLD: 3.33 M/UL — LOW (ref 4.6–6.2)
RBC # FLD: 15.7 % — HIGH (ref 11–15.6)
SODIUM SERPL-SCNC: 135 MMOL/L — SIGNIFICANT CHANGE UP (ref 135–145)
WBC # BLD: 4.2 K/UL — LOW (ref 4.8–10.8)
WBC # FLD AUTO: 4.2 K/UL — LOW (ref 4.8–10.8)

## 2018-08-21 PROCEDURE — 99232 SBSQ HOSP IP/OBS MODERATE 35: CPT

## 2018-08-21 RX ADMIN — Medication 1 MILLIGRAM(S): at 14:11

## 2018-08-21 RX ADMIN — Medication 1 TABLET(S): at 14:12

## 2018-08-21 RX ADMIN — ETRAVIRINE 200 MILLIGRAM(S): 200 TABLET ORAL at 09:33

## 2018-08-21 RX ADMIN — DOLUTEGRAVIR SODIUM 50 MILLIGRAM(S): 25 TABLET, FILM COATED ORAL at 09:33

## 2018-08-21 RX ADMIN — OXYCODONE HYDROCHLORIDE 5 MILLIGRAM(S): 5 TABLET ORAL at 08:29

## 2018-08-21 RX ADMIN — LEVETIRACETAM 250 MILLIGRAM(S): 250 TABLET, FILM COATED ORAL at 06:53

## 2018-08-21 RX ADMIN — OXYCODONE HYDROCHLORIDE 10 MILLIGRAM(S): 5 TABLET ORAL at 18:30

## 2018-08-21 RX ADMIN — OXYCODONE HYDROCHLORIDE 10 MILLIGRAM(S): 5 TABLET ORAL at 07:30

## 2018-08-21 RX ADMIN — Medication 200 MILLIGRAM(S): at 07:09

## 2018-08-21 RX ADMIN — RITONAVIR 100 MILLIGRAM(S): 100 TABLET, FILM COATED ORAL at 09:33

## 2018-08-21 RX ADMIN — HEPARIN SODIUM 5000 UNIT(S): 5000 INJECTION INTRAVENOUS; SUBCUTANEOUS at 06:51

## 2018-08-21 RX ADMIN — ETRAVIRINE 200 MILLIGRAM(S): 200 TABLET ORAL at 21:55

## 2018-08-21 RX ADMIN — DOLUTEGRAVIR SODIUM 50 MILLIGRAM(S): 25 TABLET, FILM COATED ORAL at 21:56

## 2018-08-21 RX ADMIN — OXYCODONE HYDROCHLORIDE 5 MILLIGRAM(S): 5 TABLET ORAL at 02:58

## 2018-08-21 RX ADMIN — LEVETIRACETAM 250 MILLIGRAM(S): 250 TABLET, FILM COATED ORAL at 18:29

## 2018-08-21 RX ADMIN — OXYCODONE HYDROCHLORIDE 5 MILLIGRAM(S): 5 TABLET ORAL at 09:15

## 2018-08-21 RX ADMIN — DARUNAVIR 600 MILLIGRAM(S): 75 TABLET, FILM COATED ORAL at 09:33

## 2018-08-21 RX ADMIN — PANTOPRAZOLE SODIUM 40 MILLIGRAM(S): 20 TABLET, DELAYED RELEASE ORAL at 17:53

## 2018-08-21 RX ADMIN — RITONAVIR 100 MILLIGRAM(S): 100 TABLET, FILM COATED ORAL at 21:55

## 2018-08-21 RX ADMIN — OXYCODONE HYDROCHLORIDE 5 MILLIGRAM(S): 5 TABLET ORAL at 01:58

## 2018-08-21 RX ADMIN — Medication 200 MILLIGRAM(S): at 22:00

## 2018-08-21 RX ADMIN — OXYCODONE HYDROCHLORIDE 10 MILLIGRAM(S): 5 TABLET ORAL at 06:51

## 2018-08-21 RX ADMIN — PANTOPRAZOLE SODIUM 40 MILLIGRAM(S): 20 TABLET, DELAYED RELEASE ORAL at 06:54

## 2018-08-21 RX ADMIN — Medication 10 MILLIGRAM(S): at 14:12

## 2018-08-21 RX ADMIN — Medication 10 MILLIGRAM(S): at 06:53

## 2018-08-21 RX ADMIN — OXYCODONE HYDROCHLORIDE 10 MILLIGRAM(S): 5 TABLET ORAL at 17:53

## 2018-08-21 RX ADMIN — OXYCODONE HYDROCHLORIDE 5 MILLIGRAM(S): 5 TABLET ORAL at 23:15

## 2018-08-21 RX ADMIN — DARUNAVIR 600 MILLIGRAM(S): 75 TABLET, FILM COATED ORAL at 21:59

## 2018-08-21 RX ADMIN — OXYCODONE HYDROCHLORIDE 5 MILLIGRAM(S): 5 TABLET ORAL at 22:14

## 2018-08-21 RX ADMIN — HEPARIN SODIUM 5000 UNIT(S): 5000 INJECTION INTRAVENOUS; SUBCUTANEOUS at 21:59

## 2018-08-21 RX ADMIN — Medication 2 MILLIGRAM(S): at 22:11

## 2018-08-21 RX ADMIN — Medication 10 MILLIGRAM(S): at 18:29

## 2018-08-21 NOTE — PROGRESS NOTE ADULT - ASSESSMENT
28 yr old male with HIV/AIDS, ESRD on HD, seizures admitted with foot pain, after a mirror fell on his foot. Noted to have right 2-4 metatarsal fractures. Patient non compliant with medications. ID consulted, his medications were resumed. Podiatry consulted, cast applied, he is NWB on right LE, unable to use crutches given AVF and his home is not wheelchair accessible. Neurology consulted for seizure management and nephrology consulted for HD. Patient likely needs FESTUS. He had fever, cultures sent. PMR consulted, advised he needs FESTUS.     Problem/Plan - 1:  ·  Problem: Fracture of foot.  Plan: Pain control, needs FESTUS.    Problem/Plan-2:  Problem: Fever. Plan: Hold off antibiotics, follow up cultures. No symptoms at present.     Problem/Plan - 3:  ·  Problem: ESRD (end stage renal disease).  Plan: HD per schedule.      Problem/Plan - 4:  ·  Problem: HIV disease.  Plan: Continue ART.      Problem/Plan - 5:  ·  Problem: Seizure disorder.  Plan: Continue Keppra, neurology following.

## 2018-08-21 NOTE — PROGRESS NOTE ADULT - SUBJECTIVE AND OBJECTIVE BOX
:28 y/o M with extensive PMH including, HIV+. seizure, renal failure on dialysis M-W-F, blind, came to ED s/p seizure seen by podiatry for r foot injury treatment for non-displaced 2-4 metatarsal factures. Patient states he has pain in right foot but, is improving    PAST MEDICAL & SURGICAL HISTORY:  Seizure disorder  Hypertension  Diabetes  ESRD (end stage renal disease)  Seizure  Pericarditis  Anxiety  Depression  Renal failure (ARF), acute on chronic: dialysis av fistula, RUE  HTN (hypertension)  Cardiomyopathy  HIV disease: born HIV+  AV fistula  S/P tonsillectomy      MEDICATIONS  (STANDING):  morphine  - Injectable 6 milliGRAM(s) SubCutaneous Once    MEDICATIONS  (PRN):      Allergies    vancomycin (Anaphylaxis)    Intolerances        FAMILY HISTORY:  No pertinent family history in first degree relatives  No pertinent family history in first degree relatives        Medical Imaging:       PE:   GEN: Patient is a 27y well developed, well nourished Male, alert, awake and oriented to person, place and time in no acute distress.       Extremities     Left:   DP- 1/4    PT-  1/4        Right: DP- 1/4          PT- 1/4  Derm: right foot with no opening on the skin with no active signs of infection with +1 edema, no erythema,  pop dorsal aspect of the foot  Neuro: grossly intact        A/P:  27yMale presents with 2-4 met fracture       P:  pt evaluated and chart reviewed.  Pt's xrays and CT scans reviewed. Results show multiple fractures.  Pt is non WB to right foot with crutches but, due PMHx of the pt is unable to use the crutches    Pt understands to keep the foot elevated, compressed.  Pt understands that he may require surgery in the future based on CT scan findings.  Pt understands to return to ER if he experiences any n/v/c/sob/f.  Pt understands that he may develop traumatic arthritis in the area of the fractures  Pt is stable from podiatry standpoint for discharge on pain medication to rehab once medically stable.  Pt will followup Dr. Thompson for further evaluation. :28 y/o M with extensive PMH including, HIV+. seizure, renal failure on dialysis M-W-F, blind, came to ED s/p seizure seen by podiatry for r foot injury treatment for non-displaced 2-4 metatarsal factures. Patient states he has pain in right foot but, is improving    PAST MEDICAL & SURGICAL HISTORY:  Seizure disorder  Hypertension  Diabetes  ESRD (end stage renal disease)  Seizure  Pericarditis  Anxiety  Depression  Renal failure (ARF), acute on chronic: dialysis av fistula, RUE  HTN (hypertension)  Cardiomyopathy  HIV disease: born HIV+  AV fistula  S/P tonsillectomy      MEDICATIONS  (STANDING):  morphine  - Injectable 6 milliGRAM(s) SubCutaneous Once    MEDICATIONS  (PRN):      Allergies    vancomycin (Anaphylaxis)    Intolerances        FAMILY HISTORY:  No pertinent family history in first degree relatives  No pertinent family history in first degree relatives        Medical Imaging:       PE:   GEN: Patient is a 27y well developed, well nourished Male, alert, awake and oriented to person, place and time in no acute distress.       Extremities     Left:   DP- 1/4    PT-  1/4        Right: DP- 1/4          PT- 1/4  Derm: right foot with no opening on the skin with no active signs of infection with +1 edema, no erythema,  pop dorsal aspect of the foot  Neuro: grossly intact        A/P:  27yMale presents with Right foot non-displaced 2-4 met fracture       P:  pt evaluated and chart reviewed.  Pt's xrays and CT scans reviewed. Results show multiple fractures.  Pt is non WB to right foot with crutches but, due PMHx of the pt is unable to use the crutches    Pt understands to keep the foot elevated, compressed.  Pt understands that he may require surgery in the future based on CT scan findings.  Pt understands to return to ER if he experiences any n/v/c/sob/f.  Pt understands that he may develop traumatic arthritis in the area of the fractures  Pt is stable from podiatry standpoint for discharge on pain medication to rehab once medically stable.  Pt will followup Dr. Thompson for further evaluation.

## 2018-08-21 NOTE — PROGRESS NOTE ADULT - SUBJECTIVE AND OBJECTIVE BOX
INTERVAL HPI/OVERNIGHT EVENTS:    CC: ESRD on HD, HIV/AIDS, metatarsal fracture, fever    No overnight events, denies complaints.    Vital Signs Last 24 Hrs  T(C): 37.1 (21 Aug 2018 08:18), Max: 37.7 (20 Aug 2018 15:10)  T(F): 98.7 (21 Aug 2018 08:18), Max: 99.8 (20 Aug 2018 15:10)  HR: 92 (21 Aug 2018 08:18) (86 - 99)  BP: 124/84 (21 Aug 2018 08:18) (96/62 - 140/92)  BP(mean): --  RR: 18 (21 Aug 2018 08:18) (17 - 18)  SpO2: 97% (21 Aug 2018 08:18) (95% - 98%)    PHYSICAL EXAM:    GENERAL: Not in distress, alert  CHEST/LUNG: b/l air entry, clear  HEART: Regular   ABDOMEN: Soft, BS+  EXTREMITIES:  No edema, tenderness. cast right foot    MEDICATIONS  (STANDING):  atovaquone Suspension 1500 milliGRAM(s) Oral daily  chlorhexidine 2% Cloths 1 Application(s) Topical daily  chlorhexidine 4% Liquid 1 Application(s) Topical <User Schedule>  darunavir 600 milliGRAM(s) Oral every 12 hours  dolutegravir 50 milliGRAM(s) Oral two times a day  etravirine 200 milliGRAM(s) Oral two times a day after meals  fentaNYL   Patch  50 MICROgram(s)/Hr. 1 Patch Transdermal every 48 hours  folic acid 1 milliGRAM(s) Oral daily  heparin  Injectable 5000 Unit(s) SubCutaneous every 12 hours  labetalol 200 milliGRAM(s) Oral two times a day  lamiVUDine Solution 25 milliGRAM(s) Oral daily  levETIRAcetam 250 milliGRAM(s) Oral two times a day  metoclopramide 10 milliGRAM(s) Oral three times a day before meals  multivitamin 1 Tablet(s) Oral daily  oxyCODONE  ER Tablet 10 milliGRAM(s) Oral every 12 hours  pantoprazole    Tablet 40 milliGRAM(s) Oral every 12 hours  ritonavir Tablet 100 milliGRAM(s) Oral two times a day  sucralfate suspension 1 Gram(s) Oral two times a day  tenofovir disoproxil fumarate (VIREAD) 300 milliGRAM(s) Oral <User Schedule>  valGANciclovir Oral Liquid - Peds 100 milliGRAM(s) Oral <User Schedule>    MEDICATIONS  (PRN):  acetaminophen    Suspension. 650 milliGRAM(s) Oral every 6 hours PRN Mild Pain (1 - 3)  ALPRAZolam 2 milliGRAM(s) Oral daily PRN anxiety  dextrose 40% Gel 15 Gram(s) Oral once PRN Blood Glucose LESS THAN 70 milliGRAM(s)/deciliter  levETIRAcetam 500 milliGRAM(s) Oral daily PRN after dialysis  oxyCODONE    IR 5 milliGRAM(s) Oral every 4 hours PRN Moderate Pain (4 - 6)      Allergies    vancomycin (Anaphylaxis)    Intolerances    prefers vanilla or butter pecan nepro x 2 TID RDOK (Unknown)        LABS:                          9.9    4.2   )-----------( 90       ( 21 Aug 2018 07:21 )             31.2     08-21    135  |  90<L>  |  66.0<H>  ----------------------------<  94  4.6   |  28.0  |  7.47<H>    Ca    9.1      21 Aug 2018 07:21  Phos  4.8     08-21  Mg     2.7     08-21            RADIOLOGY & ADDITIONAL TESTS:

## 2018-08-21 NOTE — PROGRESS NOTE ADULT - SUBJECTIVE AND OBJECTIVE BOX
NEPHROLOGY INTERVAL HPI/OVERNIGHT EVENTS:  HPI:  28 y/o M with extensive PMH including, HIV+. seizure, renal failure on dialysis M-W-F, blind, came to ED s/p seizure at home (unwitnessed) with c/o foot pain yesterday since a mirror fell on him.  Was d/c home since prelim x rays were negative.  Official radiology reading today stated that he has a 2nd, 3rd, and 4th linear, nondisplaced fractures and was called to return to the ED.  He states that he had to walk on it yesterday and is in severe pain.  Took Oxycodone for the pain.  Took more than usual b/c he usually takes it for his chronic pain. Pt is known for non compliance with his meds. He admits to being non compliant with his HIV & seizure meds. Last HD was yesterday. No podiatry intervention planned as per them. Pt could have been d/cherri home but could not get crutches due to AVF, could not get a wheelchair as has steps at home, no now being admitted as might require FESTUS. Poor historian. No chest pain, palpitations, sob, light headedness/dizziness, difficulty breathing/cough, fevers/chills, abdominal pain, n/v, constipation, dysuria or increased urinary frequency. Admits to having watery diarrhea (non bloody) (14 Aug 2018 15:44)    Follow up ESRD    PAST MEDICAL & SURGICAL HISTORY:  Seizure disorder  Hypertension  Diabetes  ESRD (end stage renal disease)  Seizure  Pericarditis  Anxiety  Depression  Renal failure (ARF), acute on chronic: dialysis av fistula, RUE  HTN (hypertension)  Cardiomyopathy  HIV disease: born HIV+  AV fistula  S/P tonsillectomy      MEDICATIONS  (STANDING):  atovaquone Suspension 1500 milliGRAM(s) Oral daily  chlorhexidine 2% Cloths 1 Application(s) Topical daily  chlorhexidine 4% Liquid 1 Application(s) Topical <User Schedule>  darunavir 600 milliGRAM(s) Oral every 12 hours  dolutegravir 50 milliGRAM(s) Oral two times a day  etravirine 200 milliGRAM(s) Oral two times a day after meals  fentaNYL   Patch  50 MICROgram(s)/Hr. 1 Patch Transdermal every 48 hours  folic acid 1 milliGRAM(s) Oral daily  heparin  Injectable 5000 Unit(s) SubCutaneous every 12 hours  labetalol 200 milliGRAM(s) Oral two times a day  lamiVUDine Solution 25 milliGRAM(s) Oral daily  levETIRAcetam 250 milliGRAM(s) Oral two times a day  metoclopramide 10 milliGRAM(s) Oral three times a day before meals  multivitamin 1 Tablet(s) Oral daily  oxyCODONE  ER Tablet 10 milliGRAM(s) Oral every 12 hours  pantoprazole    Tablet 40 milliGRAM(s) Oral every 12 hours  ritonavir Tablet 100 milliGRAM(s) Oral two times a day  sucralfate suspension 1 Gram(s) Oral two times a day  tenofovir disoproxil fumarate (VIREAD) 300 milliGRAM(s) Oral <User Schedule>  valGANciclovir Oral Liquid - Peds 100 milliGRAM(s) Oral <User Schedule>    MEDICATIONS  (PRN):  acetaminophen    Suspension. 650 milliGRAM(s) Oral every 6 hours PRN Mild Pain (1 - 3)  ALPRAZolam 2 milliGRAM(s) Oral daily PRN anxiety  dextrose 40% Gel 15 Gram(s) Oral once PRN Blood Glucose LESS THAN 70 milliGRAM(s)/deciliter  levETIRAcetam 500 milliGRAM(s) Oral daily PRN after dialysis  oxyCODONE    IR 5 milliGRAM(s) Oral every 4 hours PRN Moderate Pain (4 - 6)      Allergies    vancomycin (Anaphylaxis)    Intolerances    prefers vanilla or butter pecan nepro x 2 TID RDOK (Unknown)      Vital Signs Last 24 Hrs  T(C): 37.6 (20 Aug 2018 14:07), Max: 38.8 (20 Aug 2018 00:26)  T(F): 99.7 (20 Aug 2018 14:07), Max: 101.8 (20 Aug 2018 00:26)  HR: 96 (20 Aug 2018 14:07) (62 - 121)  BP: 112/70 (20 Aug 2018 14:07) (100/63 - 133/84)  BP(mean): --  RR: 18 (20 Aug 2018 14:07) (16 - 20)  SpO2: 97% (20 Aug 2018 14:07) (94% - 99%)  Daily     Daily Weight in k (20 Aug 2018 11:10)    PHYSICAL EXAM:  GENERAL: ill appearing  HEAD:  Atraumatic, Normocephalic  EYES: Conjunctiva and sclera clear  ENMT: Moist mucous membranes, Good dentition, No lesions  NECK: Supple, No JVD, Normal thyroid  NERVOUS SYSTEM:  Alert & Oriented X3, Good concentration; Motor Strength 5/5 B/L upper and lower extremities; DTRs 2+ intact and symmetric  CHEST/LUNG: Clear to percussion bilaterally; No rales, rhonchi, wheezing, or rubs  HEART: Regular rate and rhythm; No murmurs, rubs, or gallops  ABDOMEN: Soft, Nontender, Nondistended; Bowel sounds present  EXTREMITIES:  2+ Peripheral Pulses, No clubbing, cyanosis, or edema; +Hypertrophic AVF to RUE  SKIN: No rashes or lesions    LABS:  Reviewed

## 2018-08-22 DIAGNOSIS — F43.21 ADJUSTMENT DISORDER WITH DEPRESSED MOOD: ICD-10-CM

## 2018-08-22 PROCEDURE — 99232 SBSQ HOSP IP/OBS MODERATE 35: CPT

## 2018-08-22 PROCEDURE — 99223 1ST HOSP IP/OBS HIGH 75: CPT

## 2018-08-22 RX ORDER — HYDROMORPHONE HYDROCHLORIDE 2 MG/ML
1 INJECTION INTRAMUSCULAR; INTRAVENOUS; SUBCUTANEOUS ONCE
Qty: 0 | Refills: 0 | Status: DISCONTINUED | OUTPATIENT
Start: 2018-08-22 | End: 2018-08-22

## 2018-08-22 RX ORDER — ONDANSETRON 8 MG/1
4 TABLET, FILM COATED ORAL EVERY 6 HOURS
Qty: 0 | Refills: 0 | Status: DISCONTINUED | OUTPATIENT
Start: 2018-08-22 | End: 2018-08-23

## 2018-08-22 RX ORDER — DIPHENHYDRAMINE HCL 50 MG
25 CAPSULE ORAL ONCE
Qty: 0 | Refills: 0 | Status: COMPLETED | OUTPATIENT
Start: 2018-08-22 | End: 2018-08-22

## 2018-08-22 RX ORDER — MUPIROCIN 20 MG/G
1 OINTMENT TOPICAL
Qty: 0 | Refills: 0 | Status: DISCONTINUED | OUTPATIENT
Start: 2018-08-22 | End: 2018-08-23

## 2018-08-22 RX ORDER — HYDROMORPHONE HYDROCHLORIDE 2 MG/ML
2 INJECTION INTRAMUSCULAR; INTRAVENOUS; SUBCUTANEOUS ONCE
Qty: 0 | Refills: 0 | Status: DISCONTINUED | OUTPATIENT
Start: 2018-08-22 | End: 2018-08-22

## 2018-08-22 RX ORDER — FENTANYL CITRATE 50 UG/ML
1 INJECTION INTRAVENOUS
Qty: 0 | Refills: 0 | Status: DISCONTINUED | OUTPATIENT
Start: 2018-08-22 | End: 2018-08-23

## 2018-08-22 RX ORDER — OXYCODONE HYDROCHLORIDE 5 MG/1
10 TABLET ORAL EVERY 12 HOURS
Qty: 0 | Refills: 0 | Status: DISCONTINUED | OUTPATIENT
Start: 2018-08-22 | End: 2018-08-23

## 2018-08-22 RX ORDER — ALPRAZOLAM 0.25 MG
2 TABLET ORAL DAILY
Qty: 0 | Refills: 0 | Status: DISCONTINUED | OUTPATIENT
Start: 2018-08-22 | End: 2018-08-23

## 2018-08-22 RX ORDER — DIPHENHYDRAMINE HCL 50 MG
50 CAPSULE ORAL ONCE
Qty: 0 | Refills: 0 | Status: COMPLETED | OUTPATIENT
Start: 2018-08-22 | End: 2018-08-22

## 2018-08-22 RX ADMIN — OXYCODONE HYDROCHLORIDE 5 MILLIGRAM(S): 5 TABLET ORAL at 04:15

## 2018-08-22 RX ADMIN — FENTANYL CITRATE 1 PATCH: 50 INJECTION INTRAVENOUS at 20:33

## 2018-08-22 RX ADMIN — ETRAVIRINE 200 MILLIGRAM(S): 200 TABLET ORAL at 20:32

## 2018-08-22 RX ADMIN — Medication 25 MILLIGRAM(S): at 17:11

## 2018-08-22 RX ADMIN — Medication 50 MILLIGRAM(S): at 15:08

## 2018-08-22 RX ADMIN — DOLUTEGRAVIR SODIUM 50 MILLIGRAM(S): 25 TABLET, FILM COATED ORAL at 20:31

## 2018-08-22 RX ADMIN — PANTOPRAZOLE SODIUM 40 MILLIGRAM(S): 20 TABLET, DELAYED RELEASE ORAL at 20:33

## 2018-08-22 RX ADMIN — Medication 10 MILLIGRAM(S): at 09:49

## 2018-08-22 RX ADMIN — LEVETIRACETAM 250 MILLIGRAM(S): 250 TABLET, FILM COATED ORAL at 21:21

## 2018-08-22 RX ADMIN — TENOFOVIR DISOPROXIL FUMARATE 300 MILLIGRAM(S): 300 TABLET, FILM COATED ORAL at 20:32

## 2018-08-22 RX ADMIN — FENTANYL CITRATE 1 PATCH: 50 INJECTION INTRAVENOUS at 19:20

## 2018-08-22 RX ADMIN — OXYCODONE HYDROCHLORIDE 10 MILLIGRAM(S): 5 TABLET ORAL at 20:33

## 2018-08-22 RX ADMIN — DARUNAVIR 600 MILLIGRAM(S): 75 TABLET, FILM COATED ORAL at 20:31

## 2018-08-22 RX ADMIN — HYDROMORPHONE HYDROCHLORIDE 1 MILLIGRAM(S): 2 INJECTION INTRAMUSCULAR; INTRAVENOUS; SUBCUTANEOUS at 13:06

## 2018-08-22 RX ADMIN — Medication 10 MILLIGRAM(S): at 11:46

## 2018-08-22 RX ADMIN — RITONAVIR 100 MILLIGRAM(S): 100 TABLET, FILM COATED ORAL at 20:32

## 2018-08-22 RX ADMIN — Medication 10 MILLIGRAM(S): at 20:31

## 2018-08-22 RX ADMIN — ETRAVIRINE 200 MILLIGRAM(S): 200 TABLET ORAL at 09:49

## 2018-08-22 RX ADMIN — OXYCODONE HYDROCHLORIDE 10 MILLIGRAM(S): 5 TABLET ORAL at 21:33

## 2018-08-22 RX ADMIN — HYDROMORPHONE HYDROCHLORIDE 2 MILLIGRAM(S): 2 INJECTION INTRAMUSCULAR; INTRAVENOUS; SUBCUTANEOUS at 15:44

## 2018-08-22 RX ADMIN — OXYCODONE HYDROCHLORIDE 5 MILLIGRAM(S): 5 TABLET ORAL at 03:17

## 2018-08-22 RX ADMIN — HYDROMORPHONE HYDROCHLORIDE 1 MILLIGRAM(S): 2 INJECTION INTRAMUSCULAR; INTRAVENOUS; SUBCUTANEOUS at 13:21

## 2018-08-22 RX ADMIN — HYDROMORPHONE HYDROCHLORIDE 2 MILLIGRAM(S): 2 INJECTION INTRAMUSCULAR; INTRAVENOUS; SUBCUTANEOUS at 15:12

## 2018-08-22 RX ADMIN — DOLUTEGRAVIR SODIUM 50 MILLIGRAM(S): 25 TABLET, FILM COATED ORAL at 09:48

## 2018-08-22 NOTE — BEHAVIORAL HEALTH ASSESSMENT NOTE - NSBHCHARTREVIEWVS_PSY_A_CORE FT
ICU Vital Signs Last 24 Hrs  T(C): 37.2 (22 Aug 2018 14:35), Max: 37.2 (22 Aug 2018 14:35)  T(F): 98.9 (22 Aug 2018 14:35), Max: 98.9 (22 Aug 2018 14:35)  HR: 91 (22 Aug 2018 14:35) (81 - 100)  BP: 146/97 (22 Aug 2018 14:35) (121/86 - 146/97)  BP(mean): 88 (21 Aug 2018 15:56) (88 - 88)  ABP: --  ABP(mean): --  RR: 18 (22 Aug 2018 14:35) (18 - 18)  SpO2: 100% (22 Aug 2018 14:35) (94% - 100%)

## 2018-08-22 NOTE — BEHAVIORAL HEALTH ASSESSMENT NOTE - NSBHCHARTREVIEWLAB_PSY_A_CORE FT
9.9    4.2   )-----------( 90       ( 21 Aug 2018 07:21 )             31.2     08-21    135  |  90<L>  |  66.0<H>  ----------------------------<  94  4.6   |  28.0  |  7.47<H>    Ca    9.1      21 Aug 2018 07:21  Phos  4.8     08-21  Mg     2.7     08-21

## 2018-08-22 NOTE — CHART NOTE - NSCHARTNOTEFT_GEN_A_CORE
Source: Patient     Current Diet: Renal with Nepro TID    PO intake:  75%    Current Weight:   (8/20) 64kg  (8/17) 62kg    % Weight Change     Pertinent Medications: MEDICATIONS  (STANDING):  atovaquone Suspension 1500 milliGRAM(s) Oral daily  chlorhexidine 2% Cloths 1 Application(s) Topical daily  chlorhexidine 4% Liquid 1 Application(s) Topical <User Schedule>  darunavir 600 milliGRAM(s) Oral every 12 hours  diphenhydrAMINE   Injectable 50 milliGRAM(s) IV Push once  diphenhydrAMINE   Injectable 25 milliGRAM(s) IV Push once  dolutegravir 50 milliGRAM(s) Oral two times a day  etravirine 200 milliGRAM(s) Oral two times a day after meals  fentaNYL   Patch 100 MICROgram(s)/Hr 1 Patch Transdermal every 72 hours  folic acid 1 milliGRAM(s) Oral daily  heparin  Injectable 5000 Unit(s) SubCutaneous every 12 hours  HYDROmorphone  Injectable 2 milliGRAM(s) IV Push once  labetalol 200 milliGRAM(s) Oral two times a day  lamiVUDine Solution 25 milliGRAM(s) Oral daily  levETIRAcetam 250 milliGRAM(s) Oral two times a day  metoclopramide 10 milliGRAM(s) Oral three times a day before meals  multivitamin 1 Tablet(s) Oral daily  mupirocin 2% Nasal 1 Application(s) Nasal two times a day  oxyCODONE  ER Tablet 10 milliGRAM(s) Oral every 12 hours  pantoprazole    Tablet 40 milliGRAM(s) Oral every 12 hours  ritonavir Tablet 100 milliGRAM(s) Oral two times a day  sucralfate suspension 1 Gram(s) Oral two times a day  tenofovir disoproxil fumarate (VIREAD) 300 milliGRAM(s) Oral <User Schedule>  valGANciclovir Oral Liquid - Peds 100 milliGRAM(s) Oral <User Schedule>    MEDICATIONS  (PRN):  acetaminophen    Suspension. 650 milliGRAM(s) Oral every 6 hours PRN Mild Pain (1 - 3)  ALPRAZolam 2 milliGRAM(s) Oral daily PRN anxiety  dextrose 40% Gel 15 Gram(s) Oral once PRN Blood Glucose LESS THAN 70 milliGRAM(s)/deciliter  levETIRAcetam 500 milliGRAM(s) Oral daily PRN after dialysis  ondansetron Injectable 4 milliGRAM(s) IV Push every 6 hours PRN Nausea and/or Vomiting  oxyCODONE    IR 5 milliGRAM(s) Oral every 4 hours PRN Moderate Pain (4 - 6)    Pertinent Labs: CBC Full  -  ( 21 Aug 2018 07:21 )  WBC Count : 4.2 K/uL  Hemoglobin : 9.9 g/dL  Hematocrit : 31.2 %  Platelet Count - Automated : 90 K/uL  Mean Cell Volume : 93.7 fl  Mean Cell Hemoglobin : 29.7 pg  Mean Cell Hemoglobin Concentration : 31.7 g/dL  BUN 66, Cr 7.47, Phos 4.8    Skin: Intact     Nutrition focused physical exam conducted - found signs of malnutrition [x ]absent [ ]present    Subcutaneous fat loss: [ ] Orbital fat pads region, [ ]Buccal fat region, [ ]Triceps region,  [ ]Ribs region    Muscle wasting: [ ]Temples region, [ ]Clavicle region, [ ]Shoulder region, [ ]Scapula region, [ ]Interosseous region,  [ ]thigh region, [ ]Calf region    Estimated Needs:   [ x] no change since previous assessment  [ ] recalculated:     Current Nutrition Diagnosis: Pt continues with Malnutrition; chronic-moderate Related to inadequate energy intake with increased needs in setting of HIV+ and ESRD on HD as evidenced by increased protein losses via HD, physical signs of muscle mass loss in shoulder, knee      Recommendations: Continue current diet and supplement, offer food preferences as able within diet limitations    Monitoring and Evaluation:   [x ] PO intake [x ] Tolerance to diet prescription [X] Weights  [X] Follow up per protocol [X] Labs:

## 2018-08-22 NOTE — PROGRESS NOTE ADULT - SUBJECTIVE AND OBJECTIVE BOX
NEPHROLOGY INTERVAL HPI/OVERNIGHT EVENTS:  HPI:  28 y/o M with extensive PMH including, HIV+. seizure, renal failure on dialysis M-W-F, blind, came to ED s/p seizure at home (unwitnessed) with c/o foot pain yesterday since a mirror fell on him.  Was d/c home since prelim x rays were negative.  Official radiology reading today stated that he has a 2nd, 3rd, and 4th linear, nondisplaced fractures and was called to return to the ED.  He states that he had to walk on it yesterday and is in severe pain.  Took Oxycodone for the pain.  Took more than usual b/c he usually takes it for his chronic pain. Pt is known for non compliance with his meds. He admits to being non compliant with his HIV & seizure meds. Last HD was yesterday. No podiatry intervention planned as per them. Pt could have been d/cherri home but could not get crutches due to AVF, could not get a wheelchair as has steps at home, no now being admitted as might require FESTUS. Poor historian. No chest pain, palpitations, sob, light headedness/dizziness, difficulty breathing/cough, fevers/chills, abdominal pain, n/v, constipation, dysuria or increased urinary frequency. Admits to having watery diarrhea (non bloody) (14 Aug 2018 15:44)    Follow up ESRD  No new complaints    PAST MEDICAL & SURGICAL HISTORY:  Seizure disorder  Hypertension  Diabetes  ESRD (end stage renal disease)  Seizure  Pericarditis  Anxiety  Depression  Renal failure (ARF), acute on chronic: dialysis av fistula, RUE  HTN (hypertension)  Cardiomyopathy  HIV disease: born HIV+  AV fistula  S/P tonsillectomy      MEDICATIONS  (STANDING):  atovaquone Suspension 1500 milliGRAM(s) Oral daily  chlorhexidine 2% Cloths 1 Application(s) Topical daily  chlorhexidine 4% Liquid 1 Application(s) Topical <User Schedule>  darunavir 600 milliGRAM(s) Oral every 12 hours  diphenhydrAMINE   Injectable 50 milliGRAM(s) IV Push once  diphenhydrAMINE   Injectable 25 milliGRAM(s) IV Push once  dolutegravir 50 milliGRAM(s) Oral two times a day  etravirine 200 milliGRAM(s) Oral two times a day after meals  fentaNYL   Patch  50 MICROgram(s)/Hr. 1 Patch Transdermal every 48 hours  folic acid 1 milliGRAM(s) Oral daily  heparin  Injectable 5000 Unit(s) SubCutaneous every 12 hours  HYDROmorphone  Injectable 2 milliGRAM(s) IV Push once  labetalol 200 milliGRAM(s) Oral two times a day  lamiVUDine Solution 25 milliGRAM(s) Oral daily  levETIRAcetam 250 milliGRAM(s) Oral two times a day  metoclopramide 10 milliGRAM(s) Oral three times a day before meals  multivitamin 1 Tablet(s) Oral daily  oxyCODONE  ER Tablet 10 milliGRAM(s) Oral every 12 hours  pantoprazole    Tablet 40 milliGRAM(s) Oral every 12 hours  ritonavir Tablet 100 milliGRAM(s) Oral two times a day  sucralfate suspension 1 Gram(s) Oral two times a day  tenofovir disoproxil fumarate (VIREAD) 300 milliGRAM(s) Oral <User Schedule>  valGANciclovir Oral Liquid - Peds 100 milliGRAM(s) Oral <User Schedule>    MEDICATIONS  (PRN):  acetaminophen    Suspension. 650 milliGRAM(s) Oral every 6 hours PRN Mild Pain (1 - 3)  ALPRAZolam 2 milliGRAM(s) Oral daily PRN anxiety  dextrose 40% Gel 15 Gram(s) Oral once PRN Blood Glucose LESS THAN 70 milliGRAM(s)/deciliter  levETIRAcetam 500 milliGRAM(s) Oral daily PRN after dialysis  oxyCODONE    IR 5 milliGRAM(s) Oral every 4 hours PRN Moderate Pain (4 - 6)      Allergies    vancomycin (Anaphylaxis)    Intolerances    prefers vanilla or butter pecan nepro x 2 TID RDOK (Unknown)      Vital Signs Last 24 Hrs  T(C): 36.7 (21 Aug 2018 23:59), Max: 37.1 (21 Aug 2018 06:43)  T(F): 98 (21 Aug 2018 23:59), Max: 98.8 (21 Aug 2018 06:43)  HR: 85 (22 Aug 2018 05:59) (85 - 98)  BP: 142/89 (22 Aug 2018 05:59) (121/86 - 142/89)  BP(mean): 88 (21 Aug 2018 15:56) (88 - 88)  RR: 18 (22 Aug 2018 05:59) (18 - 18)  SpO2: 97% (22 Aug 2018 05:59) (94% - 97%)  Daily     Daily     PHYSICAL EXAM:  GENERAL: ill appearing, no distress  HEAD:  Atraumatic, Normocephalic  EYES: Conjunctiva and sclera clear  ENMT: Moist mucous membranes  NECK: Supple, No JVD  NERVOUS SYSTEM:  Alert & Oriented X3, Moves extremities  CHEST/LUNG: Few scattered rhonchi B/L  HEART: Regular rate and rhythm; No murmurs, rubs, or gallops  ABDOMEN: Soft, Nontender, Nondistended; Bowel sounds present  EXTREMITIES:  2+ Peripheral Pulses, No clubbing, cyanosis, or edema; +Hypertrophic AVF to RUE      LABS:                        9.9    4.2   )-----------( 90       ( 21 Aug 2018 07:21 )             31.2     08-21    135  |  90<L>  |  66.0<H>  ----------------------------<  94  4.6   |  28.0  |  7.47<H>    Ca    9.1      21 Aug 2018 07:21  Phos  4.8     08-21  Mg     2.7     08-21          Magnesium, Serum: 2.7 mg/dL (08-21 @ 07:21)  Phosphorus Level, Serum: 4.8 mg/dL (08-21 @ 07:21)        RADIOLOGY & ADDITIONAL TESTS:

## 2018-08-22 NOTE — PROGRESS NOTE ADULT - ASSESSMENT
28 yr old male with HIV/AIDS, ESRD on HD, seizures admitted with foot pain, after a mirror fell on his foot. Noted to have right 2-4 metatarsal fractures. Patient non compliant with medications. ID consulted, his medications were resumed. Podiatry consulted, cast applied, he is NWB on right LE, unable to use crutches given AVF and his home is not wheelchair accessible. Neurology consulted for seizure management and nephrology consulted for HD. Patient likely needs FESTUS. He had fever, cultures sent. PMR consulted, advised he needs FESTUS.  Patient expressing suicidal thoughts.    Problem/Plan-1:   Problem: Suicidal ideation. Plan: Psych consult. 1:1 in place for patient safety     Problem/Plan - 2:  ·  Problem: Fracture of foot.  Plan: Pain control, needs FESTUS. Pain medications reviewed and optimized. ISTOP performed.     Problem/Plan-3:  Problem: Fever. Plan: Hold off antibiotics, follow up cultures. No symptoms at present.     Problem/Plan - 4:  ·  Problem: ESRD (end stage renal disease).  Plan: HD per schedule.      Problem/Plan - 5:  ·  Problem: HIV disease.  Plan: Continue ART.      Problem/Plan - 6:  ·  Problem: Seizure disorder.  Plan: Continue Keppra

## 2018-08-22 NOTE — PROGRESS NOTE ADULT - ASSESSMENT
ESRD on HD MWF  HTN  Anemia  SHELLEY  HIV  Metatarsal fracture   Seizure     HD MWF. Proceed with dialysis today 8/22/18 as ordered; benadryl and dilaudid ordered with dialysis  Procrit/Aranesp if Hgb<10  Neuro follow up noted  Renal diet    Thank you

## 2018-08-22 NOTE — BEHAVIORAL HEALTH ASSESSMENT NOTE - RISK ASSESSMENT
Low-pt with chronic risk factors which include multiple medical problems, pain, however denies any S/H I/I/P, has maintained good behavioral control, remains future oriented and compliant with tx, has no prior h/o suicide attempt, has good support, denies high psychic anxiety, has not been aggressive or agitated and made statement out of frustration

## 2018-08-22 NOTE — PROGRESS NOTE ADULT - SUBJECTIVE AND OBJECTIVE BOX
CC: foot fracture    INTERVAL HPI/OVERNIGHT EVENTS: This am patient stated that he wanted to kill himself. 1:1 in place for patient safety. Wants increase in pain medication. ISTOP performed, patient medications reviewed and optimized. Continues to complain of pain at AVF site and right foot.      Vital Signs Last 24 Hrs  T(C): 37.1 (22 Aug 2018 07:25), Max: 37.1 (22 Aug 2018 07:25)  T(F): 98.7 (22 Aug 2018 07:25), Max: 98.7 (22 Aug 2018 07:25)  HR: 81 (22 Aug 2018 10:58) (81 - 98)  BP: 122/86 (22 Aug 2018 10:58) (121/86 - 142/89)  BP(mean): 88 (21 Aug 2018 15:56) (88 - 88)  RR: 18 (22 Aug 2018 10:58) (18 - 18)  SpO2: 97% (22 Aug 2018 10:58) (94% - 97%)    PHYSICAL EXAM:    GENERAL: Not in distress  LUNGS: b/l air entry  HEART: Regular  ABDOMEN: Soft, BS+  EXTREMITIES: Right foot dressing C/D/I    I&O's Detail                                9.9    4.2   )-----------( 90       ( 21 Aug 2018 07:21 )             31.2     21 Aug 2018 07:21    135    |  90     |  66.0   ----------------------------<  94     4.6     |  28.0   |  7.47     Ca    9.1        21 Aug 2018 07:21  Phos  4.8       21 Aug 2018 07:21  Mg     2.7       21 Aug 2018 07:21        CAPILLARY BLOOD GLUCOSE              MEDICATIONS  (STANDING):  atovaquone Suspension 1500 milliGRAM(s) Oral daily  chlorhexidine 2% Cloths 1 Application(s) Topical daily  chlorhexidine 4% Liquid 1 Application(s) Topical <User Schedule>  darunavir 600 milliGRAM(s) Oral every 12 hours  diphenhydrAMINE   Injectable 50 milliGRAM(s) IV Push once  diphenhydrAMINE   Injectable 25 milliGRAM(s) IV Push once  dolutegravir 50 milliGRAM(s) Oral two times a day  etravirine 200 milliGRAM(s) Oral two times a day after meals  fentaNYL   Patch 100 MICROgram(s)/Hr 1 Patch Transdermal every 72 hours  folic acid 1 milliGRAM(s) Oral daily  heparin  Injectable 5000 Unit(s) SubCutaneous every 12 hours  HYDROmorphone  Injectable 2 milliGRAM(s) IV Push once  labetalol 200 milliGRAM(s) Oral two times a day  lamiVUDine Solution 25 milliGRAM(s) Oral daily  levETIRAcetam 250 milliGRAM(s) Oral two times a day  metoclopramide 10 milliGRAM(s) Oral three times a day before meals  multivitamin 1 Tablet(s) Oral daily  mupirocin 2% Nasal 1 Application(s) Nasal two times a day  oxyCODONE  ER Tablet 10 milliGRAM(s) Oral every 12 hours  pantoprazole    Tablet 40 milliGRAM(s) Oral every 12 hours  ritonavir Tablet 100 milliGRAM(s) Oral two times a day  sucralfate suspension 1 Gram(s) Oral two times a day  tenofovir disoproxil fumarate (VIREAD) 300 milliGRAM(s) Oral <User Schedule>  valGANciclovir Oral Liquid - Peds 100 milliGRAM(s) Oral <User Schedule>    MEDICATIONS  (PRN):  acetaminophen    Suspension. 650 milliGRAM(s) Oral every 6 hours PRN Mild Pain (1 - 3)  ALPRAZolam 2 milliGRAM(s) Oral daily PRN anxiety  dextrose 40% Gel 15 Gram(s) Oral once PRN Blood Glucose LESS THAN 70 milliGRAM(s)/deciliter  levETIRAcetam 500 milliGRAM(s) Oral daily PRN after dialysis  ondansetron Injectable 4 milliGRAM(s) IV Push every 6 hours PRN Nausea and/or Vomiting  oxyCODONE    IR 5 milliGRAM(s) Oral every 4 hours PRN Moderate Pain (4 - 6)      RADIOLOGY & ADDITIONAL TESTS:

## 2018-08-22 NOTE — BEHAVIORAL HEALTH ASSESSMENT NOTE - SUMMARY
Patient is a  28 year old, AA male; domiciled with adoptive mother;  single; noncaregiver; unemployed; past psychiatric history of depression, currently followed by psychiatric NP, Cat Damian; no psychiatric  hospitalizations; no known suicide attempts; no known history of violence or arrests; ; PMH of/AIDS, ESRD on HD, seizures admitted with foot pain, after a mirror fell on his foot. Noted to have right 2-4 metatarsal fractures. Today, patient insinutated that he wanted to die. He was placed on 1:1 and psychiatric evaluation was requested.  Patient denies any S/H I/I/P, has full affect, reported made statement out of frustration with pain and fever. Reports stressors related to medical problems.  He did not engage in any dangerous or impulsive actions.  He was interactive and sociable with staff. Reports some flutuating depressed mood, no psychosis or manic sx's.

## 2018-08-23 VITALS
TEMPERATURE: 98 F | HEART RATE: 95 BPM | DIASTOLIC BLOOD PRESSURE: 88 MMHG | SYSTOLIC BLOOD PRESSURE: 128 MMHG | RESPIRATION RATE: 18 BRPM

## 2018-08-23 PROCEDURE — 87536 HIV-1 QUANT&REVRSE TRNSCRPJ: CPT

## 2018-08-23 PROCEDURE — 99285 EMERGENCY DEPT VISIT HI MDM: CPT | Mod: 25

## 2018-08-23 PROCEDURE — 70450 CT HEAD/BRAIN W/O DYE: CPT

## 2018-08-23 PROCEDURE — 36415 COLL VENOUS BLD VENIPUNCTURE: CPT

## 2018-08-23 PROCEDURE — 80074 ACUTE HEPATITIS PANEL: CPT

## 2018-08-23 PROCEDURE — 83735 ASSAY OF MAGNESIUM: CPT

## 2018-08-23 PROCEDURE — 86360 T CELL ABSOLUTE COUNT/RATIO: CPT

## 2018-08-23 PROCEDURE — 80053 COMPREHEN METABOLIC PANEL: CPT

## 2018-08-23 PROCEDURE — 97530 THERAPEUTIC ACTIVITIES: CPT

## 2018-08-23 PROCEDURE — 99261: CPT

## 2018-08-23 PROCEDURE — 87641 MR-STAPH DNA AMP PROBE: CPT

## 2018-08-23 PROCEDURE — 80048 BASIC METABOLIC PNL TOTAL CA: CPT

## 2018-08-23 PROCEDURE — 83605 ASSAY OF LACTIC ACID: CPT

## 2018-08-23 PROCEDURE — 71045 X-RAY EXAM CHEST 1 VIEW: CPT

## 2018-08-23 PROCEDURE — 85027 COMPLETE CBC AUTOMATED: CPT

## 2018-08-23 PROCEDURE — 97116 GAIT TRAINING THERAPY: CPT

## 2018-08-23 PROCEDURE — 97110 THERAPEUTIC EXERCISES: CPT

## 2018-08-23 PROCEDURE — 96372 THER/PROPH/DIAG INJ SC/IM: CPT

## 2018-08-23 PROCEDURE — 84146 ASSAY OF PROLACTIN: CPT

## 2018-08-23 PROCEDURE — 83036 HEMOGLOBIN GLYCOSYLATED A1C: CPT

## 2018-08-23 PROCEDURE — 84145 PROCALCITONIN (PCT): CPT

## 2018-08-23 PROCEDURE — 73630 X-RAY EXAM OF FOOT: CPT

## 2018-08-23 PROCEDURE — 84100 ASSAY OF PHOSPHORUS: CPT

## 2018-08-23 PROCEDURE — 82962 GLUCOSE BLOOD TEST: CPT

## 2018-08-23 PROCEDURE — 87640 STAPH A DNA AMP PROBE: CPT

## 2018-08-23 PROCEDURE — 97163 PT EVAL HIGH COMPLEX 45 MIN: CPT

## 2018-08-23 PROCEDURE — 87538 HIV-2 PROBE&REVRSE TRNSCRIPJ: CPT

## 2018-08-23 PROCEDURE — 73700 CT LOWER EXTREMITY W/O DYE: CPT

## 2018-08-23 PROCEDURE — 82550 ASSAY OF CK (CPK): CPT

## 2018-08-23 PROCEDURE — 87040 BLOOD CULTURE FOR BACTERIA: CPT

## 2018-08-23 PROCEDURE — 99239 HOSP IP/OBS DSCHRG MGMT >30: CPT

## 2018-08-23 PROCEDURE — 82553 CREATINE MB FRACTION: CPT

## 2018-08-23 RX ORDER — MUPIROCIN 20 MG/G
1 OINTMENT TOPICAL
Qty: 0 | Refills: 0 | COMMUNITY
Start: 2018-08-23

## 2018-08-23 RX ORDER — FENTANYL CITRATE 50 UG/ML
1 INJECTION INTRAVENOUS
Qty: 0 | Refills: 0 | COMMUNITY
Start: 2018-08-23

## 2018-08-23 RX ORDER — OXYCODONE HYDROCHLORIDE 5 MG/1
1 TABLET ORAL
Qty: 0 | Refills: 0 | COMMUNITY
Start: 2018-08-23

## 2018-08-23 RX ORDER — ACETAMINOPHEN 500 MG
20.31 TABLET ORAL
Qty: 0 | Refills: 0 | COMMUNITY
Start: 2018-08-23

## 2018-08-23 RX ADMIN — CHLORHEXIDINE GLUCONATE 1 APPLICATION(S): 213 SOLUTION TOPICAL at 06:56

## 2018-08-23 RX ADMIN — RITONAVIR 100 MILLIGRAM(S): 100 TABLET, FILM COATED ORAL at 11:02

## 2018-08-23 RX ADMIN — OXYCODONE HYDROCHLORIDE 10 MILLIGRAM(S): 5 TABLET ORAL at 06:26

## 2018-08-23 RX ADMIN — DARUNAVIR 600 MILLIGRAM(S): 75 TABLET, FILM COATED ORAL at 11:02

## 2018-08-23 RX ADMIN — Medication 2 MILLIGRAM(S): at 06:26

## 2018-08-23 RX ADMIN — MUPIROCIN 1 APPLICATION(S): 20 OINTMENT TOPICAL at 06:26

## 2018-08-23 RX ADMIN — HEPARIN SODIUM 5000 UNIT(S): 5000 INJECTION INTRAVENOUS; SUBCUTANEOUS at 11:03

## 2018-08-23 RX ADMIN — Medication 10 MILLIGRAM(S): at 06:26

## 2018-08-23 RX ADMIN — CHLORHEXIDINE GLUCONATE 1 APPLICATION(S): 213 SOLUTION TOPICAL at 15:07

## 2018-08-23 RX ADMIN — OXYCODONE HYDROCHLORIDE 5 MILLIGRAM(S): 5 TABLET ORAL at 11:48

## 2018-08-23 RX ADMIN — OXYCODONE HYDROCHLORIDE 5 MILLIGRAM(S): 5 TABLET ORAL at 03:55

## 2018-08-23 RX ADMIN — OXYCODONE HYDROCHLORIDE 5 MILLIGRAM(S): 5 TABLET ORAL at 17:19

## 2018-08-23 RX ADMIN — Medication 1 MILLIGRAM(S): at 11:02

## 2018-08-23 RX ADMIN — DOLUTEGRAVIR SODIUM 50 MILLIGRAM(S): 25 TABLET, FILM COATED ORAL at 11:02

## 2018-08-23 RX ADMIN — Medication 200 MILLIGRAM(S): at 06:26

## 2018-08-23 RX ADMIN — Medication 1 TABLET(S): at 11:03

## 2018-08-23 RX ADMIN — PANTOPRAZOLE SODIUM 40 MILLIGRAM(S): 20 TABLET, DELAYED RELEASE ORAL at 06:26

## 2018-08-23 RX ADMIN — LEVETIRACETAM 250 MILLIGRAM(S): 250 TABLET, FILM COATED ORAL at 06:26

## 2018-08-23 RX ADMIN — ETRAVIRINE 200 MILLIGRAM(S): 200 TABLET ORAL at 11:02

## 2018-08-23 RX ADMIN — Medication 10 MILLIGRAM(S): at 11:02

## 2018-08-23 NOTE — PROGRESS NOTE ADULT - SUBJECTIVE AND OBJECTIVE BOX
NEPHROLOGY INTERVAL HPI/OVERNIGHT EVENTS:  HPI:  26 y/o M with extensive PMH including, HIV+. seizure, renal failure on dialysis M-W-F, blind, came to ED s/p seizure at home (unwitnessed) with c/o foot pain yesterday since a mirror fell on him.  Was d/c home since prelim x rays were negative.  Official radiology reading today stated that he has a 2nd, 3rd, and 4th linear, nondisplaced fractures and was called to return to the ED.  He states that he had to walk on it yesterday and is in severe pain.  Took Oxycodone for the pain.  Took more than usual b/c he usually takes it for his chronic pain. Pt is known for non compliance with his meds. He admits to being non compliant with his HIV & seizure meds. Last HD was yesterday. No podiatry intervention planned as per them. Pt could have been d/cherri home but could not get crutches due to AVF, could not get a wheelchair as has steps at home, no now being admitted as might require FESTUS. Poor historian. No chest pain, palpitations, sob, light headedness/dizziness, difficulty breathing/cough, fevers/chills, abdominal pain, n/v, constipation, dysuria or increased urinary frequency. Admits to having watery diarrhea (non bloody) (14 Aug 2018 15:44)    Follow up ESRD  No new complaints    PAST MEDICAL & SURGICAL HISTORY:  Seizure disorder  Hypertension  Diabetes  ESRD (end stage renal disease)  Seizure  Pericarditis  Anxiety  Depression  Renal failure (ARF), acute on chronic: dialysis av fistula, RUE  HTN (hypertension)  Cardiomyopathy  HIV disease: born HIV+  AV fistula  S/P tonsillectomy      MEDICATIONS  (STANDING):  atovaquone Suspension 1500 milliGRAM(s) Oral daily  chlorhexidine 2% Cloths 1 Application(s) Topical daily  chlorhexidine 4% Liquid 1 Application(s) Topical <User Schedule>  darunavir 600 milliGRAM(s) Oral every 12 hours  diphenhydrAMINE   Injectable 50 milliGRAM(s) IV Push once  diphenhydrAMINE   Injectable 25 milliGRAM(s) IV Push once  dolutegravir 50 milliGRAM(s) Oral two times a day  etravirine 200 milliGRAM(s) Oral two times a day after meals  fentaNYL   Patch  50 MICROgram(s)/Hr. 1 Patch Transdermal every 48 hours  folic acid 1 milliGRAM(s) Oral daily  heparin  Injectable 5000 Unit(s) SubCutaneous every 12 hours  HYDROmorphone  Injectable 2 milliGRAM(s) IV Push once  labetalol 200 milliGRAM(s) Oral two times a day  lamiVUDine Solution 25 milliGRAM(s) Oral daily  levETIRAcetam 250 milliGRAM(s) Oral two times a day  metoclopramide 10 milliGRAM(s) Oral three times a day before meals  multivitamin 1 Tablet(s) Oral daily  oxyCODONE  ER Tablet 10 milliGRAM(s) Oral every 12 hours  pantoprazole    Tablet 40 milliGRAM(s) Oral every 12 hours  ritonavir Tablet 100 milliGRAM(s) Oral two times a day  sucralfate suspension 1 Gram(s) Oral two times a day  tenofovir disoproxil fumarate (VIREAD) 300 milliGRAM(s) Oral <User Schedule>  valGANciclovir Oral Liquid - Peds 100 milliGRAM(s) Oral <User Schedule>    MEDICATIONS  (PRN):  acetaminophen    Suspension. 650 milliGRAM(s) Oral every 6 hours PRN Mild Pain (1 - 3)  ALPRAZolam 2 milliGRAM(s) Oral daily PRN anxiety  dextrose 40% Gel 15 Gram(s) Oral once PRN Blood Glucose LESS THAN 70 milliGRAM(s)/deciliter  levETIRAcetam 500 milliGRAM(s) Oral daily PRN after dialysis  oxyCODONE    IR 5 milliGRAM(s) Oral every 4 hours PRN Moderate Pain (4 - 6)      Allergies    vancomycin (Anaphylaxis)    Intolerances    prefers vanilla or butter pecan nepro x 2 TID RDOK (Unknown)      Vital Signs Last 24 Hrs  T(C): 36.7 (21 Aug 2018 23:59), Max: 37.1 (21 Aug 2018 06:43)  T(F): 98 (21 Aug 2018 23:59), Max: 98.8 (21 Aug 2018 06:43)  HR: 85 (22 Aug 2018 05:59) (85 - 98)  BP: 142/89 (22 Aug 2018 05:59) (121/86 - 142/89)  BP(mean): 88 (21 Aug 2018 15:56) (88 - 88)  RR: 18 (22 Aug 2018 05:59) (18 - 18)  SpO2: 97% (22 Aug 2018 05:59) (94% - 97%)  Daily     Daily     PHYSICAL EXAM:  GENERAL: ill appearing, no distress  HEAD:  Atraumatic, Normocephalic  EYES: Conjunctiva and sclera clear  ENMT: Moist mucous membranes  NECK: Supple, No JVD  NERVOUS SYSTEM:  Alert & Oriented X3, Moves extremities  CHEST/LUNG: Few scattered rhonchi B/L  HEART: Regular rate and rhythm; No murmurs, rubs, or gallops  ABDOMEN: Soft, Nontender, Nondistended; Bowel sounds present  EXTREMITIES:  2+ Peripheral Pulses, No clubbing, cyanosis, or edema; +Hypertrophic AVF to RUE      LABS:  Reviewed

## 2018-08-23 NOTE — PROGRESS NOTE ADULT - ASSESSMENT
ESRD on HD MWF  HTN  Anemia  SHELLEY  HIV  Metatarsal fracture   Seizure     HD MWF  Procrit/Aranesp if Hgb<10. if not d/c today   Neuro follow up noted  Podiatry note reviewed   Renal diet    Thank you

## 2018-08-23 NOTE — PROGRESS NOTE ADULT - ASSESSMENT
28 yr old male with HIV/AIDS, ESRD on HD, seizures admitted with foot pain, after a mirror fell on his foot. Noted to have right 2-4 metatarsal fractures. Patient non compliant with medications. ID consulted, his medications were resumed. Podiatry consulted, cast applied, he is NWB on right LE, unable to use crutches given AVF and his home is not wheelchair accessible. Neurology consulted for seizure management and nephrology consulted for HD. Patient likely needs FESTUS. He had fever, cultures sent. PMR consulted, advised he needs FESTUS.  Patient expressing suicidal thoughts.    Problem/Plan-1:   Problem: Suicidal ideation. Plan: Psych consulted. No threat to self or others,  has not been aggressive or agitated and made statement out of frustration. OK to dc to [facility].      Problem/Plan - 2:  ·  Problem: Fracture of foot.  Plan: Pain control, needs FESTUS. Pain medications reviewed and optimized. ISTOP performed.     Problem/Plan-3:  Problem: Fever. Plan:  No symptoms at present. No further fever. BC negative     Problem/Plan - 4:  ·  Problem: ESRD (end stage renal disease).  Plan: HD per schedule.      Problem/Plan - 5:  ·  Problem: HIV disease.  Plan: Continue ART.      Problem/Plan - 6:  ·  Problem: Seizure disorder.  Plan: Continue Keppra    DC to [facility] when arrangements made

## 2018-08-23 NOTE — PROGRESS NOTE ADULT - PROVIDER SPECIALTY LIST ADULT
Hospitalist
Nephrology
Neurology
Neurology
Podiatry
Hospitalist
Nephrology
Nephrology

## 2018-08-23 NOTE — PROGRESS NOTE ADULT - ATTENDING COMMENTS
I reviewed the above assessment and documentation completed by the resident physician.  I verbally discussed the evaluation and treatment plan with resident.  The podiatry team will continue to follow patient while in house.
Patient was seen and present at bedside.  Reviewed and discussed case/treatment with resident.  Patient will be follow while in house.
Patient was seen bedside with resident.  I reviewed the above assessment and documentation completed by the resident physician.  I verbally discussed the evaluation and treatment plan with resident.  The podiatry team will continue to follow patient while in house.
I have personally seen and examined patient.  Above note reviewed and discussed with NP, modified where appropriate.
PT suggests FESTUS
Patient seen and examined at bedside. Agree with above A/P. Fever overnight, cultures sent. Defer antibiotics. Skin at fracture site intact. HD as schedule. Will need FESTUS.
Patient seen and examined at bedside. Agree with above A/P. Noted to have suicidal thoughts this am, aide reports episodes of agitation. Exam unchanged.  HD per schedule. Cultures negative, no fever.  Psychiatry consulted, maintain CO 1:1.  Needs FESTUS. Pain regimen optimized.
Patient seen and examined at bedside. No overnight events, No fever. cultures negative. Agree with above A/P.  NWB to right foot, outpatient podiatry follow up. HD per schedule.  Continue ART. Stable for discharge to [facility].
Plan of care discussed with patient.
I have personally seen and examined patient.  Above note reviewed and discussed with NP, modified where appropriate.
Spoke with patient and RN.

## 2018-08-23 NOTE — PROGRESS NOTE ADULT - SUBJECTIVE AND OBJECTIVE BOX
:28 y/o M  seen by podiatry for r foot injury treatment for non-displaced 2-4 metatarsal factures. Patient states he has pain in right foot but, is improving. bt states that he will be getting d/c soon and would like us to talk to his mother and sister.     PAST MEDICAL & SURGICAL HISTORY:  Seizure disorder  Hypertension  Diabetes  ESRD (end stage renal disease)  Seizure  Pericarditis  Anxiety  Depression  Renal failure (ARF), acute on chronic: dialysis av fistula, RUE  HTN (hypertension)  Cardiomyopathy  HIV disease: born HIV+  AV fistula  S/P tonsillectomy      MEDICATIONS  (STANDING):  morphine  - Injectable 6 milliGRAM(s) SubCutaneous Once    MEDICATIONS  (PRN):      Allergies    vancomycin (Anaphylaxis)    Intolerances        FAMILY HISTORY:  No pertinent family history in first degree relatives  No pertinent family history in first degree relatives        Medical Imaging:       PE:   GEN: Patient is a 27y well developed, well nourished Male, alert, awake and oriented to person, place and time in no acute distress.       Extremities     Left:   DP- 1/4    PT-  1/4        Right: DP- 1/4          PT- 1/4  Derm: right foot with no opening on the skin with no active signs of infection with +1 edema, no erythema,  pop dorsal aspect of the foot  Neuro: grossly intact        A/P:  27yMale presents with Right foot non-displaced 2-4 met fracture       P:  pt evaluated and chart reviewed.  Pt's xrays and CT scans reviewed. Results show multiple fractures.  Pt is non WB to right foot with crutches but, due PMHx of the pt is unable to use the crutches    Pt understands to keep the foot elevated, compressed.  Pt understands that he may require surgery in the future based on CT scan findings.  Pt understands to return to ER if he experiences any n/v/c/sob/f.  Pt understands that he may develop traumatic arthritis in the area of the fractures  talk to pt mother and sister and updated them about bone healing and the plan for pt to f/u  Pt is stable from podiatry standpoint for discharge on pain medication to rehab once medically stable.  pt is NWB to right foot d/c to rehab  Pt will followup Dr. Thompson for further evaluation.

## 2018-08-23 NOTE — PROGRESS NOTE ADULT - SUBJECTIVE AND OBJECTIVE BOX
CC: foot fracture    INTERVAL HPI/OVERNIGHT EVENTS: Patient seen and examined. Pain controlled on current RX. No acute events overnight.    Vital Signs Last 24 Hrs  T(C): 36.9 (23 Aug 2018 09:01), Max: 37.2 (22 Aug 2018 14:35)  T(F): 98.4 (23 Aug 2018 09:01), Max: 99 (22 Aug 2018 19:13)  HR: 85 (23 Aug 2018 09:01) (79 - 103)  BP: 111/74 (23 Aug 2018 09:01) (111/74 - 146/97)  BP(mean): --  RR: 18 (23 Aug 2018 09:01) (17 - 18)  SpO2: 99% (23 Aug 2018 09:01) (96% - 100%)    ROS:  Constitutional, Eyes, ENT, Cardiovascular, Respiratory,  Gastrointestinal, Genitourinary, Musculoskeletal, Neurological,  Integumentary, Psychiatric, Endocrine, Heme/Lymph are otherwise negative.    PHYSICAL EXAM:    GENERAL: Not in distress  LUNGS: b/l air entry  HEART: Regular  ABDOMEN: Soft, BS+  EXTREMITIES: Right foot dressing C/D/I    I&O's Detail    22 Aug 2018 07:01  -  23 Aug 2018 07:00  --------------------------------------------------------  IN:  Total IN: 0 mL    OUT:    Other: 1800 mL  Total OUT: 1800 mL    Total NET: -1800 mL                        CAPILLARY BLOOD GLUCOSE              MEDICATIONS  (STANDING):  atovaquone Suspension 1500 milliGRAM(s) Oral daily  chlorhexidine 2% Cloths 1 Application(s) Topical daily  chlorhexidine 4% Liquid 1 Application(s) Topical <User Schedule>  darunavir 600 milliGRAM(s) Oral every 12 hours  dolutegravir 50 milliGRAM(s) Oral two times a day  etravirine 200 milliGRAM(s) Oral two times a day after meals  fentaNYL   Patch 100 MICROgram(s)/Hr 1 Patch Transdermal every 72 hours  folic acid 1 milliGRAM(s) Oral daily  heparin  Injectable 5000 Unit(s) SubCutaneous every 12 hours  labetalol 200 milliGRAM(s) Oral two times a day  lamiVUDine Solution 25 milliGRAM(s) Oral daily  levETIRAcetam 250 milliGRAM(s) Oral two times a day  metoclopramide 10 milliGRAM(s) Oral three times a day before meals  multivitamin 1 Tablet(s) Oral daily  mupirocin 2% Nasal 1 Application(s) Nasal two times a day  oxyCODONE  ER Tablet 10 milliGRAM(s) Oral every 12 hours  pantoprazole    Tablet 40 milliGRAM(s) Oral every 12 hours  ritonavir Tablet 100 milliGRAM(s) Oral two times a day  sucralfate suspension 1 Gram(s) Oral two times a day  tenofovir disoproxil fumarate (VIREAD) 300 milliGRAM(s) Oral <User Schedule>  valGANciclovir Oral Liquid - Peds 100 milliGRAM(s) Oral <User Schedule>    MEDICATIONS  (PRN):  acetaminophen    Suspension. 650 milliGRAM(s) Oral every 6 hours PRN Mild Pain (1 - 3)  ALPRAZolam 2 milliGRAM(s) Oral daily PRN anxiety  dextrose 40% Gel 15 Gram(s) Oral once PRN Blood Glucose LESS THAN 70 milliGRAM(s)/deciliter  levETIRAcetam 500 milliGRAM(s) Oral daily PRN after dialysis  ondansetron Injectable 4 milliGRAM(s) IV Push every 6 hours PRN Nausea and/or Vomiting  oxyCODONE    IR 5 milliGRAM(s) Oral every 4 hours PRN Moderate Pain (4 - 6)      RADIOLOGY & ADDITIONAL TESTS:

## 2018-08-25 LAB
CULTURE RESULTS: SIGNIFICANT CHANGE UP
CULTURE RESULTS: SIGNIFICANT CHANGE UP
SPECIMEN SOURCE: SIGNIFICANT CHANGE UP
SPECIMEN SOURCE: SIGNIFICANT CHANGE UP

## 2018-08-28 ENCOUNTER — EMERGENCY (EMERGENCY)
Facility: HOSPITAL | Age: 28
LOS: 1 days | Discharge: HOME | End: 2018-08-28
Attending: EMERGENCY MEDICINE
Payer: COMMERCIAL

## 2018-08-28 VITALS — WEIGHT: 132.06 LBS | HEIGHT: 65 IN

## 2018-08-28 VITALS
DIASTOLIC BLOOD PRESSURE: 99 MMHG | HEART RATE: 74 BPM | TEMPERATURE: 98 F | SYSTOLIC BLOOD PRESSURE: 152 MMHG | OXYGEN SATURATION: 98 % | RESPIRATION RATE: 18 BRPM

## 2018-08-28 DIAGNOSIS — I77.0 ARTERIOVENOUS FISTULA, ACQUIRED: Chronic | ICD-10-CM

## 2018-08-28 PROCEDURE — 99282 EMERGENCY DEPT VISIT SF MDM: CPT

## 2018-08-28 NOTE — ED STATDOCS - OBJECTIVE STATEMENT
Patient is a 28 year old M with PMHx of ESRD, HIV and HTN who presents to the ED complaining of loosening brace on his R leg.  He has a 2nd, 3rd and 4th linear, nondisplaced fractures to his right foot and states that the wrapping kept coming off from moving.   Patient does not yet have follow up with ortho or podiatry.  Denies any other complaints

## 2018-08-28 NOTE — ED ADULT NURSE REASSESSMENT NOTE - NS ED NURSE REASSESS COMMENT FT1
pt hemodynamically stable, refer to flowsheet and chart, pt to be discharged, pt understood discharge instructions and plan of care
Resident at bedside; splint to right LE changed no open wound noted ; skin intact; mother at bedside; mother states patient has been removing the splint at home, pt educated regarding importance of leaving the splint in place for healing , pt verbalized understanding

## 2018-09-06 ENCOUNTER — APPOINTMENT (OUTPATIENT)
Dept: VASCULAR SURGERY | Facility: CLINIC | Age: 28
End: 2018-09-06
Payer: MEDICAID

## 2018-09-06 VITALS
DIASTOLIC BLOOD PRESSURE: 108 MMHG | SYSTOLIC BLOOD PRESSURE: 143 MMHG | TEMPERATURE: 98.7 F | OXYGEN SATURATION: 100 % | HEART RATE: 87 BPM

## 2018-09-06 DIAGNOSIS — Z86.79 PERSONAL HISTORY OF OTHER DISEASES OF THE CIRCULATORY SYSTEM: ICD-10-CM

## 2018-09-06 DIAGNOSIS — Z87.09 PERSONAL HISTORY OF OTHER DISEASES OF THE RESPIRATORY SYSTEM: ICD-10-CM

## 2018-09-06 DIAGNOSIS — Z87.448 PERSONAL HISTORY OF OTHER DISEASES OF URINARY SYSTEM: ICD-10-CM

## 2018-09-06 DIAGNOSIS — Z86.2 PERSONAL HISTORY OF DISEASES OF THE BLOOD AND BLOOD-FORMING ORGANS AND CERTAIN DISORDERS INVOLVING THE IMMUNE MECHANISM: ICD-10-CM

## 2018-09-06 DIAGNOSIS — Z86.39 PERSONAL HISTORY OF OTHER ENDOCRINE, NUTRITIONAL AND METABOLIC DISEASE: ICD-10-CM

## 2018-09-06 DIAGNOSIS — Z87.898 PERSONAL HISTORY OF OTHER SPECIFIED CONDITIONS: ICD-10-CM

## 2018-09-06 DIAGNOSIS — Z02.82 ENCOUNTER FOR ADOPTION SERVICES: ICD-10-CM

## 2018-09-06 DIAGNOSIS — Z78.9 OTHER SPECIFIED HEALTH STATUS: ICD-10-CM

## 2018-09-06 PROCEDURE — 99214 OFFICE O/P EST MOD 30 MIN: CPT

## 2018-09-22 ENCOUNTER — EMERGENCY (EMERGENCY)
Facility: HOSPITAL | Age: 28
LOS: 1 days | Discharge: HOME | End: 2018-09-22
Attending: EMERGENCY MEDICINE
Payer: COMMERCIAL

## 2018-09-22 VITALS
TEMPERATURE: 99 F | DIASTOLIC BLOOD PRESSURE: 76 MMHG | SYSTOLIC BLOOD PRESSURE: 119 MMHG | OXYGEN SATURATION: 98 % | HEART RATE: 101 BPM | RESPIRATION RATE: 20 BRPM

## 2018-09-22 DIAGNOSIS — I77.0 ARTERIOVENOUS FISTULA, ACQUIRED: Chronic | ICD-10-CM

## 2018-09-22 PROCEDURE — 99284 EMERGENCY DEPT VISIT MOD MDM: CPT

## 2018-09-22 NOTE — ED ADULT NURSE NOTE - NSIMPLEMENTINTERV_GEN_ALL_ED
Implemented All Fall with Harm Risk Interventions:  [place] to call system. Call bell, personal items and telephone within reach. Instruct patient to call for assistance. Room bathroom lighting operational. Non-slip footwear when patient is off stretcher. Physically safe environment: no spills, clutter or unnecessary equipment. Stretcher in lowest position, wheels locked, appropriate side rails in place. Provide visual cue, wrist band, yellow gown, etc. Monitor gait and stability. Monitor for mental status changes and reorient to person, place, and time. Review medications for side effects contributing to fall risk. Reinforce activity limits and safety measures with patient and family. Provide visual clues: red socks.

## 2018-09-22 NOTE — ED ADULT TRIAGE NOTE - CHIEF COMPLAINT QUOTE
seizure hx of same seizure hx of same. as per pt abd pain vomiting all day pt states is on keppra for seizure rt arm dialysis

## 2018-09-22 NOTE — ED ADULT NURSE NOTE - OBJECTIVE STATEMENT
patient states that he has been having nausea and vomiting for 25 days on and off with back pain, also patient is a renal patient with HD MWF last HD on friday

## 2018-09-22 NOTE — ED ADULT NURSE NOTE - CHIEF COMPLAINT QUOTE
seizure hx of same. as per pt abd pain vomiting all day pt states is on keppra for seizure rt arm dialysis

## 2018-09-23 VITALS
OXYGEN SATURATION: 97 % | DIASTOLIC BLOOD PRESSURE: 75 MMHG | RESPIRATION RATE: 18 BRPM | TEMPERATURE: 98 F | HEART RATE: 75 BPM | SYSTOLIC BLOOD PRESSURE: 115 MMHG

## 2018-09-23 LAB
ALBUMIN SERPL ELPH-MCNC: 3.9 G/DL — SIGNIFICANT CHANGE UP (ref 3.3–5.2)
ALP SERPL-CCNC: 480 U/L — HIGH (ref 40–120)
ALT FLD-CCNC: 10 U/L — SIGNIFICANT CHANGE UP
ANION GAP SERPL CALC-SCNC: 20 MMOL/L — HIGH (ref 5–17)
ANISOCYTOSIS BLD QL: SLIGHT — SIGNIFICANT CHANGE UP
AST SERPL-CCNC: 18 U/L — SIGNIFICANT CHANGE UP
BILIRUB SERPL-MCNC: 0.3 MG/DL — LOW (ref 0.4–2)
BUN SERPL-MCNC: 74 MG/DL — HIGH (ref 8–20)
CALCIUM SERPL-MCNC: 9.3 MG/DL — SIGNIFICANT CHANGE UP (ref 8.6–10.2)
CHLORIDE SERPL-SCNC: 91 MMOL/L — LOW (ref 98–107)
CO2 SERPL-SCNC: 28 MMOL/L — SIGNIFICANT CHANGE UP (ref 22–29)
CREAT SERPL-MCNC: 11.61 MG/DL — HIGH (ref 0.5–1.3)
GLUCOSE SERPL-MCNC: 102 MG/DL — SIGNIFICANT CHANGE UP (ref 70–115)
HCT VFR BLD CALC: 35.3 % — LOW (ref 42–52)
HGB BLD-MCNC: 10.9 G/DL — LOW (ref 14–18)
LACTATE BLDV-MCNC: 1.4 MMOL/L — SIGNIFICANT CHANGE UP (ref 0.5–2)
LIDOCAIN IGE QN: 44 U/L — SIGNIFICANT CHANGE UP (ref 22–51)
LYMPHOCYTES # BLD AUTO: 23 % — SIGNIFICANT CHANGE UP (ref 20–55)
MCHC RBC-ENTMCNC: 28.5 PG — SIGNIFICANT CHANGE UP (ref 27–31)
MCHC RBC-ENTMCNC: 30.9 G/DL — LOW (ref 32–36)
MCV RBC AUTO: 92.4 FL — SIGNIFICANT CHANGE UP (ref 80–94)
MONOCYTES NFR BLD AUTO: 10 % — SIGNIFICANT CHANGE UP (ref 3–10)
NEUTROPHILS NFR BLD AUTO: 67 % — SIGNIFICANT CHANGE UP (ref 37–73)
PLAT MORPH BLD: NORMAL — SIGNIFICANT CHANGE UP
PLATELET # BLD AUTO: 73 K/UL — LOW (ref 150–400)
POTASSIUM SERPL-MCNC: 4.9 MMOL/L — SIGNIFICANT CHANGE UP (ref 3.5–5.3)
POTASSIUM SERPL-SCNC: 4.9 MMOL/L — SIGNIFICANT CHANGE UP (ref 3.5–5.3)
PROT SERPL-MCNC: 7.3 G/DL — SIGNIFICANT CHANGE UP (ref 6.6–8.7)
RBC # BLD: 3.82 M/UL — LOW (ref 4.6–6.2)
RBC # FLD: 16.1 % — HIGH (ref 11–15.6)
RBC BLD AUTO: ABNORMAL
SODIUM SERPL-SCNC: 139 MMOL/L — SIGNIFICANT CHANGE UP (ref 135–145)
WBC # BLD: 3.4 K/UL — LOW (ref 4.8–10.8)
WBC # FLD AUTO: 3.4 K/UL — LOW (ref 4.8–10.8)

## 2018-09-23 PROCEDURE — 99284 EMERGENCY DEPT VISIT MOD MDM: CPT

## 2018-09-23 PROCEDURE — 76705 ECHO EXAM OF ABDOMEN: CPT | Mod: 26

## 2018-09-23 PROCEDURE — 85027 COMPLETE CBC AUTOMATED: CPT

## 2018-09-23 PROCEDURE — 83690 ASSAY OF LIPASE: CPT

## 2018-09-23 PROCEDURE — 70450 CT HEAD/BRAIN W/O DYE: CPT

## 2018-09-23 PROCEDURE — 74176 CT ABD & PELVIS W/O CONTRAST: CPT

## 2018-09-23 PROCEDURE — 80053 COMPREHEN METABOLIC PANEL: CPT

## 2018-09-23 PROCEDURE — 83605 ASSAY OF LACTIC ACID: CPT

## 2018-09-23 PROCEDURE — 36415 COLL VENOUS BLD VENIPUNCTURE: CPT

## 2018-09-23 PROCEDURE — 70450 CT HEAD/BRAIN W/O DYE: CPT | Mod: 26

## 2018-09-23 PROCEDURE — 76705 ECHO EXAM OF ABDOMEN: CPT

## 2018-09-23 PROCEDURE — 74176 CT ABD & PELVIS W/O CONTRAST: CPT | Mod: 26

## 2018-09-23 RX ORDER — ONDANSETRON 8 MG/1
8 TABLET, FILM COATED ORAL ONCE
Qty: 0 | Refills: 0 | Status: COMPLETED | OUTPATIENT
Start: 2018-09-23 | End: 2018-09-23

## 2018-09-23 RX ORDER — ONDANSETRON 8 MG/1
4 TABLET, FILM COATED ORAL ONCE
Qty: 0 | Refills: 0 | Status: COMPLETED | OUTPATIENT
Start: 2018-09-23 | End: 2018-09-23

## 2018-09-23 RX ORDER — LEVETIRACETAM 250 MG/1
250 TABLET, FILM COATED ORAL ONCE
Qty: 0 | Refills: 0 | Status: COMPLETED | OUTPATIENT
Start: 2018-09-23 | End: 2018-09-23

## 2018-09-23 RX ORDER — METOCLOPRAMIDE HCL 10 MG
10 TABLET ORAL ONCE
Qty: 0 | Refills: 0 | Status: COMPLETED | OUTPATIENT
Start: 2018-09-23 | End: 2018-09-23

## 2018-09-23 RX ORDER — CYCLOBENZAPRINE HYDROCHLORIDE 10 MG/1
10 TABLET, FILM COATED ORAL ONCE
Qty: 0 | Refills: 0 | Status: COMPLETED | OUTPATIENT
Start: 2018-09-23 | End: 2018-09-23

## 2018-09-23 RX ORDER — ACETAMINOPHEN 500 MG
975 TABLET ORAL ONCE
Qty: 0 | Refills: 0 | Status: COMPLETED | OUTPATIENT
Start: 2018-09-23 | End: 2018-09-23

## 2018-09-23 RX ORDER — ALPRAZOLAM 0.25 MG
2 TABLET ORAL ONCE
Qty: 0 | Refills: 0 | Status: DISCONTINUED | OUTPATIENT
Start: 2018-09-23 | End: 2018-09-23

## 2018-09-23 RX ORDER — SODIUM CHLORIDE 9 MG/ML
1000 INJECTION INTRAMUSCULAR; INTRAVENOUS; SUBCUTANEOUS ONCE
Qty: 0 | Refills: 0 | Status: COMPLETED | OUTPATIENT
Start: 2018-09-23 | End: 2018-09-23

## 2018-09-23 RX ADMIN — Medication 975 MILLIGRAM(S): at 06:50

## 2018-09-23 RX ADMIN — CYCLOBENZAPRINE HYDROCHLORIDE 10 MILLIGRAM(S): 10 TABLET, FILM COATED ORAL at 06:50

## 2018-09-23 RX ADMIN — Medication 10 MILLIGRAM(S): at 08:58

## 2018-09-23 RX ADMIN — Medication 2 MILLIGRAM(S): at 08:58

## 2018-09-23 RX ADMIN — LEVETIRACETAM 250 MILLIGRAM(S): 250 TABLET, FILM COATED ORAL at 04:32

## 2018-09-23 RX ADMIN — ONDANSETRON 8 MILLIGRAM(S): 8 TABLET, FILM COATED ORAL at 05:28

## 2018-09-23 RX ADMIN — Medication 975 MILLIGRAM(S): at 07:50

## 2018-09-23 NOTE — ED PROVIDER NOTE - MEDICAL DECISION MAKING DETAILS
Pt states he feels much better and he has tolerated PO. His abd is soft and non-surgical. HA has resolved and he is at hs neurologic baseline. HA was likely due to seizure, which in turn was likely due to missed medication dose. Pt is not severely leukopenic. Neck is supple. Pt is stable for dc

## 2018-09-23 NOTE — ED PROVIDER NOTE - EYES, MLM
Clear bilaterally, pupils equal, round and reactive to light. Clear bilaterally, pupils equal, round and reactive to light. Blind

## 2018-09-23 NOTE — ED PROVIDER NOTE - OBJECTIVE STATEMENT
29 y/o M with hx of renal failure (on dialysis), seizure presents to the ED c/o constant abd pain that began that began ~1 month ago. Endorses associated nausea/vomiting and headache. Pt takes Keppra 200mg, missed dose today; pt experienced seizure episode. Denies fever, chills, chest pain, shortness of breath, dizziness.

## 2018-09-23 NOTE — ED PROVIDER NOTE - MUSCULOSKELETAL, MLM
Spine appears normal, range of motion is not limited, no muscle or joint tenderness Spine appears normal, range of motion is not limited, no muscle or joint tenderness. Fistula to RUE

## 2018-09-23 NOTE — ED ADULT NURSE REASSESSMENT NOTE - NS ED NURSE REASSESS COMMENT FT1
po medication given for pain, patient tolerated well, no complaints offered
pt is discharged home he was unable to find transportation home social work called. pt resting comfortably denies headache denies nausea
pt not answering questions for social work. social work will try to call family. pt remains resting comfortably in cart
unable to obtain blood work patient stuck repeatedly x 5 by several RN. MDA, po meds gvein
pt received awake and alert sitting up in bed states that he still has a little headache. pt has an right AV fistula +thrill and bruit. Dr Kapadia at the bedside seeing pt. plan of care explained

## 2018-09-28 ENCOUNTER — INPATIENT (INPATIENT)
Facility: HOSPITAL | Age: 28
LOS: 1 days | Discharge: HOME | DRG: 313 | End: 2018-09-30
Attending: INTERNAL MEDICINE | Admitting: HOSPITALIST
Payer: COMMERCIAL

## 2018-09-28 VITALS
SYSTOLIC BLOOD PRESSURE: 111 MMHG | TEMPERATURE: 98 F | HEART RATE: 107 BPM | RESPIRATION RATE: 20 BRPM | HEIGHT: 69 IN | WEIGHT: 160.06 LBS | DIASTOLIC BLOOD PRESSURE: 76 MMHG | OXYGEN SATURATION: 99 %

## 2018-09-28 DIAGNOSIS — I77.0 ARTERIOVENOUS FISTULA, ACQUIRED: Chronic | ICD-10-CM

## 2018-09-28 DIAGNOSIS — R07.9 CHEST PAIN, UNSPECIFIED: ICD-10-CM

## 2018-09-28 LAB
ALBUMIN SERPL ELPH-MCNC: 4.2 G/DL — SIGNIFICANT CHANGE UP (ref 3.3–5.2)
ALP SERPL-CCNC: 568 U/L — HIGH (ref 40–120)
ALT FLD-CCNC: 16 U/L — SIGNIFICANT CHANGE UP
ANION GAP SERPL CALC-SCNC: 16 MMOL/L — SIGNIFICANT CHANGE UP (ref 5–17)
ANISOCYTOSIS BLD QL: SLIGHT — SIGNIFICANT CHANGE UP
APTT BLD: 31.5 SEC — SIGNIFICANT CHANGE UP (ref 27.5–37.4)
AST SERPL-CCNC: 27 U/L — SIGNIFICANT CHANGE UP
BILIRUB SERPL-MCNC: 0.4 MG/DL — SIGNIFICANT CHANGE UP (ref 0.4–2)
BUN SERPL-MCNC: 41 MG/DL — HIGH (ref 8–20)
CALCIUM SERPL-MCNC: 8.6 MG/DL — SIGNIFICANT CHANGE UP (ref 8.6–10.2)
CHLORIDE SERPL-SCNC: 90 MMOL/L — LOW (ref 98–107)
CO2 SERPL-SCNC: 29 MMOL/L — SIGNIFICANT CHANGE UP (ref 22–29)
CREAT SERPL-MCNC: 6.75 MG/DL — HIGH (ref 0.5–1.3)
EOSINOPHIL NFR BLD AUTO: 4 % — SIGNIFICANT CHANGE UP (ref 0–6)
GLUCOSE SERPL-MCNC: 80 MG/DL — SIGNIFICANT CHANGE UP (ref 70–115)
HCT VFR BLD CALC: 36.1 % — LOW (ref 42–52)
HGB BLD-MCNC: 11.6 G/DL — LOW (ref 14–18)
INR BLD: 1.04 RATIO — SIGNIFICANT CHANGE UP (ref 0.88–1.16)
LIDOCAIN IGE QN: 152 U/L — HIGH (ref 22–51)
LYMPHOCYTES # BLD AUTO: 45 % — SIGNIFICANT CHANGE UP (ref 20–55)
MACROCYTES BLD QL: SLIGHT — SIGNIFICANT CHANGE UP
MCHC RBC-ENTMCNC: 29.1 PG — SIGNIFICANT CHANGE UP (ref 27–31)
MCHC RBC-ENTMCNC: 32.1 G/DL — SIGNIFICANT CHANGE UP (ref 32–36)
MCV RBC AUTO: 90.7 FL — SIGNIFICANT CHANGE UP (ref 80–94)
MICROCYTES BLD QL: SLIGHT — SIGNIFICANT CHANGE UP
MONOCYTES NFR BLD AUTO: 14 % — HIGH (ref 3–10)
NEUTROPHILS NFR BLD AUTO: 36 % — LOW (ref 37–73)
OVALOCYTES BLD QL SMEAR: SLIGHT — SIGNIFICANT CHANGE UP
PLAT MORPH BLD: NORMAL — SIGNIFICANT CHANGE UP
PLATELET # BLD AUTO: 75 K/UL — LOW (ref 150–400)
POIKILOCYTOSIS BLD QL AUTO: SLIGHT — SIGNIFICANT CHANGE UP
POTASSIUM SERPL-MCNC: 3.9 MMOL/L — SIGNIFICANT CHANGE UP (ref 3.5–5.3)
POTASSIUM SERPL-SCNC: 3.9 MMOL/L — SIGNIFICANT CHANGE UP (ref 3.5–5.3)
PROT SERPL-MCNC: 7.6 G/DL — SIGNIFICANT CHANGE UP (ref 6.6–8.7)
PROTHROM AB SERPL-ACNC: 11.5 SEC — SIGNIFICANT CHANGE UP (ref 9.8–12.7)
RBC # BLD: 3.98 M/UL — LOW (ref 4.6–6.2)
RBC # FLD: 15.2 % — SIGNIFICANT CHANGE UP (ref 11–15.6)
RBC BLD AUTO: ABNORMAL
SODIUM SERPL-SCNC: 135 MMOL/L — SIGNIFICANT CHANGE UP (ref 135–145)
VARIANT LYMPHS # BLD: 1 % — SIGNIFICANT CHANGE UP (ref 0–6)
WBC # BLD: 2.1 K/UL — LOW (ref 4.8–10.8)
WBC # FLD AUTO: 2.1 K/UL — LOW (ref 4.8–10.8)

## 2018-09-28 PROCEDURE — 71045 X-RAY EXAM CHEST 1 VIEW: CPT | Mod: 26

## 2018-09-28 PROCEDURE — 74176 CT ABD & PELVIS W/O CONTRAST: CPT | Mod: 26

## 2018-09-28 PROCEDURE — 99223 1ST HOSP IP/OBS HIGH 75: CPT | Mod: GC

## 2018-09-28 PROCEDURE — 70450 CT HEAD/BRAIN W/O DYE: CPT | Mod: 26

## 2018-09-28 PROCEDURE — 93010 ELECTROCARDIOGRAM REPORT: CPT

## 2018-09-28 PROCEDURE — 99285 EMERGENCY DEPT VISIT HI MDM: CPT

## 2018-09-28 PROCEDURE — 73620 X-RAY EXAM OF FOOT: CPT | Mod: 26,RT

## 2018-09-28 RX ORDER — DIPHENHYDRAMINE HCL 50 MG
25 CAPSULE ORAL ONCE
Qty: 0 | Refills: 0 | Status: COMPLETED | OUTPATIENT
Start: 2018-09-28 | End: 2018-09-28

## 2018-09-28 RX ORDER — MORPHINE SULFATE 50 MG/1
4 CAPSULE, EXTENDED RELEASE ORAL ONCE
Qty: 0 | Refills: 0 | Status: DISCONTINUED | OUTPATIENT
Start: 2018-09-28 | End: 2018-09-28

## 2018-09-28 RX ORDER — SODIUM CHLORIDE 9 MG/ML
1000 INJECTION INTRAMUSCULAR; INTRAVENOUS; SUBCUTANEOUS ONCE
Qty: 0 | Refills: 0 | Status: COMPLETED | OUTPATIENT
Start: 2018-09-28 | End: 2018-09-28

## 2018-09-28 RX ORDER — METOCLOPRAMIDE HCL 10 MG
10 TABLET ORAL ONCE
Qty: 0 | Refills: 0 | Status: COMPLETED | OUTPATIENT
Start: 2018-09-28 | End: 2018-09-28

## 2018-09-28 RX ADMIN — Medication 25 MILLIGRAM(S): at 18:46

## 2018-09-28 RX ADMIN — Medication 10 MILLIGRAM(S): at 18:46

## 2018-09-28 RX ADMIN — SODIUM CHLORIDE 1000 MILLILITER(S): 9 INJECTION INTRAMUSCULAR; INTRAVENOUS; SUBCUTANEOUS at 18:46

## 2018-09-28 RX ADMIN — MORPHINE SULFATE 4 MILLIGRAM(S): 50 CAPSULE, EXTENDED RELEASE ORAL at 18:46

## 2018-09-28 NOTE — H&P ADULT - PROBLEM SELECTOR PLAN 5
asymptomatic  Currently normotensive asymptomatic  - Unclear what medication patient is on- will call pharmacy in the morning, to confirm medication  Currently normotensive Currently normotensive  - Unclear what medication patient is on- will call pharmacy in the morning, to confirm medication

## 2018-09-28 NOTE — H&P ADULT - FAMILY HISTORY
No pertinent family history in first degree relatives     No pertinent family history in first degree relatives No pertinent family history in first degree relatives, Unknown FHx because pt is adopted

## 2018-09-28 NOTE — H&P ADULT - PROBLEM SELECTOR PLAN 7
VCD boots Patient is non-compliant  - will call patient PMD and pharmacy to confirm medications in the AM

## 2018-09-28 NOTE — ED PROVIDER NOTE - PROGRESS NOTE DETAILS
Patient signed out to Dr. andrade. pending labs, cxr, ct head, ct abdomen. likely admit. Pt signed out to me pending results - pt will be admitted for ACS rule out.  Discussed need to admit with patient & discussed risk and benefits.  Patient agreed to admission.  Discussed case w/ admitting medicine doctor - agreed to admit to their service.

## 2018-09-28 NOTE — H&P ADULT - PROBLEM SELECTOR PLAN 3
Dialysis schedule MWF  underwent HD today  Nephrology consult Given low CD4 count  - etiology infectious?  - stool count & culture, Oval and parasites ordered

## 2018-09-28 NOTE — H&P ADULT - PMH
Anxiety    Cardiomyopathy    Depression    Diabetes    ESRD (end stage renal disease)    HIV disease  born HIV+  HTN (hypertension)    Hypertension    Pericarditis    Renal failure (ARF), acute on chronic  dialysis av fistula, RUE  Seizure    Seizure disorder Anxiety    Cardiomyopathy    Depression    ESRD (end stage renal disease)    HIV disease  born HIV+  HTN (hypertension)    Hypertension    Pericarditis    Renal failure (ARF), acute on chronic  dialysis av fistula, RUE  Seizure    Seizure disorder

## 2018-09-28 NOTE — H&P ADULT - HISTORY OF PRESENT ILLNESS
29 yo male with a pmh HIV, dilated cardiomyopathy, ESRD(MWF) and seizures present with a week of chest pain and n/v. The patient states he has been having this chest pain for the past couple of weeks. Patient has notice it has become difficult to lay on his back. By laying on his back he notices his symptoms becomes worst. Patient has longstanding hx of HIV. Patient has not taken his medication since January Patient states, there was contraindication to taking the medication and was not able to see his doctor because she/he was dx of cancer.  Patient admits to headache, CP, SOB, nausea, abdominal pain, night sweats.  Patient denies- cough, diarrhea, recent travel.     In the ED patient Underwent CT scan of Head abdomen and pelvis. No acute pathology was noted  Code status: Full code  Patient states he does not take any medication. 27 yo male with a pmh HIV, dilated cardiomyopathy, ESRD(MWF) and seizures present with a week of chest pain and n/v. The patient states he has been having this chest pain for the past couple of weeks. Patient has notice it has become difficult to lay on his back. By laying on his back he notices his symptoms becomes worst. Patient has not taken any medication in attempts to alleviate his pain.  Patient has longstanding hx of HIV. Patient has not taken his medication since January Patient states, there was contraindication to taking the medication and was not able to see his doctor because she/he was dx of cancer.  Patient admits to headache, CP, SOB, nausea, abdominal pain, night sweats.  Patient denies- cough, diarrhea, recent travel.     In the ED patient Underwent CT scan of Head abdomen and pelvis. No acute pathology was noted  Code status: Full code  Patient states he does not take any medication. 29 yo male with a pmh HIV, dilated cardiomyopathy, ESRD(MWF) and seizures present with a week of chest pain and n/v. The patient states he has been having this chest pain for the past couple of weeks. Patient has notice it has become difficult to lay on his back. By laying on his back he notices his symptoms becomes worst. Patient has not taken any medication in attempts to alleviate his pain.  Patient has longstanding hx of HIV. Patient has not taken his medication since January Patient states, there was contraindication to taking the medication and was not able to see his doctor because she/he was dx of cancer.  Patient denies- cough, diarrhea, recent travel.     In the ED patient Underwent CT scan of Head abdomen and pelvis. No acute pathology was noted 27 yo male with a pmh HIV, ESRD(MWF) and seizures present with a week of chest pain and n/v. The patient states he has been having this chest pain for a week. Patient has notice it has become difficult to lay on his back. By laying on his back he notices his symptoms becomes worst. Patient has not taken any medication in attempts to alleviate his pain. Patient has longstanding hx of HIV. Patient has not taken his medication since January Patient states, there was contraindication to taking the medication and was not able to see his doctor because she/he was dx of cancer.  Of note, patient reporting chronic diarrhea for several months. 29 yo male with a pmh HIV, ESRD(MWF) and seizures present with a week of chest pain and n/v. The patient states he has been having this chest pain for a week. Patient has notice it has become difficult to lay on his back. By laying on his back he notices his symptoms becomes worst. Patient has not taken any medication in attempts to alleviate his pain. Patient has longstanding hx of HIV. Patient has not taken his medication since January Patient states, there was contraindication to taking the medication and was not able to see his doctor because she/he was dx of cancer.  Of note, patient reporting chronic diarrhea for several months. 27 yo male with a pmh HIV, ESRD(MWF) and seizures present with a week of chest pain and n/v. The patient states he has been having this chest pain for a week. Patient has notice it has become difficult to lay on his back. By laying on his back he notices his symptoms becomes worst. Patient has not taken any medication in attempts to alleviate his pain. Patient has longstanding hx of HIV. Patient has not taken his medication since January. Patient states, there was contraindication to taking the medication and was not able to see his doctor because she/he was dx of cancer.  Pt admits to diarrhea for several months,

## 2018-09-28 NOTE — ED PROVIDER NOTE - CARE PLAN
Principal Discharge DX:	Chest pain, unspecified type Principal Discharge DX:	Chest pain, unspecified type  Secondary Diagnosis:	AIDS

## 2018-09-28 NOTE — H&P ADULT - ATTENDING COMMENTS
27 y/o M hx HIV, not compliant with ART, ESRD on HD (M, W, F), seizure disorder presenting with intermittent chest pain for 1 week.  Pt reports primarily left sided chest pain that he describes as sharp, stabbing. The pain occasionally radiating across his chest to the right arm.  The episodes last for a few hours and sometimes improves with aspirin use.  Chest pain can occur at rest or with activity.  Pt reports non-compliance with ART due to his pharmacist not dispensing the meds due to numerous interactions.  Of note, pt also endorsing chronic, non-bloody diarrhea for several months.    #Atypical chest pain- high risk for ACS given ESRD status.  Admit to telemetry.  Plan for serial enzymes with repeat EKG.  Ordered ASA.  Routine cardiology consult for AM.  #Chronic diarrhea- unclear etiology but given immunosuppressed state, parasitic infections on differential.  Plan to order stool count, cultures, ova an parasites.   #Elevated troponin- has been previously elevated, suspect related to renal insufficiency but given reports of chest pain need to rule out ACS as above.  #Pancytopenia- likely due to myelosuppression from HIV.  No indication for transfusions at this time.   #ESRD on HD- last HD was on 9/29 per patient.  Nephrology consult for HD planning while inpatient.  #Seizure disorder- resume Keppra 250mg BID and 500mg post HD days  #HIV/AIDs- pt reports non-compliant with ART due to numerous interaction.  ID consult to clarify appropriate ART regimen and comment on interactions.  CD4 and viral load in AM.   #Medication management- unable to clarify pt med regimen.  Primary/day team to call pharmacy ([facility] in [place] [phone]) in AM to find out last drugs dispensed 29 y/o M hx HIV, not compliant with ART, ESRD on HD (M,W,F), seizure disorder presenting with intermittent chest pain for 1 week.  Pt reports primarily left sided chest pain that he describes as sharp and stabbing. The pain occasionally radiating across his chest to the right arm.  The episodes last for a few hours and sometimes improves with aspirin use.  Chest pain can occur at rest or with activity.  Sometimes he becomes diaphoretic and nauseous with episodes.  Regarding his HIV, Pt reports non-compliance with ART due to his pharmacist not dispensing the meds due to numerous interactions.  Of note, pt also endorsing chronic, non-bloody diarrhea for several months.    #Atypical chest pain- high risk for ACS given ESRD status.  Ddx also includes pericarditis but admission EKG not supportive. Admit to telemetry.  Plan for serial enzymes with repeat EKG.  Ordered ASA.  Routine cardiology consult with [place] for AM.    #Chronic diarrhea- unclear etiology but given immunosuppressed state, parasitic infections on differential.  Plan to order stool count, cultures, ova an parasites.     #Elevated troponin- has been previously elevated, suspect related to renal insufficiency but given reports of chest pain need to rule out ACS as above.    #Pancytopenia- likely due to myelosuppression from HIV.  No indication for transfusions at this time.     #Elevated alkaline phosphatase- likely related to renal osteodystrophy.  GGT in AM to rule out hepatobiliary etiology.    #ESRD on HD- last HD was on 9/29 per patient.  Nephrology consult for HD planning while inpatient.    #Seizure disorder- resume Keppra 250mg BID and 500mg post HD days.    #HIV/AIDs- Last CD4 73 in 8/2018.  Pt reports non-compliance with ART because of inability to dispense from pharmacy because of numerous interaction.  During last hospitalization, ID saw pt and recommend etravirine, dolutegravir, ritonavir, darunavir, lamivudine. Unclear if ART should be resumed during hospitalization given risk of resistance with repeated non-compliance.  ID consult to comment on these interactions and if ART should be resumed on outpatient basis as opposed to only during hospitalizations.  CD4 and viral load in AM.  Holding azithromycin given prolonged QTc on admission EKG.    #Prolonged QTC- Repeat EKG, to be done with serial enzymes.    #Chronic pain/anxiety- iSTOP reference [id number] show pt on multiple narcotics from different providers including fentanyl last dispensed end of 8/2018. Unclear why pt on such an aggressive pain regimen.  Will resume alprazolam to prevent withdrawal seizures.  Pain control with Tylenol and Percocet for now as pt had recent toe fractures, but pt not reporting severe pain to warrant above regimen.  Monitor for opioid withdrawal.    #Medication management- unable to clarify pt med regimen.  Primary/day team to call pharmacy ([facility] in [place] [phone]) in AM to find out last drugs dispensed and should inquire about pharmacist concerns in dispensing ART regimen. 29 y/o M hx HIV, not compliant with ART, ESRD on HD (M,W,F), seizure disorder presenting with intermittent chest pain for 1 week.  Pt reports primarily left sided chest pain that he describes as sharp and stabbing. The pain occasionally radiating across his chest to the right arm.  The episodes last for a few hours and sometimes improves with aspirin use.  Chest pain can occur at rest or with activity.  Sometimes he becomes diaphoretic and nauseous with episodes.  Regarding his HIV, Pt reports non-compliance with ART due to his pharmacist not dispensing the meds due to numerous interactions.  Of note, pt also endorsing chronic, non-bloody diarrhea for several months.    #Atypical chest pain- high risk for ACS given ESRD status.  Ddx also includes pericarditis but admission EKG not supportive. Admit to telemetry.  Plan for serial enzymes with repeat EKG.  Ordered ASA.  Routine cardiology consult with [place] for AM.    #Chronic diarrhea- unclear etiology as CT negative for colitis, but given immunosuppressed state, parasitic infections on differential.  Plan to order stool count, cultures, ova and parasites.     #Elevated troponin- has been previously elevated, suspect related to renal insufficiency but given reports of chest pain need to rule out ACS as above.    #Pancytopenia- likely due to myelosuppression from HIV.  No indication for transfusions at this time.     #Elevated alkaline phosphatase- likely related to renal osteodystrophy seen on CT.  GGT in AM to rule out hepatobiliary etiology.    #ESRD on HD- last HD was on 9/29 per patient.  Nephrology consult for HD planning while inpatient.    #Seizure disorder- resume Keppra 250mg BID and 500mg post HD days.    #HIV/AIDs- Last CD4 73 in 8/2018.  Pt reports non-compliance with ART because of inability to dispense from pharmacy because of numerous interaction.  During last hospitalization, ID saw pt and recommend etravirine, dolutegravir, ritonavir, darunavir, lamivudine. Unclear if ART should be resumed during hospitalization given risk of increased resistance with repeated non-compliance.  ID consult to comment on these interactions and if ART should be resumed on outpatient basis as opposed to only during hospitalizations.  CD4 and viral load in AM.  Holding azithromycin given prolonged QTc on admission EKG.    #Prolonged QTC- Repeat EKG, to be done with serial enzymes.    #Hypertension- normotensive on admission, unclear what BP meds pt is on at home.  Primary/day team to call pharmacy as below.    #Chronic pain/anxiety- iSTOP reference [id number] show pt on multiple narcotics from different providers including fentanyl last dispensed end of 8/2018. Unclear why pt on such an aggressive pain regimen.  Will resume alprazolam to prevent withdrawal seizures.  Pain control with Tylenol and Percocet for now as pt had recent toe fractures, but pt not reporting severe pain to warrant above regimen.  Monitor for opioid withdrawal.    #Medication management- unable to clarify med regimen as pt has no med list and does not recall what he is taking besides Keppra. He denies taking any ART for his HIV. Primary/day team to call pharmacy ([facility] in [place] [phone]) in AM to find out last drugs dispensed and should inquire about pharmacist exact concerns in dispensing ART regimen. 27 y/o M hx HIV, not compliant with ART, ESRD on HD (M,W,F), seizure disorder presenting with intermittent chest pain for 1 week.  Pt reports primarily left sided chest pain that he describes as sharp and stabbing. The pain occasionally radiating across his chest to the right arm.  The episodes last for a few hours and sometimes improves with aspirin use.  Chest pain can occur at rest or with activity.  Sometimes he becomes diaphoretic and nauseous with episodes.  Regarding his HIV, Pt reports non-compliance with ART due to his pharmacist not dispensing the meds due to numerous interactions.  Of note, pt also endorsing chronic, non-bloody diarrhea for several months.    #Atypical chest pain- high risk for ACS given ESRD status.  Ddx also includes pericarditis but admission EKG not supportive. Admit to telemetry.  Plan for serial enzymes with repeat EKG.  Ordered ASA.  Routine cardiology consult with [place] for AM.  TTE and lipid panel ordered.    #Chronic diarrhea- unclear etiology as CT negative for colitis, but given immunosuppressed state, parasitic infections on differential.  Plan to order stool count, cultures, ova and parasites.     #Elevated troponin- has been previously elevated, suspect related to renal insufficiency but given reports of chest pain need to rule out ACS as above.    #Pancytopenia- likely due to myelosuppression from HIV.  No indication for transfusions at this time.     #Elevated alkaline phosphatase- likely related to renal osteodystrophy seen on CT.  GGT in AM to rule out hepatobiliary etiology.    #Prolonged QTC-  QTc of 499 on admission EKG.  Repeat EKG with repeat enzymes.    #ESRD on HD- last HD was on 9/29 per patient.  Nephrology consult for HD planning while inpatient.    #HIV/AIDs- Last CD4 73 in 8/2018.  Pt reports non-compliance with ART because of inability to dispense from pharmacy because of numerous interaction.  During last hospitalization, ID saw pt and recommend etravirine, dolutegravir, ritonavir, darunavir, lamivudine. Unclear if ART should be resumed during hospitalization given risk of increased resistance with repeated non-compliance.  ID consult to comment on these interactions and if ART should be resumed on outpatient basis as opposed to only during hospitalizations.  CD4 and viral load in AM.  Holding azithromycin given prolonged QTc on admission EKG.    #Seizure disorder- resume Keppra 250mg BID and 500mg post HD days.    #Hypertension- normotensive on admission, unclear what BP meds pt is on at home.  Primary/day team to call pharmacy as below.    #Chronic pain/anxiety- iSTOP reference [id number] show pt on multiple narcotics from different providers including fentanyl last dispensed end of 8/2018. Unclear why pt on such an aggressive pain regimen.  Will resume alprazolam to prevent withdrawal seizures.  Pain control with Tylenol and Percocet for now as pt had recent toe fractures, but pt not reporting severe pain to warrant above regimen.  Monitor for opioid withdrawal.    #Medication management- unable to clarify med regimen as pt has no med list and does not recall what he is taking besides Keppra. He denies taking any ART for his HIV. Primary/day team to call pharmacy ([facility] in [place] [phone]) in AM to find out last drugs dispensed and should inquire about pharmacist exact concerns in dispensing ART regimen.

## 2018-09-28 NOTE — H&P ADULT - NSHPSOCIALHISTORY_GEN_ALL_CORE
Patient lives at home with family.  Denies the use of alcohol, tobacco and drug use. Single, currently unemployed, lives with adopted mother and brother. Social alcohol use.  Denies tobacco use.

## 2018-09-28 NOTE — ED PROVIDER NOTE - MUSCULOSKELETAL, MLM
Spine appears normal, range of motion is not limited, no muscle. Pain with ROM of right foot; pain with palpation to 5th metatarsal; pedal pulse present.

## 2018-09-28 NOTE — H&P ADULT - NSHPPHYSICALEXAM_GEN_ALL_CORE
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 Vital Sign  T(F): 98 (28 Sep 2018 23:39), Max: 98.3 (28 Sep 2018 16:09)  HR: 89 (28 Sep 2018 23:39) (89 - 107)  BP: 116/79 (28 Sep 2018 23:39) (111/76 - 116/79)  RR: 20 (28 Sep 2018 23:39) (20 - 20)  SpO2: 99% (28 Sep 2018 23:39) (99% - 99%)    GENERAL:  Well-appearing young male, not in acute distress  EYES:  Clear conjunctiva, exotropia of both eyes noted  ENT: Moist mucous membranes  RESP:  Non-labored breathing pattern, lungs clear to ausculation   CV: Regular rate and rhythm, no murmurs appreciated, no lower extremity edema  GI: Soft, non-tender, non-distended  NEURO: Awake, alert, conversant, upper and lower extremity strength 5/5, light touch sensation grossly intact  PSYCH: Calm, cooperative  SKIN: No rash or lesions, warm and dry Vital Sign  T(F): 98 (28 Sep 2018 23:39), Max: 98.3 (28 Sep 2018 16:09)  HR: 89 (28 Sep 2018 23:39) (89 - 107)  BP: 116/79 (28 Sep 2018 23:39) (111/76 - 116/79)  RR: 20 (28 Sep 2018 23:39) (20 - 20)  SpO2: 99% (28 Sep 2018 23:39) (99% - 99%)    GENERAL:  Well-appearing young male, not in acute distress  EYES:  Clear conjunctiva, exotropia of both eyes noted  ENT: Moist mucous membranes  RESP:  Non-labored breathing pattern, lungs clear to ausculation   CV: Regular rate and rhythm, no murmurs appreciated, no lower extremity edema, RUE AVF with palpable thrill  GI: Soft, non-tender, mildly tender to palpation in epigastric region, no rebound tenderness  NEURO: Awake, alert, conversant, upper and lower extremity strength 5/5, light touch sensation grossly intact  PSYCH: Calm, cooperative  SKIN: No rash or lesions, warm and dry Vital Sign  T(F): 98 (28 Sep 2018 23:39), Max: 98.3 (28 Sep 2018 16:09)  HR: 89 (28 Sep 2018 23:39) (89 - 107)  BP: 116/79 (28 Sep 2018 23:39) (111/76 - 116/79)  RR: 20 (28 Sep 2018 23:39) (20 - 20)  SpO2: 99% (28 Sep 2018 23:39) (99% - 99%)    GENERAL:  Well-appearing young male, not in acute distress  EYES:  Clear conjunctiva, exotropia of both eyes noted  ENT: Moist mucous membranes  RESP:  Non-labored breathing pattern, lungs clear to ausculation   CV: Regular rate and rhythm, no murmurs appreciated, no lower extremity edema, RUE AVF with palpable thrill  GI: Soft, non-tender, mildly tender to palpation in epigastric region, no rebound tenderness  NEURO: Awake, alert, conversant, upper and lower extremity strength 5/5, light touch sensation grossly intact  PSYCH: Calm, cooperative  SKIN: Dry skin, excoriations from scratching

## 2018-09-28 NOTE — H&P ADULT - ASSESSMENT
29 yo male with a pmh HIV, dilated cardiomyopathy, renal failure, and seizures present with a week of chest pain and n/v. Patient will be admitted to R/O acs 29 yo male with a pmh HIV, non-compliant with ART, ESRD on HD (M, W, F) and seizures presenting with intermittent chest pain for a week, being admitted for atypical chest pain and rule out ACS, also reporting chronic diarrhea

## 2018-09-28 NOTE — ED PROVIDER NOTE - MEDICAL DECISION MAKING DETAILS
27 yo male presents with multiple complaints and noncompliance to antiviral med. Will perform labs, ecg, chest xray, CT abdomen/pelvis, and CT head. Reevaluate.

## 2018-09-28 NOTE — H&P ADULT - PROBLEM SELECTOR PLAN 1
Admit patient to medicine service. Under Dr. Mcgovern services. Diet DASH- renal. IVF none.   Patient will be admitted to r/o ACS  - Elevated tropin x1 2nd and 3rd set pending, etiology do not highly suspect ACS at this time. Elevated troponin suspect due to ESRD  - ASA and Statin initiated.   - EKG performed- no evidence of ischemia, Repeat EKG pending for AM  - TTE for Am  - cardiology consult appreciate. Admit patient to medicine service. Under Dr. Mcgovern services. Diet DASH- renal. IVF none.   Patient will be admitted to r/o ACS  - Elevated tropin x1 2nd and 3rd set pending, etiology do not highly suspect ACS at this time. Elevated troponin suspect due to ESRD  - ASA   - EKG performed- no evidence of ischemia, Repeat EKG pending for AM  - TTE- for Am

## 2018-09-28 NOTE — H&P ADULT - NSHPLABSRESULTS_GEN_ALL_CORE
11.6   2.1   )-----------( 75       ( 28 Sep 2018 19:01 )             36.1     28 Sep 2018 19:01    135    |  90     |  41.0   ----------------------------<  80     3.9     |  29.0   |  6.75     Ca    8.6        28 Sep 2018 19:01    TPro  7.6    /  Alb  4.2    /  TBili  0.4    /  DBili  x      /  AST  27     /  ALT  16     /  AlkPhos  568    28 Sep 2018 19:01    LIVER FUNCTIONS - ( 28 Sep 2018 19:01 )  Alb: 4.2 g/dL / Pro: 7.6 g/dL / ALK PHOS: 568 U/L / ALT: 16 U/L / AST: 27 U/L / GGT: x           PT/INR - ( 28 Sep 2018 19:01 )   PT: 11.5 sec;   INR: 1.04 ratio         PTT - ( 28 Sep 2018 19:01 )  PTT:31.5 sec  CAPILLARY BLOOD GLUCOSE        CARDIAC MARKERS ( 28 Sep 2018 19:01 )  x     / 0.11 ng/mL / x     / x     / x          < from: CT Head No Cont (09.28.18 @ 20:07) >      IMPRESSION:     No acute findings on noncontrast CT of the brain. Unchanged diffuse   osseous   abnormality as before.         < from: CT Abdomen and Pelvis w/ Oral Cont (09.28.18 @ 20:07) >    Prostate and seminal vesicles within normal limits.    There are no retroperitoneal masses or abnormal lymphadenopathy.  Wasn't timed vessel and the arterial diffuse calcifications noted.   Diffuse sclerosis of the vertebra indicate renal osteodystrophy.   IMPRESSION:     Renal atrophy with renal osteodystrophy.  No bowel obstruction or inflammatory bowel disease.  Mild hepatosplenomegaly.  Remaining abdominal pelvic viscera unremarkable.  .        ORLANDO MUIR M.D., ATTENDING RADIOLOGIST    EKG: Normal sinus rhythm, with QT prolongation 11.6   2.1   )-----------( 75       ( 28 Sep 2018 19:01 )             36.1     28 Sep 2018 19:01    135    |  90     |  41.0   ----------------------------<  80     3.9     |  29.0   |  6.75     Ca    8.6        28 Sep 2018 19:01    TPro  7.6    /  Alb  4.2    /  TBili  0.4    /  DBili  x      /  AST  27     /  ALT  16     /  AlkPhos  568    28 Sep 2018 19:01    LIVER FUNCTIONS - ( 28 Sep 2018 19:01 )  Alb: 4.2 g/dL / Pro: 7.6 g/dL / ALK PHOS: 568 U/L / ALT: 16 U/L / AST: 27 U/L / GGT: x           PT/INR - ( 28 Sep 2018 19:01 )   PT: 11.5 sec;   INR: 1.04 ratio         PTT - ( 28 Sep 2018 19:01 )  PTT:31.5 sec  CAPILLARY BLOOD GLUCOSE        CARDIAC MARKERS ( 28 Sep 2018 19:01 )  x     / 0.11 ng/mL / x     / x     / x          < from: CT Head No Cont (09.28.18 @ 20:07) >      IMPRESSION:     No acute findings on noncontrast CT of the brain. Unchanged diffuse   osseous   abnormality as before.         < from: CT Abdomen and Pelvis w/ Oral Cont (09.28.18 @ 20:07) >    Prostate and seminal vesicles within normal limits.    There are no retroperitoneal masses or abnormal lymphadenopathy.  Wasn't timed vessel and the arterial diffuse calcifications noted.   Diffuse sclerosis of the vertebra indicate renal osteodystrophy.   IMPRESSION:     Renal atrophy with renal osteodystrophy.  No bowel obstruction or inflammatory bowel disease.  Mild hepatosplenomegaly.  Remaining abdominal pelvic viscera unremarkable.  .    ORLANDO MUIR M.D., ATTENDING RADIOLOGIST    EKG: Normal sinus rhythm, HR 76, QTc 499, no ST-T changes

## 2018-09-28 NOTE — ED ADULT TRIAGE NOTE - CHIEF COMPLAINT QUOTE
Patient brought in by ambulance A/Ox3, sent from [facility], patient was c/o productive cough, chest pains, and hot/cold sweats for one week.

## 2018-09-28 NOTE — ED PROVIDER NOTE - ATTENDING CONTRIBUTION TO CARE
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 He report that he has not been on his HIV meds for over a year. He had worsening headache, memory loss, chest pain, epigastric pain, nuasea, vomiting, and generalized body ache. He denies fever. On exam patient comfortable, clear lung, cards RRR, epigastic tendnerss, fistula on right arm with thrill. Patient is getting blood work, CT head to r/o mass, CXR to r/o pneumonia, and CT abdomen to r/o acute abdomen.

## 2018-09-28 NOTE — H&P ADULT - PROBLEM SELECTOR PLAN 2
CD4 count on previous admission was noted to be 9  CD4 count and Viral load pending  - Patient current not on medication  - pancytopenia 2/2 due to AIDS  - will start patient on prophylaxis medication  -Infectious disease consult CD4 count on previous admission was noted to be 73  CD4 count and Viral load pending  - Patient current not on medication  - pancytopenia 2/2 due to AIDS  - will start patient on prophylaxis medication  -Infectious disease consult CD4 count on previous admission was noted to be 73  - Patient current not on medication  - pancytopenia 2/2 due to AIDS  - will start patient on prophylaxis medication  -Infectious disease consult

## 2018-09-28 NOTE — ED PROVIDER NOTE - OBJECTIVE STATEMENT
27 yo male with a [facility]           Nephrologist: Dr. Jiménez 27 yo male with a pmh HIV, dilated cardiomyopathy, renal failure, and seizures present with a week of chest pain and n/v. Pt states that he lives with a certain amount of pain and n/v but this week it has started to worsen. The pain it to his right chest and radiates to his head and right arm. Dialysis port is located in right arm. The pain is intermittent and sharp when present. He has been having a diffuse rash and itching to his arms and complains of a new cough. He also complain of having memory loss for about a month. He is not aware of his CD4 count and he had not been on his antiviral medication since January.     Nephrologist: Dr. Tino Shore

## 2018-09-29 DIAGNOSIS — E11.9 TYPE 2 DIABETES MELLITUS WITHOUT COMPLICATIONS: ICD-10-CM

## 2018-09-29 DIAGNOSIS — B20 HUMAN IMMUNODEFICIENCY VIRUS [HIV] DISEASE: ICD-10-CM

## 2018-09-29 DIAGNOSIS — Z29.9 ENCOUNTER FOR PROPHYLACTIC MEASURES, UNSPECIFIED: ICD-10-CM

## 2018-09-29 DIAGNOSIS — N18.6 END STAGE RENAL DISEASE: ICD-10-CM

## 2018-09-29 DIAGNOSIS — I10 ESSENTIAL (PRIMARY) HYPERTENSION: ICD-10-CM

## 2018-09-29 DIAGNOSIS — R19.7 DIARRHEA, UNSPECIFIED: ICD-10-CM

## 2018-09-29 DIAGNOSIS — R07.9 CHEST PAIN, UNSPECIFIED: ICD-10-CM

## 2018-09-29 DIAGNOSIS — G40.909 EPILEPSY, UNSPECIFIED, NOT INTRACTABLE, WITHOUT STATUS EPILEPTICUS: ICD-10-CM

## 2018-09-29 DIAGNOSIS — Z79.899 OTHER LONG TERM (CURRENT) DRUG THERAPY: ICD-10-CM

## 2018-09-29 LAB
ANION GAP SERPL CALC-SCNC: 14 MMOL/L — SIGNIFICANT CHANGE UP (ref 5–17)
BUN SERPL-MCNC: 59 MG/DL — HIGH (ref 8–20)
CALCIUM SERPL-MCNC: 8.4 MG/DL — LOW (ref 8.6–10.2)
CHLORIDE SERPL-SCNC: 92 MMOL/L — LOW (ref 98–107)
CHOLEST SERPL-MCNC: 110 MG/DL — SIGNIFICANT CHANGE UP (ref 110–199)
CK SERPL-CCNC: 60 U/L — SIGNIFICANT CHANGE UP (ref 30–200)
CO2 SERPL-SCNC: 29 MMOL/L — SIGNIFICANT CHANGE UP (ref 22–29)
CREAT SERPL-MCNC: 9.25 MG/DL — HIGH (ref 0.5–1.3)
EOSINOPHIL # BLD AUTO: 0.1 K/UL — SIGNIFICANT CHANGE UP (ref 0–0.5)
EOSINOPHIL NFR BLD AUTO: 6.4 % — HIGH (ref 0–5)
GGT SERPL-CCNC: 29 U/L — SIGNIFICANT CHANGE UP (ref 8–61)
GLUCOSE BLDC GLUCOMTR-MCNC: 85 MG/DL — SIGNIFICANT CHANGE UP (ref 70–99)
GLUCOSE SERPL-MCNC: 89 MG/DL — SIGNIFICANT CHANGE UP (ref 70–115)
HCT VFR BLD CALC: 33.2 % — LOW (ref 42–52)
HDLC SERPL-MCNC: 42 MG/DL — SIGNIFICANT CHANGE UP
HGB BLD-MCNC: 10.4 G/DL — LOW (ref 14–18)
LIPID PNL WITH DIRECT LDL SERPL: 45 MG/DL — SIGNIFICANT CHANGE UP
LYMPHOCYTES # BLD AUTO: 0.7 K/UL — LOW (ref 1–4.8)
LYMPHOCYTES # BLD AUTO: 48.9 % — SIGNIFICANT CHANGE UP (ref 20–55)
MAGNESIUM SERPL-MCNC: 2.4 MG/DL — SIGNIFICANT CHANGE UP (ref 1.6–2.6)
MCHC RBC-ENTMCNC: 29.6 PG — SIGNIFICANT CHANGE UP (ref 27–31)
MCHC RBC-ENTMCNC: 31.3 G/DL — LOW (ref 32–36)
MCV RBC AUTO: 94.6 FL — HIGH (ref 80–94)
MONOCYTES # BLD AUTO: 0.2 K/UL — SIGNIFICANT CHANGE UP (ref 0–0.8)
MONOCYTES NFR BLD AUTO: 12.1 % — HIGH (ref 3–10)
NEUTROPHILS # BLD AUTO: 0.5 K/UL — LOW (ref 1.8–8)
NEUTROPHILS NFR BLD AUTO: 32.6 % — LOW (ref 37–73)
PHOSPHATE SERPL-MCNC: 7 MG/DL — HIGH (ref 2.4–4.7)
PLATELET # BLD AUTO: 59 K/UL — LOW (ref 150–400)
POTASSIUM SERPL-MCNC: 4.4 MMOL/L — SIGNIFICANT CHANGE UP (ref 3.5–5.3)
POTASSIUM SERPL-SCNC: 4.4 MMOL/L — SIGNIFICANT CHANGE UP (ref 3.5–5.3)
RBC # BLD: 3.51 M/UL — LOW (ref 4.6–6.2)
RBC # FLD: 15.7 % — HIGH (ref 11–15.6)
SODIUM SERPL-SCNC: 135 MMOL/L — SIGNIFICANT CHANGE UP (ref 135–145)
TOTAL CHOLESTEROL/HDL RATIO MEASUREMENT: 3 RATIO — LOW (ref 3.4–9.6)
TRIGL SERPL-MCNC: 115 MG/DL — SIGNIFICANT CHANGE UP (ref 10–200)
TROPONIN T SERPL-MCNC: 0.12 NG/ML — HIGH (ref 0–0.06)
TROPONIN T SERPL-MCNC: 0.15 NG/ML — HIGH (ref 0–0.06)
WBC # BLD: 1.4 K/UL — LOW (ref 4.8–10.8)
WBC # FLD AUTO: 1.4 K/UL — LOW (ref 4.8–10.8)

## 2018-09-29 PROCEDURE — 71045 X-RAY EXAM CHEST 1 VIEW: CPT | Mod: 26

## 2018-09-29 PROCEDURE — 93010 ELECTROCARDIOGRAM REPORT: CPT

## 2018-09-29 PROCEDURE — 99223 1ST HOSP IP/OBS HIGH 75: CPT

## 2018-09-29 PROCEDURE — 99233 SBSQ HOSP IP/OBS HIGH 50: CPT | Mod: GC

## 2018-09-29 RX ORDER — ATORVASTATIN CALCIUM 80 MG/1
40 TABLET, FILM COATED ORAL AT BEDTIME
Qty: 0 | Refills: 0 | Status: DISCONTINUED | OUTPATIENT
Start: 2018-09-29 | End: 2018-09-29

## 2018-09-29 RX ORDER — SODIUM CHLORIDE 9 MG/ML
1000 INJECTION, SOLUTION INTRAVENOUS
Qty: 0 | Refills: 0 | Status: DISCONTINUED | OUTPATIENT
Start: 2018-09-29 | End: 2018-09-29

## 2018-09-29 RX ORDER — LEVETIRACETAM 250 MG/1
500 TABLET, FILM COATED ORAL
Qty: 0 | Refills: 0 | Status: DISCONTINUED | OUTPATIENT
Start: 2018-09-29 | End: 2018-09-30

## 2018-09-29 RX ORDER — OXYCODONE AND ACETAMINOPHEN 5; 325 MG/1; MG/1
1 TABLET ORAL EVERY 6 HOURS
Qty: 0 | Refills: 0 | Status: DISCONTINUED | OUTPATIENT
Start: 2018-09-29 | End: 2018-09-30

## 2018-09-29 RX ORDER — DEXTROSE 50 % IN WATER 50 %
25 SYRINGE (ML) INTRAVENOUS ONCE
Qty: 0 | Refills: 0 | Status: DISCONTINUED | OUTPATIENT
Start: 2018-09-29 | End: 2018-09-29

## 2018-09-29 RX ORDER — LEVETIRACETAM 250 MG/1
250 TABLET, FILM COATED ORAL
Qty: 0 | Refills: 0 | Status: DISCONTINUED | OUTPATIENT
Start: 2018-09-29 | End: 2018-09-30

## 2018-09-29 RX ORDER — ASPIRIN/CALCIUM CARB/MAGNESIUM 324 MG
81 TABLET ORAL DAILY
Qty: 0 | Refills: 0 | Status: DISCONTINUED | OUTPATIENT
Start: 2018-09-30 | End: 2018-09-30

## 2018-09-29 RX ORDER — ACETAMINOPHEN 500 MG
650 TABLET ORAL EVERY 6 HOURS
Qty: 0 | Refills: 0 | Status: DISCONTINUED | OUTPATIENT
Start: 2018-09-29 | End: 2018-09-30

## 2018-09-29 RX ORDER — OXYCODONE AND ACETAMINOPHEN 5; 325 MG/1; MG/1
2 TABLET ORAL EVERY 8 HOURS
Qty: 0 | Refills: 0 | Status: DISCONTINUED | OUTPATIENT
Start: 2018-09-29 | End: 2018-09-30

## 2018-09-29 RX ORDER — GLUCAGON INJECTION, SOLUTION 0.5 MG/.1ML
1 INJECTION, SOLUTION SUBCUTANEOUS ONCE
Qty: 0 | Refills: 0 | Status: DISCONTINUED | OUTPATIENT
Start: 2018-09-29 | End: 2018-09-29

## 2018-09-29 RX ORDER — ASPIRIN/CALCIUM CARB/MAGNESIUM 324 MG
325 TABLET ORAL DAILY
Qty: 0 | Refills: 0 | Status: DISCONTINUED | OUTPATIENT
Start: 2018-09-29 | End: 2018-09-29

## 2018-09-29 RX ORDER — DEXTROSE 50 % IN WATER 50 %
15 SYRINGE (ML) INTRAVENOUS ONCE
Qty: 0 | Refills: 0 | Status: DISCONTINUED | OUTPATIENT
Start: 2018-09-29 | End: 2018-09-29

## 2018-09-29 RX ORDER — ALPRAZOLAM 0.25 MG
2 TABLET ORAL EVERY 8 HOURS
Qty: 0 | Refills: 0 | Status: DISCONTINUED | OUTPATIENT
Start: 2018-09-29 | End: 2018-09-30

## 2018-09-29 RX ORDER — ASPIRIN/CALCIUM CARB/MAGNESIUM 324 MG
325 TABLET ORAL ONCE
Qty: 0 | Refills: 0 | Status: COMPLETED | OUTPATIENT
Start: 2018-09-29 | End: 2018-09-29

## 2018-09-29 RX ORDER — ATOVAQUONE 750 MG/5ML
1500 SUSPENSION ORAL DAILY
Qty: 0 | Refills: 0 | Status: DISCONTINUED | OUTPATIENT
Start: 2018-09-29 | End: 2018-09-30

## 2018-09-29 RX ORDER — DEXTROSE 50 % IN WATER 50 %
12.5 SYRINGE (ML) INTRAVENOUS ONCE
Qty: 0 | Refills: 0 | Status: DISCONTINUED | OUTPATIENT
Start: 2018-09-29 | End: 2018-09-29

## 2018-09-29 RX ORDER — ACETAMINOPHEN 500 MG
650 TABLET ORAL EVERY 6 HOURS
Qty: 0 | Refills: 0 | Status: DISCONTINUED | OUTPATIENT
Start: 2018-09-29 | End: 2018-09-29

## 2018-09-29 RX ORDER — INSULIN LISPRO 100/ML
VIAL (ML) SUBCUTANEOUS
Qty: 0 | Refills: 0 | Status: DISCONTINUED | OUTPATIENT
Start: 2018-09-29 | End: 2018-09-29

## 2018-09-29 RX ORDER — HEPARIN SODIUM 5000 [USP'U]/ML
5000 INJECTION INTRAVENOUS; SUBCUTANEOUS EVERY 12 HOURS
Qty: 0 | Refills: 0 | Status: DISCONTINUED | OUTPATIENT
Start: 2018-09-29 | End: 2018-09-30

## 2018-09-29 RX ORDER — DIPHENHYDRAMINE HCL 50 MG
25 CAPSULE ORAL ONCE
Qty: 0 | Refills: 0 | Status: COMPLETED | OUTPATIENT
Start: 2018-09-29 | End: 2018-09-29

## 2018-09-29 RX ADMIN — ATOVAQUONE 1500 MILLIGRAM(S): 750 SUSPENSION ORAL at 13:21

## 2018-09-29 RX ADMIN — Medication 2 MILLIGRAM(S): at 13:20

## 2018-09-29 RX ADMIN — HEPARIN SODIUM 5000 UNIT(S): 5000 INJECTION INTRAVENOUS; SUBCUTANEOUS at 06:52

## 2018-09-29 RX ADMIN — OXYCODONE AND ACETAMINOPHEN 2 TABLET(S): 5; 325 TABLET ORAL at 03:07

## 2018-09-29 RX ADMIN — OXYCODONE AND ACETAMINOPHEN 2 TABLET(S): 5; 325 TABLET ORAL at 02:17

## 2018-09-29 RX ADMIN — Medication 325 MILLIGRAM(S): at 01:05

## 2018-09-29 RX ADMIN — OXYCODONE AND ACETAMINOPHEN 1 TABLET(S): 5; 325 TABLET ORAL at 06:57

## 2018-09-29 RX ADMIN — OXYCODONE AND ACETAMINOPHEN 1 TABLET(S): 5; 325 TABLET ORAL at 07:28

## 2018-09-29 RX ADMIN — OXYCODONE AND ACETAMINOPHEN 2 TABLET(S): 5; 325 TABLET ORAL at 13:26

## 2018-09-29 RX ADMIN — LEVETIRACETAM 250 MILLIGRAM(S): 250 TABLET, FILM COATED ORAL at 06:51

## 2018-09-29 RX ADMIN — LEVETIRACETAM 250 MILLIGRAM(S): 250 TABLET, FILM COATED ORAL at 18:20

## 2018-09-29 RX ADMIN — Medication 25 MILLIGRAM(S): at 03:59

## 2018-09-29 NOTE — ED ADULT NURSE NOTE - NSIMPLEMENTINTERV_GEN_ALL_ED
Implemented All Universal Safety Interventions:  [place] to call system. Call bell, personal items and telephone within reach. Instruct patient to call for assistance. Room bathroom lighting operational. Non-slip footwear when patient is off stretcher. Physically safe environment: no spills, clutter or unnecessary equipment. Stretcher in lowest position, wheels locked, appropriate side rails in place.

## 2018-09-29 NOTE — PROGRESS NOTE ADULT - PROBLEM SELECTOR PLAN 4
Dialysis schedule MWF  underwent HD 9/28/18  Nephrology consult Dr. Nam Dialysis schedule MWF  underwent HD 9/28/18/   benadryl po prn for chronic pruritis.   Nephrology consult Dr. Nam

## 2018-09-29 NOTE — PROGRESS NOTE ADULT - PROBLEM SELECTOR PLAN 5
Currently normotensive  - Unclear what medication patient is on- will call pharmacy in the morning, to confirm medication Currently normotensive  monitor

## 2018-09-29 NOTE — PROGRESS NOTE ADULT - PROBLEM SELECTOR PLAN 7
Patient is non-compliant  - will call patient PMD and pharmacy to confirm medications in the AM Patient is non-compliant  - will call patient PMD and pharmacy to confirm medications on  monday.

## 2018-09-29 NOTE — PROGRESS NOTE ADULT - PROBLEM SELECTOR PLAN 2
CD4 count on previous admission was noted to be 73  - Patient current not on medication  - pancytopenia 2/2 due to AIDS  - will not start ARV inpatient  - start atovaquon 1500mg daily for PCP prophylaxis   - Infectious disease consult appreicated CD4 count on previous admission was noted to be 73  - Patient current not on medication  - pancytopenia 2/2 due to AIDS  - will not start ARV inpatient as per ID as needs op pcp follow up first.   - start atovaquon 1500mg daily for PCP prophylaxis   - Infectious disease consult appreicated

## 2018-09-29 NOTE — CONSULT NOTE ADULT - ASSESSMENT
ESRD on HD MWF  Chest pain  HTN  Severe PVD  HIV with HIV Nephropathy    -No acute indication for additional dialysis to be done at this time  -Can maintain MWF schedule which can be resumed outpatient  -Cario eval noted; outpatient follow up recommended  -ID eval noted; no need for acute therapy  -Renal diet  -Hemodynamics are stable    Renally stable for DC    Thank you    D/W Dr. Durbin

## 2018-09-29 NOTE — ED ADULT NURSE NOTE - CHIEF COMPLAINT QUOTE
Patient brought in by ambulance A/Ox3, sent from [facility], patient was c/o productive cough, chest pains, and hot/cold sweats for one week.

## 2018-09-29 NOTE — PROGRESS NOTE ADULT - PROBLEM SELECTOR PLAN 1
Admitted to r/o ACS  - Elevated tropin x3, uptrending from 0.11 to 0.15, suspect due to ESRD, not highly suspect ACS at this time.    - c/w ASA   - EKG performed- no evidence of ischemia, Repeat EKG pending for AM without significant change  - TTE pending  - Cardiology consult noted, no clear evidence for pericarditis

## 2018-09-29 NOTE — PROGRESS NOTE ADULT - SUBJECTIVE AND OBJECTIVE BOX
Chart reviewed.    HD MWF.     Will place full consult note when the patient is evaluated. Thank you

## 2018-09-29 NOTE — CONSULT NOTE ADULT - SUBJECTIVE AND OBJECTIVE BOX
[facility]  INFECTIOUS DISEASES AND INTERNAL MEDICINE at [place]  =======================================================  Howie Navarro MD  Diplomates American Board of Internal Medicine and Infectious Diseases  =======================================================    [id number]  TEDDY CRAWFORD     CC: Chest pain/discomfort     HPI:  29 y/o man with a PMH of poorly controlled HIV due to nonadherence, ESRD and seizures was admitted with one week of chest pain and nausea/vomiting. ID was called due to his HIV status for ARV management. He is complaining of chronic diarrhea and GI symptoms. No cough, fever, SOB, or any other symptoms.   He is well known to me from past admissions. He doesn't follow with his PCP after discharge and stops his ARV due to problem with filling his scripts. As per him, his PCP is not available since 12/2017 so he was not able to see anyone to help him with med refill.   His genotype showed a highly resistant HIV virus, resistant to all classes of ARV. ID came up with a regimen hoping to suppress the virus until new meds are available but he never took the meds as outpt.      PAST MEDICAL & SURGICAL HISTORY:  Seizure disorder  Hypertension  ESRD (end stage renal disease)  Seizure  Pericarditis  Anxiety  Depression  Renal failure (ARF), acute on chronic: dialysis av fistula, RUE  HTN (hypertension)  Cardiomyopathy  HIV disease: born HIV+  AV fistula  S/P tonsillectomy    Social Hx: no smoking, ETOH or drugs    FAMILY HISTORY:  No pertinent family history in first degree relatives: Unknown FHx because pt is adopted    Allergies  vancomycin (Anaphylaxis)    Antibiotics:  None     REVIEW OF SYSTEMS:  CONSTITUTIONAL:  No Fever or chills  HEENT:  No diplopia or blurred vision.  No sore throat or runny nose.  CARDIOVASCULAR:  No chest pain or SOB.  RESPIRATORY:  No cough, shortness of breath, + chest pain  GASTROINTESTINAL:  No nausea, vomiting or diarrhea.  GENITOURINARY:  No dysuria, frequency or urgency. No Blood in urine  MUSCULOSKELETAL:  pain in right arm the right AVF   SKIN:  No change in skin, hair or nails.  NEUROLOGIC:  No paresthesias, fasciculations, seizures or weakness.  PSYCHIATRIC:  No disorder of thought or mood.  ENDOCRINE:  No heat or cold intolerance, polyuria or polydipsia.  HEMATOLOGICAL:  No easy bruising or bleeding.     Physical Exam:  Vital Signs Last 24 Hrs  T(C): 36.5 (29 Sep 2018 08:13), Max: 36.8 (28 Sep 2018 16:09)  T(F): 97.7 (29 Sep 2018 08:13), Max: 98.3 (28 Sep 2018 16:09)  HR: 70 (29 Sep 2018 08:13) (70 - 107)  BP: 118/80 (29 Sep 2018 08:13) (111/76 - 118/80)  BP(mean): 85 (29 Sep 2018 04:46) (85 - 85)  RR: 18 (29 Sep 2018 08:13) (18 - 20)  SpO2: 100% (29 Sep 2018 08:13) (97% - 100%)  Height (cm): 175.26 (09-28 @ 16:09)  Weight (kg): 72.6 (09-28 @ 16:09)  BMI (kg/m2): 23.6 (09-28 @ 16:09)  BSA (m2): 1.88 (09-28 @ 16:09)  GEN: NAD  HEENT: normocephalic and atraumatic. blind in both eyes  NECK: Supple.  No lymphadenopathy   LUNGS: Clear to auscultation.  HEART: Regular rate and rhythm without murmur.  ABDOMEN: Soft, nontender, and nondistended.  Positive bowel sounds.    : No CVA tenderness  EXTREMITIES: Without any cyanosis, clubbing, rash, lesions or edema. R arm AVF with erythema or sign of infection with good bruit  NEUROLOGIC: grossly intact.  SKIN: No ulceration or induration present.    Labs:  09-28    135  |  90<L>  |  41.0<H>  ----------------------------<  80  3.9   |  29.0  |  6.75<H>    Ca    8.6      28 Sep 2018 19:01    TPro  7.6  /  Alb  4.2  /  TBili  0.4  /  DBili  x   /  AST  27  /  ALT  16  /  AlkPhos  568<H>  09-28                       11.6   2.1   )-----------( 75       ( 28 Sep 2018 19:01 )             36.1     PT/INR - ( 28 Sep 2018 19:01 )   PT: 11.5 sec;   INR: 1.04 ratio    PTT - ( 28 Sep 2018 19:01 )  PTT:31.5 sec    LIVER FUNCTIONS - ( 28 Sep 2018 19:01 )  Alb: 4.2 g/dL / Pro: 7.6 g/dL / ALK PHOS: 568 U/L / ALT: 16 U/L / AST: 27 U/L / GGT: x           CARDIAC MARKERS ( 29 Sep 2018 03:14 )  x     / 0.12 ng/mL / x     / x     / x      CARDIAC MARKERS ( 28 Sep 2018 19:01 )  x     / 0.11 ng/mL / x     / x     / x        RECENT CULTURES:  None    All imaging and other data have been reviewed.

## 2018-09-29 NOTE — PROGRESS NOTE ADULT - ASSESSMENT
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

## 2018-09-29 NOTE — PROGRESS NOTE ADULT - SUBJECTIVE AND OBJECTIVE BOX
CC: Patient is a 28y old  Male who presents with a chief complaint of Chest pain/discomfort rule-out ACS (29 Sep 2018 12:52)    Patient seen and examined at bedside, No acute overnight events. Patient states his chest pain is improving. Patient requesting IV Benadryl.   Patient ambulating, eating well, voiding and last BM prior to admission.    ROS: Denies fever, SOB, abdominal pain, diarrhea, constipation, calf pain.    VS:   T(C): 36.5 (29 Sep 2018 08:13), Max: 36.8 (28 Sep 2018 16:09)  T(F): 97.7 (29 Sep 2018 08:13), Max: 98.3 (28 Sep 2018 16:09)  HR: 70 (29 Sep 2018 08:13) (70 - 107)  BP: 118/80 (29 Sep 2018 08:13) (111/76 - 118/80)  BP(mean): 85 (29 Sep 2018 04:46) (85 - 85)  RR: 18 (29 Sep 2018 08:13) (18 - 20)  SpO2: 100% (29 Sep 2018 08:13) (97% - 100%)      Physical Exam:   Gen: NAD  HEENT: NCAT, EOMI, PERRLA  CVS: RRR, +S1/S2, no murmurs, rubs or gallops appreciated  Lungs: CTAB, no wheeze, rales, rhonchi  Abdomen: +BS, soft, ND, NT. no palpable flank tenderness or mass, no CVA tenderness  Ext: No cyanosis, edema or calf tenderness,  RUE AVF with palpable thrill  Neuro: AAOx3, no focal deficits.    Labs:                        10.4   1.4   )-----------( 59       ( 29 Sep 2018 11:15 )             33.2   09-29    135  |  92<L>  |  59.0<H>  ----------------------------<  89  4.4   |  29.0  |  9.25<H>    Ca    8.4<L>      29 Sep 2018 11:15  Phos  7.0     09-29  Mg     2.4     09-29    TPro  7.6  /  Alb  4.2  /  TBili  0.4  /  DBili  x   /  AST  27  /  ALT  16  /  AlkPhos  568<H>  09-28    Radiology:  < from: Xray Chest 1 View- PORTABLE-Urgent (09.29.18 @ 01:16) >    IMPRESSION: No evidence of active chest disease.   Other findings as   noted above.     < from: CT Head No Cont (09.28.18 @ 20:07) >  IMPRESSION:     No acute findings on noncontrast CT of the brain. Unchanged diffuse   osseous   abnormality as before.     < from: CT Abdomen and Pelvis w/ Oral Cont (09.28.18 @ 20:07) >  IMPRESSION:     Renal atrophy with renal osteodystrophy.  No bowel obstruction or inflammatory bowel disease.  Mild hepatosplenomegaly.  Remaining abdominal pelvic viscera unremarkable.    Medications:  MEDICATIONS  (STANDING):  atovaquone Suspension 1500 milliGRAM(s) Oral daily  heparin  Injectable 5000 Unit(s) SubCutaneous every 12 hours  levETIRAcetam 250 milliGRAM(s) Oral two times a day  levETIRAcetam 500 milliGRAM(s) Oral <User Schedule>    MEDICATIONS  (PRN):  acetaminophen   Tablet .. 650 milliGRAM(s) Oral every 6 hours PRN Mild Pain (1 - 3)  ALPRAZolam 2 milliGRAM(s) Oral every 8 hours PRN Anxiety  oxyCODONE    5 mG/acetaminophen 325 mG 2 Tablet(s) Oral every 8 hours PRN Severe Pain (7 - 10)  oxyCODONE    5 mG/acetaminophen 325 mG 1 Tablet(s) Oral every 6 hours PRN Moderate Pain (4 - 6) CC: Patient is a 28y old  Male who presents with a chief complaint of Chest pain/discomfort rule-out ACS (29 Sep 2018 12:52)    Patient seen and examined at bedside, No acute overnight events. has vague complaint of ?cp/ worse with arm movement/ non radiating. also c/o abd pain / non specific in character. Patient requesting IV Benadryl for chronic pruritis.    Patient ambulating, eating well, voiding and last BM prior to admission.     ROS: Denies fever, SOB, abdominal pain, diarrhea, constipation, calf pain.    VS:   T(C): 36.5 (29 Sep 2018 08:13), Max: 36.8 (28 Sep 2018 16:09)  T(F): 97.7 (29 Sep 2018 08:13), Max: 98.3 (28 Sep 2018 16:09)  HR: 70 (29 Sep 2018 08:13) (70 - 107)  BP: 118/80 (29 Sep 2018 08:13) (111/76 - 118/80)  BP(mean): 85 (29 Sep 2018 04:46) (85 - 85)  RR: 18 (29 Sep 2018 08:13) (18 - 20)  SpO2: 100% (29 Sep 2018 08:13) (97% - 100%)      Physical Exam:   Gen: NAD  HEENT: NCAT, EOMI, patient legally blind b/l eyes   CVS: RRR, +S1/S2, no murmurs, rubs or gallops appreciated  Lungs: CTAB, no wheeze, rales, rhonchi. no reproducible cp on palpation   Abdomen: +BS, soft, ND, NT. no palpable flank tenderness or mass, no CVA tenderness  Ext: No cyanosis, edema or calf tenderness,  RUE AVF with palpable thrill  Neuro: AAOx3, no focal deficits.    Labs:                        10.4   1.4   )-----------( 59       ( 29 Sep 2018 11:15 )             33.2   09-29    135  |  92<L>  |  59.0<H>  ----------------------------<  89  4.4   |  29.0  |  9.25<H>    Ca    8.4<L>      29 Sep 2018 11:15  Phos  7.0     09-29  Mg     2.4     09-29    TPro  7.6  /  Alb  4.2  /  TBili  0.4  /  DBili  x   /  AST  27  /  ALT  16  /  AlkPhos  568<H>  09-28    Radiology:  < from: Xray Chest 1 View- PORTABLE-Urgent (09.29.18 @ 01:16) >    IMPRESSION: No evidence of active chest disease.   Other findings as   noted above.     < from: CT Head No Cont (09.28.18 @ 20:07) >  IMPRESSION:     No acute findings on noncontrast CT of the brain. Unchanged diffuse   osseous   abnormality as before.     < from: CT Abdomen and Pelvis w/ Oral Cont (09.28.18 @ 20:07) >  IMPRESSION:     Renal atrophy with renal osteodystrophy.  No bowel obstruction or inflammatory bowel disease.  Mild hepatosplenomegaly.  Remaining abdominal pelvic viscera unremarkable.    Medications:  MEDICATIONS  (STANDING):  atovaquone Suspension 1500 milliGRAM(s) Oral daily  heparin  Injectable 5000 Unit(s) SubCutaneous every 12 hours  levETIRAcetam 250 milliGRAM(s) Oral two times a day  levETIRAcetam 500 milliGRAM(s) Oral <User Schedule>    MEDICATIONS  (PRN):  acetaminophen   Tablet .. 650 milliGRAM(s) Oral every 6 hours PRN Mild Pain (1 - 3)  ALPRAZolam 2 milliGRAM(s) Oral every 8 hours PRN Anxiety  oxyCODONE    5 mG/acetaminophen 325 mG 2 Tablet(s) Oral every 8 hours PRN Severe Pain (7 - 10)  oxyCODONE    5 mG/acetaminophen 325 mG 1 Tablet(s) Oral every 6 hours PRN Moderate Pain (4 - 6)

## 2018-09-29 NOTE — CONSULT NOTE ADULT - ASSESSMENT
27 y/o man with a PMH of poorly controlled HIV due to nonadherence, ESRD and seizures was admitted with one week of chest pain and nausea/vomiting. ID was called due to his HIV status for ARV management. He is complaining of chronic diarrhea and GI symptoms. No cough, fever, SOB, or any other symptoms.   He is well known to me from past admissions. He doesn't follow with his PCP after discharge and stops his ARV due to problem with filling his scripts. As per him, his PCP is not available since 12/2017 so he was not able to see anyone to help him with med refill.   His genotype showed a highly resistant HIV virus, resistant to all classes of ARV. ID came up with a regimen hoping to suppress the virus until new meds are available but he never took the meds as outpt.      1-HIV:  -Will not start any ARV at this time.  -Needs to see a PCP as outpt to make sure he will take his ARV with appropriate follow up for repeat labs to evaluate his response to ARV since he has a very resistant virus.   -Can start atovaquon 1500mg daily for PCP prophylaxis since last CD4=73/9%    2-Diarrhea:  -Could be related to uncontrolled HIV    -Will rule out other causes by stool culture, cryptosporidium and giardia testing.     3-Chest pain:  -Most likely noninfectious and musculoskeletal type  -Needs cardiology work up and TTE to rule out pericarditis and pericardia effusion due to HIV.

## 2018-09-29 NOTE — ED ADULT NURSE NOTE - OBJECTIVE STATEMENT
Patient presents to ER complaining of abd pain radiating to chest, nausea and vomiting X 3 days, reports pain to right leg, resp even/unlabored, lungs CTAB.

## 2018-09-29 NOTE — CONSULT NOTE ADULT - SUBJECTIVE AND OBJECTIVE BOX
Patient is a 28y old  Male who presents with a chief complaint of Chest pain/discomfort (29 Sep 2018 10:46)   Acute  renal failure.    HPI:  27 yo male with a pmh HIV, ESRD(MWF) and seizures present with a week of chest pain and n/v. The patient states he has been having this chest pain for a week. Patient has notice it has become difficult to lay on his back. By laying on his back he notices his symptoms becomes worst. Patient has not taken any medication in attempts to alleviate his pain. Patient has longstanding hx of HIV. Patient has not taken his medication since January. Patient states, there was contraindication to taking the medication and was not able to see his doctor because she/he was dx of cancer.  Pt admits to diarrhea for several months, (28 Sep 2018 22:58)   Hx renal stones x1 not aware of type, no other renal  problems; elevated creatinine on admission.    PAST MEDICAL & SURGICAL HISTORY:  Seizure disorder  Hypertension  ESRD (end stage renal disease)  Seizure  Pericarditis  Anxiety  Depression  Renal failure (ARF), acute on chronic: dialysis av fistula, RUE  HTN (hypertension)  Cardiomyopathy  HIV disease: born HIV+  AV fistula  S/P tonsillectomy   DM 2, MO, hypothyroidism, prior DVT and IVC filter; Cholecystectomy    FAMILY HISTORY:  No pertinent family history in first degree relatives: Unknown FHx because pt is adopted  [place]    Social History:Non smoker    MEDICATIONS  (STANDING):  atovaquone Suspension 1500 milliGRAM(s) Oral daily  heparin  Injectable 5000 Unit(s) SubCutaneous every 12 hours  levETIRAcetam 250 milliGRAM(s) Oral two times a day  levETIRAcetam 500 milliGRAM(s) Oral <User Schedule>    MEDICATIONS  (PRN):  acetaminophen   Tablet .. 650 milliGRAM(s) Oral every 6 hours PRN Mild Pain (1 - 3)  ALPRAZolam 2 milliGRAM(s) Oral every 8 hours PRN Anxiety  oxyCODONE    5 mG/acetaminophen 325 mG 2 Tablet(s) Oral every 8 hours PRN Severe Pain (7 - 10)  oxyCODONE    5 mG/acetaminophen 325 mG 1 Tablet(s) Oral every 6 hours PRN Moderate Pain (4 - 6)   Meds reviewed    Allergies    vancomycin (Anaphylaxis)    Intolerances    prefers vanilla or butter pecan nepro x 2 TID RDOK (Unknown)       REVIEW OF SYSTEMS:    CONSTITUTIONAL:  neg  EYES: No eye pain, visual disturbances, or discharge  ENMT:  No difficulty hearing, tinnitus, vertigo; No sinus or throat pain  NECK: No pain or stiffness  BREASTS: No pain, masses, or nipple discharge  RESPIRATORY: neg  CARDIOVASCULAR: +chest pain, palpitations, dizziness,   GASTROINTESTINAL: No abdominal or epigastric pain. No nausea, vomiting, or hematemesis; + diarrhea . No melena  GENITOURINARY: No dysuria, frequency, hematuria, or incontinence  NEUROLOGICAL: No headaches, memory loss, loss of strength, numbness, or tremors  SKIN: no rashes  LYMPH NODES: No enlarged glands  ENDOCRINE: No heat or cold intolerance; No hair loss  MUSCULOSKELETAL: no edema  PSYCHIATRIC: No depression, anxiety, mood swings, or difficulty sleeping  HEME/LYMPH: No easy bruising, or bleeding gums  ALLERGY AND IMMUNOLOGIC: No hives or eczema    Vital Signs Last 24 Hrs  T(C): 36.5 (29 Sep 2018 08:13), Max: 36.8 (28 Sep 2018 16:09)  T(F): 97.7 (29 Sep 2018 08:13), Max: 98.3 (28 Sep 2018 16:09)  HR: 70 (29 Sep 2018 08:13) (70 - 107)  BP: 118/80 (29 Sep 2018 08:13) (111/76 - 118/80)  BP(mean): 85 (29 Sep 2018 04:46) (85 - 85)  RR: 18 (29 Sep 2018 08:13) (18 - 20)  SpO2: 100% (29 Sep 2018 08:13) (97% - 100%)  Daily Height in cm: 175.26 (28 Sep 2018 16:09)    Daily     PHYSICAL EXAM:    GENERAL: appears chronically ill, oriented.  HEAD:  Atraumatic, Normocephalic  EYES: Conjunctiva and sclera clear  ENMT: Moist mucous membranes, Good dentition, No lesions  NECK: Supple, neck veins full  NERVOUS SYSTEM:  Alert & Oriented X3, Good concentration; Motor Strength wnl upper and lower extremities  CHEST/LUNG: Clear to percussion bilaterally; No rales, rhonchi, wheezing, or rubs  HEART: Regular rate and rhythm; No murmurs, rubs, or gallops  ABDOMEN: Soft, Nontender, Nondistended; Bowel sounds present, body wall and flank edema  EXTREMITIES: No edema B/L; RUE Hypertrophic AVF with palpable thrill  LYMPH: No lymphadenopathy noted  SKIN: No rashes or lesions, pale    LABS:                        10.4   1.4   )-----------( 59       ( 29 Sep 2018 11:15 )             33.2     09-29    135  |  92<L>  |  59.0<H>  ----------------------------<  89  4.4   |  29.0  |  9.25<H>    Ca    8.4<L>      29 Sep 2018 11:15  Phos  7.0     09-29  Mg     2.4     09-29    TPro  7.6  /  Alb  4.2  /  TBili  0.4  /  DBili  x   /  AST  27  /  ALT  16  /  AlkPhos  568<H>  09-28    PT/INR - ( 28 Sep 2018 19:01 )   PT: 11.5 sec;   INR: 1.04 ratio         PTT - ( 28 Sep 2018 19:01 )  PTT:31.5 sec    Magnesium, Serum: 2.4 mg/dL (09-29 @ 11:15)  Phosphorus Level, Serum: 7.0 mg/dL (09-29 @ 11:15)        RADIOLOGY & ADDITIONAL TESTS:

## 2018-09-29 NOTE — CONSULT NOTE ADULT - SUBJECTIVE AND OBJECTIVE BOX
[facility]                                   [address] [place] [address]                                                      PHONE: [phone]                                                         FAX: [phone]  [email or web address]  ---------------------------------------------------------------------------------------------------------------------------------    Reason for Consult: chest pain     HPI:  TEDDY CRAWFORD is an 28y Male history of HIV none complaint with treatment CMV retinitis with loss of vision GERD ESRD on HD HTN seizure prior work up for nausea vomiting s/p GI work up gastritis atypical chest pain negative work up at [facility] 7/2018 now presents with sharp chest pain 5/10 reproducible B/L worse with movement not positional in nature.  Currently feeling well      PAST MEDICAL & SURGICAL HISTORY:  Seizure disorder  Hypertension  ESRD (end stage renal disease)  Seizure  Pericarditis  Anxiety  Depression  Renal failure (ARF), acute on chronic: dialysis av fistula, RUE  HTN (hypertension)  Cardiomyopathy  HIV disease: born HIV+  AV fistula  S/P tonsillectomy      prefers vanilla or butter pecan nepro x 2 TID RDOK (Unknown)  vancomycin (Anaphylaxis)      MEDICATIONS  (STANDING):  heparin  Injectable 5000 Unit(s) SubCutaneous every 12 hours  levETIRAcetam 250 milliGRAM(s) Oral two times a day  levETIRAcetam 500 milliGRAM(s) Oral <User Schedule>    MEDICATIONS  (PRN):  acetaminophen   Tablet .. 650 milliGRAM(s) Oral every 6 hours PRN Mild Pain (1 - 3)  ALPRAZolam 2 milliGRAM(s) Oral every 8 hours PRN Anxiety  oxyCODONE    5 mG/acetaminophen 325 mG 2 Tablet(s) Oral every 8 hours PRN Severe Pain (7 - 10)  oxyCODONE    5 mG/acetaminophen 325 mG 1 Tablet(s) Oral every 6 hours PRN Moderate Pain (4 - 6)      Social History:  Cigarettes:          none          Alchohol:         none        Illicit Drug Abuse:  none    ROS: Negative other than as mentioned in HPI.    Vital Signs Last 24 Hrs  T(C): 36.5 (29 Sep 2018 08:13), Max: 36.8 (28 Sep 2018 16:09)  T(F): 97.7 (29 Sep 2018 08:13), Max: 98.3 (28 Sep 2018 16:09)  HR: 70 (29 Sep 2018 08:13) (70 - 107)  BP: 118/80 (29 Sep 2018 08:13) (111/76 - 118/80)  BP(mean): 85 (29 Sep 2018 04:46) (85 - 85)  RR: 18 (29 Sep 2018 08:13) (18 - 20)  SpO2: 100% (29 Sep 2018 08:13) (97% - 100%)  ICU Vital Signs Last 24 Hrs  TEDDY CRAWFORD  I&O's Detail    I&O's Summary    Drug Dosing Weight  TEDDY WYMANA      PHYSICAL EXAM:  General: Appears well developed, well nourished alert and cooperative.  HEENT: Head; normocephalic, atraumatic.  Eyes: Pupils reactive, cornea wnl.  Neck: Supple, no nodes adenopathy, no NVD or carotid bruit or thyromegaly.  CARDIOVASCULAR: Normal S1 and S2, No murmur, rub, gallop or lift.   LUNGS: No rales, rhonchi or wheeze. Normal breath sounds bilaterally.  ABDOMEN: Soft, nontender without mass or organomegaly. bowel sounds normoactive.  EXTREMITIES: No clubbing, cyanosis or edema. Distal pulses wnl.   SKIN: warm and dry with normal turgor.  NEURO: Alert/oriented x 3/normal motor exam. No pathologic reflexes.    PSYCH: normal affect.        LABS:                        11.6   2.1   )-----------( 75       ( 28 Sep 2018 19:01 )             36.1     09-28    135  |  90<L>  |  41.0<H>  ----------------------------<  80  3.9   |  29.0  |  6.75<H>    Ca    8.6      28 Sep 2018 19:01    TPro  7.6  /  Alb  4.2  /  TBili  0.4  /  DBili  x   /  AST  27  /  ALT  16  /  AlkPhos  568<H>  09-28    TEDDY CRAWFORD  CARDIAC MARKERS ( 29 Sep 2018 03:14 )  x     / 0.12 ng/mL / x     / x     / x      CARDIAC MARKERS ( 28 Sep 2018 19:01 )  x     / 0.11 ng/mL / x     / x     / x          PT/INR - ( 28 Sep 2018 19:01 )   PT: 11.5 sec;   INR: 1.04 ratio         PTT - ( 28 Sep 2018 19:01 )  PTT:31.5 sec      RADIOLOGY & ADDITIONAL STUDIES:    INTERPRETATION OF TELEMETRY (personally reviewed):    ECG:  NSR without ischemic changes     ECHO: 7/2018  Summary:   1. Technically adequate study.   2. Normal global left ventricular systolic function. Left ventricular   ejection fraction, by visual estimation, is 55%.   3. Mildly increased left ventricular internal cavity size.   4. Mildly increased LV wall thickness.   5. Moderate mitral annular calcification.   6. Mild tricuspid regurgitation.   7. Biatrial enlargement.   8. There is no evidence of pericardial effusion.   9. Dilated pulmonary artery.  10. ** Compared to TTE dated 4/14/18, TR less prominent, LV systolic   function mildly improved.      Assessment and Plan:  In summary, TEDDY CRAWFORD is an 28y Male with past medical history significant for 28y Male history of HIV none complaint with treatment CMV retinitis with loss of vision GERD ESRD on HD HTN seizure prior work up for nausea vomiting s/p GI work up gastritis atypical chest pain negative work up at [facility] 7/2018 now presents with sharp chest pain 5/10 reproducible B/L worse with movement not positional in nature musculoskeletal in nature + trop renal clearness not C/W with ACS and no clear evidence of pericarditis.  Currently feeling well.  Pain control.  Out patient follow up    please recall if needed

## 2018-09-30 ENCOUNTER — TRANSCRIPTION ENCOUNTER (OUTPATIENT)
Age: 28
End: 2018-09-30

## 2018-09-30 VITALS — WEIGHT: 160.06 LBS

## 2018-09-30 LAB
ANION GAP SERPL CALC-SCNC: 21 MMOL/L — HIGH (ref 5–17)
BUN SERPL-MCNC: 79 MG/DL — HIGH (ref 8–20)
CALCIUM SERPL-MCNC: 8.8 MG/DL — SIGNIFICANT CHANGE UP (ref 8.6–10.2)
CHLORIDE SERPL-SCNC: 93 MMOL/L — LOW (ref 98–107)
CO2 SERPL-SCNC: 25 MMOL/L — SIGNIFICANT CHANGE UP (ref 22–29)
CREAT SERPL-MCNC: 11.16 MG/DL — HIGH (ref 0.5–1.3)
GLUCOSE SERPL-MCNC: 96 MG/DL — SIGNIFICANT CHANGE UP (ref 70–115)
POTASSIUM SERPL-MCNC: 4.5 MMOL/L — SIGNIFICANT CHANGE UP (ref 3.5–5.3)
POTASSIUM SERPL-SCNC: 4.5 MMOL/L — SIGNIFICANT CHANGE UP (ref 3.5–5.3)
SODIUM SERPL-SCNC: 139 MMOL/L — SIGNIFICANT CHANGE UP (ref 135–145)

## 2018-09-30 PROCEDURE — 99239 HOSP IP/OBS DSCHRG MGMT >30: CPT

## 2018-09-30 PROCEDURE — 99232 SBSQ HOSP IP/OBS MODERATE 35: CPT

## 2018-09-30 RX ORDER — ONDANSETRON 8 MG/1
1 TABLET, FILM COATED ORAL
Qty: 21 | Refills: 0 | OUTPATIENT
Start: 2018-09-30 | End: 2018-10-06

## 2018-09-30 RX ORDER — DIPHENHYDRAMINE HCL 50 MG
25 CAPSULE ORAL ONCE
Qty: 0 | Refills: 0 | Status: COMPLETED | OUTPATIENT
Start: 2018-09-30 | End: 2018-09-30

## 2018-09-30 RX ORDER — ONDANSETRON 8 MG/1
4 TABLET, FILM COATED ORAL EVERY 8 HOURS
Qty: 0 | Refills: 0 | Status: DISCONTINUED | OUTPATIENT
Start: 2018-09-30 | End: 2018-09-30

## 2018-09-30 RX ORDER — ASPIRIN/CALCIUM CARB/MAGNESIUM 324 MG
1 TABLET ORAL
Qty: 30 | Refills: 0 | OUTPATIENT
Start: 2018-09-30 | End: 2018-10-29

## 2018-09-30 RX ORDER — DIPHENHYDRAMINE HCL 50 MG
12.5 CAPSULE ORAL ONCE
Qty: 0 | Refills: 0 | Status: DISCONTINUED | OUTPATIENT
Start: 2018-09-30 | End: 2018-09-30

## 2018-09-30 RX ORDER — GABAPENTIN 400 MG/1
1 CAPSULE ORAL
Qty: 60 | Refills: 0 | OUTPATIENT
Start: 2018-09-30 | End: 2018-10-29

## 2018-09-30 RX ORDER — DIPHENHYDRAMINE HCL 50 MG
50 CAPSULE ORAL ONCE
Qty: 0 | Refills: 0 | Status: DISCONTINUED | OUTPATIENT
Start: 2018-10-01 | End: 2018-09-30

## 2018-09-30 RX ORDER — DIPHENHYDRAMINE HCL 50 MG
25 CAPSULE ORAL ONCE
Qty: 0 | Refills: 0 | Status: DISCONTINUED | OUTPATIENT
Start: 2018-10-01 | End: 2018-09-30

## 2018-09-30 RX ORDER — ACETAMINOPHEN 500 MG
2 TABLET ORAL
Qty: 112 | Refills: 0 | OUTPATIENT
Start: 2018-09-30 | End: 2018-10-13

## 2018-09-30 RX ORDER — HYDROMORPHONE HYDROCHLORIDE 2 MG/ML
2 INJECTION INTRAMUSCULAR; INTRAVENOUS; SUBCUTANEOUS ONCE
Qty: 0 | Refills: 0 | Status: DISCONTINUED | OUTPATIENT
Start: 2018-10-01 | End: 2018-09-30

## 2018-09-30 RX ORDER — LEVETIRACETAM 250 MG/1
1 TABLET, FILM COATED ORAL
Qty: 60 | Refills: 0 | OUTPATIENT
Start: 2018-09-30 | End: 2018-10-29

## 2018-09-30 RX ORDER — ATOVAQUONE 750 MG/5ML
10 SUSPENSION ORAL
Qty: 300 | Refills: 0 | OUTPATIENT
Start: 2018-09-30 | End: 2018-10-29

## 2018-09-30 RX ADMIN — LEVETIRACETAM 250 MILLIGRAM(S): 250 TABLET, FILM COATED ORAL at 05:26

## 2018-09-30 RX ADMIN — Medication 2 MILLIGRAM(S): at 05:30

## 2018-09-30 RX ADMIN — Medication 81 MILLIGRAM(S): at 13:12

## 2018-09-30 RX ADMIN — Medication 25 MILLIGRAM(S): at 05:27

## 2018-09-30 RX ADMIN — Medication 25 MILLIGRAM(S): at 03:11

## 2018-09-30 RX ADMIN — HEPARIN SODIUM 5000 UNIT(S): 5000 INJECTION INTRAVENOUS; SUBCUTANEOUS at 05:27

## 2018-09-30 RX ADMIN — OXYCODONE AND ACETAMINOPHEN 2 TABLET(S): 5; 325 TABLET ORAL at 04:00

## 2018-09-30 RX ADMIN — OXYCODONE AND ACETAMINOPHEN 2 TABLET(S): 5; 325 TABLET ORAL at 03:10

## 2018-09-30 NOTE — PROGRESS NOTE ADULT - SUBJECTIVE AND OBJECTIVE BOX
[facility]  INFECTIOUS DISEASES AND INTERNAL MEDICINE at [place]  =======================================================  Howie Navarro MD  Diplomates American Board of Internal Medicine and Infectious Diseases  =======================================================    [id number]  TEDDY CRAWFORD     Follow up for HIV/AIDS and treatment plan.   No new complaint. Not on ARV.    PAST MEDICAL & SURGICAL HISTORY:  Seizure disorder  Hypertension  ESRD (end stage renal disease)  Seizure  Pericarditis  Anxiety  Depression  Renal failure (ARF), acute on chronic: dialysis av fistula, RUE  HTN (hypertension)  Cardiomyopathy  HIV disease: born HIV+  AV fistula  S/P tonsillectomy    Social Hx: no smoking, ETOH or drugs    FAMILY HISTORY:  No pertinent family history in first degree relatives: Unknown FHx because pt is adopted    Allergies  vancomycin (Anaphylaxis)    Antibiotics:  PCP prophylaxis with atovaquone      REVIEW OF SYSTEMS:  CONSTITUTIONAL:  No Fever or chills  HEENT:  No diplopia or blurred vision.  No sore throat or runny nose.  CARDIOVASCULAR:  No chest pain or SOB.  RESPIRATORY:  No cough, shortness of breath, + chest pain  GASTROINTESTINAL:  No nausea, vomiting or diarrhea.  GENITOURINARY:  No dysuria, frequency or urgency. No Blood in urine  MUSCULOSKELETAL:  pain in right arm the right AVF   SKIN:  No change in skin, hair or nails.  NEUROLOGIC:  No paresthesias, fasciculations, seizures or weakness.  PSYCHIATRIC:  No disorder of thought or mood.  ENDOCRINE:  No heat or cold intolerance, polyuria or polydipsia.  HEMATOLOGICAL:  No easy bruising or bleeding.     Physical Exam:  Vital Signs Last 24 Hrs  T(C): 36.6 (30 Sep 2018 08:18), Max: 37 (30 Sep 2018 01:29)  T(F): 97.8 (30 Sep 2018 08:18), Max: 98.6 (30 Sep 2018 01:29)  HR: 67 (30 Sep 2018 08:18) (67 - 88)  BP: 120/79 (30 Sep 2018 08:18) (118/62 - 131/89)  RR: 18 (30 Sep 2018 08:18) (18 - 18)  SpO2: 96% (30 Sep 2018 08:18) (95% - 98%)  GEN: NAD  HEENT: normocephalic and atraumatic. blind in both eyes  NECK: Supple.  No lymphadenopathy   LUNGS: Clear to auscultation.  HEART: Regular rate and rhythm without murmur.  ABDOMEN: Soft, nontender, and nondistended.  Positive bowel sounds.    : No CVA tenderness  EXTREMITIES: Without any cyanosis, clubbing, rash, lesions or edema. R arm AVF with erythema or sign of infection with good bruit  NEUROLOGIC: grossly intact.  SKIN: No ulceration or induration present.    Labs:  09-30    139  |  93<L>  |  79.0<H>  ----------------------------<  96  4.5   |  25.0  |  11.16<H>    Ca    8.8      30 Sep 2018 07:42  Phos  7.0     09-29  Mg     2.4     09-29    TPro  7.6  /  Alb  4.2  /  TBili  0.4  /  DBili  x   /  AST  27  /  ALT  16  /  AlkPhos  568<H>  09-28                        10.4   1.4   )-----------( 59       ( 29 Sep 2018 11:15 )             33.2     PT/INR - ( 28 Sep 2018 19:01 )   PT: 11.5 sec;   INR: 1.04 ratio    PTT - ( 28 Sep 2018 19:01 )  PTT:31.5 sec    LIVER FUNCTIONS - ( 29 Sep 2018 11:15 )  Alb: x     / Pro: x     / ALK PHOS: x     / ALT: x     / AST: x     / GGT: 29 U/L       CARDIAC MARKERS ( 29 Sep 2018 11:15 )  x     / 0.15 ng/mL / 60 U/L / x     / x      CARDIAC MARKERS ( 29 Sep 2018 03:14 )  x     / 0.12 ng/mL / x     / x     / x      CARDIAC MARKERS ( 28 Sep 2018 19:01 )  x     / 0.11 ng/mL / x     / x     / x        RECENT CULTURES:  None    All imaging and other data have been reviewed.

## 2018-09-30 NOTE — DISCHARGE NOTE ADULT - HOSPITAL COURSE
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 [place] [facility] consulted. Pt with atypical chest pain that is reproducible, likely musculoskeletal. Also no evidence of pericarditis. Noted to have elevated tropoins x3, suspect due to ESRD. EKG with no evidence of ischemia. TTE showing ____. ACS was ruled out. Will continue on daily asa 81  ·  Problem: AIDS.  Plan: CD4 count 73 (08/2018)  -hx of non-compliance with meds  -pancytopenia due to AIDS  - will not start ARV inpatient as per ID as needs op pcp follow up first  - c/w atovaquone for PCP prophylaxis   - Infectious disease consult appreciated.    ·  Problem: Diarrhea.  Plan: Resolved, last BM prior to admission  unknown etiology  Given low CD4 count, ordered test for cryptosporidium  f/u stool culture, Ova and parasites.      Problem: ESRD (end stage renal disease).  Plan: Dialysis schedule MWF  underwent HD 9/28/18  benadryl po prn for chronic pruritis  Nephrology consult Dr. Nam's group  per nephro, renally stable for d/c  vascular consult placed for RUE AVF eval. 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 [place] [facility] consulted. Pt with atypical chest pain that is reproducible, likely musculoskeletal. Also no evidence of pericarditis. Noted to have elevated tropoins x3, suspect due to ESRD. EKG with no evidence of ischemia. TTE showing ____. ACS was ruled out. Will continue on daily asa 81mg.     With regards to his HIV status and AIDS, pt's last CD4 count was 73 (08/2018). Pt with strong hx of non-compliance with meds. As per Infectious Disease, his genotype showed a highly resistant HIV virus, resistant to all classes of ARV. ID came up with a regimen hoping to suppress the virus until new meds are available but he never took the meds as outpt. Therefore, will not start ARV inpatient as per ID as needs op pcp follow up first. In the meantime, will continue with atovaquone for PCP prophylaxis. Strongly advised outpt follow up with ID and pmd.    Pt also with reported hx of diarrhea, which appears to have resolved since last BM was prior to admission. Unknown etiology of diarrhea. But given low CD4 count, ordered test for cryptosporidium, also stool culture, Ova and parasites. Patient to follow up outpt for results of stool studies.     With regards to his ESRD, pt is currently on HD (MWF). He last underwent HD 9/28/18. Nephrology consulted Dr. Nam's group, and cleared for discharge as renally stable. Also vascular consult placed for RUE AVF eval.    Patient is medically optimized for discharge to home. Follow up with ID and pmd within 1 week. Also, f/u with nephro for continued HD.     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 [place] [facility] consulted. Pt with atypical chest pain that is reproducible, likely musculoskeletal. Also no evidence of pericarditis. Noted to have elevated tropoins x3, suspect due to ESRD. EKG with no evidence of ischemia. TTE from July 2018 showed no significant structural abnormalities and EF 55%. ACS was ruled out. Will continue on daily asa 81mg.     With regards to his HIV status and AIDS, pt's last CD4 count was 73 (08/2018). Pt with strong hx of non-compliance with meds. As per Infectious Disease, his genotype showed a highly resistant HIV virus, resistant to all classes of ARV. ID came up with a regimen hoping to suppress the virus until new meds are available but he never took the meds as outpt. Therefore, will not start ARV inpatient as per ID as needs op pcp follow up first. In the meantime, will continue with atovaquone for PCP prophylaxis. Strongly advised outpt follow up with ID and pmd.    Pt also with reported hx of diarrhea, which appears to have resolved since last BM was prior to admission. Unknown etiology of diarrhea. But given low CD4 count, ordered test for cryptosporidium, also stool culture, Ova and parasites. Patient to follow up outpt for results of stool studies.     With regards to his ESRD, pt is currently on HD (MWF). He last underwent HD 9/28/18. Nephrology consulted Dr. Nam's group, and cleared for discharge as renally stable. Also vascular consult placed for RUE AVF eval. He needs AVF revision after improved CD4 count and proper ID follow up.    Patient is medically optimized for discharge to home. Follow up with ID and pmd within 1 week. Also, f/u with nephro for continued HD.     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 [place] [facility] consulted. Pt with atypical chest pain that is reproducible, likely musculoskeletal. Also no evidence of pericarditis. Noted to have elevated tropoins x3, suspect due to ESRD. EKG with no evidence of ischemia. TTE from July 2018 showed no significant structural abnormalities and EF 55%. ACS was ruled out. Will continue on daily asa 81mg.     With regards to his HIV status and AIDS, pt's last CD4 count was 73 (08/2018). Pt with strong hx of non-compliance with meds. As per Infectious Disease, his genotype showed a highly resistant HIV virus, resistant to all classes of ARV. ID came up with a regimen hoping to suppress the virus until new meds are available but he never took the meds as outpt. Therefore, will not start ARV inpatient as per ID as needs op pcp follow up first. In the meantime, will continue with atovaquone for PCP prophylaxis. Strongly advised outpt follow up with ID and pmd.    Pt also with reported hx of diarrhea, which appears to have resolved since last BM was prior to admission. Unknown etiology of diarrhea. But given low CD4 count, ordered test for cryptosporidium, also stool culture, Ova and parasites. Patient to follow up outpt for results of stool studies.     With regards to his ESRD, pt is currently on HD (MWF). He last underwent HD 9/28/18. Nephrology consulted Dr. Nam's group, and cleared for discharge as renally stable. Also vascular consult placed for RUE AVF eval. He needs AVF revision after medical optimization with improved CD4 count and proper ID follow up. Strongly advised close follow up with Vascular and ID.     Patient is medically optimized for discharge to home. Follow up with ID and pmd within 1 week. Also, f/u with nephro for continued HD.     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 [place] [facility] consulted. Pt with atypical chest pain that is reproducible, likely musculoskeletal. Also no evidence of pericarditis. Noted to have elevated tropoins x3, suspect due to ESRD. EKG with no evidence of ischemia. TTE from July 2018 showed no significant structural abnormalities and EF 55%. ACS was ruled out. Will continue on daily asa 81mg.     With regards to his HIV status and AIDS, pt's last CD4 count was 73 (08/2018). Pt with strong hx of non-compliance with meds. As per Infectious Disease, his genotype showed a highly resistant HIV virus, resistant to all classes of ARV. ID came up with a regimen hoping to suppress the virus until new meds are available but he never took the meds as outpt. Therefore, will not start ARV inpatient as per ID as needs op pcp follow up first. In the meantime, will continue with atovaquone for PCP prophylaxis. Strongly advised outpt follow up with ID and pmd.    Pt also with reported hx of diarrhea, which appears to have resolved since last BM was prior to admission. Unknown etiology of diarrhea. But given low CD4 count, ordered test for cryptosporidium, also stool culture, Ova and parasites. Patient to follow up outpt for results of stool studies.   With regards to his ESRD, pt is currently on HD (MWF). He last underwent HD 9/28/18. Nephrology consulted Dr. Nam's group, and cleared for discharge as renally stable. Also vascular consult placed for RUE AVF eval. He needs AVF revision after medical optimization with improved CD4 count and proper ID follow up. Strongly advised close follow up with Vascular and ID.   Patient is medically optimized for discharge to home. Follow up with ID and pmd within 1 week. Also, f/u with nephro for continued HD.     Today Patient was complaining of generalized itchiness overnight 50mg of PO Benadryl was given, states that it didn't significantly improve his symptoms.  No further episodes of chest pain. Patient ambulating, eating well, voiding. had small bm this am. no fever or chills Denies fever, SOB, abdominal pain, diarrhea, constipation, calf pain.  Patient has been started on gabapentin 100 mg po bid for likely uremic pruritis related to his ESRD.     Vital Signs Last 24 Hrs  T(C): 36.7 (30 Sep 2018 04:47), Max: 37 (30 Sep 2018 01:29)  T(F): 98.1 (30 Sep 2018 04:47), Max: 98.6 (30 Sep 2018 01:29)  HR: 71 (30 Sep 2018 04:47) (70 - 88)  BP: 131/89 (30 Sep 2018 04:47) (118/62 - 131/89)  RR: 18 (30 Sep 2018 04:47) (18 - 18)  SpO2: 97% (30 Sep 2018 04:47) (95% - 100%)    Physical Exam:   Gen: NAD  HEENT: NCAT, EOMI, patient legally blind b/l eyes   CVS: RRR, +S1/S2, no murmurs, rubs or gallops appreciated  Lungs: CTAB, no wheeze, rales, rhonchi. no reproducible cp on palpation   Abdomen: +BS, soft, ND, NT. no palpable flank tenderness or mass, no CVA tenderness  Ext: No cyanosis, edema or calf tenderness,  RUE AVF with palpable thrill  Neuro: AAOx3, no focal deficits.      Time spent on discharge: 45mins

## 2018-09-30 NOTE — DISCHARGE NOTE ADULT - OTHER SIGNIFICANT FINDINGS
10.4   1.4   )-----------( 59       ( 29 Sep 2018 11:15 )             33.2   09-30    139  |  93<L>  |  79.0<H>  ----------------------------<  96  4.5   |  25.0  |  11.16<H>    Ca    8.8      30 Sep 2018 07:42  Phos  7.0     09-29  Mg     2.4     09-29    TPro  7.6  /  Alb  4.2  /  TBili  0.4  /  DBili  x   /  AST  27  /  ALT  16  /  AlkPhos  568<H>  09-28    T Cell Subset (08.15.18 @ 14:05)    ABS CD3: 478 /uL    ABS CD4: 73 /uL    ABS CD8: 399 /uL    CD3 %: 59 %    CD4 %: 9 %    CD8 %: 49 %    < from: CT Abdomen and Pelvis w/ Oral Cont (09.28.18 @ 20:07) >  Renal atrophy with renal osteodystrophy.  No bowel obstruction or inflammatory bowel disease.  Mild hepatosplenomegaly.  Remaining abdominal pelvic viscera unremarkable.    < end of copied text >    < from: CT Head No Cont (09.28.18 @ 20:07) >  No acute findings on noncontrast CT of the brain. Unchanged diffuse   osseous   abnormality as before.     < end of copied text >

## 2018-09-30 NOTE — PROGRESS NOTE ADULT - PROBLEM SELECTOR PLAN 3
Given low CD4 count  - etiology infectious?  - stool count & culture, Oval and parasites ordered Resolved, last BM prior to admission  unknown etiology  Given low CD4 count, ordered test for cryptosporidium  f/u stool culture, Ova and parasites Resolved, last BM prior to admission  unknown etiology  Given low CD4 count, ordered test for cryptosporidium but less likley given diarrhea seems to be more chronic and symptoms seem to be resolved now  f/u stool culture, Ova and parasites

## 2018-09-30 NOTE — PROGRESS NOTE ADULT - PROBLEM SELECTOR PLAN 6
Continue home medication  Keppra 250 BID stable, no seizures this admission  Continue home medication  Keppra 250 BID

## 2018-09-30 NOTE — PROGRESS NOTE ADULT - PROBLEM SELECTOR PLAN 1
Admitted to r/o ACS  - Elevated tropin x3, uptrending from 0.11 to 0.15, suspect due to ESRD, not highly suspect ACS at this time.    - c/w ASA   - EKG performed- no evidence of ischemia, Repeat EKG pending for AM without significant change  - TTE pending  - Cardiology consult noted, no clear evidence for pericarditis Admitted for r/o ACS - ruled out  atypical chest pain that is reproducible, likely musculoskeletal  - Elevated tropoins x3, suspect due to ESRD  - [place] [facility] consulted, no evidence of pericarditis  - c/w ASA   - EKG with no evidence of ischemia  - TTE pending Admitted for r/o ACS - ruled out  atypical chest pain that is reproducible, likely musculoskeletal  - Elevated tropoins x3, suspect due to ESRD  - [facility] consulted, no evidence of pericarditis  - c/w ASA   - EKG with no evidence of ischemia  -TTE in july showed ef 55 , no evidence of pericardial effusion.   -cleared from cardiology stand point for dc . follow up as op

## 2018-09-30 NOTE — CONSULT NOTE ADULT - SUBJECTIVE AND OBJECTIVE BOX
28 year old male admitted to medicine for abdominal pain. Patient was previously seen by vascular surgery a couple months ago for evaluation of his right upper extremity fistula.     PMH: congenital HIV, ESRD on HD (MWF), seizure disorder  PSH: permacath insertion/removal, PD catheter insertion/removal, AV fistual creation  Medications: keppra  Allergies: vancomycin  SH: denies toxic habits x3  FH: adopted, unknown; birth mother -HIV      MEDICATIONS  (STANDING):  aspirin enteric coated 81 milliGRAM(s) Oral daily  atovaquone Suspension 1500 milliGRAM(s) Oral daily  diphenhydrAMINE   Injectable 12.5 milliGRAM(s) IV Push once  heparin  Injectable 5000 Unit(s) SubCutaneous every 12 hours  levETIRAcetam 250 milliGRAM(s) Oral two times a day  levETIRAcetam 500 milliGRAM(s) Oral <User Schedule>    MEDICATIONS  (PRN):  acetaminophen   Tablet .. 650 milliGRAM(s) Oral every 6 hours PRN Mild Pain (1 - 3)  ALPRAZolam 2 milliGRAM(s) Oral every 8 hours PRN Anxiety  ondansetron    Tablet 4 milliGRAM(s) Oral every 8 hours PRN Nausea and/or Vomiting  oxyCODONE    5 mG/acetaminophen 325 mG 1 Tablet(s) Oral every 6 hours PRN Moderate Pain (4 - 6)  oxyCODONE    5 mG/acetaminophen 325 mG 2 Tablet(s) Oral every 8 hours PRN Severe Pain (7 - 10)      Vital Signs Last 24 Hrs  T(C): 36.6 (30 Sep 2018 08:18), Max: 37 (30 Sep 2018 01:29)  T(F): 97.8 (30 Sep 2018 08:18), Max: 98.6 (30 Sep 2018 01:29)  HR: 67 (30 Sep 2018 08:18) (67 - 88)  BP: 120/79 (30 Sep 2018 08:18) (118/62 - 131/89)  BP(mean): --  RR: 18 (30 Sep 2018 08:18) (18 - 18)  SpO2: 96% (30 Sep 2018 08:18) (95% - 97%)    Physical Exam:    general: no acute distress, AOx3  Respiratory: Breath Sounds equal & clear to auscultation, no accessory muscle use  Cardiovascular: Regular rate & rhythm, normal S1, S2; no murmurs, gallops or rubs  Gastrointestinal: Soft, non-tender, nondistended. well healed midline scar from prior surgery  Vascular: Equal and normal pulses: 2+ radial pulses bilaterally    Musculoskeletal: No joint pain, swelling or deformity; no limitation of movement    Skin: No rashes      I&O's Detail      LABS:                        10.4   1.4   )-----------( 59       ( 29 Sep 2018 11:15 )             33.2     09-30    139  |  93<L>  |  79.0<H>  ----------------------------<  96  4.5   |  25.0  |  11.16<H>    Ca    8.8      30 Sep 2018 07:42  Phos  7.0     09-29  Mg     2.4     09-29            RADIOLOGY & ADDITIONAL STUDIES: 28 year old male admitted to medicine for abdominal pain. Patient was previously seen by vascular surgery a couple months ago for evaluation of his right upper extremity fistula. Patient has undergone HD last Friday and it caused him pain and the session needed to be paused periodically. this has been a chronic problem with his right upper extremity for a while. In July 2018, patient underwent fistulogram, angioplasty of R subclavian vein of RUE transposed brachiobasilic fistula with AV shunt for noted right subclavian stenosis.   ROS: denies fevers, chills, chest pain, shortness of breath    PMH: congenital HIV, ESRD on HD (MWF), seizure disorder  PSH: permacath insertion/removal, PD catheter insertion/removal, AV fistula creation, fistulogram 7/2018  Medications: keppra  Allergies: vancomycin  SH: denies toxic habits x3  FH: adopted, unknown; birth mother -HIV      MEDICATIONS  (STANDING):  aspirin enteric coated 81 milliGRAM(s) Oral daily  atovaquone Suspension 1500 milliGRAM(s) Oral daily  diphenhydrAMINE   Injectable 12.5 milliGRAM(s) IV Push once  heparin  Injectable 5000 Unit(s) SubCutaneous every 12 hours  levETIRAcetam 250 milliGRAM(s) Oral two times a day  levETIRAcetam 500 milliGRAM(s) Oral <User Schedule>    MEDICATIONS  (PRN):  acetaminophen   Tablet .. 650 milliGRAM(s) Oral every 6 hours PRN Mild Pain (1 - 3)  ALPRAZolam 2 milliGRAM(s) Oral every 8 hours PRN Anxiety  ondansetron    Tablet 4 milliGRAM(s) Oral every 8 hours PRN Nausea and/or Vomiting  oxyCODONE    5 mG/acetaminophen 325 mG 1 Tablet(s) Oral every 6 hours PRN Moderate Pain (4 - 6)  oxyCODONE    5 mG/acetaminophen 325 mG 2 Tablet(s) Oral every 8 hours PRN Severe Pain (7 - 10)      Vital Signs Last 24 Hrs  T(C): 36.6 (30 Sep 2018 08:18), Max: 37 (30 Sep 2018 01:29)  T(F): 97.8 (30 Sep 2018 08:18), Max: 98.6 (30 Sep 2018 01:29)  HR: 67 (30 Sep 2018 08:18) (67 - 88)  BP: 120/79 (30 Sep 2018 08:18) (118/62 - 131/89)  BP(mean): --  RR: 18 (30 Sep 2018 08:18) (18 - 18)  SpO2: 96% (30 Sep 2018 08:18) (95% - 97%)    Physical Exam:    general: no acute distress, AOx3  Respiratory: Breath Sounds equal & clear to auscultation, no accessory muscle use  Cardiovascular: Regular rate & rhythm, normal S1, S2; no murmurs, gallops or rubs  Gastrointestinal: Soft, non-tender, nondistended. well healed midline scar from prior surgery  Vascular: Equal and normal pulses: 2+ radial pulses bilaterally. anuerysmal/tortuous right upper extremity AV fistula with distal and proximal thrill. compressible in most parts of fistula. motor/sensation grossly intact  Musculoskeletal: No joint pain, swelling or deformity; no limitation of movement  Skin: bilateral upper extremity dermatitis      I&O's Detail      LABS:                        10.4   1.4   )-----------( 59       ( 29 Sep 2018 11:15 )             33.2     09-30    139  |  93<L>  |  79.0<H>  ----------------------------<  96  4.5   |  25.0  |  11.16<H>    Ca    8.8      30 Sep 2018 07:42  Phos  7.0     09-29  Mg     2.4     09-29            RADIOLOGY & ADDITIONAL STUDIES:

## 2018-09-30 NOTE — CONSULT NOTE ADULT - ASSESSMENT
28 year old with history of HIV, noncompliant with medication. RUE fistula with marginal success during HD, in need of operative repair. 28 year old with history of HIV, noncompliant with medication. RUE fistula with marginal success during HD, in need of operative repair. However, patient has been off anti-retroviral medication and needs to be followed closely by ID physician to optimize his HIV status to prevent oppportunistic infections. Currently no evidence of steal syndrome

## 2018-09-30 NOTE — DISCHARGE NOTE ADULT - CARE PLAN
Principal Discharge DX:	Chest pain, unspecified type  Goal:	resolved  Assessment and plan of treatment:	Your chest pain was likely musculoskeletal in nature. Your cardiac work up was negative. Please take Tylenol as needed for pain. Follow up with your primary care doctor.  Secondary Diagnosis:	AIDS  Goal:	stable  Assessment and plan of treatment:	Your last CD4 count from Aug 2018 was 73. You were not started on HIV treatment drugs this visit as you have a strong history of non-compliance causing you to be very high risk for development of highly resistant strains of the HIV virus. We strongly recommend that you follow up with Infectious Disease (Dr Navarro's group) to ensure appropriate follow up. As per Infectious Disease, you may be started on HIV treatment regimen at a later date.  Secondary Diagnosis:	Diarrhea  Goal:	resolved  Assessment and plan of treatment:	Your diarrhea may have resolved at this time as you have not had a bowel movement at the hospital this visit. Stool studies were ordered, please follow up for results. Also call your doctor if recurrent episodes of diarrhea.  Secondary Diagnosis:	ESRD (end stage renal disease)  Goal:	stable  Assessment and plan of treatment:	Follow up with Nephrology (Dr Nam) for continued hemodialysis as per your routine schedule (Mon-Wed-Fri).  Secondary Diagnosis:	Hypertension  Goal:	stable  Assessment and plan of treatment:	Your blood pressure has been stable at the hospital while off medications. Follow up with your doctor for further monitoring. Follow low salt diet.  Secondary Diagnosis:	Seizure disorder  Goal:	stable  Assessment and plan of treatment:	Continue your anti-seizure medications and follow up with your neurologist after discharge. Please seek medical attention if you experience recurrent seizure activity. Principal Discharge DX:	Chest pain, unspecified type  Goal:	resolved  Assessment and plan of treatment:	Your chest pain was likely musculoskeletal in nature. Your cardiac work up was negative. Please take Tylenol as needed for pain. Follow up with your primary care doctor.  Secondary Diagnosis:	AIDS  Goal:	stable  Assessment and plan of treatment:	Your last CD4 count from Aug 2018 was 73. You were not started on HIV treatment drugs this visit as you have a strong history of non-compliance causing you to be very high risk for development of highly resistant strains of the HIV virus. We strongly recommend that you follow up with Infectious Disease (Dr Navarro's group) to ensure appropriate follow up. As per Infectious Disease, you may be started back on an HIV treatment regimen at a later date after establishing good follow up.  Secondary Diagnosis:	Diarrhea  Goal:	resolved  Assessment and plan of treatment:	Your diarrhea may have resolved at this time as you have not had a bowel movement at the hospital this visit. Stool studies were ordered, please follow up for results. Also call your doctor if recurrent episodes of diarrhea.  Secondary Diagnosis:	ESRD (end stage renal disease)  Goal:	stable  Assessment and plan of treatment:	Follow up with Nephrology (Dr Nam) for continued hemodialysis as per your routine schedule (Mon-Wed-Fri). Also follow up with Vascular Surgery as you need a revision of your AV fistula, for which you will need proper Infectious disease follow up as well and improved CD4 count.  Secondary Diagnosis:	Hypertension  Goal:	stable  Assessment and plan of treatment:	Your blood pressure has been stable at the hospital while off medications. Follow up with your doctor for further monitoring. Follow low salt diet.  Secondary Diagnosis:	Seizure disorder  Goal:	stable  Assessment and plan of treatment:	Continue your anti-seizure medications and follow up with your neurologist after discharge. Please seek medical attention if you experience recurrent seizure activity. Principal Discharge DX:	Chest pain, unspecified type  Goal:	resolved  Assessment and plan of treatment:	Your chest pain was likely musculoskeletal in nature. Your cardiac work up was negative. Please take Tylenol as needed for pain. Follow up with your primary care doctor.  Secondary Diagnosis:	AIDS  Goal:	stable  Assessment and plan of treatment:	Your last CD4 count from Aug 2018 was 73. You were not started on HIV treatment drugs this visit as you have a strong history of non-compliance causing you to be very high risk for development of highly resistant strains of the HIV virus. We strongly recommend that you follow up with Infectious Disease (Dr Navarro's group) to ensure appropriate follow up. As per Infectious Disease, you may be started back on an HIV treatment regimen at a later date after establishing good follow up.  Secondary Diagnosis:	Diarrhea  Goal:	resolved  Assessment and plan of treatment:	Your diarrhea may have resolved at this time as you have not had a bowel movement at the hospital this visit. Stool studies were ordered, please follow up for results. Also call your doctor if recurrent episodes of diarrhea.  Secondary Diagnosis:	ESRD (end stage renal disease)  Goal:	stable  Assessment and plan of treatment:	Follow up with Nephrology (Dr Nam) for continued hemodialysis as per your routine schedule (Mon-Wed-Fri). Also follow up with Vascular Surgery as you need a revision of your AV fistula, for which you will need to be medically optimized with improved CD4 count and appropriate Infectious disease.  Secondary Diagnosis:	Hypertension  Goal:	stable  Assessment and plan of treatment:	Your blood pressure has been stable at the hospital while off medications. Follow up with your doctor for further monitoring. Follow low salt diet.  Secondary Diagnosis:	Seizure disorder  Goal:	stable  Assessment and plan of treatment:	Continue your anti-seizure medications and follow up with your neurologist after discharge. Please seek medical attention if you experience recurrent seizure activity. Principal Discharge DX:	Chest pain, unspecified type  Goal:	resolved  Assessment and plan of treatment:	Your chest pain was likely musculoskeletal in nature. Your cardiac work up was negative. Please take Tylenol as needed for pain. Follow up with your primary care doctor in 1 week  Secondary Diagnosis:	AIDS  Goal:	stable  Assessment and plan of treatment:	Your last CD4 count from Aug 2018 was 73. You were not started on HIV treatment drugs this visit as you have a strong history of non-compliance causing you to be very high risk for development of highly resistant strains of the HIV virus. We strongly recommend that you follow up with Infectious Disease (Dr Navarro's group) to ensure appropriate follow up. As per Infectious Disease, you may be started back on an HIV treatment regimen at a later date after establishing good follow up.  Secondary Diagnosis:	Diarrhea  Goal:	resolved  Assessment and plan of treatment:	Your diarrhea may have resolved at this time as you have not had a bowel movement at the hospital this visit. Stool studies were ordered, please follow up for results. Also call your doctor if recurrent episodes of diarrhea.  Secondary Diagnosis:	ESRD (end stage renal disease)  Goal:	stable  Assessment and plan of treatment:	Follow up with Nephrology (Dr Nam) for continued hemodialysis as per your routine schedule (Mon-Wed-Fri). Also follow up with Vascular Surgery as you need a revision of your AV fistula, for which you will need to be medically optimized with improved CD4 count and appropriate Infectious disease. Please take new medicine gabapentin 100 mg oral twice daily for chronic itching which is likely related to renal disease.  Secondary Diagnosis:	Hypertension  Goal:	stable  Assessment and plan of treatment:	Your blood pressure has been stable at the hospital while off medications. Follow up with your doctor for further monitoring. Follow low salt diet.  Secondary Diagnosis:	Seizure disorder  Goal:	stable  Assessment and plan of treatment:	Continue your anti-seizure medications and follow up with your neurologist after discharge. Please seek medical attention if you experience recurrent seizure activity.

## 2018-09-30 NOTE — PROGRESS NOTE ADULT - PROBLEM SELECTOR PLAN 2
CD4 count on previous admission was noted to be 73  - Patient current not on medication  - pancytopenia 2/2 due to AIDS  - will not start ARV inpatient as per ID as needs op pcp follow up first.   - start atovaquon 1500mg daily for PCP prophylaxis   - Infectious disease consult appreicated CD4 count 73 (08/2018)  -hx of non-compliance with meds  -pancytopenia due to AIDS  - will not start ARV inpatient as per ID as needs op pcp follow up first  - c/w atovaquone for PCP prophylaxis   - Infectious disease consult appreciated CD4 count 73 (08/2018)  -hx of non-compliance with meds  -pancytopenia due to AIDS  - will not start ARV inpatient as per ID as needs op pcp follow up first  - c/w atovaquone for PCP prophylaxis   - Infectious disease consult appreciated  - advised patient to follow up as op for further HIV tx and monitoring

## 2018-09-30 NOTE — PROGRESS NOTE ADULT - SUBJECTIVE AND OBJECTIVE BOX
Will have dialysis arranged for 10/1/18 if the patient remains admitted. Otherwise, renally stable for DC planning.     Dialysis orders: Revaclear 300, 3 hours, 2k bicarb bath, BFR 400ml/min, and UF of 2kg as tolerated per hemodynamics.  Benadryl and Dilaudid as needed for the dialysis treatment only.     Thank you.

## 2018-09-30 NOTE — PROGRESS NOTE ADULT - SUBJECTIVE AND OBJECTIVE BOX
Patient is a 28y old  Male who presents with a chief complaint of Chest pain/discomfort (30 Sep 2018 06:05)    INTERVAL/OVERNIGHT EVENTS  Patient was complaining of generalized itchiness overnight 50mg of PO Benadryl was given, states that it didn't significantly improve his symptoms.  No further episodes of chest pain. Patient ambulating, eating well, voiding and last BM prior to admission.     ROS: Denies fever, SOB, abdominal pain, diarrhea, constipation, calf pain.    Vital Signs Last 24 Hrs  T(C): 36.7 (30 Sep 2018 04:47), Max: 37 (30 Sep 2018 01:29)  T(F): 98.1 (30 Sep 2018 04:47), Max: 98.6 (30 Sep 2018 01:29)  HR: 71 (30 Sep 2018 04:47) (70 - 88)  BP: 131/89 (30 Sep 2018 04:47) (118/62 - 131/89)  BP(mean): --  RR: 18 (30 Sep 2018 04:47) (18 - 18)  SpO2: 97% (30 Sep 2018 04:47) (95% - 100%)    Physical Exam:   Gen: NAD  HEENT: NCAT, EOMI, patient legally blind b/l eyes   CVS: RRR, +S1/S2, no murmurs, rubs or gallops appreciated  Lungs: CTAB, no wheeze, rales, rhonchi. no reproducible cp on palpation   Abdomen: +BS, soft, ND, NT. no palpable flank tenderness or mass, no CVA tenderness  Ext: No cyanosis, edema or calf tenderness,  RUE AVF with palpable thrill  Neuro: AAOx3, no focal deficits.    Pending AM labs.    MEDICATIONS  (STANDING):  aspirin enteric coated 81 milliGRAM(s) Oral daily  atovaquone Suspension 1500 milliGRAM(s) Oral daily  heparin  Injectable 5000 Unit(s) SubCutaneous every 12 hours  levETIRAcetam 250 milliGRAM(s) Oral two times a day  levETIRAcetam 500 milliGRAM(s) Oral <User Schedule>    MEDICATIONS  (PRN):  acetaminophen   Tablet .. 650 milliGRAM(s) Oral every 6 hours PRN Mild Pain (1 - 3)  ALPRAZolam 2 milliGRAM(s) Oral every 8 hours PRN Anxiety  oxyCODONE    5 mG/acetaminophen 325 mG 2 Tablet(s) Oral every 8 hours PRN Severe Pain (7 - 10)  oxyCODONE    5 mG/acetaminophen 325 mG 1 Tablet(s) Oral every 6 hours PRN Moderate Pain (4 - 6) Patient is a 28y old  Male who presents with a chief complaint of Chest pain/discomfort (30 Sep 2018 06:05)    INTERVAL/OVERNIGHT EVENTS  Patient was complaining of generalized itchiness overnight 50mg of PO Benadryl was given, states that it didn't significantly improve his symptoms.  No further episodes of chest pain. Patient ambulating, eating well, voiding. had small bm this am. no fever or chills     ROS: Denies fever, SOB, abdominal pain, diarrhea, constipation, calf pain.    Vital Signs Last 24 Hrs  T(C): 36.7 (30 Sep 2018 04:47), Max: 37 (30 Sep 2018 01:29)  T(F): 98.1 (30 Sep 2018 04:47), Max: 98.6 (30 Sep 2018 01:29)  HR: 71 (30 Sep 2018 04:47) (70 - 88)  BP: 131/89 (30 Sep 2018 04:47) (118/62 - 131/89)  RR: 18 (30 Sep 2018 04:47) (18 - 18)  SpO2: 97% (30 Sep 2018 04:47) (95% - 100%)    Physical Exam:   Gen: NAD  HEENT: NCAT, EOMI, patient legally blind b/l eyes   CVS: RRR, +S1/S2, no murmurs, rubs or gallops appreciated  Lungs: CTAB, no wheeze, rales, rhonchi. no reproducible cp on palpation   Abdomen: +BS, soft, ND, NT. no palpable flank tenderness or mass, no CVA tenderness  Ext: No cyanosis, edema or calf tenderness,  RUE AVF with palpable thrill  Neuro: AAOx3, no focal deficits.    Pending AM labs.    MEDICATIONS  (STANDING):  aspirin enteric coated 81 milliGRAM(s) Oral daily  atovaquone Suspension 1500 milliGRAM(s) Oral daily  heparin  Injectable 5000 Unit(s) SubCutaneous every 12 hours  levETIRAcetam 250 milliGRAM(s) Oral two times a day  levETIRAcetam 500 milliGRAM(s) Oral <User Schedule>    MEDICATIONS  (PRN):  acetaminophen   Tablet .. 650 milliGRAM(s) Oral every 6 hours PRN Mild Pain (1 - 3)  ALPRAZolam 2 milliGRAM(s) Oral every 8 hours PRN Anxiety  oxyCODONE    5 mG/acetaminophen 325 mG 2 Tablet(s) Oral every 8 hours PRN Severe Pain (7 - 10)  oxyCODONE    5 mG/acetaminophen 325 mG 1 Tablet(s) Oral every 6 hours PRN Moderate Pain (4 - 6)

## 2018-09-30 NOTE — CONSULT NOTE ADULT - PROBLEM SELECTOR RECOMMENDATION 9
-Cont meds
-continue dialysis with RUE AV fistula  -follow up with vascular surgery outpatient once medically optimized with anti-retoviral medication

## 2018-09-30 NOTE — PROGRESS NOTE ADULT - ASSESSMENT
27 y/o man with a PMH of poorly controlled HIV due to nonadherence, ESRD and seizures was admitted with one week of chest pain and nausea/vomiting. ID was called due to his HIV status for ARV management. He is complaining of chronic diarrhea and GI symptoms. No cough, fever, SOB, or any other symptoms.   He is well known to me from past admissions. He doesn't follow with his PCP after discharge and stops his ARV due to problem with filling his scripts. As per him, his PCP is not available since 12/2017 so he was not able to see anyone to help him with med refill.   His genotype showed a highly resistant HIV virus, resistant to all classes of ARV. ID came up with a regimen hoping to suppress the virus until new meds are available but he never took the meds as outpt.      1-HIV:  -Will not start any ARV at this time.  -Needs to see a PCP as outpt to make sure he will take his ARV with appropriate follow up for repeat labs to evaluate his response to ARV since he has a very resistant virus.   -Cont atovaquone 1500mg daily for PCP prophylaxis since last CD4=73/9%    2-Diarrhea:  -Could be related to uncontrolled HIV    -Will rule out other causes by stool culture, cryptosporidium and giardia testing.     3-Chest pain:  -Most likely noninfectious and musculoskeletal type  -Needs cardiology work up and TTE to rule out pericarditis and pericardia effusion due to HIV.

## 2018-09-30 NOTE — DISCHARGE NOTE ADULT - CARE PROVIDER_API CALL
Josh Navarro), Infectious Disease; Internal Medicine  [address] [facility]  [address]  [place] [address]  Phone: [phone]  Fax: [phone]    Primary Care Doctor: Dr. Corbin,   Address: [address] [place] [address]  Phone: [phone]  Phone: (   )    -  Fax: (   )    -    Manuel Nam), Nephrology  [address] [place] [address]  Phone: [phone]  Fax: [phone] Josh Navarro), Infectious Disease; Internal Medicine  [address] [facility]  [address]  [place] [address]  Phone: [phone]  Fax: [phone]    Manuel Nam), Nephrology  [address] [facility]  [address]  [place] [address]  Phone: [phone]  Fax: [phone]    Primary Care Doctor: Dr. Corbin,   Address: [address] [place] [address]  Phone: [phone]  Phone: (   )    -  Fax: (   )    -    Isaac Dahl), Vascular Surgery  [address] [place] [address]  Phone: [phone]  Fax: [phone]

## 2018-09-30 NOTE — DISCHARGE NOTE ADULT - MEDICATION SUMMARY - MEDICATIONS TO TAKE
I will START or STAY ON the medications listed below when I get home from the hospital:    ALPRAZolam 2 mg oral tablet  -- 1 tab(s) by mouth once a day, As needed, anxiety  -- Indication: For Anxiety I will START or STAY ON the medications listed below when I get home from the hospital:    acetaminophen 325 mg oral tablet  -- 2 tab(s) by mouth every 6 hours, As needed, Mild Pain (1 - 3)  -- Indication: For Pain    aspirin 81 mg oral delayed release tablet  -- 1 tab(s) by mouth once a day  -- Indication: For Chest pain    levETIRAcetam 250 mg oral tablet  -- 1 tab(s) by mouth 2 times a day  -- Indication: For Seizure disorder    gabapentin 100 mg oral capsule  -- 1 cap(s) by mouth 2 times a day   -- It is very important that you take or use this exactly as directed.  Do not skip doses or discontinue unless directed by your doctor.  May cause drowsiness.  Alcohol may intensify this effect.  Use care when operating dangerous machinery.    -- Indication: For Itching    ondansetron 4 mg oral tablet  -- 1 tab(s) by mouth every 8 hours, As needed, Nausea and/or Vomiting  -- Indication: For Nausea/vomiting    ALPRAZolam 2 mg oral tablet  -- 1 tab(s) by mouth once a day, As needed, anxiety  -- Indication: For Anxiety    atovaquone 750 mg/5 mL oral suspension  -- 10 milliliter(s) by mouth once a day  -- Indication: For HIV

## 2018-09-30 NOTE — PROGRESS NOTE ADULT - PROBLEM SELECTOR PLAN 7
Patient is non-compliant  - will call patient PMD and pharmacy to confirm medications on  monday. Heparin 5000 units BID

## 2018-09-30 NOTE — DISCHARGE NOTE ADULT - ADDITIONAL INSTRUCTIONS
Follow up with your primary care doctor and Infectious disease doctor within 1 week of discharge. You may also choose to follow up with the Infectious disease doctor for your primary healthcare needs.   Take all medications and diet as prescribed above. Follow up with your primary care doctor and Infectious disease doctor within 1 week of discharge. You may also choose to follow up with the Infectious disease doctor for all your primary healthcare needs. Follow up with Nephrology and Vascular surgery within 1 week of discharge.   Take all medications and diet as prescribed above.

## 2018-09-30 NOTE — PROGRESS NOTE ADULT - ASSESSMENT
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

## 2018-09-30 NOTE — PROGRESS NOTE ADULT - REASON FOR ADMISSION
Chest pain/discomfort

## 2018-09-30 NOTE — DISCHARGE NOTE ADULT - PATIENT PORTAL LINK FT
You can access the [email or web address], offered by [facility], by registering with the following website: [email or web address]

## 2018-09-30 NOTE — DISCHARGE NOTE ADULT - CARE PROVIDERS DIRECT ADDRESSES
,DirectAddress_Unknown,DirectAddress_Unknown,DirectAddress_Unknown ,DirectAddress_Unknown,DirectAddress_Unknown,DirectAddress_Unknown,[email or web address]

## 2018-09-30 NOTE — CHART NOTE - NSCHARTNOTEFT_GEN_A_CORE
SOCIAL WORK NOTE: SOCIAL WORK SENT UP MEDICAID TAXI FOR PT. PT STATED NO ONE WAS HOME TO PICK HIM UP, HIS MOTHER WAS AT [employer]. PT LIVES IN A PRIVATE HOME WITH HIS MOTHER IN [place]. SW SET UP MEDICAID TAXI AND WENT TO TELL PT TIME FRAME; PT'S FAMILY WAS PRESENT IN ROOM. PT STATED TAXI IS NO LONGER NEEDED. SW CANCELLED MEDICAID TAXI RESERVATION #: [id number]. FAMILY IS TRANSPORTING PT HOME.

## 2018-09-30 NOTE — DISCHARGE NOTE ADULT - PROVIDER TOKENS
TOKEN:'[id number]',FREE:[LAST:[Primary Care Doctor: Dr. Corbin],PHONE:[(   )    -],FAX:[(   )    -],ADDRESS:[Address: [address] [place] [address]  Phone: [phone]]],TOKEN:'[id number]' TOKEN:'[id number]',TOKEN:'[id number]',FREE:[LAST:[Primary Care Doctor: Dr. Corbin],PHONE:[(   )    -],FAX:[(   )    -],ADDRESS:[Address: [address] [place] [address]  Phone: [phone]]],TOKEN:'[id number]'

## 2018-09-30 NOTE — DISCHARGE NOTE ADULT - PLAN OF CARE
resolved Your chest pain was likely musculoskeletal in nature. Your cardiac work up was negative. Please take Tylenol as needed for pain. Follow up with your primary care doctor. stable Your last CD4 count from Aug 2018 was 73. You were not started on HIV treatment drugs this visit as you have a strong history of non-compliance causing you to be very high risk for development of highly resistant strains of the HIV virus. We strongly recommend that you follow up with Infectious Disease (Dr Navarro's group) to ensure appropriate follow up. As per Infectious Disease, you may be started on HIV treatment regimen at a later date. Your diarrhea may have resolved at this time as you have not had a bowel movement at the hospital this visit. Stool studies were ordered, please follow up for results. Also call your doctor if recurrent episodes of diarrhea. Follow up with Nephrology (Dr Nam) for continued hemodialysis as per your routine schedule (Mon-Wed-Fri). Your blood pressure has been stable at the hospital while off medications. Follow up with your doctor for further monitoring. Follow low salt diet. Continue your anti-seizure medications and follow up with your neurologist after discharge. Please seek medical attention if you experience recurrent seizure activity. Your last CD4 count from Aug 2018 was 73. You were not started on HIV treatment drugs this visit as you have a strong history of non-compliance causing you to be very high risk for development of highly resistant strains of the HIV virus. We strongly recommend that you follow up with Infectious Disease (Dr Navarro's group) to ensure appropriate follow up. As per Infectious Disease, you may be started back on an HIV treatment regimen at a later date after establishing good follow up. Follow up with Nephrology (Dr Nam) for continued hemodialysis as per your routine schedule (Mon-Wed-Fri). Also follow up with Vascular Surgery as you need a revision of your AV fistula, for which you will need proper Infectious disease follow up as well and improved CD4 count. Follow up with Nephrology (Dr Nam) for continued hemodialysis as per your routine schedule (Mon-Wed-Fri). Also follow up with Vascular Surgery as you need a revision of your AV fistula, for which you will need to be medically optimized with improved CD4 count and appropriate Infectious disease. Your chest pain was likely musculoskeletal in nature. Your cardiac work up was negative. Please take Tylenol as needed for pain. Follow up with your primary care doctor in 1 week Follow up with Nephrology (Dr Nam) for continued hemodialysis as per your routine schedule (Mon-Wed-Fri). Also follow up with Vascular Surgery as you need a revision of your AV fistula, for which you will need to be medically optimized with improved CD4 count and appropriate Infectious disease. Please take new medicine gabapentin 100 mg oral twice daily for chronic itching which is likely related to renal disease.

## 2018-09-30 NOTE — PROGRESS NOTE ADULT - PROBLEM SELECTOR PLAN 4
Dialysis schedule MWF  underwent HD 9/28/18/   benadryl po prn for chronic pruritis.   Nephrology consult Dr. Nam Dialysis schedule MWF  underwent HD 9/28/18  benadryl po prn for chronic pruritis  Nephrology consult Dr. Nam's group  per nephro, renally stable for d/c  vascular consult placed for RUE AVF eval Dialysis schedule MWF  underwent HD 9/28/18  benadryl po prn for chronic pruritis.  Nephrology consult Dr. Nam's group  per nephro, renally stable for d/c  will start on low dose gabapentin for uremic pruritis   follow up with jadiel as op for possible revision after HIV status optimized.

## 2018-10-01 LAB
4/8 RATIO: 0.25 RATIO — LOW (ref 0.9–3.6)
ABS CD8: 393 /UL — SIGNIFICANT CHANGE UP (ref 142–740)
CD16+CD56+ CELLS NFR BLD: 8 % — SIGNIFICANT CHANGE UP (ref 5–23)
CD16+CD56+ CELLS NFR SPEC: 64 /UL — LOW (ref 71–410)
CD19 BLASTS SPEC-ACNC: 145 /UL — SIGNIFICANT CHANGE UP (ref 84–469)
CD19 BLASTS SPEC-ACNC: 19 % — SIGNIFICANT CHANGE UP (ref 6–24)
CD3 BLASTS SPEC-ACNC: 521 /UL — LOW (ref 672–1870)
CD3 BLASTS SPEC-ACNC: 70 % — SIGNIFICANT CHANGE UP (ref 59–83)
CD4 %: 13 % — LOW (ref 30–62)
CD8 %: 53 % — HIGH (ref 12–36)
HIV-1 VIRAL LOAD RESULT: ABNORMAL
HIV1 RNA # SERPL NAA+PROBE: SIGNIFICANT CHANGE UP
HIV1 RNA SER-IMP: SIGNIFICANT CHANGE UP
HIV1 RNA SERPL NAA+PROBE-ACNC: ABNORMAL
HIV1 RNA SERPL NAA+PROBE-LOG#: 5.2 — SIGNIFICANT CHANGE UP
T-CELL CD4 SUBSET PNL BLD: 97 /UL — LOW (ref 489–1457)

## 2018-10-02 LAB
CULTURE RESULTS: SIGNIFICANT CHANGE UP
CULTURE RESULTS: SIGNIFICANT CHANGE UP
LEVETIRACETAM SERPL-MCNC: 9.9 MCG/ML — LOW (ref 12–46)
SPECIMEN SOURCE: SIGNIFICANT CHANGE UP
SPECIMEN SOURCE: SIGNIFICANT CHANGE UP

## 2018-10-03 ENCOUNTER — APPOINTMENT (OUTPATIENT)
Dept: INTERNAL MEDICINE | Facility: CLINIC | Age: 28
End: 2018-10-03
Payer: MEDICAID

## 2018-10-03 VITALS
WEIGHT: 130 LBS | HEIGHT: 70 IN | BODY MASS INDEX: 18.61 KG/M2 | DIASTOLIC BLOOD PRESSURE: 60 MMHG | SYSTOLIC BLOOD PRESSURE: 100 MMHG

## 2018-10-03 PROCEDURE — 99214 OFFICE O/P EST MOD 30 MIN: CPT

## 2018-10-04 LAB
CULTURE RESULTS: SIGNIFICANT CHANGE UP
SPECIMEN SOURCE: SIGNIFICANT CHANGE UP

## 2018-10-20 ENCOUNTER — INPATIENT (INPATIENT)
Facility: HOSPITAL | Age: 28
LOS: 4 days | Discharge: HOME | DRG: 189 | End: 2018-10-25
Attending: INTERNAL MEDICINE | Admitting: INTERNAL MEDICINE
Payer: COMMERCIAL

## 2018-10-20 VITALS — HEART RATE: 124 BPM | OXYGEN SATURATION: 98 % | RESPIRATION RATE: 24 BRPM

## 2018-10-20 DIAGNOSIS — G93.41 METABOLIC ENCEPHALOPATHY: ICD-10-CM

## 2018-10-20 DIAGNOSIS — I47.2 VENTRICULAR TACHYCARDIA: ICD-10-CM

## 2018-10-20 DIAGNOSIS — N18.6 END STAGE RENAL DISEASE: ICD-10-CM

## 2018-10-20 DIAGNOSIS — I77.0 ARTERIOVENOUS FISTULA, ACQUIRED: Chronic | ICD-10-CM

## 2018-10-20 DIAGNOSIS — J69.0 PNEUMONITIS DUE TO INHALATION OF FOOD AND VOMIT: ICD-10-CM

## 2018-10-20 DIAGNOSIS — J96.90 RESPIRATORY FAILURE, UNSPECIFIED, UNSPECIFIED WHETHER WITH HYPOXIA OR HYPERCAPNIA: ICD-10-CM

## 2018-10-20 DIAGNOSIS — B20 HUMAN IMMUNODEFICIENCY VIRUS [HIV] DISEASE: ICD-10-CM

## 2018-10-20 DIAGNOSIS — E87.5 HYPERKALEMIA: ICD-10-CM

## 2018-10-20 DIAGNOSIS — G40.909 EPILEPSY, UNSPECIFIED, NOT INTRACTABLE, WITHOUT STATUS EPILEPTICUS: ICD-10-CM

## 2018-10-20 LAB
ALBUMIN SERPL ELPH-MCNC: 3.6 G/DL — SIGNIFICANT CHANGE UP (ref 3.3–5.2)
ALBUMIN SERPL ELPH-MCNC: 3.9 G/DL — SIGNIFICANT CHANGE UP (ref 3.3–5.2)
ALP SERPL-CCNC: 659 U/L — HIGH (ref 40–120)
ALP SERPL-CCNC: 715 U/L — HIGH (ref 40–120)
ALT FLD-CCNC: 33 U/L — SIGNIFICANT CHANGE UP
ALT FLD-CCNC: 40 U/L — SIGNIFICANT CHANGE UP
ANION GAP SERPL CALC-SCNC: 18 MMOL/L — HIGH (ref 5–17)
ANION GAP SERPL CALC-SCNC: 19 MMOL/L — HIGH (ref 5–17)
ANISOCYTOSIS BLD QL: SLIGHT — SIGNIFICANT CHANGE UP
APAP SERPL-MCNC: <7.5 UG/ML — LOW (ref 10–26)
APTT BLD: 32.7 SEC — SIGNIFICANT CHANGE UP (ref 27.5–37.4)
AST SERPL-CCNC: 38 U/L — SIGNIFICANT CHANGE UP
AST SERPL-CCNC: 46 U/L — HIGH
BASE EXCESS BLDV CALC-SCNC: -4.7 MMOL/L — LOW (ref -2–2)
BASOPHILS # BLD AUTO: 0 K/UL — SIGNIFICANT CHANGE UP (ref 0–0.2)
BASOPHILS NFR BLD AUTO: 0.1 % — SIGNIFICANT CHANGE UP (ref 0–2)
BILIRUB SERPL-MCNC: 0.3 MG/DL — LOW (ref 0.4–2)
BILIRUB SERPL-MCNC: 0.4 MG/DL — SIGNIFICANT CHANGE UP (ref 0.4–2)
BUN SERPL-MCNC: 104 MG/DL — HIGH (ref 8–20)
BUN SERPL-MCNC: 37 MG/DL — HIGH (ref 8–20)
CA-I SERPL-SCNC: 1.07 MMOL/L — LOW (ref 1.15–1.33)
CALCIUM SERPL-MCNC: 9.1 MG/DL — SIGNIFICANT CHANGE UP (ref 8.6–10.2)
CALCIUM SERPL-MCNC: 9.4 MG/DL — SIGNIFICANT CHANGE UP (ref 8.6–10.2)
CHLORIDE BLDV-SCNC: 100 MMOL/L — SIGNIFICANT CHANGE UP (ref 98–107)
CHLORIDE SERPL-SCNC: 92 MMOL/L — LOW (ref 98–107)
CHLORIDE SERPL-SCNC: 97 MMOL/L — LOW (ref 98–107)
CK MB CFR SERPL CALC: 1.8 NG/ML — SIGNIFICANT CHANGE UP (ref 0–6.7)
CK SERPL-CCNC: 167 U/L — SIGNIFICANT CHANGE UP (ref 30–200)
CO2 SERPL-SCNC: 22 MMOL/L — SIGNIFICANT CHANGE UP (ref 22–29)
CO2 SERPL-SCNC: 25 MMOL/L — SIGNIFICANT CHANGE UP (ref 22–29)
CREAT SERPL-MCNC: 17.27 MG/DL — HIGH (ref 0.5–1.3)
CREAT SERPL-MCNC: 7.65 MG/DL — HIGH (ref 0.5–1.3)
EOSINOPHIL # BLD AUTO: 0 K/UL — SIGNIFICANT CHANGE UP (ref 0–0.5)
EOSINOPHIL NFR BLD AUTO: 0.2 % — SIGNIFICANT CHANGE UP (ref 0–6)
ETHANOL SERPL-MCNC: <10 MG/DL — SIGNIFICANT CHANGE UP
GAS PNL BLDV: 139 MMOL/L — SIGNIFICANT CHANGE UP (ref 135–145)
GAS PNL BLDV: SIGNIFICANT CHANGE UP
GAS PNL BLDV: SIGNIFICANT CHANGE UP
GLUCOSE BLDV-MCNC: 94 MG/DL — SIGNIFICANT CHANGE UP (ref 70–99)
GLUCOSE SERPL-MCNC: 118 MG/DL — HIGH (ref 70–115)
GLUCOSE SERPL-MCNC: 99 MG/DL — SIGNIFICANT CHANGE UP (ref 70–115)
HCO3 BLDV-SCNC: 20 MMOL/L — SIGNIFICANT CHANGE UP (ref 20–26)
HCT VFR BLD CALC: 28.7 % — LOW (ref 42–52)
HCT VFR BLD CALC: 29 % — LOW (ref 42–52)
HCT VFR BLDA CALC: 29 — LOW (ref 39–50)
HGB BLD CALC-MCNC: 9.5 G/DL — LOW (ref 13–17)
HGB BLD-MCNC: 9.1 G/DL — LOW (ref 14–18)
HGB BLD-MCNC: 9.1 G/DL — LOW (ref 14–18)
INR BLD: 1.08 RATIO — SIGNIFICANT CHANGE UP (ref 0.88–1.16)
LACTATE BLDV-MCNC: 2 MMOL/L — SIGNIFICANT CHANGE UP (ref 0.5–2)
LYMPHOCYTES # BLD AUTO: 0.5 K/UL — LOW (ref 1–4.8)
LYMPHOCYTES # BLD AUTO: 6.5 % — LOW (ref 20–55)
MACROCYTES BLD QL: SLIGHT — SIGNIFICANT CHANGE UP
MAGNESIUM SERPL-MCNC: 2.1 MG/DL — SIGNIFICANT CHANGE UP (ref 1.6–2.6)
MAGNESIUM SERPL-MCNC: 2.8 MG/DL — HIGH (ref 1.6–2.6)
MCHC RBC-ENTMCNC: 29.5 PG — SIGNIFICANT CHANGE UP (ref 27–31)
MCHC RBC-ENTMCNC: 29.9 PG — SIGNIFICANT CHANGE UP (ref 27–31)
MCHC RBC-ENTMCNC: 31.4 G/DL — LOW (ref 32–36)
MCHC RBC-ENTMCNC: 31.7 G/DL — LOW (ref 32–36)
MCV RBC AUTO: 93.2 FL — SIGNIFICANT CHANGE UP (ref 80–94)
MCV RBC AUTO: 95.4 FL — HIGH (ref 80–94)
MONOCYTES # BLD AUTO: 0.3 K/UL — SIGNIFICANT CHANGE UP (ref 0–0.8)
MONOCYTES NFR BLD AUTO: 3.8 % — SIGNIFICANT CHANGE UP (ref 3–10)
NEUTROPHILS # BLD AUTO: 7.2 K/UL — SIGNIFICANT CHANGE UP (ref 1.8–8)
NEUTROPHILS NFR BLD AUTO: 89.3 % — HIGH (ref 37–73)
OTHER CELLS CSF MANUAL: 13 ML/DL — LOW (ref 18–22)
OVALOCYTES BLD QL SMEAR: SLIGHT — SIGNIFICANT CHANGE UP
PCO2 BLDV: 52 MMHG — HIGH (ref 35–50)
PH BLDV: 7.25 — LOW (ref 7.32–7.43)
PHOSPHATE SERPL-MCNC: 4.1 MG/DL — SIGNIFICANT CHANGE UP (ref 2.4–4.7)
PLAT MORPH BLD: NORMAL — SIGNIFICANT CHANGE UP
PLATELET # BLD AUTO: 107 K/UL — LOW (ref 150–400)
PLATELET # BLD AUTO: 88 K/UL — LOW (ref 150–400)
PO2 BLDV: 135 MMHG — HIGH (ref 25–45)
POIKILOCYTOSIS BLD QL AUTO: SLIGHT — SIGNIFICANT CHANGE UP
POTASSIUM BLDV-SCNC: 8.7 MMOL/L — CRITICAL HIGH (ref 3.4–4.5)
POTASSIUM SERPL-MCNC: 4.2 MMOL/L — SIGNIFICANT CHANGE UP (ref 3.5–5.3)
POTASSIUM SERPL-MCNC: 9.3 MMOL/L — CRITICAL HIGH (ref 3.5–5.3)
POTASSIUM SERPL-SCNC: 4.2 MMOL/L — SIGNIFICANT CHANGE UP (ref 3.5–5.3)
POTASSIUM SERPL-SCNC: 9.3 MMOL/L — CRITICAL HIGH (ref 3.5–5.3)
PROT SERPL-MCNC: 7 G/DL — SIGNIFICANT CHANGE UP (ref 6.6–8.7)
PROT SERPL-MCNC: 7 G/DL — SIGNIFICANT CHANGE UP (ref 6.6–8.7)
PROTHROM AB SERPL-ACNC: 11.9 SEC — SIGNIFICANT CHANGE UP (ref 9.8–12.7)
RBC # BLD: 3.04 M/UL — LOW (ref 4.6–6.2)
RBC # BLD: 3.08 M/UL — LOW (ref 4.6–6.2)
RBC # FLD: 16.3 % — HIGH (ref 11–15.6)
RBC # FLD: 16.4 % — HIGH (ref 11–15.6)
RBC BLD AUTO: ABNORMAL
SALICYLATES SERPL-MCNC: <0.6 MG/DL — LOW (ref 10–20)
SAO2 % BLDV: 99 % — SIGNIFICANT CHANGE UP
SODIUM SERPL-SCNC: 135 MMOL/L — SIGNIFICANT CHANGE UP (ref 135–145)
SODIUM SERPL-SCNC: 138 MMOL/L — SIGNIFICANT CHANGE UP (ref 135–145)
TROPONIN T SERPL-MCNC: 0.27 NG/ML — HIGH (ref 0–0.06)
TROPONIN T SERPL-MCNC: 0.3 NG/ML — HIGH (ref 0–0.06)
TSH SERPL-MCNC: 0.73 UIU/ML — SIGNIFICANT CHANGE UP (ref 0.27–4.2)
WBC # BLD: 6 K/UL — SIGNIFICANT CHANGE UP (ref 4.8–10.8)
WBC # BLD: 8.1 K/UL — SIGNIFICANT CHANGE UP (ref 4.8–10.8)
WBC # FLD AUTO: 6 K/UL — SIGNIFICANT CHANGE UP (ref 4.8–10.8)
WBC # FLD AUTO: 8.1 K/UL — SIGNIFICANT CHANGE UP (ref 4.8–10.8)

## 2018-10-20 PROCEDURE — 72125 CT NECK SPINE W/O DYE: CPT | Mod: 26

## 2018-10-20 PROCEDURE — 93010 ELECTROCARDIOGRAM REPORT: CPT

## 2018-10-20 PROCEDURE — 99291 CRITICAL CARE FIRST HOUR: CPT | Mod: 25

## 2018-10-20 PROCEDURE — 31500 INSERT EMERGENCY AIRWAY: CPT

## 2018-10-20 PROCEDURE — 99291 CRITICAL CARE FIRST HOUR: CPT

## 2018-10-20 PROCEDURE — 71045 X-RAY EXAM CHEST 1 VIEW: CPT | Mod: 26

## 2018-10-20 PROCEDURE — 70450 CT HEAD/BRAIN W/O DYE: CPT | Mod: 26

## 2018-10-20 PROCEDURE — 71250 CT THORAX DX C-: CPT | Mod: 26

## 2018-10-20 PROCEDURE — 99223 1ST HOSP IP/OBS HIGH 75: CPT

## 2018-10-20 RX ORDER — AZITHROMYCIN 500 MG/1
1 TABLET, FILM COATED ORAL
Qty: 0 | Refills: 0 | Status: DISCONTINUED | OUTPATIENT
Start: 2018-10-20 | End: 2018-10-20

## 2018-10-20 RX ORDER — ROCURONIUM BROMIDE 10 MG/ML
70 VIAL (ML) INTRAVENOUS ONCE
Qty: 0 | Refills: 0 | Status: COMPLETED | OUTPATIENT
Start: 2018-10-20 | End: 2018-10-20

## 2018-10-20 RX ORDER — AZITHROMYCIN 500 MG/1
1000 TABLET, FILM COATED ORAL
Qty: 0 | Refills: 0 | Status: DISCONTINUED | OUTPATIENT
Start: 2018-10-20 | End: 2018-10-22

## 2018-10-20 RX ORDER — ETOMIDATE 2 MG/ML
20 INJECTION INTRAVENOUS ONCE
Qty: 0 | Refills: 0 | Status: COMPLETED | OUTPATIENT
Start: 2018-10-20 | End: 2018-10-20

## 2018-10-20 RX ORDER — PANTOPRAZOLE SODIUM 20 MG/1
40 TABLET, DELAYED RELEASE ORAL DAILY
Qty: 0 | Refills: 0 | Status: DISCONTINUED | OUTPATIENT
Start: 2018-10-20 | End: 2018-10-21

## 2018-10-20 RX ORDER — INSULIN HUMAN 100 [IU]/ML
10 INJECTION, SOLUTION SUBCUTANEOUS ONCE
Qty: 0 | Refills: 0 | Status: COMPLETED | OUTPATIENT
Start: 2018-10-20 | End: 2018-10-20

## 2018-10-20 RX ORDER — CALCIUM CHLORIDE
1000 POWDER (GRAM) MISCELLANEOUS ONCE
Qty: 0 | Refills: 0 | Status: COMPLETED | OUTPATIENT
Start: 2018-10-20 | End: 2018-10-20

## 2018-10-20 RX ORDER — PROPOFOL 10 MG/ML
20 INJECTION, EMULSION INTRAVENOUS
Qty: 1000 | Refills: 0 | Status: DISCONTINUED | OUTPATIENT
Start: 2018-10-20 | End: 2018-10-21

## 2018-10-20 RX ORDER — AMIODARONE HYDROCHLORIDE 400 MG/1
150 TABLET ORAL ONCE
Qty: 0 | Refills: 0 | Status: DISCONTINUED | OUTPATIENT
Start: 2018-10-20 | End: 2018-10-20

## 2018-10-20 RX ORDER — PIPERACILLIN AND TAZOBACTAM 4; .5 G/20ML; G/20ML
2.25 INJECTION, POWDER, LYOPHILIZED, FOR SOLUTION INTRAVENOUS EVERY 12 HOURS
Qty: 0 | Refills: 0 | Status: DISCONTINUED | OUTPATIENT
Start: 2018-10-21 | End: 2018-10-25

## 2018-10-20 RX ORDER — ACETAMINOPHEN 500 MG
650 TABLET ORAL EVERY 6 HOURS
Qty: 0 | Refills: 0 | Status: DISCONTINUED | OUTPATIENT
Start: 2018-10-20 | End: 2018-10-21

## 2018-10-20 RX ORDER — ATOVAQUONE 750 MG/5ML
750 SUSPENSION ORAL
Qty: 0 | Refills: 0 | Status: DISCONTINUED | OUTPATIENT
Start: 2018-10-20 | End: 2018-10-22

## 2018-10-20 RX ORDER — VANCOMYCIN HCL 1 G
1000 VIAL (EA) INTRAVENOUS ONCE
Qty: 0 | Refills: 0 | Status: COMPLETED | OUTPATIENT
Start: 2018-10-20 | End: 2018-10-20

## 2018-10-20 RX ORDER — LEVETIRACETAM 250 MG/1
1000 TABLET, FILM COATED ORAL ONCE
Qty: 0 | Refills: 0 | Status: COMPLETED | OUTPATIENT
Start: 2018-10-20 | End: 2018-10-20

## 2018-10-20 RX ORDER — CHLORHEXIDINE GLUCONATE 213 G/1000ML
15 SOLUTION TOPICAL
Qty: 0 | Refills: 0 | Status: DISCONTINUED | OUTPATIENT
Start: 2018-10-20 | End: 2018-10-21

## 2018-10-20 RX ORDER — FENTANYL CITRATE 50 UG/ML
100 INJECTION INTRAVENOUS ONCE
Qty: 0 | Refills: 0 | Status: DISCONTINUED | OUTPATIENT
Start: 2018-10-20 | End: 2018-10-20

## 2018-10-20 RX ORDER — PIPERACILLIN AND TAZOBACTAM 4; .5 G/20ML; G/20ML
INJECTION, POWDER, LYOPHILIZED, FOR SOLUTION INTRAVENOUS
Qty: 0 | Refills: 0 | Status: DISCONTINUED | OUTPATIENT
Start: 2018-10-20 | End: 2018-10-25

## 2018-10-20 RX ORDER — HEPARIN SODIUM 5000 [USP'U]/ML
5000 INJECTION INTRAVENOUS; SUBCUTANEOUS EVERY 12 HOURS
Qty: 0 | Refills: 0 | Status: DISCONTINUED | OUTPATIENT
Start: 2018-10-20 | End: 2018-10-25

## 2018-10-20 RX ORDER — PIPERACILLIN AND TAZOBACTAM 4; .5 G/20ML; G/20ML
2.25 INJECTION, POWDER, LYOPHILIZED, FOR SOLUTION INTRAVENOUS ONCE
Qty: 0 | Refills: 0 | Status: COMPLETED | OUTPATIENT
Start: 2018-10-20 | End: 2018-10-20

## 2018-10-20 RX ORDER — LEVETIRACETAM 250 MG/1
500 TABLET, FILM COATED ORAL ONCE
Qty: 0 | Refills: 0 | Status: COMPLETED | OUTPATIENT
Start: 2018-10-20 | End: 2018-10-20

## 2018-10-20 RX ADMIN — CHLORHEXIDINE GLUCONATE 15 MILLILITER(S): 213 SOLUTION TOPICAL at 17:35

## 2018-10-20 RX ADMIN — Medication 4 MILLIGRAM(S): at 12:05

## 2018-10-20 RX ADMIN — LEVETIRACETAM 400 MILLIGRAM(S): 250 TABLET, FILM COATED ORAL at 10:58

## 2018-10-20 RX ADMIN — INSULIN HUMAN 10 UNIT(S): 100 INJECTION, SOLUTION SUBCUTANEOUS at 10:45

## 2018-10-20 RX ADMIN — AZITHROMYCIN 1000 MILLIGRAM(S): 500 TABLET, FILM COATED ORAL at 17:22

## 2018-10-20 RX ADMIN — Medication 1000 MILLIGRAM(S): at 10:35

## 2018-10-20 RX ADMIN — PROPOFOL 8.52 MICROGRAM(S)/KG/MIN: 10 INJECTION, EMULSION INTRAVENOUS at 12:20

## 2018-10-20 RX ADMIN — HEPARIN SODIUM 5000 UNIT(S): 5000 INJECTION INTRAVENOUS; SUBCUTANEOUS at 17:23

## 2018-10-20 RX ADMIN — PIPERACILLIN AND TAZOBACTAM 200 GRAM(S): 4; .5 INJECTION, POWDER, LYOPHILIZED, FOR SOLUTION INTRAVENOUS at 17:20

## 2018-10-20 RX ADMIN — Medication 250 MILLIGRAM(S): at 17:19

## 2018-10-20 RX ADMIN — Medication 2 MILLIGRAM(S): at 10:52

## 2018-10-20 RX ADMIN — Medication 70 MILLIGRAM(S): at 10:29

## 2018-10-20 RX ADMIN — ATOVAQUONE 750 MILLIGRAM(S): 750 SUSPENSION ORAL at 17:23

## 2018-10-20 RX ADMIN — LEVETIRACETAM 500 MILLIGRAM(S): 250 TABLET, FILM COATED ORAL at 17:19

## 2018-10-20 RX ADMIN — FENTANYL CITRATE 100 MICROGRAM(S): 50 INJECTION INTRAVENOUS at 10:52

## 2018-10-20 RX ADMIN — PANTOPRAZOLE SODIUM 40 MILLIGRAM(S): 20 TABLET, DELAYED RELEASE ORAL at 17:35

## 2018-10-20 RX ADMIN — ETOMIDATE 20 MILLIGRAM(S): 2 INJECTION INTRAVENOUS at 10:29

## 2018-10-20 NOTE — ED ADULT NURSE REASSESSMENT NOTE - NS ED NURSE REASSESS COMMENT FT1
Late entry : Pt was moved from [facility] department to MICU Bed # 10 , pt intubated on the vent, tolerating vent settings well, VSS, verbal report given to endorsing  nurse She. Made aware of critical potasium value , pt v

## 2018-10-20 NOTE — CONSULT NOTE ADULT - ASSESSMENT
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   - will monitor    HIV/AIDS  -Will not start any ARV at this time; concern raised for possible IRIS in this pt with CD4 less than 100  - continue Mepron 750mg enteral tube BID  - add azithromycin 1 gram weekly for now  - check CD4 and HIV viral load    ESRD   - continue dialysis  - meds dosed renally.     history of Seizure d/o   - continue Anti epileptic drugs

## 2018-10-20 NOTE — PROGRESS NOTE ADULT - SUBJECTIVE AND OBJECTIVE BOX
Critical care note      Antimicrobial:  doxycycline IVPB      doxycycline IVPB 100 milliGRAM(s) IV Intermittent once  piperacillin/tazobactam IVPB. 2.25 Gram(s) IV Intermittent once  piperacillin/tazobactam IVPB.        Cardiovascular:    Pulmonary:    Hematalogic:  heparin  Injectable 5000 Unit(s) SubCutaneous every 12 hours    Other:  chlorhexidine 0.12% Liquid 15 milliLiter(s) Swish and Spit two times a day  levETIRAcetam  Solution 500 milliGRAM(s) Oral once  pantoprazole  Injectable 40 milliGRAM(s) IV Push daily  propofol Infusion 20 MICROgram(s)/kG/Min IV Continuous <Continuous>      Drug Dosing Weight  Height (cm): 175.26 (28 Sep 2018 16:09)  Weight (kg): 63.6 (20 Oct 2018 12:00)  BMI (kg/m2): 20.7 (20 Oct 2018 12:00)  BSA (m2): 1.78 (20 Oct 2018 12:00)    CENTRAL LINE: [ ] YES [x ] NO  LOCATION:   DATE INSERTED:    MALIK: [x ] YES [x ] NO    DATE INSERTED:    A-LINE:  [ ] YES [x ] NO  LOCATION:   DATE INSERTED:    PMH/Social Hx/Fam Hx -reviewed admission note, no change since admission  PAST MEDICAL & SURGICAL HISTORY:  Seizure disorder  Hypertension  ESRD (end stage renal disease)  Seizure  Pericarditis  Anxiety  Depression  Renal failure (ARF), acute on chronic: dialysis av fistula, RUE  HTN (hypertension)  Cardiomyopathy  HIV disease: born HIV+  AV fistula  S/P tonsillectomy      T(C): 37.3 (10-20-18 @ 13:00), Max: 37.9 (10-20-18 @ 12:00)  HR: 85 (10-20-18 @ 13:00)  BP: 98/72 (10-20-18 @ 13:00)  BP(mean): 97 (10-20-18 @ 12:00)  ABP: --  ABP(mean): --  RR: 14 (10-20-18 @ 11:15)  SpO2: 97% (10-20-18 @ 13:00)  Wt(kg): --            Mode: AC/ CMV (Assist Control/ Continuous Mandatory Ventilation)  RR (machine): 20  TV (machine): 450  FiO2: 30  PEEP: 5  MAP: 11  PIP: 28      PHYSICAL EXAM:    GENERAL: [ ]NAD, [ ]well-groomed, [ ]well-developed;  [ ]  HEAD:  [ ]Atraumatic, [ ]Normocephalic;  [ ]  EYES: [ ]EOMI, [ ]PERRLA, [ ]conjunctiva and sclera clear;   [ ]  ENMT: [ ]No tonsillar erythema, exudates, or enlargement; [ ]Moist mucous membranes, [ ]Good dentition, [ ]No lesions; [ ]  NECK: [ ]Supple, normal appearance, [ ]No JVD; [ ]Normal thyroid; [ ]Trachea midline; [ ]  NERVOUS SYSTEM:  [ ]Alert & Oriented X3, [ ]Good concentration; [ ]Motor Strength 5/5 B/L upper and lower extremities; [ ]DTRs 2+ intact and symmetric; [ ]  CHEST/LUNG: [ ]No chest deformity; [ ]Normal percussion bilaterally; [ ]No rales, rhonchi, wheezing; [ ]Crackles at bases; [ ]  HEART: [ ]Regular rate and rhythm; [ ]No murmurs, rubs, or gallops;  [ ]  ABDOMEN: [ ]Soft, Nontender, Nondistended; [ ]Bowel sounds present;  [ ]  EXTREMITIES:  [ ]2+ Peripheral Pulses, [ ]No clubbing, cyanosis, or edema [ ]Bilat lower extremity edema;  [ ]  LYMPH: [ ]No lymphadenopathy noted;  [ ]  SKIN: [ ]No rashes or lesions; [ ]Good capillary refill;  [ ]      LABS:  CBC Full  -  ( 20 Oct 2018 10:38 )  WBC Count : 8.1 K/uL  Hemoglobin : 9.1 g/dL  Hematocrit : 29.0 %  Platelet Count - Automated : 107 K/uL  Mean Cell Volume : 95.4 fl  Mean Cell Hemoglobin : 29.9 pg  Mean Cell Hemoglobin Concentration : 31.4 g/dL  Auto Neutrophil # : 7.2 K/uL  Auto Lymphocyte # : 0.5 K/uL  Auto Monocyte # : 0.3 K/uL  Auto Eosinophil # : 0.0 K/uL  Auto Basophil # : 0.0 K/uL  Auto Neutrophil % : 89.3 %  Auto Lymphocyte % : 6.5 %  Auto Monocyte % : 3.8 %  Auto Eosinophil % : 0.2 %  Auto Basophil % : 0.1 %    10-20    138  |  97<L>  |  104.0<H>  ----------------------------<  99  9.3<HH>   |  22.0  |  17.27<H>    Ca    9.1      20 Oct 2018 10:38  Mg     2.8     10-20    TPro  7.0  /  Alb  3.9  /  TBili  0.3<L>  /  DBili  x   /  AST  38  /  ALT  33  /  AlkPhos  715<H>  10-20    PT/INR - ( 20 Oct 2018 10:38 )   PT: 11.9 sec;   INR: 1.08 ratio         PTT - ( 20 Oct 2018 10:38 )  PTT:32.7 sec        RADIOLOGY & ADDITIONAL STUDIES REVIEWED -     [ ]GOALS OF CARE DISCUSSION WITH PATIENT/FAMILY/PROXY    CRITICAL CARE TIME SPENT: 35 minutes Critical care note  Interval HPI - had brief episode of NSVT in ED    Antimicrobial:  doxycycline IVPB      doxycycline IVPB 100 milliGRAM(s) IV Intermittent once  piperacillin/tazobactam IVPB. 2.25 Gram(s) IV Intermittent once  piperacillin/tazobactam IVPB.        Cardiovascular:    Pulmonary:    Hematalogic:  heparin  Injectable 5000 Unit(s) SubCutaneous every 12 hours    Other:  chlorhexidine 0.12% Liquid 15 milliLiter(s) Swish and Spit two times a day  levETIRAcetam  Solution 500 milliGRAM(s) Oral once  pantoprazole  Injectable 40 milliGRAM(s) IV Push daily  propofol Infusion 20 MICROgram(s)/kG/Min IV Continuous <Continuous>      Drug Dosing Weight  Height (cm): 175.26 (28 Sep 2018 16:09)  Weight (kg): 63.6 (20 Oct 2018 12:00)  BMI (kg/m2): 20.7 (20 Oct 2018 12:00)  BSA (m2): 1.78 (20 Oct 2018 12:00)    CENTRAL LINE: [ ] YES [x ] NO  LOCATION:   DATE INSERTED:    MALIK: [x ] YES [x ] NO    DATE INSERTED:    A-LINE:  [ ] YES [x ] NO  LOCATION:   DATE INSERTED:    PMH/Social Hx/Fam Hx -reviewed admission note, no change since admission  PAST MEDICAL & SURGICAL HISTORY:  Seizure disorder  Hypertension  ESRD (end stage renal disease)  Seizure  Pericarditis  Anxiety  Depression  Renal failure (ARF), acute on chronic: dialysis av fistula, RUE  HTN (hypertension)  Cardiomyopathy  HIV disease: born HIV+  AV fistula  S/P tonsillectomy      T(C): 37.3 (10-20-18 @ 13:00), Max: 37.9 (10-20-18 @ 12:00)  HR: 85 (10-20-18 @ 13:00)  BP: 98/72 (10-20-18 @ 13:00)  BP(mean): 97 (10-20-18 @ 12:00)  ABP: --  ABP(mean): --  RR: 14 (10-20-18 @ 11:15)  SpO2: 97% (10-20-18 @ 13:00)  Wt(kg): --            Mode: AC/ CMV (Assist Control/ Continuous Mandatory Ventilation)  RR (machine): 20  TV (machine): 450  FiO2: 30  PEEP: 5  MAP: 11  PIP: 28      PHYSICAL EXAM:    GENERAL: [ ]NAD, [ ]well-groomed, [ ]well-developed;  [ x] intubated, sedated  HEAD:  [x ]Atraumatic, [x ]Normocephalic;  [ ]  EYES: [ ]EOMI, [ x]PERRLA, [ ]conjunctiva and sclera clear;   [ ]  ENMT: [ x]No tonsillar erythema, exudates, or enlargement; [x ]Moist mucous membranes, [ ]Good dentition, [ ]No lesions; [ ]  NECK: [x ]Supple, normal appearance, [x ]No JVD; [ ]Normal thyroid; [x ]Trachea midline; [ ]  NERVOUS SYSTEM:  [ ]Alert & Oriented X3, [ ]Good concentration; [ ]Motor Strength 5/5 B/L upper and lower extremities; [ ]DTRs 2+ intact and symmetric; [x ]unresponsive (received paralytic)   CHEST/LUNG: [ x]No chest deformity; [ ]Normal percussion bilaterally; [ ]No rales, rhonchi, wheezing; [ ]Crackles at bases; [x ]scattered crackles  HEART: [x ]Regular rate and rhythm; [ ]No murmurs, rubs, or gallops;  [ ]  ABDOMEN: [x ]Soft, Nontender, Nondistended; [ ]Bowel sounds present;  [ ]  EXTREMITIES:  [x ]2+ Peripheral Pulses, [ ]No clubbing, cyanosis, or edema [ ]Bilat lower extremity edema;  [ ]  LYMPH: [ x]No lymphadenopathy noted;  [ ]  SKIN: [ ]No rashes or lesions; [x ]Good capillary refill;  [ ]      LABS:  CBC Full  -  ( 20 Oct 2018 10:38 )  WBC Count : 8.1 K/uL  Hemoglobin : 9.1 g/dL  Hematocrit : 29.0 %  Platelet Count - Automated : 107 K/uL  Mean Cell Volume : 95.4 fl  Mean Cell Hemoglobin : 29.9 pg  Mean Cell Hemoglobin Concentration : 31.4 g/dL  Auto Neutrophil # : 7.2 K/uL  Auto Lymphocyte # : 0.5 K/uL  Auto Monocyte # : 0.3 K/uL  Auto Eosinophil # : 0.0 K/uL  Auto Basophil # : 0.0 K/uL  Auto Neutrophil % : 89.3 %  Auto Lymphocyte % : 6.5 %  Auto Monocyte % : 3.8 %  Auto Eosinophil % : 0.2 %  Auto Basophil % : 0.1 %    10-20    138  |  97<L>  |  104.0<H>  ----------------------------<  99  9.3<HH>   |  22.0  |  17.27<H>    Ca    9.1      20 Oct 2018 10:38  Mg     2.8     10-20    TPro  7.0  /  Alb  3.9  /  TBili  0.3<L>  /  DBili  x   /  AST  38  /  ALT  33  /  AlkPhos  715<H>  10-20    PT/INR - ( 20 Oct 2018 10:38 )   PT: 11.9 sec;   INR: 1.08 ratio         PTT - ( 20 Oct 2018 10:38 )  PTT:32.7 sec        RADIOLOGY & ADDITIONAL STUDIES REVIEWED - CT chest shows infiltrates indicative of pneumonia    CRITICAL CARE TIME SPENT: 35 minutes

## 2018-10-20 NOTE — ED ADULT NURSE NOTE - PMH
Anxiety    Cardiomyopathy    Depression    ESRD (end stage renal disease)    HIV disease  born HIV+  HTN (hypertension)    Hypertension    Pericarditis    Renal failure (ARF), acute on chronic  dialysis av fistula, RUE  Seizure    Seizure disorder

## 2018-10-20 NOTE — H&P ADULT - NSHPPHYSICALEXAM_GEN_ALL_CORE
GENERAL:  HEENT:  CV:  RESP:  ABD:  : Mccoy in place.   EXT: No edema, nontender.   SKIN: Warm, intact, no rashes appreciated.   NEURO: Sedated with propofol. GENERAL: Young male, lying in bed, intubated, sedated, NAD.    HEENT: NC/AT, PERRLA, ET tube in place.  OG tube in place.   CV: RRR, +s1, +s2, No murmurs, rubs or gallops appreciated.    RESP: Symmetrical thorax expansion upon respiration.  Coarse to auscultation bilaterally R>L, wheezing bilaterally R>L.  +secretions per ET tube suctioning and oral suctioning.    ABD: soft, nontender, nondistended, normoactive bowel sounds.    : Mccoy in place.   EXT: No edema, nontender.   SKIN: Warm, intact, no rashes appreciated.   NEURO: Sedated with propofol.

## 2018-10-20 NOTE — ED ADULT NURSE REASSESSMENT NOTE - NS ED NURSE REASSESS COMMENT FT1
Intubated at 10:31 by Dr. Denny with 8.0 tube, 23 at lip.  Color changed noted.  Stat chest xray called.

## 2018-10-20 NOTE — PROGRESS NOTE ADULT - SUBJECTIVE AND OBJECTIVE BOX
Non-compliant with dialysis treatment now in H with AMS. Intubated. K of 9.3. Hyperkalemia cocktail given. Emergent dialysis 3.5 hours with 1K bath. D/w ER. Orders written. Full consult to follow

## 2018-10-20 NOTE — ED PROVIDER NOTE - OBJECTIVE STATEMENT
This is a 28M w/Hx of ESRD on dialysis, HIV, depression, seizure disorder, med noncompliance who presents for altered mental status. Pt was found unresponsive in his bed by mom. Unknown last HD. Unknown if he took drugs. Mom said he has been depressed and are concerned he may have taken drugs. No reported trauma. Pt obtunded per EMS. FSG was 90s in the field. Pt hypoxic and sonorous breathing placed on BVM and transported emergently. No family available at this time.

## 2018-10-20 NOTE — CONSULT NOTE ADULT - ASSESSMENT
ESRD  Hyperkalemia  AMS  Acute respiratory failure  Anemia    - Long term non-compliance with treatment. He has missed his dialysis for a week. We called him several times outpatient but he refuse to go to dialysis even just yesterday 10/19/18  - HD today 3.5 hours with 1 K bath. May need repeat dialysis tomorrow  - Social work and psych evaluation maybe helpful  - Vent per ICU      D/w HD NR  D/w MICU    Thank you

## 2018-10-20 NOTE — CONSULT NOTE ADULT - SUBJECTIVE AND OBJECTIVE BOX
[facility]  INFECTIOUS DISEASES AND INTERNAL MEDICINE at [place]  =======================================================  Howie Navarro MD  Diplomates American Board of Internal Medicine and Infectious Diseases  =======================================================    [id number]  TEDDY CRAWFORD   29 y/o man with a PMH of poorly controlled HIV due to nonadherence, ESRD, seizures disorder,  ID was called due to his HIV status for ARV management. Know to Dr. Navarro from past admissions, and doesn't follow with his PCP after discharge, has not seen PCP since 12/2017.  Prior genotype with a highly resistant HIV virus, resistant to all classes of ARV. ID came up with a regimen hoping to suppress the virus until new meds are available but he never took the meds as outpt. HE is admitted after being found obtunded in bed by family member prompting 911 call.  Upon arrival to ED patient obtunded, actively vomiting and aspirating.  Patient intubated for airway protection.  S/p intubation patient experienced run of vtach, given calcium chloride and bicarb and returned to NSR.  Labs significant for serum K 9.3, , Cr, 17.27.  Patient given D50 and Insulin 10U IVP x1 and keppra loaded.  Patient sent for head and chest CT, awaiting official read.  Nephrology called, emergent HD per right AV fistula upon arrival to MICU.      Workup here included a CT chest, which showed:  A RIGHT lower lobe complete airspace lobar consolidation. Increased soft tissue fullness RIGHT hilum. RIGHT upper lobe posterior segment segmental multifocal airspace  consolidation.  A LEFT lower lobe multifocal subsegmental airspace consolidation. Cardiomegaly. No pericardial effusion.    We are now called to evaluate patient for HIV management and for management of PNA  =======================================================  Past Medical & Surgical Hx:  =====================  PAST MEDICAL & SURGICAL HISTORY:  Seizure disorder  Hypertension  ESRD (end stage renal disease)  Seizure  Pericarditis  Anxiety  Depression  Renal failure (ARF), acute on chronic: dialysis av fistula, RUE  HTN (hypertension)  Cardiomyopathy  HIV disease: born HIV+  AV fistula  S/P tonsillectomy      Problem List:  ==========  HEALTH ISSUES - PROBLEM Dx:  HIV disease: HIV disease  Seizure disorder: Seizure disorder  Metabolic encephalopathy: Metabolic encephalopathy  Ventricular tachycardia: Ventricular tachycardia  Hyperkalemia: Hyperkalemia  ESRD (end stage renal disease): ESRD (end stage renal disease)  Respiratory failure: Respiratory failure      Social Hx:  =======  Single, currently unemployed, lives with adopted mother and brother. Social alcohol use.  Denies tobacco use.    FAMILY HISTORY:  No pertinent family history in first degree relatives: Unknown FHx because pt is adopted  no significant family history of immunosuppressive disorders in mother or father    Allergies  vancomycin (Anaphylaxis)        Antibiotics:  doxycycline IVPB      doxycycline IVPB 100 milliGRAM(s) IV Intermittent once  piperacillin/tazobactam IVPB. 2.25 Gram(s) IV Intermittent once  piperacillin/tazobactam IVPB.        Other medications:  chlorhexidine 0.12% Liquid 15 milliLiter(s) Swish and Spit two times a day  heparin  Injectable 5000 Unit(s) SubCutaneous every 12 hours  levETIRAcetam  Solution 500 milliGRAM(s) Oral once  pantoprazole  Injectable 40 milliGRAM(s) IV Push daily  propofol Infusion 20 MICROgram(s)/kG/Min IV Continuous <Continuous>     atovaquone Suspension   1500 milliGRAM(s) Oral (09-29-18 @ 13:21)    darunavir   600 milliGRAM(s) Oral (08-21-18 @ 21:59)   600 milliGRAM(s) Oral (08-22-18 @ 20:31)   600 milliGRAM(s) Oral (08-23-18 @ 11:02)    dolutegravir   50 milliGRAM(s) Oral (08-21-18 @ 21:56)   50 milliGRAM(s) Oral (08-22-18 @ 09:48)   50 milliGRAM(s) Oral (08-22-18 @ 20:31)   50 milliGRAM(s) Oral (08-23-18 @ 11:02)    etravirine   200 milliGRAM(s) Oral (08-21-18 @ 21:55)   200 milliGRAM(s) Oral (08-22-18 @ 09:49)   200 milliGRAM(s) Oral (08-22-18 @ 20:32)   200 milliGRAM(s) Oral (08-23-18 @ 11:02)    ritonavir Tablet   100 milliGRAM(s) Oral (08-21-18 @ 21:55)   100 milliGRAM(s) Oral (08-22-18 @ 20:32)   100 milliGRAM(s) Oral (08-23-18 @ 11:02)    tenofovir disoproxil fumarate (VIREAD)   300 milliGRAM(s) Oral (08-22-18 @ 20:32)       =======================================================  REVIEW OF SYSTEMS:  unable to be obtained.   ======================================================  Physical Exam:  ============  T(F): 99 (20 Oct 2018 14:00), Max: 100.2 (20 Oct 2018 12:00)  HR: 107 (20 Oct 2018 14:00)  BP: 105/70 (20 Oct 2018 13:30)  RR: 14 (20 Oct 2018 11:15)  SpO2: 100% (20 Oct 2018 13:30) (90% - 100%)    Weight (kg): 63.6 (10-20-18 @ 12:00)    General:  INTUBATED     =======================================================  Labs:  ====  Labs:                        9.1    8.1   )-----------( 107      ( 20 Oct 2018 10:38 )             29.0     10-20    138  |  97<L>  |  104.0<H>  ----------------------------<  99  9.3<HH>   |  22.0  |  17.27<H>    Ca    9.1      20 Oct 2018 10:38  Mg     2.8     10-20    TPro  7.0  /  Alb  3.9  /  TBili  0.3<L>  /  DBili  x   /  AST  38  /  ALT  33  /  AlkPhos  715<H>  10-20        ===========     EXAM:  CT CHEST                          PROCEDURE DATE:  10/20/2018          INTERPRETATION:  Chest CT without contrast .  COMPARISON: Chest x-ray 9/29/2018..  CLINICAL INFORMATION: Respiratory failure. Clear chest x-ray..  TECHNIQUE: Contiguous axial 2.5 mm slice thickness images of the chest   were obtained without intravenous contrast administration.  FINDINGS:  Increased soft tissue density noted within the RIGHT hilum .  Hilar findings may indicate adenopathy or a primary carcinoma. The RIGHT   lung shows a complete consolidation of the RIGHT lower lobe .  Multifocal subsegmental airspace consolidation also noted within the   RIGHT upper lobe posterior segments .  A LEFT lung parenchyma shows LEFT lower lobe a multifocal M subsegmental   airspace consolidations. The LEFT upper lobe remains clear. No gross   endobronchial obstructing lesion identified.  There is cardiomegaly without pericardial effusion. An  NG tube tip is within stomach.  Kidneys are atrophic.  No evidence of axillary or supraclavicular bulky lymphadenopathy.  Endotracheal tube tip is above tracheal bifurcation.  Skeletal structures show diffuse sclerosis consistent with renal   osteodystrophy..   IMPRESSION:  A RIGHT lower lobe complete airspace lobar consolidation.  Increased soft tissue fullness RIGHT hilum.  RIGHT upper lobe posterior segment segmental multifocal airspace   consolidation.  A LEFT lower lobe multifocal subsegmental airspace consolidation.  Cardiomegaly. No pericardial effusion.    ORLANDO MUIR M.D., ATTENDING RADIOLOGIST  This document has been electronically signed. Oct 20 2018 11:56AM [facility]  INFECTIOUS DISEASES AND INTERNAL MEDICINE at [place]  =======================================================  Howie Navarro MD  Diplomates American Board of Internal Medicine and Infectious Diseases  =======================================================    [id number]  TEDDY CRAWFORD   29 y/o man with a PMH of poorly controlled HIV due to nonadherence, ESRD, seizures disorder,  ID was called due to his HIV status for ARV management. Know to Dr. Navarro from past admissions, and doesn't follow with his PCP after discharge, has not seen PCP since 12/2017.  Prior genotype with a highly resistant HIV virus, resistant to all classes of ARV. ID came up with a regimen hoping to suppress the virus until new meds are available but he never took the meds as outpt. HE is admitted after being found obtunded in bed by family member prompting 911 call.  Upon arrival to ED patient obtunded, actively vomiting and aspirating.  Patient intubated for airway protection.  S/p intubation patient experienced run of vtach, given calcium chloride and bicarb and returned to NSR.  Labs significant for serum K 9.3, , Cr, 17.27.  Patient given D50 and Insulin 10U IVP x1 and keppra loaded.  Patient sent for head and chest CT, awaiting official read.  Nephrology called, emergent HD per right AV fistula upon arrival to MICU.      Workup here included a CT chest, which showed:  A RIGHT lower lobe complete airspace lobar consolidation. Increased soft tissue fullness RIGHT hilum. RIGHT upper lobe posterior segment segmental multifocal airspace  consolidation. A LEFT lower lobe multifocal subsegmental airspace consolidation. Cardiomegaly. No pericardial effusion.    We are now called to evaluate patient for HIV management and for management of PNA.    patient is intubated in the MICU    =======================================================  Past Medical & Surgical Hx:  =====================  PAST MEDICAL & SURGICAL HISTORY:  Seizure disorder  Hypertension  ESRD (end stage renal disease)  Seizure  Pericarditis  Anxiety  Depression  Renal failure (ARF), acute on chronic: dialysis av fistula, RUE  HTN (hypertension)  Cardiomyopathy  HIV disease: born HIV+  AV fistula  S/P tonsillectomy      Problem List:  ==========  HEALTH ISSUES - PROBLEM Dx:  HIV disease: HIV disease  Seizure disorder: Seizure disorder  Metabolic encephalopathy: Metabolic encephalopathy  Ventricular tachycardia: Ventricular tachycardia  Hyperkalemia: Hyperkalemia  ESRD (end stage renal disease): ESRD (end stage renal disease)  Respiratory failure: Respiratory failure      Social Hx:  =======  Single, currently unemployed, lives with adopted mother and brother. Social alcohol use.  Denies tobacco use.    FAMILY HISTORY:  No pertinent family history in first degree relatives: Unknown FHx because pt is adopted  no significant family history of immunosuppressive disorders in mother or father    Allergies  vancomycin (Anaphylaxis)        Antibiotics:  doxycycline IVPB      doxycycline IVPB 100 milliGRAM(s) IV Intermittent once  piperacillin/tazobactam IVPB. 2.25 Gram(s) IV Intermittent once  piperacillin/tazobactam IVPB.        Other medications:  chlorhexidine 0.12% Liquid 15 milliLiter(s) Swish and Spit two times a day  heparin  Injectable 5000 Unit(s) SubCutaneous every 12 hours  levETIRAcetam  Solution 500 milliGRAM(s) Oral once  pantoprazole  Injectable 40 milliGRAM(s) IV Push daily  propofol Infusion 20 MICROgram(s)/kG/Min IV Continuous <Continuous>     atovaquone Suspension   1500 milliGRAM(s) Oral (09-29-18 @ 13:21)    darunavir   600 milliGRAM(s) Oral (08-21-18 @ 21:59)   600 milliGRAM(s) Oral (08-22-18 @ 20:31)   600 milliGRAM(s) Oral (08-23-18 @ 11:02)    dolutegravir   50 milliGRAM(s) Oral (08-21-18 @ 21:56)   50 milliGRAM(s) Oral (08-22-18 @ 09:48)   50 milliGRAM(s) Oral (08-22-18 @ 20:31)   50 milliGRAM(s) Oral (08-23-18 @ 11:02)    etravirine   200 milliGRAM(s) Oral (08-21-18 @ 21:55)   200 milliGRAM(s) Oral (08-22-18 @ 09:49)   200 milliGRAM(s) Oral (08-22-18 @ 20:32)   200 milliGRAM(s) Oral (08-23-18 @ 11:02)    ritonavir Tablet   100 milliGRAM(s) Oral (08-21-18 @ 21:55)   100 milliGRAM(s) Oral (08-22-18 @ 20:32)   100 milliGRAM(s) Oral (08-23-18 @ 11:02)    tenofovir disoproxil fumarate (VIREAD)   300 milliGRAM(s) Oral (08-22-18 @ 20:32)       =======================================================  REVIEW OF SYSTEMS:  unable to be obtained.   ======================================================  Physical Exam:  ============  T(F): 99 (20 Oct 2018 14:00), Max: 100.2 (20 Oct 2018 12:00)  HR: 107 (20 Oct 2018 14:00)  BP: 105/70 (20 Oct 2018 13:30)  RR: 14 (20 Oct 2018 11:15)  SpO2: 100% (20 Oct 2018 13:30) (90% - 100%)    Weight (kg): 63.6 (10-20-18 @ 12:00)    GEN: intubated, sedated, rigorous during exam  HEENT: normocephalic and atraumatic. EOMI. NATALIE.  ET tube in place  NECK: Supple. No carotid bruits.    LUNGS: diminished breath sounds bilaterally.  HEART: tachycardia  ABDOMEN: Soft, nontender, and nondistended.  Positive bowel sounds.    EXTREMITIES: Without any  edema.  RIGHT upper extremity with AVF  MSK: no joint swelling  NEUROLOGIC: sedated  PSYCHIATRIC: sedated  SKIN: No ulceration or induration present.       =======================================================  Labs:  ====  Labs:                        9.1    8.1   )-----------( 107      ( 20 Oct 2018 10:38 )             29.0     10-20    138  |  97<L>  |  104.0<H>  ----------------------------<  99  9.3<HH>   |  22.0  |  17.27<H>    Ca    9.1      20 Oct 2018 10:38  Mg     2.8     10-20    TPro  7.0  /  Alb  3.9  /  TBili  0.3<L>  /  DBili  x   /  AST  38  /  ALT  33  /  AlkPhos  715<H>  10-20        ===========     EXAM:  CT CHEST                          PROCEDURE DATE:  10/20/2018          INTERPRETATION:  Chest CT without contrast .  COMPARISON: Chest x-ray 9/29/2018..  CLINICAL INFORMATION: Respiratory failure. Clear chest x-ray..  TECHNIQUE: Contiguous axial 2.5 mm slice thickness images of the chest   were obtained without intravenous contrast administration.  FINDINGS:  Increased soft tissue density noted within the RIGHT hilum .  Hilar findings may indicate adenopathy or a primary carcinoma. The RIGHT   lung shows a complete consolidation of the RIGHT lower lobe .  Multifocal subsegmental airspace consolidation also noted within the   RIGHT upper lobe posterior segments .  A LEFT lung parenchyma shows LEFT lower lobe a multifocal M subsegmental   airspace consolidations. The LEFT upper lobe remains clear. No gross   endobronchial obstructing lesion identified.  There is cardiomegaly without pericardial effusion. An  NG tube tip is within stomach.  Kidneys are atrophic.  No evidence of axillary or supraclavicular bulky lymphadenopathy.  Endotracheal tube tip is above tracheal bifurcation.  Skeletal structures show diffuse sclerosis consistent with renal   osteodystrophy..   IMPRESSION:  A RIGHT lower lobe complete airspace lobar consolidation.  Increased soft tissue fullness RIGHT hilum.  RIGHT upper lobe posterior segment segmental multifocal airspace   consolidation.  A LEFT lower lobe multifocal subsegmental airspace consolidation.  Cardiomegaly. No pericardial effusion.    ORLANDO MUIR M.D., ATTENDING RADIOLOGIST  This document has been electronically signed. Oct 20 2018 11:56AM

## 2018-10-20 NOTE — ED ADULT NURSE REASSESSMENT NOTE - NS ED NURSE REASSESS COMMENT FT1
Patient had EKG change at 10:35 to v-tach.  EKG stat called to bedside. Dr. Denny at bedside.  No medication given due to change in EKG to NSR.  Patient tolerating intubation without difficulty with positive breath sounds.

## 2018-10-20 NOTE — PATIENT PROFILE ADULT - HARM TO SELF OR OTHERS PLAN/AVAILABILITY
sometimes feels like hurting himself. feels like being gone but he doesn't want to hurt his family. he wishes to go to palliative care.

## 2018-10-20 NOTE — ED PROVIDER NOTE - PHYSICAL EXAMINATION
GEN: obtunded, sonorous breathing  HEAD: NCAT  EYES: 3mm, discordant gaze, sluggishly responsivetolight  ENT: mucus membranes are dry, +JVD bilaterally  RESP: sonorous breathing, not protecting ariway, rhonci and wet crackles bilaterally  CV: normal rate, regular rhythm, S1, S2, nomurmur heard over breath sounds, 2+ distal pulses, LUE AV graft with palpable pulse  ABD: the abdomen is soft, nontender, and nondistended, there are no palpable masses or organomegaly  : normal genetalia  SKIN: warm, dry, no rash, appropriate color for ethnicity  NEURO: obtunded, nonverbal, responsive to pain in all extremities, no purposeful movments, eyes open, withdrawing frompain GCS [id number]

## 2018-10-20 NOTE — ED ADULT NURSE NOTE - OBJECTIVE STATEMENT
patient found laying in stretcher, patient responses to painful stimuli.  Mother found patient unresponsive in bed.  Fistula noted to right upper arm.  Mother states to EMS possible SI and statements made prior to ED arrival.  Rales and Rhonchi noted to lung fields, increased to left side patient found laying in stretcher, patient responses to painful stimuli.  Mother found patient unresponsive in bed.   Patient arrived to ED in respiratory distress,  Fistula noted to right upper arm.  Mother states to EMS possible SI and statements made prior to ED arrival.  Rales and Rhonchi noted to lung fields, increased to left side

## 2018-10-20 NOTE — H&P ADULT - ASSESSMENT
27 yo male pmhx seizures, ESRD on HD (MWF), HIV (born with), HTN, pericarditis EF 55%, multiple admission for noncompliance with medications and dialysis admitted with encephalopathy, acute respiratory failure, aspiration, acute on chronic renal failure and hyperkalemia.      NEURO:  CV:  RESP:  RENAL:  GI:  ENDO:  ID:  HEME: +HIV, uncontrolled secondary to non-compliance with medications.    DISPO: Full code.  Palliative care and social work consult placed.  Patient persistently non compliant with medications and medical management.  GOC discussion will be held with family. 27 yo male pmhx seizures, ESRD on HD (MWF), HIV (born with), HTN, pericarditis EF 55%, multiple admission for noncompliance with medications and dialysis admitted with encephalopathy, acute respiratory failure, aspiration, acute on chronic renal failure and hyperkalemia.      NEURO:  CV:  RESP: Acute hypoxic respiratory failure, ac/vc 6cc/kg tidal volume lung protective strategy.  Wean FiO2 for sPO2 >93%.  Keep HOB >30 degrees.  Peridex for VAP ppx.    RENAL: Acute on chronic renal failure, missed HD yesterday.  Nephrology called, emergent HD to be performed upon arrival to MICU.  Renally dose medications.  Mccoy in place.  Follow urine, BUN/Cr and electrolytes.  Replace electrolytes as needed.  Hyperkalemic likely secondary to missed HD.  Received hyperkalemic cocktail.  Emergent HD.    GI: NPO.  NG tube in place.   ENDO: POCT q4 hours.     ID: Blood, urine and sputum cultures to be sent.  Broad spectrum coverage.    HEME: +HIV, uncontrolled secondary to non-compliance with medications.    DISPO: Full code.  Palliative care and social work consult placed.  Patient persistently non compliant with medications and medical management.  GOC discussion will be held with family. 29 yo male pmhx seizures, ESRD on HD (MWF), HIV (born with), HTN, pericarditis EF 55%, multiple admission for noncompliance with medications and dialysis admitted with encephalopathy, acute respiratory failure, aspiration, ESRD s/p missed HD and hyperkalemia.      NEURO: Encephalopathy secondary to metabolic derangements.  Seizure disorder, keppra loaded in ED.  Will renally dose meds.    CV: HTN, BP control for SBP <160.  Hydralazine IVP prn.  Vtach in ED likely secondary to hyperkalemia.  Cardiac monitoring low threshold for experiencing additional arrhythmias.   RESP: Acute hypoxic respiratory failure, ac/vc 6cc/kg tidal volume lung protective strategy.  Wean FiO2 for sPO2 >93%.  Keep HOB >30 degrees.  Peridex for VAP ppx.    RENAL: ESRD, missed HD yesterday.  Nephrology called, emergent HD to be performed upon arrival to MICU.  Renally dose medications.  Mccoy in place.  Follow urine, BUN/Cr and electrolytes.  Replace electrolytes as needed.  Hyperkalemic likely secondary to missed HD.  Received hyperkalemic cocktail.  Emergent HD.    GI: NPO.  NG tube in place.   ENDO: POCT q4 hours.     ID: Blood, urine and sputum cultures to be sent.  Coverage with zosyn and doxycycline.   HEME: +HIV, uncontrolled secondary to non-compliance with medications.    DISPO: Full code.  Palliative care and social work consult placed.  Patient persistently non compliant with medications and medical management.  GOC discussion will be held with family.      Critical Care time: 60 mins assessing presenting problems of acute illness that poses high probability of life threatening deterioration or end organ damage/dysfunction.  Medical decision making inculding Initiating plan of care, reviewing data, reviewing radiology, discussing with multidisciplinary team, non inclusive of procedures, discussing goals of care with patient/family

## 2018-10-20 NOTE — H&P ADULT - HISTORY OF PRESENT ILLNESS
Patient is a 27 yo male pmhx seizures, ESRD on HD (MWF) missed HD session yesterday, HIV (born with), HTN, pericarditis EF 55%, multiple admission for noncompliance with medications and dialysis biba after being found obtunded in bed at home.  Family not at bedside, upon exam, information from ED staff.  Patient found obtunded in bed by family member prompting 911 call.  Upon arrival to ED patient obtunded, actively vomiting and aspirating.  Patient intubated for airway protection.  S/p intubation patient experienced run of vtach, given calcium chloride and bicarb and returned to NSR.  Labs significant for serum K 9.3, , Cr, 17.27.  Patient given D50 and Insulin 10U IVP x1 and keppra loaded.  Patient sent for head and chest CT, awaiting official read.  Nephrology called, emergent HD per right AV fistula upon arrival to MICU.

## 2018-10-20 NOTE — H&P ADULT - ATTENDING COMMENTS
29 yo male with hx of ESRD on HD, HIV since birth, seizure disorder, medication/dialysis nonadherence, was found unresponsive at home and brought to the hospital with acute respiratory failure secondary to aspiration pneumonia, severe hyperkalemia (missed last dialysis), seizure, encephalopathy.  Now on abx for septic shock.  Plan:  - urgent HD  - broad spectrum abx  - pressors 29 yo male with hx of ESRD on HD, HIV since birth, seizure disorder, medication/dialysis nonadherence, was found unresponsive at home and brought to the hospital with acute respiratory failure secondary to aspiration pneumonia, severe hyperkalemia (missed last dialysis), seizure, encephalopathy.  Now on abx for sepsis.   Plan:  - urgent HD  - broad spectrum abx  - pressors

## 2018-10-20 NOTE — PROGRESS NOTE ADULT - ASSESSMENT
27 yo male with hx of ESRD on HD, HIV since birth, seizure disorder, medication/dialysis nonadherence, was found unresponsive at home and brought to the hospital with acute respiratory failure secondary to aspiration pneumonia, severe hyperkalemia (missed last dialysis), seizure, encephalopathy.     Plan  Neuro- restarted on Keppra, now on propofol for sedation, given ativan as well  CV - fluid bolused in ED, not requiring pressors at this time.  Pulm- low Vt ventilation, on alkalotic side while awaiting HD  GI- npo, NG tube in place  ID- vancomycin and zosyn for pneumonia pending cultures  Renal - urgent HD, received Ca, insulin, dextrose  Proph- hep SC, PPI    Total cc time 35 min spent titrating vent settings, managing life threatening arrhythmias and electrolyte disturbances

## 2018-10-20 NOTE — ED ADULT NURSE REASSESSMENT NOTE - NS ED NURSE REASSESS COMMENT FT1
ICU PA and MD at bedside at 11:05.  patient transported to CT at 11:10.  Cardiac monitor, pulse ox, respiratory therapist, RN, and ICU team at bedside.

## 2018-10-20 NOTE — ED ADULT TRIAGE NOTE - CHIEF COMPLAINT QUOTE
pt BIBA responsive to painful stimuli with respiratory distress, rhonchi audible on arrival. EMS reports pt found by mom somewhat unresponsive in bed. pt on dialysis, last treatment unknown. ER MD brought to bedside upon arrival and RT paged for intubation. pt BIBA responsive to painful stimuli with respiratory distress, rhonchi audible on arrival. EMS reports pt found by mom somewhat unresponsive in bed. pt on dialysis, last treatment unknown. FS 98 on arrival to ED, ER MD brought to bedside immediately and RT paged for intubation.

## 2018-10-20 NOTE — CONSULT NOTE ADULT - SUBJECTIVE AND OBJECTIVE BOX
Patient is a 28y old  Male who presents with a chief complaint of AMS, Respiratory Failure (20 Oct 2018 11:33)       HPI:  Patient is a 29 yo male pmhx seizures, ESRD on HD (MWF) missed HD session yesterday, HIV (born with), HTN, pericarditis EF 55%, multiple admission for noncompliance with medications and dialysis biba after being found obtunded in bed at home.  Family not at bedside, upon exam, information from ED staff.  Patient found obtunded in bed by family member prompting 911 call.  Upon arrival to ED patient obtunded, actively vomiting and aspirating.  Patient intubated for airway protection.  S/p intubation patient experienced run of vtach, given calcium chloride and bicarb and returned to NSR.  Labs significant for serum K 9.3, , Cr, 17.27.  Patient given D50 and Insulin 10U IVP x1 and keppra loaded.  Patient sent for head and chest CT, awaiting official read.  Nephrology called, emergent HD per right AV fistula upon arrival to MICU. (20 Oct 2018 11:33)    Renal is being consulted for ESRD and hyperkalemia. He is on vent and cannot provide any history       PAST MEDICAL & SURGICAL HISTORY:  Seizure disorder  Hypertension  ESRD (end stage renal disease)  Seizure  Pericarditis  Anxiety  Depression  Renal failure (ARF), acute on chronic: dialysis av fistula, RUE  HTN (hypertension)  Cardiomyopathy  HIV disease: born HIV+  AV fistula  S/P tonsillectomy       FAMILY HISTORY:  No pertinent family history in first degree relatives: Unknown FHx because pt is adopted  [place]    Social History:Non smoker    MEDICATIONS  (STANDING):  chlorhexidine 0.12% Liquid 15 milliLiter(s) Swish and Spit two times a day  doxycycline IVPB      doxycycline IVPB 100 milliGRAM(s) IV Intermittent once  heparin  Injectable 5000 Unit(s) SubCutaneous every 12 hours  levETIRAcetam  Solution 500 milliGRAM(s) Oral once  pantoprazole  Injectable 40 milliGRAM(s) IV Push daily  piperacillin/tazobactam IVPB. 2.25 Gram(s) IV Intermittent once  piperacillin/tazobactam IVPB.      propofol Infusion 20 MICROgram(s)/kG/Min (8.52 mL/Hr) IV Continuous <Continuous>    MEDICATIONS  (PRN):   Meds reviewed    Allergies    vancomycin (Anaphylaxis)    Intolerances    prefers vanilla or butter pecan nepro x 2 TID RDOK (Unknown)       REVIEW OF SYSTEMS: NA on vent       Vital Signs Last 24 Hrs  T(C): 37.9 (20 Oct 2018 12:00), Max: 37.9 (20 Oct 2018 12:00)  T(F): 100.2 (20 Oct 2018 12:00), Max: 100.2 (20 Oct 2018 12:00)  HR: 85 (20 Oct 2018 13:00) (85 - 162)  BP: 98/72 (20 Oct 2018 13:00) (98/72 - 123/76)  BP(mean): 97 (20 Oct 2018 12:00) (97 - 97)  RR: 14 (20 Oct 2018 11:15) (14 - 24)  SpO2: 97% (20 Oct 2018 13:00) (90% - 100%)  Daily     Daily     PHYSICAL EXAM:   GEN: On vent   	HEAD: NCAT  	ENT: mucus membranes are dry, +JVD bilaterally  	RESP: sonorous breathing, not protecting ariway, rhonci and wet crackles bilaterally  	CV: normal rate, regular rhythm, S1, S2, nomurmur heard over breath sounds, 2+ distal pulses, LUE AV graft with palpable pulse  	ABD: the abdomen is soft, nontender, and nondistended, there are no palpable masses or organomegaly  	: normal genetalia  	SKIN: warm, dry, no rash, appropriate color for ethnicity  NEURO: obtunded, nonverbal, responsive to pain in all extremities, no purposeful movments, eyes open, withdrawing frompain GCS [id number]      LABS:                        9.1    8.1   )-----------( 107      ( 20 Oct 2018 10:38 )             29.0     10-20    138  |  97<L>  |  104.0<H>  ----------------------------<  99  9.3<HH>   |  22.0  |  17.27<H>    Ca    9.1      20 Oct 2018 10:38  Mg     2.8     10-20    TPro  7.0  /  Alb  3.9  /  TBili  0.3<L>  /  DBili  x   /  AST  38  /  ALT  33  /  AlkPhos  715<H>  10-20    PT/INR - ( 20 Oct 2018 10:38 )   PT: 11.9 sec;   INR: 1.08 ratio         PTT - ( 20 Oct 2018 10:38 )  PTT:32.7 sec    Magnesium, Serum: 2.8 mg/dL (10-20 @ 10:38)          RADIOLOGY & ADDITIONAL TESTS:

## 2018-10-20 NOTE — ED ADULT NURSE NOTE - NSIMPLEMENTINTERV_GEN_ALL_ED
Implemented All Universal Safety Interventions:  [place] to call system. Call bell, personal items and telephone within reach. Instruct patient to call for assistance. Room bathroom lighting operational. Non-slip footwear when patient is off stretcher. Physically safe environment: no spills, clutter or unnecessary equipment. Stretcher in lowest position, wheels locked, appropriate side rails in place.

## 2018-10-21 LAB
ALBUMIN SERPL ELPH-MCNC: 3.4 G/DL — SIGNIFICANT CHANGE UP (ref 3.3–5.2)
ALP SERPL-CCNC: 604 U/L — HIGH (ref 40–120)
ALT FLD-CCNC: 39 U/L — SIGNIFICANT CHANGE UP
ANION GAP SERPL CALC-SCNC: 19 MMOL/L — HIGH (ref 5–17)
AST SERPL-CCNC: 34 U/L — SIGNIFICANT CHANGE UP
BILIRUB SERPL-MCNC: 0.5 MG/DL — SIGNIFICANT CHANGE UP (ref 0.4–2)
BUN SERPL-MCNC: 53 MG/DL — HIGH (ref 8–20)
CALCIUM SERPL-MCNC: 9 MG/DL — SIGNIFICANT CHANGE UP (ref 8.6–10.2)
CHLORIDE SERPL-SCNC: 91 MMOL/L — LOW (ref 98–107)
CO2 SERPL-SCNC: 24 MMOL/L — SIGNIFICANT CHANGE UP (ref 22–29)
CREAT SERPL-MCNC: 9.94 MG/DL — HIGH (ref 0.5–1.3)
GLUCOSE SERPL-MCNC: 96 MG/DL — SIGNIFICANT CHANGE UP (ref 70–115)
GRAM STN FLD: SIGNIFICANT CHANGE UP
HCT VFR BLD CALC: 28.4 % — LOW (ref 42–52)
HCT VFR BLD CALC: 28.7 % — LOW (ref 42–52)
HGB BLD-MCNC: 9.2 G/DL — LOW (ref 14–18)
HGB BLD-MCNC: 9.3 G/DL — LOW (ref 14–18)
MAGNESIUM SERPL-MCNC: 2.3 MG/DL — SIGNIFICANT CHANGE UP (ref 1.6–2.6)
MCHC RBC-ENTMCNC: 29.6 PG — SIGNIFICANT CHANGE UP (ref 27–31)
MCHC RBC-ENTMCNC: 30.1 PG — SIGNIFICANT CHANGE UP (ref 27–31)
MCHC RBC-ENTMCNC: 32.1 G/DL — SIGNIFICANT CHANGE UP (ref 32–36)
MCHC RBC-ENTMCNC: 32.7 G/DL — SIGNIFICANT CHANGE UP (ref 32–36)
MCV RBC AUTO: 91.9 FL — SIGNIFICANT CHANGE UP (ref 80–94)
MCV RBC AUTO: 92.3 FL — SIGNIFICANT CHANGE UP (ref 80–94)
MRSA PCR RESULT.: SIGNIFICANT CHANGE UP
PHOSPHATE SERPL-MCNC: 5.8 MG/DL — HIGH (ref 2.4–4.7)
PLATELET # BLD AUTO: 142 K/UL — LOW (ref 150–400)
PLATELET # BLD AUTO: 92 K/UL — LOW (ref 150–400)
POTASSIUM SERPL-MCNC: 4.8 MMOL/L — SIGNIFICANT CHANGE UP (ref 3.5–5.3)
POTASSIUM SERPL-SCNC: 4.8 MMOL/L — SIGNIFICANT CHANGE UP (ref 3.5–5.3)
PROT SERPL-MCNC: 6.6 G/DL — SIGNIFICANT CHANGE UP (ref 6.6–8.7)
RBC # BLD: 3.09 M/UL — LOW (ref 4.6–6.2)
RBC # BLD: 3.11 M/UL — LOW (ref 4.6–6.2)
RBC # FLD: 16.1 % — HIGH (ref 11–15.6)
RBC # FLD: 17 % — HIGH (ref 11–15.6)
S AUREUS DNA NOSE QL NAA+PROBE: DETECTED
SODIUM SERPL-SCNC: 134 MMOL/L — LOW (ref 135–145)
SPECIMEN SOURCE: SIGNIFICANT CHANGE UP
WBC # BLD: 4.9 K/UL — SIGNIFICANT CHANGE UP (ref 4.8–10.8)
WBC # BLD: 5.4 K/UL — SIGNIFICANT CHANGE UP (ref 4.8–10.8)
WBC # FLD AUTO: 4.9 K/UL — SIGNIFICANT CHANGE UP (ref 4.8–10.8)
WBC # FLD AUTO: 5.4 K/UL — SIGNIFICANT CHANGE UP (ref 4.8–10.8)

## 2018-10-21 PROCEDURE — 99233 SBSQ HOSP IP/OBS HIGH 50: CPT

## 2018-10-21 RX ORDER — LEVETIRACETAM 250 MG/1
500 TABLET, FILM COATED ORAL
Qty: 0 | Refills: 0 | Status: DISCONTINUED | OUTPATIENT
Start: 2018-10-21 | End: 2018-10-25

## 2018-10-21 RX ORDER — GABAPENTIN 400 MG/1
100 CAPSULE ORAL
Qty: 0 | Refills: 0 | Status: DISCONTINUED | OUTPATIENT
Start: 2018-10-21 | End: 2018-10-25

## 2018-10-21 RX ORDER — ALPRAZOLAM 0.25 MG
2 TABLET ORAL THREE TIMES A DAY
Qty: 0 | Refills: 0 | Status: DISCONTINUED | OUTPATIENT
Start: 2018-10-21 | End: 2018-10-25

## 2018-10-21 RX ORDER — ACETAMINOPHEN 500 MG
650 TABLET ORAL EVERY 6 HOURS
Qty: 0 | Refills: 0 | Status: DISCONTINUED | OUTPATIENT
Start: 2018-10-21 | End: 2018-10-22

## 2018-10-21 RX ORDER — HYDROMORPHONE HYDROCHLORIDE 2 MG/ML
2 INJECTION INTRAMUSCULAR; INTRAVENOUS; SUBCUTANEOUS EVERY 4 HOURS
Qty: 0 | Refills: 0 | Status: DISCONTINUED | OUTPATIENT
Start: 2018-10-21 | End: 2018-10-25

## 2018-10-21 RX ADMIN — HEPARIN SODIUM 5000 UNIT(S): 5000 INJECTION INTRAVENOUS; SUBCUTANEOUS at 17:52

## 2018-10-21 RX ADMIN — Medication 650 MILLIGRAM(S): at 00:50

## 2018-10-21 RX ADMIN — HYDROMORPHONE HYDROCHLORIDE 2 MILLIGRAM(S): 2 INJECTION INTRAMUSCULAR; INTRAVENOUS; SUBCUTANEOUS at 16:40

## 2018-10-21 RX ADMIN — Medication 2 MILLIGRAM(S): at 11:08

## 2018-10-21 RX ADMIN — CHLORHEXIDINE GLUCONATE 15 MILLILITER(S): 213 SOLUTION TOPICAL at 05:43

## 2018-10-21 RX ADMIN — PIPERACILLIN AND TAZOBACTAM 200 GRAM(S): 4; .5 INJECTION, POWDER, LYOPHILIZED, FOR SOLUTION INTRAVENOUS at 05:41

## 2018-10-21 RX ADMIN — HEPARIN SODIUM 5000 UNIT(S): 5000 INJECTION INTRAVENOUS; SUBCUTANEOUS at 05:39

## 2018-10-21 RX ADMIN — PIPERACILLIN AND TAZOBACTAM 200 GRAM(S): 4; .5 INJECTION, POWDER, LYOPHILIZED, FOR SOLUTION INTRAVENOUS at 19:23

## 2018-10-21 RX ADMIN — ATOVAQUONE 750 MILLIGRAM(S): 750 SUSPENSION ORAL at 17:54

## 2018-10-21 RX ADMIN — HYDROMORPHONE HYDROCHLORIDE 2 MILLIGRAM(S): 2 INJECTION INTRAMUSCULAR; INTRAVENOUS; SUBCUTANEOUS at 17:20

## 2018-10-21 RX ADMIN — PROPOFOL 8.52 MICROGRAM(S)/KG/MIN: 10 INJECTION, EMULSION INTRAVENOUS at 00:51

## 2018-10-21 RX ADMIN — ATOVAQUONE 750 MILLIGRAM(S): 750 SUSPENSION ORAL at 05:39

## 2018-10-21 RX ADMIN — LEVETIRACETAM 500 MILLIGRAM(S): 250 TABLET, FILM COATED ORAL at 17:52

## 2018-10-21 RX ADMIN — HYDROMORPHONE HYDROCHLORIDE 2 MILLIGRAM(S): 2 INJECTION INTRAMUSCULAR; INTRAVENOUS; SUBCUTANEOUS at 05:41

## 2018-10-21 NOTE — PROGRESS NOTE ADULT - ASSESSMENT
29 yo male with hx of ESRD on HD, HIV since birth, seizure disorder, medication/dialysis nonadherence, was found unresponsive at home and brought to the hospital with acute respiratory failure secondary to aspiration pneumonia, severe hyperkalemia (missed last dialysis), seizure, encephalopathy.     1) Aspiration Pneumonia- Antibx per ID. [place] O2. C/S testing  2) Acute hypoxic resp failure- sec to above. extubated to [place] O2. suspect underlying JUMA sec to apneic spells while asleep.  3) Initial Acute Encephalopathy- multifactorial- missed HD vs Sz vs arrythmia. Now improved mentum, to baseline  4) Sz- stable. on Keppra. Cont same.  5) ESRD on HD- to follow HD schedule per Renal. Palliative consulted, as patient now talkigna bout stopping HD.  6) HIV/ AIDS- Multidrug resistant. treatment per ID.    Heparin 29 yo male with hx of ESRD on HD, HIV since birth, seizure disorder, medication/dialysis nonadherence, was found unresponsive at home and brought to the hospital with acute respiratory failure secondary to aspiration pneumonia, severe hyperkalemia (missed last dialysis), seizure, encephalopathy.     1) Aspiration Pneumonia- Antibx per ID. [place] O2. C/S testing  2) Acute hypoxic resp failure- sec to above. extubated to [place] O2. suspect underlying JUMA sec to apneic spells while asleep.  3) Initial Acute Encephalopathy- multifactorial- missed HD vs Sz vs arrythmia. Now improved mentum, to baseline  4) Sz- stable. on Keppra. Cont same.  5) ESRD on HD- to follow HD schedule per Renal. Palliative consulted, as patient now talkigna bout stopping HD.  6) HIV/ AIDS- Multidrug resistant. treatment per ID.  7) thrombocytopenia, mild elevation of LFTs, Metabolic abnormalities- related to ESRD and AIDs    Heparin

## 2018-10-21 NOTE — PROGRESS NOTE ADULT - SUBJECTIVE AND OBJECTIVE BOX
[facility]  INFECTIOUS DISEASES AND INTERNAL MEDICINE at [place]  =======================================================  Howie Navarro MD  Diplomates American Board of Internal Medicine and Infectious Diseases  =======================================================    TEDDY CRAWFORD [id number]  chief complaint: follow up on bilateral PNA and HIV/AIDS  patient seen and examined in follow up.  Chart and labs reviewed.     remains intubated.   no new events.     =======================================================  Allergies:  prefers vanilla or butter pecan nepro x 2 TID RDOK (Unknown)  vancomycin (Anaphylaxis)      =======================================================  Antibiotics:  atovaquone Suspension 750 milliGRAM(s) Enteral Tube two times a day  azithromycin  40 mG/mL Suspension 1000 milliGRAM(s) Enteral Tube <User Schedule>  piperacillin/tazobactam IVPB. 2.25 Gram(s) IV Intermittent every 12 hours  piperacillin/tazobactam IVPB.        Other medications:  chlorhexidine 0.12% Liquid 15 milliLiter(s) Swish and Spit two times a day  heparin  Injectable 5000 Unit(s) SubCutaneous every 12 hours  pantoprazole  Injectable 40 milliGRAM(s) IV Push daily  propofol Infusion 20 MICROgram(s)/kG/Min IV Continuous <Continuous>    Antibiotics Course:  atovaquone Suspension   1500 milliGRAM(s) (09-29-18 @ 13:21)    atovaquone Suspension   750 milliGRAM(s) (10-20-18 @ 17:23)    azithromycin  40 mG/mL Suspension   1000 milliGRAM(s) (10-20-18 @ 17:22)    piperacillin/tazobactam IVPB.   200 mL/Hr (10-20-18 @ 17:20)    vancomycin  IVPB   250 mL/Hr (10-20-18 @ 17:19)         =======================================================     REVIEW OF SYSTEMS:  as above  all other ROS are negative    =======================================================    Physical Exam:  T(F): 100.9 (21 Oct 2018 00:21), Max: 101.1 (20 Oct 2018 19:00)  HR: 93 (21 Oct 2018 01:00)  BP: 114/79 (21 Oct 2018 01:00)  RR: 26 (21 Oct 2018 01:00)  SpO2: 98% (21 Oct 2018 01:00) (90% - 100%)    GEN: intubated, sedated, more comfortable appearing.  HEENT: normocephalic and atraumatic. EOMI. NATALIE.  ET tube in place  NECK: Supple. No carotid bruits.    LUNGS: diminished breath sounds bilaterally.  HEART: tachycardia  ABDOMEN: Soft, nontender, and nondistended.  Positive bowel sounds.    EXTREMITIES: Without any  edema.  RIGHT upper extremity with AVF  MSK: no joint swelling  NEUROLOGIC: sedated  PSYCHIATRIC: sedated  SKIN: No ulceration or induration present.    =======================================================  Labs:                        9.1    6.0   )-----------( 88       ( 20 Oct 2018 18:53 )             28.7     10-20    135  |  92<L>  |  37.0<H>  ----------------------------<  118<H>  4.2   |  25.0  |  7.65<H>    Ca    9.4      20 Oct 2018 18:53  Phos  4.1     10-20  Mg     2.1     10-20    TPro  7.0  /  Alb  3.6  /  TBili  0.4  /  DBili  x   /  AST  46<H>  /  ALT  40  /  AlkPhos  659<H>  10-20

## 2018-10-21 NOTE — PROGRESS NOTE ADULT - ASSESSMENT
27 y/o man with a PMH of poorly controlled HIV due to nonadherence, ESRD, seizures disorder, here for obtundation, intubated, bilateral PNA    PNA  - continue zosyn  - s/p vancomycin x 1 dose post HD - unclear hx of anaphylaxis, currently intubated so airway is protected.   - will monitor    HIV/AIDS  -Will not start any ARV at this time; concern raised for possible IRIS in this pt with CD4 less than 100  - continue Mepron 750mg enteral tube BID  - continue azithromycin 1 gram weekly for now  - check CD4 and HIV viral load    ESRD   - continue dialysis  - meds dosed renally.     history of Seizure d/o   - continue Anti epileptic drugs

## 2018-10-21 NOTE — PROGRESS NOTE ADULT - SUBJECTIVE AND OBJECTIVE BOX
HEALTH ISSUES - PROBLEM Dx:  29 y/o man with a PMH of poorly controlled HIV due to nonadherence, ESRD, seizures disorder,  ID was called due to his HIV status for ARV management. Know to Dr. Navarro from past admissions, and doesn't follow with his PCP after discharge, has not seen PCP since 12/2017.  Prior genotype with a highly resistant HIV virus, resistant to all classes of ARV. ID came up with a regimen hoping to suppress the virus until new meds are available but he never took the meds as outpt. HE is admitted after being found obtunded in bed by family member prompting 911 call.  Upon arrival to ED patient obtunded, actively vomiting and aspirating.  Patient intubated for airway protection.  S/p intubation patient experienced run of vtach, given calcium chloride and bicarb and returned to NSR.  Labs significant for serum K 9.3, , Cr, 17.27.  Patient given D50 and Insulin 10U IVP x1 and keppra loaded.  Patient sent for head and chest CT, awaiting official read.  Nephrology called, emergent HD per right AV fistula upon arrival to MICU.      In MICU-  He self extubated himself, signed his own DNR/ DNI, maintaining well on [place] O2. No further arrythmias.     INTERVAL HPI/ OVERNIGHT EVENTS:  comfortable  noted apenic spells while asleep  patient refusing HD and made himself DNR/ DNI    REVIEW OF SYSTEMS:    CONSTITUTIONAL: No fever,   EYES: No discharge  ENMT:  No sinus or throat pain  NECK: No pain or stiffness  RESPIRATORY: No cough, wheezing, chills or hemoptysis; No shortness of breath  CARDIOVASCULAR: No chest pain, palpitations, dizziness, or leg swelling  GASTROINTESTINAL: No abdominal or epigastric pain. No nausea, vomiting, or hematemesis; No diarrhea or constipation. No melena or hematochezia.  GENITOURINARY: No dysuria, frequency, hematuria, or incontinence  NEUROLOGICAL: No headaches, memory loss, loss of strength, numbness, or tremors  SKIN: No itching, burning, rashes, or lesions   MUSCULOSKELETAL: No joint pain or swelling;   PSYCHIATRIC: No  anxiety,       Vital Signs Last 24 Hrs  T(C): 36.9 (21 Oct 2018 11:00), Max: 38.4 (20 Oct 2018 19:00)  T(F): 98.4 (21 Oct 2018 11:00), Max: 101.1 (20 Oct 2018 19:00)  HR: 115 (21 Oct 2018 10:00) (85 - 129)  BP: 125/59 (21 Oct 2018 10:00) (88/53 - 153/101)  BP(mean): 85 (21 Oct 2018 10:00) (66 - 121)  RR: 20 (21 Oct 2018 09:00) (20 - 27)  SpO2: 97% (21 Oct 2018 10:00) (96% - 100%)    PHYSICAL EXAM-  GEN:on [place] O2,  comfortable appearing.  HEENT: normocephalic and atraumatic. EOMI. NATALIE.    NECK: Supple. No carotid bruits.    LUNGS: diminished breath sounds bilaterally.  HEART: tachycardia  ABDOMEN: Soft, nontender, and nondistended.  Positive bowel sounds.    EXTREMITIES: Without any  edema.  RIGHT upper extremity with AVF  MSK: no joint swelling  NEUROLOGIC: sedated  PSYCHIATRIC: sedated  SKIN: No ulceration or induration present.  LABS:                        9.2    5.4   )-----------( 92       ( 21 Oct 2018 09:26 )             28.7     10-21    134<L>  |  91<L>  |  53.0<H>  ----------------------------<  96  4.8   |  24.0  |  9.94<H>    Ca    9.0      21 Oct 2018 09:26  Phos  5.8     10-21  Mg     2.3     10-21    TPro  6.6  /  Alb  3.4  /  TBili  0.5  /  DBili  x   /  AST  34  /  ALT  39  /  AlkPhos  604<H>  10-21    PT/INR - ( 20 Oct 2018 10:38 )   PT: 11.9 sec;   INR: 1.08 ratio       PTT - ( 20 Oct 2018 10:38 )  PTT:32.7 sec    Assessment and Plan

## 2018-10-22 DIAGNOSIS — F33.8 OTHER RECURRENT DEPRESSIVE DISORDERS: ICD-10-CM

## 2018-10-22 DIAGNOSIS — G93.40 ENCEPHALOPATHY, UNSPECIFIED: ICD-10-CM

## 2018-10-22 DIAGNOSIS — F41.9 ANXIETY DISORDER, UNSPECIFIED: ICD-10-CM

## 2018-10-22 DIAGNOSIS — N18.6 END STAGE RENAL DISEASE: ICD-10-CM

## 2018-10-22 DIAGNOSIS — Z51.5 ENCOUNTER FOR PALLIATIVE CARE: ICD-10-CM

## 2018-10-22 DIAGNOSIS — G89.4 CHRONIC PAIN SYNDROME: ICD-10-CM

## 2018-10-22 DIAGNOSIS — F32.9 MAJOR DEPRESSIVE DISORDER, SINGLE EPISODE, UNSPECIFIED: ICD-10-CM

## 2018-10-22 LAB
ALBUMIN SERPL ELPH-MCNC: 3.6 G/DL — SIGNIFICANT CHANGE UP (ref 3.3–5.2)
ALP SERPL-CCNC: 557 U/L — HIGH (ref 40–120)
ALT FLD-CCNC: 80 U/L — HIGH
ANION GAP SERPL CALC-SCNC: 22 MMOL/L — HIGH (ref 5–17)
AST SERPL-CCNC: 51 U/L — HIGH
BILIRUB SERPL-MCNC: 0.4 MG/DL — SIGNIFICANT CHANGE UP (ref 0.4–2)
BUN SERPL-MCNC: 78 MG/DL — HIGH (ref 8–20)
CALCIUM SERPL-MCNC: 9 MG/DL — SIGNIFICANT CHANGE UP (ref 8.6–10.2)
CHLORIDE SERPL-SCNC: 96 MMOL/L — LOW (ref 98–107)
CO2 SERPL-SCNC: 23 MMOL/L — SIGNIFICANT CHANGE UP (ref 22–29)
CREAT SERPL-MCNC: 14.14 MG/DL — HIGH (ref 0.5–1.3)
GLUCOSE SERPL-MCNC: 113 MG/DL — SIGNIFICANT CHANGE UP (ref 70–115)
HCT VFR BLD CALC: 26.4 % — LOW (ref 42–52)
HGB BLD-MCNC: 8.6 G/DL — LOW (ref 14–18)
MCHC RBC-ENTMCNC: 29.6 PG — SIGNIFICANT CHANGE UP (ref 27–31)
MCHC RBC-ENTMCNC: 32.6 G/DL — SIGNIFICANT CHANGE UP (ref 32–36)
MCV RBC AUTO: 90.7 FL — SIGNIFICANT CHANGE UP (ref 80–94)
MRSA PCR RESULT.: SIGNIFICANT CHANGE UP
PLATELET # BLD AUTO: 96 K/UL — LOW (ref 150–400)
POTASSIUM SERPL-MCNC: 4.5 MMOL/L — SIGNIFICANT CHANGE UP (ref 3.5–5.3)
POTASSIUM SERPL-SCNC: 4.5 MMOL/L — SIGNIFICANT CHANGE UP (ref 3.5–5.3)
PROT SERPL-MCNC: 6.8 G/DL — SIGNIFICANT CHANGE UP (ref 6.6–8.7)
RBC # BLD: 2.91 M/UL — LOW (ref 4.6–6.2)
RBC # FLD: 15.3 % — SIGNIFICANT CHANGE UP (ref 11–15.6)
S AUREUS DNA NOSE QL NAA+PROBE: DETECTED
SODIUM SERPL-SCNC: 141 MMOL/L — SIGNIFICANT CHANGE UP (ref 135–145)
WBC # BLD: 3.6 K/UL — LOW (ref 4.8–10.8)
WBC # FLD AUTO: 3.6 K/UL — LOW (ref 4.8–10.8)

## 2018-10-22 PROCEDURE — 99232 SBSQ HOSP IP/OBS MODERATE 35: CPT

## 2018-10-22 PROCEDURE — 99233 SBSQ HOSP IP/OBS HIGH 50: CPT

## 2018-10-22 PROCEDURE — 99223 1ST HOSP IP/OBS HIGH 75: CPT

## 2018-10-22 RX ORDER — HYDROMORPHONE HYDROCHLORIDE 2 MG/ML
2 INJECTION INTRAMUSCULAR; INTRAVENOUS; SUBCUTANEOUS ONCE
Qty: 0 | Refills: 0 | Status: DISCONTINUED | OUTPATIENT
Start: 2018-10-22 | End: 2018-10-22

## 2018-10-22 RX ORDER — ACETAMINOPHEN 500 MG
650 TABLET ORAL EVERY 6 HOURS
Qty: 0 | Refills: 0 | Status: COMPLETED | OUTPATIENT
Start: 2018-10-22 | End: 2018-10-24

## 2018-10-22 RX ORDER — LIDOCAINE 4 G/100G
1 CREAM TOPICAL DAILY
Qty: 0 | Refills: 0 | Status: DISCONTINUED | OUTPATIENT
Start: 2018-10-22 | End: 2018-10-24

## 2018-10-22 RX ORDER — ATOVAQUONE 750 MG/5ML
750 SUSPENSION ORAL
Qty: 0 | Refills: 0 | Status: DISCONTINUED | OUTPATIENT
Start: 2018-10-22 | End: 2018-10-25

## 2018-10-22 RX ORDER — DIPHENHYDRAMINE HCL 50 MG
25 CAPSULE ORAL ONCE
Qty: 0 | Refills: 0 | Status: COMPLETED | OUTPATIENT
Start: 2018-10-22 | End: 2018-10-22

## 2018-10-22 RX ORDER — METHOCARBAMOL 500 MG/1
500 TABLET, FILM COATED ORAL THREE TIMES A DAY
Qty: 0 | Refills: 0 | Status: COMPLETED | OUTPATIENT
Start: 2018-10-22 | End: 2018-10-25

## 2018-10-22 RX ADMIN — Medication 650 MILLIGRAM(S): at 17:25

## 2018-10-22 RX ADMIN — PIPERACILLIN AND TAZOBACTAM 200 GRAM(S): 4; .5 INJECTION, POWDER, LYOPHILIZED, FOR SOLUTION INTRAVENOUS at 17:26

## 2018-10-22 RX ADMIN — HYDROMORPHONE HYDROCHLORIDE 2 MILLIGRAM(S): 2 INJECTION INTRAMUSCULAR; INTRAVENOUS; SUBCUTANEOUS at 20:36

## 2018-10-22 RX ADMIN — ATOVAQUONE 750 MILLIGRAM(S): 750 SUSPENSION ORAL at 17:25

## 2018-10-22 RX ADMIN — METHOCARBAMOL 500 MILLIGRAM(S): 500 TABLET, FILM COATED ORAL at 17:25

## 2018-10-22 RX ADMIN — HYDROMORPHONE HYDROCHLORIDE 2 MILLIGRAM(S): 2 INJECTION INTRAMUSCULAR; INTRAVENOUS; SUBCUTANEOUS at 05:11

## 2018-10-22 RX ADMIN — Medication 650 MILLIGRAM(S): at 18:30

## 2018-10-22 RX ADMIN — LEVETIRACETAM 500 MILLIGRAM(S): 250 TABLET, FILM COATED ORAL at 05:11

## 2018-10-22 RX ADMIN — Medication 25 MILLIGRAM(S): at 20:35

## 2018-10-22 RX ADMIN — HEPARIN SODIUM 5000 UNIT(S): 5000 INJECTION INTRAVENOUS; SUBCUTANEOUS at 17:25

## 2018-10-22 RX ADMIN — Medication 2 MILLIGRAM(S): at 12:14

## 2018-10-22 RX ADMIN — LIDOCAINE 1 PATCH: 4 CREAM TOPICAL at 17:25

## 2018-10-22 RX ADMIN — LEVETIRACETAM 500 MILLIGRAM(S): 250 TABLET, FILM COATED ORAL at 17:25

## 2018-10-22 RX ADMIN — PIPERACILLIN AND TAZOBACTAM 200 GRAM(S): 4; .5 INJECTION, POWDER, LYOPHILIZED, FOR SOLUTION INTRAVENOUS at 05:11

## 2018-10-22 RX ADMIN — ATOVAQUONE 750 MILLIGRAM(S): 750 SUSPENSION ORAL at 05:11

## 2018-10-22 RX ADMIN — Medication 2 MILLIGRAM(S): at 05:13

## 2018-10-22 NOTE — BEHAVIORAL HEALTH ASSESSMENT NOTE - SUMMARY
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  Patient with chronic depression in context of long history of recurrent medical problems, currently denying depression however endorsing some depressive symptoms i.e feelings of emptiness , passive suicidal ideation, at this time reporting primary trigger to moods being reported oppressive home environment as well as dealing with loss of friend who  2 years ago. Patient previously evaluated by writer in 2018 during which he had a similar presentation with  chronic passive suicidal ideation, also at that time reported triggers of death of friend and  not feeling not well understood by mother.     patient may benefit from retrial of zoloft given hx of response, was not discussed today with patient as he was more primarily concerned with discussing his wishes for treatment and speaking to palliative care  will follow to discuss starting zoloft  agree with continuing xanax

## 2018-10-22 NOTE — GOALS OF CARE CONVERSATION - PERSONAL ADVANCE DIRECTIVE - CONVERSATION DETAILS
Collaborating C PCT  GERALDINE Adhikari and Psych NP . Pt  does not necessarily appear to be on a hospice page but is expressing frustration with present living condition and quality of life expressed a desire to live with his sister Shaye in [place] and continue his treatment and  HD there . This complex . Sister does say she would be open to take him in  [facility] access hospital if services can be coordinated . This a work in process . a meeting was help with his family and himself , mother father  Shaye via phone . Hospice will continue to collaborate with PCT but does not at this time appear to be ready for hospice Collaborating C PCT  GERALDINE Adhikari and Psych NP . Pt  does not necessarily appear to be on a hospice page but is expressing frustration with present living condition and quality of life expressed a desire to live with his sister Shaye in [place] and continue his treatment and  HD there . This is  complex . Sister does say she would be open to take him in  [facility] if services can be coordinated . This a work in process . a meeting was held with his family and himself , mother father  Shaye via phone  and E[facility]  Hospice will continue to collaborate with PCT but does not at this time appear to be ready for hospice

## 2018-10-22 NOTE — PROGRESS NOTE ADULT - ASSESSMENT
27 yo male with hx of ESRD on HD, HIV since birth, seizure disorder, medication/dialysis nonadherence, was found unresponsive at home and brought to the hospital with acute respiratory failure secondary to aspiration pneumonia, severe hyperkalemia (missed last dialysis), seizure, encephalopathy.     1) Aspiration Pneumonia- Antibx per ID. [place] O2. C/S testing  2) Acute hypoxic resp failure-resolved. currently maintaining SaO2 on RA. suspect underlying JUMA sec to apneic spells while asleep.  3) Initial Acute Encephalopathy- multifactorial- missed HD vs Sz vs arrythmia. Now baseline  4) Seizure disorder- stable. on Keppra. Cont same. seizure and fall precautions  5) ESRD on HD- to follow HD schedule per Renal. Palliative consulted, as patient now talking about stopping HD and non-complaint with other rx. counselled. psych and palliative cx called  6) HIV/ AIDS- Multidrug resistant. treatment per ID.  7) thrombocytopenia, mild elevation of LFTs, Metabolic abnormalities- related to ESRD and AIDs    DVT: Heparin  encouraged OOB to chair with assisstance

## 2018-10-22 NOTE — PROGRESS NOTE ADULT - ASSESSMENT
27 y/o man with a PMH of poorly controlled HIV due to nonadherence, ESRD, seizures disorder, here for obtundation, intubated, bilateral PNA, self extubated and now DNR/DNI.    PNA  -continue zosyn  -needs about 7 days for possible Aspiration pneumonia     HIV/AIDS  -Will not start any ARV at this time; due to nonadherence, he doesn't take his meds after discharge.   -continue Mepron 750mg enteral tube BID  -No need for MAC prophylaxis.     ESRD   -continue dialysis  -meds dosed renally.     history of Seizure d/o   -continue Anti epileptic drugs

## 2018-10-22 NOTE — BEHAVIORAL HEALTH ASSESSMENT NOTE - OTHER
blind not assessed in bed "not depressed" mildly dysphoric strained relations at home recently had to start sharing basement with mother

## 2018-10-22 NOTE — BEHAVIORAL HEALTH ASSESSMENT NOTE - NSBHCHARTREVIEWVS_PSY_A_CORE FT
Vital Signs Last 24 Hrs  T(C): 37.4 (22 Oct 2018 11:00), Max: 37.4 (22 Oct 2018 11:00)  T(F): 99.4 (22 Oct 2018 11:00), Max: 99.4 (22 Oct 2018 11:00)  HR: 110 (22 Oct 2018 11:00) (98 - 112)  BP: 123/68 (22 Oct 2018 11:00) (123/68 - 142/84)  BP(mean): --  RR: 18 (22 Oct 2018 11:00) (18 - 20)  SpO2: --

## 2018-10-22 NOTE — BEHAVIORAL HEALTH ASSESSMENT NOTE - HPI (INCLUDE ILLNESS QUALITY, SEVERITY, DURATION, TIMING, CONTEXT, MODIFYING FACTORS, ASSOCIATED SIGNS AND SYMPTOMS)
Pt is a 29 y/o SAAM with h/o depression 2/2 medical illness, congenital HIV, renal failure receiving HD 3x a week, bib EMS after found unresponsive in context of not attending dialysis for 1 week.  Patient previously evaluated by psychiatry on prior admissions.    Per primary team patient refused to attend dialysis prior to admission, however complying with dialysis here.  Patient initially states he wishes to stop dialysis, that he is tired of suffering tired of the diffuse bodily pain he has , as well as pain induced by dialysis session in his arm, radiating to neck. However upon further evaluation, patient articulates that he does not want to stop dialysis , just wishes to move to [place] to live with sister, feels his current home environment is oppressive which has weakened his motivation to attend dialysis. He states he does not think he would be noncompliant if he was living with his sister instead. He states he does not think he is depressed, and that a psychiatrist he talks to does not think so either, that psychiatrist thinks he is just reacting to the multiple life struggles he has had, however endorses a feeling of emptiness. He denies sleep or appetite disturbances, states he is ambivalent about continuing to live given his quality of life but denies suicidal plan or intent, states "I'm not going to kill myself" , that he plans to coordinate his end of life care in consultation with palliative care.   Patient reports home environment is oppressive because he does not do anything except sit around and attend dialysis, that mother is not able to do much, states sister is much more active and plans quality time with her family, feels he would enjoy this if he moved there. Met also with patient and palliative care/parents present during which patient's sister over phone confirmed that mother's ability to engage with patient in pleasurable activities is limited due to mother's age and health problems. Mother states recently she and patient vacated most of the house, moved to live in the basement so that mother can rent out upper house to bring more income in, mother states patient and she have had to start sharing the same bed. Patient reports since having to share basement with mother he feels overly controlled, mother tells him when he can and can't watch TV.

## 2018-10-22 NOTE — BEHAVIORAL HEALTH ASSESSMENT NOTE - NSBHCHARTREVIEWIMAGING_PSY_A_CORE FT
CT brain:  IMPRESSION:      No evidence of an acute transcortical infarction or   hemorrhage. MR is a more sensitive imaging modality for the evaluation of   an acute infarction.

## 2018-10-22 NOTE — CONSULT NOTE ADULT - PROBLEM SELECTOR RECOMMENDATION 2
Multifactorial. Improved, likely due to acute infection and electrolyte imbalance due to missed dialysis

## 2018-10-22 NOTE — CONSULT NOTE ADULT - PROBLEM SELECTOR RECOMMENDATION 9
S/P intubation. ID following. C/W Zosyn I had a long conversation with pt this morning, he shared his frustration, poor quality of life with frequent hospitalization, dialysis sessions - feeling tired and home situation. He stated "I'm tired it all and just want to be comfortable" but express concern of not wanting to disappoint his family especially his mom who wants him to continue dialysis - "I can't do that to her." He shared that a close friend of his passed about 2 years ago; who he was able to confide in and talk to - stated being tired of it all and that's why he stop going to dialysis last week.     Of note, he reports that he is adopted with another sister who has since passed when he was age 7. His adoptive parents are , has 3 biological children - a brother who is an alcoholic that lives in home with mother and himself and a sister (Shaye) in [place] who he feels very close to. His father is remarried and lives with wife in [place]. Previously pt lived in [place] with sister until 10 years ago, came to [facility] in hopes to get renal transplant given his HIV status. He feels at present time that transplant is not likely to happen and would like to go back to [place] to live with sister - feels environment is more supportive and he feels that he be more willing to be compliant with treatment in terms of dialysis. He was agreeable to our team arranging a family meeting with mother, father and sister.

## 2018-10-22 NOTE — PROGRESS NOTE ADULT - ASSESSMENT
ESRD  Hyperkalemia - resolved  AMS - resolved  Acute respiratory failure - resolved  Anemia  HTN  Chronic Pain    - Long term non-compliance with treatment. He has missed his dialysis for a week. We called him several times, event the police, outpatient but he refused to go to dialysis   - Proceed with dialysis today 10/22/18 as ordered  - Social work and psych evaluation maybe helpful; Palliative evaluation would benefit since the patient is debating stopping HD altogether.   - Renal diet  - Dilaudid/Benadryl with dialysis, otherwise, he would refuse HD    D/W HD RN    Thank you

## 2018-10-22 NOTE — BEHAVIORAL HEALTH ASSESSMENT NOTE - NSBHCHARTREVIEWLAB_PSY_A_CORE FT
9.2    5.4   )-----------( 92       ( 21 Oct 2018 09:26 )             28.7     10-21    134<L>  |  91<L>  |  53.0<H>  ----------------------------<  96  4.8   |  24.0  |  9.94<H>    Ca    9.0      21 Oct 2018 09:26  Phos  5.8     10-21  Mg     2.3     10-21    TPro  6.6  /  Alb  3.4  /  TBili  0.5  /  DBili  x   /  AST  34  /  ALT  39  /  AlkPhos  604<H>  10-21    LIVER FUNCTIONS - ( 21 Oct 2018 09:26 )  Alb: 3.4 g/dL / Pro: 6.6 g/dL / ALK PHOS: 604 U/L / ALT: 39 U/L / AST: 34 U/L / GGT: x

## 2018-10-22 NOTE — CONSULT NOTE ADULT - SUBJECTIVE AND OBJECTIVE BOX
HPI:  Mr. Savage is a "27 yo male pmhx seizures, ESRD on HD (MWF) missed HD session yesterday, HIV (born with), HTN, pericarditis EF 55%, multiple admission for noncompliance with medications and dialysis biba after being found obtunded in bed at home.  Family not at bedside, upon exam, information from ED staff.  Patient found obtunded in bed by family member prompting 911 call.  Upon arrival to ED patient obtunded, actively vomiting and aspirating.  Patient intubated for airway protection.  S/p intubation patient experienced run of vtach, given calcium chloride and bicarb and returned to NSR.  Labs significant for serum K 9.3, , Cr, 17.27.  Patient given D50 and Insulin 10U IVP x1 and keppra loaded.  Patient sent for head and chest CT, awaiting official read.  Nephrology called, emergent HD per right AV fistula upon arrival to MICU. (copied and pasted from H&P 20 Oct 2018 11:33)"    He was seen and examined at bedside. He reports feeling restless, in pain but does not elaborate a specific location. Otherwise endorses no other complaints. Denies any fever, chills,  headache, dizziness, chest pain, dyspnea, nausea, vomiting, diarrhea. ROS - all other system negative.     PERTINENT PMH REVIEWED: Yes    PAST MEDICAL & SURGICAL HISTORY:  Seizure disorder  Hypertension  ESRD (end stage renal disease)  Seizure  Pericarditis  Anxiety  Depression  Renal failure (ARF), acute on chronic: dialysis av fistula, RUE  HTN (hypertension)  Cardiomyopathy  HIV disease: born HIV+  AV fistula  S/P tonsillectomy      SOCIAL HISTORY:    Admitted from:  home, lives with adoptive mother and her son/his brother (reports problem with alcohol abuse and is verbally abusive toward mother).     Surrogate/HCP/Guardian: Phone#: Mother Tricia Savage - [phone] (phone does not presently work and has no alternative number at present)  2nd HCP is sister - Shaye [phone] (lives in [place]).     Advance Directives: No living will or MOLST, he made himself DNR/DNI this admission per chart review.     FAMILY HISTORY:  No pertinent family history in first degree relatives: Unknown FHx because pt is adopted      Baseline ADLs (prior to admission): Some independence, requires assistance with medications, cooking and etc due to blindness. Able to make his needs known    Allergies    vancomycin (Anaphylaxis)    Intolerances    prefers vanilla or butter pecan nepro x 2 TID RDOK (Unknown)      MEDICATIONS  (STANDING):  acetaminophen   Tablet .. 650 milliGRAM(s) Oral every 6 hours  atovaquone Suspension 750 milliGRAM(s) Oral two times a day  diphenhydrAMINE   Injectable 25 milliGRAM(s) IV Push once  heparin  Injectable 5000 Unit(s) SubCutaneous every 12 hours  HYDROmorphone  Injectable 2 milliGRAM(s) IV Push once  levETIRAcetam 500 milliGRAM(s) Oral two times a day  lidocaine   Patch 1 Patch Transdermal daily  methocarbamol 500 milliGRAM(s) Oral three times a day  piperacillin/tazobactam IVPB. 2.25 Gram(s) IV Intermittent every 12 hours  piperacillin/tazobactam IVPB.        MEDICATIONS  (PRN):  ALPRAZolam 2 milliGRAM(s) Oral three times a day PRN anxiety  gabapentin 100 milliGRAM(s) Oral two times a day PRN pain  HYDROmorphone   Tablet 2 milliGRAM(s) Oral every 4 hours PRN Severe Pain (7 - 10)      PHYSICAL EXAM:    Vital Signs Last 24 Hrs  T(C): 36.9 (22 Oct 2018 16:37), Max: 37.4 (22 Oct 2018 11:00)  T(F): 98.4 (22 Oct 2018 16:37), Max: 99.4 (22 Oct 2018 11:00)  HR: 106 (22 Oct 2018 16:37) (106 - 112)  BP: 141/90 (22 Oct 2018 16:37) (123/68 - 142/84)  BP(mean): --  RR: 20 (22 Oct 2018 16:37) (18 - 20)  SpO2: --    General: alert and awake. Oriented to person, place time. No acute distress.  HEENT: moist mucous membrane. Blindness in bilateral eyes  Lungs: CTAB. Good air entry. No use of accessory muscles.  CV: +s1/s2. RRR. No murmurs, rubs, gallops  GI: Normoactive bowel sounds. Non-tender. non-distended. No guarding.  : normal  MSK: Moves all 4 extremities. + Rt AVF  Skin: warm and dry  Psych: frustrated, sad, appears to be depressed but denies any suicidal or homicidal ideation    LABS:                        9.2    5.4   )-----------( 92       ( 21 Oct 2018 09:26 )             28.7     10-21    134<L>  |  91<L>  |  53.0<H>  ----------------------------<  96  4.8   |  24.0  |  9.94<H>    Ca    9.0      21 Oct 2018 09:26  Phos  5.8     10-21  Mg     2.3     10-21    TPro  6.6  /  Alb  3.4  /  TBili  0.5  /  DBili  x   /  AST  34  /  ALT  39  /  AlkPhos  604<H>  10-21        I&O's Summary    21 Oct 2018 07:01  -  22 Oct 2018 07:00  --------------------------------------------------------  IN: 463.8 mL / OUT: 0 mL / NET: 463.8 mL    22 Oct 2018 07:01  -  22 Oct 2018 17:11  --------------------------------------------------------  IN: 480 mL / OUT: 1 mL / NET: 479 mL        RADIOLOGY & ADDITIONAL STUDIES:       EXAM:  CT BRAIN                          PROCEDURE DATE:  10/20/2018          INTERPRETATION:  CLINICAL HISTORY: Altered mental status, found down in   bed    COMPARISON: CT head dated 9/28/2018    TECHNIQUE: Noncontrast CT of the head. Multiplanar reformations are   submitted.    FINDINGS:  There is no compelling evidence for an acute transcortical infarction.   There is no evidence of mass, mass effect, midline shift or extra-axial   fluid collection. The lateral ventricles and cortical sulci are   age-appropriate in size and configuration. The orbits, mastoid air cells   and visualized paranasal sinuses are normal.     Diffuse abnormal thickened sclerotic calvarium with areas of lucencies.   These findings also involve the facial bones. These findings are   unchanged. Findings likely represent renal osteodystrophy. Differential   diagnosis includes fibrous dysplasia hyperparathyroidism The calvarium is   otherwise intact. The patient is intubated. Orogastric tube is also noted.    IMPRESSION:      No evidence of an acute transcortical infarction or   hemorrhage. MR is a more sensitive imaging modality for the evaluation of   an acute infarction.        EXAM:  CT CERVICAL SPINE                          PROCEDURE DATE:  10/20/2018          INTERPRETATION:  CLINICAL HISTORY: Found down in bed, altered mental   status    COMPARISON: None.    TECHNIQUE: Noncontrast CT of the cervical spine. Multiple contiguous   axial images through the cervical spine as well as multiplanar   reformatted images are submitted for interpretation without the   administration of intravenous contrast. 3-D images.    FINDINGS:  Diffuse abnormal sclerosis with lucencies likely representing renal   osteodystrophy involving all of the visualized osseous structures. The   patient is intubated. Orogastric tube is also noted. Moderate leftward   curvature to the mid cervical spine. The basis of patient positioning.   Moderate right C2/C3 and C5/C6 facet arthropathy.    There is no evidence for acute fracture. A normal lordosis is noted.   Craniocervical junction is normal. The cervicovertebral body heights and   intervertebral disc spaces are preserved. There is no prevertebral soft   tissue abnormality. The odontoid process is intact. The spinal canal and   foramen are widely patent. There is no evidence of significant disc   herniation. Thyroid gland is normal. The airway is patent.     Right maxillary odontogenic disease. Outpatient dental consultation is   recommended. Right upper lobe consolidation. Dedicated chest CT is   recommended. Findings may represent pneumonia.     Evaluation of the individual levels:    C2/C3 level:    No spinal canal stenosis or foraminal narrowing.    C3/C4 level:    No spinal canal stenosis or foraminal narrowing.    C4/C5 level:    No spinal canal stenosis or foraminal narrowing.    C5/C6 level:    No spinal canal stenosis or foraminal narrowing.    C6/C7 level:    No spinal canal stenosis or foraminal narrowing.    C7/T1 level:    No spinal canal stenosis or foraminal narrowing.      IMPRESSION:        Renal osteodystrophy. No acute osseous abnormality. Additional   findings above.       EXAM:  CT CHEST                          PROCEDURE DATE:  10/20/2018          INTERPRETATION:  Chest CT without contrast .  COMPARISON: Chest x-ray 9/29/2018..  CLINICAL INFORMATION: Respiratory failure. Clear chest x-ray..  TECHNIQUE: Contiguous axial 2.5 mm slice thickness images of the chest   were obtained without intravenous contrast administration.  FINDINGS:  Increased soft tissue density noted within the RIGHT hilum .  Hilar findings may indicate adenopathy or a primary carcinoma. The RIGHT   lung shows a complete consolidation of the RIGHT lower lobe .  Multifocal subsegmental airspace consolidation also noted within the   RIGHT upper lobe posterior segments .  A LEFT lung parenchyma shows LEFT lower lobe a multifocal M subsegmental   airspace consolidations. The LEFT upper lobe remains clear. No gross   endobronchial obstructing lesion identified.  There is cardiomegaly without pericardial effusion. An  NG tube tip is within stomach.  Kidneys are atrophic.  No evidence of axillary or supraclavicular bulky lymphadenopathy.  Endotracheal tube tip is above tracheal bifurcation.  Skeletal structures show diffuse sclerosis consistent with renal   osteodystrophy..   IMPRESSION:    A RIGHT lower lobe complete airspace lobar consolidation.  Increased soft tissue fullness RIGHT hilum.  RIGHT upper lobe posterior segment segmental multifocal airspace   consolidation.  A LEFT lower lobe multifocal subsegmental airspace consolidation.  Cardiomegaly. No pericardial effusion.         EXAM:  XR CHEST AP OR PA 1V                          PROCEDURE DATE:  10/20/2018          INTERPRETATION:  XR CHEST AP OR PA 1V    Single AP view    HISTORY:  s/p intubation respiratory failure    Comparison:  Chest x-ray 9/29/2018    The tip of the endotracheal tube is above the toyin. The NG tube is in   stomach. Cardiac silhouette is rotated. Patchy opacity right upper lung.   No effusion.    IMPRESSION: Right upper lung airspace disease.

## 2018-10-22 NOTE — PROGRESS NOTE ADULT - SUBJECTIVE AND OBJECTIVE BOX
NEPHROLOGY INTERVAL HPI/OVERNIGHT EVENTS:  HPI:  Patient is a 27 yo male pmhx seizures, ESRD on HD (MWF) missed HD session yesterday, HIV (born with), HTN, pericarditis EF 55%, multiple admission for noncompliance with medications and dialysis biba after being found obtunded in bed at home.  Family not at bedside, upon exam, information from ED staff.  Patient found obtunded in bed by family member prompting 911 call.  Upon arrival to ED patient obtunded, actively vomiting and aspirating.  Patient intubated for airway protection.  S/p intubation patient experienced run of vtach, given calcium chloride and bicarb and returned to NSR.  Labs significant for serum K 9.3, , Cr, 17.27.  Patient given D50 and Insulin 10U IVP x1 and keppra loaded.  Patient sent for head and chest CT, awaiting official read.  Nephrology called, emergent HD per right AV fistula upon arrival to MICU. (20 Oct 2018 11:33)    Follow up ESRD  No acute complaints this morning; tolerating extubation well    PAST MEDICAL & SURGICAL HISTORY:  Seizure disorder  Hypertension  ESRD (end stage renal disease)  Seizure  Pericarditis  Anxiety  Depression  Renal failure (ARF), acute on chronic: dialysis av fistula, RUE  HTN (hypertension)  Cardiomyopathy  HIV disease: born HIV+  AV fistula  S/P tonsillectomy      MEDICATIONS  (STANDING):  atovaquone Suspension 750 milliGRAM(s) Enteral Tube two times a day  azithromycin  40 mG/mL Suspension 1000 milliGRAM(s) Enteral Tube <User Schedule>  heparin  Injectable 5000 Unit(s) SubCutaneous every 12 hours  levETIRAcetam 500 milliGRAM(s) Oral two times a day  piperacillin/tazobactam IVPB. 2.25 Gram(s) IV Intermittent every 12 hours  piperacillin/tazobactam IVPB.        MEDICATIONS  (PRN):  acetaminophen   Tablet .. 650 milliGRAM(s) Oral every 6 hours PRN Temp greater or equal to 38.5C (101.3F), Mild Pain (1 - 3), Moderate Pain (4 - 6)  ALPRAZolam 2 milliGRAM(s) Oral three times a day PRN anxiety  gabapentin 100 milliGRAM(s) Oral two times a day PRN pain  HYDROmorphone   Tablet 2 milliGRAM(s) Oral every 4 hours PRN Severe Pain (7 - 10)      Allergies    vancomycin (Anaphylaxis)    Intolerances    prefers vanilla or butter pecan nepro x 2 TID RDOK (Unknown)      Vital Signs Last 24 Hrs  T(C): 37.3 (22 Oct 2018 05:00), Max: 37.3 (22 Oct 2018 05:00)  T(F): 99.1 (22 Oct 2018 05:00), Max: 99.1 (22 Oct 2018 05:00)  HR: 108 (22 Oct 2018 05:00) (85 - 115)  BP: 141/79 (22 Oct 2018 05:00) (117/79 - 153/101)  BP(mean): 85 (21 Oct 2018 10:00) (85 - 121)  RR: 20 (21 Oct 2018 20:00) (20 - 27)  SpO2: 97% (21 Oct 2018 10:00) (97% - 100%)  Daily     Daily     PHYSICAL EXAM:  GENERAL: No distress  HEAD:  Atraumatic, Normocephalic  EYES: Conjunctiva and sclera clear  ENMT:  Moist mucous membranes, Good dentition, No lesions  NECK: Supple, No JVD  NERVOUS SYSTEM:  Alert & Oriented X3, Good concentration; Motor Strength 5/5 B/L upper and lower extremities; DTRs 2+ intact and symmetric  CHEST/LUNG: Clear to percussion bilaterally; No rales, rhonchi, wheezing, or rubs  HEART: Regular rate and rhythm; No murmurs, rubs, or gallops  ABDOMEN: Soft, Nontender, Nondistended; Bowel sounds present  EXTREMITIES:  No edema; +hypertrophic AVF to RUE that is pulsatile  SKIN: No rashes or lesions    LABS:                        9.2    5.4   )-----------( 92       ( 21 Oct 2018 09:26 )             28.7     10-21    134<L>  |  91<L>  |  53.0<H>  ----------------------------<  96  4.8   |  24.0  |  9.94<H>    Ca    9.0      21 Oct 2018 09:26  Phos  5.8     10-21  Mg     2.3     10-21    TPro  6.6  /  Alb  3.4  /  TBili  0.5  /  DBili  x   /  AST  34  /  ALT  39  /  AlkPhos  604<H>  10-21    PT/INR - ( 20 Oct 2018 10:38 )   PT: 11.9 sec;   INR: 1.08 ratio         PTT - ( 20 Oct 2018 10:38 )  PTT:32.7 sec    Magnesium, Serum: 2.3 mg/dL (10-21 @ 09:26)  Phosphorus Level, Serum: 5.8 mg/dL (10-21 @ 09:26)        RADIOLOGY & ADDITIONAL TESTS:

## 2018-10-22 NOTE — PROGRESS NOTE ADULT - SUBJECTIVE AND OBJECTIVE BOX
HEALTH ISSUES - PROBLEM Dx:  27 y/o man with a PMH of poorly controlled HIV due to nonadherence, ESRD, seizures disorder,  ID was called due to his HIV status for ARV management. Know to Dr. Navarro from past admissions, and doesn't follow with his PCP after discharge, has not seen PCP since 12/2017.  Prior genotype with a highly resistant HIV virus, resistant to all classes of ARV. ID came up with a regimen hoping to suppress the virus until new meds are available but he never took the meds as outpt. HE is admitted after being found obtunded in bed by family member prompting 911 call.  Upon arrival to ED patient obtunded, actively vomiting and aspirating.  Patient intubated for airway protection.  S/p intubation patient experienced run of vtach, given calcium chloride and bicarb and returned to NSR.  Labs significant for serum K 9.3, , Cr, 17.27.  Patient given D50 and Insulin 10U IVP x1 and keppra loaded.  Patient sent for head and chest CT, awaiting official read.  Nephrology called, emergent HD per right AV fistula upon arrival to MICU.  In MICU-  He self extubated himself, signed his own DNR/ DNI, maintaining well on [place] O2. No further arrythmias.     INTERVAL HPI/ OVERNIGHT EVENTS:  patient is comfortable.  asking for IV pain meds for his neck and back pain. h/o chronic neck and back pain aggravated in hospital. no bowel/bladder problem. no saddle anesthesias. no limb weakness or numbness. reported taking oxycodone 10 mg Q 4hr PRN at home and dilaudid and benadryl PRN on HD day. otherwise refuses to get HD. asking for pain consult. agreed for HD today with dilaudid and benadryl. never had any s/e with opioids.   RN reported he refused heparin. however agreed now after hearing risk and benefit and alternative. family at bedside. denied anxiety/depression/SI. psych and palliative cx was called.    Tele: Sinus Tachy    REVIEW OF SYSTEMS:    CONSTITUTIONAL:  No distress. no fever/chills  HEENT: blind both eyes  CARDIOVASCULAR:  No pressure, squeezing, tightness, heaviness or aching about the chest; no palpitations.no leg swelling, no orthopnea or PND  RESPIRATORY:  no SOB. no wheezing.no cough or sputum.  No hemoptysis  GI: no abd pain, no nausea, no vomiting, no diarrhea, no constipation. No hematochezia or melena  EXT:No joint pain or joint swelling or redness  back: as per HPI  CNS: No headaches. No weakness. no numbness. No depression or anxiety. No SI    Vital Signs Last 24 Hrs  T(C): 37.4 (22 Oct 2018 11:00), Max: 37.4 (22 Oct 2018 11:00)  T(F): 99.4 (22 Oct 2018 11:00), Max: 99.4 (22 Oct 2018 11:00)  HR: 110 (22 Oct 2018 11:00) (98 - 112)  BP: 123/68 (22 Oct 2018 11:00) (123/68 - 142/84)  BP(mean): --  RR: 18 (22 Oct 2018 11:00) (18 - 20)  SpO2: --    GENERAL: Not in distress. Alert    HEENT:  Normocephalic and atraumatic. blind  NECK: Supple.  No JVD.    CARDIOVASCULAR: RRR S1, S2. No murmur/rubs/gallop  LUNGS: BLAE+, no rales, no wheezing, no rhonchi.    ABDOMEN: ND. Soft,  NT, no guarding / rebound / rigidity. BS normoactive. No CVA tenderness.    BACK/MSK: No spine tenderness. + b/l paravertebral muscle spasm diffuse. NT  EXTREMITIES: no cyanosis, no clubbing, no edema.   SKIN: no rash. No skin break or ulcer. No cellulitis.  NEUROLOGIC: AAO*3.strength is symmetric, sensation intact, speech fluent.  SLR neg  PSYCHIATRIC: Calm.  No agitation.    LABS:                                   9.2    5.4   )-----------( 92       ( 21 Oct 2018 09:26 )             28.7       10-21    134<L>  |  91<L>  |  53.0<H>  ----------------------------<  96  4.8   |  24.0  |  9.94<H>    Ca    9.0      21 Oct 2018 09:26  Phos  5.8     10-21  Mg     2.3     10-21    TPro  6.6  /  Alb  3.4  /  TBili  0.5  /  DBili  x   /  AST  34  /  ALT  39  /  AlkPhos  604<H>  10-21

## 2018-10-22 NOTE — CONSULT NOTE ADULT - PROBLEM SELECTOR RECOMMENDATION 5
Poor compliance - missed last outpatient session. HD on MWF, planned for next session today at ~6PM. Nephrology on board.

## 2018-10-22 NOTE — CONSULT NOTE ADULT - PROBLEM SELECTOR RECOMMENDATION 6
Significant psychosocial stress factors. He feels frustrated and hopeless with his living situation, frequent hospitalizations and overall clinical disease processes. Psych consulted, appreciate recommendation.

## 2018-10-22 NOTE — CONSULT NOTE ADULT - ATTENDING COMMENTS
Addendum:  RN informed team that pt requesting our presence as family (mother, father and his spouse) to discuss further goals of care. A  was presence as mom is French speaking. In attendance, Psychiatry, Hospice/Pall RN Adela and at request of pt, sister Shaye was called and present via phone. We shared Kalyan concerns and frustration as discussed about with family, poor QOL and desire to be in [place] for continued treatment. Sister, Shaye, was accepting to take care of pt but expressed concern that she has 3 children, pt being left home alone while away at work -  need for home services which mom is presently fulfilling as pt's CDPAP and acceptance of care by nephrologist in [place] given his complicated AVF and overall underlying medical history. In addition, mom has financial obligations that makes it difficult for her to leave [place] at present time.     We acknowledge pt and family concerns, offered to reach out to care manager and our SW to get additional information on potential long term options for family. Will continue to follow for ongoing GOC and update them as we get more information.

## 2018-10-22 NOTE — CONSULT NOTE ADULT - ASSESSMENT
Mr. Savage is a 28 year old male with HIV since birth, ESRD on HD (MW), h/o CMV retinitis with blindness, seizure disorder and poor compliance to dialysis and HAART therapy admitted for encephalopathy and acute respiratory failure secondary to aspiration pneumonia, severe hyperkalemia (likely due to missed last HD). ID, nephrology on board.

## 2018-10-22 NOTE — BEHAVIORAL HEALTH ASSESSMENT NOTE - DETAILS
na is adopted, bio parents had mental illness, sister with bipolar disorder diffuse bodily aches, worse in back, chronic

## 2018-10-22 NOTE — CONSULT NOTE ADULT - PROBLEM SELECTOR RECOMMENDATION 3
Poor med compliance. ID on board, note reviewed - no antiretroviral presently due to likely poor compliance. C/W mepron

## 2018-10-22 NOTE — PROGRESS NOTE ADULT - SUBJECTIVE AND OBJECTIVE BOX
[facility]  INFECTIOUS DISEASES AND INTERNAL MEDICINE at [facility]  =======================================================  Howie Navarro MD  Diplomates American Board of Internal Medicine and Infectious Diseases  =======================================================    TEDDY CRAWFORD [id number]  chief complaint: follow up on bilateral PNA and HIV/AIDS  Most likely aspiration due to AMS and intubation. No complaint except for weakness.     Allergies:  prefers vanilla or butter pecan nepro x 2 TID RDOK (Unknown)  vancomycin (Anaphylaxis)    Antibiotics:  atovaquone Suspension 750 milliGRAM(s) Enteral Tube two times a day  piperacillin/tazobactam IVPB. 2.25 Gram(s) IV Intermittent every 12 hours     REVIEW OF SYSTEMS:  as above  all other ROS are negative    Physical Exam:  Vital Signs Last 24 Hrs  T(C): 37.4 (22 Oct 2018 11:00), Max: 37.4 (22 Oct 2018 11:00)  T(F): 99.4 (22 Oct 2018 11:00), Max: 99.4 (22 Oct 2018 11:00)  HR: 110 (22 Oct 2018 11:00) (98 - 112)  BP: 123/68 (22 Oct 2018 11:00) (123/68 - 142/84)  RR: 18 (22 Oct 2018 11:00) (18 - 20)  GEN: intubated, sedated, more comfortable appearing.  HEENT: normocephalic and atraumatic. blind both eyes  NECK: Supple. No carotid bruits.    LUNGS: diminished breath sounds bilaterally.  HEART: s1 and s2 normal  ABDOMEN: Soft, nontender, and nondistended.  Positive bowel sounds.    EXTREMITIES: Without any  edema.  Right upper extremity with AVF  MSK: no joint swelling  SKIN: No ulceration or induration present.    Labs:  10-21    134<L>  |  91<L>  |  53.0<H>  ----------------------------<  96  4.8   |  24.0  |  9.94<H>    Ca    9.0      21 Oct 2018 09:26  Phos  5.8     10-21  Mg     2.3     10-21    TPro  6.6  /  Alb  3.4  /  TBili  0.5  /  DBili  x   /  AST  34  /  ALT  39  /  AlkPhos  604<H>  10-21                        9.2    5.4   )-----------( 92       ( 21 Oct 2018 09:26 )             28.7     LIVER FUNCTIONS - ( 21 Oct 2018 09:26 )  Alb: 3.4 g/dL / Pro: 6.6 g/dL / ALK PHOS: 604 U/L / ALT: 39 U/L / AST: 34 U/L / GGT: x           CARDIAC MARKERS ( 20 Oct 2018 17:27 )  x     / 0.30 ng/mL / x     / x     / x          RECENT CULTURES:  10-20 @ 22:16 .Sputum Sputum     Numerous Staphylococcus aureus Identification and susceptibility to  follow.  Numerous Routine respiratory hakan present  Culture in progress    Few White blood cells  Few Gram Negative Rods  Few Gram Positive Rods  Few Gram Positive Cocci in Clusters    10-20 @ 13:22 .Blood Blood-Peripheral     No growth at 48 hours    10-20 @ 13:21 .Blood Blood-Venous     No growth at 48 hours

## 2018-10-22 NOTE — BEHAVIORAL HEALTH ASSESSMENT NOTE - OTHER PAST PSYCHIATRIC HISTORY (INCLUDE DETAILS REGARDING ONSET, COURSE OF ILLNESS, INPATIENT/OUTPATIENT TREATMENT)
hx of depression  hx of depression, hospitalized at [facility] 2 x in early 20s for depression

## 2018-10-22 NOTE — CONSULT NOTE ADULT - PROBLEM SELECTOR RECOMMENDATION 8
He is opioid tolerant, has been on high doses in the past from chart review of previous palliative notes. Presently on hydromorphone PO 2 mg q4 PRN (used 4 mg in past 24hrs) and gabapentin PRN. Will continue this regimen. Hold if drowsy or somnolent

## 2018-10-23 DIAGNOSIS — F41.9 ANXIETY DISORDER, UNSPECIFIED: ICD-10-CM

## 2018-10-23 DIAGNOSIS — J15.9 UNSPECIFIED BACTERIAL PNEUMONIA: ICD-10-CM

## 2018-10-23 LAB
HBV CORE AB SER-ACNC: SIGNIFICANT CHANGE UP
HBV SURFACE AB SER-ACNC: REACTIVE
HBV SURFACE AG SER-ACNC: SIGNIFICANT CHANGE UP
HCV AB S/CO SERPL IA: 0.08 S/CO — SIGNIFICANT CHANGE UP
HCV AB SERPL-IMP: SIGNIFICANT CHANGE UP

## 2018-10-23 PROCEDURE — 99233 SBSQ HOSP IP/OBS HIGH 50: CPT

## 2018-10-23 PROCEDURE — 90792 PSYCH DIAG EVAL W/MED SRVCS: CPT

## 2018-10-23 PROCEDURE — 99232 SBSQ HOSP IP/OBS MODERATE 35: CPT

## 2018-10-23 RX ORDER — TENOFOVIR DISOPROXIL FUMARATE 300 MG/1
300 TABLET, FILM COATED ORAL
Qty: 0 | Refills: 0 | Status: DISCONTINUED | OUTPATIENT
Start: 2018-10-23 | End: 2018-10-25

## 2018-10-23 RX ORDER — RITONAVIR 100 MG/1
100 TABLET, FILM COATED ORAL
Qty: 0 | Refills: 0 | Status: DISCONTINUED | OUTPATIENT
Start: 2018-10-23 | End: 2018-10-25

## 2018-10-23 RX ORDER — DOLUTEGRAVIR SODIUM 25 MG/1
50 TABLET, FILM COATED ORAL
Qty: 0 | Refills: 0 | Status: DISCONTINUED | OUTPATIENT
Start: 2018-10-23 | End: 2018-10-25

## 2018-10-23 RX ORDER — DARUNAVIR 75 MG/1
600 TABLET, FILM COATED ORAL
Qty: 0 | Refills: 0 | Status: DISCONTINUED | OUTPATIENT
Start: 2018-10-23 | End: 2018-10-25

## 2018-10-23 RX ORDER — ETRAVIRINE 200 MG/1
200 TABLET ORAL
Qty: 0 | Refills: 0 | Status: DISCONTINUED | OUTPATIENT
Start: 2018-10-23 | End: 2018-10-25

## 2018-10-23 RX ADMIN — HYDROMORPHONE HYDROCHLORIDE 2 MILLIGRAM(S): 2 INJECTION INTRAMUSCULAR; INTRAVENOUS; SUBCUTANEOUS at 01:42

## 2018-10-23 RX ADMIN — Medication 650 MILLIGRAM(S): at 00:38

## 2018-10-23 RX ADMIN — LIDOCAINE 1 PATCH: 4 CREAM TOPICAL at 20:22

## 2018-10-23 RX ADMIN — Medication 2 MILLIGRAM(S): at 19:03

## 2018-10-23 RX ADMIN — Medication 650 MILLIGRAM(S): at 14:11

## 2018-10-23 RX ADMIN — DOLUTEGRAVIR SODIUM 50 MILLIGRAM(S): 25 TABLET, FILM COATED ORAL at 18:56

## 2018-10-23 RX ADMIN — Medication 650 MILLIGRAM(S): at 00:08

## 2018-10-23 RX ADMIN — PIPERACILLIN AND TAZOBACTAM 200 GRAM(S): 4; .5 INJECTION, POWDER, LYOPHILIZED, FOR SOLUTION INTRAVENOUS at 06:03

## 2018-10-23 RX ADMIN — RITONAVIR 100 MILLIGRAM(S): 100 TABLET, FILM COATED ORAL at 18:56

## 2018-10-23 RX ADMIN — METHOCARBAMOL 500 MILLIGRAM(S): 500 TABLET, FILM COATED ORAL at 14:11

## 2018-10-23 RX ADMIN — Medication 2 MILLIGRAM(S): at 00:10

## 2018-10-23 RX ADMIN — DARUNAVIR 600 MILLIGRAM(S): 75 TABLET, FILM COATED ORAL at 20:21

## 2018-10-23 RX ADMIN — PIPERACILLIN AND TAZOBACTAM 200 GRAM(S): 4; .5 INJECTION, POWDER, LYOPHILIZED, FOR SOLUTION INTRAVENOUS at 19:12

## 2018-10-23 RX ADMIN — HYDROMORPHONE HYDROCHLORIDE 2 MILLIGRAM(S): 2 INJECTION INTRAMUSCULAR; INTRAVENOUS; SUBCUTANEOUS at 08:47

## 2018-10-23 RX ADMIN — ETRAVIRINE 200 MILLIGRAM(S): 200 TABLET ORAL at 18:56

## 2018-10-23 RX ADMIN — Medication 650 MILLIGRAM(S): at 15:11

## 2018-10-23 RX ADMIN — LEVETIRACETAM 500 MILLIGRAM(S): 250 TABLET, FILM COATED ORAL at 06:02

## 2018-10-23 RX ADMIN — LIDOCAINE 1 PATCH: 4 CREAM TOPICAL at 14:10

## 2018-10-23 RX ADMIN — ATOVAQUONE 750 MILLIGRAM(S): 750 SUSPENSION ORAL at 06:03

## 2018-10-23 RX ADMIN — Medication 650 MILLIGRAM(S): at 06:03

## 2018-10-23 RX ADMIN — HYDROMORPHONE HYDROCHLORIDE 2 MILLIGRAM(S): 2 INJECTION INTRAMUSCULAR; INTRAVENOUS; SUBCUTANEOUS at 17:03

## 2018-10-23 RX ADMIN — HYDROMORPHONE HYDROCHLORIDE 2 MILLIGRAM(S): 2 INJECTION INTRAMUSCULAR; INTRAVENOUS; SUBCUTANEOUS at 07:56

## 2018-10-23 RX ADMIN — METHOCARBAMOL 500 MILLIGRAM(S): 500 TABLET, FILM COATED ORAL at 06:02

## 2018-10-23 RX ADMIN — LEVETIRACETAM 500 MILLIGRAM(S): 250 TABLET, FILM COATED ORAL at 18:56

## 2018-10-23 RX ADMIN — Medication 2 MILLIGRAM(S): at 06:03

## 2018-10-23 RX ADMIN — Medication 650 MILLIGRAM(S): at 07:03

## 2018-10-23 RX ADMIN — HYDROMORPHONE HYDROCHLORIDE 2 MILLIGRAM(S): 2 INJECTION INTRAMUSCULAR; INTRAVENOUS; SUBCUTANEOUS at 16:09

## 2018-10-23 RX ADMIN — Medication 650 MILLIGRAM(S): at 18:56

## 2018-10-23 RX ADMIN — Medication 650 MILLIGRAM(S): at 20:21

## 2018-10-23 RX ADMIN — METHOCARBAMOL 500 MILLIGRAM(S): 500 TABLET, FILM COATED ORAL at 21:15

## 2018-10-23 NOTE — PROGRESS NOTE ADULT - ASSESSMENT
Mr. Savage is a 28 year old male with HIV/AIDS (multi-drug resistant) since birth, ESRD on HD ([facility]), h/o CMV retinitis with blindness, seizure disorder and poor compliance to dialysis and HAART therapy admitted for encephalopathy and acute respiratory failure secondary to aspiration pneumonia, severe hyperkalemia (likely due to missed last HD). ID, nephrology on board. We are following to assist with goals of care.

## 2018-10-23 NOTE — PROGRESS NOTE ADULT - PROBLEM SELECTOR PLAN 5
Would be cautious in increasing opioids given history of noncompliance. Per medicine note, ISTOP showed last use of fentanyl was in 7/2018. C/W current regimen hydromorphone 2 mg q4H PRN (hold if somnolent)

## 2018-10-23 NOTE — PROGRESS NOTE ADULT - PROBLEM SELECTOR PLAN 6
Pt's goal remains to move and live in [place] with sister Shaye. Our palliative SW Nicolette to assist in reaching out to sister to evaluate potential needs/barrier to determine if this is a safe and possible disposition. Will continue to follow for ongoing GOC discussion.

## 2018-10-23 NOTE — PROGRESS NOTE ADULT - ASSESSMENT
27 y/o man with a PMH of poorly controlled HIV due to nonadherence, ESRD, seizures disorder, here for obtundation, intubated, bilateral PNA, self extubated and now DNR/DNI.    PNA  -continue zosyn  -needs about 7 days for possible Aspiration pneumonia   -When ready for discharge can be switched to po Augmentin 500mg daily to complete the course.     HIV/AIDS  -Today he stated that he was taking his ARV since he saw me in the office.   -Will resume the same regimen and repeat VL today.   -continue Mepron 750mg enteral tube BID  -No need for MAC prophylaxis.     ESRD   -continue dialysis  -meds dosed renally.     history of Seizure d/o   -continue Anti epileptic drugs  -There is possibility of drug related AMS (on pain meds and benzodiazepines)

## 2018-10-23 NOTE — PROGRESS NOTE ADULT - PROBLEM SELECTOR PLAN 4
Known history of chronic depression and many psychosocial stressors influencing his clinical treatment decisions. Previously followed a therapist outpatient. Psych following, note reviewed and appreciate input.

## 2018-10-23 NOTE — PROGRESS NOTE ADULT - SUBJECTIVE AND OBJECTIVE BOX
BRIEF HOSPITAL COURSE:    HPI:  Mr. Savage is a "29 yo male pmhx seizures, ESRD on HD (MWF) missed HD session yesterday, HIV (born with), HTN, pericarditis EF 55%, multiple admission for noncompliance with medications and dialysis biba after being found obtunded in bed at home.  Family not at bedside, upon exam, information from ED staff.  Patient found obtunded in bed by family member prompting 911 call.  Upon arrival to ED patient obtunded, actively vomiting and aspirating.  Patient intubated for airway protection.  S/p intubation patient experienced run of vtach, given calcium chloride and bicarb and returned to NSR.  Labs significant for serum K 9.3, , Cr, 17.27.  Patient given D50 and Insulin 10U IVP x1 and keppra loaded.  Patient sent for head and chest CT, awaiting official read.  Nephrology called, emergent HD per right AV fistula upon arrival to MICU. (copied and pasted from H&P 20 Oct 2018 11:33)"    Seen and examined at bedside. He reports feeling very anxious and restless; requesting home meds including fentanyl patch and ambien. When I asked him to elaborate on last known use, he was not able to provide information. D/W hospitalist and note reviewed, istop showed last use of fentanyl in 7/2018.     Present Symptoms:     Dyspnea: no  Nausea/Vomiting: No  Anxiety:  Yes   Depression: Yes   Fatigue: No   Loss of appetite: No  Pain: none    MEDICATIONS  (STANDING):  acetaminophen   Tablet .. 650 milliGRAM(s) Oral every 6 hours  atovaquone Suspension 750 milliGRAM(s) Oral two times a day  darunavir 600 milliGRAM(s) Oral two times a day  dolutegravir 50 milliGRAM(s) Oral two times a day  etravirine 200 milliGRAM(s) Oral two times a day after meals  heparin  Injectable 5000 Unit(s) SubCutaneous every 12 hours  levETIRAcetam 500 milliGRAM(s) Oral two times a day  lidocaine   Patch 1 Patch Transdermal daily  methocarbamol 500 milliGRAM(s) Oral three times a day  piperacillin/tazobactam IVPB. 2.25 Gram(s) IV Intermittent every 12 hours  piperacillin/tazobactam IVPB.      ritonavir Tablet 100 milliGRAM(s) Oral two times a day  tenofovir disoproxil fumarate (VIREAD) 300 milliGRAM(s) Oral <User Schedule>    MEDICATIONS  (PRN):  ALPRAZolam 2 milliGRAM(s) Oral three times a day PRN anxiety  gabapentin 100 milliGRAM(s) Oral two times a day PRN pain  HYDROmorphone   Tablet 2 milliGRAM(s) Oral every 4 hours PRN Severe Pain (7 - 10)      PHYSICAL EXAM:    Vital Signs Last 24 Hrs  T(C): 36.4 (23 Oct 2018 08:57), Max: 37.4 (23 Oct 2018 06:01)  T(F): 97.5 (23 Oct 2018 08:57), Max: 99.3 (23 Oct 2018 06:01)  HR: 100 (23 Oct 2018 08:57) (98 - 112)  BP: 119/80 (23 Oct 2018 08:57) (119/80 - 154/95)  BP(mean): --  RR: 18 (23 Oct 2018 08:57) (18 - 20)  SpO2: 100% (22 Oct 2018 23:35) (98% - 100%)    General: resting comfortably when i entered room. became restless after he woke  HEENT: moist mucous membrane. Blindness in bilateral eyes  Lungs: CTAB. Good air entry. No use of accessory muscles.  CV: +s1/s2. RRR. No murmurs, rubs, gallops  GI: Normoactive bowel sounds. Non-tender. non-distended. No guarding.  MSK: Moves all 4 extremities. + Rt AVF  Skin: warm and dry  Psych: anxious    LABS:                          8.6    3.6   )-----------( 96       ( 22 Oct 2018 20:37 )             26.4     10-22    141  |  96<L>  |  78.0<H>  ----------------------------<  113  4.5   |  23.0  |  14.14<H>    Ca    9.0      22 Oct 2018 20:37    TPro  6.8  /  Alb  3.6  /  TBili  0.4  /  DBili  x   /  AST  51<H>  /  ALT  80<H>  /  AlkPhos  557<H>  10-22        I&O's Summary    22 Oct 2018 07:01  -  23 Oct 2018 07:00  --------------------------------------------------------  IN: 720 mL / OUT: 2001 mL / NET: -1281 mL        RADIOLOGY & ADDITIONAL STUDIES:  Reviewed, no new recent studies

## 2018-10-23 NOTE — PROGRESS NOTE ADULT - SUBJECTIVE AND OBJECTIVE BOX
HEALTH ISSUES - PROBLEM Dx:  29 y/o man with a PMH of poorly controlled HIV due to nonadherence, ESRD, seizures disorder,  ID was called due to his HIV status for ARV management. Know to Dr. Navarro from past admissions, and doesn't follow with his PCP after discharge, has not seen PCP since 12/2017.  Prior genotype with a highly resistant HIV virus, resistant to all classes of ARV. ID came up with a regimen hoping to suppress the virus until new meds are available but he never took the meds as outpt. HE is admitted after being found obtunded in bed by family member prompting 911 call.  Upon arrival to ED patient obtunded, actively vomiting and aspirating.  Patient intubated for airway protection.  S/p intubation patient experienced run of vtach, given calcium chloride and bicarb and returned to NSR.  Labs significant for serum K 9.3, , Cr, 17.27.  Patient given D50 and Insulin 10U IVP x1 and keppra loaded.  Patient sent for head and chest CT, awaiting official read.  Nephrology called, emergent HD per right AV fistula upon arrival to MICU.  and was dialysed again on 10/22/18 PM to facilitate good sleep.     INTERVAL HPI/ OVERNIGHT EVENTS:  Noted social issues. [place] transferred [place].   states he has severe anxiety and so he avoids HD. here he is getting small dose diluadid pre HD and that is helping him a lot  and he wants to continue that at home and hopes it will help him to be compliant with HD>  asking for his fentanyl patch and sedatives etc. informed that I will istop and check and do the needful    REVIEW OF SYSTEMS:    CONSTITUTIONAL: No fever,   EYES: No discharge  ENMT:  No sinus or throat pain  NECK: No pain or stiffness  RESPIRATORY: No cough, wheezing, chills or hemoptysis; No shortness of breath  CARDIOVASCULAR: No chest pain, palpitations, dizziness, or leg swelling  GASTROINTESTINAL: No abdominal or epigastric pain. No nausea, vomiting, or hematemesis; No diarrhea or constipation. No melena or hematochezia.  GENITOURINARY: No dysuria, frequency, hematuria, or incontinence  NEUROLOGICAL: No headaches, memory loss, loss of strength, numbness, or tremors  SKIN: No itching, burning, rashes, or lesions   MUSCULOSKELETAL: No joint pain or swelling;   PSYCHIATRIC: No  anxiety,     Vital Signs Last 24 Hrs  T(C): 36.4 (23 Oct 2018 08:57), Max: 37.4 (23 Oct 2018 06:01)  T(F): 97.5 (23 Oct 2018 08:57), Max: 99.3 (23 Oct 2018 06:01)  HR: 100 (23 Oct 2018 08:57) (98 - 112)  BP: 119/80 (23 Oct 2018 08:57) (119/80 - 154/95)  BP(mean): --  RR: 18 (23 Oct 2018 08:57) (18 - 20)  SpO2: 100% (22 Oct 2018 23:35) (98% - 100%)    PHYSICAL EXAM-  GEN:on [place] O2, comfortable appearing.  HEENT: normocephalic and atraumatic. EOMI. NATALIE.    NECK: Supple. No carotid bruits.    LUNGS: diminished breath sounds bilaterally.  HEART: tachycardia  ABDOMEN: Soft, nontender, and nondistended. Positive bowel sounds.    EXTREMITIES: Without any edema.  RIGHT upper extremity with AVF +  MSK: no joint swelling  NEUROLOGIC: sedated  PSYCHIATRIC: sedated  SKIN: No ulceration or induration present.    LABS:                        8.6    3.6   )-----------( 96       ( 22 Oct 2018 20:37 )             26.4     10-22    141  |  96<L>  |  78.0<H>  ----------------------------<  113  4.5   |  23.0  |  14.14<H>    Ca    9.0      22 Oct 2018 20:37    TPro  6.8  /  Alb  3.6  /  TBili  0.4  /  DBili  x   /  AST  51<H>  /  ALT  80<H>  /  AlkPhos  557<H>  10-22    Assessment and Plan

## 2018-10-23 NOTE — CHART NOTE - NSCHARTNOTEFT_GEN_A_CORE
Medicine PA Note         Called to evaluate patient with restlessness and anxiety this morning. s/p dialysis 10/22.  Patient had been administered dilaudid and xanax routinely for management and has no symptoms today.  Patient examined lying in bed sleeping and sedated.  No signs of respiratory distress.  Patient unable to answer questions.  No visibile symptoms or injury.      LUNGS: CTA  B/l normal breath sounds bilaterally   no wheezes /rhonchi /rales   HEART:  Clear s1,s2, no murmur    NEURO:  CNII-XII grossly intact.  mild restless motor movement.   no focal or mtor deficits noted.    VSS  A/P:  Anxiety  1)  Monitor vitals q4 for 12hours  2)  cont. mgmt per attending

## 2018-10-23 NOTE — PROGRESS NOTE ADULT - ASSESSMENT
27 yo male with hx of ESRD on HD, HIV since birth, seizure disorder, medication/dialysis nonadherence, was found unresponsive at home and brought to the hospital with acute respiratory failure secondary to aspiration pneumonia, severe hyperkalemia (missed last dialysis), seizure, encephalopathy.     1) Aspiration Pneumonia- Antibx per ID. needs about 7 days for possible Aspiration pneumonia. When ready for discharge can be switched to po Augmentin 500mg daily to complete the course. [place] O2.  2) Acute hypoxic resp failure-resolved. currently maintaining SaO2 on RA. suspect underlying JUMA sec to apneic spells while asleep.  3) Initial Acute Encephalopathy- multifactorial- missed HD vs Sz vs arrythmia. Now baseline  4) Seizure disorder- stable. on Keppra. Cont same. seizure and fall precautions  5) ESRD on HD- to follow HD schedule per Renal. Palliative consulted, as patient now talking about stopping HD and non-complaint with other rx. counselled. psych and palliative cx called  6) HIV/ AIDS- Multidrug resistant. treatment per ID.  7) thrombocytopenia, mild elevation of LFTs, Metabolic abnormalities- related to ESRD and AIDs    DVT: Heparin  encouraged OOB to chair with assistance    Pt states social living issues for noncompliance and wants to go to [place] to live with his sister (see Palliative detailed notes). D/W CM- unable to set up out of state care. Plan is to d/c home here in [place] and then he can move to [place] himself and find a hospital and doctors there.     iSTOP- last used fentanyl patch in 7/18. and since has been on percocet, ambien, xanax. Will resume here.     Will dialyse inhouse tomorrow and then d/c home

## 2018-10-23 NOTE — PROGRESS NOTE ADULT - SUBJECTIVE AND OBJECTIVE BOX
[facility]  INFECTIOUS DISEASES AND INTERNAL MEDICINE [facility] [place]  =======================================================  Howie Navarro MD  Diplomates American Board of Internal Medicine and Infectious Diseases  =======================================================    TEDDY CRAWFORD [id number]  chief complaint: follow up on bilateral PNA and HIV/AIDS  Most likely aspiration due to AMS and intubation. No complaint today. Afebrile.     Allergies:  vancomycin (Anaphylaxis)    Antibiotics:  atovaquone Suspension 750 milliGRAM(s) Enteral Tube two times a day  piperacillin/tazobactam IVPB. 2.25 Gram(s) IV Intermittent every 12 hours     REVIEW OF SYSTEMS:  as above  all other ROS are negative    Physical Exam:  Vital Signs Last 24 Hrs  T(C): 36.4 (23 Oct 2018 08:57), Max: 37.4 (23 Oct 2018 06:01)  T(F): 97.5 (23 Oct 2018 08:57), Max: 99.3 (23 Oct 2018 06:01)  HR: 100 (23 Oct 2018 08:57) (98 - 112)  BP: 119/80 (23 Oct 2018 08:57) (119/80 - 154/95)  RR: 18 (23 Oct 2018 08:57) (18 - 20)  SpO2: 100% (22 Oct 2018 23:35) (98% - 100%)  GEN: intubated, sedated, more comfortable appearing.  HEENT: normocephalic and atraumatic. blind both eyes  NECK: Supple. No carotid bruits.    LUNGS: diminished breath sounds bilaterally.  HEART: s1 and s2 normal  ABDOMEN: Soft, nontender, and nondistended.  Positive bowel sounds.    EXTREMITIES: Without any  edema.  Right upper extremity with AVF  MSK: no joint swelling  SKIN: No ulceration or induration present.    Labs:  10-22    141  |  96<L>  |  78.0<H>  ----------------------------<  113  4.5   |  23.0  |  14.14<H>    Ca    9.0      22 Oct 2018 20:37    TPro  6.8  /  Alb  3.6  /  TBili  0.4  /  DBili  x   /  AST  51<H>  /  ALT  80<H>  /  AlkPhos  557<H>  10-22                        8.6    3.6   )-----------( 96       ( 22 Oct 2018 20:37 )             26.4     LIVER FUNCTIONS - ( 22 Oct 2018 20:37 )  Alb: 3.6 g/dL / Pro: 6.8 g/dL / ALK PHOS: 557 U/L / ALT: 80 U/L / AST: 51 U/L / GGT: x           RECENT CULTURES:  10-20 @ 22:16 .Sputum Sputum     Numerous Staphylococcus aureus Identification and susceptibility to  follow.  Numerous Routine respiratory hakan present  Culture in progress    Few White blood cells  Few Gram Negative Rods  Few Gram Positive Rods  Few Gram Positive Cocci in Clusters    10-20 @ 13:22 .Blood Blood-Peripheral     No growth at 48 hours    10-20 @ 13:21 .Blood Blood-Venous     No growth at 48 hours    Chest CT:   IMPRESSION:  A RIGHT lower lobe complete airspace lobar consolidation.  Increased soft tissue fullness RIGHT hilum.  RIGHT upper lobe posterior segment segmental multifocal airspace   consolidation.  A LEFT lower lobe multifocal subsegmental airspace consolidation.  Cardiomegaly. No pericardial effusion.

## 2018-10-24 ENCOUNTER — TRANSCRIPTION ENCOUNTER (OUTPATIENT)
Age: 28
End: 2018-10-24

## 2018-10-24 LAB
-  AMPICILLIN/SULBACTAM: SIGNIFICANT CHANGE UP
-  CEFAZOLIN: SIGNIFICANT CHANGE UP
-  CLINDAMYCIN: SIGNIFICANT CHANGE UP
-  ERYTHROMYCIN: SIGNIFICANT CHANGE UP
-  GENTAMICIN: SIGNIFICANT CHANGE UP
-  OXACILLIN: SIGNIFICANT CHANGE UP
-  PENICILLIN: SIGNIFICANT CHANGE UP
-  RIFAMPIN: SIGNIFICANT CHANGE UP
-  TETRACYCLINE: SIGNIFICANT CHANGE UP
-  TRIMETHOPRIM/SULFAMETHOXAZOLE: SIGNIFICANT CHANGE UP
-  VANCOMYCIN: SIGNIFICANT CHANGE UP
ANION GAP SERPL CALC-SCNC: 22 MMOL/L — HIGH (ref 5–17)
BUN SERPL-MCNC: 75 MG/DL — HIGH (ref 8–20)
CALCIUM SERPL-MCNC: 8.8 MG/DL — SIGNIFICANT CHANGE UP (ref 8.6–10.2)
CHLORIDE SERPL-SCNC: 90 MMOL/L — LOW (ref 98–107)
CO2 SERPL-SCNC: 24 MMOL/L — SIGNIFICANT CHANGE UP (ref 22–29)
CREAT SERPL-MCNC: 11.65 MG/DL — HIGH (ref 0.5–1.3)
CULTURE RESULTS: SIGNIFICANT CHANGE UP
GLUCOSE SERPL-MCNC: 100 MG/DL — SIGNIFICANT CHANGE UP (ref 70–115)
HCT VFR BLD CALC: 28.3 % — LOW (ref 42–52)
HGB BLD-MCNC: 9.3 G/DL — LOW (ref 14–18)
MCHC RBC-ENTMCNC: 30.1 PG — SIGNIFICANT CHANGE UP (ref 27–31)
MCHC RBC-ENTMCNC: 32.9 G/DL — SIGNIFICANT CHANGE UP (ref 32–36)
MCV RBC AUTO: 91.6 FL — SIGNIFICANT CHANGE UP (ref 80–94)
METHOD TYPE: SIGNIFICANT CHANGE UP
ORGANISM # SPEC MICROSCOPIC CNT: SIGNIFICANT CHANGE UP
ORGANISM # SPEC MICROSCOPIC CNT: SIGNIFICANT CHANGE UP
PHOSPHATE SERPL-MCNC: 5 MG/DL — HIGH (ref 2.4–4.7)
PLATELET # BLD AUTO: 132 K/UL — LOW (ref 150–400)
POTASSIUM SERPL-MCNC: 4 MMOL/L — SIGNIFICANT CHANGE UP (ref 3.5–5.3)
POTASSIUM SERPL-SCNC: 4 MMOL/L — SIGNIFICANT CHANGE UP (ref 3.5–5.3)
RBC # BLD: 3.09 M/UL — LOW (ref 4.6–6.2)
RBC # FLD: 15.8 % — HIGH (ref 11–15.6)
SODIUM SERPL-SCNC: 136 MMOL/L — SIGNIFICANT CHANGE UP (ref 135–145)
SPECIMEN SOURCE: SIGNIFICANT CHANGE UP
WBC # BLD: 3.6 K/UL — LOW (ref 4.8–10.8)
WBC # FLD AUTO: 3.6 K/UL — LOW (ref 4.8–10.8)

## 2018-10-24 PROCEDURE — 99233 SBSQ HOSP IP/OBS HIGH 50: CPT

## 2018-10-24 PROCEDURE — 99232 SBSQ HOSP IP/OBS MODERATE 35: CPT

## 2018-10-24 RX ORDER — HYDROMORPHONE HYDROCHLORIDE 2 MG/ML
2 INJECTION INTRAMUSCULAR; INTRAVENOUS; SUBCUTANEOUS ONCE
Qty: 0 | Refills: 0 | Status: DISCONTINUED | OUTPATIENT
Start: 2018-10-24 | End: 2018-10-24

## 2018-10-24 RX ORDER — METHOCARBAMOL 500 MG/1
1 TABLET, FILM COATED ORAL
Qty: 60 | Refills: 0 | OUTPATIENT
Start: 2018-10-24 | End: 2018-11-12

## 2018-10-24 RX ORDER — TENOFOVIR DISOPROXIL FUMARATE 300 MG/1
1 TABLET, FILM COATED ORAL
Qty: 1 | Refills: 0 | OUTPATIENT
Start: 2018-10-24 | End: 2018-10-28

## 2018-10-24 RX ORDER — RITONAVIR 100 MG/1
1 TABLET, FILM COATED ORAL
Qty: 60 | Refills: 0 | OUTPATIENT
Start: 2018-10-24 | End: 2018-11-22

## 2018-10-24 RX ORDER — FENTANYL CITRATE 50 UG/ML
1 INJECTION INTRAVENOUS
Qty: 5 | Refills: 0 | OUTPATIENT
Start: 2018-10-24 | End: 2018-10-28

## 2018-10-24 RX ORDER — HYDROMORPHONE HYDROCHLORIDE 2 MG/ML
1 INJECTION INTRAMUSCULAR; INTRAVENOUS; SUBCUTANEOUS
Qty: 10 | Refills: 0 | OUTPATIENT
Start: 2018-10-24 | End: 2018-11-02

## 2018-10-24 RX ORDER — DOLUTEGRAVIR SODIUM 25 MG/1
1 TABLET, FILM COATED ORAL
Qty: 60 | Refills: 0 | OUTPATIENT
Start: 2018-10-24 | End: 2018-11-22

## 2018-10-24 RX ORDER — DIPHENHYDRAMINE HCL 50 MG
25 CAPSULE ORAL ONCE
Qty: 0 | Refills: 0 | Status: COMPLETED | OUTPATIENT
Start: 2018-10-24 | End: 2018-10-24

## 2018-10-24 RX ORDER — LEVETIRACETAM 250 MG/1
1 TABLET, FILM COATED ORAL
Qty: 60 | Refills: 0 | OUTPATIENT
Start: 2018-10-24 | End: 2018-11-22

## 2018-10-24 RX ORDER — DARUNAVIR 75 MG/1
8 TABLET, FILM COATED ORAL
Qty: 480 | Refills: 0 | OUTPATIENT
Start: 2018-10-24 | End: 2018-11-22

## 2018-10-24 RX ORDER — ETRAVIRINE 200 MG/1
1 TABLET ORAL
Qty: 60 | Refills: 0 | OUTPATIENT
Start: 2018-10-24 | End: 2018-11-22

## 2018-10-24 RX ORDER — ATOVAQUONE 750 MG/5ML
5 SUSPENSION ORAL
Qty: 210 | Refills: 0 | OUTPATIENT
Start: 2018-10-24 | End: 2018-11-13

## 2018-10-24 RX ADMIN — ETRAVIRINE 200 MILLIGRAM(S): 200 TABLET ORAL at 23:39

## 2018-10-24 RX ADMIN — HYDROMORPHONE HYDROCHLORIDE 2 MILLIGRAM(S): 2 INJECTION INTRAMUSCULAR; INTRAVENOUS; SUBCUTANEOUS at 19:50

## 2018-10-24 RX ADMIN — HYDROMORPHONE HYDROCHLORIDE 2 MILLIGRAM(S): 2 INJECTION INTRAMUSCULAR; INTRAVENOUS; SUBCUTANEOUS at 05:36

## 2018-10-24 RX ADMIN — METHOCARBAMOL 500 MILLIGRAM(S): 500 TABLET, FILM COATED ORAL at 05:08

## 2018-10-24 RX ADMIN — METHOCARBAMOL 500 MILLIGRAM(S): 500 TABLET, FILM COATED ORAL at 23:41

## 2018-10-24 RX ADMIN — Medication 2 MILLIGRAM(S): at 18:13

## 2018-10-24 RX ADMIN — Medication 650 MILLIGRAM(S): at 05:36

## 2018-10-24 RX ADMIN — LEVETIRACETAM 500 MILLIGRAM(S): 250 TABLET, FILM COATED ORAL at 18:10

## 2018-10-24 RX ADMIN — HEPARIN SODIUM 5000 UNIT(S): 5000 INJECTION INTRAVENOUS; SUBCUTANEOUS at 23:39

## 2018-10-24 RX ADMIN — HYDROMORPHONE HYDROCHLORIDE 2 MILLIGRAM(S): 2 INJECTION INTRAMUSCULAR; INTRAVENOUS; SUBCUTANEOUS at 05:08

## 2018-10-24 RX ADMIN — HYDROMORPHONE HYDROCHLORIDE 2 MILLIGRAM(S): 2 INJECTION INTRAMUSCULAR; INTRAVENOUS; SUBCUTANEOUS at 18:07

## 2018-10-24 RX ADMIN — DARUNAVIR 600 MILLIGRAM(S): 75 TABLET, FILM COATED ORAL at 12:27

## 2018-10-24 RX ADMIN — HEPARIN SODIUM 5000 UNIT(S): 5000 INJECTION INTRAVENOUS; SUBCUTANEOUS at 05:10

## 2018-10-24 RX ADMIN — RITONAVIR 100 MILLIGRAM(S): 100 TABLET, FILM COATED ORAL at 23:40

## 2018-10-24 RX ADMIN — RITONAVIR 100 MILLIGRAM(S): 100 TABLET, FILM COATED ORAL at 05:08

## 2018-10-24 RX ADMIN — Medication 650 MILLIGRAM(S): at 00:04

## 2018-10-24 RX ADMIN — PIPERACILLIN AND TAZOBACTAM 200 GRAM(S): 4; .5 INJECTION, POWDER, LYOPHILIZED, FOR SOLUTION INTRAVENOUS at 05:07

## 2018-10-24 RX ADMIN — Medication 25 MILLIGRAM(S): at 19:05

## 2018-10-24 RX ADMIN — HYDROMORPHONE HYDROCHLORIDE 2 MILLIGRAM(S): 2 INJECTION INTRAMUSCULAR; INTRAVENOUS; SUBCUTANEOUS at 12:27

## 2018-10-24 RX ADMIN — LEVETIRACETAM 500 MILLIGRAM(S): 250 TABLET, FILM COATED ORAL at 05:08

## 2018-10-24 RX ADMIN — Medication 650 MILLIGRAM(S): at 05:08

## 2018-10-24 RX ADMIN — METHOCARBAMOL 500 MILLIGRAM(S): 500 TABLET, FILM COATED ORAL at 18:10

## 2018-10-24 RX ADMIN — PIPERACILLIN AND TAZOBACTAM 200 GRAM(S): 4; .5 INJECTION, POWDER, LYOPHILIZED, FOR SOLUTION INTRAVENOUS at 23:38

## 2018-10-24 RX ADMIN — Medication 2 MILLIGRAM(S): at 12:33

## 2018-10-24 RX ADMIN — HYDROMORPHONE HYDROCHLORIDE 2 MILLIGRAM(S): 2 INJECTION INTRAMUSCULAR; INTRAVENOUS; SUBCUTANEOUS at 20:48

## 2018-10-24 RX ADMIN — ETRAVIRINE 200 MILLIGRAM(S): 200 TABLET ORAL at 12:27

## 2018-10-24 RX ADMIN — Medication 2 MILLIGRAM(S): at 02:42

## 2018-10-24 RX ADMIN — ATOVAQUONE 750 MILLIGRAM(S): 750 SUSPENSION ORAL at 23:40

## 2018-10-24 RX ADMIN — Medication 2 MILLIGRAM(S): at 23:49

## 2018-10-24 RX ADMIN — LIDOCAINE 1 PATCH: 4 CREAM TOPICAL at 04:16

## 2018-10-24 RX ADMIN — DARUNAVIR 600 MILLIGRAM(S): 75 TABLET, FILM COATED ORAL at 23:40

## 2018-10-24 RX ADMIN — Medication 650 MILLIGRAM(S): at 00:40

## 2018-10-24 RX ADMIN — DOLUTEGRAVIR SODIUM 50 MILLIGRAM(S): 25 TABLET, FILM COATED ORAL at 05:08

## 2018-10-24 RX ADMIN — ATOVAQUONE 750 MILLIGRAM(S): 750 SUSPENSION ORAL at 05:08

## 2018-10-24 NOTE — PROGRESS NOTE ADULT - ASSESSMENT
27 yo male with hx of ESRD on HD, HIV since birth, seizure disorder, medication/dialysis nonadherence, was found unresponsive at home and brought to the hospital with acute respiratory failure secondary to aspiration pneumonia, severe hyperkalemia (missed last dialysis), seizure, encephalopathy.     1) Aspiration Pneumonia- Antibx per ID. needs about 7 days for possible Aspiration pneumonia. Day 4 today. When ready for discharge can be switched to po Augmentin 500mg daily to complete the course. [place] O2.  2) Acute hypoxic resp failure-resolved. currently maintaining SaO2 on RA. suspect underlying JUMA sec to apneic spells while asleep.  3) Initial Acute Encephalopathy- multifactorial- missed HD vs Sz vs arrythmia. Now baseline  4) Seizure disorder- stable. on Keppra. Cont same. seizure and fall precautions  5) ESRD on HD- to follow HD schedule per Renal. Palliative consulted, as patient now talking about stopping HD and non-complaint with other rx. counselled. psych and palliative cx called  6) HIV/ AIDS- Multidrug resistant. treatment per ID.  7) thrombocytopenia, mild elevation of LFTs, Metabolic abnormalities- related to ESRD and AIDs    DVT: Heparin  encouraged OOB to chair with assistance    Pt states social living issues for noncompliance and wants to go to [place] to live with his sister (see Palliative detailed notes). D/W CM- unable to set up out of state care. Plan is to d/c home here in [place] and then he can move to [place] himself and find a hospital and doctors there.     iSTOP- last used fentanyl patch in 7/18. and since has been on percocet, ambien, xanax. Will resume here.     Will dialyse inhouse tomorrow and then d/c home

## 2018-10-24 NOTE — PROGRESS NOTE ADULT - PROBLEM SELECTOR PLAN 2
C/W antiretrovirals - resumed yesterday per ID and atovoquone for PCP prophylaxis. Pending repeat VL

## 2018-10-24 NOTE — CHART NOTE - NSCHARTNOTEFT_GEN_A_CORE
palliative care social work note.   SW is familiar with pt from previous admission. SW met with pt who actively engaged in supportive counseling and addressed feelings he was feeling regarding helplessness and hopelesness. Pt has decided to try to remain optimistic for options for tx and will continue dialysis. pt does acknowledge always minimizing depression but has acknowledged his current need for help and services. SW reviewed disease related supports and pt provide permission to attempt to link pt to options for community outreach. Further pt reports willingness for antidepressant. SW will dicuss with palliatiev NP as well as psychiatry for medication options. Pt reports he would like to still maybe in future attempt to move down to [facility] with sister and states that his SW Lidia López  had previously assisted with coordinating dialysis at [facility] in [facility] when pt went to visit. Sw will follow.

## 2018-10-24 NOTE — DISCHARGE NOTE ADULT - CARE PROVIDER_API CALL
Marcelo Jiménez), Nephrology  [address]  [address]  [place] [address]  Phone: [phone]  Fax: [phone]    [facility],   Phone: (   )    -  Fax: (   )    -    Josh Navarro), Infectious Disease; Internal Medicine  [address] [place] [address]  Phone: [phone]  Fax: [phone]

## 2018-10-24 NOTE — PROGRESS NOTE ADULT - PROBLEM SELECTOR PLAN 6
Pt's goal is to continue HAART therapy and dialysis; hopes to get his HIV under control and be eligible for renal transplant. When I asked if he still wants to go to [place] to live with sister - states unless they will consider transplant, does not feel it will change his overall treatment course. Also expressed today, "I will never leave my mother." I updated and discussed with our palliative SW Nicolette who will also be seeing him today.

## 2018-10-24 NOTE — PROGRESS NOTE ADULT - ASSESSMENT
Mr. Savage is a 28 year old male with HIV/AIDS (since birth), ESRD on HD, h/o CMV retinitis - legally blind, seizure disorder and poor compliance to dialysis and HAART therapy admitted for encephalopathy and acute respiratory failure secondary to aspiration pneumonia, severe hyperkalemia. ID, nephrology on board. We are following to assist with goals of care.

## 2018-10-24 NOTE — PROGRESS NOTE ADULT - PROBLEM SELECTOR PLAN 5
Pt reported being on Fentanyl patch in past, per ISTOP last filled in 7/2018. Pt reports having left over at home and using them. ISTOP also showed Oxycontin 10 mg BID and Oxycodone/APAP  mg PRN (last refilled end of 9/2018). Presently on hydromorphone 2 mg q4H PRN (using about 6mg in last 24 hours = ~35 oral morphine equivalent daily dose). Given his history of noncompliance, would be cautious in titrating opioids. He does not endorse any acute pain, appears comfortable, will continue current regimen as ordered. Hold opioids for somnolence.

## 2018-10-24 NOTE — DISCHARGE NOTE ADULT - PROVIDER TOKENS
TOKEN:'[id number]',FREE:[LAST:[[facility]],PHONE:[(   )    -],FAX:[(   )    -]],TOKEN:'[id number]'

## 2018-10-24 NOTE — PROGRESS NOTE ADULT - SUBJECTIVE AND OBJECTIVE BOX
NEPHROLOGY INTERVAL HPI/OVERNIGHT EVENTS:  HPI:  Patient is a 29 yo male pmhx seizures, ESRD on HD (MWF) missed HD session yesterday, HIV (born with), HTN, pericarditis EF 55%, multiple admission for noncompliance with medications and dialysis biba after being found obtunded in bed at home.  Family not at bedside, upon exam, information from ED staff.  Patient found obtunded in bed by family member prompting 911 call.  Upon arrival to ED patient obtunded, actively vomiting and aspirating.  Patient intubated for airway protection.  S/p intubation patient experienced run of vtach, given calcium chloride and bicarb and returned to NSR.  Labs significant for serum K 9.3, , Cr, 17.27.  Patient given D50 and Insulin 10U IVP x1 and keppra loaded.  Patient sent for head and chest CT, awaiting official read.  Nephrology called, emergent HD per right AV fistula upon arrival to MICU. (20 Oct 2018 11:33)    Follow up ESRD on HD  No new complaints    PAST MEDICAL & SURGICAL HISTORY:  Seizure disorder  Hypertension  ESRD (end stage renal disease)  Seizure  Pericarditis  Anxiety  Depression  Renal failure (ARF), acute on chronic: dialysis av fistula, RUE  HTN (hypertension)  Cardiomyopathy  HIV disease: born HIV+  AV fistula  S/P tonsillectomy      MEDICATIONS  (STANDING):  atovaquone Suspension 750 milliGRAM(s) Oral two times a day  darunavir 600 milliGRAM(s) Oral two times a day  diphenhydrAMINE   Injectable 25 milliGRAM(s) IV Push once  dolutegravir 50 milliGRAM(s) Oral two times a day  etravirine 200 milliGRAM(s) Oral two times a day after meals  heparin  Injectable 5000 Unit(s) SubCutaneous every 12 hours  levETIRAcetam 500 milliGRAM(s) Oral two times a day  methocarbamol 500 milliGRAM(s) Oral three times a day  piperacillin/tazobactam IVPB. 2.25 Gram(s) IV Intermittent every 12 hours  piperacillin/tazobactam IVPB.      ritonavir Tablet 100 milliGRAM(s) Oral two times a day  tenofovir disoproxil fumarate (VIREAD) 300 milliGRAM(s) Oral <User Schedule>    MEDICATIONS  (PRN):  ALPRAZolam 2 milliGRAM(s) Oral three times a day PRN anxiety  gabapentin 100 milliGRAM(s) Oral two times a day PRN pain  HYDROmorphone   Tablet 2 milliGRAM(s) Oral every 4 hours PRN Severe Pain (7 - 10)      Allergies    vancomycin (Anaphylaxis)    Intolerances    prefers vanilla or butter pecan nepro x 2 TID RDOK (Unknown)      Vital Signs Last 24 Hrs  T(C): 36.5 (24 Oct 2018 05:33), Max: 37.6 (23 Oct 2018 16:49)  T(F): 97.7 (24 Oct 2018 05:33), Max: 99.7 (23 Oct 2018 16:49)  HR: 99 (24 Oct 2018 05:33) (98 - 102)  BP: 127/89 (24 Oct 2018 05:33) (120/76 - 127/89)  BP(mean): --  RR: 17 (24 Oct 2018 05:33) (17 - 20)  SpO2: 97% (24 Oct 2018 05:33) (97% - 97%)  Daily     Daily Weight in k (24 Oct 2018 13:15)    PHYSICAL EXAM:  GENERAL: NAD  HEAD:  Atraumatic, Normocephalic  EYES: Conjunctiva and sclera clear  ENMT:  Moist mucous membranes, Good dentition, No lesions  NECK: Supple, No JVD  NERVOUS SYSTEM:  Alert & Oriented X3, Good concentration; Motor Strength 5/5 B/L upper and lower extremities; DTRs 2+ intact and symmetric  CHEST/LUNG: Clear to percussion bilaterally; No rales, rhonchi, wheezing, or rubs  HEART: Regular rate and rhythm; No murmurs, rubs, or gallops  ABDOMEN: Soft, Nontender, Nondistended; Bowel sounds present  EXTREMITIES:  No edema; +hypertrophic AVF to RUE that is pulsatile    LABS:                        8.6    3.6   )-----------( 96       ( 22 Oct 2018 20:37 )             26.4     10-22    141  |  96<L>  |  78.0<H>  ----------------------------<  113  4.5   |  23.0  |  14.14<H>    Ca    9.0      22 Oct 2018 20:37    TPro  6.8  /  Alb  3.6  /  TBili  0.4  /  DBili  x   /  AST  51<H>  /  ALT  80<H>  /  AlkPhos  557<H>  10-22              RADIOLOGY & ADDITIONAL TESTS:

## 2018-10-24 NOTE — PROGRESS NOTE ADULT - PROBLEM SELECTOR PLAN 4
Known history of chronic depression and many psychosocial stressors influencing his clinical treatment decisions. Previously followed a therapist outpatient. Psych following. Likely to benefit from antidepressant if pt receptive. C/W PTA alprazolam 2mg TID PRN

## 2018-10-24 NOTE — PROGRESS NOTE ADULT - SUBJECTIVE AND OBJECTIVE BOX
[facility]  INFECTIOUS DISEASES AND INTERNAL MEDICINE at [place]  =======================================================  Howie Navarro MD  Diplomates American Board of Internal Medicine and Infectious Diseases  =======================================================    TEDDY CRAWFORD [id number]  chief complaint: follow up on bilateral PNA and HIV/AIDS  Most likely aspiration due to AMS and intubation. No complaint today. Afebrile.   No cough or sputum     Allergies:  vancomycin (Anaphylaxis)    Antibiotics:  atovaquone Suspension 750 milliGRAM(s) Enteral Tube two times a day  piperacillin/tazobactam IVPB. 2.25 Gram(s) IV Intermittent every 12 hours     REVIEW OF SYSTEMS:  as above  all other ROS are negative    Physical Exam:  Vital Signs Last 24 Hrs  T(C): 36.5 (24 Oct 2018 05:33), Max: 37.6 (23 Oct 2018 16:49)  T(F): 97.7 (24 Oct 2018 05:33), Max: 99.7 (23 Oct 2018 16:49)  HR: 99 (24 Oct 2018 05:33) (98 - 102)  BP: 127/89 (24 Oct 2018 05:33) (120/76 - 127/89)  RR: 17 (24 Oct 2018 05:33) (17 - 20)  SpO2: 97% (24 Oct 2018 05:33) (97% - 97%)  GEN: intubated, sedated, more comfortable appearing.  HEENT: normocephalic and atraumatic. blind both eyes  NECK: Supple. No carotid bruits.    LUNGS: diminished breath sounds bilaterally.  HEART: s1 and s2 normal  ABDOMEN: Soft, nontender, and nondistended.  Positive bowel sounds.    EXTREMITIES: Without any  edema.  Right upper extremity with AVF  MSK: no joint swelling  SKIN: No ulceration or induration present.    Labs:  10-22    141  |  96<L>  |  78.0<H>  ----------------------------<  113  4.5   |  23.0  |  14.14<H>    Ca    9.0      22 Oct 2018 20:37    TPro  6.8  /  Alb  3.6  /  TBili  0.4  /  DBili  x   /  AST  51<H>  /  ALT  80<H>  /  AlkPhos  557<H>  10-22                        8.6    3.6   )-----------( 96       ( 22 Oct 2018 20:37 )             26.4     LIVER FUNCTIONS - ( 22 Oct 2018 20:37 )  Alb: 3.6 g/dL / Pro: 6.8 g/dL / ALK PHOS: 557 U/L / ALT: 80 U/L / AST: 51 U/L / GGT: x           RECENT CULTURES:  10-20 @ 22:16 .Sputum Sputum Staphylococcus aureus    Numerous Staphylococcus aureus  Numerous Routine respiratory hakan present    Few White blood cells  Few Gram Negative Rods  Few Gram Positive Rods  Few Gram Positive Cocci in Clusters    10-20 @ 13:22 .Blood Blood-Peripheral     No growth at 48 hours    10-20 @ 13:21 .Blood Blood-Venous     No growth at 48 hours      Chest CT:   IMPRESSION:  A RIGHT lower lobe complete airspace lobar consolidation.  Increased soft tissue fullness RIGHT hilum.  RIGHT upper lobe posterior segment segmental multifocal airspace   consolidation.  A LEFT lower lobe multifocal subsegmental airspace consolidation.  Cardiomegaly. No pericardial effusion.

## 2018-10-24 NOTE — PROGRESS NOTE ADULT - ASSESSMENT
ESRD  Hyperkalemia - resolved  AMS - resolved  Acute respiratory failure - resolved  Anemia  HTN  Chronic Pain    - HD MWF; proceed with dialysis today 10/24/18 as ordered  - Social work and psych evaluation maybe helpful; Palliative evaluation would benefit since the patient is debating stopping HD altogether.   - Renal diet  - Dilaudid/Benadryl with dialysis, otherwise, he would refuse HD    Renally stable for DC    D/W HD RN    Thank you

## 2018-10-24 NOTE — DISCHARGE NOTE ADULT - SECONDARY DIAGNOSIS.
Aspiration pneumonia of both lungs, unspecified aspiration pneumonia type, unspecified part of lung Chronic pain syndrome Cardiomyopathy AIDS Anxiety and depression ESRD on dialysis

## 2018-10-24 NOTE — DISCHARGE NOTE ADULT - PATIENT PORTAL LINK FT
You can access the [facility] Patient Portal, offered by [facility], by registering with the following website: [email or web address]

## 2018-10-24 NOTE — PROGRESS NOTE ADULT - SUBJECTIVE AND OBJECTIVE BOX
HEALTH ISSUES - PROBLEM Dx:  asp pna, hiv/ aids      INTERVAL HPI/OVERNIGHT EVENTS:  gamaliel  askign for his maintainence meds of fentanyl ambien, benadryl dilaudid before hd etc  wants to talk to ID about his hiv status    REVIEW OF SYSTEMS:    CONSTITUTIONAL: No fever,   EYES: No discharge  ENMT:  No sinus or throat pain  NECK: No pain or stiffness  RESPIRATORY: No cough, wheezing, chills or hemoptysis; No shortness of breath  CARDIOVASCULAR: No chest pain, palpitations, dizziness, or leg swelling  GASTROINTESTINAL: No abdominal or epigastric pain. No nausea, vomiting, or hematemesis; No diarrhea or constipation. No melena or hematochezia.  GENITOURINARY: No dysuria, frequency, hematuria, or incontinence  NEUROLOGICAL: No headaches, memory loss, loss of strength, numbness, or tremors  SKIN: itching +  MUSCULOSKELETAL: No joint pain or swelling;   PSYCHIATRIC: No  anxiety,    Vital Signs Last 24 Hrs  T(C): 36.9 (25 Oct 2018 11:30), Max: 37.2 (24 Oct 2018 22:12)  T(F): 98.5 (25 Oct 2018 11:30), Max: 99 (24 Oct 2018 22:12)  HR: 105 (25 Oct 2018 11:30) (101 - 112)  BP: 138/98 (25 Oct 2018 11:30) (118/82 - 138/98)  BP(mean): --  RR: 18 (25 Oct 2018 11:30) (18 - 20)  SpO2: 99% (25 Oct 2018 05:33) (99% - 100%)    PHYSICAL EXAM-  GEN:on NC O2, comfortable appearing.  HEENT: normocephalic and atraumatic. EOMI. NATALIE.    NECK: Supple. No carotid bruits.    LUNGS: diminished breath sounds bilaterally.  HEART: tachycardia  ABDOMEN: Soft, nontender, and nondistended. Positive bowel sounds.    EXTREMITIES: Without any edema.  RIGHT upper extremity with AVF +  MSK: no joint swelling  NEUROLOGIC: sedated  PSYCHIATRIC: sedated  SKIN: No ulceration or induration present.    LABS:                        9.3    3.6   )-----------( 132      ( 24 Oct 2018 19:23 )             28.3     10-24    136  |  90<L>  |  75.0<H>  ----------------------------<  100  4.0   |  24.0  |  11.65<H>    Ca    8.8      24 Oct 2018 19:23  Phos  5.0     10-24    Assessment and Plan

## 2018-10-24 NOTE — DISCHARGE NOTE ADULT - CARE PLAN
Principal Discharge DX:	Acute encephalopathy  Goal:	resolved  Assessment and plan of treatment:	Follow with PMD in < 1 week. Follow with your HD schedule  Secondary Diagnosis:	Aspiration pneumonia of both lungs, unspecified aspiration pneumonia type, unspecified part of lung  Secondary Diagnosis:	Chronic pain syndrome  Secondary Diagnosis:	Cardiomyopathy  Secondary Diagnosis:	AIDS  Secondary Diagnosis:	Anxiety and depression  Secondary Diagnosis:	ESRD on dialysis

## 2018-10-24 NOTE — PROGRESS NOTE ADULT - SUBJECTIVE AND OBJECTIVE BOX
BRIEF HOSPITAL COURSE:    HPI:  Mr. Savage is a "29 yo male pmhx seizures, ESRD on HD (MWF) missed HD session yesterday, HIV (born with), HTN, pericarditis EF 55%, multiple admission for noncompliance with medications and dialysis biba after being found obtunded in bed at home.  Family not at bedside, upon exam, information from ED staff.  Patient found obtunded in bed by family member prompting 911 call.  Upon arrival to ED patient obtunded, actively vomiting and aspirating.  Patient intubated for airway protection.  S/p intubation patient experienced run of vtach, given calcium chloride and bicarb and returned to NSR.  Labs significant for serum K 9.3, , Cr, 17.27.  Patient given D50 and Insulin 10U IVP x1 and keppra loaded.  Patient sent for head and chest CT, awaiting official read.  Nephrology called, emergent HD per right AV fistula upon arrival to MICU. (copied and pasted from H&P 20 Oct 2018 11:33)"    INTERVAL HISTORY:  Seen and examined at bedside. He appears more calm and less restless today. Hospitalist present in room at time of my visit. Pt shared that he had been taking HAART and was resumed yesterday per ID. He had many questions about future eligibility for renal transplant, surgery for AVF aneurysm repair. At present time given his high VL, would not be a candidate. Hospitalist to have ID follow-up to provide additional clarification/input. Pt shared that he had a change of heart overnight and wants to do everything possible to fight his illness including taking antiviral meds and going to dialysis hopes that transplant would be an option later down the road.     Present Symptoms:     Dyspnea: no  Nausea/Vomiting: No  Anxiety:  Yes   Depression: Yes, no suicidal or homicidal ideation.   Fatigue: No   Loss of appetite: No  Pain: none    MEDICATIONS  (STANDING):  acetaminophen   Tablet .. 650 milliGRAM(s) Oral every 6 hours  atovaquone Suspension 750 milliGRAM(s) Oral two times a day  darunavir 600 milliGRAM(s) Oral two times a day  dolutegravir 50 milliGRAM(s) Oral two times a day  etravirine 200 milliGRAM(s) Oral two times a day after meals  heparin  Injectable 5000 Unit(s) SubCutaneous every 12 hours  levETIRAcetam 500 milliGRAM(s) Oral two times a day  lidocaine   Patch 1 Patch Transdermal daily  methocarbamol 500 milliGRAM(s) Oral three times a day  piperacillin/tazobactam IVPB. 2.25 Gram(s) IV Intermittent every 12 hours  piperacillin/tazobactam IVPB.      ritonavir Tablet 100 milliGRAM(s) Oral two times a day  tenofovir disoproxil fumarate (VIREAD) 300 milliGRAM(s) Oral <User Schedule>    MEDICATIONS  (PRN):  ALPRAZolam 2 milliGRAM(s) Oral three times a day PRN anxiety  gabapentin 100 milliGRAM(s) Oral two times a day PRN pain  HYDROmorphone   Tablet 2 milliGRAM(s) Oral every 4 hours PRN Severe Pain (7 - 10)      PHYSICAL EXAM:    Vital Signs Last 24 Hrs  T(C): 36.4 (23 Oct 2018 08:57), Max: 37.4 (23 Oct 2018 06:01)  T(F): 97.5 (23 Oct 2018 08:57), Max: 99.3 (23 Oct 2018 06:01)  HR: 100 (23 Oct 2018 08:57) (98 - 112)  BP: 119/80 (23 Oct 2018 08:57) (119/80 - 154/95)  BP(mean): --  RR: 18 (23 Oct 2018 08:57) (18 - 20)  SpO2: 100% (22 Oct 2018 23:35) (98% - 100%)    General: resting comfortably. no acute distress.   HEENT: moist mucous membrane. Blindness in bilateral eyes  Lungs: CTAB. Good air entry. Non-labored  CV: +s1/s2. RRR. No murmurs, rubs, gallops  GI: +bowel sounds. Abdomen soft, Non-tender. non-distended.   MSK: Moves all 4 extremities. + Rt AVF  Skin: warm and dry  Psych: calm    LABS:                        8.6    3.6   )-----------( 96       ( 22 Oct 2018 20:37 )             26.4     10-22    141  |  96<L>  |  78.0<H>  ----------------------------<  113  4.5   |  23.0  |  14.14<H>    Ca    9.0      22 Oct 2018 20:37    TPro  6.8  /  Alb  3.6  /  TBili  0.4  /  DBili  x   /  AST  51<H>  /  ALT  80<H>  /  AlkPhos  557<H>  10-22      I&O's Summary    23 Oct 2018 07:01  -  24 Oct 2018 07:00  --------------------------------------------------------  IN: 120 mL / OUT: 0 mL / NET: 120 mL      RADIOLOGY & ADDITIONAL STUDIES:  Reviewed, no new recent studies

## 2018-10-24 NOTE — DISCHARGE NOTE ADULT - MEDICATION SUMMARY - MEDICATIONS TO TAKE
I will START or STAY ON the medications listed below when I get home from the hospital:    Dilaudid 1 mg/mL oral liquid  -- 1 milliliter(s) by mouth 3 times a week x 10 days, As Needed take 30 mins before dialysis on HD days MDD:1  -- Caution federal law prohibits the transfer of this drug to any person other  than the person for whom it was prescribed.  May cause drowsiness.  Alcohol may intensify this effect.  Use care when operating dangerous machinery.  This prescription cannot be refilled.  Using more of this medication than prescribed may cause serious breathing problems.    -- Indication: For Dialysis related anxiety    acetaminophen 325 mg oral tablet  -- 2 tab(s) by mouth every 6 hours, As needed, Mild Pain (1 - 3)  -- Indication: For Mild pain    aspirin 81 mg oral delayed release tablet  -- 1 tab(s) by mouth once a day  -- Indication: For Cardio prophylaxis    fentaNYL 50 mcg/hr transdermal film, extended release  -- Apply on skin to affected area every 72 hours MDD:1  -- Caution federal law prohibits the transfer of this drug to any person other  than the person for whom it was prescribed.  Do not use unless you have been treated with another narcotic pain medicine and you are tolerant to it.  For external use only.  It is very important that you take or use this exactly as directed.  Do not skip doses or discontinue unless directed by your doctor.  May cause drowsiness.  Alcohol may intensify this effect.  Use care when operating dangerous machinery.  Remove old patch prior to applying a new patch.  This prescription cannot be refilled.  Using more of this medication than prescribed may cause serious breathing problems.    -- Indication: For Chronic pain syndrome    gabapentin 100 mg oral capsule  -- 1 cap(s) by mouth 2 times a day   -- It is very important that you take or use this exactly as directed.  Do not skip doses or discontinue unless directed by your doctor.  May cause drowsiness.  Alcohol may intensify this effect.  Use care when operating dangerous machinery.    -- Indication: For Chronic pain syndrome    levETIRAcetam 500 mg oral tablet  -- 1 tab(s) by mouth 2 times a day  -- Indication: For Seizure disorder    darunavir 75 mg oral tablet  -- 8 tab(s) by mouth 2 times a day  -- Indication: For HIV disease    dolutegravir 50 mg oral tablet  -- 1 tab(s) by mouth 2 times a day  -- Indication: For HIV disease    etravirine 200 mg oral tablet  -- 1 tab(s) by mouth 2 times a day (after meals)  -- Indication: For HIV disease    ritonavir 100 mg oral tablet  -- 1 tab(s) by mouth 2 times a day  -- Indication: For HIV disease    tenofovir disoproxil fumarate 300 mg oral tablet  -- 1 tab(s) by mouth every 7 days on Thursday  -- Indication: For HIV disease    ALPRAZolam 2 mg oral tablet  -- 1 tab(s) by mouth once a day, As needed, anxiety  -- Indication: For Anxiety    atovaquone 750 mg/5 mL oral suspension  -- 5 milliliter(s) by mouth 2 times a day  -- Indication: For HIV disease    methocarbamol 500 mg oral tablet  -- 1 tab(s) by mouth 3 times a day  -- Indication: For Muscle pain    Augmentin 875 mg-125 mg oral tablet  -- 875 milligram(s) by mouth every 12 hours   -- Finish all this medication unless otherwise directed by prescriber.  Take with food or milk.    -- Indication: For Pneumonia, bacterial I will START or STAY ON the medications listed below when I get home from the hospital:    Dilaudid 1 mg/mL oral liquid  -- 1 milliliter(s) by mouth 3 times a week x 10 days, As Needed take 30 mins before dialysis on HD days MDD:1  -- Caution federal law prohibits the transfer of this drug to any person other  than the person for whom it was prescribed.  May cause drowsiness.  Alcohol may intensify this effect.  Use care when operating dangerous machinery.  This prescription cannot be refilled.  Using more of this medication than prescribed may cause serious breathing problems.    -- Indication: For Dialysis related anxiety    acetaminophen 325 mg oral tablet  -- 2 tab(s) by mouth every 6 hours, As needed, Mild Pain (1 - 3)  -- Indication: For Mild pain    aspirin 81 mg oral delayed release tablet  -- 1 tab(s) by mouth once a day  -- Indication: For Cardio prophylaxis    fentaNYL 50 mcg/hr transdermal film, extended release  -- Apply on skin to affected area every 72 hours MDD:1  -- Caution federal law prohibits the transfer of this drug to any person other  than the person for whom it was prescribed.  Do not use unless you have been treated with another narcotic pain medicine and you are tolerant to it.  For external use only.  It is very important that you take or use this exactly as directed.  Do not skip doses or discontinue unless directed by your doctor.  May cause drowsiness.  Alcohol may intensify this effect.  Use care when operating dangerous machinery.  Remove old patch prior to applying a new patch.  This prescription cannot be refilled.  Using more of this medication than prescribed may cause serious breathing problems.    -- Indication: For Chronic pain syndrome    gabapentin 100 mg oral capsule  -- 1 cap(s) by mouth 2 times a day   -- It is very important that you take or use this exactly as directed.  Do not skip doses or discontinue unless directed by your doctor.  May cause drowsiness.  Alcohol may intensify this effect.  Use care when operating dangerous machinery.    -- Indication: For Chronic pain syndrome    levETIRAcetam 500 mg oral tablet  -- 1 tab(s) by mouth 2 times a day  -- Indication: For Seizure disorder    diphenhydrAMINE 25 mg oral capsule  -- 1 cap(s) by mouth 1 to 2 times a day, As Needed -Rash and/or Itching   -- Indication: For itching severe    darunavir 75 mg oral tablet  -- 8 tab(s) by mouth 2 times a day  -- Indication: For HIV disease    dolutegravir 50 mg oral tablet  -- 1 tab(s) by mouth 2 times a day  -- Indication: For HIV disease    etravirine 200 mg oral tablet  -- 1 tab(s) by mouth 2 times a day (after meals)  -- Indication: For HIV disease    ritonavir 100 mg oral tablet  -- 1 tab(s) by mouth 2 times a day  -- Indication: For HIV disease    tenofovir disoproxil fumarate 300 mg oral tablet  -- 1 tab(s) by mouth every 7 days on Thursday  -- Indication: For HIV disease    zolpidem 5 mg oral tablet  -- 1 tab(s) by mouth once a day (at bedtime), As needed, Insomnia  -- Indication: For insomnia    ALPRAZolam 2 mg oral tablet  -- 1 tab(s) by mouth once a day, As needed, anxiety  -- Indication: For Anxiety    atovaquone 750 mg/5 mL oral suspension  -- 5 milliliter(s) by mouth 2 times a day  -- Indication: For HIV disease    methocarbamol 500 mg oral tablet  -- 1 tab(s) by mouth 3 times a day  -- Indication: For Muscle pain    Augmentin 875 mg-125 mg oral tablet  -- 875 milligram(s) by mouth every 12 hours   -- Finish all this medication unless otherwise directed by prescriber.  Take with food or milk.    -- Indication: For Pneumonia, bacterial

## 2018-10-24 NOTE — DISCHARGE NOTE ADULT - HOSPITAL COURSE
admitted with encephalopathy. ntoed to be vomiting witha spiration and hypoxic. intubated. treated fora sp pna. HD resumed. now all issues resolved. hiv meds resumed.   Medically stable and agreeable with discharge and follow up plan. Patient was advised to return to ED if any symptoms occur or worsen.    Vital Signs Last 24 Hrs  T(C): 36.5 (10-24-18 @ 05:33), Max: 37.6 (10-23-18 @ 16:49)  T(F): 97.7 (10-24-18 @ 05:33), Max: 99.7 (10-23-18 @ 16:49)  HR: 99 (10-24-18 @ 05:33) (98 - 102)  BP: 127/89 (10-24-18 @ 05:33) (120/76 - 127/89)  BP(mean): --  RR: 17 (10-24-18 @ 05:33) (17 - 20)  SpO2: 97% (10-24-18 @ 05:33) (97% - 97%)  GENERAL:  well-groomed, well-developed  HEAD:  Atraumatic, Normocephalic  EYES: EOMI,  conjunctiva and sclera clear  NECK: Supple, No JVD, Normal thyroid  NERVOUS SYSTEM:  Alert & Oriented X3, Motor Strength 5/5 B/L upper and lower extremities; very poor vision  CHEST/LUNG: Clear to auscultate bilaterally; No rales, rhonchi, wheezing, or rubs  HEART: Regular rate and rhythm; No murmurs, rubs, or gallops  ABDOMEN: Soft, Nontender, Nondistended; Bowel sounds present  EXTREMITIES:  2+ Peripheral Pulses, No clubbing, cyanosis, or edema  SKIN: No rashes or lesions    TIME 40 mins admitted with encephalopathy. ntoed to be vomiting witha spiration and hypoxic. intubated. treated fora sp pna. HD resumed. now all issues resolved. hiv meds resumed.   Medically stable and agreeable with discharge and follow up plan. Patient was advised to return to ED if any symptoms occur or worsen.    ICU Vital Signs Last 24 Hrs  T(C): 36.9 (25 Oct 2018 11:30), Max: 37.2 (24 Oct 2018 22:12)  T(F): 98.5 (25 Oct 2018 11:30), Max: 99 (24 Oct 2018 22:12)  HR: 105 (25 Oct 2018 11:30) (101 - 112)  BP: 138/98 (25 Oct 2018 11:30) (118/82 - 138/98)  BP(mean): --  ABP: --  ABP(mean): --  RR: 18 (25 Oct 2018 11:30) (18 - 20)  SpO2: 99% (25 Oct 2018 05:33) (99% - 100%)    GENERAL:  well-groomed, well-developed  HEAD:  Atraumatic, Normocephalic  EYES: EOMI,  conjunctiva and sclera clear  NECK: Supple, No JVD, Normal thyroid  NERVOUS SYSTEM:  Alert & Oriented X3, Motor Strength 5/5 B/L upper and lower extremities; very poor vision  CHEST/LUNG: Clear to auscultate bilaterally; No rales, rhonchi, wheezing, or rubs  HEART: Regular rate and rhythm; No murmurs, rubs, or gallops  ABDOMEN: Soft, Nontender, Nondistended; Bowel sounds present  EXTREMITIES:  2+ Peripheral Pulses, No clubbing, cyanosis, or edema  SKIN: No rashes or lesions    His AVF needs to be repaired by Vascular. But until his viral load is undetectable, he will not be medically cleared for the same. He also needs to be compliant with his HIV meds for minimum 3months before assessing if they are effective and he is responding to treatment or not.    TIME 40 mins

## 2018-10-25 VITALS
DIASTOLIC BLOOD PRESSURE: 83 MMHG | HEART RATE: 90 BPM | RESPIRATION RATE: 20 BRPM | SYSTOLIC BLOOD PRESSURE: 126 MMHG | TEMPERATURE: 98 F

## 2018-10-25 DIAGNOSIS — I77.0 ARTERIOVENOUS FISTULA, ACQUIRED: ICD-10-CM

## 2018-10-25 LAB
CULTURE RESULTS: SIGNIFICANT CHANGE UP
CULTURE RESULTS: SIGNIFICANT CHANGE UP
DRUG SCREEN, SERUM: ABNORMAL
SPECIMEN SOURCE: SIGNIFICANT CHANGE UP
SPECIMEN SOURCE: SIGNIFICANT CHANGE UP

## 2018-10-25 PROCEDURE — 99261: CPT

## 2018-10-25 PROCEDURE — 96374 THER/PROPH/DIAG INJ IV PUSH: CPT | Mod: XU

## 2018-10-25 PROCEDURE — 94003 VENT MGMT INPAT SUBQ DAY: CPT

## 2018-10-25 PROCEDURE — 43753 TX GASTRO INTUB W/ASP: CPT

## 2018-10-25 PROCEDURE — 94770: CPT

## 2018-10-25 PROCEDURE — 87070 CULTURE OTHR SPECIMN AEROBIC: CPT

## 2018-10-25 PROCEDURE — 87186 SC STD MICRODIL/AGAR DIL: CPT

## 2018-10-25 PROCEDURE — 87340 HEPATITIS B SURFACE AG IA: CPT

## 2018-10-25 PROCEDURE — 99233 SBSQ HOSP IP/OBS HIGH 50: CPT

## 2018-10-25 PROCEDURE — 99239 HOSP IP/OBS DSCHRG MGMT >30: CPT

## 2018-10-25 PROCEDURE — 71045 X-RAY EXAM CHEST 1 VIEW: CPT

## 2018-10-25 PROCEDURE — 80053 COMPREHEN METABOLIC PANEL: CPT

## 2018-10-25 PROCEDURE — 85610 PROTHROMBIN TIME: CPT

## 2018-10-25 PROCEDURE — 85027 COMPLETE CBC AUTOMATED: CPT

## 2018-10-25 PROCEDURE — 70450 CT HEAD/BRAIN W/O DYE: CPT

## 2018-10-25 PROCEDURE — 80307 DRUG TEST PRSMV CHEM ANLYZR: CPT

## 2018-10-25 PROCEDURE — 93005 ELECTROCARDIOGRAM TRACING: CPT

## 2018-10-25 PROCEDURE — 72125 CT NECK SPINE W/O DYE: CPT

## 2018-10-25 PROCEDURE — 87040 BLOOD CULTURE FOR BACTERIA: CPT

## 2018-10-25 PROCEDURE — 82553 CREATINE MB FRACTION: CPT

## 2018-10-25 PROCEDURE — 86706 HEP B SURFACE ANTIBODY: CPT

## 2018-10-25 PROCEDURE — 85730 THROMBOPLASTIN TIME PARTIAL: CPT

## 2018-10-25 PROCEDURE — 71250 CT THORAX DX C-: CPT

## 2018-10-25 PROCEDURE — 87536 HIV-1 QUANT&REVRSE TRNSCRPJ: CPT

## 2018-10-25 PROCEDURE — 82962 GLUCOSE BLOOD TEST: CPT

## 2018-10-25 PROCEDURE — 80048 BASIC METABOLIC PNL TOTAL CA: CPT

## 2018-10-25 PROCEDURE — 86704 HEP B CORE ANTIBODY TOTAL: CPT

## 2018-10-25 PROCEDURE — 84443 ASSAY THYROID STIM HORMONE: CPT

## 2018-10-25 PROCEDURE — 94002 VENT MGMT INPAT INIT DAY: CPT

## 2018-10-25 PROCEDURE — 36415 COLL VENOUS BLD VENIPUNCTURE: CPT

## 2018-10-25 PROCEDURE — 96375 TX/PRO/DX INJ NEW DRUG ADDON: CPT | Mod: XU

## 2018-10-25 PROCEDURE — 84484 ASSAY OF TROPONIN QUANT: CPT

## 2018-10-25 PROCEDURE — 83605 ASSAY OF LACTIC ACID: CPT

## 2018-10-25 PROCEDURE — 85014 HEMATOCRIT: CPT

## 2018-10-25 PROCEDURE — 84295 ASSAY OF SERUM SODIUM: CPT

## 2018-10-25 PROCEDURE — 31500 INSERT EMERGENCY AIRWAY: CPT

## 2018-10-25 PROCEDURE — 82550 ASSAY OF CK (CPK): CPT

## 2018-10-25 PROCEDURE — 82330 ASSAY OF CALCIUM: CPT

## 2018-10-25 PROCEDURE — 82803 BLOOD GASES ANY COMBINATION: CPT

## 2018-10-25 PROCEDURE — 87640 STAPH A DNA AMP PROBE: CPT

## 2018-10-25 PROCEDURE — 86803 HEPATITIS C AB TEST: CPT

## 2018-10-25 PROCEDURE — T1013: CPT

## 2018-10-25 PROCEDURE — 83735 ASSAY OF MAGNESIUM: CPT

## 2018-10-25 PROCEDURE — 84100 ASSAY OF PHOSPHORUS: CPT

## 2018-10-25 PROCEDURE — 84132 ASSAY OF SERUM POTASSIUM: CPT

## 2018-10-25 PROCEDURE — 36000 PLACE NEEDLE IN VEIN: CPT | Mod: LT,XU

## 2018-10-25 PROCEDURE — 99291 CRITICAL CARE FIRST HOUR: CPT | Mod: 25

## 2018-10-25 PROCEDURE — 82947 ASSAY GLUCOSE BLOOD QUANT: CPT

## 2018-10-25 PROCEDURE — 82435 ASSAY OF BLOOD CHLORIDE: CPT

## 2018-10-25 RX ORDER — DIPHENHYDRAMINE HCL 50 MG
1 CAPSULE ORAL
Qty: 30 | Refills: 0 | OUTPATIENT
Start: 2018-10-25 | End: 2018-11-08

## 2018-10-25 RX ORDER — DIPHENHYDRAMINE HCL 50 MG
25 CAPSULE ORAL EVERY 6 HOURS
Qty: 0 | Refills: 0 | Status: DISCONTINUED | OUTPATIENT
Start: 2018-10-25 | End: 2018-10-25

## 2018-10-25 RX ORDER — ZOLPIDEM TARTRATE 10 MG/1
5 TABLET ORAL AT BEDTIME
Qty: 0 | Refills: 0 | Status: DISCONTINUED | OUTPATIENT
Start: 2018-10-25 | End: 2018-10-25

## 2018-10-25 RX ORDER — DIPHENHYDRAMINE HCL 50 MG
25 CAPSULE ORAL ONCE
Qty: 0 | Refills: 0 | Status: COMPLETED | OUTPATIENT
Start: 2018-10-25 | End: 2018-10-25

## 2018-10-25 RX ORDER — ZOLPIDEM TARTRATE 10 MG/1
1 TABLET ORAL
Qty: 0 | Refills: 0 | COMMUNITY
Start: 2018-10-25

## 2018-10-25 RX ORDER — HYDROMORPHONE HYDROCHLORIDE 2 MG/ML
1 INJECTION INTRAMUSCULAR; INTRAVENOUS; SUBCUTANEOUS
Qty: 0 | Refills: 0 | Status: DISCONTINUED | OUTPATIENT
Start: 2018-10-25 | End: 2018-10-25

## 2018-10-25 RX ORDER — FENTANYL CITRATE 50 UG/ML
1 INJECTION INTRAVENOUS
Qty: 0 | Refills: 0 | Status: DISCONTINUED | OUTPATIENT
Start: 2018-10-25 | End: 2018-10-25

## 2018-10-25 RX ADMIN — RITONAVIR 100 MILLIGRAM(S): 100 TABLET, FILM COATED ORAL at 11:42

## 2018-10-25 RX ADMIN — HYDROMORPHONE HYDROCHLORIDE 2 MILLIGRAM(S): 2 INJECTION INTRAMUSCULAR; INTRAVENOUS; SUBCUTANEOUS at 06:12

## 2018-10-25 RX ADMIN — FENTANYL CITRATE 1 PATCH: 50 INJECTION INTRAVENOUS at 11:41

## 2018-10-25 RX ADMIN — PIPERACILLIN AND TAZOBACTAM 200 GRAM(S): 4; .5 INJECTION, POWDER, LYOPHILIZED, FOR SOLUTION INTRAVENOUS at 11:45

## 2018-10-25 RX ADMIN — ETRAVIRINE 200 MILLIGRAM(S): 200 TABLET ORAL at 11:41

## 2018-10-25 RX ADMIN — DOLUTEGRAVIR SODIUM 50 MILLIGRAM(S): 25 TABLET, FILM COATED ORAL at 00:02

## 2018-10-25 RX ADMIN — DARUNAVIR 600 MILLIGRAM(S): 75 TABLET, FILM COATED ORAL at 11:42

## 2018-10-25 RX ADMIN — DOLUTEGRAVIR SODIUM 50 MILLIGRAM(S): 25 TABLET, FILM COATED ORAL at 11:42

## 2018-10-25 RX ADMIN — HYDROMORPHONE HYDROCHLORIDE 2 MILLIGRAM(S): 2 INJECTION INTRAMUSCULAR; INTRAVENOUS; SUBCUTANEOUS at 05:42

## 2018-10-25 RX ADMIN — LEVETIRACETAM 500 MILLIGRAM(S): 250 TABLET, FILM COATED ORAL at 11:42

## 2018-10-25 RX ADMIN — Medication 25 MILLIGRAM(S): at 01:17

## 2018-10-25 NOTE — PROGRESS NOTE ADULT - ASSESSMENT
Mr. Savage is a 28 year old male with HIV/AIDS (since birth), ESRD on HD, h/o CMV retinitis - legally blind, seizure disorder and poor compliance to dialysis and HAART therapy admitted for encephalopathy and acute respiratory failure secondary to aspiration pneumonia, severe hyperkalemia. ID, nephrology, vascular on board. We are following to assist with goals of care.

## 2018-10-25 NOTE — PROGRESS NOTE ADULT - PROBLEM SELECTOR PROBLEM 1
Aspiration pneumonia of both lungs, unspecified aspiration pneumonia type, unspecified part of lung
Pneumonia, bacterial

## 2018-10-25 NOTE — PROGRESS NOTE ADULT - PROBLEM SELECTOR PLAN 7
Pt's goal is continue medical management and dialysis; hopeful to be elgible for renal transplant. D/W palliative  Nicolette who reached out to pt's dialysis SW (Lidia) who is well known to patient to assist in eventual transition to NC if pt wishes to move in with sister; home therapy to be set up for psychosocial support.

## 2018-10-25 NOTE — PROGRESS NOTE ADULT - ATTENDING COMMENTS
Will follow up.
Will follow up.
Will sign off please call with any question.
D/W primary team
Discussed above plan with hospitalist

## 2018-10-25 NOTE — PROGRESS NOTE ADULT - SUBJECTIVE AND OBJECTIVE BOX
HPI:  Mr. Savage is a "27 yo male pmhx seizures, ESRD on HD (MWF) missed HD session yesterday, HIV (born with), HTN, pericarditis EF 55%, multiple admission for noncompliance with medications and dialysis biba after being found obtunded in bed at home.  Family not at bedside, upon exam, information from ED staff.  Patient found obtunded in bed by family member prompting 911 call.  Upon arrival to ED patient obtunded, actively vomiting and aspirating.  Patient intubated for airway protection.  S/p intubation patient experienced run of vtach, given calcium chloride and bicarb and returned to NSR.  Labs significant for serum K 9.3, , Cr, 17.27.  Patient given D50 and Insulin 10U IVP x1 and keppra loaded.  Patient sent for head and chest CT, awaiting official read.  Nephrology called, emergent HD per right AV fistula upon arrival to MICU. (copied and pasted from H&P 20 Oct 2018 11:33)"    INTERVAL HISTORY:  Seen and examined at bedside. Hospice RN Adela present for support. Pt expressed feeling anxious regarding his pain regimen; reported back pain - chronic history. His opioids have been managed by his PCP who has been on medical leave which is the reason he had not been getting fentanyl patch filled and using left overs at home. When asked who would be resuming his pain management - he states hoping his ID doctor would take over care. We suggest pain management services which he can follow up the community - he was receptive to this plan.     Present Symptoms:     Dyspnea: no  Nausea/Vomiting: No  Anxiety:  Yes   Depression: Yes   Fatigue: Yes   Loss of appetite: No  Pain: yes, at back, 9/10 pain, constant and makes him anxious/restless    Review of Systems: All others negative    MEDICATIONS  (STANDING):  atovaquone Suspension 750 milliGRAM(s) Oral two times a day  darunavir 600 milliGRAM(s) Oral two times a day  dolutegravir 50 milliGRAM(s) Oral two times a day  etravirine 200 milliGRAM(s) Oral two times a day after meals  fentaNYL   Patch  50 MICROgram(s)/Hr 1 Patch Transdermal every 72 hours  heparin  Injectable 5000 Unit(s) SubCutaneous every 12 hours  HYDROmorphone  Injectable 1 milliGRAM(s) IV Push <User Schedule>  levETIRAcetam 500 milliGRAM(s) Oral two times a day  piperacillin/tazobactam IVPB. 2.25 Gram(s) IV Intermittent every 12 hours  piperacillin/tazobactam IVPB.      ritonavir Tablet 100 milliGRAM(s) Oral two times a day  tenofovir disoproxil fumarate (VIREAD) 300 milliGRAM(s) Oral <User Schedule>    MEDICATIONS  (PRN):  ALPRAZolam 2 milliGRAM(s) Oral three times a day PRN anxiety  diphenhydrAMINE 25 milliGRAM(s) Oral every 6 hours PRN Rash and/or Itching  gabapentin 100 milliGRAM(s) Oral two times a day PRN pain  zolpidem 5 milliGRAM(s) Oral at bedtime PRN Insomnia      PHYSICAL EXAM:    Vital Signs Last 24 Hrs  T(C): 36.9 (25 Oct 2018 11:30), Max: 37.2 (24 Oct 2018 22:12)  T(F): 98.5 (25 Oct 2018 11:30), Max: 99 (24 Oct 2018 22:12)  HR: 105 (25 Oct 2018 11:30) (101 - 112)  BP: 138/98 (25 Oct 2018 11:30) (118/82 - 138/98)  BP(mean): --  RR: 18 (25 Oct 2018 11:30) (18 - 20)  SpO2: 99% (25 Oct 2018 05:33) (99% - 100%)    General: resting comfortably. no acute distress.   HEENT: moist mucous membrane. + legal blindness b/l eyes  Lungs: CTAB. Good air entry. Non-labored  CV: +s1/s2. RRR. No murmurs, rubs, gallops  GI: +bowel sounds. Abdomen soft, Non-tender. non-distended.   MSK: Moves all 4 extremities. + Rt AVF with palpable thrill  Skin: warm and dry  Psych: calm    LABS:                          9.3    3.6   )-----------( 132      ( 24 Oct 2018 19:23 )             28.3     10-24    136  |  90<L>  |  75.0<H>  ----------------------------<  100  4.0   |  24.0  |  11.65<H>    Ca    8.8      24 Oct 2018 19:23  Phos  5.0     10-24          I&O's Summary    24 Oct 2018 07:01  -  25 Oct 2018 07:00  --------------------------------------------------------  IN: 0 mL / OUT: 1800 mL / NET: -1800 mL    25 Oct 2018 07:01  -  25 Oct 2018 14:11  --------------------------------------------------------  IN: 960 mL / OUT: 1 mL / NET: 959 mL        RADIOLOGY & ADDITIONAL STUDIES: no new recent studies breast and formula feeding

## 2018-10-25 NOTE — PHARMACOTHERAPY INTERVENTION NOTE - COMMENTS
Entered augmentin 500mg po to complete 7 day antibiotic course for asp. pneumonia as per Dr. Navarro recommendation, and antibiotic stewardship

## 2018-10-25 NOTE — PROGRESS NOTE ADULT - SUBJECTIVE AND OBJECTIVE BOX
Patient is a 28y old  Male who presents with a chief complaint of AMS, Respiratory Failure (24 Oct 2018 13:57)  Pt well known to vascular surgery. Asked by Dr Jarvis to speak to pt re R AVF/aneurysm repair  On this admission, pt BIBA after being found obtunded in bed at home.  Family not at bedside, upon exam, information from ED staff.  Patient found obtunded in bed by family member prompting 911 call.  Upon arrival to ED patient obtunded, actively vomiting and aspirating.  Patient intubated for airway protection.  S/p intubation patient experienced run of vtach, given calcium chloride and bicarb and returned to NSR.  Labs significant for serum K 9.3, , Cr, 17.27.  Patient given D50 and Insulin 10U IVP x1 and keppra loaded.  Patient sent for head and chest CT, awaiting official read.  Nephrology called, emergent HD per right AV fistula upon arrival to MICU.  Subsequently, he has been extubated and treated for aspiration pneumonia  Currently c/o pain in RUE at AVF sites and states he needs this fixed or he not going to stay compliant.   Pt argumentative and after explaination of procedure and needs from pt in terms of compliance and clearances, he was more willing to discuss    atovaquone Suspension 750  darunavir 600  dolutegravir 50  etravirine 200  piperacillin/tazobactam IVPB. 2.25  piperacillin/tazobactam IVPB.   ritonavir Tablet 100  tenofovir disoproxil fumarate (VIREAD) 300  atovaquone Suspension 750  darunavir 600  dolutegravir 50  etravirine 200  heparin  Injectable 5000  piperacillin/tazobactam IVPB. 2.25  piperacillin/tazobactam IVPB.   ritonavir Tablet 100  tenofovir disoproxil fumarate (VIREAD) 300    Allergies    vancomycin (Anaphylaxis)    Intolerances    prefers vanilla or butter pecan nepro x 2 TID RDOK (Unknown)      Vital Signs Last 24 Hrs  T(C): 36.9 (25 Oct 2018 11:30), Max: 37.2 (24 Oct 2018 22:12)  T(F): 98.5 (25 Oct 2018 11:30), Max: 99 (24 Oct 2018 22:12)  HR: 105 (25 Oct 2018 11:30) (101 - 112)  BP: 138/98 (25 Oct 2018 11:30) (118/82 - 138/98)  BP(mean): --  RR: 18 (25 Oct 2018 11:30) (18 - 20)  SpO2: 99% (25 Oct 2018 05:33) (99% - 100%)  I&O's Detail    24 Oct 2018 07:01  -  25 Oct 2018 07:00  --------------------------------------------------------  IN:  Total IN: 0 mL    OUT:    Other: 1800 mL  Total OUT: 1800 mL    Total NET: -1800 mL      25 Oct 2018 07:01  -  25 Oct 2018 13:09  --------------------------------------------------------  IN:    Oral Fluid: 600 mL  Total IN: 600 mL    OUT:    Voided: 1 mL  Total OUT: 1 mL    Total NET: 599 mL          Physical Exam:  General: NAD, resting comfortably in bed  Extremities: R AVF + bruit, + thrill, dilated and aneurysmal with notable thinning of skin.     LABS:                        9.3    3.6   )-----------( 132      ( 24 Oct 2018 19:23 )             28.3     10-24    136  |  90<L>  |  75.0<H>  ----------------------------<  100  4.0   |  24.0  |  11.65<H>    Ca    8.8      24 Oct 2018 19:23  Phos  5.0     10-24        CAPILLARY BLOOD GLUCOSE        Radiology and Additional Studies:    Assessment and Plan: 28y Male Ventricular tachycardia, ESRD on HD with aneurysmal AVF, HIV  Pt needs to be compliant with medications, HD and follow up  He needs to be off antibiotics and have ID clearance with re to his viral load  He will be contacted for a date to have elective procedure performed  Discussed with Dr Kinsey

## 2018-10-25 NOTE — PROGRESS NOTE ADULT - REASON FOR ADMISSION
AMS, Respiratory Failure

## 2018-10-25 NOTE — PROGRESS NOTE ADULT - PROBLEM SELECTOR PLAN 6
Chronic pain with complex regimen previously managed by PCP who is on medical leave. Restarted on Fentanyl patch 50 mcg/72 hr and hydromorphone IV 1 mg once on MWF to be given 30 mins prior to HD per primary team. TDF patch takes ~48-72hrs to reach steady state, suggest adding hydromorphone PO 2 mg q4H PRN for breakthrough pain. Recommend pain service consult for continuity of care on discharge.

## 2018-10-25 NOTE — PROGRESS NOTE ADULT - PROVIDER SPECIALTY LIST ADULT
Critical Care
Hospitalist
Infectious Disease
Internal Medicine
Nephrology
Palliative Care
Vascular Surgery
Infectious Disease

## 2018-10-25 NOTE — CHART NOTE - NSCHARTNOTEFT_GEN_A_CORE
Palliative care social work note.    RADHA followed up with agencies discussed with pt yesterday to coordinate counseling services for additional support in community. SW contacted [facility] as well as [facility].  Pt already has RADHA Murillo at dialysis center who sees pt weekly and pt has therapist who does phone counseling 1x weekly -Cat. RADHA spoke with Lidia at [facility] who reports intensive involvement weekly with pt. Lidia reports that she will assist with mental health counseling further if pt would like to attend counseling and she can set up pts Medicaid transport. RADHA reviewed with Lidia at dialysis unit  pts acknowledgement of depression and hx of denying it. Pt more open to medication options and tx . Radha also reviewed palliative meeting with family and thoughts regarding living with sister in [facility]. Lidia reports she can coordinate with dialysis center in [facility] and they do take pts insurance, however [facility] does not have CDPAP services like [place] and although pt would be entitled to services family would have to go thru agency since family can not get paid to care for pt. Lidia plans to give information further to sister to coordinate care in [facility].

## 2018-10-25 NOTE — PROGRESS NOTE ADULT - PROBLEM SELECTOR PLAN 4
Dilated and Aneurysmal followed by vascular outpt - Dr. Dahl for possible repair. Vascular note reviewed and appreciated - would need to be compliant with meds, HD and clearance by ID for future surgical intervention

## 2018-10-26 LAB
HIV-1 VIRAL LOAD RESULT: ABNORMAL
HIV1 RNA # SERPL NAA+PROBE: SIGNIFICANT CHANGE UP
HIV1 RNA SER-IMP: SIGNIFICANT CHANGE UP
HIV1 RNA SERPL NAA+PROBE-ACNC: ABNORMAL
HIV1 RNA SERPL NAA+PROBE-LOG#: 5.09 — SIGNIFICANT CHANGE UP

## 2018-10-26 RX ORDER — HYDROMORPHONE HYDROCHLORIDE 2 MG/ML
1 INJECTION INTRAMUSCULAR; INTRAVENOUS; SUBCUTANEOUS
Qty: 4 | Refills: 0 | OUTPATIENT
Start: 2018-10-26

## 2018-10-26 NOTE — CHART NOTE - NSCHARTNOTEFT_GEN_A_CORE
Since Dilaudid 2 mg tablet is hard to split in half and take, I had prescribed Dilaudid 1 mg liquid that he can take sublingual as soon as he reached dialysis center so he can get dialysis pain free. but pharmacy called me to say it needs insurance pre-auth. Since we do not have office people here I cancelled that and instructed patient to talk to his nephrologist and get the required dose at HD. But today CM called me to say patient missed HD time today and he doesn't have Dilaudid too. so called his pharmacy and spoke with pharmacist and escribed 2 mg tabs total 4 tabs to take 30 misn before HD. then I called dr hale his nephrologist and informed him about this dilemma and that I did not want patient taking Dilaudid before reaching the hd center. I requested dr hale to get preauth for Dilaudid liquid 1 mg dosing through his office. he assured he would do the same. s/w CCC and updated her too.

## 2018-11-08 ENCOUNTER — APPOINTMENT (OUTPATIENT)
Dept: VASCULAR SURGERY | Facility: CLINIC | Age: 28
End: 2018-11-08
Payer: MEDICAID

## 2018-11-08 VITALS
BODY MASS INDEX: 19.9 KG/M2 | OXYGEN SATURATION: 99 % | HEIGHT: 70 IN | SYSTOLIC BLOOD PRESSURE: 117 MMHG | WEIGHT: 139 LBS | HEART RATE: 115 BPM | TEMPERATURE: 98.1 F | DIASTOLIC BLOOD PRESSURE: 81 MMHG

## 2018-11-08 PROCEDURE — 99214 OFFICE O/P EST MOD 30 MIN: CPT

## 2018-11-30 ENCOUNTER — APPOINTMENT (OUTPATIENT)
Dept: INTERNAL MEDICINE | Facility: CLINIC | Age: 28
End: 2018-11-30

## 2018-12-03 ENCOUNTER — INPATIENT (INPATIENT)
Facility: HOSPITAL | Age: 28
LOS: 7 days | Discharge: HOME | DRG: 314 | End: 2018-12-11
Attending: HOSPITALIST | Admitting: HOSPITALIST
Payer: COMMERCIAL

## 2018-12-03 VITALS
OXYGEN SATURATION: 100 % | WEIGHT: 167.99 LBS | TEMPERATURE: 97 F | HEIGHT: 70 IN | SYSTOLIC BLOOD PRESSURE: 131 MMHG | DIASTOLIC BLOOD PRESSURE: 83 MMHG | RESPIRATION RATE: 20 BRPM | HEART RATE: 86 BPM

## 2018-12-03 DIAGNOSIS — I77.0 ARTERIOVENOUS FISTULA, ACQUIRED: Chronic | ICD-10-CM

## 2018-12-03 LAB
ALBUMIN SERPL ELPH-MCNC: 3.8 G/DL — SIGNIFICANT CHANGE UP (ref 3.3–5.2)
ALP SERPL-CCNC: 714 U/L — HIGH (ref 40–120)
ALT FLD-CCNC: 14 U/L — SIGNIFICANT CHANGE UP
ANION GAP SERPL CALC-SCNC: 19 MMOL/L — HIGH (ref 5–17)
ANISOCYTOSIS BLD QL: SLIGHT — SIGNIFICANT CHANGE UP
AST SERPL-CCNC: 29 U/L — SIGNIFICANT CHANGE UP
BILIRUB SERPL-MCNC: 0.4 MG/DL — SIGNIFICANT CHANGE UP (ref 0.4–2)
BLD GP AB SCN SERPL QL: SIGNIFICANT CHANGE UP
BUN SERPL-MCNC: 32 MG/DL — HIGH (ref 8–20)
CALCIUM SERPL-MCNC: 9.2 MG/DL — SIGNIFICANT CHANGE UP (ref 8.6–10.2)
CHLORIDE SERPL-SCNC: 93 MMOL/L — LOW (ref 98–107)
CO2 SERPL-SCNC: 28 MMOL/L — SIGNIFICANT CHANGE UP (ref 22–29)
CREAT SERPL-MCNC: 9.17 MG/DL — HIGH (ref 0.5–1.3)
DACRYOCYTES BLD QL SMEAR: SLIGHT — SIGNIFICANT CHANGE UP
ELLIPTOCYTES BLD QL SMEAR: SLIGHT — SIGNIFICANT CHANGE UP
GLUCOSE SERPL-MCNC: 91 MG/DL — SIGNIFICANT CHANGE UP (ref 70–115)
HCT VFR BLD CALC: 30.9 % — LOW (ref 42–52)
HGB BLD-MCNC: 9.7 G/DL — LOW (ref 14–18)
HYPOCHROMIA BLD QL: SLIGHT — SIGNIFICANT CHANGE UP
INR BLD: 1.03 RATIO — SIGNIFICANT CHANGE UP (ref 0.88–1.16)
LYMPHOCYTES # BLD AUTO: 33 % — SIGNIFICANT CHANGE UP (ref 20–55)
MACROCYTES BLD QL: SLIGHT — SIGNIFICANT CHANGE UP
MCHC RBC-ENTMCNC: 29.7 PG — SIGNIFICANT CHANGE UP (ref 27–31)
MCHC RBC-ENTMCNC: 31.4 G/DL — LOW (ref 32–36)
MCV RBC AUTO: 94.5 FL — HIGH (ref 80–94)
MICROCYTES BLD QL: SLIGHT — SIGNIFICANT CHANGE UP
MONOCYTES NFR BLD AUTO: 9 % — SIGNIFICANT CHANGE UP (ref 3–10)
NEUTROPHILS NFR BLD AUTO: 53 % — SIGNIFICANT CHANGE UP (ref 37–73)
OVALOCYTES BLD QL SMEAR: SLIGHT — SIGNIFICANT CHANGE UP
PLAT MORPH BLD: NORMAL — SIGNIFICANT CHANGE UP
PLATELET # BLD AUTO: 128 K/UL — LOW (ref 150–400)
POIKILOCYTOSIS BLD QL AUTO: SLIGHT — SIGNIFICANT CHANGE UP
POLYCHROMASIA BLD QL SMEAR: SLIGHT — SIGNIFICANT CHANGE UP
POTASSIUM SERPL-MCNC: 4 MMOL/L — SIGNIFICANT CHANGE UP (ref 3.5–5.3)
POTASSIUM SERPL-SCNC: 4 MMOL/L — SIGNIFICANT CHANGE UP (ref 3.5–5.3)
PROT SERPL-MCNC: 7.2 G/DL — SIGNIFICANT CHANGE UP (ref 6.6–8.7)
PROTHROM AB SERPL-ACNC: 11.8 SEC — SIGNIFICANT CHANGE UP (ref 10–12.9)
RBC # BLD: 3.27 M/UL — LOW (ref 4.6–6.2)
RBC # FLD: 17 % — HIGH (ref 11–15.6)
RBC BLD AUTO: ABNORMAL
SODIUM SERPL-SCNC: 140 MMOL/L — SIGNIFICANT CHANGE UP (ref 135–145)
STOMATOCYTES BLD QL SMEAR: PRESENT — SIGNIFICANT CHANGE UP
TYPE + AB SCN PNL BLD: SIGNIFICANT CHANGE UP
VARIANT LYMPHS # BLD: 5 % — SIGNIFICANT CHANGE UP (ref 0–6)
WBC # BLD: 1.6 K/UL — LOW (ref 4.8–10.8)
WBC # FLD AUTO: 1.6 K/UL — LOW (ref 4.8–10.8)

## 2018-12-03 PROCEDURE — 99285 EMERGENCY DEPT VISIT HI MDM: CPT

## 2018-12-03 PROCEDURE — 93010 ELECTROCARDIOGRAM REPORT: CPT

## 2018-12-03 RX ORDER — ALPRAZOLAM 0.25 MG
1 TABLET ORAL ONCE
Qty: 0 | Refills: 0 | Status: DISCONTINUED | OUTPATIENT
Start: 2018-12-03 | End: 2018-12-03

## 2018-12-03 RX ORDER — SODIUM CHLORIDE 9 MG/ML
3 INJECTION INTRAMUSCULAR; INTRAVENOUS; SUBCUTANEOUS EVERY 8 HOURS
Qty: 0 | Refills: 0 | Status: DISCONTINUED | OUTPATIENT
Start: 2018-12-03 | End: 2018-12-11

## 2018-12-03 RX ADMIN — SODIUM CHLORIDE 3 MILLILITER(S): 9 INJECTION INTRAMUSCULAR; INTRAVENOUS; SUBCUTANEOUS at 22:00

## 2018-12-03 RX ADMIN — Medication 1 MILLIGRAM(S): at 22:57

## 2018-12-03 NOTE — ED ADULT TRIAGE NOTE - CHIEF COMPLAINT QUOTE
Patient presents to ER from HD center, patient complaining of arm pain on fistula area, denies fever.

## 2018-12-03 NOTE — H&P ADULT - HISTORY OF PRESENT ILLNESS
29 y/o male with PMHx of ESRD on HD (MWF), cardiomyopathy, seizure disorder, anxiety, depression came to the ED complaining of R arm pain near the fistular site. Patient said the pain has been going on for many years. Describes the pain as a sharp spasm that occasionally radiates into the chest and back of the neck. Patient said  he was recently told by his vascular surgeon and told to come to the ED for revision surgery and graft placement due to the site being aneurysmal. Patient has no fever, chills, diaphoresis, nausea/vomiting, chest pain, abdominal pain, change in bowel habit.

## 2018-12-03 NOTE — CONSULT NOTE ADULT - ASSESSMENT
28 year old male with RUE aneurysmal fistula requiring admission for permacath 12/3. Patient needs to be admitted to medicine service and has other acute medical problems (poorly controlled HIV/AIDS, narcotics dependence, disposition concerns etc) in addition to his acute vascular issue. Vascular surgery recommends obtaining screening CXR and EKG, stat labs and NPO@md with IVFs on board.

## 2018-12-03 NOTE — ED PROVIDER NOTE - MEDICAL DECISION MAKING DETAILS
Will talk to vascular to see if patient needs admission for fistula repair. Will talk to vascular to see if patient needs admission for fistula repair.    Case discussed with vascular patient will need need admission for graft placement.

## 2018-12-03 NOTE — CONSULT NOTE ADULT - SUBJECTIVE AND OBJECTIVE BOX
28 year old male presenting with dysufunctional RUE fistula secondary to aneurysm. Vascular directly contacted by nephrologist to evaluate for dialysis acces. Patient seen and examined at bedside. Reports that he last had dialysis today (12/3) via his RUE fistula. Reports of increasing pain to this RUE at this time. ROS negative for fever, chills, nausea, vomiting chest pain, SOB, dizziness, abd pain or any other concerning symptoms.     Vital Signs Last 24 Hrs  T(C): 36.1 (03 Dec 2018 18:33), Max: 36.1 (03 Dec 2018 18:33)  T(F): 97 (03 Dec 2018 18:33), Max: 97 (03 Dec 2018 18:33)  HR: 86 (03 Dec 2018 18:33) (86 - 86)  BP: 131/83 (03 Dec 2018 18:33) (131/83 - 131/83)  BP(mean): --  RR: 20 (03 Dec 2018 18:33) (20 - 20)  SpO2: 100% (03 Dec 2018 18:33) (100% - 100%)    I&O's Detail    Constitutional: patient resting comfortably in bed, in no acute distress  HEENT: EOMI / PERRL b/l  Neck: No JVD, full ROM without pain  Respiratory: CTAB with respirations unlabored, no accessory muscle use, no conversational dyspnea  Cardiovascular: Regular rate & rhythm  Gastrointestinal: Abdomen soft, non-tender, non-distended, no rebound tenderness / guarding  Neurological: GCS: 15 (4/5/6). A&O x 3; no gross sensory / motor / coordination deficits  Psychiatric: Normal mood, normal affect  Musculoskeletal: RUE fistula tender to palpation    LABS:                        9.7    1.6   )-----------( 128      ( 03 Dec 2018 21:57 )             30.9     12-03    140  |  93<L>  |  32.0<H>  ----------------------------<  91  4.0   |  28.0  |  9.17<H>    Ca    9.2      03 Dec 2018 21:57    TPro  7.2  /  Alb  3.8  /  TBili  0.4  /  DBili  x   /  AST  29  /  ALT  14  /  AlkPhos  714<H>  12-03    PT/INR - ( 03 Dec 2018 21:57 )   PT: 11.8 sec;   INR: 1.03 ratio      MEDICATIONS  (STANDING):  sodium chloride 0.9% lock flush 3 milliLiter(s) IV Push every 8 hours    MEDICATIONS  (PRN):    MICRO:     STUDIES: 28 year old male presenting with dysufunctional RUE fistula secondary to aneurysm. Vascular directly contacted by nephrologist to evaluate for dialysis acces. Patient seen and examined at bedside. Reports that he last had dialysis today (12/3) via his RUE fistula. Reports of increasing pain to this RUE at this time. ROS negative for fever, chills, nausea, vomiting chest pain, SOB, dizziness, abd pain or any other concerning symptoms.    PMHx: HIV+@birth, ESRD on HD (MWF), cardiomyopathy, seizure disorder, anxiety, depression  PSHx: Right AVF, tonsillectomy   Allergies: Vancomycin   Meds: Atovaquone, keppra, gabapentin, ASA 81, zofran, tylenol, alprazolam   FamHx: Non contributory   SocHx: Documented non compliance with medication     Vital Signs Last 24 Hrs  T(C): 36.1 (03 Dec 2018 18:33), Max: 36.1 (03 Dec 2018 18:33)  T(F): 97 (03 Dec 2018 18:33), Max: 97 (03 Dec 2018 18:33)  HR: 86 (03 Dec 2018 18:33) (86 - 86)  BP: 131/83 (03 Dec 2018 18:33) (131/83 - 131/83)  BP(mean): --  RR: 20 (03 Dec 2018 18:33) (20 - 20)  SpO2: 100% (03 Dec 2018 18:33) (100% - 100%)    I&O's Detail    Constitutional: patient resting comfortably in bed, in no acute distress  HEENT: EOMI / PERRL b/l  Neck: No JVD, full ROM without pain  Respiratory: CTAB with respirations unlabored, no accessory muscle use, no conversational dyspnea  Cardiovascular: Regular rate & rhythm  Gastrointestinal: Abdomen soft, non-tender, non-distended, no rebound tenderness / guarding  Neurological: GCS: 15 (4/5/6). A&O x 3; no gross sensory / motor / coordination deficits  Psychiatric: Normal mood, normal affect  Musculoskeletal: RUE fistula tender to palpation    LABS:                        9.7    1.6   )-----------( 128      ( 03 Dec 2018 21:57 )             30.9     12-03    140  |  93<L>  |  32.0<H>  ----------------------------<  91  4.0   |  28.0  |  9.17<H>    Ca    9.2      03 Dec 2018 21:57    TPro  7.2  /  Alb  3.8  /  TBili  0.4  /  DBili  x   /  AST  29  /  ALT  14  /  AlkPhos  714<H>  12-03    PT/INR - ( 03 Dec 2018 21:57 )   PT: 11.8 sec;   INR: 1.03 ratio      MEDICATIONS  (STANDING):  sodium chloride 0.9% lock flush 3 milliLiter(s) IV Push every 8 hours    MEDICATIONS  (PRN):    MICRO:     STUDIES:

## 2018-12-03 NOTE — H&P ADULT - ASSESSMENT
27 y/o male with PMHx of ESRD on HD (MWF), cardiomyopathy, seizure disorder, anxiety, depression came to the ED complaining of R arm pain near the fistular site. Patient said the pain has been going on for many years. Describes the pain as a sharp spasm that occasionally radiates into the chest and back of the neck. Patient said  he was recently told by his vascular surgeon and told to come to the ED for revision surgery and graft placement due to the site being aneurysmal. Patient has no fever, chills, diaphoresis, nausea/vomiting, chest pain, abdominal pain, change in bowel habit.     Right arm pain due to dysfunctional RUE fistula secondary to aneurysm  Vascular on board   Patient to get permcath in Am; probably revision surgery and graft placement   Will keep NPO   Pain management   Tylenol 650mg q6h PRN for pain     ESRD  HD on M/W/F  Last HD on Monday   Renal consult     HIV   Continue home regimen     Anxiety   Xanax 2mg PRN    Seizure   Keppra 500mg BID     Chronic pain   Gabapentin 100mg BID     Supportive   DVT: prophylaxis: SCD   Diet: NPO

## 2018-12-03 NOTE — ED PROVIDER NOTE - CARE PLAN
Principal Discharge DX:	AV fistula  Secondary Diagnosis:	Anxiety  Secondary Diagnosis:	ESRD (end stage renal disease)

## 2018-12-03 NOTE — ED PROVIDER NOTE - MUSCULOSKELETAL, MLM
Spine appears normal, range of motion is not limited, extremities normal except for large fistula with noted aneurysm to RUE, stable with bleeding controlled.

## 2018-12-03 NOTE — ED ADULT NURSE NOTE - OBJECTIVE STATEMENT
Pt axox3 c/o  right arm fistula pain  which has been an issue for the last year. Pt debnies injury or issues with dialysis access. PT  goes m*w*s to dialysis. Other then arm pain pt denies any sob or chest pain
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
## 2018-12-03 NOTE — ED PROVIDER NOTE - PROGRESS NOTE DETAILS
Spoke with vascular who is requesting admission and labs, but no US. Plan to admit for placement of graft.

## 2018-12-04 DIAGNOSIS — I77.0 ARTERIOVENOUS FISTULA, ACQUIRED: ICD-10-CM

## 2018-12-04 LAB
ANION GAP SERPL CALC-SCNC: 18 MMOL/L — HIGH (ref 5–17)
APTT BLD: 33.3 SEC — SIGNIFICANT CHANGE UP (ref 27.5–36.3)
BUN SERPL-MCNC: 40 MG/DL — HIGH (ref 8–20)
CALCIUM SERPL-MCNC: 8.8 MG/DL — SIGNIFICANT CHANGE UP (ref 8.6–10.2)
CHLORIDE SERPL-SCNC: 92 MMOL/L — LOW (ref 98–107)
CO2 SERPL-SCNC: 31 MMOL/L — HIGH (ref 22–29)
CREAT SERPL-MCNC: 10.85 MG/DL — HIGH (ref 0.5–1.3)
GLUCOSE SERPL-MCNC: 85 MG/DL — SIGNIFICANT CHANGE UP (ref 70–115)
HCT VFR BLD CALC: 28.1 % — LOW (ref 42–52)
HGB BLD-MCNC: 9 G/DL — LOW (ref 14–18)
MAGNESIUM SERPL-MCNC: 2.3 MG/DL — SIGNIFICANT CHANGE UP (ref 1.8–2.6)
MCHC RBC-ENTMCNC: 30.6 PG — SIGNIFICANT CHANGE UP (ref 27–31)
MCHC RBC-ENTMCNC: 32 G/DL — SIGNIFICANT CHANGE UP (ref 32–36)
MCV RBC AUTO: 95.6 FL — HIGH (ref 80–94)
PHOSPHATE SERPL-MCNC: 6.5 MG/DL — HIGH (ref 2.4–4.7)
PLATELET # BLD AUTO: 116 K/UL — LOW (ref 150–400)
POTASSIUM SERPL-MCNC: 3.2 MMOL/L — LOW (ref 3.5–5.3)
POTASSIUM SERPL-SCNC: 3.2 MMOL/L — LOW (ref 3.5–5.3)
RBC # BLD: 2.94 M/UL — LOW (ref 4.6–6.2)
RBC # FLD: 17.2 % — HIGH (ref 11–15.6)
SODIUM SERPL-SCNC: 141 MMOL/L — SIGNIFICANT CHANGE UP (ref 135–145)
WBC # BLD: 1.6 K/UL — LOW (ref 4.8–10.8)
WBC # FLD AUTO: 1.6 K/UL — LOW (ref 4.8–10.8)

## 2018-12-04 PROCEDURE — 93010 ELECTROCARDIOGRAM REPORT: CPT

## 2018-12-04 PROCEDURE — 36558 INSERT TUNNELED CV CATH: CPT

## 2018-12-04 PROCEDURE — 71045 X-RAY EXAM CHEST 1 VIEW: CPT | Mod: 26

## 2018-12-04 PROCEDURE — 99223 1ST HOSP IP/OBS HIGH 75: CPT

## 2018-12-04 PROCEDURE — 12345: CPT | Mod: NC

## 2018-12-04 PROCEDURE — 99152 MOD SED SAME PHYS/QHP 5/>YRS: CPT

## 2018-12-04 PROCEDURE — 76937 US GUIDE VASCULAR ACCESS: CPT | Mod: 26

## 2018-12-04 RX ORDER — ALPRAZOLAM 0.25 MG
2 TABLET ORAL DAILY
Qty: 0 | Refills: 0 | Status: DISCONTINUED | OUTPATIENT
Start: 2018-12-04 | End: 2018-12-11

## 2018-12-04 RX ORDER — GABAPENTIN 400 MG/1
100 CAPSULE ORAL
Qty: 0 | Refills: 0 | Status: DISCONTINUED | OUTPATIENT
Start: 2018-12-04 | End: 2018-12-11

## 2018-12-04 RX ORDER — CALCIUM ACETATE 667 MG
667 TABLET ORAL
Qty: 0 | Refills: 0 | Status: DISCONTINUED | OUTPATIENT
Start: 2018-12-04 | End: 2018-12-11

## 2018-12-04 RX ORDER — ACETAMINOPHEN 500 MG
650 TABLET ORAL EVERY 6 HOURS
Qty: 0 | Refills: 0 | Status: DISCONTINUED | OUTPATIENT
Start: 2018-12-04 | End: 2018-12-11

## 2018-12-04 RX ORDER — ETRAVIRINE 200 MG/1
200 TABLET ORAL
Qty: 0 | Refills: 0 | Status: DISCONTINUED | OUTPATIENT
Start: 2018-12-04 | End: 2018-12-11

## 2018-12-04 RX ORDER — TENOFOVIR DISOPROXIL FUMARATE 300 MG/1
300 TABLET, FILM COATED ORAL
Qty: 0 | Refills: 0 | Status: DISCONTINUED | OUTPATIENT
Start: 2018-12-04 | End: 2018-12-11

## 2018-12-04 RX ORDER — RITONAVIR 100 MG/1
100 TABLET, FILM COATED ORAL
Qty: 0 | Refills: 0 | Status: DISCONTINUED | OUTPATIENT
Start: 2018-12-04 | End: 2018-12-11

## 2018-12-04 RX ORDER — OXYCODONE HYDROCHLORIDE 5 MG/1
10 TABLET ORAL ONCE
Qty: 0 | Refills: 0 | Status: DISCONTINUED | OUTPATIENT
Start: 2018-12-04 | End: 2018-12-05

## 2018-12-04 RX ORDER — DARUNAVIR 75 MG/1
600 TABLET, FILM COATED ORAL EVERY 12 HOURS
Qty: 0 | Refills: 0 | Status: DISCONTINUED | OUTPATIENT
Start: 2018-12-04 | End: 2018-12-11

## 2018-12-04 RX ORDER — OXYCODONE HYDROCHLORIDE 5 MG/1
5 TABLET ORAL ONCE
Qty: 0 | Refills: 0 | Status: DISCONTINUED | OUTPATIENT
Start: 2018-12-04 | End: 2018-12-04

## 2018-12-04 RX ORDER — HYDROMORPHONE HYDROCHLORIDE 2 MG/ML
1 INJECTION INTRAMUSCULAR; INTRAVENOUS; SUBCUTANEOUS EVERY 4 HOURS
Qty: 0 | Refills: 0 | Status: DISCONTINUED | OUTPATIENT
Start: 2018-12-04 | End: 2018-12-05

## 2018-12-04 RX ORDER — CHLORHEXIDINE GLUCONATE 213 G/1000ML
1 SOLUTION TOPICAL
Qty: 0 | Refills: 0 | Status: DISCONTINUED | OUTPATIENT
Start: 2018-12-04 | End: 2018-12-11

## 2018-12-04 RX ORDER — ATOVAQUONE 750 MG/5ML
750 SUSPENSION ORAL EVERY 12 HOURS
Qty: 0 | Refills: 0 | Status: DISCONTINUED | OUTPATIENT
Start: 2018-12-04 | End: 2018-12-11

## 2018-12-04 RX ORDER — LEVETIRACETAM 250 MG/1
500 TABLET, FILM COATED ORAL
Qty: 0 | Refills: 0 | Status: DISCONTINUED | OUTPATIENT
Start: 2018-12-04 | End: 2018-12-11

## 2018-12-04 RX ORDER — ASPIRIN/CALCIUM CARB/MAGNESIUM 324 MG
81 TABLET ORAL DAILY
Qty: 0 | Refills: 0 | Status: DISCONTINUED | OUTPATIENT
Start: 2018-12-04 | End: 2018-12-11

## 2018-12-04 RX ORDER — DOLUTEGRAVIR SODIUM 25 MG/1
50 TABLET, FILM COATED ORAL
Qty: 0 | Refills: 0 | Status: DISCONTINUED | OUTPATIENT
Start: 2018-12-04 | End: 2018-12-11

## 2018-12-04 RX ADMIN — RITONAVIR 100 MILLIGRAM(S): 100 TABLET, FILM COATED ORAL at 05:48

## 2018-12-04 RX ADMIN — SODIUM CHLORIDE 3 MILLILITER(S): 9 INJECTION INTRAMUSCULAR; INTRAVENOUS; SUBCUTANEOUS at 13:12

## 2018-12-04 RX ADMIN — GABAPENTIN 100 MILLIGRAM(S): 400 CAPSULE ORAL at 18:51

## 2018-12-04 RX ADMIN — HYDROMORPHONE HYDROCHLORIDE 1 MILLIGRAM(S): 2 INJECTION INTRAMUSCULAR; INTRAVENOUS; SUBCUTANEOUS at 18:52

## 2018-12-04 RX ADMIN — DOLUTEGRAVIR SODIUM 50 MILLIGRAM(S): 25 TABLET, FILM COATED ORAL at 18:50

## 2018-12-04 RX ADMIN — OXYCODONE HYDROCHLORIDE 5 MILLIGRAM(S): 5 TABLET ORAL at 14:21

## 2018-12-04 RX ADMIN — SODIUM CHLORIDE 3 MILLILITER(S): 9 INJECTION INTRAMUSCULAR; INTRAVENOUS; SUBCUTANEOUS at 05:46

## 2018-12-04 RX ADMIN — DOLUTEGRAVIR SODIUM 50 MILLIGRAM(S): 25 TABLET, FILM COATED ORAL at 05:48

## 2018-12-04 RX ADMIN — SODIUM CHLORIDE 3 MILLILITER(S): 9 INJECTION INTRAMUSCULAR; INTRAVENOUS; SUBCUTANEOUS at 22:11

## 2018-12-04 RX ADMIN — OXYCODONE HYDROCHLORIDE 5 MILLIGRAM(S): 5 TABLET ORAL at 14:51

## 2018-12-04 RX ADMIN — Medication 667 MILLIGRAM(S): at 20:06

## 2018-12-04 RX ADMIN — DARUNAVIR 600 MILLIGRAM(S): 75 TABLET, FILM COATED ORAL at 05:48

## 2018-12-04 RX ADMIN — GABAPENTIN 100 MILLIGRAM(S): 400 CAPSULE ORAL at 05:48

## 2018-12-04 RX ADMIN — Medication 81 MILLIGRAM(S): at 12:40

## 2018-12-04 RX ADMIN — LEVETIRACETAM 500 MILLIGRAM(S): 250 TABLET, FILM COATED ORAL at 18:51

## 2018-12-04 RX ADMIN — LEVETIRACETAM 500 MILLIGRAM(S): 250 TABLET, FILM COATED ORAL at 05:48

## 2018-12-04 RX ADMIN — ETRAVIRINE 200 MILLIGRAM(S): 200 TABLET ORAL at 18:50

## 2018-12-04 RX ADMIN — DARUNAVIR 600 MILLIGRAM(S): 75 TABLET, FILM COATED ORAL at 18:50

## 2018-12-04 RX ADMIN — HYDROMORPHONE HYDROCHLORIDE 1 MILLIGRAM(S): 2 INJECTION INTRAMUSCULAR; INTRAVENOUS; SUBCUTANEOUS at 19:52

## 2018-12-04 RX ADMIN — RITONAVIR 100 MILLIGRAM(S): 100 TABLET, FILM COATED ORAL at 18:50

## 2018-12-04 NOTE — PROGRESS NOTE ADULT - ASSESSMENT
Patient is a 28 year old male with PMH of HIV, ESRD on HD (MWF), cardiomyopathy, seizure disorder, anxiety, legally blind, depression and chronic pain syndrome who presented to the ED  complaining of right upper extremity pain at fistula site. Patient said the pain has been going on for many years. Describes the pain as a sharp spasm that occasionally radiates into the chest and back of the neck. Patient said he was recently told by his vascular surgeon that he would need revision and graft placement due to the site being aneurysmal.      Right arm pain due to RUE aneurysmal fistula     NPO for permacath today  Vascular follow up re: revision and graft placement  Pain Management consult  Tylenol 650mg q6h PRN for pain   Add Oxycodone and follow up further pain management recommendations    ESRD  HD on M/W/F  Last HD on 12/3; unclear if patient had a complete session  SHPT - start phoslo TID with meals  Strict I/Os, daily weights  Avoid nephrotoxic agents and renally dose all medications for eGFR < 10  Renal consulted overnight    HIV   last CD4 97  Continue home regimen   ID evaluation    Anxiety   Xanax 2mg PRN    Seizure   Keppra 500mg BID   check keppra level    Chronic pain   Gabapentin 100mg BID   Pain management consult    Anemia  likely anemia of chronic disease  check iron studies  EPO/IV iron per renal    Pancytopenia  -likely secondary to HIV  -monitor counts closely    DVT: prophylaxis: SCDs

## 2018-12-04 NOTE — CONSULT NOTE ADULT - ASSESSMENT
27 y/o man with a PMH of poorly controlled HIV due to nonadherence, ESRD, seizures disorder, admitted to have surgery on R arm AV fistula possible graft placement.   his questions regarding ARV and surgical intervention for R arm AVF site and renal transplant were addressed. Needs to improve his HIV care before any decision about surgery.     HIV/AIDS  Right Arm Aneurysm on AV fistula site    -No need for any antibiotics  -Will send viral load  -Will restart Mepron 1500mg daily for PCP prophylaxis  -Will restart ARV as below:   TDF 300mg one tab weekly   Tivicay 50mg bid   Darunavir 600mg bid   Norvir 100mg bid   Etravirine 200mg bid    Will follow.

## 2018-12-04 NOTE — PATIENT PROFILE ADULT - CONTRAINDICATIONS & PRECAUTIONS (SELECT ALL THAT APPLY)
Moderate to severe acute illness with or without fever. Delay administration of vaccination until patient has been afebrile or illness resolved for 24hrs/Patient/surrogate refused vaccine... Patient/surrogate refused vaccine...

## 2018-12-04 NOTE — PROGRESS NOTE ADULT - SUBJECTIVE AND OBJECTIVE BOX
CHIEF COMPLAINT/INTERVAL HISTORY:    Patient is a 28y old  Male who presents with a chief complaint of Aneurysmal Fistula (03 Dec 2018 23:23)    SUBJECTIVE & OBJECTIVE: Pt seen and examined at bedside. No overnight events. Complaining of right arm pain and back pain. NPO for permacath insertion today.    ROS: Denies chest pain, SOB, N/V, abdominal pain, fevers or chills.    ICU Vital Signs Last 24 Hrs  T(C): 36.6 (04 Dec 2018 11:22), Max: 37.7 (04 Dec 2018 01:18)  T(F): 97.8 (04 Dec 2018 11:22), Max: 99.8 (04 Dec 2018 01:18)  HR: 80 (04 Dec 2018 12:37) (73 - 86)  BP: 125/81 (04 Dec 2018 12:37) (107/71 - 131/83)  RR: 16 (04 Dec 2018 12:37) (16 - 20)  SpO2: 98% (04 Dec 2018 12:37) (98% - 100%)        MEDICATIONS  (STANDING):  aspirin  chewable 81 milliGRAM(s) Oral daily  atovaquone Suspension 750 milliGRAM(s) Oral every 12 hours  chlorhexidine 2% Cloths 1 Application(s) Topical <User Schedule>  darunavir 600 milliGRAM(s) Oral every 12 hours  dolutegravir 50 milliGRAM(s) Oral two times a day  etravirine 200 milliGRAM(s) Oral two times a day after meals  gabapentin 100 milliGRAM(s) Oral two times a day  levETIRAcetam 500 milliGRAM(s) Oral two times a day  oxyCODONE    IR 5 milliGRAM(s) Oral once  ritonavir Tablet 100 milliGRAM(s) Oral two times a day  sodium chloride 0.9% lock flush 3 milliLiter(s) IV Push every 8 hours  tenofovir disoproxil fumarate (VIREAD) 300 milliGRAM(s) Oral <User Schedule>    MEDICATIONS  (PRN):  acetaminophen   Tablet .. 650 milliGRAM(s) Oral every 6 hours PRN Moderate Pain (4 - 6)  ALPRAZolam 2 milliGRAM(s) Oral daily PRN Anxiety      LABS:                        9.0    1.6   )-----------( 116      ( 04 Dec 2018 10:02 )             28.1     12-04    141  |  92<L>  |  40.0<H>  ----------------------------<  85  3.2<L>   |  31.0<H>  |  10.85<H>    Ca    8.8      04 Dec 2018 10:02  Phos  6.5     12-04  Mg     2.3     12-04    TPro  7.2  /  Alb  3.8  /  TBili  0.4  /  DBili  x   /  AST  29  /  ALT  14  /  AlkPhos  714<H>  12-03    PT/INR - ( 03 Dec 2018 21:57 )   PT: 11.8 sec;   INR: 1.03 ratio         PTT - ( 04 Dec 2018 10:02 )  PTT:33.3 sec    PHYSICAL EXAM:    GENERAL: young male patient, laying in bed, NAD  HEAD:  Atraumatic, Normocephalic  EYES: EOMI, PERRLA, conjunctiva and sclera clear  ENMT: Moist mucous membranes  NECK: Supple   NERVOUS SYSTEM:  Alert & Oriented X3   CHEST/LUNG: Clear to auscultation bilaterally   HEART: Regular rate and rhythm; + S1/S2  ABDOMEN: Soft, Nontender, Nondistended; Bowel sounds present  EXTREMITIES:  RUE AVF + pseudoaneursym

## 2018-12-04 NOTE — PATIENT PROFILE ADULT - VISION (WITH CORRECTIVE LENSES IF THE PATIENT USUALLY WEARS THEM):
Normal vision: sees adequately in most situations; can see medication labels, newsprint/blind blind/Severely impaired: cannot locate objects without hearing or touching them or patient nonresponsive.

## 2018-12-04 NOTE — PROGRESS NOTE ADULT - SUBJECTIVE AND OBJECTIVE BOX
Pain consult requested by medicine attending. Patient is now in Cath lab. After reviewing [facility], ([facility] ), most recent prescription shows Percocet 10/325mg, 240 tabs for 30 days. It is ok to administer Oxycodone IR 10mg PO q3hrs PRN moderate and severe pain, until full consult is completed within 24hrs. Monitor and hold opioids for oversedation.

## 2018-12-04 NOTE — PATIENT PROFILE ADULT - FUNCTIONAL SCREEN CURRENT LEVEL: COMMUNICATION, MLM
3 = unable to understand or speak (not related to language barrier) 0 = understands/communicates without difficulty

## 2018-12-04 NOTE — PATIENT PROFILE ADULT - HARM TO SELF OR OTHERS PLAN/AVAILABILITY
sometimes feels like hurting himself. feels like being gone but he doesn't want to hurt his family. he wishes to go to palliative care. sometimes
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
## 2018-12-04 NOTE — PROGRESS NOTE ADULT - SUBJECTIVE AND OBJECTIVE BOX
INTERVAL HPI/OVERNIGHT EVENTS/SUBJECTIVE:  Pt seen and examined s/p permacath placement today. c/o chronic back pain and R AVF pain. afebrile. VSS.  HD scheduled for tomorrow.   Denies cp, sob, n/v/d, abd pain.    ICU Vital Signs Last 24 Hrs  T(C): 36.8 (04 Dec 2018 16:01), Max: 37.7 (04 Dec 2018 01:18)  T(F): 98.2 (04 Dec 2018 16:01), Max: 99.8 (04 Dec 2018 01:18)  HR: 90 (04 Dec 2018 16:01) (73 - 90)  BP: 125/79 (04 Dec 2018 16:01) (107/71 - 131/83)  BP(mean): --  ABP: --  ABP(mean): --  RR: 18 (04 Dec 2018 16:01) (16 - 20)  SpO2: 96% (04 Dec 2018 16:01) (96% - 100%)      MEDICATIONS  (STANDING):  aspirin  chewable 81 milliGRAM(s) Oral daily  atovaquone Suspension 750 milliGRAM(s) Oral every 12 hours  calcium acetate 667 milliGRAM(s) Oral three times a day with meals  chlorhexidine 2% Cloths 1 Application(s) Topical <User Schedule>  darunavir 600 milliGRAM(s) Oral every 12 hours  dolutegravir 50 milliGRAM(s) Oral two times a day  etravirine 200 milliGRAM(s) Oral two times a day after meals  gabapentin 100 milliGRAM(s) Oral two times a day  levETIRAcetam 500 milliGRAM(s) Oral two times a day  ritonavir Tablet 100 milliGRAM(s) Oral two times a day  sodium chloride 0.9% lock flush 3 milliLiter(s) IV Push every 8 hours  tenofovir disoproxil fumarate (VIREAD) 300 milliGRAM(s) Oral <User Schedule>    MEDICATIONS  (PRN):  acetaminophen   Tablet .. 650 milliGRAM(s) Oral every 6 hours PRN Moderate Pain (4 - 6)  ALPRAZolam 2 milliGRAM(s) Oral daily PRN Anxiety  HYDROmorphone  Injectable 1 milliGRAM(s) IV Push every 4 hours PRN Severe Pain (7 - 10)  oxyCODONE    IR 10 milliGRAM(s) Oral once PRN Moderate Pain (4 - 6)      MISC:     PHYSICAL EXAM:    Gen: NAD, laying comfortably.  Neurological: AAOx3, no loss of sensation.   Chest: R permacath in place. soft. No active bleeding or drainage. no signs of hematoma.  Pulmonary: non-labored, no accessory muscle use  Cardiovascular: s1/s2  Genitourinary: voiding independently  Extremities: R AVF megafistula.     LABS:  CBC Full  -  ( 04 Dec 2018 10:02 )  WBC Count : 1.6 K/uL  Hemoglobin : 9.0 g/dL  Hematocrit : 28.1 %  Platelet Count - Automated : 116 K/uL  Mean Cell Volume : 95.6 fl  Mean Cell Hemoglobin : 30.6 pg  Mean Cell Hemoglobin Concentration : 32.0 g/dL  Auto Neutrophil # : x  Auto Lymphocyte # : x  Auto Monocyte # : x  Auto Eosinophil # : x  Auto Basophil # : x  Auto Neutrophil % : x  Auto Lymphocyte % : x  Auto Monocyte % : x  Auto Eosinophil % : x  Auto Basophil % : x    12-04    141  |  92<L>  |  40.0<H>  ----------------------------<  85  3.2<L>   |  31.0<H>  |  10.85<H>    Ca    8.8      04 Dec 2018 10:02  Phos  6.5     12-04  Mg     2.3     12-04    TPro  7.2  /  Alb  3.8  /  TBili  0.4  /  DBili  x   /  AST  29  /  ALT  14  /  AlkPhos  714<H>  12-03    PT/INR - ( 03 Dec 2018 21:57 )   PT: 11.8 sec;   INR: 1.03 ratio         PTT - ( 04 Dec 2018 10:02 )  PTT:33.3 sec    RECENT CULTURES:      LIVER FUNCTIONS - ( 03 Dec 2018 21:57 )  Alb: 3.8 g/dL / Pro: 7.2 g/dL / ALK PHOS: 714 U/L / ALT: 14 U/L / AST: 29 U/L / GGT: x           ASSESSMENT/PLAN:  28yMale s/p permacath placement today. Will need R AVF revision.   -HD tomorrow through permacath  -appreciate ID consult, at this time patient is not medically cleared for surgery.   -pain control issues - patient sees pain mgt outpatient;. Recommend pain mgt consult in house  -rest of care per primary team

## 2018-12-04 NOTE — ED ADULT NURSE REASSESSMENT NOTE - NS ED NURSE REASSESS COMMENT FT1
Pt to be admitted to medicine for possible perma cath placement on 12/4. Pt NPO and updated on plan of care

## 2018-12-05 DIAGNOSIS — N18.6 END STAGE RENAL DISEASE: ICD-10-CM

## 2018-12-05 DIAGNOSIS — M54.9 DORSALGIA, UNSPECIFIED: ICD-10-CM

## 2018-12-05 DIAGNOSIS — F11.20 OPIOID DEPENDENCE, UNCOMPLICATED: ICD-10-CM

## 2018-12-05 LAB
ANION GAP SERPL CALC-SCNC: 19 MMOL/L — HIGH (ref 5–17)
BUN SERPL-MCNC: 59 MG/DL — HIGH (ref 8–20)
CALCIUM SERPL-MCNC: 9 MG/DL — SIGNIFICANT CHANGE UP (ref 8.6–10.2)
CHLORIDE SERPL-SCNC: 94 MMOL/L — LOW (ref 98–107)
CO2 SERPL-SCNC: 27 MMOL/L — SIGNIFICANT CHANGE UP (ref 22–29)
CREAT SERPL-MCNC: 13.39 MG/DL — HIGH (ref 0.5–1.3)
EOSINOPHIL # BLD AUTO: 0 K/UL — SIGNIFICANT CHANGE UP (ref 0–0.5)
EOSINOPHIL NFR BLD AUTO: 1 % — SIGNIFICANT CHANGE UP (ref 0–5)
FERRITIN SERPL-MCNC: 888 NG/ML — HIGH (ref 30–400)
GLUCOSE SERPL-MCNC: 88 MG/DL — SIGNIFICANT CHANGE UP (ref 70–115)
HCT VFR BLD CALC: 28.2 % — LOW (ref 42–52)
HGB BLD-MCNC: 8.8 G/DL — LOW (ref 14–18)
IRON SATN MFR SERPL: 25 % — SIGNIFICANT CHANGE UP (ref 16–55)
IRON SATN MFR SERPL: 37 UG/DL — LOW (ref 59–158)
LYMPHOCYTES # BLD AUTO: 0.9 K/UL — LOW (ref 1–4.8)
LYMPHOCYTES # BLD AUTO: 43.5 % — SIGNIFICANT CHANGE UP (ref 20–55)
MAGNESIUM SERPL-MCNC: 2.4 MG/DL — SIGNIFICANT CHANGE UP (ref 1.6–2.6)
MCHC RBC-ENTMCNC: 29.7 PG — SIGNIFICANT CHANGE UP (ref 27–31)
MCHC RBC-ENTMCNC: 31.2 G/DL — LOW (ref 32–36)
MCV RBC AUTO: 95.3 FL — HIGH (ref 80–94)
MONOCYTES # BLD AUTO: 0.2 K/UL — SIGNIFICANT CHANGE UP (ref 0–0.8)
MONOCYTES NFR BLD AUTO: 12 % — HIGH (ref 3–10)
NEUTROPHILS # BLD AUTO: 0.9 K/UL — LOW (ref 1.8–8)
NEUTROPHILS NFR BLD AUTO: 43 % — SIGNIFICANT CHANGE UP (ref 37–73)
PHOSPHATE SERPL-MCNC: 7.2 MG/DL — HIGH (ref 2.4–4.7)
PLATELET # BLD AUTO: 102 K/UL — LOW (ref 150–400)
POTASSIUM SERPL-MCNC: 3.7 MMOL/L — SIGNIFICANT CHANGE UP (ref 3.5–5.3)
POTASSIUM SERPL-SCNC: 3.7 MMOL/L — SIGNIFICANT CHANGE UP (ref 3.5–5.3)
RBC # BLD: 2.96 M/UL — LOW (ref 4.6–6.2)
RBC # FLD: 17.2 % — HIGH (ref 11–15.6)
SODIUM SERPL-SCNC: 140 MMOL/L — SIGNIFICANT CHANGE UP (ref 135–145)
TIBC SERPL-MCNC: 150 UG/DL — LOW (ref 220–430)
TRANSFERRIN SERPL-MCNC: 105 MG/DL — LOW (ref 180–329)
WBC # BLD: 2.1 K/UL — LOW (ref 4.8–10.8)
WBC # FLD AUTO: 2.1 K/UL — LOW (ref 4.8–10.8)

## 2018-12-05 PROCEDURE — 99232 SBSQ HOSP IP/OBS MODERATE 35: CPT

## 2018-12-05 PROCEDURE — 99233 SBSQ HOSP IP/OBS HIGH 50: CPT | Mod: GC

## 2018-12-05 RX ORDER — OXYCODONE HYDROCHLORIDE 5 MG/1
10 TABLET ORAL
Qty: 0 | Refills: 0 | Status: DISCONTINUED | OUTPATIENT
Start: 2018-12-05 | End: 2018-12-11

## 2018-12-05 RX ORDER — HYDROMORPHONE HYDROCHLORIDE 2 MG/ML
2 INJECTION INTRAMUSCULAR; INTRAVENOUS; SUBCUTANEOUS ONCE
Qty: 0 | Refills: 0 | Status: DISCONTINUED | OUTPATIENT
Start: 2018-12-05 | End: 2018-12-05

## 2018-12-05 RX ORDER — ERYTHROPOIETIN 10000 [IU]/ML
10000 INJECTION, SOLUTION INTRAVENOUS; SUBCUTANEOUS
Qty: 0 | Refills: 0 | Status: DISCONTINUED | OUTPATIENT
Start: 2018-12-05 | End: 2018-12-11

## 2018-12-05 RX ORDER — DIPHENHYDRAMINE HCL 50 MG
25 CAPSULE ORAL ONCE
Qty: 0 | Refills: 0 | Status: COMPLETED | OUTPATIENT
Start: 2018-12-05 | End: 2018-12-05

## 2018-12-05 RX ORDER — FENTANYL CITRATE 50 UG/ML
1 INJECTION INTRAVENOUS
Qty: 0 | Refills: 0 | Status: DISCONTINUED | OUTPATIENT
Start: 2018-12-05 | End: 2018-12-06

## 2018-12-05 RX ORDER — HEPARIN SODIUM 5000 [USP'U]/ML
5000 INJECTION INTRAVENOUS; SUBCUTANEOUS EVERY 8 HOURS
Qty: 0 | Refills: 0 | Status: DISCONTINUED | OUTPATIENT
Start: 2018-12-05 | End: 2018-12-11

## 2018-12-05 RX ADMIN — OXYCODONE HYDROCHLORIDE 10 MILLIGRAM(S): 5 TABLET ORAL at 00:59

## 2018-12-05 RX ADMIN — Medication 667 MILLIGRAM(S): at 16:42

## 2018-12-05 RX ADMIN — GABAPENTIN 100 MILLIGRAM(S): 400 CAPSULE ORAL at 05:30

## 2018-12-05 RX ADMIN — LEVETIRACETAM 500 MILLIGRAM(S): 250 TABLET, FILM COATED ORAL at 16:44

## 2018-12-05 RX ADMIN — Medication 25 MILLIGRAM(S): at 13:40

## 2018-12-05 RX ADMIN — DOLUTEGRAVIR SODIUM 50 MILLIGRAM(S): 25 TABLET, FILM COATED ORAL at 16:43

## 2018-12-05 RX ADMIN — HYDROMORPHONE HYDROCHLORIDE 1 MILLIGRAM(S): 2 INJECTION INTRAMUSCULAR; INTRAVENOUS; SUBCUTANEOUS at 00:05

## 2018-12-05 RX ADMIN — ETRAVIRINE 200 MILLIGRAM(S): 200 TABLET ORAL at 11:13

## 2018-12-05 RX ADMIN — OXYCODONE HYDROCHLORIDE 10 MILLIGRAM(S): 5 TABLET ORAL at 01:58

## 2018-12-05 RX ADMIN — HYDROMORPHONE HYDROCHLORIDE 1 MILLIGRAM(S): 2 INJECTION INTRAMUSCULAR; INTRAVENOUS; SUBCUTANEOUS at 05:55

## 2018-12-05 RX ADMIN — HYDROMORPHONE HYDROCHLORIDE 1 MILLIGRAM(S): 2 INJECTION INTRAMUSCULAR; INTRAVENOUS; SUBCUTANEOUS at 00:20

## 2018-12-05 RX ADMIN — Medication 81 MILLIGRAM(S): at 11:13

## 2018-12-05 RX ADMIN — HEPARIN SODIUM 5000 UNIT(S): 5000 INJECTION INTRAVENOUS; SUBCUTANEOUS at 21:01

## 2018-12-05 RX ADMIN — RITONAVIR 100 MILLIGRAM(S): 100 TABLET, FILM COATED ORAL at 05:35

## 2018-12-05 RX ADMIN — HEPARIN SODIUM 5000 UNIT(S): 5000 INJECTION INTRAVENOUS; SUBCUTANEOUS at 16:42

## 2018-12-05 RX ADMIN — SODIUM CHLORIDE 3 MILLILITER(S): 9 INJECTION INTRAMUSCULAR; INTRAVENOUS; SUBCUTANEOUS at 05:30

## 2018-12-05 RX ADMIN — RITONAVIR 100 MILLIGRAM(S): 100 TABLET, FILM COATED ORAL at 16:43

## 2018-12-05 RX ADMIN — DARUNAVIR 600 MILLIGRAM(S): 75 TABLET, FILM COATED ORAL at 05:35

## 2018-12-05 RX ADMIN — OXYCODONE HYDROCHLORIDE 10 MILLIGRAM(S): 5 TABLET ORAL at 21:01

## 2018-12-05 RX ADMIN — ETRAVIRINE 200 MILLIGRAM(S): 200 TABLET ORAL at 16:43

## 2018-12-05 RX ADMIN — DARUNAVIR 600 MILLIGRAM(S): 75 TABLET, FILM COATED ORAL at 16:44

## 2018-12-05 RX ADMIN — HYDROMORPHONE HYDROCHLORIDE 2 MILLIGRAM(S): 2 INJECTION INTRAMUSCULAR; INTRAVENOUS; SUBCUTANEOUS at 12:32

## 2018-12-05 RX ADMIN — Medication 667 MILLIGRAM(S): at 11:13

## 2018-12-05 RX ADMIN — CHLORHEXIDINE GLUCONATE 1 APPLICATION(S): 213 SOLUTION TOPICAL at 05:34

## 2018-12-05 RX ADMIN — GABAPENTIN 100 MILLIGRAM(S): 400 CAPSULE ORAL at 16:44

## 2018-12-05 RX ADMIN — LEVETIRACETAM 500 MILLIGRAM(S): 250 TABLET, FILM COATED ORAL at 05:30

## 2018-12-05 RX ADMIN — SODIUM CHLORIDE 3 MILLILITER(S): 9 INJECTION INTRAMUSCULAR; INTRAVENOUS; SUBCUTANEOUS at 11:10

## 2018-12-05 RX ADMIN — DOLUTEGRAVIR SODIUM 50 MILLIGRAM(S): 25 TABLET, FILM COATED ORAL at 05:37

## 2018-12-05 RX ADMIN — HYDROMORPHONE HYDROCHLORIDE 2 MILLIGRAM(S): 2 INJECTION INTRAMUSCULAR; INTRAVENOUS; SUBCUTANEOUS at 12:50

## 2018-12-05 RX ADMIN — FENTANYL CITRATE 1 PATCH: 50 INJECTION INTRAVENOUS at 21:05

## 2018-12-05 RX ADMIN — SODIUM CHLORIDE 3 MILLILITER(S): 9 INJECTION INTRAMUSCULAR; INTRAVENOUS; SUBCUTANEOUS at 21:05

## 2018-12-05 RX ADMIN — Medication 667 MILLIGRAM(S): at 21:01

## 2018-12-05 RX ADMIN — HYDROMORPHONE HYDROCHLORIDE 1 MILLIGRAM(S): 2 INJECTION INTRAMUSCULAR; INTRAVENOUS; SUBCUTANEOUS at 05:40

## 2018-12-05 RX ADMIN — FENTANYL CITRATE 1 PATCH: 50 INJECTION INTRAVENOUS at 16:42

## 2018-12-05 RX ADMIN — ERYTHROPOIETIN 10000 UNIT(S): 10000 INJECTION, SOLUTION INTRAVENOUS; SUBCUTANEOUS at 13:44

## 2018-12-05 NOTE — CONSULT NOTE ADULT - SUBJECTIVE AND OBJECTIVE BOX
Patient is a 28y old  Male who presents with a chief complaint of AV Fistula aneurysm (04 Dec 2018 14:44)   Acute  renal failure.    HPI:  29 y/o male with PMHx of ESRD on HD (MWF), cardiomyopathy, seizure disorder, anxiety, depression came to the ED complaining of R arm pain near the fistular site. Patient said the pain has been going on for many years. Describes the pain as a sharp spasm that occasionally radiates into the chest and back of the neck. Patient said  he was recently told by his vascular surgeon and told to come to the ED for revision surgery and graft placement due to the site being aneurysmal. Patient has no fever, chills, diaphoresis, nausea/vomiting, chest pain, abdominal pain, change in bowel habit. (03 Dec 2018 23:26)   Hx renal stones x1 not aware of type, no other renal  problems; elevated creatinine on admission.    Renal consulted for ESRD on HD MWF. The patient complains of mild pain to where the PC was placed. Otherwise, has no other focal complaints at this time.     PAST MEDICAL & SURGICAL HISTORY:  Seizure disorder  Hypertension  ESRD (end stage renal disease)  Seizure  Pericarditis  Anxiety  Depression  Renal failure (ARF), acute on chronic: dialysis av fistula, RUE  HTN (hypertension)  Cardiomyopathy  HIV disease: born HIV+  AV fistula  S/P tonsillectomy   DM 2, MO, hypothyroidism, prior DVT and IVC filter; Cholecystectomy    FAMILY HISTORY:  No pertinent family history in first degree relatives: Unknown FHx because pt is adopted  [place]    Social History:Non smoker    MEDICATIONS  (STANDING):  aspirin  chewable 81 milliGRAM(s) Oral daily  atovaquone Suspension 750 milliGRAM(s) Oral every 12 hours  calcium acetate 667 milliGRAM(s) Oral three times a day with meals  chlorhexidine 2% Cloths 1 Application(s) Topical <User Schedule>  darunavir 600 milliGRAM(s) Oral every 12 hours  dolutegravir 50 milliGRAM(s) Oral two times a day  etravirine 200 milliGRAM(s) Oral two times a day after meals  gabapentin 100 milliGRAM(s) Oral two times a day  levETIRAcetam 500 milliGRAM(s) Oral two times a day  ritonavir Tablet 100 milliGRAM(s) Oral two times a day  sodium chloride 0.9% lock flush 3 milliLiter(s) IV Push every 8 hours  tenofovir disoproxil fumarate (VIREAD) 300 milliGRAM(s) Oral <User Schedule>    MEDICATIONS  (PRN):  acetaminophen   Tablet .. 650 milliGRAM(s) Oral every 6 hours PRN Moderate Pain (4 - 6)  ALPRAZolam 2 milliGRAM(s) Oral daily PRN Anxiety   Meds reviewed    Allergies    vancomycin (Anaphylaxis)    Intolerances    prefers vanilla or butter pecan nepro x 2 TID RDOK (Unknown)       REVIEW OF SYSTEMS:    CONSTITUTIONAL:  pain where PC was inserted  EYES: No eye pain, visual disturbances, or discharge  ENMT:  No difficulty hearing, tinnitus, vertigo; No sinus or throat pain  NECK: No pain or stiffness  BREASTS: No pain, masses, or nipple discharge  RESPIRATORY: neg  CARDIOVASCULAR: No chest pain, palpitations, dizziness,   GASTROINTESTINAL: No abdominal or epigastric pain. No nausea, vomiting, or hematemesis; No diarrhea or constipation. No melena   GENITOURINARY: No dysuria, frequency, hematuria, or incontinence  NEUROLOGICAL: No headaches, memory loss, loss of strength, numbness, or tremors  SKIN: no rash  LYMPH NODES: No enlarged glands  ENDOCRINE: No heat or cold intolerance; No hair loss  MUSCULOSKELETAL: no edema  PSYCHIATRIC: No depression, anxiety, mood swings, or difficulty sleeping  HEME/LYMPH: No easy bruising, or bleeding gums  ALLERGY AND IMMUNOLOGIC: No hives or eczema    Vital Signs Last 24 Hrs  T(C): 36.4 (04 Dec 2018 23:48), Max: 36.8 (04 Dec 2018 16:01)  T(F): 97.6 (04 Dec 2018 23:48), Max: 98.2 (04 Dec 2018 16:01)  HR: 76 (05 Dec 2018 05:42) (73 - 100)  BP: 135/85 (05 Dec 2018 05:42) (120/83 - 135/85)  BP(mean): --  RR: 18 (04 Dec 2018 23:48) (16 - 18)  SpO2: 97% (04 Dec 2018 23:48) (96% - 100%)  Daily     Daily     PHYSICAL EXAM:    GENERAL: appears chronically ill, no distress  HEAD:  Atraumatic, Normocephalic  EYES: Conjunctiva and sclera clear  ENMT: Moist mucous membranes, Poor dentition, No lesions  NECK: Supple, No JVD; +PC intact, no erythema, no bleeding, no rash  NERVOUS SYSTEM:  Alert & Oriented X3, Good concentration; Motor Strength wnl upper and lower extremities  CHEST/LUNG: Clear to percussion bilaterally; No rales, rhonchi, wheezing, or rubs  HEART: Regular rate and rhythm; No murmurs, rubs, or gallops  ABDOMEN: Soft, Nontender, Nondistended; Bowel sounds present  EXTREMITIES:  No edema B/L; +hypertrophic RUE AVF  LYMPH: No lymphadenopathy noted  SKIN: No rashes or lesions, pale    LABS:                        9.0    1.6   )-----------( 116      ( 04 Dec 2018 10:02 )             28.1     12-04    141  |  92<L>  |  40.0<H>  ----------------------------<  85  3.2<L>   |  31.0<H>  |  10.85<H>    Ca    8.8      04 Dec 2018 10:02  Phos  6.5     12-04  Mg     2.3     12-04    TPro  7.2  /  Alb  3.8  /  TBili  0.4  /  DBili  x   /  AST  29  /  ALT  14  /  AlkPhos  714<H>  12-03    PT/INR - ( 03 Dec 2018 21:57 )   PT: 11.8 sec;   INR: 1.03 ratio         PTT - ( 04 Dec 2018 10:02 )  PTT:33.3 sec    Magnesium, Serum: 2.3 mg/dL (12-04 @ 10:02)  Phosphorus Level, Serum: 6.5 mg/dL (12-04 @ 10:02)        RADIOLOGY & ADDITIONAL TESTS:

## 2018-12-05 NOTE — PROGRESS NOTE ADULT - SUBJECTIVE AND OBJECTIVE BOX
[facility]  INFECTIOUS DISEASES AND INTERNAL MEDICINE at [place]  =======================================================  Howie Navarro MD  Diplomates American Board of Internal Medicine and Infectious Diseases  =======================================================    TEDDY CRAWFORD [id number]    Follow up:  Right arm pain due to AVF aneurysm  HIV    Complaining of pain in R arm and back, no other issue at this time.   Seen by vascular and has a permacath on right chest since yesterday.     PAST MEDICAL & SURGICAL HISTORY:  Seizure disorder  Hypertension  ESRD (end stage renal disease)  Seizure  Pericarditis  Anxiety  Depression  Renal failure (ARF), acute on chronic: dialysis av fistula, RUE  HTN (hypertension)  Cardiomyopathy  HIV disease: born HIV+  AV fistula  S/P tonsillectomy    Social Hx: no smoking, ETOH or drugs reportedly    FAMILY HISTORY:  No pertinent family history in first degree relatives: Unknown FHx because pt is adopted    Allergies:  vancomycin (Anaphylaxis)    Antibiotics:  None     REVIEW OF SYSTEMS:  as above  all other ROS are negative    Physical Exam:  Vital Signs Last 24 Hrs  T(C): 36.8 (05 Dec 2018 08:11), Max: 36.8 (04 Dec 2018 16:01)  T(F): 98.2 (05 Dec 2018 08:11), Max: 98.2 (04 Dec 2018 16:01)  HR: 74 (05 Dec 2018 08:11) (74 - 100)  RR: 18 (05 Dec 2018 08:11) (16 - 18)  SpO2: 96% (05 Dec 2018 08:11) (96% - 98%)  GEN: seems conformable but slightly drowsy   HEENT: normocephalic and atraumatic. blind both eyes  NECK: Supple. No carotid bruits.    LUNGS: diminished breath sounds bilaterally. Permacath on right chest  HEART: s1 and s2 normal  ABDOMEN: Soft, nontender, and nondistended.  Positive bowel sounds.    EXTREMITIES: Without any  edema.  Right upper extremity with AVF, no sign of infection or cellulitis or bleeding  MSK: no joint swelling  SKIN: No ulceration or induration present.    Labs:  12-05    140  |  94<L>  |  59.0<H>  ----------------------------<  88  3.7   |  27.0  |  13.39<H>    Ca    9.0      05 Dec 2018 08:07  Phos  7.2     12-05  Mg     2.4     12-05    TPro  7.2  /  Alb  3.8  /  TBili  0.4  /  DBili  x   /  AST  29  /  ALT  14  /  AlkPhos  714<H>  12-03                        8.8    2.1   )-----------( 102      ( 05 Dec 2018 08:09 )             28.2     PT/INR - ( 03 Dec 2018 21:57 )   PT: 11.8 sec;   INR: 1.03 ratio    PTT - ( 04 Dec 2018 10:02 )  PTT:33.3 sec    LIVER FUNCTIONS - ( 03 Dec 2018 21:57 )  Alb: 3.8 g/dL / Pro: 7.2 g/dL / ALK PHOS: 714 U/L / ALT: 14 U/L / AST: 29 U/L / GGT: x           RECENT CULTURES:  None

## 2018-12-05 NOTE — PROGRESS NOTE ADULT - ASSESSMENT
Assessment and Plan:   Patient is a 28 year old male with PMH of HIV, ESRD on HD (MWF), cardiomyopathy, seizure disorder, anxiety, legally blind, depression and chronic pain syndrome who presented to the ED  complaining of right upper extremity pain at fistula site. s/p Perma cath placement on 12/4. and is due for HD today. pain management and vascular involved with ID for HIV.      Right arm pain due to RUE aneurysmal fistula     pain management consulted today was started on Oxycodone 10 mg q 3 h prn and fentanyl patch  scheuduled for HD today  Discussed with vascular and mentioned HIV needs to be controlled prior to revision and graft placement. Discussed with ID, patient has resistant HIV with resistant genotype, been non complaint with HIV meds as an outpatient. Vascular will do revision as an outpatient      ESRD  HD on M/W/F  SHPT - start phoslo TID with meals  Strict I/Os, daily weights  Avoid nephrotoxic agents and renally dose all medications for eGFR < 10      HIV   last CD4 97  Continue home regimen   ID evaluation appreciated    Anxiety   Xanax 2mg PRN    Seizure   Keppra 500mg BID       Chronic pain   Gabapentin 100mg BID   Pain management note appreciated    Anemia  likely anemia of chronic disease  check iron studies  EPO/IV iron per renal    Pancytopenia  -likely secondary to HIV  -monitor counts closely    DVT: prophylaxis: SCDs and heparin q 8 h

## 2018-12-05 NOTE — PROGRESS NOTE ADULT - ASSESSMENT
27 y/o man with a PMH of poorly controlled HIV due to nonadherence, ESRD, seizures disorder, admitted to have surgery on R arm AV fistula possible graft placement.   Last CD4=97 from 9/30, not clear if taking his meds regularly or not. I doubt that he will be undetectable in few months. He has a very resistant HIV on multiple meds with nonadherence.      HIV/AIDS  Right Arm Aneurysm on AV fistula site    -No need for any antibiotics  -Will follow up viral load  -Will continue on Mepron 1500mg daily for PCP prophylaxis  -Will Continue ARV as below:   TDF 300mg one tab weekly   Tivicay 50mg bid   Darunavir 600mg bid   Norvir 100mg bid   Etravirine 200mg bid    Will follow.

## 2018-12-05 NOTE — PROGRESS NOTE ADULT - ASSESSMENT
27 y/o M s/p permacath placement for hemodialysis. Hemodynamically stable, afebrile.    Plan:  -HD today via permacath  -Needs improvement in HIV prior to right AVF revision. Infectious disease recommendations appreciated.  -Rest of medical management as per primary team

## 2018-12-05 NOTE — PROGRESS NOTE ADULT - PROBLEM SELECTOR PLAN 3
monitor for s/s of opioid withdrawal  base line prn oxycodone 10mg q3h  f/u with outpatient pain doc, encourage opioid wean

## 2018-12-05 NOTE — PROGRESS NOTE ADULT - SUBJECTIVE AND OBJECTIVE BOX
Unfortunate 28 year old male with long disjointed history.  HIV+, ostensibly poor compliance with ARV (though patient claims to have taken his Rx as prescribed).  Subjective axial back pain and pain from right arm fistula.  Long history of opioid use for these subjective pains, [place]-STOP ([place] ) notes intermittent use of fentanyl transcutaneous (last Rx for five 50 mcg patches filled 10/27/18), last Rx for Percocet 10/325 #240 filled 10/29/18, last Rx for hydromorphone 2 mg (#12) filled 11/13/18 for "pain with dialysis".    Patient has been in the hospital now for approx 36 hours.  Has had a total of 15 mg oxycodone and single IV dose of hydromorphone 1 mg in the ER this hospital stay.  This is important as he is exhibiting no s/s of opioid withdrawal despite a  suggesting a minimum of 120 mg MS bio-equivalents per day.   One would suspect that this may indicate some degree of diversion.  History of being found down at home last Oct; AMS with respiratory acidosis and hyperkalemia.      This am the patient is seen in the hospital bed in [facility].  He moves easily while noting "chronic back pain".  He is hard to pin down but suggests that his back hurts in the upper lumbar and lower thoracic paravertebral muscles.  No radicular symptoms noted.  Does c/o pain at righ arm dialysis shunt site.  Did have a right chest perma-cath inserted in radiology yesterday.  Plans are for right arm dialysis shunt revision "soon".      VSS, no tachycardia, hypertension nor tachypnea noted.  A&O, decreased visual acuity bilat.  Large shunt right upper arm, some vitaligo noted around shunt.  Post procedure dressing right chest wall.  Moves easily in bed, some subjective pain axial back, no Trigger points noted, no obvious mm. spasm appreciable.  Lower ext symmetric.      ASSESSMENT:  1. HIV+  2. ESRD  3. Subjective axial back pain  4. opioid dependance/tolerance  5. Blindness  6. Sx disorder by history  7. Dep/anxiety by history    RECOMMEND:  1. Long discussion with patient about inappropriate expectation for pain control and opioid overuse.  Recommend baseline opioid as needed to avoid opioid withdrawal, will make prn oxycodone 10mg q3h prn.  2. Will reluctantly agree to Hyrdromorphone 2 mg IVP and benadryl 25 mg IVP with dialysis x 1.   3. IF patient in hospital for subsequent dialysis, would STRONGLY recommend Benadryl be used as IVPB over 15 minutes (same anti-puretic effect but less somnolence/euphoria)  4  Have patient follow up with his prior treating pain doc; strongly encourage wean overall opioid use as outpatient.  This may take some time and may require some social service intervention

## 2018-12-05 NOTE — PROGRESS NOTE ADULT - SUBJECTIVE AND OBJECTIVE BOX
TEDDY YVETTE    [id number]    28y      Male    Patient is a 28y old  Male who presents with a chief complaint of Aneurysmal Fistula (03 Dec 2018 23:23)    SUBJECTIVE & OBJECTIVE:   Pt seen and examined at bedside. Was asking for pain medications. is scheduled for HD today through perma cath.      ROS: Denies chest pain, SOB, N/V, abdominal pain, fevers or chills.      Vital Signs Last 24 Hrs  T(C): 37.2 (05 Dec 2018 12:10), Max: 37.2 (05 Dec 2018 12:10)  T(F): 98.9 (05 Dec 2018 12:10), Max: 98.9 (05 Dec 2018 12:10)  HR: 89 (05 Dec 2018 12:10) (74 - 100)  BP: 134/93 (05 Dec 2018 12:10) (125/79 - 135/85)  BP(mean): --  RR: 18 (05 Dec 2018 12:10) (18 - 18)  SpO2: 98% (05 Dec 2018 12:10) (96% - 98%)    PHYSICAL EXAM:    GENERAL: young male patient, laying in bed, NAD, perma cath in right chest wall, legally blind  HEAD:  Atraumatic, Normocephalic  EYES: EOMI, PERRLA, conjunctiva and sclera clear  ENMT: Moist mucous membranes  NECK: Supple   NERVOUS SYSTEM:  Alert & Oriented X3   CHEST/LUNG: Clear to auscultation bilaterally   HEART: Regular rate and rhythm; + S1/S2  ABDOMEN: Soft, Nontender, Nondistended; Bowel sounds present  EXTREMITIES:  RUE AVF + pseudoaneursym          LABS:                        8.8    2.1   )-----------( 102      ( 05 Dec 2018 08:09 )             28.2     12-05    140  |  94<L>  |  59.0<H>  ----------------------------<  88  3.7   |  27.0  |  13.39<H>    Ca    9.0      05 Dec 2018 08:07  Phos  7.2     12-05  Mg     2.4     12-05    TPro  7.2  /  Alb  3.8  /  TBili  0.4  /  DBili  x   /  AST  29  /  ALT  14  /  AlkPhos  714<H>  12-03    PT/INR - ( 03 Dec 2018 21:57 )   PT: 11.8 sec;   INR: 1.03 ratio         PTT - ( 04 Dec 2018 10:02 )  PTT:33.3 sec        MEDICATIONS  (STANDING):  aspirin  chewable 81 milliGRAM(s) Oral daily  atovaquone Suspension 750 milliGRAM(s) Oral every 12 hours  calcium acetate 667 milliGRAM(s) Oral three times a day with meals  chlorhexidine 2% Cloths 1 Application(s) Topical <User Schedule>  darunavir 600 milliGRAM(s) Oral every 12 hours  diphenhydrAMINE   Injectable 25 milliGRAM(s) IV Push once  dolutegravir 50 milliGRAM(s) Oral two times a day  epoetin nithin Injectable 06896 Unit(s) IV Push <User Schedule>  etravirine 200 milliGRAM(s) Oral two times a day after meals  fentaNYL   Patch  25 MICROgram(s)/Hr 1 Patch Transdermal every 72 hours  gabapentin 100 milliGRAM(s) Oral two times a day  heparin  Injectable 5000 Unit(s) SubCutaneous every 8 hours  levETIRAcetam 500 milliGRAM(s) Oral two times a day  ritonavir Tablet 100 milliGRAM(s) Oral two times a day  sodium chloride 0.9% lock flush 3 milliLiter(s) IV Push every 8 hours  tenofovir disoproxil fumarate (VIREAD) 300 milliGRAM(s) Oral <User Schedule>    MEDICATIONS  (PRN):  acetaminophen   Tablet .. 650 milliGRAM(s) Oral every 6 hours PRN Moderate Pain (4 - 6)  ALPRAZolam 2 milliGRAM(s) Oral daily PRN Anxiety  oxyCODONE    IR 10 milliGRAM(s) Oral every 3 hours PRN Severe Pain (7 - 10)      RADIOLOGY & ADDITIONAL TESTS:

## 2018-12-05 NOTE — CONSULT NOTE ADULT - ASSESSMENT
ESRD on HD MWF  Malfunction of AVF  HIV  Chronic Pain    -HD today 12/5/18 per schedule; 3 hours, Revaclear 400, BFR 350ml/min, DFR 700ml/min, 4k bicarb bath, and UF of 1.5-2kg as tolerated per hemodynamics  -Dilaudid and Benadryl with HD or the patient refuses treatment; he has chronic pain and discomfort on HD  -HAART therapy per ID  -Vascular follow up noted; pending AVF revision when WBC improves    Thank you    D/W HD RN ESRD on HD MWF  Malfunction of AVF  HIV  Chronic Pain    -HD today 12/5/18 per schedule; 3 hours, Revaclear 400, BFR 350ml/min, DFR 600ml/min, 4k bicarb bath, and UF of 1.5-2kg as tolerated per hemodynamics  -Dilaudid and Benadryl with HD or the patient refuses treatment; he has chronic pain and discomfort on HD  -HAART therapy per ID  -Vascular follow up noted; pending AVF revision when WBC improves    Thank you    D/W HD RN

## 2018-12-05 NOTE — PROGRESS NOTE ADULT - SUBJECTIVE AND OBJECTIVE BOX
INTERVAL HPI/OVERNIGHT EVENTS: No acute events overnight. Pain to right neck at surgical site. No dyspnea. No fever, chills, chest pain. Pain controlled with pain medications. For dialysis today.    MEDICATIONS  (STANDING):  aspirin  chewable 81 milliGRAM(s) Oral daily  atovaquone Suspension 750 milliGRAM(s) Oral every 12 hours  calcium acetate 667 milliGRAM(s) Oral three times a day with meals  chlorhexidine 2% Cloths 1 Application(s) Topical <User Schedule>  darunavir 600 milliGRAM(s) Oral every 12 hours  dolutegravir 50 milliGRAM(s) Oral two times a day  etravirine 200 milliGRAM(s) Oral two times a day after meals  gabapentin 100 milliGRAM(s) Oral two times a day  levETIRAcetam 500 milliGRAM(s) Oral two times a day  ritonavir Tablet 100 milliGRAM(s) Oral two times a day  sodium chloride 0.9% lock flush 3 milliLiter(s) IV Push every 8 hours  tenofovir disoproxil fumarate (VIREAD) 300 milliGRAM(s) Oral <User Schedule>    MEDICATIONS  (PRN):  acetaminophen   Tablet .. 650 milliGRAM(s) Oral every 6 hours PRN Moderate Pain (4 - 6)  ALPRAZolam 2 milliGRAM(s) Oral daily PRN Anxiety      Vital Signs Last 24 Hrs  T(C): 36.4 (04 Dec 2018 23:48), Max: 36.8 (04 Dec 2018 16:01)  T(F): 97.6 (04 Dec 2018 23:48), Max: 98.2 (04 Dec 2018 16:01)  HR: 76 (05 Dec 2018 05:42) (73 - 100)  BP: 135/85 (05 Dec 2018 05:42) (120/83 - 135/85)  BP(mean): --  RR: 18 (04 Dec 2018 23:48) (16 - 18)  SpO2: 97% (04 Dec 2018 23:48) (96% - 100%)    Physical exam:  General: NAD, AOx3, resting comfortably in bed  HEENT: PERRLA, EOMI  Neck: supple, nontender  Respiratory: no respiratory distress, lungs CTAB  Heart: regular rate and rhythm, no murmurs  Abdomen: soft, nontender, nondistended. Normal bowel sounds. No guarding or rebound.  Extremities: right upper extremity with evidence of venous dilation from AVF. No peripheral edema. Normal ROM.      I&O's Detail      LABS:                        9.0    1.6   )-----------( 116      ( 04 Dec 2018 10:02 )             28.1     12-04    141  |  92<L>  |  40.0<H>  ----------------------------<  85  3.2<L>   |  31.0<H>  |  10.85<H>    Ca    8.8      04 Dec 2018 10:02  Phos  6.5     12-04  Mg     2.3     12-04    TPro  7.2  /  Alb  3.8  /  TBili  0.4  /  DBili  x   /  AST  29  /  ALT  14  /  AlkPhos  714<H>  12-03    PT/INR - ( 03 Dec 2018 21:57 )   PT: 11.8 sec;   INR: 1.03 ratio         PTT - ( 04 Dec 2018 10:02 )  PTT:33.3 sec

## 2018-12-06 LAB
HIV-1 VIRAL LOAD RESULT: ABNORMAL
HIV1 RNA # SERPL NAA+PROBE: SIGNIFICANT CHANGE UP
HIV1 RNA SER-IMP: SIGNIFICANT CHANGE UP
HIV1 RNA SERPL NAA+PROBE-ACNC: ABNORMAL
HIV1 RNA SERPL NAA+PROBE-LOG#: 5.4 — SIGNIFICANT CHANGE UP

## 2018-12-06 PROCEDURE — 99232 SBSQ HOSP IP/OBS MODERATE 35: CPT

## 2018-12-06 PROCEDURE — 99232 SBSQ HOSP IP/OBS MODERATE 35: CPT | Mod: GC

## 2018-12-06 RX ADMIN — LEVETIRACETAM 500 MILLIGRAM(S): 250 TABLET, FILM COATED ORAL at 05:01

## 2018-12-06 RX ADMIN — CHLORHEXIDINE GLUCONATE 1 APPLICATION(S): 213 SOLUTION TOPICAL at 05:01

## 2018-12-06 RX ADMIN — OXYCODONE HYDROCHLORIDE 10 MILLIGRAM(S): 5 TABLET ORAL at 19:30

## 2018-12-06 RX ADMIN — ATOVAQUONE 750 MILLIGRAM(S): 750 SUSPENSION ORAL at 18:22

## 2018-12-06 RX ADMIN — Medication 2 MILLIGRAM(S): at 02:05

## 2018-12-06 RX ADMIN — RITONAVIR 100 MILLIGRAM(S): 100 TABLET, FILM COATED ORAL at 05:01

## 2018-12-06 RX ADMIN — DARUNAVIR 600 MILLIGRAM(S): 75 TABLET, FILM COATED ORAL at 05:01

## 2018-12-06 RX ADMIN — Medication 81 MILLIGRAM(S): at 12:21

## 2018-12-06 RX ADMIN — SODIUM CHLORIDE 3 MILLILITER(S): 9 INJECTION INTRAMUSCULAR; INTRAVENOUS; SUBCUTANEOUS at 13:29

## 2018-12-06 RX ADMIN — ETRAVIRINE 200 MILLIGRAM(S): 200 TABLET ORAL at 18:22

## 2018-12-06 RX ADMIN — FENTANYL CITRATE 1 PATCH: 50 INJECTION INTRAVENOUS at 08:40

## 2018-12-06 RX ADMIN — HEPARIN SODIUM 5000 UNIT(S): 5000 INJECTION INTRAVENOUS; SUBCUTANEOUS at 05:02

## 2018-12-06 RX ADMIN — Medication 667 MILLIGRAM(S): at 08:18

## 2018-12-06 RX ADMIN — DOLUTEGRAVIR SODIUM 50 MILLIGRAM(S): 25 TABLET, FILM COATED ORAL at 18:22

## 2018-12-06 RX ADMIN — Medication 667 MILLIGRAM(S): at 18:22

## 2018-12-06 RX ADMIN — TENOFOVIR DISOPROXIL FUMARATE 300 MILLIGRAM(S): 300 TABLET, FILM COATED ORAL at 08:18

## 2018-12-06 RX ADMIN — SODIUM CHLORIDE 3 MILLILITER(S): 9 INJECTION INTRAMUSCULAR; INTRAVENOUS; SUBCUTANEOUS at 05:02

## 2018-12-06 RX ADMIN — RITONAVIR 100 MILLIGRAM(S): 100 TABLET, FILM COATED ORAL at 18:22

## 2018-12-06 RX ADMIN — HEPARIN SODIUM 5000 UNIT(S): 5000 INJECTION INTRAVENOUS; SUBCUTANEOUS at 13:29

## 2018-12-06 RX ADMIN — GABAPENTIN 100 MILLIGRAM(S): 400 CAPSULE ORAL at 18:22

## 2018-12-06 RX ADMIN — ATOVAQUONE 750 MILLIGRAM(S): 750 SUSPENSION ORAL at 05:01

## 2018-12-06 RX ADMIN — OXYCODONE HYDROCHLORIDE 10 MILLIGRAM(S): 5 TABLET ORAL at 18:38

## 2018-12-06 RX ADMIN — DARUNAVIR 600 MILLIGRAM(S): 75 TABLET, FILM COATED ORAL at 18:22

## 2018-12-06 RX ADMIN — ETRAVIRINE 200 MILLIGRAM(S): 200 TABLET ORAL at 08:18

## 2018-12-06 RX ADMIN — LEVETIRACETAM 500 MILLIGRAM(S): 250 TABLET, FILM COATED ORAL at 18:21

## 2018-12-06 RX ADMIN — Medication 667 MILLIGRAM(S): at 12:21

## 2018-12-06 RX ADMIN — DOLUTEGRAVIR SODIUM 50 MILLIGRAM(S): 25 TABLET, FILM COATED ORAL at 05:01

## 2018-12-06 RX ADMIN — GABAPENTIN 100 MILLIGRAM(S): 400 CAPSULE ORAL at 05:01

## 2018-12-06 RX ADMIN — OXYCODONE HYDROCHLORIDE 10 MILLIGRAM(S): 5 TABLET ORAL at 00:04

## 2018-12-06 NOTE — PROGRESS NOTE ADULT - ASSESSMENT
ESRD on HD MWF  Malfunction of AVF  HIV  Chronic Pain  Anemia    -HD today 12/5/18 per schedule; 3 hours, Revaclear 400, BFR 350ml/min, DFR 600ml/min, 4k bicarb bath, and UF of 1.5-2kg as tolerated per hemodynamics  -Dilaudid and Benadryl with HD or the patient refuses treatment; he has chronic pain and discomfort on HD  -HAART therapy per ID  -Vascular follow up noted; pending AVF revision when WBC improves  -Procrit    Thank you    D/W HD RN ESRD on HD MWF  Malfunction of AVF  HIV  Chronic Pain  Anemia    -HD MWF; 3 hours, Revaclear 400, BFR 350ml/min, DFR 600ml/min, 4k bicarb bath, and UF of 1.5-2kg as tolerated per hemodynamics  -Dilaudid and Benadryl with HD or the patient refuses treatment; he has chronic pain and discomfort on HD  -HAART therapy per ID  -Vascular follow up noted; pending AVF revision when WBC improves  -Procrit    Thank you    D/W HD RN

## 2018-12-06 NOTE — PROGRESS NOTE ADULT - SUBJECTIVE AND OBJECTIVE BOX
TEDDY YVETTE    [id number]    28y      Male    Patient is a 28y old  Male who presents with a chief complaint of Aneurysmal Fistula (03 Dec 2018 23:23)    SUBJECTIVE & OBJECTIVE:   Pt seen and examined at bedside. Pt c/o pain in arm and concerned about not having fistula repaired during this admission.        ROS: Denies chest pain, SOB, N/V, abdominal pain, fevers or chills.      Vital Signs Last 24 Hrs  Vital Signs Last 24 Hrs  T(C): 36.4 (06 Dec 2018 07:33), Max: 37.2 (05 Dec 2018 16:13)  T(F): 97.5 (06 Dec 2018 07:33), Max: 98.9 (05 Dec 2018 16:13)  HR: 83 (06 Dec 2018 07:33) (70 - 86)  BP: 109/74 (06 Dec 2018 07:33) (109/74 - 128/85)  BP(mean): --  RR: 18 (06 Dec 2018 07:33) (18 - 20)  SpO2: 100% (06 Dec 2018 07:33) (99% - 100%)    PHYSICAL EXAM:    GENERAL: young male patient, laying in bed, NAD, perma cath in right chest wall, legally blind  HEAD:  Atraumatic, Normocephalic  EYES: blind, conjunctiva and sclera clear  ENMT: Moist mucous membranes  NECK: Supple   NERVOUS SYSTEM:  Alert & Oriented X3   CHEST/LUNG: Clear to auscultation bilaterally   HEART: Regular rate and rhythm; + S1/S2  ABDOMEN: Soft, Nontender, Nondistended; Bowel sounds present  EXTREMITIES:  RUE AVF + pseudoaneursym          LABS:                             8.8    2.1   )-----------( 102      ( 05 Dec 2018 08:09 )             28.2     12-05    140  |  94<L>  |  59.0<H>  ----------------------------<  88  3.7   |  27.0  |  13.39<H>    Ca    9.0      05 Dec 2018 08:07  Phos  7.2     12-05  Mg     2.4     12-05                    MEDICATIONS  (STANDING):  aspirin  chewable 81 milliGRAM(s) Oral daily  atovaquone Suspension 750 milliGRAM(s) Oral every 12 hours  calcium acetate 667 milliGRAM(s) Oral three times a day with meals  chlorhexidine 2% Cloths 1 Application(s) Topical <User Schedule>  darunavir 600 milliGRAM(s) Oral every 12 hours  diphenhydrAMINE   Injectable 25 milliGRAM(s) IV Push once  dolutegravir 50 milliGRAM(s) Oral two times a day  epoetin nithin Injectable 19006 Unit(s) IV Push <User Schedule>  etravirine 200 milliGRAM(s) Oral two times a day after meals  fentaNYL   Patch  25 MICROgram(s)/Hr 1 Patch Transdermal every 72 hours  gabapentin 100 milliGRAM(s) Oral two times a day  heparin  Injectable 5000 Unit(s) SubCutaneous every 8 hours  levETIRAcetam 500 milliGRAM(s) Oral two times a day  ritonavir Tablet 100 milliGRAM(s) Oral two times a day  sodium chloride 0.9% lock flush 3 milliLiter(s) IV Push every 8 hours  tenofovir disoproxil fumarate (VIREAD) 300 milliGRAM(s) Oral <User Schedule>    MEDICATIONS  (PRN):  acetaminophen   Tablet .. 650 milliGRAM(s) Oral every 6 hours PRN Moderate Pain (4 - 6)  ALPRAZolam 2 milliGRAM(s) Oral daily PRN Anxiety  oxyCODONE    IR 10 milliGRAM(s) Oral every 3 hours PRN Severe Pain (7 - 10)

## 2018-12-06 NOTE — PROGRESS NOTE ADULT - ASSESSMENT
Assessment and Plan:     Patient is a 28 year old male with PMH of HIV, ESRD on HD (MWF), cardiomyopathy, seizure disorder, anxiety, legally blind, depression and chronic pain syndrome who presented to the ED  complaining of right upper extremity pain at fistula site. s/p Perma cath placement on 12/4. Awaiting Vascular decision to decide AV fistula graft revision. Pain management and vascular involved along with ID for HIV.       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  Underwent HD yesterday  Discussed with vascular and mentioned HIV needs to be controlled prior to revision and graft placement. Discussed with ID, patient has resistant HIV with resistant genotype, been non complaint with HIV meds as an outpatient.     ESRD  HD on M/W/F  SHPT - start phoslo TID with meals  Strict I/Os, daily weights  Avoid nephrotoxic agents and renally dose all medications for eGFR < 10      HIV   last CD4 97  Continue home regimen   ID evaluation appreciated    Anxiety   Xanax 2mg PRN    Seizure   Keppra 500mg BID     Chronic pain   Gabapentin 100mg BID   Pain management note appreciated Oxycodone 10 mg    Anemia  likely anemia of chronic disease  check iron studies  EPO/IV iron per renal    Pancytopenia  -likely secondary to HIV  -monitor counts closely    DVT: prophylaxis: SCDs and heparin q 8 h

## 2018-12-06 NOTE — PROGRESS NOTE ADULT - ASSESSMENT
Assessment and Plan:   Patient is a 28 year old male with PMH of HIV, ESRD on HD (MWF), cardiomyopathy, seizure disorder, anxiety, legally blind, depression and chronic pain syndrome who presented to the ED  complaining of right upper extremity pain at fistula site. s/p Perma cath placement on 12/4. and receives HD M/W/F. Pain management and vascular involved with ID for HIV.      Right arm pain due to RUE aneurysmal fistula     pain management consulted was started on Oxycodone 10 mg q 3 h prn and fentanyl patch  scheuduled for HD M/W/F  Discussed with vascular and mentioned HIV needs to be controlled prior to revision and graft placement. Discussed with ID, patient has resistant HIV with resistant genotype, been non complaint with HIV meds as an outpatient. Per ID, no need for antbx at this time.  Vascular will do revision as an outpatient      ESRD  HD on M/W/F  SHPT - start phoslo TID with meals  Strict I/Os, daily weights  Avoid nephrotoxic agents and renally dose all medications for eGFR < 10      HIV   last CD4 97  Continue home regimen   ID evaluation appreciated    Anxiety   Xanax 2mg PRN    Seizure   Keppra 500mg BID       Chronic pain   Gabapentin 100mg BID   Pain management note appreciated    Anemia  likely anemia of chronic disease  check iron studies  EPO/IV iron per renal    Pancytopenia  -likely secondary to HIV  -monitor counts closely    DVT: prophylaxis: SCDs and heparin q 8 h

## 2018-12-06 NOTE — PROGRESS NOTE ADULT - SUBJECTIVE AND OBJECTIVE BOX
[facility]  INFECTIOUS DISEASES AND INTERNAL MEDICINE at [place]  =======================================================  Howie Navarro MD  Diplomates American Board of Internal Medicine and Infectious Diseases  =======================================================    TEDDY CRAWFORD [id number]    Follow up:  Right arm pain due to AVF aneurysm  HIV    Complaining of pain in R arm and back, asking for appropriate pain meds.   Seen by vascular and has a permacath on right chest for HD, plan for possible surgery.  Viral load is high.     PAST MEDICAL & SURGICAL HISTORY:  Seizure disorder  Hypertension  ESRD (end stage renal disease)  Seizure  Pericarditis  Anxiety  Depression  Renal failure (ARF), acute on chronic: dialysis av fistula, RUE  HTN (hypertension)  Cardiomyopathy  HIV disease: born HIV+  AV fistula  S/P tonsillectomy    Social Hx: no smoking, ETOH or drugs reportedly    FAMILY HISTORY:  No pertinent family history in first degree relatives: Unknown FHx because pt is adopted    Allergies:  vancomycin (Anaphylaxis)    Antibiotics:  None     REVIEW OF SYSTEMS:  as above  all other ROS are negative    Physical Exam:  Vital Signs Last 24 Hrs  T(C): 36.4 (06 Dec 2018 07:33), Max: 37.2 (05 Dec 2018 12:10)  T(F): 97.5 (06 Dec 2018 07:33), Max: 98.9 (05 Dec 2018 12:10)  HR: 83 (06 Dec 2018 07:33) (70 - 89)  BP: 109/74 (06 Dec 2018 07:33) (109/74 - 134/93)  RR: 18 (06 Dec 2018 07:33) (18 - 20)  SpO2: 100% (06 Dec 2018 07:33) (98% - 100%)  GEN: seems conformable but slightly drowsy   HEENT: normocephalic and atraumatic. blind both eyes  NECK: Supple. No carotid bruits.    LUNGS: diminished breath sounds bilaterally. Permacath on right chest  HEART: s1 and s2 normal  ABDOMEN: Soft, nontender, and nondistended.  Positive bowel sounds.    EXTREMITIES: Without any  edema.  Right upper extremity with AVF, no sign of infection or cellulitis or bleeding  MSK: no joint swelling  SKIN: No ulceration or induration present.    Labs:  12-05    140  |  94<L>  |  59.0<H>  ----------------------------<  88  3.7   |  27.0  |  13.39<H>    Ca    9.0      05 Dec 2018 08:07  Phos  7.2     12-05  Mg     2.4     12-05                     8.8    2.1   )-----------( 102      ( 05 Dec 2018 08:09 )             28.2     RECENT CULTURES:  None    All other data and imaging are reviewed.

## 2018-12-06 NOTE — PROGRESS NOTE ADULT - SUBJECTIVE AND OBJECTIVE BOX
TEDDY CRAWFORD    [id number]    28y      Male    CC: Patient is a 28y old  Male who presents with a chief complaint of Aneurysmal Fistula (03 Dec 2018 23:23)    SUBJECTIVE & OBJECTIVE:   Pt seen and examined at bedside. Was asking when he can go for AV fistula graft revision. Patient was sleepy and groggy in am, fentanyl patch was dc. patient was started on oxycodone 10 mg by pain management.    ROS: Denies chest pain, SOB, N/V, abdominal pain, fevers or chills.    Vital Signs Last 24 Hrs  T(C): 36.4 (06 Dec 2018 07:33), Max: 37.2 (05 Dec 2018 16:13)  T(F): 97.5 (06 Dec 2018 07:33), Max: 98.9 (05 Dec 2018 16:13)  HR: 83 (06 Dec 2018 07:33) (70 - 86)  BP: 109/74 (06 Dec 2018 07:33) (109/74 - 128/85)  BP(mean): --  RR: 18 (06 Dec 2018 07:33) (18 - 20)  SpO2: 100% (06 Dec 2018 07:33) (99% - 100%)    PHYSICAL EXAM:    GENERAL: young male patient, laying in bed, NAD, perma cath in right chest wall, legally blind  HEAD:  Atraumatic, Normocephalic  EYES: EOMI, PERRLA, conjunctiva and sclera clear  ENMT: Moist mucous membranes  NECK: Supple   NERVOUS SYSTEM:  Alert & Oriented X3   CHEST/LUNG: Clear to auscultation bilaterally   HEART: Regular rate and rhythm; + S1/S2  ABDOMEN: Soft, Nontender, Nondistended; Bowel sounds present  EXTREMITIES:  RUE AVF + pseudoaneursym    LABS:                        8.8    2.1   )-----------( 102      ( 05 Dec 2018 08:09 )             28.2     12-05    140  |  94<L>  |  59.0<H>  ----------------------------<  88  3.7   |  27.0  |  13.39<H>    Ca    9.0      05 Dec 2018 08:07  Phos  7.2     12-05  Mg     2.4     12-05              MEDICATIONS  (STANDING):  aspirin  chewable 81 milliGRAM(s) Oral daily  atovaquone Suspension 750 milliGRAM(s) Oral every 12 hours  calcium acetate 667 milliGRAM(s) Oral three times a day with meals  chlorhexidine 2% Cloths 1 Application(s) Topical <User Schedule>  darunavir 600 milliGRAM(s) Oral every 12 hours  dolutegravir 50 milliGRAM(s) Oral two times a day  epoetin nithin Injectable 68686 Unit(s) IV Push <User Schedule>  etravirine 200 milliGRAM(s) Oral two times a day after meals  gabapentin 100 milliGRAM(s) Oral two times a day  heparin  Injectable 5000 Unit(s) SubCutaneous every 8 hours  levETIRAcetam 500 milliGRAM(s) Oral two times a day  ritonavir Tablet 100 milliGRAM(s) Oral two times a day  sodium chloride 0.9% lock flush 3 milliLiter(s) IV Push every 8 hours  tenofovir disoproxil fumarate (VIREAD) 300 milliGRAM(s) Oral <User Schedule>    MEDICATIONS  (PRN):  acetaminophen   Tablet .. 650 milliGRAM(s) Oral every 6 hours PRN Moderate Pain (4 - 6)  ALPRAZolam 2 milliGRAM(s) Oral daily PRN Anxiety  oxyCODONE    IR 10 milliGRAM(s) Oral every 3 hours PRN Severe Pain (7 - 10)      RADIOLOGY & ADDITIONAL TESTS:

## 2018-12-06 NOTE — PROGRESS NOTE ADULT - ASSESSMENT
29 y/o man with a PMH of poorly controlled HIV due to nonadherence, ESRD, seizures disorder, admitted to have surgery on R arm AV fistula possible graft placement.   Last CD4=97 from 9/30, not clear if taking his meds regularly or not. Viral load came back even higher than last time. He has a very resistant HIV on multiple meds with nonadherence.    If I find an appropriate HIV study for Resistant HIV, will refer him. Ibalizumab is approved in Reistant cases but not studied in renal failure or HD patients, so wouldn't be safe to administer in this case. I am suspecting that his CD4 or viral load improves soon, if no new meds available soon within one year or so, his prognosis is poor.     HIV/AIDS  Right Arm Aneurysm on AV fistula site    -No need for any antibiotics  -Viral load is high so either not taking meds or due to highly resistant virus it is not working.   -Will continue on Mepron 1500mg daily for PCP prophylaxis  -Will Continue ARV as below:   TDF 300mg one tab weekly   Tivicay 50mg bid   Darunavir 600mg bid   Norvir 100mg bid   Etravirine 200mg bid  -If next viral load in about 3-4 weeks is still high will stop all meds.     Will follow.

## 2018-12-06 NOTE — PROGRESS NOTE ADULT - SUBJECTIVE AND OBJECTIVE BOX
Patient is a 28y old  Male who presents with a chief complaint of AV Fistula aneurysm (04 Dec 2018 14:44)   Acute  renal failure.    HPI:  27 y/o male with PMHx of ESRD on HD (MWF), cardiomyopathy, seizure disorder, anxiety, depression came to the ED complaining of R arm pain near the fistular site. Patient said the pain has been going on for many years. Describes the pain as a sharp spasm that occasionally radiates into the chest and back of the neck. Patient said  he was recently told by his vascular surgeon and told to come to the ED for revision surgery and graft placement due to the site being aneurysmal. Patient has no fever, chills, diaphoresis, nausea/vomiting, chest pain, abdominal pain, change in bowel habit. (03 Dec 2018 23:26)   Hx renal stones x1 not aware of type, no other renal  problems; elevated creatinine on admission.    Renal consulted for ESRD on HD MWF. The patient complains of mild pain to where the PC was placed. Otherwise, has no other focal complaints at this time.     PAST MEDICAL & SURGICAL HISTORY:  Seizure disorder  Hypertension  ESRD (end stage renal disease)  Seizure  Pericarditis  Anxiety  Depression  Renal failure (ARF), acute on chronic: dialysis av fistula, RUE  HTN (hypertension)  Cardiomyopathy  HIV disease: born HIV+  AV fistula  S/P tonsillectomy   DM 2, MO, hypothyroidism, prior DVT and IVC filter; Cholecystectomy    FAMILY HISTORY:  No pertinent family history in first degree relatives: Unknown FHx because pt is adopted  [place]    Social History:Non smoker    MEDICATIONS  (STANDING):  aspirin  chewable 81 milliGRAM(s) Oral daily  atovaquone Suspension 750 milliGRAM(s) Oral every 12 hours  calcium acetate 667 milliGRAM(s) Oral three times a day with meals  chlorhexidine 2% Cloths 1 Application(s) Topical <User Schedule>  darunavir 600 milliGRAM(s) Oral every 12 hours  dolutegravir 50 milliGRAM(s) Oral two times a day  etravirine 200 milliGRAM(s) Oral two times a day after meals  gabapentin 100 milliGRAM(s) Oral two times a day  levETIRAcetam 500 milliGRAM(s) Oral two times a day  ritonavir Tablet 100 milliGRAM(s) Oral two times a day  sodium chloride 0.9% lock flush 3 milliLiter(s) IV Push every 8 hours  tenofovir disoproxil fumarate (VIREAD) 300 milliGRAM(s) Oral <User Schedule>    MEDICATIONS  (PRN):  acetaminophen   Tablet .. 650 milliGRAM(s) Oral every 6 hours PRN Moderate Pain (4 - 6)  ALPRAZolam 2 milliGRAM(s) Oral daily PRN Anxiety   Meds reviewed    Allergies    vancomycin (Anaphylaxis)    Intolerances    prefers vanilla or butter pecan nepro x 2 TID RDOK (Unknown)       REVIEW OF SYSTEMS:    CONSTITUTIONAL:  pain where PC was inserted  EYES: No eye pain, visual disturbances, or discharge  ENMT:  No difficulty hearing, tinnitus, vertigo; No sinus or throat pain  NECK: No pain or stiffness  BREASTS: No pain, masses, or nipple discharge  RESPIRATORY: neg  CARDIOVASCULAR: No chest pain, palpitations, dizziness,   GASTROINTESTINAL: No abdominal or epigastric pain. No nausea, vomiting, or hematemesis; No diarrhea or constipation. No melena   GENITOURINARY: No dysuria, frequency, hematuria, or incontinence  NEUROLOGICAL: No headaches, memory loss, loss of strength, numbness, or tremors  SKIN: no rash  LYMPH NODES: No enlarged glands  ENDOCRINE: No heat or cold intolerance; No hair loss  MUSCULOSKELETAL: no edema  PSYCHIATRIC: No depression, anxiety, mood swings, or difficulty sleeping  HEME/LYMPH: No easy bruising, or bleeding gums  ALLERGY AND IMMUNOLOGIC: No hives or eczema    Vital Signs Last 24 Hrs  T(C): 36.4 (04 Dec 2018 23:48), Max: 36.8 (04 Dec 2018 16:01)  T(F): 97.6 (04 Dec 2018 23:48), Max: 98.2 (04 Dec 2018 16:01)  HR: 76 (05 Dec 2018 05:42) (73 - 100)  BP: 135/85 (05 Dec 2018 05:42) (120/83 - 135/85)  BP(mean): --  RR: 18 (04 Dec 2018 23:48) (16 - 18)  SpO2: 97% (04 Dec 2018 23:48) (96% - 100%)  Daily     Daily     PHYSICAL EXAM:    GENERAL: appears chronically ill, no distress  HEAD:  Atraumatic, Normocephalic  EYES: Conjunctiva and sclera clear  ENMT: Moist mucous membranes, Poor dentition, No lesions  NECK: Supple, No JVD; +PC intact, no erythema, no bleeding, no rash  NERVOUS SYSTEM:  Alert & Oriented X3, Good concentration; Motor Strength wnl upper and lower extremities  CHEST/LUNG: Clear to percussion bilaterally; No rales, rhonchi, wheezing, or rubs  HEART: Regular rate and rhythm; No murmurs, rubs, or gallops  ABDOMEN: Soft, Nontender, Nondistended; Bowel sounds present  EXTREMITIES:  No edema B/L; +hypertrophic RUE AVF  LYMPH: No lymphadenopathy noted  SKIN: No rashes or lesions, pale    LABS:  Reviewed

## 2018-12-07 ENCOUNTER — TRANSCRIPTION ENCOUNTER (OUTPATIENT)
Age: 28
End: 2018-12-07

## 2018-12-07 LAB
ANION GAP SERPL CALC-SCNC: 17 MMOL/L — SIGNIFICANT CHANGE UP (ref 5–17)
BUN SERPL-MCNC: 61 MG/DL — HIGH (ref 8–20)
CALCIUM SERPL-MCNC: 8.8 MG/DL — SIGNIFICANT CHANGE UP (ref 8.6–10.2)
CHLORIDE SERPL-SCNC: 97 MMOL/L — LOW (ref 98–107)
CO2 SERPL-SCNC: 27 MMOL/L — SIGNIFICANT CHANGE UP (ref 22–29)
CREAT SERPL-MCNC: 11.46 MG/DL — HIGH (ref 0.5–1.3)
GLUCOSE SERPL-MCNC: 111 MG/DL — SIGNIFICANT CHANGE UP (ref 70–115)
HCT VFR BLD CALC: 27.9 % — LOW (ref 42–52)
HGB BLD-MCNC: 8.7 G/DL — LOW (ref 14–18)
LEVETIRACETAM SERPL-MCNC: 36.4 MCG/ML — SIGNIFICANT CHANGE UP (ref 12–46)
MCHC RBC-ENTMCNC: 30 PG — SIGNIFICANT CHANGE UP (ref 27–31)
MCHC RBC-ENTMCNC: 31.2 G/DL — LOW (ref 32–36)
MCV RBC AUTO: 96.2 FL — HIGH (ref 80–94)
PLATELET # BLD AUTO: 107 K/UL — LOW (ref 150–400)
POTASSIUM SERPL-MCNC: 4.1 MMOL/L — SIGNIFICANT CHANGE UP (ref 3.5–5.3)
POTASSIUM SERPL-SCNC: 4.1 MMOL/L — SIGNIFICANT CHANGE UP (ref 3.5–5.3)
RBC # BLD: 2.9 M/UL — LOW (ref 4.6–6.2)
RBC # FLD: 16.7 % — HIGH (ref 11–15.6)
SODIUM SERPL-SCNC: 141 MMOL/L — SIGNIFICANT CHANGE UP (ref 135–145)
WBC # BLD: 2.4 K/UL — LOW (ref 4.8–10.8)
WBC # FLD AUTO: 2.4 K/UL — LOW (ref 4.8–10.8)

## 2018-12-07 PROCEDURE — 99232 SBSQ HOSP IP/OBS MODERATE 35: CPT | Mod: GC

## 2018-12-07 PROCEDURE — 99232 SBSQ HOSP IP/OBS MODERATE 35: CPT

## 2018-12-07 RX ORDER — CHLORHEXIDINE GLUCONATE 213 G/1000ML
1 SOLUTION TOPICAL
Qty: 0 | Refills: 0 | COMMUNITY
Start: 2018-12-07

## 2018-12-07 RX ORDER — GABAPENTIN 400 MG/1
1 CAPSULE ORAL
Qty: 0 | Refills: 0 | COMMUNITY
Start: 2018-12-07

## 2018-12-07 RX ORDER — CALCIUM ACETATE 667 MG
1 TABLET ORAL
Qty: 90 | Refills: 0 | OUTPATIENT
Start: 2018-12-07 | End: 2019-01-05

## 2018-12-07 RX ADMIN — SODIUM CHLORIDE 3 MILLILITER(S): 9 INJECTION INTRAMUSCULAR; INTRAVENOUS; SUBCUTANEOUS at 23:03

## 2018-12-07 RX ADMIN — HEPARIN SODIUM 5000 UNIT(S): 5000 INJECTION INTRAVENOUS; SUBCUTANEOUS at 23:03

## 2018-12-07 RX ADMIN — ERYTHROPOIETIN 10000 UNIT(S): 10000 INJECTION, SOLUTION INTRAVENOUS; SUBCUTANEOUS at 10:09

## 2018-12-07 RX ADMIN — RITONAVIR 100 MILLIGRAM(S): 100 TABLET, FILM COATED ORAL at 18:40

## 2018-12-07 RX ADMIN — SODIUM CHLORIDE 3 MILLILITER(S): 9 INJECTION INTRAMUSCULAR; INTRAVENOUS; SUBCUTANEOUS at 05:50

## 2018-12-07 RX ADMIN — GABAPENTIN 100 MILLIGRAM(S): 400 CAPSULE ORAL at 17:22

## 2018-12-07 RX ADMIN — OXYCODONE HYDROCHLORIDE 10 MILLIGRAM(S): 5 TABLET ORAL at 15:30

## 2018-12-07 RX ADMIN — GABAPENTIN 100 MILLIGRAM(S): 400 CAPSULE ORAL at 05:49

## 2018-12-07 RX ADMIN — RITONAVIR 100 MILLIGRAM(S): 100 TABLET, FILM COATED ORAL at 07:59

## 2018-12-07 RX ADMIN — OXYCODONE HYDROCHLORIDE 10 MILLIGRAM(S): 5 TABLET ORAL at 14:42

## 2018-12-07 RX ADMIN — ATOVAQUONE 750 MILLIGRAM(S): 750 SUSPENSION ORAL at 07:59

## 2018-12-07 RX ADMIN — DARUNAVIR 600 MILLIGRAM(S): 75 TABLET, FILM COATED ORAL at 07:59

## 2018-12-07 RX ADMIN — Medication 81 MILLIGRAM(S): at 14:42

## 2018-12-07 RX ADMIN — ETRAVIRINE 200 MILLIGRAM(S): 200 TABLET ORAL at 18:40

## 2018-12-07 RX ADMIN — DARUNAVIR 600 MILLIGRAM(S): 75 TABLET, FILM COATED ORAL at 17:23

## 2018-12-07 RX ADMIN — OXYCODONE HYDROCHLORIDE 10 MILLIGRAM(S): 5 TABLET ORAL at 23:14

## 2018-12-07 RX ADMIN — DOLUTEGRAVIR SODIUM 50 MILLIGRAM(S): 25 TABLET, FILM COATED ORAL at 07:59

## 2018-12-07 RX ADMIN — ATOVAQUONE 750 MILLIGRAM(S): 750 SUSPENSION ORAL at 17:23

## 2018-12-07 RX ADMIN — SODIUM CHLORIDE 3 MILLILITER(S): 9 INJECTION INTRAMUSCULAR; INTRAVENOUS; SUBCUTANEOUS at 14:41

## 2018-12-07 RX ADMIN — ETRAVIRINE 200 MILLIGRAM(S): 200 TABLET ORAL at 08:18

## 2018-12-07 RX ADMIN — LEVETIRACETAM 500 MILLIGRAM(S): 250 TABLET, FILM COATED ORAL at 05:48

## 2018-12-07 RX ADMIN — Medication 667 MILLIGRAM(S): at 17:23

## 2018-12-07 RX ADMIN — LEVETIRACETAM 500 MILLIGRAM(S): 250 TABLET, FILM COATED ORAL at 17:23

## 2018-12-07 RX ADMIN — Medication 667 MILLIGRAM(S): at 07:59

## 2018-12-07 RX ADMIN — CHLORHEXIDINE GLUCONATE 1 APPLICATION(S): 213 SOLUTION TOPICAL at 05:49

## 2018-12-07 RX ADMIN — DOLUTEGRAVIR SODIUM 50 MILLIGRAM(S): 25 TABLET, FILM COATED ORAL at 17:23

## 2018-12-07 RX ADMIN — SODIUM CHLORIDE 3 MILLILITER(S): 9 INJECTION INTRAMUSCULAR; INTRAVENOUS; SUBCUTANEOUS at 00:00

## 2018-12-07 NOTE — DISCHARGE NOTE ADULT - MEDICATION SUMMARY - MEDICATIONS TO STOP TAKING
I will STOP taking the medications listed below when I get home from the hospital:    Augmentin 875 mg-125 mg oral tablet  -- 875 milligram(s) by mouth every 12 hours   -- Finish all this medication unless otherwise directed by prescriber.  Take with food or milk. I will STOP taking the medications listed below when I get home from the hospital:    methocarbamol 500 mg oral tablet  -- 1 tab(s) by mouth 3 times a day    Augmentin 875 mg-125 mg oral tablet  -- 875 milligram(s) by mouth every 12 hours   -- Finish all this medication unless otherwise directed by prescriber.  Take with food or milk.

## 2018-12-07 NOTE — PROGRESS NOTE ADULT - ASSESSMENT
27 y/o man with a PMH of poorly controlled HIV due to nonadherence, ESRD, seizures disorder, admitted to have surgery on R arm AV fistula possible graft placement.   Last CD4=97 from 9/30, not clear if taking his meds regularly or not. Viral load came back even higher than last time. He has a very resistant HIV on multiple meds with nonadherence.    If I find an appropriate HIV study for Resistant HIV, will refer him. Ibalizumab is approved in Resistant cases but not studied in renal failure or HD patients, so wouldn't be safe to administer in this case. I am suspecting that his CD4 or viral load will improve in next few months, Poor prognosis if no new meds available soon within one year or so.      HIV/AIDS  Right Arm Aneurysm on AV fistula site    -No need for any antibiotics  -Viral load is high so either not taking meds or due to highly resistant virus ARV is not working.   -Will continue on Mepron 1500mg daily for PCP prophylaxis  -Will Continue ARV as below:   TDF 300mg one tab weekly   Tivicay 50mg bid   Darunavir 600mg bid   Norvir 100mg bid   Etravirine 200mg bid  -Will see him as outpatietn for repeat Viral load in about 3 weeks, if no changes will stop all meds.     Will sign off please call with any question.

## 2018-12-07 NOTE — PROGRESS NOTE ADULT - SUBJECTIVE AND OBJECTIVE BOX
Pt seen and evaluated yesterday afternoon, 12/6  Has RUE diffusely aneurysmal megafistula, causing chronic pain, on narcotics  Has chronic dependence on narcotics and with pain management on board  Previously being accessed, but now s/p RIJ permcath placement 12/4, now successfully being used  Ultimate plan is for fistula ligation and aneurysm resection due to the pain to provide palliation  Of significance, pt also with hx of HIV and AIDS, poorly compliant on antiviral therapy, recently seen by and established care with Dr. Navarro  Therapy initiated in Oct 2018, but with poor compliance and/or highly resistant virus, he remains with high viral loads and low white cell counts  Given that, he is at prohibitive risk for a surgical site infection and will defer operative therapy (fistula resection) until better optimized. Now that his fistula is no longer being accessed, his pain should improve. Pt is concerned about risk of rupture and bleeding from fistula, but reassured pt that with intact overlying skin and no need for cannulation, that he is at no risk of that  Signing off; can follow as outpt

## 2018-12-07 NOTE — DISCHARGE NOTE ADULT - CONTRAINDICATIONS & PRECAUTIONS (SELECT ALL THAT APPLY)
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

## 2018-12-07 NOTE — DISCHARGE NOTE ADULT - MEDICATION SUMMARY - MEDICATIONS TO TAKE
I will START or STAY ON the medications listed below when I get home from the hospital:    acetaminophen 325 mg oral tablet  -- 2 tab(s) by mouth every 6 hours, As needed, Mild Pain (1 - 3)  -- Indication: For pain    aspirin 81 mg oral delayed release tablet  -- 1 tab(s) by mouth once a day  -- Indication: For home meds    Dilaudid 2 mg oral tablet  -- 1 tab(s) by mouth 3 times a week 30 misn before dialysis MDD:1  -- Caution federal law prohibits the transfer of this drug to any person other  than the person for whom it was prescribed.  May cause drowsiness.  Alcohol may intensify this effect.  Use care when operating dangerous machinery.  This prescription cannot be refilled.  Using more of this medication than prescribed may cause serious breathing problems.    -- Indication: For pain    gabapentin 100 mg oral capsule  -- 1 cap(s) by mouth 2 times a day  -- Indication: For pain    levETIRAcetam 500 mg oral tablet  -- 1 tab(s) by mouth 2 times a day  -- Indication: For seizures    diphenhydrAMINE 25 mg oral capsule  -- 1 cap(s) by mouth 1 to 2 times a day, As Needed -Rash and/or Itching   -- Indication: For ithcing    chlorhexidine 2% topical pad  -- 1 application on skin   -- Indication: For ESRD (end stage renal disease)    darunavir 75 mg oral tablet  -- 8 tab(s) by mouth 2 times a day  -- Indication: For hiv    dolutegravir 50 mg oral tablet  -- 1 tab(s) by mouth 2 times a day  -- Indication: For hiv    etravirine 200 mg oral tablet  -- 1 tab(s) by mouth 2 times a day (after meals)  -- Indication: For hiv    ritonavir 100 mg oral tablet  -- 1 tab(s) by mouth 2 times a day  -- Indication: For hiv    tenofovir disoproxil fumarate 300 mg oral tablet  -- 1 tab(s) by mouth every 7 days on Thursday  -- Indication: For hiv    zolpidem 5 mg oral tablet  -- 1 tab(s) by mouth once a day (at bedtime), As needed, Insomnia  -- Indication: For insonia    ALPRAZolam 2 mg oral tablet  -- 1 tab(s) by mouth once a day, As needed, anxiety  -- Indication: For Anxiety    atovaquone 750 mg/5 mL oral suspension  -- 5 milliliter(s) by mouth 2 times a day  -- Indication: For hiv    methocarbamol 500 mg oral tablet  -- 1 tab(s) by mouth 3 times a day  -- Indication: For muscle relaxent    calcium acetate 667 mg oral tablet  -- 1 tab(s) by mouth 3 times a day   -- Indication: For ESRD (end stage renal disease) I will START or STAY ON the medications listed below when I get home from the hospital:    acetaminophen 325 mg oral tablet  -- 2 tab(s) by mouth every 8 hours x 30 days, As Needed -Mild Pain (1 - 3)   -- Indication: For pain    Dilaudid 2 mg oral tablet  -- 1 tab(s) by mouth every 8 hours x 3 days  30 misn before dialysis MDD:6 mg /24 hours  -- Caution federal law prohibits the transfer of this drug to any person other  than the person for whom it was prescribed.  May cause drowsiness.  Alcohol may intensify this effect.  Use care when operating dangerous machinery.  This prescription cannot be refilled.  Using more of this medication than prescribed may cause serious breathing problems.    -- Indication: For pain    aspirin 81 mg oral delayed release tablet  -- 1 tab(s) by mouth once a day  -- Indication: For Home meds    fentaNYL 12 mcg/hr transdermal film, extended release  -- 1 patch by transdermal patch every 72 hours MDD:12 mcg/h for 72 hours.  -- Indication: For pain    gabapentin 100 mg oral capsule  -- 1 cap(s) by mouth 2 times a day MDD:200 mg/24 h  -- Indication: For pain    levETIRAcetam 500 mg oral tablet  -- 1 tab(s) by mouth 2 times a day  -- Indication: For seziures    diphenhydrAMINE 25 mg oral capsule  -- 1 cap(s) by mouth 1 to 2 times a day, As Needed -Rash and/or Itching   -- Indication: For itching    chlorhexidine 2% topical pad  -- 1 application on skin   -- Indication: For ESRD (end stage renal disease)    darunavir 600 mg oral tablet  -- 1 tab(s) by mouth 2 times a day   -- Check with your doctor before becoming pregnant.  It is very important that you take or use this exactly as directed.  Do not skip doses or discontinue unless directed by your doctor.  Obtain medical advice before taking any non-prescription drugs as some may affect the action of this medication.  Swallow whole.  Do not crush.  Take with food or milk.    -- Indication: For HIV disease    dolutegravir 50 mg oral tablet  -- 1 tab(s) by mouth 2 times a day  -- Indication: For HIV disease    etravirine 200 mg oral tablet  -- 1 tab(s) by mouth 2 times a day (after meals)  -- Indication: For HIV disease    ritonavir 100 mg oral tablet  -- 1 tab(s) by mouth 2 times a day  -- Indication: For HIV disease    tenofovir disoproxil fumarate 300 mg oral tablet  -- 1 tab(s) by mouth every 7 days on Thursday  -- Indication: For HIV disease    ALPRAZolam 2 mg oral tablet  -- 1 tab(s) by mouth once a day, As needed, anxiety MDD:2 mg /24 hours  -- Indication: For Anxiety    zolpidem 5 mg oral tablet  -- 1 tab(s) by mouth once a day (at bedtime), As needed, Insomnia MDD:5 mg /24 hours  -- Indication: For sleep    atovaquone 750 mg/5 mL oral suspension  -- 5 milliliter(s) by mouth every 12 hours   -- Finish all this medication unless otherwise directed by prescriber.  Shake well before use.  Take with food or milk.    -- Indication: For HIV disease    calcium acetate 667 mg oral tablet  -- 1 tab(s) by mouth 3 times a day   -- Indication: For Ckd

## 2018-12-07 NOTE — DISCHARGE NOTE ADULT - MEDICATION SUMMARY - MEDICATIONS TO CHANGE
I will SWITCH the dose or number of times a day I take the medications listed below when I get home from the hospital:  None I will SWITCH the dose or number of times a day I take the medications listed below when I get home from the hospital:    Dilaudid 1 mg/mL oral liquid  -- 1 milliliter(s) by mouth 3 times a week x 10 days, As Needed take 30 mins before dialysis on HD days MDD:1  -- Caution federal law prohibits the transfer of this drug to any person other  than the person for whom it was prescribed.  May cause drowsiness.  Alcohol may intensify this effect.  Use care when operating dangerous machinery.  This prescription cannot be refilled.  Using more of this medication than prescribed may cause serious breathing problems.    fentaNYL 50 mcg/hr transdermal film, extended release  -- Apply on skin to affected area every 72 hours MDD:1  -- Caution federal law prohibits the transfer of this drug to any person other  than the person for whom it was prescribed.  Do not use unless you have been treated with another narcotic pain medicine and you are tolerant to it.  For external use only.  It is very important that you take or use this exactly as directed.  Do not skip doses or discontinue unless directed by your doctor.  May cause drowsiness.  Alcohol may intensify this effect.  Use care when operating dangerous machinery.  Remove old patch prior to applying a new patch.  This prescription cannot be refilled.  Using more of this medication than prescribed may cause serious breathing problems.    Dilaudid 2 mg oral tablet  -- 1 tab(s) by mouth 3 times a week 30 misn before dialysis MDD:1  -- Caution federal law prohibits the transfer of this drug to any person other  than the person for whom it was prescribed.  May cause drowsiness.  Alcohol may intensify this effect.  Use care when operating dangerous machinery.  This prescription cannot be refilled.  Using more of this medication than prescribed may cause serious breathing problems.

## 2018-12-07 NOTE — PROGRESS NOTE ADULT - ASSESSMENT
ESRD on HD MWF  Malfunction of AVF  HIV  Chronic Pain  Anemia    -HD MWF; Proceed with dialysis today 12/7/18 as ordered; tolerating. Orders reviewed.   -HAART therapy per ID  -Vascular follow up noted; pending AVF revision when WBC improves/HIV is better controlled; the patient is noncompliant with his medications as an outpatient.   -Procrit    Thank you    D/W HD RN

## 2018-12-07 NOTE — DISCHARGE NOTE ADULT - HOSPITAL COURSE
Patient is a 28 year old male with PMH of HIV, ESRD on HD (MWF), cardiomyopathy, seizure disorder, anxiety, legally blind, depression and chronic pain syndrome who presented to the ED  complaining of right upper extremity pain at fistula site. s/p Perma cath placement on 12/4. and receives HD M/W/F. Pain management and vascular involved with ID for HIV.      Right arm pain due to RUE aneurysmal fistula     pain management consulted was started on Oxycodone 10 mg q 3 h prn and fentanyl patch  Scheuduled for HD M/W/F  Discussed with vascular and mentioned HIV needs to be controlled prior to revision and graft placement. Discussed with ID, patient has resistant HIV with resistant genotype, been non complaint with HIV meds as an outpatient. Per ID, no need for antbx at this time.  Vascular will do revision as an outpatient      ESRD  HD on M/W/F  SHPT - start phoslo TID with meals  Strict I/Os, daily weights  Avoid nephrotoxic agents and renally dose all medications for eGFR < 10      HIV   last CD4 97  Continue home regimen   ID evaluation appreciated    Anxiety   Xanax 2mg PRN    Seizure   Keppra 500mg BID       Chronic pain   Gabapentin 100mg BID   Pain management note appreciated    Anemia  likely anemia of chronic disease  check iron studies  EPO/IV iron per renal    Pancytopenia  -likely secondary to HIV  -monitor counts closely        Vital Signs Last 24 Hrs  T(C): 36.4 (06 Dec 2018 07:33), Max: 37.2 (05 Dec 2018 16:13)  T(F): 97.5 (06 Dec 2018 07:33), Max: 98.9 (05 Dec 2018 16:13)  HR: 83 (06 Dec 2018 07:33) (70 - 86)  BP: 109/74 (06 Dec 2018 07:33) (109/74 - 128/85)  BP(mean): --  RR: 18 (06 Dec 2018 07:33) (18 - 20)  SpO2: 100% (06 Dec 2018 07:33) (99% - 100%)    PHYSICAL EXAM:    GENERAL: young male patient, laying in bed, NAD, perma cath in right chest wall, legally blind  HEAD:  Atraumatic, Normocephalic  EYES: blind, conjunctiva and sclera clear  ENMT: Moist mucous membranes  NECK: Supple   NERVOUS SYSTEM:  Alert & Oriented X3   CHEST/LUNG: Clear to auscultation bilaterally   HEART: Regular rate and rhythm; + S1/S2  ABDOMEN: Soft, Nontender, Nondistended; Bowel sounds present  EXTREMITIES:  RUE AVF + pseudoaneursym      I spent 40 min in discharging the patient Patient is a 28 year old male with PMH of HIV, ESRD on HD (MWF), cardiomyopathy, seizure disorder, anxiety, legally blind, depression and chronic pain syndrome who presented to the ED  complaining of right upper extremity pain at fistula site. s/p Perma cath placement on 12/4. and receives HD M/W/F. Pain management and vascular involved with ID for HIV.      Right arm pain due to RUE aneurysmal fistula     pain management consulted was started on Oxycodone 10 mg q 3 h prn and fentanyl patch. patient will be discharged with fentanyl patch 12 mcg and dilaudid for 3 days supply. Has to follow up with pain management   Scheduled for HD M/W/F  Discussed with vascular and mentioned HIV needs to be controlled prior to revision and graft placement. Discussed with ID, patient has resistant HIV with resistant genotype, been non complaint with HIV meds as an outpatient. Per ID, no need for antbx at this time.  Vascular will do revision as an outpatient      ESRD  HD on M/W/F  SHPT - start phoslo TID with meals  Strict I/Os, daily weights  Avoid nephrotoxic agents and renally dose all medications for eGFR < 10      HIV   last CD4 97  Continue home regimen   ID evaluation appreciated    Anxiety   Xanax 2mg PRN    Seizure   Keppra 500mg BID       Chronic pain   Gabapentin 100mg BID   Pain management note appreciated    Anemia  likely anemia of chronic disease  check iron studies  EPO/IV iron per renal    Pancytopenia  -likely secondary to HIV  -monitor counts closely        Vital Signs Last 24 Hrs  T(C): 36.4 (06 Dec 2018 07:33), Max: 37.2 (05 Dec 2018 16:13)  T(F): 97.5 (06 Dec 2018 07:33), Max: 98.9 (05 Dec 2018 16:13)  HR: 83 (06 Dec 2018 07:33) (70 - 86)  BP: 109/74 (06 Dec 2018 07:33) (109/74 - 128/85)  BP(mean): --  RR: 18 (06 Dec 2018 07:33) (18 - 20)  SpO2: 100% (06 Dec 2018 07:33) (99% - 100%)    PHYSICAL EXAM:    GENERAL: young male patient, laying in bed, NAD, perma cath in right chest wall, legally blind  HEAD:  Atraumatic, Normocephalic  EYES: blind, conjunctiva and sclera clear  ENMT: Moist mucous membranes  NECK: Supple   NERVOUS SYSTEM:  Alert & Oriented X3   CHEST/LUNG: Clear to auscultation bilaterally   HEART: Regular rate and rhythm; + S1/S2  ABDOMEN: Soft, Nontender, Nondistended; Bowel sounds present  EXTREMITIES:  RUE AVF + pseudoaneursym      I spent 40 min in discharging the patient

## 2018-12-07 NOTE — PROGRESS NOTE ADULT - SUBJECTIVE AND OBJECTIVE BOX
NEPHROLOGY INTERVAL HPI/OVERNIGHT EVENTS:  HPI:  29 y/o male with PMHx of ESRD on HD (MWF), cardiomyopathy, seizure disorder, anxiety, depression came to the ED complaining of R arm pain near the fistular site. Patient said the pain has been going on for many years. Describes the pain as a sharp spasm that occasionally radiates into the chest and back of the neck. Patient said  he was recently told by his vascular surgeon and told to come to the ED for revision surgery and graft placement due to the site being aneurysmal. Patient has no fever, chills, diaphoresis, nausea/vomiting, chest pain, abdominal pain, change in bowel habit. (03 Dec 2018 23:26)    Follow up ESRD  No acute events noted    PAST MEDICAL & SURGICAL HISTORY:  Seizure disorder  Hypertension  ESRD (end stage renal disease)  Seizure  Pericarditis  Anxiety  Depression  Renal failure (ARF), acute on chronic: dialysis av fistula, RUE  HTN (hypertension)  Cardiomyopathy  HIV disease: born HIV+  AV fistula  S/P tonsillectomy      MEDICATIONS  (STANDING):  aspirin  chewable 81 milliGRAM(s) Oral daily  atovaquone Suspension 750 milliGRAM(s) Oral every 12 hours  calcium acetate 667 milliGRAM(s) Oral three times a day with meals  chlorhexidine 2% Cloths 1 Application(s) Topical <User Schedule>  darunavir 600 milliGRAM(s) Oral every 12 hours  dolutegravir 50 milliGRAM(s) Oral two times a day  epoetin nithin Injectable 16568 Unit(s) IV Push <User Schedule>  etravirine 200 milliGRAM(s) Oral two times a day after meals  gabapentin 100 milliGRAM(s) Oral two times a day  heparin  Injectable 5000 Unit(s) SubCutaneous every 8 hours  levETIRAcetam 500 milliGRAM(s) Oral two times a day  ritonavir Tablet 100 milliGRAM(s) Oral two times a day  sodium chloride 0.9% lock flush 3 milliLiter(s) IV Push every 8 hours  tenofovir disoproxil fumarate (VIREAD) 300 milliGRAM(s) Oral <User Schedule>    MEDICATIONS  (PRN):  acetaminophen   Tablet .. 650 milliGRAM(s) Oral every 6 hours PRN Moderate Pain (4 - 6)  ALPRAZolam 2 milliGRAM(s) Oral daily PRN Anxiety  oxyCODONE    IR 10 milliGRAM(s) Oral every 3 hours PRN Severe Pain (7 - 10)      Allergies    vancomycin (Anaphylaxis)    Intolerances    prefers vanilla or butter pecan nepro x 2 TID RDOK (Unknown)      Vital Signs Last 24 Hrs  T(C): 36.6 (07 Dec 2018 08:39), Max: 37.7 (06 Dec 2018 15:45)  T(F): 97.9 (07 Dec 2018 08:39), Max: 99.8 (06 Dec 2018 15:45)  HR: 91 (07 Dec 2018 08:39) (77 - 96)  BP: 136/79 (07 Dec 2018 08:39) (124/89 - 136/79)  BP(mean): --  RR: 18 (07 Dec 2018 08:39) (18 - 18)  SpO2: 99% (07 Dec 2018 08:39) (98% - 100%)  Daily     Daily Weight in k.3 (07 Dec 2018 08:39)    PHYSICAL EXAM:  GENERAL: no distress  HEAD:  Atraumatic, Normocephalic  EYES: Conjunctiva and sclera clear  ENMT: Moist mucous membranes, Poor dentition, No lesions  NECK: Supple, No JVD; +PC intact, no erythema, no bleeding, no rash  NERVOUS SYSTEM:  Alert & Oriented X3, Good concentration; Motor Strength wnl upper and lower extremities  CHEST/LUNG: Clear to percussion bilaterally; No rales, rhonchi, wheezing, or rubs  HEART: Regular rate and rhythm; No murmurs, rubs, or gallops  ABDOMEN: Soft, Nontender, Nondistended; Bowel sounds present  EXTREMITIES:  No edema B/L; +hypertrophic RUE AVF  LYMPH: No lymphadenopathy noted  SKIN: No rashes or lesions, pale      LABS:                    RADIOLOGY & ADDITIONAL TESTS:

## 2018-12-07 NOTE — DISCHARGE NOTE ADULT - CARE PROVIDER_API CALL
Isaac Dahl), Vascular Surgery  [address] [place] [address]  Phone: [phone]  Fax: [phone]    Josh Navarro), Infectious Disease; Internal Medicine  [address] [place] [address]  Phone: [phone]  Fax: [phone]
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
## 2018-12-07 NOTE — DISCHARGE NOTE ADULT - PATIENT PORTAL LINK FT
You can access the [facility] Patient Portal, offered by [facility], by registering with the following website: [email or web address]
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
## 2018-12-07 NOTE — DISCHARGE NOTE ADULT - CARE PLAN
Principal Discharge DX:	ESRD (end stage renal disease)  Goal:	f/u vascualr and nephrology  Assessment and plan of treatment:	seen by vascular and recommended graft revision as an outpatient  Secondary Diagnosis:	AV fistula  Assessment and plan of treatment:	f/u with vascular as an outpatient  Secondary Diagnosis:	HIV disease  Assessment and plan of treatment:	f/u as an outpatient

## 2018-12-07 NOTE — PROGRESS NOTE ADULT - SUBJECTIVE AND OBJECTIVE BOX
[facility]  INFECTIOUS DISEASES AND INTERNAL MEDICINE at [place]  =======================================================  Howie Navarro MD  Diplomates American Board of Internal Medicine and Infectious Diseases  =======================================================    TEDDY CRAWFORD [id number]    Follow up:  Right arm pain due to AVF aneurysm  HIV    No new complaint. R arm pain and chronic back pain. Afebrile. Getting HD through R chest permacath.     PAST MEDICAL & SURGICAL HISTORY:  Seizure disorder  Hypertension  ESRD (end stage renal disease)  Seizure  Pericarditis  Anxiety  Depression  Renal failure (ARF), acute on chronic: dialysis av fistula, RUE  HTN (hypertension)  Cardiomyopathy  HIV disease: born HIV+  AV fistula  S/P tonsillectomy    Social Hx: no smoking, ETOH or drugs reportedly    FAMILY HISTORY:  No pertinent family history in first degree relatives: Unknown FHx because pt is adopted    Allergies:  vancomycin (Anaphylaxis)    Antibiotics:  None     REVIEW OF SYSTEMS:  as above  all other ROS are negative    Physical Exam:  Vital Signs Last 24 Hrs  T(C): 36.6 (07 Dec 2018 08:39), Max: 37.7 (06 Dec 2018 15:45)  T(F): 97.9 (07 Dec 2018 08:39), Max: 99.8 (06 Dec 2018 15:45)  HR: 91 (07 Dec 2018 08:39) (77 - 96)  BP: 136/79 (07 Dec 2018 08:39) (124/89 - 136/79)  RR: 18 (07 Dec 2018 08:39) (18 - 18)  SpO2: 99% (07 Dec 2018 08:39) (98% - 100%)  GEN: seems conformable but slightly drowsy   HEENT: normocephalic and atraumatic. blind both eyes  NECK: Supple. No carotid bruits.    LUNGS: diminished breath sounds bilaterally. Permacath on right chest  HEART: s1 and s2 normal  ABDOMEN: Soft, nontender, and nondistended.  Positive bowel sounds.    EXTREMITIES: Without any  edema.  Right upper extremity with AVF, no sign of infection or cellulitis or bleeding  MSK: no joint swelling  SKIN: No ulceration or induration present.    Labs:  12-07    141  |  97<L>  |  61.0<H>  ----------------------------<  111  4.1   |  27.0  |  11.46<H>    Ca    8.8      07 Dec 2018 09:15                        8.7    2.4   )-----------( 107      ( 07 Dec 2018 09:15 )             27.9     RECENT CULTURES:  None    All other data and imaging are reviewed.

## 2018-12-07 NOTE — DISCHARGE NOTE ADULT - PLAN OF CARE
seen by vascular and recommended graft revision as an outpatient f/u with vascular as an outpatient f/u as an outpatient f/u vascualr and nephrology

## 2018-12-08 PROCEDURE — 99232 SBSQ HOSP IP/OBS MODERATE 35: CPT

## 2018-12-08 RX ORDER — ALPRAZOLAM 0.25 MG
2 TABLET ORAL ONCE
Qty: 0 | Refills: 0 | Status: DISCONTINUED | OUTPATIENT
Start: 2018-12-08 | End: 2018-12-08

## 2018-12-08 RX ADMIN — GABAPENTIN 100 MILLIGRAM(S): 400 CAPSULE ORAL at 06:09

## 2018-12-08 RX ADMIN — RITONAVIR 100 MILLIGRAM(S): 100 TABLET, FILM COATED ORAL at 10:01

## 2018-12-08 RX ADMIN — Medication 667 MILLIGRAM(S): at 17:14

## 2018-12-08 RX ADMIN — HEPARIN SODIUM 5000 UNIT(S): 5000 INJECTION INTRAVENOUS; SUBCUTANEOUS at 06:10

## 2018-12-08 RX ADMIN — Medication 667 MILLIGRAM(S): at 10:00

## 2018-12-08 RX ADMIN — OXYCODONE HYDROCHLORIDE 10 MILLIGRAM(S): 5 TABLET ORAL at 22:55

## 2018-12-08 RX ADMIN — CHLORHEXIDINE GLUCONATE 1 APPLICATION(S): 213 SOLUTION TOPICAL at 06:36

## 2018-12-08 RX ADMIN — OXYCODONE HYDROCHLORIDE 10 MILLIGRAM(S): 5 TABLET ORAL at 21:55

## 2018-12-08 RX ADMIN — OXYCODONE HYDROCHLORIDE 10 MILLIGRAM(S): 5 TABLET ORAL at 10:30

## 2018-12-08 RX ADMIN — OXYCODONE HYDROCHLORIDE 10 MILLIGRAM(S): 5 TABLET ORAL at 07:18

## 2018-12-08 RX ADMIN — Medication 2 MILLIGRAM(S): at 02:58

## 2018-12-08 RX ADMIN — OXYCODONE HYDROCHLORIDE 10 MILLIGRAM(S): 5 TABLET ORAL at 10:00

## 2018-12-08 RX ADMIN — Medication 650 MILLIGRAM(S): at 06:15

## 2018-12-08 RX ADMIN — Medication 2 MILLIGRAM(S): at 17:56

## 2018-12-08 RX ADMIN — GABAPENTIN 100 MILLIGRAM(S): 400 CAPSULE ORAL at 17:14

## 2018-12-08 RX ADMIN — OXYCODONE HYDROCHLORIDE 10 MILLIGRAM(S): 5 TABLET ORAL at 06:29

## 2018-12-08 RX ADMIN — ETRAVIRINE 200 MILLIGRAM(S): 200 TABLET ORAL at 10:01

## 2018-12-08 RX ADMIN — OXYCODONE HYDROCHLORIDE 10 MILLIGRAM(S): 5 TABLET ORAL at 18:00

## 2018-12-08 RX ADMIN — OXYCODONE HYDROCHLORIDE 10 MILLIGRAM(S): 5 TABLET ORAL at 02:52

## 2018-12-08 RX ADMIN — DARUNAVIR 600 MILLIGRAM(S): 75 TABLET, FILM COATED ORAL at 10:01

## 2018-12-08 RX ADMIN — OXYCODONE HYDROCHLORIDE 10 MILLIGRAM(S): 5 TABLET ORAL at 17:14

## 2018-12-08 RX ADMIN — DOLUTEGRAVIR SODIUM 50 MILLIGRAM(S): 25 TABLET, FILM COATED ORAL at 17:14

## 2018-12-08 RX ADMIN — Medication 81 MILLIGRAM(S): at 17:14

## 2018-12-08 RX ADMIN — DARUNAVIR 600 MILLIGRAM(S): 75 TABLET, FILM COATED ORAL at 17:14

## 2018-12-08 RX ADMIN — ETRAVIRINE 200 MILLIGRAM(S): 200 TABLET ORAL at 17:15

## 2018-12-08 RX ADMIN — HEPARIN SODIUM 5000 UNIT(S): 5000 INJECTION INTRAVENOUS; SUBCUTANEOUS at 17:15

## 2018-12-08 RX ADMIN — LEVETIRACETAM 500 MILLIGRAM(S): 250 TABLET, FILM COATED ORAL at 17:15

## 2018-12-08 RX ADMIN — RITONAVIR 100 MILLIGRAM(S): 100 TABLET, FILM COATED ORAL at 17:14

## 2018-12-08 RX ADMIN — OXYCODONE HYDROCHLORIDE 10 MILLIGRAM(S): 5 TABLET ORAL at 06:28

## 2018-12-08 RX ADMIN — DOLUTEGRAVIR SODIUM 50 MILLIGRAM(S): 25 TABLET, FILM COATED ORAL at 10:01

## 2018-12-08 RX ADMIN — Medication 650 MILLIGRAM(S): at 06:29

## 2018-12-08 RX ADMIN — LEVETIRACETAM 500 MILLIGRAM(S): 250 TABLET, FILM COATED ORAL at 06:10

## 2018-12-08 RX ADMIN — ATOVAQUONE 750 MILLIGRAM(S): 750 SUSPENSION ORAL at 10:06
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
## 2018-12-08 NOTE — PROGRESS NOTE ADULT - SUBJECTIVE AND OBJECTIVE BOX
NEPHROLOGY INTERVAL HPI/OVERNIGHT EVENTS:  HPI:  29 y/o male with PMHx of ESRD on HD (MWF), cardiomyopathy, seizure disorder, anxiety, depression came to the ED complaining of R arm pain near the fistular site. Patient said the pain has been going on for many years. Describes the pain as a sharp spasm that occasionally radiates into the chest and back of the neck. Patient said  he was recently told by his vascular surgeon and told to come to the ED for revision surgery and graft placement due to the site being aneurysmal. Patient has no fever, chills, diaphoresis, nausea/vomiting, chest pain, abdominal pain, change in bowel habit. (03 Dec 2018 23:26)      PAST MEDICAL & SURGICAL HISTORY:  Seizure disorder  Hypertension  ESRD (end stage renal disease)  Seizure  Pericarditis  Anxiety  Depression  Renal failure (ARF), acute on chronic: dialysis av fistula, RUE  HTN (hypertension)  Cardiomyopathy  HIV disease: born HIV+  AV fistula  S/P tonsillectomy      MEDICATIONS  (STANDING):  aspirin  chewable 81 milliGRAM(s) Oral daily  atovaquone Suspension 750 milliGRAM(s) Oral every 12 hours  calcium acetate 667 milliGRAM(s) Oral three times a day with meals  chlorhexidine 2% Cloths 1 Application(s) Topical <User Schedule>  darunavir 600 milliGRAM(s) Oral every 12 hours  dolutegravir 50 milliGRAM(s) Oral two times a day  epoetin nithin Injectable 88350 Unit(s) IV Push <User Schedule>  etravirine 200 milliGRAM(s) Oral two times a day after meals  gabapentin 100 milliGRAM(s) Oral two times a day  heparin  Injectable 5000 Unit(s) SubCutaneous every 8 hours  levETIRAcetam 500 milliGRAM(s) Oral two times a day  ritonavir Tablet 100 milliGRAM(s) Oral two times a day  sodium chloride 0.9% lock flush 3 milliLiter(s) IV Push every 8 hours  tenofovir disoproxil fumarate (VIREAD) 300 milliGRAM(s) Oral <User Schedule>    MEDICATIONS  (PRN):  acetaminophen   Tablet .. 650 milliGRAM(s) Oral every 6 hours PRN Moderate Pain (4 - 6)  ALPRAZolam 2 milliGRAM(s) Oral daily PRN Anxiety  oxyCODONE    IR 10 milliGRAM(s) Oral every 3 hours PRN Severe Pain (7 - 10)      Allergies    vancomycin (Anaphylaxis)    Intolerances    prefers vanilla or butter pecan nepro x 2 TID RDOK (Unknown)      Vital Signs Last 24 Hrs  T(C): 36.4 (08 Dec 2018 07:39), Max: 37.1 (07 Dec 2018 17:41)  T(F): 97.5 (08 Dec 2018 07:39), Max: 98.8 (07 Dec 2018 17:41)  HR: 78 (08 Dec 2018 07:39) (78 - 98)  BP: 116/69 (08 Dec 2018 07:39) (116/69 - 129/87)  BP(mean): --  RR: 16 (08 Dec 2018 07:39) (16 - 18)  SpO2: 100% (08 Dec 2018 07:39) (98% - 100%)  Daily     Daily Weight in k.9 (07 Dec 2018 12:08)    PHYSICAL EXAM:  GENERAL: no distress  HEAD:  Atraumatic, Normocephalic  EYES: Conjunctiva and sclera clear  ENMT: Moist mucous membranes, Poor dentition, No lesions  NECK: Supple, No JVD; +PC intact, no erythema, no bleeding, no rash  NERVOUS SYSTEM:  Alert & Oriented X3, Good concentration; Motor Strength wnl upper and lower extremities  CHEST/LUNG: Clear to percussion bilaterally; No rales, rhonchi, wheezing, or rubs  HEART: Regular rate and rhythm; No murmurs, rubs, or gallops  ABDOMEN: Soft, Nontender, Nondistended; Bowel sounds present  EXTREMITIES:  No edema B/L; +hypertrophic RUE AVF  LYMPH: No lymphadenopathy noted  SKIN: No rashes or lesions, pale      LABS:                        8.7    2.4   )-----------( 107      ( 07 Dec 2018 09:15 )             27.9     12-07    141  |  97<L>  |  61.0<H>  ----------------------------<  111  4.1   |  27.0  |  11.46<H>    Ca    8.8      07 Dec 2018 09:15                RADIOLOGY & ADDITIONAL TESTS:

## 2018-12-08 NOTE — PROGRESS NOTE ADULT - ASSESSMENT
ESRD on HD MWF  Malfunction of AVF  HIV  Chronic Pain  Anemia    -HD MWF; Next dialysis to be done on 12/10/18; inpatient order placed just in case the patient is still admitted. If he is discharged, then he can resume outpatient dialysis as scheduled  -HAART therapy per ID  -Vascular follow up noted; deferring AVF ligation for now until medically better optimized, particularly in regards to HIV for which the patient tends to be noncompliant with treatment.   -Procrit    Renally stable for DC    Thank you

## 2018-12-09 LAB
ANION GAP SERPL CALC-SCNC: 16 MMOL/L — SIGNIFICANT CHANGE UP (ref 5–17)
BUN SERPL-MCNC: 64 MG/DL — HIGH (ref 8–20)
CALCIUM SERPL-MCNC: 8.7 MG/DL — SIGNIFICANT CHANGE UP (ref 8.6–10.2)
CHLORIDE SERPL-SCNC: 99 MMOL/L — SIGNIFICANT CHANGE UP (ref 98–107)
CO2 SERPL-SCNC: 24 MMOL/L — SIGNIFICANT CHANGE UP (ref 22–29)
CREAT SERPL-MCNC: 10.09 MG/DL — HIGH (ref 0.5–1.3)
GLUCOSE SERPL-MCNC: 89 MG/DL — SIGNIFICANT CHANGE UP (ref 70–115)
HCT VFR BLD CALC: 27.1 % — LOW (ref 42–52)
HGB BLD-MCNC: 8.4 G/DL — LOW (ref 14–18)
MCHC RBC-ENTMCNC: 30 PG — SIGNIFICANT CHANGE UP (ref 27–31)
MCHC RBC-ENTMCNC: 31 G/DL — LOW (ref 32–36)
MCV RBC AUTO: 96.8 FL — HIGH (ref 80–94)
PLATELET # BLD AUTO: 96 K/UL — LOW (ref 150–400)
POTASSIUM SERPL-MCNC: 4.6 MMOL/L — SIGNIFICANT CHANGE UP (ref 3.5–5.3)
POTASSIUM SERPL-SCNC: 4.6 MMOL/L — SIGNIFICANT CHANGE UP (ref 3.5–5.3)
RBC # BLD: 2.8 M/UL — LOW (ref 4.6–6.2)
RBC # FLD: 17.5 % — HIGH (ref 11–15.6)
SODIUM SERPL-SCNC: 139 MMOL/L — SIGNIFICANT CHANGE UP (ref 135–145)
WBC # BLD: 2.1 K/UL — LOW (ref 4.8–10.8)
WBC # FLD AUTO: 2.1 K/UL — LOW (ref 4.8–10.8)

## 2018-12-09 PROCEDURE — 99232 SBSQ HOSP IP/OBS MODERATE 35: CPT

## 2018-12-09 RX ORDER — DIPHENHYDRAMINE HCL 50 MG
25 CAPSULE ORAL ONCE
Qty: 0 | Refills: 0 | Status: COMPLETED | OUTPATIENT
Start: 2018-12-09 | End: 2018-12-09

## 2018-12-09 RX ORDER — FENTANYL CITRATE 50 UG/ML
1 INJECTION INTRAVENOUS
Qty: 0 | Refills: 0 | Status: DISCONTINUED | OUTPATIENT
Start: 2018-12-09 | End: 2018-12-11

## 2018-12-09 RX ORDER — HYDROMORPHONE HYDROCHLORIDE 2 MG/ML
2 INJECTION INTRAMUSCULAR; INTRAVENOUS; SUBCUTANEOUS ONCE
Qty: 0 | Refills: 0 | Status: DISCONTINUED | OUTPATIENT
Start: 2018-12-09 | End: 2018-12-09

## 2018-12-09 RX ADMIN — OXYCODONE HYDROCHLORIDE 10 MILLIGRAM(S): 5 TABLET ORAL at 17:06

## 2018-12-09 RX ADMIN — DARUNAVIR 600 MILLIGRAM(S): 75 TABLET, FILM COATED ORAL at 05:10

## 2018-12-09 RX ADMIN — Medication 667 MILLIGRAM(S): at 11:11

## 2018-12-09 RX ADMIN — ATOVAQUONE 750 MILLIGRAM(S): 750 SUSPENSION ORAL at 05:10

## 2018-12-09 RX ADMIN — ETRAVIRINE 200 MILLIGRAM(S): 200 TABLET ORAL at 20:37

## 2018-12-09 RX ADMIN — DARUNAVIR 600 MILLIGRAM(S): 75 TABLET, FILM COATED ORAL at 17:06

## 2018-12-09 RX ADMIN — OXYCODONE HYDROCHLORIDE 10 MILLIGRAM(S): 5 TABLET ORAL at 19:05

## 2018-12-09 RX ADMIN — ATOVAQUONE 750 MILLIGRAM(S): 750 SUSPENSION ORAL at 20:38

## 2018-12-09 RX ADMIN — Medication 667 MILLIGRAM(S): at 10:02

## 2018-12-09 RX ADMIN — OXYCODONE HYDROCHLORIDE 10 MILLIGRAM(S): 5 TABLET ORAL at 02:02

## 2018-12-09 RX ADMIN — OXYCODONE HYDROCHLORIDE 10 MILLIGRAM(S): 5 TABLET ORAL at 20:38

## 2018-12-09 RX ADMIN — FENTANYL CITRATE 1 PATCH: 50 INJECTION INTRAVENOUS at 10:02

## 2018-12-09 RX ADMIN — OXYCODONE HYDROCHLORIDE 10 MILLIGRAM(S): 5 TABLET ORAL at 13:04

## 2018-12-09 RX ADMIN — RITONAVIR 100 MILLIGRAM(S): 100 TABLET, FILM COATED ORAL at 17:06

## 2018-12-09 RX ADMIN — OXYCODONE HYDROCHLORIDE 10 MILLIGRAM(S): 5 TABLET ORAL at 11:17

## 2018-12-09 RX ADMIN — Medication 81 MILLIGRAM(S): at 11:11

## 2018-12-09 RX ADMIN — Medication 25 MILLIGRAM(S): at 17:06

## 2018-12-09 RX ADMIN — GABAPENTIN 100 MILLIGRAM(S): 400 CAPSULE ORAL at 05:09

## 2018-12-09 RX ADMIN — FENTANYL CITRATE 1 PATCH: 50 INJECTION INTRAVENOUS at 19:15

## 2018-12-09 RX ADMIN — HYDROMORPHONE HYDROCHLORIDE 2 MILLIGRAM(S): 2 INJECTION INTRAMUSCULAR; INTRAVENOUS; SUBCUTANEOUS at 02:58

## 2018-12-09 RX ADMIN — DOLUTEGRAVIR SODIUM 50 MILLIGRAM(S): 25 TABLET, FILM COATED ORAL at 05:10

## 2018-12-09 RX ADMIN — HYDROMORPHONE HYDROCHLORIDE 2 MILLIGRAM(S): 2 INJECTION INTRAMUSCULAR; INTRAVENOUS; SUBCUTANEOUS at 01:58

## 2018-12-09 RX ADMIN — OXYCODONE HYDROCHLORIDE 10 MILLIGRAM(S): 5 TABLET ORAL at 21:38

## 2018-12-09 RX ADMIN — CHLORHEXIDINE GLUCONATE 1 APPLICATION(S): 213 SOLUTION TOPICAL at 05:08

## 2018-12-09 RX ADMIN — OXYCODONE HYDROCHLORIDE 10 MILLIGRAM(S): 5 TABLET ORAL at 01:02

## 2018-12-09 RX ADMIN — LEVETIRACETAM 500 MILLIGRAM(S): 250 TABLET, FILM COATED ORAL at 17:06

## 2018-12-09 RX ADMIN — LEVETIRACETAM 500 MILLIGRAM(S): 250 TABLET, FILM COATED ORAL at 05:15

## 2018-12-09 RX ADMIN — ETRAVIRINE 200 MILLIGRAM(S): 200 TABLET ORAL at 10:02

## 2018-12-09 RX ADMIN — Medication 667 MILLIGRAM(S): at 17:06

## 2018-12-09 RX ADMIN — DOLUTEGRAVIR SODIUM 50 MILLIGRAM(S): 25 TABLET, FILM COATED ORAL at 17:06

## 2018-12-09 RX ADMIN — OXYCODONE HYDROCHLORIDE 10 MILLIGRAM(S): 5 TABLET ORAL at 13:30

## 2018-12-09 RX ADMIN — GABAPENTIN 100 MILLIGRAM(S): 400 CAPSULE ORAL at 17:06

## 2018-12-09 RX ADMIN — OXYCODONE HYDROCHLORIDE 10 MILLIGRAM(S): 5 TABLET ORAL at 05:07

## 2018-12-09 RX ADMIN — RITONAVIR 100 MILLIGRAM(S): 100 TABLET, FILM COATED ORAL at 05:10

## 2018-12-09 RX ADMIN — OXYCODONE HYDROCHLORIDE 10 MILLIGRAM(S): 5 TABLET ORAL at 07:23

## 2018-12-09 RX ADMIN — OXYCODONE HYDROCHLORIDE 10 MILLIGRAM(S): 5 TABLET ORAL at 10:02
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
## 2018-12-10 PROCEDURE — 99232 SBSQ HOSP IP/OBS MODERATE 35: CPT | Mod: GC

## 2018-12-10 RX ORDER — ATOVAQUONE 750 MG/5ML
5 SUSPENSION ORAL
Qty: 5 | Refills: 0 | OUTPATIENT
Start: 2018-12-10 | End: 2019-01-08

## 2018-12-10 RX ORDER — CALCIUM ACETATE 667 MG
1 TABLET ORAL
Qty: 90 | Refills: 0 | OUTPATIENT
Start: 2018-12-10 | End: 2019-01-08

## 2018-12-10 RX ORDER — RITONAVIR 100 MG/1
1 TABLET, FILM COATED ORAL
Qty: 60 | Refills: 0 | OUTPATIENT
Start: 2018-12-10 | End: 2019-01-08

## 2018-12-10 RX ORDER — DARUNAVIR 75 MG/1
8 TABLET, FILM COATED ORAL
Qty: 480 | Refills: 0 | OUTPATIENT
Start: 2018-12-10 | End: 2019-01-08

## 2018-12-10 RX ORDER — HYDROMORPHONE HYDROCHLORIDE 2 MG/ML
1 INJECTION INTRAMUSCULAR; INTRAVENOUS; SUBCUTANEOUS
Qty: 9 | Refills: 0 | OUTPATIENT
Start: 2018-12-10 | End: 2018-12-12

## 2018-12-10 RX ORDER — DARUNAVIR 75 MG/1
1 TABLET, FILM COATED ORAL
Qty: 60 | Refills: 0 | OUTPATIENT
Start: 2018-12-10 | End: 2019-01-08

## 2018-12-10 RX ORDER — ZOLPIDEM TARTRATE 10 MG/1
1 TABLET ORAL
Qty: 3 | Refills: 0 | OUTPATIENT
Start: 2018-12-10 | End: 2018-12-12

## 2018-12-10 RX ORDER — FENTANYL CITRATE 50 UG/ML
1 INJECTION INTRAVENOUS
Qty: 5 | Refills: 0 | OUTPATIENT
Start: 2018-12-10 | End: 2018-12-14

## 2018-12-10 RX ORDER — DOLUTEGRAVIR SODIUM 25 MG/1
1 TABLET, FILM COATED ORAL
Qty: 60 | Refills: 0 | OUTPATIENT
Start: 2018-12-10 | End: 2019-01-08

## 2018-12-10 RX ORDER — ETRAVIRINE 200 MG/1
1 TABLET ORAL
Qty: 60 | Refills: 0 | OUTPATIENT
Start: 2018-12-10 | End: 2019-01-08

## 2018-12-10 RX ORDER — ALPRAZOLAM 0.25 MG
1 TABLET ORAL
Qty: 7 | Refills: 0 | OUTPATIENT
Start: 2018-12-10 | End: 2018-12-16

## 2018-12-10 RX ORDER — ATOVAQUONE 750 MG/5ML
5 SUSPENSION ORAL
Qty: 300 | Refills: 0 | OUTPATIENT
Start: 2018-12-10 | End: 2019-01-08

## 2018-12-10 RX ORDER — LEVETIRACETAM 250 MG/1
1 TABLET, FILM COATED ORAL
Qty: 60 | Refills: 0 | OUTPATIENT
Start: 2018-12-10 | End: 2019-01-08

## 2018-12-10 RX ORDER — TENOFOVIR DISOPROXIL FUMARATE 300 MG/1
1 TABLET, FILM COATED ORAL
Qty: 4 | Refills: 0 | OUTPATIENT
Start: 2018-12-10 | End: 2019-01-08

## 2018-12-10 RX ORDER — GABAPENTIN 400 MG/1
1 CAPSULE ORAL
Qty: 60 | Refills: 0 | OUTPATIENT
Start: 2018-12-10 | End: 2019-01-08

## 2018-12-10 RX ORDER — DIPHENHYDRAMINE HCL 50 MG
1 CAPSULE ORAL
Qty: 6 | Refills: 0 | OUTPATIENT
Start: 2018-12-10 | End: 2018-12-12

## 2018-12-10 RX ORDER — ACETAMINOPHEN 500 MG
2 TABLET ORAL
Qty: 180 | Refills: 0 | OUTPATIENT
Start: 2018-12-10 | End: 2019-01-08

## 2018-12-10 RX ORDER — ASPIRIN/CALCIUM CARB/MAGNESIUM 324 MG
1 TABLET ORAL
Qty: 30 | Refills: 0 | OUTPATIENT
Start: 2018-12-10 | End: 2019-01-08

## 2018-12-10 RX ORDER — FENTANYL CITRATE 50 UG/ML
1 INJECTION INTRAVENOUS
Qty: 3 | Refills: 0 | OUTPATIENT
Start: 2018-12-10 | End: 2018-12-12

## 2018-12-10 RX ORDER — METHOCARBAMOL 500 MG/1
500 TABLET, FILM COATED ORAL DAILY
Qty: 0 | Refills: 0 | Status: DISCONTINUED | OUTPATIENT
Start: 2018-12-10 | End: 2018-12-11

## 2018-12-10 RX ORDER — DIPHENHYDRAMINE HCL 50 MG
25 CAPSULE ORAL ONCE
Qty: 0 | Refills: 0 | Status: COMPLETED | OUTPATIENT
Start: 2018-12-10 | End: 2018-12-10

## 2018-12-10 RX ORDER — HYDROMORPHONE HYDROCHLORIDE 2 MG/ML
2 INJECTION INTRAMUSCULAR; INTRAVENOUS; SUBCUTANEOUS ONCE
Qty: 0 | Refills: 0 | Status: DISCONTINUED | OUTPATIENT
Start: 2018-12-10 | End: 2018-12-10

## 2018-12-10 RX ORDER — DIPHENHYDRAMINE HCL 50 MG
25 CAPSULE ORAL EVERY 8 HOURS
Qty: 0 | Refills: 0 | Status: DISCONTINUED | OUTPATIENT
Start: 2018-12-10 | End: 2018-12-11

## 2018-12-10 RX ADMIN — METHOCARBAMOL 500 MILLIGRAM(S): 500 TABLET, FILM COATED ORAL at 14:50

## 2018-12-10 RX ADMIN — Medication 81 MILLIGRAM(S): at 11:47

## 2018-12-10 RX ADMIN — Medication 650 MILLIGRAM(S): at 22:30

## 2018-12-10 RX ADMIN — HYDROMORPHONE HYDROCHLORIDE 2 MILLIGRAM(S): 2 INJECTION INTRAMUSCULAR; INTRAVENOUS; SUBCUTANEOUS at 17:19

## 2018-12-10 RX ADMIN — Medication 667 MILLIGRAM(S): at 18:46

## 2018-12-10 RX ADMIN — CHLORHEXIDINE GLUCONATE 1 APPLICATION(S): 213 SOLUTION TOPICAL at 06:28

## 2018-12-10 RX ADMIN — GABAPENTIN 100 MILLIGRAM(S): 400 CAPSULE ORAL at 21:46

## 2018-12-10 RX ADMIN — OXYCODONE HYDROCHLORIDE 10 MILLIGRAM(S): 5 TABLET ORAL at 21:44

## 2018-12-10 RX ADMIN — Medication 25 MILLIGRAM(S): at 14:52

## 2018-12-10 RX ADMIN — SODIUM CHLORIDE 3 MILLILITER(S): 9 INJECTION INTRAMUSCULAR; INTRAVENOUS; SUBCUTANEOUS at 21:42

## 2018-12-10 RX ADMIN — FENTANYL CITRATE 1 PATCH: 50 INJECTION INTRAVENOUS at 19:35

## 2018-12-10 RX ADMIN — OXYCODONE HYDROCHLORIDE 10 MILLIGRAM(S): 5 TABLET ORAL at 06:28

## 2018-12-10 RX ADMIN — DOLUTEGRAVIR SODIUM 50 MILLIGRAM(S): 25 TABLET, FILM COATED ORAL at 21:47

## 2018-12-10 RX ADMIN — OXYCODONE HYDROCHLORIDE 10 MILLIGRAM(S): 5 TABLET ORAL at 11:47

## 2018-12-10 RX ADMIN — ETRAVIRINE 200 MILLIGRAM(S): 200 TABLET ORAL at 21:47

## 2018-12-10 RX ADMIN — Medication 667 MILLIGRAM(S): at 11:47

## 2018-12-10 RX ADMIN — Medication 667 MILLIGRAM(S): at 09:53

## 2018-12-10 RX ADMIN — HEPARIN SODIUM 5000 UNIT(S): 5000 INJECTION INTRAVENOUS; SUBCUTANEOUS at 22:30

## 2018-12-10 RX ADMIN — HYDROMORPHONE HYDROCHLORIDE 2 MILLIGRAM(S): 2 INJECTION INTRAMUSCULAR; INTRAVENOUS; SUBCUTANEOUS at 17:21

## 2018-12-10 RX ADMIN — ERYTHROPOIETIN 10000 UNIT(S): 10000 INJECTION, SOLUTION INTRAVENOUS; SUBCUTANEOUS at 17:22

## 2018-12-10 RX ADMIN — Medication 25 MILLIGRAM(S): at 17:21

## 2018-12-10 RX ADMIN — OXYCODONE HYDROCHLORIDE 10 MILLIGRAM(S): 5 TABLET ORAL at 14:51

## 2018-12-10 RX ADMIN — RITONAVIR 100 MILLIGRAM(S): 100 TABLET, FILM COATED ORAL at 21:46

## 2018-12-10 RX ADMIN — GABAPENTIN 100 MILLIGRAM(S): 400 CAPSULE ORAL at 05:28

## 2018-12-10 RX ADMIN — Medication 650 MILLIGRAM(S): at 21:46

## 2018-12-10 RX ADMIN — DARUNAVIR 600 MILLIGRAM(S): 75 TABLET, FILM COATED ORAL at 21:46

## 2018-12-10 RX ADMIN — ATOVAQUONE 750 MILLIGRAM(S): 750 SUSPENSION ORAL at 09:53

## 2018-12-10 RX ADMIN — OXYCODONE HYDROCHLORIDE 10 MILLIGRAM(S): 5 TABLET ORAL at 05:28

## 2018-12-10 RX ADMIN — OXYCODONE HYDROCHLORIDE 10 MILLIGRAM(S): 5 TABLET ORAL at 12:30

## 2018-12-10 RX ADMIN — ETRAVIRINE 200 MILLIGRAM(S): 200 TABLET ORAL at 09:53

## 2018-12-10 RX ADMIN — LEVETIRACETAM 500 MILLIGRAM(S): 250 TABLET, FILM COATED ORAL at 05:28

## 2018-12-10 RX ADMIN — DOLUTEGRAVIR SODIUM 50 MILLIGRAM(S): 25 TABLET, FILM COATED ORAL at 05:28

## 2018-12-10 RX ADMIN — LEVETIRACETAM 500 MILLIGRAM(S): 250 TABLET, FILM COATED ORAL at 21:47

## 2018-12-10 RX ADMIN — OXYCODONE HYDROCHLORIDE 10 MILLIGRAM(S): 5 TABLET ORAL at 22:30

## 2018-12-10 RX ADMIN — DARUNAVIR 600 MILLIGRAM(S): 75 TABLET, FILM COATED ORAL at 05:28

## 2018-12-10 RX ADMIN — OXYCODONE HYDROCHLORIDE 10 MILLIGRAM(S): 5 TABLET ORAL at 15:25

## 2018-12-10 NOTE — DIETITIAN INITIAL EVALUATION ADULT. - DIET TYPE
1000ml/no concentrated potassium/no concentrated phosphorus/DASH/TLC (sodium and cholesterol restricted diet)
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
## 2018-12-10 NOTE — PROGRESS NOTE ADULT - SUBJECTIVE AND OBJECTIVE BOX
NEPHROLOGY INTERVAL HPI/OVERNIGHT EVENTS:  HPI:  29 y/o male with PMHx of ESRD on HD (MWF), cardiomyopathy, seizure disorder, anxiety, depression came to the ED complaining of R arm pain near the fistular site. Patient said the pain has been going on for many years. Describes the pain as a sharp spasm that occasionally radiates into the chest and back of the neck. Patient said  he was recently told by his vascular surgeon and told to come to the ED for revision surgery and graft placement due to the site being aneurysmal. Patient has no fever, chills, diaphoresis, nausea/vomiting, chest pain, abdominal pain, change in bowel habit. (03 Dec 2018 23:26)    Follow up ESRD  No new complaints    PAST MEDICAL & SURGICAL HISTORY:  Seizure disorder  Hypertension  ESRD (end stage renal disease)  Seizure  Pericarditis  Anxiety  Depression  Renal failure (ARF), acute on chronic: dialysis av fistula, RUE  HTN (hypertension)  Cardiomyopathy  HIV disease: born HIV+  AV fistula  S/P tonsillectomy      MEDICATIONS  (STANDING):  aspirin  chewable 81 milliGRAM(s) Oral daily  atovaquone Suspension 750 milliGRAM(s) Oral every 12 hours  calcium acetate 667 milliGRAM(s) Oral three times a day with meals  chlorhexidine 2% Cloths 1 Application(s) Topical <User Schedule>  darunavir 600 milliGRAM(s) Oral every 12 hours  dolutegravir 50 milliGRAM(s) Oral two times a day  epoetin nithin Injectable 45243 Unit(s) IV Push <User Schedule>  etravirine 200 milliGRAM(s) Oral two times a day after meals  fentaNYL   Patch  12 MICROgram(s)/Hr 1 Patch Transdermal every 72 hours  gabapentin 100 milliGRAM(s) Oral two times a day  heparin  Injectable 5000 Unit(s) SubCutaneous every 8 hours  levETIRAcetam 500 milliGRAM(s) Oral two times a day  methocarbamol 500 milliGRAM(s) Oral daily  ritonavir Tablet 100 milliGRAM(s) Oral two times a day  sodium chloride 0.9% lock flush 3 milliLiter(s) IV Push every 8 hours  tenofovir disoproxil fumarate (VIREAD) 300 milliGRAM(s) Oral <User Schedule>    MEDICATIONS  (PRN):  acetaminophen   Tablet .. 650 milliGRAM(s) Oral every 6 hours PRN Moderate Pain (4 - 6)  ALPRAZolam 2 milliGRAM(s) Oral daily PRN Anxiety  diphenhydrAMINE 25 milliGRAM(s) Oral every 8 hours PRN Rash and/or Itching  oxyCODONE    IR 10 milliGRAM(s) Oral every 3 hours PRN Severe Pain (7 - 10)      Allergies    vancomycin (Anaphylaxis)    Intolerances    prefers vanilla or butter pecan nepro x 2 TID RDOK (Unknown)      Vital Signs Last 24 Hrs  T(C): 36.9 (10 Dec 2018 16:45), Max: 36.9 (10 Dec 2018 15:48)  T(F): 98.4 (10 Dec 2018 16:45), Max: 98.5 (10 Dec 2018 15:48)  HR: 98 (10 Dec 2018 16:45) (87 - 98)  BP: 141/83 (10 Dec 2018 16:45) (117/81 - 141/83)  BP(mean): --  RR: 18 (10 Dec 2018 16:45) (18 - 18)  SpO2: 100% (10 Dec 2018 16:45) (97% - 100%)  Daily     Daily Weight in k.9 (10 Dec 2018 16:45)    PHYSICAL EXAM:  GENERAL: no distress  HEAD:  Atraumatic, Normocephalic  EYES: Conjunctiva and sclera clear  ENMT: Moist mucous membranes, Poor dentition, No lesions  NECK: Supple, No JVD; +PC intact, no erythema, no bleeding, no rash  NERVOUS SYSTEM:  Alert & Oriented X3, Good concentration; Motor Strength wnl upper and lower extremities  CHEST/LUNG: Clear to percussion bilaterally; No rales, rhonchi, wheezing, or rubs  HEART: Regular rate and rhythm; No murmurs, rubs, or gallops  ABDOMEN: Soft, Nontender, Nondistended; Bowel sounds present  EXTREMITIES:  No edema B/L; +hypertrophic RUE AVF  LYMPH: No lymphadenopathy noted  SKIN: No rashes or lesions        LABS:                        8.4    2.1   )-----------( 96       ( 09 Dec 2018 09:11 )             27.1     12    139  |  99  |  64.0<H>  ----------------------------<  89  4.6   |  24.0  |  10.09<H>    Ca    8.7      09 Dec 2018 09:11                RADIOLOGY & ADDITIONAL TESTS:
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
## 2018-12-10 NOTE — PROGRESS NOTE ADULT - ASSESSMENT
ESRD on HD MWF  Malfunction of AVF  HIV  Chronic Pain  Anemia    -HD MWF; Dialysis today 12/10/18 per schedule; tolerating well thus far. Benadryl/Dilaudid with dialysis for chronic pain and itchiness; patient otherwise refuses dialysis without it.  -HAART therapy per ID  -Vascular follow up noted; deferring AVF ligation for now until medically better optimized, particularly in regards to HIV for which the patient tends to be noncompliant with treatment.   -Procrit    Renally stable for DC    Thank you

## 2018-12-10 NOTE — PROGRESS NOTE ADULT - SUBJECTIVE AND OBJECTIVE BOX
Interval/Overnight: No acute events overnight as per RN. Chart reviewed. Pt seen/examined by Attending and PA. Pt c/o generalized pain, states his back pain has improved since started back on Fentanyl patch. Reports pain of RUE at AV fistula site w radiation to his neck.     REVIEW OF SYSTEMS:    Patient denied fever, chills, abdominal pain, nausea, vomiting, cough, shortness of breath, chest pain or palpitations    Vital Signs Last 24 Hrs  T(C): 36.7 (10 Dec 2018 07:54), Max: 36.7 (10 Dec 2018 07:54)  T(F): 98 (10 Dec 2018 07:54), Max: 98 (10 Dec 2018 07:54)  HR: 90 (10 Dec 2018 07:54) (76 - 90)  BP: 133/90 (10 Dec 2018 07:54) (115/77 - 133/90)  BP(mean): --  RR: 18 (10 Dec 2018 07:54) (18 - 18)  SpO2: 97% (10 Dec 2018 07:54) (97% - 100%)I&O's Summary    09 Dec 2018 07:01  -  10 Dec 2018 07:00  --------------------------------------------------------  IN: 0 mL / OUT: 400 mL / NET: -400 mL    CAPILLARY BLOOD GLUCOSE    PHYSICAL EXAM:  GENERAL: Speaking in full sentences without difficulty, mild distress due to pain  HEENT: blind  NECK: Supple, No JVD   CHEST/LUNG: CTA bilaterally; Normal effort  HEART: S1S2 Normal intensity, no murmurs, gallops or rubs noted  ABDOMEN: non tender, non distended  EXTREMITIES:  RUE noted w AVF, large/bulbous. No clubbing, cyanosis, or edema noted  SKIN: No rashes or lesions noted  NEURO: A&Ox3, no focal deficits noted  PSYCH: normal mood and affect; insight/judgement appropriate  LABS:                        8.4    2.1   )-----------( 96       ( 09 Dec 2018 09:11 )             27.1     12-09    139  |  99  |  64.0<H>  ----------------------------<  89  4.6   |  24.0  |  10.09<H>    Ca    8.7      09 Dec 2018 09:11        RADIOLOGY & ADDITIONAL TESTS:    appreciated    MEDICATIONS:  MEDICATIONS  (STANDING):  aspirin  chewable 81 milliGRAM(s) Oral daily  atovaquone Suspension 750 milliGRAM(s) Oral every 12 hours  calcium acetate 667 milliGRAM(s) Oral three times a day with meals  chlorhexidine 2% Cloths 1 Application(s) Topical <User Schedule>  darunavir 600 milliGRAM(s) Oral every 12 hours  dolutegravir 50 milliGRAM(s) Oral two times a day  epoetin nithin Injectable 51620 Unit(s) IV Push <User Schedule>  etravirine 200 milliGRAM(s) Oral two times a day after meals  fentaNYL   Patch  12 MICROgram(s)/Hr 1 Patch Transdermal every 72 hours  gabapentin 100 milliGRAM(s) Oral two times a day  heparin  Injectable 5000 Unit(s) SubCutaneous every 8 hours  levETIRAcetam 500 milliGRAM(s) Oral two times a day  methocarbamol 500 milliGRAM(s) Oral daily  ritonavir Tablet 100 milliGRAM(s) Oral two times a day  sodium chloride 0.9% lock flush 3 milliLiter(s) IV Push every 8 hours  tenofovir disoproxil fumarate (VIREAD) 300 milliGRAM(s) Oral <User Schedule>    MEDICATIONS  (PRN):  acetaminophen   Tablet .. 650 milliGRAM(s) Oral every 6 hours PRN Moderate Pain (4 - 6)  ALPRAZolam 2 milliGRAM(s) Oral daily PRN Anxiety  diphenhydrAMINE 25 milliGRAM(s) Oral every 8 hours PRN Rash and/or Itching  oxyCODONE    IR 10 milliGRAM(s) Oral every 3 hours PRN Severe Pain (7 - 10)

## 2018-12-10 NOTE — DIETITIAN INITIAL EVALUATION ADULT. - OTHER INFO
Pt admit with acquired arteriovenous fistula, positive for HIV also ESRD on HD. Pt states eating well at this time, consuming 75% of meals, and Nepro TID. Food preferences obtained. Aware Pt with malnutrition in the past, though Pt's weight is stable, and shows no physical signs of malnutrition at this time. No evident signs present, RD will continue to monitor.

## 2018-12-11 VITALS
OXYGEN SATURATION: 98 % | SYSTOLIC BLOOD PRESSURE: 119 MMHG | HEART RATE: 93 BPM | DIASTOLIC BLOOD PRESSURE: 73 MMHG | TEMPERATURE: 98 F | RESPIRATION RATE: 18 BRPM

## 2018-12-11 DIAGNOSIS — Z79.891 LONG TERM (CURRENT) USE OF OPIATE ANALGESIC: ICD-10-CM

## 2018-12-11 DIAGNOSIS — G89.4 CHRONIC PAIN SYNDROME: ICD-10-CM

## 2018-12-11 DIAGNOSIS — F11.20 OPIOID DEPENDENCE, UNCOMPLICATED: ICD-10-CM

## 2018-12-11 PROCEDURE — 99239 HOSP IP/OBS DSCHRG MGMT >30: CPT

## 2018-12-11 RX ADMIN — Medication 2 MILLIGRAM(S): at 00:53

## 2018-12-11 RX ADMIN — SODIUM CHLORIDE 3 MILLILITER(S): 9 INJECTION INTRAMUSCULAR; INTRAVENOUS; SUBCUTANEOUS at 05:44

## 2018-12-11 RX ADMIN — CHLORHEXIDINE GLUCONATE 1 APPLICATION(S): 213 SOLUTION TOPICAL at 05:44

## 2018-12-11 RX ADMIN — GABAPENTIN 100 MILLIGRAM(S): 400 CAPSULE ORAL at 05:46

## 2018-12-11 RX ADMIN — ETRAVIRINE 200 MILLIGRAM(S): 200 TABLET ORAL at 09:49

## 2018-12-11 RX ADMIN — OXYCODONE HYDROCHLORIDE 10 MILLIGRAM(S): 5 TABLET ORAL at 07:08

## 2018-12-11 RX ADMIN — Medication 667 MILLIGRAM(S): at 12:59

## 2018-12-11 RX ADMIN — DARUNAVIR 600 MILLIGRAM(S): 75 TABLET, FILM COATED ORAL at 05:46

## 2018-12-11 RX ADMIN — RITONAVIR 100 MILLIGRAM(S): 100 TABLET, FILM COATED ORAL at 05:46

## 2018-12-11 RX ADMIN — HEPARIN SODIUM 5000 UNIT(S): 5000 INJECTION INTRAVENOUS; SUBCUTANEOUS at 05:47

## 2018-12-11 RX ADMIN — METHOCARBAMOL 500 MILLIGRAM(S): 500 TABLET, FILM COATED ORAL at 12:59

## 2018-12-11 RX ADMIN — LEVETIRACETAM 500 MILLIGRAM(S): 250 TABLET, FILM COATED ORAL at 05:46

## 2018-12-11 RX ADMIN — DOLUTEGRAVIR SODIUM 50 MILLIGRAM(S): 25 TABLET, FILM COATED ORAL at 05:46

## 2018-12-11 RX ADMIN — Medication 81 MILLIGRAM(S): at 12:59

## 2018-12-11 RX ADMIN — Medication 30 MILLILITER(S): at 01:32

## 2018-12-11 RX ADMIN — ATOVAQUONE 750 MILLIGRAM(S): 750 SUSPENSION ORAL at 09:14

## 2018-12-11 RX ADMIN — Medication 667 MILLIGRAM(S): at 09:14

## 2018-12-11 NOTE — PROGRESS NOTE ADULT - SUBJECTIVE AND OBJECTIVE BOX
TEDDY CRAWFORD    [id number]    28y      Male    CC: RUE pain      Interval/Overnight:   No acute events overnight as per RN. patient was ready for discharge. his pain medications were sent to [facility] pharmacy. everything been set up. no he is claiming that he needs enough pain medications for home. was told multiple times that hospitalist can provide only 3 days supply. he has appointment on thursday with pain management dr Tony srivastava and he is concerned that if he will leave, he will not get pain medications. attempted calling [phone], but no one picked up.      REVIEW OF SYSTEMS:    Patient denied fever, chills, abdominal pain, nausea, vomiting, cough, shortness of breath, chest pain or palpitations    Vital Signs Last 24 Hrs  Vital Signs Last 24 Hrs  T(C): 36.7 (11 Dec 2018 08:04), Max: 37.2 (10 Dec 2018 20:45)  T(F): 98 (11 Dec 2018 08:04), Max: 99 (10 Dec 2018 20:45)  HR: 94 (11 Dec 2018 08:04) (87 - 98)  BP: 117/76 (11 Dec 2018 08:04) (117/76 - 141/83)  BP(mean): --  RR: 18 (11 Dec 2018 08:04) (18 - 18)  SpO2: 97% (11 Dec 2018 08:04) (97% - 100%)    CAPILLARY BLOOD GLUCOSE    PHYSICAL EXAM:  GENERAL: Speaking in full sentences without difficulty, mild distress due to pain  HEENT: blind  NECK: Supple, No JVD   CHEST/LUNG: CTA bilaterally; Normal effort  HEART: S1S2 Normal intensity, no murmurs, gallops or rubs noted  ABDOMEN: non tender, non distended  EXTREMITIES:  RUE noted w AVF, large/bulbous. No clubbing, cyanosis, or edema noted  SKIN: No rashes or lesions noted  NEURO: A&Ox3, no focal deficits noted  PSYCH: normal mood and affect; insight/judgement appropriate        MEDICATIONS  (STANDING):  aspirin  chewable 81 milliGRAM(s) Oral daily  atovaquone Suspension 750 milliGRAM(s) Oral every 12 hours  calcium acetate 667 milliGRAM(s) Oral three times a day with meals  chlorhexidine 2% Cloths 1 Application(s) Topical <User Schedule>  darunavir 600 milliGRAM(s) Oral every 12 hours  dolutegravir 50 milliGRAM(s) Oral two times a day  epoetin nithin Injectable 64941 Unit(s) IV Push <User Schedule>  etravirine 200 milliGRAM(s) Oral two times a day after meals  fentaNYL   Patch  12 MICROgram(s)/Hr 1 Patch Transdermal every 72 hours  gabapentin 100 milliGRAM(s) Oral two times a day  heparin  Injectable 5000 Unit(s) SubCutaneous every 8 hours  levETIRAcetam 500 milliGRAM(s) Oral two times a day  methocarbamol 500 milliGRAM(s) Oral daily  ritonavir Tablet 100 milliGRAM(s) Oral two times a day  sodium chloride 0.9% lock flush 3 milliLiter(s) IV Push every 8 hours  tenofovir disoproxil fumarate (VIREAD) 300 milliGRAM(s) Oral <User Schedule>    MEDICATIONS  (PRN):  acetaminophen   Tablet .. 650 milliGRAM(s) Oral every 6 hours PRN Moderate Pain (4 - 6)  diphenhydrAMINE 25 milliGRAM(s) Oral every 8 hours PRN Rash and/or Itching  oxyCODONE    IR 10 milliGRAM(s) Oral every 3 hours PRN Severe Pain (7 - 10)      RADIOLOGY & ADDITIONAL TESTS:

## 2018-12-11 NOTE — PROGRESS NOTE ADULT - REASON FOR ADMISSION
AV Fistula aneurysm
Aneurysmal Fistula
ESRD
Right arm pain
R Arm pain
chronic pain
pain
Aneurysmal Fistula
chronic pain concerns

## 2018-12-11 NOTE — PROGRESS NOTE ADULT - ASSESSMENT
ESRD on HD MWF  Malfunction of AVF  HIV  Chronic Pain  Anemia    -HD MWF  -HAART therapy per ID  -Vascular follow up noted; deferring AVF ligation for now until medically better optimized, particularly in regards to HIV for which the patient tends to be noncompliant with treatment.   -Procrit    Renally stable for DC    Thank you

## 2018-12-11 NOTE — PROGRESS NOTE ADULT - PROVIDER SPECIALTY LIST ADULT
Cardiology
Hospitalist
Infectious Disease
Nephrology
Pain Medicine
Pain Medicine
Vascular Surgery
Hospitalist
Nephrology
Nephrology
Pain Medicine
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
## 2018-12-11 NOTE — PROGRESS NOTE ADULT - SUBJECTIVE AND OBJECTIVE BOX
NEPHROLOGY INTERVAL HPI/OVERNIGHT EVENTS:  HPI:  27 y/o male with PMHx of ESRD on HD (MWF), cardiomyopathy, seizure disorder, anxiety, depression came to the ED complaining of R arm pain near the fistular site. Patient said the pain has been going on for many years. Describes the pain as a sharp spasm that occasionally radiates into the chest and back of the neck. Patient said  he was recently told by his vascular surgeon and told to come to the ED for revision surgery and graft placement due to the site being aneurysmal. Patient has no fever, chills, diaphoresis, nausea/vomiting, chest pain, abdominal pain, change in bowel habit. (03 Dec 2018 23:26)    Follow up ESRD  No new complaints    PAST MEDICAL & SURGICAL HISTORY:  Seizure disorder  Hypertension  ESRD (end stage renal disease)  Seizure  Pericarditis  Anxiety  Depression  Renal failure (ARF), acute on chronic: dialysis av fistula, RUE  HTN (hypertension)  Cardiomyopathy  HIV disease: born HIV+  AV fistula  S/P tonsillectomy      MEDICATIONS  (STANDING):  aspirin  chewable 81 milliGRAM(s) Oral daily  atovaquone Suspension 750 milliGRAM(s) Oral every 12 hours  calcium acetate 667 milliGRAM(s) Oral three times a day with meals  chlorhexidine 2% Cloths 1 Application(s) Topical <User Schedule>  darunavir 600 milliGRAM(s) Oral every 12 hours  dolutegravir 50 milliGRAM(s) Oral two times a day  epoetin nithin Injectable 46196 Unit(s) IV Push <User Schedule>  etravirine 200 milliGRAM(s) Oral two times a day after meals  fentaNYL   Patch  12 MICROgram(s)/Hr 1 Patch Transdermal every 72 hours  gabapentin 100 milliGRAM(s) Oral two times a day  heparin  Injectable 5000 Unit(s) SubCutaneous every 8 hours  levETIRAcetam 500 milliGRAM(s) Oral two times a day  methocarbamol 500 milliGRAM(s) Oral daily  ritonavir Tablet 100 milliGRAM(s) Oral two times a day  sodium chloride 0.9% lock flush 3 milliLiter(s) IV Push every 8 hours  tenofovir disoproxil fumarate (VIREAD) 300 milliGRAM(s) Oral <User Schedule>    MEDICATIONS  (PRN):  acetaminophen   Tablet .. 650 milliGRAM(s) Oral every 6 hours PRN Moderate Pain (4 - 6)  ALPRAZolam 2 milliGRAM(s) Oral daily PRN Anxiety  diphenhydrAMINE 25 milliGRAM(s) Oral every 8 hours PRN Rash and/or Itching  oxyCODONE    IR 10 milliGRAM(s) Oral every 3 hours PRN Severe Pain (7 - 10)      Allergies    vancomycin (Anaphylaxis)    Intolerances    prefers vanilla or butter pecan nepro x 2 TID RDOK (Unknown)      Vital Signs Last 24 Hrs  T(C): 36.9 (10 Dec 2018 16:45), Max: 36.9 (10 Dec 2018 15:48)  T(F): 98.4 (10 Dec 2018 16:45), Max: 98.5 (10 Dec 2018 15:48)  HR: 98 (10 Dec 2018 16:45) (87 - 98)  BP: 141/83 (10 Dec 2018 16:45) (117/81 - 141/83)  BP(mean): --  RR: 18 (10 Dec 2018 16:45) (18 - 18)  SpO2: 100% (10 Dec 2018 16:45) (97% - 100%)  Daily     Daily Weight in k.9 (10 Dec 2018 16:45)    PHYSICAL EXAM:  GENERAL: no distress  HEAD:  Atraumatic, Normocephalic  EYES: Conjunctiva and sclera clear  ENMT: Moist mucous membranes, Poor dentition, No lesions  NECK: Supple, No JVD; +PC intact, no erythema, no bleeding, no rash  NERVOUS SYSTEM:  Alert & Oriented X3, Good concentration; Motor Strength wnl upper and lower extremities  CHEST/LUNG: Clear to percussion bilaterally; No rales, rhonchi, wheezing, or rubs  HEART: Regular rate and rhythm; No murmurs, rubs, or gallops  ABDOMEN: Soft, Nontender, Nondistended; Bowel sounds present  EXTREMITIES:  No edema B/L; +hypertrophic RUE AVF  LYMPH: No lymphadenopathy noted  SKIN: No rashes or lesions        LABS:  Reviewed

## 2018-12-11 NOTE — PROGRESS NOTE ADULT - PROBLEM SELECTOR PLAN 3
1. Maximize use of non-opioid analgesics  - Offer Tylenol PRN, unless contraindicated  - Continue Gabapentin and Robaxin as ordered

## 2018-12-11 NOTE — PROGRESS NOTE ADULT - ASSESSMENT
22 y/o M with complicated medical history, admitted for care of an Aneurysmal Fistula. Found A&Ox3, NAD, sitting up in bed having hearty conversation with a volunteer. He displays no signs of pain. He is not oversedated. He inquires about speaking with Dr Gunter directly, prior to his scheduled appointment in 2 days, to ensure that he will add a Fentanyl patch and Oxycontin (last used in 04/2018) to his previously requested regimen of Dilaudid 2mg tablets for Dialysis days. The patient was advised that a definite answer may not be possible prior to his being discharged today. He did see Dr Gunter 1 month ago, for the first time, but failed to have this discussion, as he had opioids recently prescribed by his longstanding provider. The patient was seen by pain management on 12/05, and did not express concern for inadequate follow up care at that time.     He was encouraged to keep his appointment, as a plan of care is best developed at an office visit between provider and patient. He verbalizes understanding, but still want to give the provider a "heads up." A message was sent tot he office on his behalf.
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
## 2018-12-11 NOTE — PROGRESS NOTE ADULT - PROBLEM SELECTOR PLAN 1
1. Continue with Oxycodone IR PRN analgesia  - titrate down to maximum of 6 tablets per day if possible  2. Continue to titrate off Duragesic patch, as patient displays very adequate analgesia on a much reduced dose 12mcg, than previously prescribed 100mcg.

## 2018-12-11 NOTE — PROGRESS NOTE ADULT - NSHPATTENDINGPLANDISCUSS_GEN_ALL_CORE
patient, rn, cm and sw
patient, rn, consutlant, cm and sw
patient, rn, pharmacy [facility] at [phone]
patient, rn, cm and sw
TRAY Gunter MD

## 2018-12-26 PROCEDURE — 80061 LIPID PANEL: CPT

## 2018-12-26 PROCEDURE — 87536 HIV-1 QUANT&REVRSE TRNSCRPJ: CPT

## 2018-12-26 PROCEDURE — 86357 NK CELLS TOTAL COUNT: CPT

## 2018-12-26 PROCEDURE — 86355 B CELLS TOTAL COUNT: CPT

## 2018-12-26 PROCEDURE — 82550 ASSAY OF CK (CPK): CPT

## 2018-12-26 PROCEDURE — 84484 ASSAY OF TROPONIN QUANT: CPT

## 2018-12-26 PROCEDURE — 80177 DRUG SCRN QUAN LEVETIRACETAM: CPT

## 2018-12-26 PROCEDURE — 87329 GIARDIA AG IA: CPT

## 2018-12-26 PROCEDURE — 74176 CT ABD & PELVIS W/O CONTRAST: CPT

## 2018-12-26 PROCEDURE — 80048 BASIC METABOLIC PNL TOTAL CA: CPT

## 2018-12-26 PROCEDURE — 87045 FECES CULTURE AEROBIC BACT: CPT

## 2018-12-26 PROCEDURE — 71045 X-RAY EXAM CHEST 1 VIEW: CPT

## 2018-12-26 PROCEDURE — 96375 TX/PRO/DX INJ NEW DRUG ADDON: CPT

## 2018-12-26 PROCEDURE — 85610 PROTHROMBIN TIME: CPT

## 2018-12-26 PROCEDURE — 73620 X-RAY EXAM OF FOOT: CPT

## 2018-12-26 PROCEDURE — 82977 ASSAY OF GGT: CPT

## 2018-12-26 PROCEDURE — 87177 OVA AND PARASITES SMEARS: CPT

## 2018-12-26 PROCEDURE — 83735 ASSAY OF MAGNESIUM: CPT

## 2018-12-26 PROCEDURE — 96374 THER/PROPH/DIAG INJ IV PUSH: CPT

## 2018-12-26 PROCEDURE — 85730 THROMBOPLASTIN TIME PARTIAL: CPT

## 2018-12-26 PROCEDURE — 93005 ELECTROCARDIOGRAM TRACING: CPT

## 2018-12-26 PROCEDURE — 84100 ASSAY OF PHOSPHORUS: CPT

## 2018-12-26 PROCEDURE — 85027 COMPLETE CBC AUTOMATED: CPT

## 2018-12-26 PROCEDURE — 87046 STOOL CULTR AEROBIC BACT EA: CPT

## 2018-12-26 PROCEDURE — 99285 EMERGENCY DEPT VISIT HI MDM: CPT | Mod: 25

## 2018-12-26 PROCEDURE — 80053 COMPREHEN METABOLIC PANEL: CPT

## 2018-12-26 PROCEDURE — 82962 GLUCOSE BLOOD TEST: CPT

## 2018-12-26 PROCEDURE — 70450 CT HEAD/BRAIN W/O DYE: CPT

## 2018-12-26 PROCEDURE — 83690 ASSAY OF LIPASE: CPT

## 2018-12-26 PROCEDURE — 36415 COLL VENOUS BLD VENIPUNCTURE: CPT

## 2019-01-01 NOTE — PROGRESS NOTE ADULT - ASSESSMENT
26 y/o M with extensive PMH including, HIV+. seizure, renal failure on dialysis M-W-F, blind, came to ED s/p seizure at home (unwitnessed) with c/o foot pain yesterday since a mirror fell on him.  Was d/c home since prelim x rays were negative.  Official radiology reading today stated that he has a 2nd, 3rd, and 4th linear, nondisplaced fractures and was called to return to the ED.  He states that he had to walk on it yesterday and is in severe pain.  Took Oxycodone for the pain.  Took more than usual b/c he usually takes it for his chronic pain. Pt is known for non compliance with his meds. He admits to being non compliant with his HIV & seizure meds. Last HD was yesterday. No podiatry intervention planned as per them. Pt could have been d/cherri home but could not get crutches due to AVF, could not get a wheelchair as has steps at home and will require FESTUS. CM/SW working on it        >Right 2-4 metatarsal fractures-  Pt's xrays and CT scans reviewed. Results show multiple fractures.  Splint applied by podiatry  Pt is non WB to right foot with crutches but, due PMHx of AVF the pt is unable to use the crutches  pt states his house is not wheelchair accessible and has stairs  Podiatry recommended pt to be admitted for FESTUS  Keep the foot elevated, iced, and compressed.  Pt may require surgery in the future based on CT scan findings as per podiatry but no plans for surgery inhouse as per podiatry  Pain meds adjusted  Daily PT ordered      >Breakthrough seizure- likely from med non compliance, neuro consult appreciated. should be on standing keppra with post HD supplementation. start keppra 250 mg bid with 500 mg dose post HD, no "head spasms" reported, CTH no acute pathology .       >Diarrhea-resolved    >AV fistula.  S/p angioplasty, outpatient vascular follow up.  c/w PO opiates, tylenol, lidoderm patch, fentanyl patch 50 mcg, gabapentin    >Failure to thrive in adult.  Plan: GI eval on last admission noted. EGD showing antral gastritis, Carafate, PPI & reglan. No further abd pain, N/V  low fat renal diet  Biopsy results of stomach:   - Hyperplastic mucosa with ulcer, chronic inflammation, old  hemorrhage  - Chemical gastropathy changes  - Negative for dysplasia or malignancy  - Helicobacter pylori immunostain: No bacteria identified   Renal diet; 3 nepro shakes three times per day  EUS to be done as outpt    >HIV (human immunodeficiency virus infection).  Plan: Pt non compliant with ART (outpatient pharmacy was not dispensing new regimen as there were several drug interactions), re-called ID,  ART resumed by ID, CD 4 & viral load, Atovaquone 1500mg daily for PCP prophylaxis   -Azithromycin 1200mg once weekly for MAC prophylaxis    >Pancytopenia.  Plan: Stable. Likely from underlying HIV/AIDS, f/u CBC    >CMV retinitis.  Plan: On valganciclovir 100mg after each HD for prophylaxis until CD4 is high. Ophthalmology consult as outpatient      >ESRD (end stage renal disease). Plan: HD as per schedule.     DVT ppx    Dispo: FESTUS when receive authorization 28 y/o M with extensive PMH including, HIV+. seizure, renal failure on dialysis M-W-F, blind, came to ED s/p seizure at home (unwitnessed) with c/o foot pain yesterday since a mirror fell on him.  Was d/c home since prelim x rays were negative.  Official radiology reading today stated that he has a 2nd, 3rd, and 4th linear, nondisplaced fractures and was called to return to the ED.  He states that he had to walk on it yesterday and is in severe pain.  Took Oxycodone for the pain.  Took more than usual b/c he usually takes it for his chronic pain. Pt is known for non compliance with his meds. He admits to being non compliant with his HIV & seizure meds. Last HD was yesterday. No podiatry intervention planned as per them. Pt could have been d/cherri home but could not get crutches due to AVF, could not get a wheelchair as has steps at home and will require FESTUS. CM/SW working on it. ID resumed pt's HIV meds. Pt was found to have Right 2-4 metatarsal fractures. Pt's xrays and CT scans reviewed. Results show multiple fractures.Splint applied by podiatry. Pt is non WB to right foot with crutches but, due PMHx of AVF the pt is unable to use the crutches. pt states his house is not wheelchair accessible and has stairs  Podiatry recommended pt to be admitted for FESTUS. Pt may require surgery in the future based on CT scan findings as per podiatry but no plans for surgery inhouse as per podiatry. For Breakthrough seizure 2/2  med non compliance, neuro consult appreciated. should be on standing keppra with post HD supplementation. start keppra 250 mg bid with 500 mg dose post HD,  "head spasms" reported, CTH no acute pathology . Await FESTUS auth              >Right 2-4 metatarsal fractures-  Pt's xrays and CT scans reviewed. Results show multiple fractures.  Splint applied by podiatry  Pt is non WB to right foot with crutches but, due PMHx of AVF the pt is unable to use the crutches  pt states his house is not wheelchair accessible and has stairs  Podiatry recommended pt to be admitted for FESTUS  Keep the foot elevated, iced, and compressed.  Pt may require surgery in the future based on CT scan findings as per podiatry but no plans for surgery inhouse as per podiatry  Pain meds adjusted  Daily PT ordered      >Breakthrough seizure- likely from med non compliance, neuro consult appreciated. should be on standing keppra with post HD supplementation. start keppra 250 mg bid with 500 mg dose post HD, no "head spasms" reported, CTH no acute pathology .       >Diarrhea-resolved    >AV fistula.  S/p angioplasty, outpatient vascular follow up.  c/w PO opiates, tylenol, lidoderm patch, fentanyl patch 50 mcg, gabapentin    >Failure to thrive in adult.  Plan: GI eval on last admission noted. EGD showing antral gastritis, Carafate, PPI & reglan. No further abd pain, N/V  low fat renal diet  Biopsy results of stomach:   - Hyperplastic mucosa with ulcer, chronic inflammation, old  hemorrhage  - Chemical gastropathy changes  - Negative for dysplasia or malignancy  - Helicobacter pylori immunostain: No bacteria identified   Renal diet; 3 nepro shakes three times per day  EUS to be done as outpt    >HIV (human immunodeficiency virus infection).  Plan: Pt non compliant with ART (outpatient pharmacy was not dispensing new regimen as there were several drug interactions), re-called ID,  ART resumed by ID, CD 4 & viral load, Atovaquone 1500mg daily for PCP prophylaxis   -Azithromycin 1200mg once weekly for MAC prophylaxis    >Pancytopenia.  Plan: Stable. Likely from underlying HIV/AIDS, f/u CBC    >CMV retinitis.  Plan: On valganciclovir 100mg after each HD for prophylaxis until CD4 is high. Ophthalmology consult as outpatient    >ESRD (end stage renal disease). Plan: HD as per schedule.     DVT ppx    Dispo: FESTUS when receive authorization povidone-iodine ( under 2 weeks of age or 1500 grams)

## 2019-01-05 NOTE — ED PROVIDER NOTE - MEDICAL DECISION MAKING DETAILS
Pt arrives acutely enecphalopathic, sonorous, EKG chagnes concerning for hyperkalemia, missed dialysis. Intubated for airway protection. Immediately after intuabtion run of sustaiend VT responsive to bicarb and calcium chloride. Dr. Mooney of nephrology consulted for emergent dialysis. Insulin and glucose given. Pt with known seizure disorder, loaded with keppra, does not appear to be actively seizing. will get CT head. D/w ICU requesting CT C-spine and chest as well. Pt with known HIV, unknown control, will get blood culture, add broad spectrum ABx if there is fever. Patient/Caregiver provided printed discharge information.

## 2019-01-09 PROCEDURE — 83540 ASSAY OF IRON: CPT

## 2019-01-09 PROCEDURE — 80177 DRUG SCRN QUAN LEVETIRACETAM: CPT

## 2019-01-09 PROCEDURE — 36558 INSERT TUNNELED CV CATH: CPT

## 2019-01-09 PROCEDURE — 86900 BLOOD TYPING SEROLOGIC ABO: CPT

## 2019-01-09 PROCEDURE — 84466 ASSAY OF TRANSFERRIN: CPT

## 2019-01-09 PROCEDURE — 87536 HIV-1 QUANT&REVRSE TRNSCRPJ: CPT

## 2019-01-09 PROCEDURE — 99261: CPT

## 2019-01-09 PROCEDURE — 85027 COMPLETE CBC AUTOMATED: CPT

## 2019-01-09 PROCEDURE — 99285 EMERGENCY DEPT VISIT HI MDM: CPT

## 2019-01-09 PROCEDURE — 86901 BLOOD TYPING SEROLOGIC RH(D): CPT

## 2019-01-09 PROCEDURE — 36415 COLL VENOUS BLD VENIPUNCTURE: CPT

## 2019-01-09 PROCEDURE — 71045 X-RAY EXAM CHEST 1 VIEW: CPT

## 2019-01-09 PROCEDURE — 93005 ELECTROCARDIOGRAM TRACING: CPT

## 2019-01-09 PROCEDURE — 84100 ASSAY OF PHOSPHORUS: CPT

## 2019-01-09 PROCEDURE — 80053 COMPREHEN METABOLIC PANEL: CPT

## 2019-01-09 PROCEDURE — 80048 BASIC METABOLIC PNL TOTAL CA: CPT

## 2019-01-09 PROCEDURE — 86850 RBC ANTIBODY SCREEN: CPT

## 2019-01-09 PROCEDURE — 76937 US GUIDE VASCULAR ACCESS: CPT

## 2019-01-09 PROCEDURE — 82728 ASSAY OF FERRITIN: CPT

## 2019-01-09 PROCEDURE — 83735 ASSAY OF MAGNESIUM: CPT

## 2019-01-09 PROCEDURE — 85610 PROTHROMBIN TIME: CPT

## 2019-01-09 PROCEDURE — 85730 THROMBOPLASTIN TIME PARTIAL: CPT

## 2019-01-09 PROCEDURE — 77001 FLUOROGUIDE FOR VEIN DEVICE: CPT

## 2019-01-09 PROCEDURE — 83550 IRON BINDING TEST: CPT

## 2019-01-28 ENCOUNTER — MEDICATION RENEWAL (OUTPATIENT)
Age: 29
End: 2019-01-28

## 2019-01-30 ENCOUNTER — APPOINTMENT (OUTPATIENT)
Dept: INTERNAL MEDICINE | Facility: CLINIC | Age: 29
End: 2019-01-30
Payer: MEDICAID

## 2019-01-30 VITALS
DIASTOLIC BLOOD PRESSURE: 66 MMHG | BODY MASS INDEX: 19.9 KG/M2 | SYSTOLIC BLOOD PRESSURE: 96 MMHG | WEIGHT: 139 LBS | HEIGHT: 70 IN

## 2019-01-30 DIAGNOSIS — M54.9 DORSALGIA, UNSPECIFIED: ICD-10-CM

## 2019-01-30 DIAGNOSIS — B20 HUMAN IMMUNODEFICIENCY VIRUS [HIV] DISEASE: ICD-10-CM

## 2019-01-30 DIAGNOSIS — N18.6 END STAGE RENAL DISEASE: ICD-10-CM

## 2019-01-30 DIAGNOSIS — I77.0 ARTERIOVENOUS FISTULA, ACQUIRED: ICD-10-CM

## 2019-01-30 DIAGNOSIS — G89.29 DORSALGIA, UNSPECIFIED: ICD-10-CM

## 2019-01-30 DIAGNOSIS — Z99.2 END STAGE RENAL DISEASE: ICD-10-CM

## 2019-01-30 PROCEDURE — 99213 OFFICE O/P EST LOW 20 MIN: CPT

## 2019-01-30 NOTE — PHYSICAL EXAM
[General Appearance - Alert] : alert [Outer Ear] : the ears and nose were normal in appearance [Hearing Threshold Finger Rub Not Titus] : hearing was normal [Auscultation Breath Sounds / Voice Sounds] : lungs were clear to auscultation bilaterally [Heart Rate And Rhythm] : heart rate was normal and rhythm regular [Heart Sounds] : normal S1 and S2 [Heart Sounds Gallop] : no gallops [Murmurs] : no murmurs [Heart Sounds Pericardial Friction Rub] : no pericardial rub [Edema] : there was no peripheral edema [FreeTextEntry1] : right fistual with bruit and very large and distended  [Bowel Sounds] : normal bowel sounds [Abdomen Soft] : soft [Abdomen Tenderness] : non-tender [] : no hepato-splenomegaly [Abdomen Mass (___ Cm)] : no abdominal mass palpated [Costovertebral Angle Tenderness] : no CVA tenderness [No Palpable Adenopathy] : no palpable adenopathy [Deep Tendon Reflexes (DTR)] : deep tendon reflexes were 2+ and symmetric [Sensation] : the sensory exam was normal to light touch and pinprick [No Focal Deficits] : no focal deficits [Oriented To Time, Place, And Person] : oriented to person, place, and time [Affect] : the affect was normal

## 2019-01-30 NOTE — ASSESSMENT
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 \par discussed about adherence and possible complications in case of nonadherence. \par will take atovaquone for PCP prophylaxis. \par Pain clinic follow up and Chiropractor referral \par Keep appt with vascular  [Treatment Education] : treatment education [Treatment Adherence] : treatment adherence [Rx Dose / Side Effects] : Rx dose/side effects [Risk Reduction] : risk reduction [Drug Interactions / Side Effects] : drug interactions/side effects [HIV Education] : HIV Education [Anticipatory Guidance] : anticipatory guidance

## 2019-01-30 NOTE — HISTORY OF PRESENT ILLNESS
[FreeTextEntry1] : 27 y/o man with a PMH of poorly controlled HIV due to nonadherence, ESRD and\par seizures very nonadherent to his ARV meds is here for follow up.  \par Never had undetectable VL, CD4<100. He states that he is taking meds but missing frequently due to issue with refills? \par His genotype showed resistance to all classes of ARV. \par he has been hospitalized multiple times in [facility] with GI symptoms and chronic pain. \par On HD regulalry 3 times weekly.\par Has appt with vascular for the big vascular mass on on site of AVF. \par \par Seizure disorder\par Hypertension\par ESRD (end stage renal disease)\par Seizure\par Pericarditis\par Anxiety\par Depression\par Renal failure (ARF), acute on chronic: dialysis av fistula, RUE\par HTN (hypertension)\par Cardiomyopathy\par HIV disease: born HIV+\par AV fistula\par S/P tonsillectomy

## 2019-02-04 ENCOUNTER — APPOINTMENT (OUTPATIENT)
Dept: VASCULAR SURGERY | Facility: CLINIC | Age: 29
End: 2019-02-04

## 2019-02-26 ENCOUNTER — MEDICATION RENEWAL (OUTPATIENT)
Age: 29
End: 2019-02-26

## 2019-02-28 ENCOUNTER — MEDICATION RENEWAL (OUTPATIENT)
Age: 29
End: 2019-02-28

## 2019-02-28 ENCOUNTER — APPOINTMENT (OUTPATIENT)
Dept: VASCULAR SURGERY | Facility: CLINIC | Age: 29
End: 2019-02-28

## 2019-02-28 RX ORDER — OXYCODONE HYDROCHLORIDE AND ACETAMINOPHEN 10; 325 MG/1; MG/1
10-325 TABLET ORAL
Refills: 0 | Status: ACTIVE | COMMUNITY
Start: 2019-02-28

## 2019-02-28 RX ORDER — CALCIUM ACETATE 667 MG
667 TABLET ORAL 3 TIMES DAILY
Refills: 0 | Status: ACTIVE | COMMUNITY
Start: 2019-02-28

## 2019-02-28 RX ORDER — PANTOPRAZOLE 40 MG/1
40 TABLET, DELAYED RELEASE ORAL
Qty: 90 | Refills: 1 | Status: ACTIVE | COMMUNITY
Start: 2019-02-28

## 2019-02-28 RX ORDER — HYDRALAZINE HYDROCHLORIDE 50 MG/1
50 TABLET ORAL
Refills: 0 | Status: ACTIVE | COMMUNITY
Start: 2019-02-28

## 2019-02-28 RX ORDER — ZOLPIDEM TARTRATE 10 MG/1
10 TABLET, FILM COATED ORAL
Qty: 30 | Refills: 1 | Status: ACTIVE | COMMUNITY
Start: 2019-02-28

## 2019-02-28 RX ORDER — TENOFOVIR DISOPROXIL FUMARATE 300 MG/1
300 TABLET ORAL
Qty: 5 | Refills: 1 | Status: ACTIVE | COMMUNITY
Start: 2018-10-03 | End: 1900-01-01

## 2019-02-28 RX ORDER — RITONAVIR 100 MG 100 MG/1
100 TABLET ORAL TWICE DAILY
Qty: 60 | Refills: 1 | Status: ACTIVE | COMMUNITY
Start: 2018-10-03 | End: 1900-01-01

## 2019-02-28 RX ORDER — NUT.TX.IMPAIRED RENAL FXN,SOY 0.09G-2/ML
LIQUID (ML) ORAL
Refills: 0 | Status: ACTIVE | COMMUNITY
Start: 2019-02-28

## 2019-02-28 RX ORDER — FENTANYL 100 UG/H
100 PATCH, EXTENDED RELEASE TRANSDERMAL
Refills: 0 | Status: ACTIVE | COMMUNITY
Start: 2019-02-28

## 2019-02-28 RX ORDER — FENTANYL 87.5 UG/H
PATCH, EXTENDED RELEASE TRANSDERMAL
Refills: 0 | Status: DISCONTINUED | COMMUNITY
End: 2019-02-28

## 2019-02-28 RX ORDER — DARUNAVIR 600 MG/1
600 TABLET, FILM COATED ORAL TWICE DAILY
Qty: 60 | Refills: 0 | Status: ACTIVE | COMMUNITY
Start: 2018-10-03 | End: 1900-01-01

## 2019-02-28 RX ORDER — LEVETIRACETAM 500 MG/1
500 TABLET, FILM COATED ORAL TWICE DAILY
Qty: 180 | Refills: 0 | Status: ACTIVE | COMMUNITY
Start: 2019-02-26 | End: 1900-01-01

## 2019-02-28 RX ORDER — METOPROLOL TARTRATE 50 MG/1
50 TABLET, FILM COATED ORAL TWICE DAILY
Qty: 180 | Refills: 1 | Status: ACTIVE | COMMUNITY
Start: 2019-02-28

## 2019-02-28 RX ORDER — SEVELAMER CARBONATE 800 MG/1
800 TABLET, FILM COATED ORAL 3 TIMES DAILY
Refills: 0 | Status: ACTIVE | COMMUNITY
Start: 2019-02-28

## 2019-02-28 RX ORDER — GABAPENTIN 100 MG/1
100 CAPSULE ORAL TWICE DAILY
Qty: 180 | Refills: 1 | Status: ACTIVE | COMMUNITY
Start: 2019-02-28

## 2019-02-28 RX ORDER — ALPRAZOLAM 2 MG/1
2 TABLET ORAL 3 TIMES DAILY
Refills: 0 | Status: ACTIVE | COMMUNITY

## 2019-02-28 RX ORDER — ETRAVIRINE 200 MG/1
200 TABLET ORAL TWICE DAILY
Qty: 60 | Refills: 0 | Status: ACTIVE | COMMUNITY
Start: 2018-10-03 | End: 1900-01-01

## 2019-02-28 RX ORDER — DOLUTEGRAVIR SODIUM 50 MG/1
50 TABLET, FILM COATED ORAL TWICE DAILY
Qty: 60 | Refills: 1 | Status: ACTIVE | COMMUNITY
Start: 2018-10-03 | End: 1900-01-01

## 2019-02-28 RX ORDER — OXYCODONE HYDROCHLORIDE 30 MG/1
TABLET ORAL
Refills: 0 | Status: DISCONTINUED | COMMUNITY
End: 2019-02-28

## 2019-03-01 ENCOUNTER — OUTPATIENT (OUTPATIENT)
Dept: OUTPATIENT SERVICES | Facility: HOSPITAL | Age: 29
LOS: 1 days | End: 2019-03-01
Payer: MEDICAID

## 2019-03-01 DIAGNOSIS — I77.0 ARTERIOVENOUS FISTULA, ACQUIRED: Chronic | ICD-10-CM

## 2019-03-01 PROCEDURE — G9001: CPT

## 2019-03-01 RX ORDER — ATOVAQUONE 750 MG/5ML
750 SUSPENSION ORAL TWICE DAILY
Qty: 300 | Refills: 0 | Status: ACTIVE | COMMUNITY
Start: 2018-10-03 | End: 1900-01-01

## 2019-03-04 DIAGNOSIS — Z71.89 OTHER SPECIFIED COUNSELING: ICD-10-CM

## 2019-03-22 ENCOUNTER — APPOINTMENT (OUTPATIENT)
Dept: INTERNAL MEDICINE | Facility: CLINIC | Age: 29
End: 2019-03-22

## 2019-03-23 NOTE — ED PROVIDER NOTE - CPE EDP GASTRO NORM
[Work-up necessary to assess local, regional or metastatic recurrence] : Work-up necessary to assess local, regional or metastatic recurrence normal...

## 2019-04-11 NOTE — DISCHARGE NOTE ADULT - SECONDARY DIAGNOSIS.
Essential hypertension HIV (human immunodeficiency virus infection) ESRD (end stage renal disease) Anxiety Name Of Product 4: Tretinioin 0.025%

## 2019-05-06 ENCOUNTER — OTHER (OUTPATIENT)
Age: 29
End: 2019-05-06

## 2019-05-31 NOTE — H&P ADULT - NSHPSOCIALHISTORY_GEN_ALL_CORE
Unable to obtain due to pts mental status
Equal and normal pulses (carotid, femoral, dorsalis pedis)

## 2019-08-12 NOTE — ED ADULT NURSE NOTE - CCCP TRG CHIEF CMPLNT
Called and spoke with patient to update him on results of echocardiogram.  Plan will be to have his dual chamber pacemaker generator changed tomorrow.  Pt verbalized understanding and will call the clinic with any questions.     SIVA Van       cough/chest pain

## 2019-12-17 NOTE — H&P ADULT - NEUROLOGICAL
Health Maintenance Due   Topic Date Due   • Shingles Vaccine (1 of 2) 09/30/2012   • DTaP/Tdap/Td Vaccine (2 - Td) 06/11/2018   • Diabetes Eye Exam  10/09/2019   • Diabetes A1C  10/17/2019       Patient is due for the topics as listed above and wishes to proceed with them. Orders placed for Diabetes A1C and Diabetes Eye Exam.    Patient requests normal tests results to be communicated via Phone. Preferred number is [phone].Patient states that it  is okay to leave a detailed voice message. and Letter.  Patient address is :  [address] [place] [id number].    Patient notified that if test results are abnormal, provider will call patient.        Alert & oriented; no sensory, motor or coordination deficits, normal reflexes

## 2020-01-06 NOTE — PATIENT PROFILE ADULT. - PAIN, CHRONIC: FACTORS THAT AGGRAVATE, PROFILE
Assessment/Plan:       Diagnoses and all orders for this visit:    Type 2 diabetes mellitus without complication, without long-term current use of insulin (HCC)    Essential hypertension    Congestive heart failure, NYHA class 1, acute, systolic (HCC)    Coronary artery disease due to lipid rich plaque    Carotid artery stenosis without cerebral infarction, bilateral    Hyperlipidemia, mixed    Ventral hernia without obstruction or gangrene    Cigarette nicotine dependence without complication        No problem-specific Assessment & Plan notes found for this encounter  Subjective:      Patient ID: Stefania Walters is a 79 y o  male  Patient is here for follow up of chronic medical conditions    htn-  hld-  Cad-  Carotid artery disease-  Nicotine addiction    Results for Poornima Fajardo (MRN [id number]) as of 2020 09:01    2019 07:48  Sodium: 142  Potassium: 5 0  Chloride: 107  CO2: 27  Anion Gap: 8  BUN: 23  Creatinine: 0 90  GLUCOSE FASTIN (H)  Calcium: 9 4  AST: 18  ALT: 38  Alkaline Phosphatase: 43 (L)  Total Protein: 8 1  Albumin: 4 4  TOTAL BILIRUBIN: 0 76  eGFR: 88  Cholesterol: 167  Triglycerides: 126  HDL: 45  Non-HDL Cholesterol: 122  LDL Direct: 97    2019 09:58  Sodium: 138  Potassium: 4 0  Chloride: 105  CO2: 29  Anion Gap: 4  BUN: 12  Creatinine: 0 80  GLUCOSE FASTIN (H)  Calcium: 9 9  eGFR: 92  WBC: 5 38  Red Blood Cell Count: 4 75  Hemoglobin: 15 7  HCT: 48 5  MCV: 102 (H)  MCH: 33 1  MCHC: 32 4  RDW: 12 0  Platelet Count: 596  MPV: 10 8  nRBC: 0  Neutrophils %: 48  Immat GRANS %: 0  Lymphocytes Relative: 34  Monocytes Relative: 10  Eosinophils: 6  Basophils Relative: 2 (H)  Immature Grans Absolute: 0 01  Absolute Neutrophils: 2 65  Lymphocytes Absolute: 1 80  Absolute Monocytes: 0 51  Absolute Eosinophils: 0 31  Basophils Absolute: 0 10  Protime: 13 1  INR: 1 03  Hemoglobin A1C: 6 8 (H)  EA  ABO Grouping:  A  Rh Factor: Positive  Antibody Screen: Negative  Specimen Expiration Date: [id number]        The following portions of the patient's history were reviewed and updated as appropriate:   He has a past medical history of CHF (congestive heart failure) ([facility] ), Diabetes mellitus ([facility] [facility] ), Diverticulitis, Hyperlipidemia, Hypertension, and Kidney stone  ,  does not have any pertinent problems on file  ,   has a past surgical history that includes Abdominal surgery; Laparoscopic colon resection; Colonoscopy; pr colonoscopy flx dx w/collj spec when pfrmd (N/A, 11/3/2017); and Inguinal hernia repair (Left)  ,  family history includes Colon cancer in his family, father, and paternal grandfather; No Known Problems in his mother  ,   reports that he quit smoking about 3 months ago  He has never used smokeless tobacco  He reports that he drinks alcohol  He reports that he does not use drugs  ,  has No Known Allergies     Current Outpatient Medications   Medication Sig Dispense Refill    aspirin 81 mg chewable tablet Chew 1 tablet (81 mg total) daily 60 tablet 3    atorvastatin (LIPITOR) 20 mg tablet Take 0 5 tablets (10 mg total) by mouth daily 60 tablet 3    carvedilol (COREG) 3 125 mg tablet Take 1 tablet (3 125 mg total) by mouth 2 (two) times a day with meals 180 tablet 3    clopidogrel (PLAVIX) 75 mg tablet Take 1 tablet (75 mg total) by mouth daily 90 tablet 3    glucose blood test strip Use as instructed 100 each 2    JANUVIA 25 MG tablet TAKE 1 TABLET BY MOUTH  DAILY (Patient taking differently: Take 25 mg by mouth daily after breakfast ) 90 tablet 0    losartan (COZAAR) 100 MG tablet Take 1 tablet (100 mg total) by mouth daily (Patient taking differently: Take 100 mg by mouth daily in the early morning ) 90 tablet 3    metFORMIN (GLUCOPHAGE) 1000 MG tablet Take 1 tablet (1,000 mg total) by mouth 2 (two) times a day with meals 180 tablet 3    sildenafil (VIAGRA) 50 MG tablet Take 1 tablet by mouth       No current facility-administered medications for this visit  Review of Systems   Constitutional: Negative for fatigue and fever  HENT: Negative for congestion  Eyes: Negative for visual disturbance  Respiratory: Negative for cough and shortness of breath  Cardiovascular: Negative for chest pain, palpitations and leg swelling  Gastrointestinal: Negative for abdominal distention and abdominal pain  Endocrine: Negative for cold intolerance, polydipsia and polyuria  Genitourinary: Negative for difficulty urinating  Musculoskeletal: Negative for back pain and joint swelling  Skin: Negative for color change and rash  Allergic/Immunologic: Negative for immunocompromised state  Neurological: Negative for dizziness and headaches  Hematological: Negative for adenopathy  Psychiatric/Behavioral: Negative for behavioral problems and sleep disturbance  All other systems reviewed and are negative  Objective: There were no vitals filed for this visit  There is no height or weight on file to calculate BMI  Physical Exam   Constitutional: He is oriented to person, place, and time  He appears well-developed and well-nourished  No distress  HENT:   Head: Normocephalic and atraumatic  Right Ear: External ear normal    Left Ear: External ear normal    Nose: Nose normal    Mouth/Throat: Oropharynx is clear and moist  No oropharyngeal exudate  Eyes: Pupils are equal, round, and reactive to light  Conjunctivae and EOM are normal  Right eye exhibits no discharge  Left eye exhibits no discharge  No scleral icterus  Neck: Normal range of motion  Neck supple  No JVD present  No thyromegaly present  Cardiovascular: Normal rate, regular rhythm, normal heart sounds and intact distal pulses  Exam reveals no gallop and no friction rub  No murmur heard  Pulmonary/Chest: Effort normal and breath sounds normal  No respiratory distress  He exhibits no tenderness  Abdominal: Soft  Bowel sounds are normal  He exhibits no distension   There is no tenderness  Musculoskeletal: Normal range of motion  He exhibits no edema or tenderness  Lymphadenopathy:     He has no cervical adenopathy  Neurological: He is alert and oriented to person, place, and time  He has normal reflexes  No cranial nerve deficit  Coordination normal    Skin: Skin is warm and dry  He is not diaphoretic  Psychiatric: He has a normal mood and affect  His behavior is normal  Judgment and thought content normal    Nursing note and vitals reviewed  activity/movement/ineffective pain medication

## 2020-03-06 NOTE — ED ADULT TRIAGE NOTE - PRO INTERPRETER NEED 2
[FreeTextEntry1] : CTA reviewed: flush occlusion L VERENA, eia.  femoral recon, small caliber.\par occluded fem fem\par occluded sfa stent\par patent below knee pop and tibials
English

## 2020-05-19 NOTE — PATIENT PROFILE ADULT - NSPROMEDSBROUGHTTOHOSP_GEN_A_NUR
Patient: Teetee Laurent  : 1951  MRN: [id number]  Age: 68 year old      HPI: Mrs. Laurent is seen today for evaluation of lightheadedness/dizziness.  She has rheumatoid arthritis and a recent diagnosis of dementia--possibly related to deep white matter disease although a nonvascular etiology I think is not completely ruled out.  She has had longstanding difficulties with vision in her left eye and has lost vision in that eye.  She does not have a history of a discrete stroke.  She does not have hypertension and does not smoke.  She does not have diabetes.  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  I do have access to her neurology consultations which I reviewed--neuropsychiatric testing confirmed her suspicion of cognitive decline.  She does not have sleep apnea.  She had an echocardiogram here in the office earlier this month which I will review in more detail below.  She can walk several miles for exercise without shortness of breath, chest discomfort or palpitations.  She describes transient sensations of lightheadedness upon arising in the morning and at other times occasionally during the day but not consistently associated with postural change.  She has not had syncope.  Her brother  suddenly at age 47--likely of a myocardial infarction.  The remainder of her family history is not notable for premature cardiac disease.    Review of Systems   Neurological: Positive for dizziness and light-headedness. Negative for syncope.       Current Outpatient Medications   Medication Sig Dispense Refill   • methotrexate (RHEUMATREX) 2.5 MG tablet 1 day a week.  2   • diclofenac (VOLTAREN) 75 MG EC tablet 2 times daily.  0   • predniSONE (DELTASONE) 5 MG tablet daily.  4   • folic acid (FOLATE) 1 MG tablet TK 1 T PO QD  4   • tiZANidine (ZANAFLEX) 2 MG tablet nightly.  2   • prednisoLONE acetate (PRED FORTE, OMNIPRED) 1 % ophthalmic suspension 2 times daily.  4    • aspirin 325 MG tablet Take 325 mg by mouth daily.     • nitrofurantoin, macrocrystal-monohydrate, (MACROBID) 100 MG capsule 1 tablet daily prn intercourse 30 capsule 0   • albuterol 108 (90 Base) MCG/ACT inhaler Inhale 2 puffs into the lungs every 4 hours as needed for Shortness of Breath or Wheezing. 1 Inhaler 0   • prednisoLONE acetate (PRED MILD) 0.12 % ophthalmic suspension Place 1 drop into left eye 4 times daily.      • atropine (ISOPTO ATROPINE) 1 % ophthalmic solution Place 1 drop into left eye 2 times daily.     • escitalopram (LEXAPRO) 20 MG tablet TAKE 1 TABLET BY MOUTH DAILY     • sertraline (ZOLOFT) 100 MG tablet TAKE 1 TABLET BY MOUTH DAILY     • travoprost, benzalkonium free, (TRAVATAN Z) 0.004 % ophthalmic solution      • trazodone (DESYREL) 150 MG tablet TAKE 1 TABLET AT BEDTIME     • pravastatin (PRAVACHOL) 40 MG tablet TAKE 1 TABLET BY MOUTH AT BEDTIME       No current facility-administered medications for this visit.        ALLERGIES:  Bimatoprost and Brimonidine    Visit Vitals  /60 (BP Location: LUE - Left upper extremity, Patient Position: Sitting)   Pulse 75   Wt 63 kg (138 lb 14.2 oz)   SpO2 96%   BMI 23.14 kg/m²     Physical Exam   Constitutional: She is oriented to person, place, and time. She appears well-developed and well-nourished.   HENT:   Head: Normocephalic and atraumatic.   Eyes:   Left eye is chronically abnormal   Neck: Normal range of motion. Neck supple. No JVD present. No tracheal deviation present.   Cardiovascular: Normal rate, regular rhythm and intact distal pulses. Exam reveals no gallop and no friction rub.   No murmur heard.  Pulmonary/Chest: Effort normal and breath sounds normal. No stridor. No respiratory distress. She has no wheezes. She has no rales. She exhibits no tenderness.   Abdominal: Soft. She exhibits no distension. There is no abdominal tenderness.   Musculoskeletal:         General: No edema.   Neurological: She is alert and oriented to person,  place, and time.   Skin: Skin is warm and dry. No erythema.   Psychiatric: She has a normal mood and affect. Her behavior is normal. Judgment and thought content normal.       Data: Recent echocardiogram from May 2020 is reported as normal left ventricular size and wall thickness with normal ejection fraction and no regional wall motion abnormalities.  Mild to moderate aortic regurgitation is reported along with moderate to severe mitral regurgitation.  I reviewed the imaging and I think the aortic regurgitation is in the range of mild and the mitral regurgitation is in the range of moderate or less.  Notably, the left atrium is normal in size.    EKG today shows normal sinus rhythm at 80 bpm with minor RV conduction delay but is otherwise within normal limits.    Recent labs include a normal basic metabolic panel    Neuroimaging includes a normal brain and neck MRA.    Brain MRI is reported as atrophy and stable chronic small vessel ischemia in the deep white matter.    Impression/Plan:  Dizziness  I do not think this is related to a cardiovascular disorder.  Her symptoms are not consistently postural so I do not think further evaluation in that direction would be fruitful.  I do not think her symptoms represent a rhythm disturbance.  Therefore I did not recommend any further testing at this time.    Nonrheumatic aortic valve insufficiency  I reviewed her echocardiogram and I think her aortic regurgitation is in the range of mild.  She does not have aortic stenosis.  The valve is tricuspid.  The aortic root is normal in caliber.  - ELECTROCARDIOGRAM 12-LEAD    Nonrheumatic mitral valve regurgitation  She has a somewhat myxomatous mitral valve with borderline systolic prolapse of both leaflets with I think moderate, or perhaps a bit less, regurgitation.  Her left ventricle is normal in size and systolic function and her left atrium is normal size as well.  She does not have a murmur which supports my assessment of  the echocardiogram.  I am not recommending any additional testing here.  I would like to see her back in 1 year to make sure this issue is stable.  I encouraged her to keep up with her walking program for exercise.    Mixed hyperlipidemia  On pravastatin          Mihai Clark MD   no

## 2020-06-16 NOTE — H&P ADULT - PSYCHIATRIC
Left a message at 10:23am for the patient to return my call due to his message that was sent to the office.    Bhargav FRANCO   Affect and characteristics of appearance, verbalizations, behaviors are appropriate

## 2020-09-24 NOTE — PATIENT PROFILE ADULT. - NS PRO MODE OF ARRIVAL
stretcher Burow's Graft Text: The defect edges were debeveled with a #15 scalpel blade.  Given the location of the defect, shape of the defect, the proximity to free margins and the presence of a standing cone deformity a Burow's skin graft was deemed most appropriate. The standing cone was removed and this tissue was then trimmed to the shape of the primary defect. The adipose tissue was also removed until only dermis and epidermis were left.  The skin margins of the secondary defect were undermined to an appropriate distance in all directions utilizing iris scissors.  The secondary defect was closed with interrupted buried subcutaneous sutures.  The skin edges were then re-apposed with running  sutures.  The skin graft was then placed in the primary defect and oriented appropriately.

## 2021-02-05 NOTE — PROGRESS NOTE ADULT - SUBJECTIVE AND OBJECTIVE BOX
SO, Jimena, updated with patient condition.   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 Pt also c/o nausea and wretching. Vomited once today at home. Fell today at home in bathroom but denies hitting head, states tripped. Pt uses wheelchair at baseline. He admits to being non compliant with his medications at home; he had not taken his medications. Pt denies cp, diarrhea, HA, fevers, constipation, numbness, tingling. (2018 23:01)    Follow up ESRD  No acute overnight event     PAST MEDICAL & SURGICAL HISTORY:  HIV (human immunodeficiency virus infection)  Seizure disorder  Hypertension  Diabetes  ESRD (end stage renal disease)  Seizure  Pericarditis  Anxiety  Depression  Renal failure (ARF), acute on chronic: dialysis av fistula, RUE  HTN (hypertension)  Cardiomyopathy  HIV disease: born HIV+  AV fistula  S/P tonsillectomy      MEDICATIONS  (STANDING):  acetaminophen  IVPB. 1000 milliGRAM(s) IV Intermittent once  amLODIPine   Tablet 10 milliGRAM(s) Oral daily  atovaquone Suspension 1500 milliGRAM(s) Oral daily  calcitriol   Capsule 0.5 MICROGram(s) Oral daily  darunavir 800 milliGRAM(s) Oral daily  diphenhydrAMINE   Injectable 25 milliGRAM(s) IV Push once  dolutegravir 50 milliGRAM(s) Oral daily  emtricitabine 200 milliGRAM(s) Oral <User Schedule>  epoetin nithin Injectable 46252 Unit(s) IV Push <User Schedule>  fentaNYL   Patch 100 MICROgram(s)/Hr. 1 Patch Transdermal every 48 hours  folic acid 1 milliGRAM(s) Oral daily  heparin  Injectable 5000 Unit(s) SubCutaneous every 12 hours  levETIRAcetam 500 milliGRAM(s) Oral two times a day  lisinopril 20 milliGRAM(s) Oral daily  LORazepam     Tablet 0.5 milliGRAM(s) Oral every 8 hours  metoclopramide 10 milliGRAM(s) Oral three times a day with meals  metoprolol tartrate 100 milliGRAM(s) Oral two times a day  Nephro-dayron 1 Tablet(s) Oral daily  ondansetron Injectable 4 milliGRAM(s) IV Push every 6 hours  pantoprazole    Tablet 40 milliGRAM(s) Oral two times a day  ritonavir Tablet 100 milliGRAM(s) Oral daily  sertraline 50 milliGRAM(s) Oral daily  sevelamer hydrochloride 800 milliGRAM(s) Oral three times a day  sucralfate suspension 1 Gram(s) Oral four times a day  tenofovir disoproxil fumarate (VIREAD) 300 milliGRAM(s) Oral <User Schedule>  Valganciclovir 50 mg/ 1 ml Oral Solutio 100 milliGRAM(s) 100 milliGRAM(s) Oral <User Schedule>    MEDICATIONS  (PRN):  acetaminophen   Tablet 650 milliGRAM(s) Oral every 6 hours PRN For Temp greater than 38 C (100.4 F)  acetaminophen   Tablet. 650 milliGRAM(s) Oral every 6 hours PRN Moderate Pain (4 - 6)  diphenhydrAMINE   Capsule 25 milliGRAM(s) Oral every 6 hours PRN Rash and/or Itching  oxyCODONE    IR 10 milliGRAM(s) Oral four times a day PRN mod to severe pain      Allergies    vancomycin (Anaphylaxis)    Intolerances        Vital Signs Last 24 Hrs  T(C): 36.4 (2018 08:01), Max: 36.9 (2018 00:09)  T(F): 97.5 (2018 08:01), Max: 98.5 (2018 00:09)  HR: 72 (2018 08:01) (72 - 80)  BP: 118/75 (2018 08:01) (108/74 - 121/76)  BP(mean): --  RR: 16 (2018 08:01) (16 - 18)  SpO2: 95% (2018 08:01) (95% - 100%)  Daily     Daily Weight in k.5 (2018 18:45)    PHYSICAL EXAM:    GENERAL: NAD, well-groomed, well-developed  HEAD:  Atraumatic, Normocephalic  EYES: EOMI, PERRLA, conjunctiva and sclera clear  ENMT: No tonsillar erythema, exudates, or enlargement; Moist mucous membranes, Good dentition, No lesions  NECK: Supple, No JVD, Normal thyroid  NERVOUS SYSTEM:  Alert & Oriented X3, Good concentration; Motor Strength 5/5 B/L upper and lower extremities; DTRs 2+ intact and symmetric  CHEST/LUNG: Clear to percussion bilaterally; No rales, rhonchi, wheezing, or rubs  HEART: Regular rate and rhythm; No murmurs, rubs, or gallops  ABDOMEN: Soft, Nontender, Nondistended; Bowel sounds present  EXTREMITIES:  2+ Peripheral Pulses, No clubbing, cyanosis, or edema  SKIN: No rashes or lesions    LABS:                        10.4   4.3   )-----------( 193      ( 2018 17:05 )             32.6         134<L>  |  87<L>  |  89.0<H>  ----------------------------<  92  4.8   |  27.0  |  8.72<H>    Ca    9.2      2018 17:05  Phos  5.4         TPro  x   /  Alb  4.1  /  TBili  x   /  DBili  x   /  AST  x   /  ALT  x   /  AlkPhos  x           Phosphorus Level, Serum: 5.4 mg/dL ( @ 17:05)          RADIOLOGY & ADDITIONAL TESTS:

## 2021-02-17 NOTE — PATIENT PROFILE ADULT. - CENTRAL VENOUS CATHETER
SUBJECTIVE:_  Patient is up in the room and has been up walking in the hallway.  He has minimal surgical pain at the implant site.  He was walking in the [place] earlier and felt his heart rate speed up suddenly.  He had shortness of breath with the tachycardia.  After he came back to his room and sat down, the HR went back to his normal range, 60's.    OBJECTIVE:_    Vital Signs (last 24 hrs)_____ Last Charted___________Minimum____________ Maximum____________  Temp    98.2  (DEC 18 12:38) L 97.5 (DEC 17 15:53) 98.3  (DEC 17 22:16)  Heart Rate   L 53 (DEC 18 12:38) L 53 (DEC 18 12:38) 66  (DEC 18 07:23)  Resp Rate       16  (DEC 18 12:38) 16  (DEC 17 15:53) 18  (DEC 17 22:16)  SBP    H 151 (DEC 18 12:38) H 151 (DEC 18 12:38) H 171 (DEC 18 07:23)  DBP    77  (DEC 18 12:38) 76  (DEC 17 15:53) 86  (DEC 18 07:23)  O2 Sat: 95% on room air.     GENERAL:_  Well-appearing, well hydrated, in no acute distress.     HEENT:_  Unremarkable.     LUNGS:_  Clear to auscultation    CARDIAC:_  Normal S1, S2.  Regular rate and rhythm. Rhythm:  AP/ at 60 BPM.       ABDOMEN:_ Bowel sounds present.     EXTREMITIES:_ No edema.     SKIN:_  Warm and dry.     NEURO:_ Alert and oriented x3.    LABS:_    Labs (Last four charted values)  PT                   H 12.5 (DEC 17)   INR                  H 1.3 (DEC 17)     ASSESSMENT AND PLAN:_      Patient is POD #1 post pacemaker implant and EPS.  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  Due to previous evidence of SSS and some dropped beats as well as using slow pathway A-V conduction much of the time, a pacemaker was implanted.  This morning the patient was walking in the [place] and his HR went up to 140 BPM, ventricular pacing.  Onset of tachycardia was reviewed and the tachycardia appeared to be PMT initiated by a PVC with retrograde conduction to the atrium.  The pacemaker was interrogated and V-A conduction was confirmed with ventricular pacing at 90 BPM and a  V-A interval of 280ms.  PVARP was extended to 380ms. We programmed pacing mode to DDD as the rate-responsive mode caused AV pacing at a faster rate than the patient probably needed.  Lower rate limit set to 50 BPM.  Patient rested for an hour and then walked in the [place] again.  His heart rate gradually went up to 93 BPM with vigorous walking. A single PVC was seen which did not initiate PMT or any other arrhythmias.  Patient felt good.      Pacemaker interrogation by the Arrhythmia nurse this AM   showed normal pacemaker function.  Parameters are:  Atrial lead impedance 475 ohms, P wave sensing 4.5mV, threshold 0.5V@0.4ms  RV lead impedance 627 ohms, R wave sensing 12.5mV, threshold 0.5V@0.4ms  PVARP now at 380ms.  Pacing mode DDD, lower rate limit 50 BPM.    Today's CXR report and image reviewed.  No pneumothorax or pleural effusion seen.  Small linear and patchy opacities in the right lower lung, atelectasis/scar versus small infiltration, per report.  Will watch patient clinically.  Leads are in good position.      Pacemaker implant site is clean and dry. Steristrips intact, no drainage or redness.  Small area of ecchymosis inferior to incisional area. Small amount of swelling - normal surgical.  Patient teaching about activity and general follow up reviewed with patient.  He will receive a wireless transmitter at his first office visit on 12/26 at Dr. Bills's office.      Will probably be discharged this afternoon.  Dr. Bills will want to see him before discharge.  BP elevated over the last day, but will discharge him on his usual meds and observe his BP at home for a week or so.  He is understandably somewhat anxious.                     Electronically Signed On 12.18.2018 15:47  ___________________________________________________   Sheron Sierra              Over 45 minutes spent with this patient including reviewing the chart, interrogating and programming pacemaker, walking the patient, and  education.             Electronically Signed On 12.18.2018 17:34  ___________________________________________________   Sheron Sierra              Patient was seen with Sheron Urbina and changes in his program parameters of his pacemaker were made with the patient made to walk the hallway.  After the final adjustments he did the best.  He still is feeling quite anxious.  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  We will observe him overnight.  He will is metoprolol will be switched to metoprolol succinate and the dose will be increased to 75 twice daily he will also start him on Cardizem 60 mg p.o. 3 times daily in an effort to control the echo beats and fast heart rates    Overall the patient thinks he is feeling a little bit better but is not quite sure as he is not been as active if he stays paced without any more fast heart rates into the 140s he will be discharged home tomorrow.  He will need to have the slow pathway ablated electively once the pacemaker is healed and           Electronically Signed On 12.18.2018 17:40  ___________________________________________________   Donovan Bills MD     no

## 2021-03-16 NOTE — ED STATDOCS - MUSCULOSKELETAL [-], MLM
Airway  Performed by: Carolyn Meyers MD  Authorized by: Carolyn Meyers MD     Final Airway Type:  Endotracheal airway  Final Endotracheal Airway*:  ETT and JEANNETTE tube  ETT Size (mm)*:  4.0  Cuff*:  Uncuffed  Technique Used for Successful ETT Placement:  Direct laryngoscopy  Devices/Methods Used in Placement*:  Mask  Intubation Procedure*:  Preoxygenation, ETCO2, Atraumatic, Dentition Unchanged and Phaynx Clear  Insertion Site:  Oral  Blade Type*:  Cruz  Blade Size*:  1.5  Placement Verified by: auscultation and capnometry    Glottic View*:  1 - full view of glottis  Attempts*:  1  Ventilation Between Attempts:  Bag valve  Number of Other Approaches Attempted:  0   Patient Identified, Procedure confirmed, Emergency equipment available and Safety protocols followed  Location:  OR  Urgency:  Elective  Difficult Airway: No    Indications for Airway Management:  Anesthesia  Mask Difficulty Assessment:  1 - vent by mask  Performed By:  Anesthesiologist  Anesthesiologist:  Carolyn Meyers MD        
Peripheral IV  Performed by: Carolyn Meyers MD  Authorized by: Carolyn Meyers MD     Size:  22 G  Laterality:  Left  Location:  Hand  Site Prep:  Chlorhexidine gluconate (CHG)Technique:  Anatomical landmarks  Attempts:  1  Securement: Transparent dressing and Transparent dressing w/border  Performed By:  Other        
no neck pain/no back pain

## 2021-04-13 NOTE — PHYSICAL THERAPY INITIAL EVALUATION ADULT - REFERRING PHYSICIAN, REHAB EVAL
CT SINUS (NFU=[id number]): Result Notes   Sun Bell MD   4/12/2021  4:12 PM CDT       Please let him know that his CT scan shows chronic sinusitis.  I would like for him to return to clinic to go over the scans and to discuss further management
Left message to call back.
Patient returned call (Name and  of pt verified). All results and recommendations reviewed. Patient verbalizes understanding, denies further questions and agrees with plan of care. Appt scheduled On MOnday in [facility] to review results.
Per pt was to received call from Dr. Lela Sahni to discuss test results, please advise
Pt returning call please advise
Dr. Bertrand

## 2021-05-04 NOTE — DISCHARGE NOTE ADULT - NS MD DC PLAN IMMU FLU PROVIDE INFO
? Related to pancreatic process? Cannot r/o acid based issues  Will give a brief trial of omeprazole to see if it helps with pain   Recheck 2w if not improved Risks/benefits discussed with patient or patient surrogate

## 2021-06-28 NOTE — CONSULT NOTE ADULT - PROVIDER SPECIALTY LIST ADULT
Physical Therapy     Referred by: Gladis Nuñez CNP; Medical Diagnosis (from order):    Diagnosis Information      Diagnosis    722.52 (ICD-9-CM) - M51.37 (ICD-10-CM) - DDD (degenerative disc disease), lumbosacral    721.3 (ICD-9-CM) - M47.817 (ICD-10-CM) - Lumbosacral spondylosis without myelopathy                Daily Treatment Note    Visit:  2     SUBJECTIVE                                                                                                             He presents in the waiting room in a wheelchair because of walking into the building bothered his back so much (he pushed the w/c to the waiting room and then sat down in it).  Walking more or standing would make it a lot worse.  Pain / Symptoms:  Pain rating (out of 10): Current: 2 ; Worst: 9    OBJECTIVE                                                                                                                        TREATMENT                                                                                                                  Manual Therapy:  After the US still in sitting:    Massage to soft tissue of low back    He was under the impression that he would be having dry needling with electrical stimulation today, but it was scheduled incorrectly, so I did a quick courtesy dry needling treatment (no charge).  I explained the rationale and effects of dry needling and received informed, verbal consent to proceed.    In sidelying:    Bilaterally:  L5 and L4 paraspinals with 40 mm needles; superior cluneals and along SI joints with 50 mm needles, and inferior cluneals at the glut max with 60 mm needles.  No adverse effects noted.  All 12 needles accounted for after treatment.    4 x 40 mm  6 x 50 mm  2 x 60 mm      45 min total time (30 min billed)      Ultrasound (22100)  Location: bilateral lower lumbar region  Position: sitting  Duty Cycle: 100%  Frequency: 1 Mhz  Intensity (w/cm2): 1.4  Duration: 9 minutes  Results: no change in  Podiatry symptoms immediately following      ASSESSMENT                                                                                                             Today I used ultrasound to improve the circulation to the soft tissue, and I massaged the muscles in the lumbar region to relax them also for pain management.  Hopefully he responds well to the dry needling as well.        PLAN                                                                                                                           Suggestions for next session as indicated: Modalities such as ultrasound, manual therapy for soft tissue massage, dry needling with electrical stimulation.         Therapy procedure time and total treatment time can be found documented on the Time Entry flowsheet

## 2021-08-30 NOTE — ED ADULT TRIAGE NOTE - MEANS OF ARRIVAL
[facility]   Comprehensive Heart Failure Clinic    Please send a Heart Failure Assessment today 8/30/2021. Please report if patient has any:    -shortness of breath  -paroxysmal nocturnal dyspnea  -dyspnea on exertion  -cough  -dizziness or lightheadedness  -syncope  -edema, in lower extremities  -if abdomen is feeling full or patient has complaints of early satiety or bloating    Please also send:    -current medication list; please note if patient is refusing any medications  -most recent weights for the past 7 days  -blood pressure and heart rate for the past 7 days    Please fax to [phone].      After information is received any new orders or changes will be faxed back to your facility.    Thank you,    Danielle BLANCO  [facility]  Comprehensive Heart Failure Clinic  [facility] [address]  P: [phone]  F: [phone]        stretcher

## 2021-12-22 NOTE — PROGRESS NOTE ADULT - SUBJECTIVE AND OBJECTIVE BOX
12/22/2021        RE: Carmen Perez  [address] [address]  [facility] [address]        [place] [facility]  PRIMARY CARE PROVIDER AND CLINIC:  Vicky Ye PA-C, [address] [facility] [address]  Chief Complaint   Patient presents with     Hospital F/U     [facility] [facility] Medical Record Number:  [id number]  Place of Service where encounter took place:  [facility] ([facility]) [[id number]]    Carmen Perez  is a 73 year old  (1948), admitted to the above facility from  [facility]. Hospital stay 12/16/21 through 12/19/21..  Admitted to this facility for  rehab, medical management and nursing care.    HPI:    HPI information obtained from: facility chart records, facility staff, patient report and [facility] chart review.   Brief Summary of Hospital Course:   -Patient with PMH pertinent for COPD and chronic hypoxic and hypercapnic respiratory failure, had a fall at home after tripping on her nasal cannula cord, was found to have acute on chronic respiratory failure and AE-COPD requiring BiPAP, and steroid therapy. Declined NIPPV at home.   * Also, was found to have  fourth phalanx comminuted fracture managed conservatively and with keflex.   * hospital stay was c/w A-fib vs SVT, started on diltiazem with improvement. declined OAC.   * evaluated by PMR and felt to benefit from TCU placement    Today:  - Resp failure: OT reports pt's O2 dropped to mix 80's with standing up and with moving to commode, on 3.5 Liter of O2. Pt reports would like to keep her O2 volume as is, neither increase or decrease. Likes to keep her room cold to help with breathing.  endorse a progressive severe GEE.    - A-fib: reports that the heart medicine she was placed on while at the hospital is a wonderful and she is feeling much better and has given her a new lease of live. Reports heart palpitation is much better. Feels like a brand new.   - left finger fx: reports cannot feel it,  Reports soreness  26 year-old male pmh ESRD on HD (M,W,F), seizure-disorder, congenital HIV, cardiomyopathy, HTN, anxiety, chronic back pain, a/w seizure-like activity concerning for seizure vs cardiogenic syncope. Patient is being watched for drug seeking behavior.       He repeatedly asked for fentanyl, morphine, or dilaudid, and stated that although he is prescribed xanax twice daily, he is allowed to take it as frequently as he wants. Per nursing report, he would state that he was in severe pain just after waking up, and when he was not given additional pain medication, he would fall back to sleep.        This AM he continues to state that he still has back and arm pain.     Tele - no events on Tele overnight;        Vital Signs Last 24 Hrs  T(C): 37.2 (22 Jul 2017 23:18), Max: 37.6 (22 Jul 2017 10:50)  T(F): 99 (22 Jul 2017 23:18), Max: 99.6 (22 Jul 2017 10:50)  Afebrile   HR: 98 (22 Jul 2017 23:18) (86 - 100)  BP: 142/60 (22 Jul 2017 23:18) (140/80 - 149/82)    mildly elevated bp    RR: 18 (22 Jul 2017 23:18) (18 - 18)  SpO2: 95% (22 Jul 2017 23:18) (95% - 100%)      PHYSICAL EXAM  GENERAL: NAD, well-groomed, well-developed  HEAD:  Atraumatic, Normocephalic  EYES: EOMI, PERRLA, conjunctiva and sclera clear  ENMT: No tonsillar erythema, exudates, or enlargement; Moist mucous membranes, Good dentition, No lesions  NECK: Supple, No JVD, Normal thyroid  NERVOUS SYSTEM:  Alert & Oriented X3, Good concentration; Motor Strength 5/5 B/L upper and lower extremities; DTRs 2+ intact and symmetric  CHEST/LUNG: Clear to percussion bilaterally; No rales, rhonchi, wheezing, or rubs  HEART: Regular rate and rhythm; No murmurs, rubs, or gallops  ABDOMEN: Soft, Nontender, Nondistended; Bowel sounds present  EXTREMITIES:  2+ Peripheral Pulses, No clubbing, cyanosis, or edema  LYMPH: No lymphadenopathy noted  SKIN: No rashes or lesions      MEDICATIONS  (STANDING):  lisinopril 20 milliGRAM(s) Oral daily  labetalol 100 milliGRAM(s) Oral four times a day  amLODIPine   Tablet 10 milliGRAM(s) Oral daily  gabapentin 100 milliGRAM(s) Oral at bedtime  calcium acetate 2001 milliGRAM(s) Oral three times a day with meals  sevelamer hydrochloride 800 milliGRAM(s) Oral three times a day with meals  pantoprazole    Tablet 40 milliGRAM(s) Oral before breakfast  darunavir 800 milliGRAM(s) Oral daily  levETIRAcetam 750 milliGRAM(s) Oral two times a day  ritonavir Tablet 100 milliGRAM(s) Oral daily  oxyCODONE  ER Tablet 15 milliGRAM(s) Oral every 12 hours  dolutegravir 50 milliGRAM(s) Oral daily    MEDICATIONS  (PRN):  acetaminophen   Tablet. 650 milliGRAM(s) Oral every 6 hours PRN Moderate Pain (4 - 6)  ALPRAZolam 2 milliGRAM(s) Oral two times a day PRN anxiety  HYDROcodone 10 mG/acetaminophen 325 mG 2 Tablet(s) Oral every 6 hours PRN Severe Pain (7 - 10)    U/S Right upper extremity  7/22/17  AV fistula patent.  No evidence of upper extremity DVT. over the wrist    =====================================================    CODE STATUS/ADVANCE DIRECTIVES DISCUSSION:   CPR/Full code   Patient's living condition: lives alone  ALLERGIES: Patient has no known allergies.  PAST MEDICAL HISTORY:  has a past medical history of Asthma, COPD (chronic obstructive pulmonary disease) (H), Cor pulmonale (H), Hyperkalemia (2012), Mild major depression (H), and Myocardial infarction (H).    She has no past medical history of Diabetes (H), History of blood transfusion, PONV (postoperative nausea and vomiting), or Thyroid disease.  PAST SURGICAL HISTORY:   has a past surgical history that includes Laparoscopic tubal ligation;  section; Laparoscopic assisted colectomy (N/A, 2016); Laparoscopic assisted colostomy (N/A, 2016); and Herniorrhaphy incisional (location) (N/A, 2016).  FAMILY HISTORY: family history includes C.A.D. in her mother.  SOCIAL HISTORY:   reports that she quit smoking about 9 years ago. Her smoking use included cigarettes. She smoked 0.25 packs per day. She has never used smokeless tobacco. She reports that she does not drink alcohol and does not use drugs.    Post Discharge Medication Reconciliation Status: discharge medications reconciled and changed, per note/orders  Current Outpatient Medications   Medication Sig Dispense Refill     acetaminophen (TYLENOL) 500 MG tablet Take 1,000 mg by mouth 3 times daily        calcium carb-cholecalciferol 600-500 MG-UNIT CAPS Take 1 tablet by mouth daily       cephALEXin (KEFLEX) 500 MG capsule Take 1 capsule (500 mg) by mouth 3 times daily for 7 days (Patient taking differently: Take 500 mg by mouth 3 times daily Complete on )       COMBIVENT RESPIMAT  MCG/ACT inhaler INHALE 1 PUFF INTO THE LUNGS FOUR TIMES DAILY AS NEEDED FOR SHORTNESS OF BREATH OR DIFFICULT BREATHING OR WHEEZING 4 g 0     diltiazem ER COATED BEADS (CARDIZEM CD/CARTIA XT) 120 MG 24 hr capsule Take 1 capsule (120  "mg) by mouth daily       fluticasone-salmeterol (ADVAIR DISKUS) 250-50 MCG/DOSE inhaler Inhale 1 puff into the lungs 2 times daily 1 Inhaler 1     INCRUSE ELLIPTA 62.5 MCG/INH Inhaler INHALE 1 PUFF INTO THE LUNGS DAILY 1 Inhaler 1     predniSONE (DELTASONE) 20 MG tablet Take 3 tabs by mouth daily x 3 days, then 2 tabs daily x 3 days, then 1 tab daily x 3 days, then 1/2 tab daily x 3 days. 20 tablet 0     senna-docusate (SENOKOT-S/PERICOLACE) 8.6-50 MG tablet Take 1 tablet by mouth daily       ROS: 10 point ROS of systems including Constitutional, Eyes, Respiratory, Cardiovascular, Gastroenterology, Genitourinary, Integumentary, Musculoskeletal, Psychiatric were all negative except for pertinent positives noted in my HPI.    Vitals:  /69   Pulse 84   Temp (!) 96.4  F (35.8  C)   Resp 22   Ht 1.6 m (5' 3\")   Wt 55.3 kg (122 lb)   SpO2 96%   BMI 21.61 kg/m    Exam:  GENERAL APPEARANCE:  in no distress, cooperative  ENT: NC in place. Dry oral mucosa. .   EYES:  EOMI, Pupil rounded and equal.  RESP:  Diffusely coarse sounds. Uses extra respiratory muscles.   CV:  S1S2 audible, regular HR, no murmur appreciated.   ABDOMEN:  soft, NT/ND, BS audible. no mass appreciated on palpation.   M/S:   Left forearm and hand in cast.distal NVB intact.   SKIN:  Ecchymosis over left 2nd finger.   NEURO:   No NFD appreciated on observation. Hand  5/5 b/l  PSYCH:  normal insight, judgement and memory, affect and mood normal      Lab/Diagnostic data: Reviewed in the chart and EHR.          ASSESSMENT/PLAN:  --------------------------------  -Patient with PMH pertinent for Pulmonary HTN, COPD and chronic hypoxic and hypercapnic respiratory failure, had a fall at home after tripping on her nasal cannula cord, was found to have acute on chronic respiratory failure and AE-COPD requiring BiPAP, started on steroid therapy. Declined NIPPV at home.   * hospital stay was c/w A-fib vs SVT, started on diltiazem with improvement. " declined OAC.   * evaluated by PMR and felt to benefit from TCU placement  - compensated. On 3.5 L of O2 (at home on 3.5 L). Does not want to adjust O2 level for she has been on this for several years. Patient is aware of her limitation.   - finishing taper prednisone. On KARINA/NOEL prn, and LAMA/LABA/CSI.   - CVR, continue diltiazem. Cardiology follow up on outpatient basis.   - spoke about vaccination for covid19 and influenza, in agreement, but wants to take pfizer only.   - started rehab program, making a progress, continue until desired goal is achieved.       * Open displaced fx of proximal phalanx of left ring finger managed conservatively and with keflex.   -  analgesia optimal. Followed by Orthopedic team.       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  Patient kept self quarantine at home for almost two years. Adjusting well. No concern     [facility]: [facility] 23/30. PCP to follow.         ORDER:   - ok to give pfizer and flu vaccination.        Electronically signed by:  Sana Peña MD                       Sincerely,        Sana Peña MD       26 year-old male pmh ESRD on HD (M,W,F), seizure-disorder, congenital HIV, cardiomyopathy, HTN, anxiety, chronic back pain, a/w seizure-like activity concerning for seizure vs cardiogenic syncope. Patient is being watched for drug seeking behavior.       He repeatedly asked for fentanyl, morphine, or dilaudid, and stated that although he is prescribed xanax twice daily, he is allowed to take it as frequently as he wants. Per nursing report, he would state that he was in severe pain just after waking up, and when he was not given additional pain medication, he would fall back to sleep.        This AM he continues to state that he still has back and arm pain.     Tele - no events on Tele overnight;        Vital Signs Last 24 Hrs  T(C): 37.2 (22 Jul 2017 23:18), Max: 37.6 (22 Jul 2017 10:50)  T(F): 99 (22 Jul 2017 23:18), Max: 99.6 (22 Jul 2017 10:50)  Afebrile   HR: 98 (22 Jul 2017 23:18) (86 - 100)  BP: 142/60 (22 Jul 2017 23:18) (140/80 - 149/82)    mildly elevated bp    RR: 18 (22 Jul 2017 23:18) (18 - 18)  SpO2: 95% (22 Jul 2017 23:18) (95% - 100%)      PHYSICAL EXAM  GENERAL: Adult male laying in bed in NAD, well-groomed, well-developed  HEENT- Atraumatic, Normocephalic,  b/l vision loss;  Pupils equal, not reactive to light b/l; conjunctiva and sclera clear  ENMT: No tonsillar erythema, enlargement; dry mucous membranes, mild white discharge on tongue; Good dentition, No lesions  NECK: Supple, No JVD, Normal thyroid  NERVOUS SYSTEM:  Alert & Oriented X3, Good concentration; Motor Strength 5/5 B/L upper and lower extremities; DTRs 2+ intact and symmetric  CHEST/LUNG: Clear to percussion bilaterally; No rales, rhonchi, wheezing, or rubs  HEART: Regular rate and rhythm; No murmurs, rubs, or gallops  ABDOMEN: Soft, Nontender, Nondistended; Bowel sounds present  EXTREMITIES:  2+ Peripheral Pulses, No clubbing, cyanosis, or edema  LYMPH: B/l Submandibular lymphadenopathy more pronounced on right, nontender;    SKIN: No rashes or lesions visualized; skin dry;        MEDICATIONS  (STANDING):  lisinopril 20 milliGRAM(s) Oral daily  labetalol 100 milliGRAM(s) Oral four times a day  amLODIPine   Tablet 10 milliGRAM(s) Oral daily  gabapentin 100 milliGRAM(s) Oral at bedtime  calcium acetate 2001 milliGRAM(s) Oral three times a day with meals  sevelamer hydrochloride 800 milliGRAM(s) Oral three times a day with meals  pantoprazole    Tablet 40 milliGRAM(s) Oral before breakfast  darunavir 800 milliGRAM(s) Oral daily  levETIRAcetam 750 milliGRAM(s) Oral two times a day  ritonavir Tablet 100 milliGRAM(s) Oral daily  oxyCODONE  ER Tablet 15 milliGRAM(s) Oral every 12 hours  dolutegravir 50 milliGRAM(s) Oral daily    MEDICATIONS  (PRN):  acetaminophen   Tablet. 650 milliGRAM(s) Oral every 6 hours PRN Moderate Pain (4 - 6)  ALPRAZolam 2 milliGRAM(s) Oral two times a day PRN anxiety  HYDROcodone 10 mG/acetaminophen 325 mG 2 Tablet(s) Oral every 6 hours PRN Severe Pain (7 - 10)  Norco    U/S Right upper extremity  7/22/17  AV fistula patent.  No evidence of upper extremity DVT.     MRI Brain w/o contrast 7/22/17  No space-occupying lesion or intracranial mass in particular, the mesial temporal lobe structures are unremarkable. 26 year-old male pmh ESRD on HD (M,W,F), seizure-disorder, congenital HIV, cardiomyopathy, HTN, anxiety, chronic back pain, sent to ED from dialysis center after seizure-like activity.  He likely had a break through seizure event. Patient is being watched for drug seeking behavior.    Back and arm pain is more optimally controlled overnight with Oxycontin standing order, prn Narco;  Anxiety controlled with Xanax 2mg;     Tele - no events on Tele overnight;      Vital Signs Last 24 Hrs  T(C): 37.2 (22 Jul 2017 23:18), Max: 37.6 (22 Jul 2017 10:50)  T(F): 99 (22 Jul 2017 23:18), Max: 99.6 (22 Jul 2017 10:50)  Afebrile   HR: 98 (22 Jul 2017 23:18) (86 - 100)  BP: 142/60 (22 Jul 2017 23:18) (140/80 - 149/82)    mildly elevated bp    RR: 18 (22 Jul 2017 23:18) (18 - 18)  SpO2: 95% (22 Jul 2017 23:18) (95% - 100%)      PHYSICAL EXAM  GENERAL: Adult male laying in bed in NAD, well-groomed, well-developed  HEENT- Atraumatic, Normocephalic,  b/l vision loss;  Pupils equal, not reactive to light b/l; conjunctiva and sclera clear  ENMT: No tonsillar erythema, enlargement; dry mucous membranes, mild white discharge on tongue; Good dentition, No lesions  NECK: Supple, No JVD, Normal thyroid  NERVOUS SYSTEM:  Alert & Oriented X3, Good concentration; Motor Strength 5/5 B/L upper and lower extremities; DTRs 2+ intact and symmetric;  CNII:  deficit in Visual fields;   CNII- V:  no primary gaze 2/2 b/l vision loss;    CN V: Facial sensation is intact to pinprick in all 3 divisions bilaterally.   CN VII: Face is symmetric with normal eye closure and smile.  CN VII: Hearing is normal to rubbing fingers  CN IX, X: Palate elevates symmetrically. Phonation is normal.  CN XI: Head turning and shoulder shrug are intact  CN XII: Tongue is midline with normal movements and no atrophy.  CHEST/LUNG: Clear to percussion bilaterally; No rales, rhonchi, wheezing, or rubs  HEART: Regular rate and rhythm; No murmurs, rubs, or gallops; palpable thrill at av fistula;   bruits audible;   ABDOMEN: Soft, Nontender, Nondistended; Bowel sounds present  EXTREMITIES:  2+ Peripheral Pulses througout, No clubbing, cyanosis, or edema  LYMPH: B/l Submandibular lymphadenopathy more pronounced on right, nontender;    SKIN: No rashes or lesions visualized; skin dry;        MEDICATIONS  (STANDING):  lisinopril 20 milliGRAM(s) Oral daily  labetalol 100 milliGRAM(s) Oral four times a day  amLODIPine   Tablet 10 milliGRAM(s) Oral daily  gabapentin 100 milliGRAM(s) Oral at bedtime  calcium acetate 2001 milliGRAM(s) Oral three times a day with meals  sevelamer hydrochloride 800 milliGRAM(s) Oral three times a day with meals  pantoprazole    Tablet 40 milliGRAM(s) Oral before breakfast  darunavir 800 milliGRAM(s) Oral daily  levETIRAcetam 750 milliGRAM(s) Oral two times a day  ritonavir Tablet 100 milliGRAM(s) Oral daily  oxyCODONE  ER Tablet 15 milliGRAM(s) Oral every 12 hours  dolutegravir 50 milliGRAM(s) Oral daily    MEDICATIONS  (PRN):  acetaminophen   Tablet. 650 milliGRAM(s) Oral every 6 hours PRN Moderate Pain (4 - 6)  ALPRAZolam 2 milliGRAM(s) Oral two times a day PRN anxiety  HYDROcodone 10 mG/acetaminophen 325 mG 2 Tablet(s) Oral every 6 hours PRN Severe Pain (7 - 10)  Norco    U/S Right upper extremity  7/22/17  AV fistula patent.  No evidence of upper extremity DVT.     MRI Brain w/o contrast 7/22/17  No space-occupying lesion or intracranial mass in particular, the mesial temporal lobe structures are unremarkable.

## 2022-03-18 NOTE — ED ADULT NURSE NOTE - GENITOURINARY WDL
2 Alert and oriented, no focal deficits, no motor or sensory deficits. Bladder non-tender and non-distended. Urine clear yellow.

## 2022-04-12 NOTE — ED STATDOCS - CROS ED CONS ALL NEG
Patient Education     Bronchitis, Antibiotic Treatment (Adult)    Bronchitis is an infection of the air passages (bronchial tubes) in your lungs. It often occurs when you have a cold. This illness is contagious during the first few days and is spread through the air by coughing and sneezing, or by direct contact (touching the sick person and then touching your own eyes, nose, or mouth).  Symptoms of bronchitis include cough with mucus (phlegm) and low-grade fever. Bronchitis usually lasts 7 to 14 days. Mild cases can be treated with simple home remedies. More severe infection is treated with an antibiotic.  Home care  Follow these guidelines when caring for yourself at home:  · If your symptoms are severe, rest at home for the first 2 to 3 days. When you go back to your usual activities, don't let yourself get too tired.  · Don't smoke. Also stay away from secondhand smoke.  · You may use over-the-counter medicines to control fever or pain, unless another medicine was prescribed. If you have chronic liver or kidney disease or have ever had a stomach ulcer or gastrointestinal bleeding, talk with your healthcare provider before using these medicines. Also talk to your provider if you are taking medicine to prevent blood clots. Aspirin should never be given to anyone younger than 18 who is ill with a viral infection or fever. It may cause severe liver or brain damage.  · Your appetite may be low, so a light diet is fine. Stay well hydrated by drinking 6 to 8 glasses of fluids per day. This includes water, soft drinks, sports drinks, juices, tea, or soup. Extra fluids will help loosen mucus in your nose and lungs.  · Over-the-counter cough, cold, and sore-throat medicines will not shorten the length of the illness, but they may be helpful to reduce your symptoms. Don't use decongestants if you have high blood pressure.  · Finish all antibiotic medicine. Do this even if you are feeling better after only a few  days.  Follow-up care  Follow up with your healthcare provider, or as advised. If you had an X-ray or ECG (electrocardiogram), a specialist will review it. You will be told of any new test results that may affect your care.  If you are age 65 or older, if you smoke, or if you have a chronic lung disease or condition that affects your immune system, ask your healthcare provider about getting a pneumococcal vaccine and a yearly flu shot (influenza vaccine).  When to seek medical advice  Call your healthcare provider right away if any of these occur:  · Fever of 100.4°F (38°C) or higher, or as directed by your healthcare provider  · Coughing up more sputum  · Weakness, drowsiness, headache, facial pain, ear pain, or a stiff neck  Call 911  Call 911 if any of these occur.  · Coughing up blood  · Weakness, drowsiness, headache, or stiff neck that get worse  · Trouble breathing, wheezing, or pain with breathing  Denise last reviewed this educational content on 6/1/2018  © 2066-0791 [employer]. All rights reserved. This information is not intended as a substitute for professional medical care. Always follow your healthcare professional's instructions.            negative...

## 2022-05-05 NOTE — BEHAVIORAL HEALTH ASSESSMENT NOTE - ORIENTED TO PERSON
Sarthak Weiss was scheduled for an appointment with Dr Dayday Radford on 5/11 as a virtual appointment  Unfortunately;y this appointment needs to be changed  Sarthak Weiss is a BETY from Dr Dung Herring so the appointment needs to be scheduled for 40 minutes and in person at the office  Also our doctors can not hold a virtual appointment for patients that are out of state  I we left numerous voice mails and I also sent a My Chart message today  The appointment will be canceled  Yes

## 2022-07-12 NOTE — PROGRESS NOTE ADULT - PROBLEM SELECTOR PLAN 3
social work consult  dietary consult
Await EGD biopsy results of antral lesions. Possible EUS if biopsies inconclusive or negative. CEA ordered.
ID following. Continue ART. Blood cultures negative so far.
cont medications, pt unsure of dosages/drugs, continued as per d/c summary
due to overmedication  ISTOP reviewed  change long acting oxycodone from 10mg BID to oxycodone IR 10mg qid.
due to overmedication  ISTOP reviewed  change long acting oxycodone from 10mg BID to oxycodone IR 10mg qid.
social work consult  dietary consult
social work consult  dietary consult
ID following. Continue ART.
ID following. Continue ART. Blood cultures negative so far.
Siliq Counseling:  I discussed with the patient the risks of Siliq including but not limited to new or worsening depression, suicidal thoughts and behavior, immunosuppression, malignancy, posterior leukoencephalopathy syndrome, and serious infections.  The patient understands that monitoring is required including a PPD at baseline and must alert us or the primary physician if symptoms of infection or other concerning signs are noted. There is also a special program designed to monitor depression which is required with Siliq.

## 2022-07-13 NOTE — BRIEF OPERATIVE NOTE - OPERATION/FINDINGS
Fahad Borja(Attending)
Stenosis of subclavian vein.
Antral gastritis with two erythematous lesions seen near pylorus roughly 1 cm. in size each. Lesions were biopsied. Separate antral biopsies were taken for H. Pylori. Body and esophagus were normal. Body biopsies taken to r/o H. Pylori. Pt. also had duodenitis ans duodenal biopsies were taken in D2 to r/o celiac disease or other small bowel pathology.

## 2022-07-18 NOTE — DISCHARGE NOTE ADULT - DISCHARGE DATE
Please monitor and call our office at [phone] to speak with a nurse or call your prescribing provider if you experience any side effects or adverse reactions from your medication given here today. Patients treated with specific medications are at an increased risk for developing infections. If your medication falls into this category it is not recommended for patients with active infections to receive their medication until the infection is under control. Please let us know prior to your scheduled appointment if you currently have an active infection. You may return to your regular diet and medications and resume normal activities. 18-Apr-2018 19-Apr-2018

## 2022-10-12 NOTE — ED STATDOCS - CARE PLAN
Principal Discharge DX:	Fracture of foot  Secondary Diagnosis:	ESRD (end stage renal disease)
Clear bilaterally, pupils equal, round and reactive to light.

## 2022-10-20 NOTE — ED ADULT TRIAGE NOTE - HEART RATE (BEATS/MIN)
Subjective:      Patient ID: Danette Mejia 
[id number]

## 2023-02-08 NOTE — ED ADULT NURSE NOTE - BREATH SOUNDS, RIGHT
Render In Strict Bullet Format?: No Plan: Patient declines Imiquimod cream and elects Mohs surgery Detail Level: Zone course crackles

## 2023-02-10 NOTE — DISCHARGE NOTE ADULT - PROVIDER RX CONTACT NUMBER
Oral Minoxidil Counseling- I discussed with the patient the risks of oral minoxidil including but not limited to shortness of breath, swelling of the feet or ankles, dizziness, lightheadedness, unwanted hair growth and allergic reaction.  The patient verbalized understanding of the proper use and possible adverse effects of oral minoxidil.  All of the patient's questions and concerns were addressed. [phone]

## 2023-05-03 NOTE — PATIENT PROFILE ADULT - NSALCOHOLTYPE_GEN_A_NUR
SUBJECTIVE:   Gabriela Limon is a 28 year old female       here for annual well woman exam.  Established patient. Hx reviewd.  Partner x many years.  Long distance.  Here for IUD removal.  Moodiness during menses.  Will use condoms until decides on BC method.    Patient's last menstrual period was 04/10/2023.    GYNE ROS:   Contraceptive method: condoms  Menses:regular   Vaginal symptoms: none.  Vulvar symptoms: none.      Last Pap smear: Negative for intraepithelial lesion or malignancy     OB History    Para Term  AB Living   0 0 0 0 0 0   SAB IAB Ectopic Molar Multiple Live Births   0 0 0 0 0 0       Current Outpatient Medications   Medication Sig Dispense Refill   • levonorgestrel (Kyleena) 19.5 MG intrauterine device 19.5 mg by Intrauterine route.       No current facility-administered medications for this visit.       ALLERGIES:  No Known Allergies    Past Medical History:   Diagnosis Date   • Dysmenorrhea    • History of recurrent UTIs        Past Surgical History:   Procedure Laterality Date   • Cystoscopy     • Cystoscopy,dil urethral stricture      recurrent UTI's       Family History   Problem Relation Age of Onset   • Cancer, Breast Maternal Grandmother          age 60   • Cancer, Breast Paternal Grandmother 60   • Cancer, Breast Paternal Aunt 45   • Cancer, Colon Neg Hx    • Cancer, Ovarian Neg Hx    • Other Neg Hx         thromboembolism       Social History     Socioeconomic History   • Marital status: Single     Spouse name: Not on file   • Number of children: Not on file   • Years of education: Not on file   • Highest education level: Not on file   Occupational History   • Not on file   Tobacco Use   • Smoking status: Former     Current packs/day: 0.00     Types: Cigarettes     Quit date: 2017     Years since quittin.3   • Smokeless tobacco: Never   Vaping Use   • Vaping status: never used   Substance and Sexual Activity   • Alcohol use: Yes     Comment:  occasional   • Drug use: Never   • Sexual activity: Yes   Other Topics Concern   • Not on file   Social History Narrative   • Not on file     Social Determinants of Health     Financial Resource Strain: Not on file   Food Insecurity: Not on file   Transportation Needs: Not on file   Physical Activity: Not on file   Stress: Not on file   Social Connections: Not on file   Intimate Partner Violence: Not on file       Review of Systems   Constitutional: Negative.    Gastrointestinal: Negative.    Genitourinary: Negative.    Allergic/Immunologic: Negative.    breast:  negative        OBJECTIVE:  Visit Vitals  /82 (BP Location: RUE - Right upper extremity, Patient Position: Sitting, Cuff Size: Regular)   Pulse 75   Temp 97.4 °F (36.3 °C) (Oral)   Ht 5' 4\" (1.626 m)   Wt 88 kg (194 lb 0.1 oz)   LMP 04/10/2023 Comment: Kyleena    SpO2 98%   BMI 33.30 kg/m²     Physical Exam:    Constitutional: appears well, appropriate, NAD  HEENT neck soft supple, no thyromegaly, no masses  Breast  no mass; no nipple inversion; no skin changes, dimpling;  non tender;   No axillary or supraclavicular adenopathy  Abdomen  soft, NT, no organomegaly, no masses, no hernia  Inguinal  no hernia, masses, lymphadenopathy  Pelvic:      Urethra  no discharge, prolapse, mass;       Bladder  non tender     External Genitalia  no vulvar lesions ; no rash, Non tender     Vagina   no vaginal discharge, no lesions, no tenderness.      Cervix  no lesions, no discharge, no CMT.  String visualized     Uterus  anteverted; mobile, NT, no masses     Ovaries   no masses, NT, no fullness     Parametria  no nodularity           ASSESSMENT:  Normal gyne exam.   -Pap smear with reflex HPV Due in 2025 -RTC in 1 yr for next annual exam    No problem-specific Assessment & Plan notes found for this encounter.            Laura Toledo MD          family unsure when last drink was/liquor/beer

## 2023-05-04 NOTE — ED ADULT NURSE NOTE - NS ED NURSE IV DC DT
----- Message from ALYSSA Reynolds sent at 5/4/2023  5:14 PM CDT -----  Please call patient. No predominant bacterial growth in urine culture. If symptoms are improving continue Cipro as prescribed. If symptoms persist follow up with PCP.   
12-Aug-2018 20:33

## 2023-09-06 NOTE — ED ADULT NURSE NOTE - NSFALLRSKOUTCOME_ED_ALL_ED
Will give Zithromax for bronchitis  
[Symptom and Test Evaluation] : symptom and test evaluation
Universal Safety Interventions

## 2023-10-04 NOTE — H&P ADULT - FAMILY HISTORY
Message sent on My Chart No pertinent family history in first degree relatives, Unknown FHx because pt is adopted

## 2024-01-25 NOTE — CONSULT NOTE ADULT - CONSULT REQUESTED DATE/TIME
[Time Spent: ___ minutes] : I have spent [unfilled] minutes of time on the encounter.
02-Jul-2018 06:49
02-Jul-2018 09:48
03-Jul-2018 16:53
05-Jul-2018 14:24
09-Jul-2018 16:28
09-Jul-2018 18:06
16-Jul-2018 11:08

## 2024-02-17 NOTE — DIETITIAN INITIAL EVALUATION ADULT. - PROBLEM SELECTOR PLAN 7
Problem: Prexisting or High Potential for Compromised Skin Integrity  Goal: Skin integrity is maintained or improved  Description: INTERVENTIONS:  - Identify patients at risk for skin breakdown  - Assess and monitor skin integrity  - Assess and monitor nutrition and hydration status  - Monitor labs   - Assess for incontinence   - Turn and reposition patient  - Assist with mobility/ambulation  - Relieve pressure over bony prominences  - Avoid friction and shearing  - Provide appropriate hygiene as needed including keeping skin clean and dry  - Evaluate need for skin moisturizer/barrier cream  - Collaborate with interdisciplinary team   - Patient/family teaching  - Consider wound care consult   Outcome: Progressing     Problem: SKIN/TISSUE INTEGRITY - ADULT  Goal: Incision(s), wounds(s) or drain site(s) healing without S/S of infection  Description: INTERVENTIONS  - Assess and document dressing, incision, wound bed, drain sites and surrounding tissue  - Provide patient and family education  - Perform skin care/dressing changes every shift  Outcome: Progressing     Problem: INFECTION - ADULT  Goal: Absence or prevention of progression during hospitalization  Description: INTERVENTIONS:  - Assess and monitor for signs and symptoms of infection  - Monitor lab/diagnostic results  - Monitor all insertion sites, i.e. indwelling lines, tubes, and drains  - Monitor endotracheal if appropriate and nasal secretions for changes in amount and color  - [place] appropriate cooling/warming therapies per order  - Administer medications as ordered  - Instruct and encourage patient and family to use good hand hygiene technique  - Identify and instruct in appropriate isolation precautions for identified infection/condition  Outcome: Progressing      - urgent HD now  - HD as per nephrology going forward

## 2024-03-18 NOTE — PATIENT PROFILE ADULT. - HAS THE PATIENT HAD A SIGNIFICANT CHANGE IN FUNCTIONAL STATUS DUE TO CVA, HEAD TRAUMA, ORTHOPEDIC TRAUMA/SURGERY, OR FALL, WITH THE WEEK PRIOR TO ADMISSION
[Cervical Pap Smear] : cervical Pap smear [Liquid Base] : liquid base [Tolerated Well] : the patient tolerated the procedure well [GC & Chlamydia via Pap] : GC & Chlamydia via Pap [No Complications] : there were no complications no

## 2024-04-24 NOTE — ED ADULT NURSE NOTE - CAS TRG GEN SKIN CONDITION
Schedule for a combined cryo AF/aflutter ablation with anestheisa.  Needs ANIL on day of procedure and needs cardiac CTA prior.  Hold xarelto 1 day   Warm/Dry

## 2024-05-29 NOTE — DIETITIAN INITIAL EVALUATION ADULT. - PROBLEM SELECTOR PROBLEM 5
Detail Level: Zone Plan: Trial of LN2 Type 2 diabetes mellitus with complication, unspecified whether long term insulin use

## 2024-10-31 NOTE — PATIENT PROFILE ADULT. - PATIENT REPRESENTATIVE NAME
Chief Complaint   Patient presents with    Pre-Op Exam          Medication verified, no changes  Denies known Latex allergy or symptoms of Latex sensitivity   Tobacco history verified.  Verbal permission granted by patient to leave a detailed message with medical information on answering machine at phone number given? yes  If female, are you pregnant, trying to become pregnant, or breastfeeding? No    MOHS PREOPERATIVE SUMMARY    yes :Verbal Consent for Photography obtained  Yes 10/2021 [facility] Dr. Romero :[employer]  Referring physician:  Dr. Ubaldo Romero  Primary Care Physician:  Jeri Segura MD       Shaye

## 2024-12-13 NOTE — CHART NOTE - NSCHARTNOTESELECT_GEN_ALL_CORE
Event Note Removal Tympanostomy Tubes (R), Myringoplasty (R) Operative Note     Date: 2024  [facility]    Name: Mary Blackmon, : 2012, Age: 12 y.o., MRN: [id number], Sex: male    Diagnosis  Pre-op Diagnosis      * Retained myringotomy tube in right ear [Z96.22] Post-op Diagnosis     * Retained myringotomy tube in right ear [Z96.22]     Procedures  Removal Tympanostomy Tubes  99440 - [place] VENTILATING TUBE RMVL REQUIRING GENERAL ANES    Myringoplasty  57053 - [place] MYRINGOPLASTY    Myringoplasty  04882 - [place] MYRINGOPLASTY      Surgeons      * Stephan Mesa - Primary    Resident/Fellow/Other Assistant:  Surgeons and Role:     * Flex Kaye MD - Assisting    Staff:   Circulator: Shanda Shrestha Person: Gypsy    Anesthesia Staff: Anesthesiologist: Carmina Ferreira MD  Anesthesia Resident: Rylie Chen MD    Procedure Summary  Anesthesia: General  ASA: II  Estimated Blood Loss: 0.1mL  Intra-op Medications: Administrations occurring from 1330 to 1400 on 24:  * No intraprocedure medications in log *           Anesthesia Record               Intraprocedure I/O Totals       None           Specimen: No specimens collected              Drains and/or Catheters: * None in log *    Tourniquet Times:         Implants:  Implants              Findings: Right T-tube within TM, partially extruded and blocked with cerumen; <10% perforation    Indications: Mary Blackmon is an 12 y.o. male who is having surgery for Retained myringotomy tube in right ear [Z96.22].     The patient was seen in the preoperative area. The risks, benefits, complications, treatment options, non-operative alternatives, expected recovery and outcomes were discussed with the patient. The possibilities of reaction to medication, pulmonary aspiration, injury to surrounding structures, bleeding, recurrent infection, the need for additional procedures, failure to diagnose a condition, and creating a complication requiring transfusion or  operation were discussed with the patient. The patient concurred with the proposed plan, giving informed consent.  The site of surgery was properly noted/marked if necessary per policy. The patient has been actively warmed in preoperative area. Preoperative antibiotics are not indicated. Venous thrombosis prophylaxis are not indicated.    Procedure Details:   Patient was seen and evaluated in the pre-operative area. Informed consent was obtained after discussing the risks, benefits and indications for the procedure. The patient was taken back to the operating room by the anesthesia team. General mask anesthesia was induced. Appropriate timeout was performed.    The microscope was brought into place and attention was paid to the right ear. An otic speculum was inserted and all cerumen was cleared from the ear canal. The tympanic membrane was visualized with the retained ear tube in place as described in the findings. The ear tube was removed using a hay pick and alligator forceps. The residual tympanic membrane perforation was then roughed with a 20 suction and a gel-foam myringoplasty patch was placed over the perforation. The ear canal was then filled with mupirocin ointment.       This concluded the procedure and the patient was turned over to anesthesia for wake up.      Complications:  None; patient tolerated the procedure well.    Disposition: PACU - hemodynamically stable.  Condition: stable                 Additional Details:     Attending Attestation: I was present and scrubbed for the key portions of the procedure.    Stephan Mesa  Phone Number: [phone]

## 2024-12-26 NOTE — ED ADULT NURSE NOTE - PT NEEDS ASSIST
Respiratory Infection Guidelines for Care  Respiratory infections can be caused by viruses or bacteria. Antibiotics do not kill viruses. Healthcare providers treat viral respiratory infections with symptomatic relief to help patients feel better until the body heals itself. They use the same treatments with bacterial respiratory infections.  While ill it is recommended that you:  Get more sleep than you normally require.  Avoid strenuous workouts until cough, fever, and fatigue are gone.  Wash hands frequently, especially if you are blowing your nose, sneezing, or coughing.  Cover your mouth and nose with a tissue when sneezing, coughing or clearing your throat. Avoid being around other people when possible - respiratory infections are contagious!    CONGESTION:  Keep nasal drainage and sputum (mucus coughed up from lungs) loose so it can be more easily cleared from the nose, throat, and lungs by:  Using an expectorant   Drinking 6-8 glasses of fluid a day such as water, sugar free sports drinks, or tea.  Increase humidity in the environment- Using a vaporizer, cool or warm mist and clean it daily. Also breathe steam from hot shower, water tap, or boiled water.  Sinus rinses (Netti Pot, Winston Med Sinus Rinse, Navage or nasal saline spray): To flush your sinuses free of debris, irritants, or allergens. Use the buffered salt packets or pre-prepared saline, never use table salt. Best to use distilled water because tap water can have bacteria. Use twice daily - first thing in the morning and last thing at night.  Decongestants: pseudoephedrine (Sudafed) - relieve stuffy head and nose. Decongestants may have a caffeine-like effect and should be used with caution at bedtime or in those with high blood pressure. Coricidin HBP will work similarly without the adverse affect on blood pressure.  Mucinex (guaifenesin) can also help loosen and thin the mucous in the throat as well as the nose.  Flonase per package instructions.  Decrease swelling and congestion in nasal passageways. Cross technique for proper administration to avoid septal perforation.  Tylenol or ibuprofen per package directions for fevers and aches.    SORE THROAT:  A sore throat can be caused by a virus, bacteria, post nasal drip, or exposure to irritating chemicals or smoke. Keep the throat moist throughout the day and night. This will help to ease the discomfort. Sore throats typically become more sore at night.  To improve comfort of a sore throat:  Gargle often with warm salt water (1/2 teaspoon table salt in 8 oz. warm water).  Run a vaporizer by the bedside.  Sip on fluids (warm or cold - whichever feels better) throughout the day and night.  Eat foods that are soft and glide down easily (broth, jello, popsicles, ice cream, yogurt, pudding, etc.).  Use over the counter throat lozenges and sprays which offer temporary relief and typically contain a local anesthetic, such as Cepacol cough drops or Chloraseptic spray.    COUGH:  Encourage coughing - do not try to suppress it if you are coughing up lung mucus (sputum).  If coughing interrupts sleep, take a cough medicine at bedtime (e.g. Robitussin DM, dextromethorphan, or generic store brand). Do not wash the cough medicine down with water as this reduces its effectiveness.  Stop smoking and avoid breathing any irritating substances, including second hand smoke.  Warm liquids sometimes help relieve coughing. Hot tea with honey will help clear secretions, decrease cough and soothe a sore throat.  Activity, talking, and exposure to cold temperatures increases coughing.    Return for care if you develop any of the following:  -Fever over 101 F.  -Hard shaking chills.  -Severe headache.  -Chest pain or shortness of breath.  -Cold symptoms lasting more than >10-14 days.  -Severe sore throat causing difficulty opening your mouth or swallowing.    If an antibiotic is prescribed for you:  Take it exactly as prescribed.   Take  all of the daily doses until the medicine is gone, even if symptoms resolve.  If you should develop skin rash or hives (raised itchy red spots on the skin) stop taking the antibiotics and call the clinic.  Stop taking the antibiotic and seek EMERGENT care if you experience difficulty breathing, lip or tongue swelling, or trouble swallowing or breathing.  You are considered contagious for the first 24 hours while on the antibiotic, avoid kissing or sharing beverages/straws with others. To reduce communicability it is recommended that you stay home from work and other social activities.  After 3 days/72 hours of being on the antibiotic, boil or dispose your toothbrush to prevent re-infection.  Adverse effects of antibiotics may include nausea, diarrhea, yeast infections,and antibiotic resistance. A probiotic while is recommended when taking an antibiotic, doing so may decrease the risk of gastrointestinal upset and diarrhea caused by the antibiotic, but is not a guarantee. Do not take a probiotic within 2 hours of taking antibiotic.    Call if your symptoms do not improve, worsen, or with questions/concerns.     no

## 2025-01-01 NOTE — PATIENT PROFILE ADULT. - HAS THE PATIENT HAD A SIGNIFICANT CHANGE IN FUNCTIONAL STATUS DUE TO CVA, HEAD TRAUMA, ORTHOPEDIC TRAUMA/SURGERY, OR FALL, WITH THE WEEK PRIOR TO ADMISSION
[facility]  Advanced Heart Failure, MCS, Transplant and Pulmonary Hypertension Cardiology  LVAD Progress Note      Date: 2025    Patient: Evelyn Mi   : 1972 MRN: [id number]   Admit Date: 2024 LOS: 3   PCP: Tierra Salcedo CNP  Referring Provider: No ref. provider found     SUBJECTIVE: Patient examined while resting in bed this morning. Patient states that she is feeling better.     HPI: Ms. Mi is a 52 year old female with PMHx of chronic NICM HFrEF s/p HM3 on 3/26/21 as DT, chronic driveline infection, HIV (recently undetectable), asthma, CVA without residual deficit, prior DVT who presents to the ED with worsening HF symptoms. She had bloating, SOB, DC, productive cough, edema, and fatigue. She reported a few days of decreased urine output despite being on a higher dose of lasix. She was also busy during the holidays with family and could have had some dietary noncompliance. She denies VAD alarms.     REVIEW OF SYSTEMS:  Constitutional: Denies fever, chills, fatigue, myalgias, and weakness.   HEENT: Denies headache, vision changes. Denies nose bleeding and gingival bleeding.   Respiratory:  +SOB -> improving per patient. Able to ambulate to the bathroom. Denies orthopnea, PND, cough. On 2L of oxygen via nasal cannula   Cardiovascular:  Denies chest pain, palpitations. Denies pre-syncope, syncope, lightheadedness, dizziness. Denies LE edema.  Denies recent VAD alarms or ICD shocks.   GI:  decreased appetite. Denies abdominal bloating, early satiety. Denies nausea, vomiting, diarrhea, constipation, abdominal pain. Denies bloody or black/tarry stools. Last BM: yesterday.  :  Denies dark colored urine, blood in urine. Denies urinary retention, pain with urination, urinary frequency, and nocturia.   Musculoskeletal:  Endorses having muscle spasms. Denies back pain, neck pain, joint pain.  Integument:  Denies redness, pain and drainage at driveline exit site.    Neurologic:  Denies focal weakness or sensory changes.  Denies numbness and tingling.  Endocrine:  Denies polyuria, polydipsia or temperature intolerance.     Past Medical History:   Diagnosis Date    Anemia     Arthritis     Asthma (CMD)     Blood clot associated with vein wall inflammation     Bronchitis     Cardiac pacemaker     Cerebral embolism and thrombosis     Chest pain     CHF (congestive heart failure)  (CMD)     COPD (chronic obstructive pulmonary disease)  (CMD)     CVA (cerebral vascular accident)  (CMD) 2019    Deep vein thrombosis  (CMD)     History of blood transfusion     HIV (human immunodeficiency virus infection) (CMD)     Hypertension     Intestinal infection due to Clostridium difficile     LVAD (left ventricular assist device) present  (CMD)     Lymphoma (CMD)     Non-ischemic cardiomyopathy  (CMD)     Noncompliance with CPAP treatment     NSTEMI (non-ST elevated myocardial infarction)  (CMD)     LEONEL (obstructive sleep apnea)     PE (pulmonary thromboembolism)  (CMD)     Pneumonia     UTI (urinary tract infection)           Past Surgical History:   Procedure Laterality Date    Cardiac surgery      Coronary angiogram - cv      Gallbladder surgery      Ir intracranial artery mechanical thrombectomy and/or thrombolysis      Left ventricular assist device Left 03/26/2021     3    Removal gallbladder      Right heart cath            Family History   Problem Relation Age of Onset    Congestive Heart Failure Mother     Hypertension Mother     Congestive Heart Failure Father     Hypertension Father     Seizure Disorder Brother           Social History     Socioeconomic History    Marital status: Single     Spouse name: Not on file    Number of children: Not on file    Years of education: Not on file    Highest education level: Not on file   Occupational History    Not on file   Tobacco Use    Smoking status: Former     Current packs/day: 0.00     Average packs/day: 0.3 packs/day for 10.0 years  (2.5 ttl pk-yrs)     Types: Cigarettes     Start date: 1/1/2000     Quit date: 1/1/2010     Years since quitting: 15.0    Smokeless tobacco: Never   Vaping Use    Vaping status: never used   Substance and Sexual Activity    Alcohol use: Never    Drug use: Not Currently     Types: Marijuana    Sexual activity: Not on file   Other Topics Concern    Not on file   Social History Narrative    Not on file     Social Determinants of Health     Financial Resource Strain: Low Risk  (12/29/2024)    Financial Resource Strain     Unable to Get: None   Food Insecurity: Low Risk  (12/29/2024)    Food Insecurity     Worried about Food: Never true     Food is Gone: Never true   Transportation Needs: Not At Risk (12/29/2024)    Transportation Needs     Lack of Reliable Transportation: No   Physical Activity: Low Risk  (7/3/2024)    Exercise Vital Sign     Days of Exercise per Week: 7 days     Minutes of Exercise per Session: 30 min   Stress: Low Risk  (7/3/2024)    Stress     How Stressed: Not at all   Social Connections: Low Risk  (12/29/2024)    Social Connections     Social Connectivity: 3 to 5 times a week   Interpersonal Safety: Low Risk  (12/29/2024)    Interpersonal Safety     How often physically hurt: Never     How often insulted or talked down to: Never     How often threatened with harm: Never     How often scream or curse at: Never            OBJECTIVE:    Vitals with min/max:      Vital Last Value 24 Hour Range   Temperature 98.7 °F (37.1 °C) (01/01/25 1201) Temp  Min: 98.1 °F (36.7 °C)  Max: 99 °F (37.2 °C)   Pulse (!) 102 (01/01/25 1201) Pulse  Min: 96  Max: 110   Respiratory 18 (01/01/25 1201) Resp  Min: 16  Max: 18   Non-Invasive  Blood Pressure  (need doppler) (12/29/24 1519) No data recorded   Pulse Oximetry 97 % (01/01/25 1201) SpO2  Min: 97 %  Max: 100 %   Arterial   Blood Pressure   No data recorded           Weight    12/29/24 1519 12/30/24 0514 12/31/24 0500   Weight: 89.4 kg (197 lb) 95.8 kg (211 lb 3.2 oz)  96 kg (211 lb 10.3 oz)          Intake/Output Summary (Last 24 hours) at 1/1/2025 1418  Last data filed at 1/1/2025 1200  Gross per 24 hour   Intake 960 ml   Output 3000 ml   Net -2040 ml                 MEDICATIONS:    ALLERGIES:   Allergen Reactions    Breo Ellipta SWELLING    Azithromycin PRURITUS     Per patient in June 2018    Bactrim [Sulfamethoxazole-Trimethoprim] GI UPSET and Nausea & Vomiting    Enalapril Angioedema, Nausea & Vomiting and Other (See Comments)     Headache      Isosorbide HEADACHES    Nitroglycerin RASH     Nitro Patch only    Valium [Di Nagy] PRURITUS        Current Facility-Administered Medications   Medication Dose Route Frequency Provider Last Rate Last Admin    warfarin (COUMADIN) tablet 6 mg  6 mg Oral Once Cris Bean APNP        spironolactone (ALDACTONE) tablet 25 mg  25 mg Oral Daily Darya Walker APNP   25 mg at 01/01/25 0828    furosemide (LASIX) tablet 100 mg  100 mg Oral BID Darya Walker APNP   100 mg at 01/01/25 0827    potassium CHLORIDE ER tablet 40 mEq  40 mEq Oral BID WC Darya Walker APNP   40 mEq at 01/01/25 0951    ipratropium-albuterol (DUONEB) 0.5-2.5 (3) MG/3ML nebulizer solution 3 mL  3 mL Nebulization TID Resp Octavio Rubalcava MD   3 mL at 01/01/25 1235    sulfamethoxazole-trimethoprim (BACTRIM DS) 800-160 MG tablet 1 tablet  1 tablet Oral Once per day on Monday Wednesday Friday Fozia Wasserman, CNP   1 tablet at 01/01/25 0828    aspirin chewable 81 mg  81 mg Oral Daily Cris Bean APNP   81 mg at 01/01/25 0828    magnesium oxide (MAG-OX) tablet 800 mg  800 mg Oral BID Cris Bean APNP   800 mg at 01/01/25 0828    sacubitril-valsartan (ENTRESTO) 49-51 MG per tablet 1 tablet  1 tablet Oral BID Cris Bean APNP   1 tablet at 01/01/25 0827    empagliflozin (JARDIANCE) tablet 10 mg  10 mg Oral QAM AC Cris Bean APNP   10 mg at 01/01/25 0828    digoxin (LANOXIN) tablet 125 mcg  125 mcg Oral Every Other Day Cris Bean,  ASHLIENP   125 mcg at 01/01/25 0827    Potassium Standard Replacement Protocol (Levels 3.5 and lower)   Does not apply See Admin Instructions Cris Bean APNP        Potassium Replacement (Levels 3.6 - 4)   Does not apply See Admin Instructions Cris Bean APNP        Magnesium Standard Replacement Protocol   Does not apply See Admin Instructions Cris Bean APNP        WARFARIN - PHARMACIST MONITORED Misc   Does not apply See Admin Instructions Cris Bean APNP        allopurinol (ZYLOPRIM) tablet 100 mg  100 mg Oral Daily Octavio Rubalcava MD   100 mg at 01/01/25 0827    bictegravir-emtricitab-tenofov (BIKTARVY) -25 MG per tablet 1 tablet  1 tablet Oral Daily Octavio Rubalcava MD   1 tablet at 01/01/25 0828    cephalexin (KEFLEX) capsule 1,000 mg  1,000 mg Oral BID Octavio Rubalcava MD   1,000 mg at 01/01/25 0827    darunavir-cobicistat (PREZCOBIX) 800-150 MG per tablet 1 tablet  1 tablet Oral Nightly Octavio Rubalcava MD   1 tablet at 12/31/24 2027    pantoprazole (PROTONIX) EC tablet 40 mg  40 mg Oral Nightly Octavio Rubalcava MD   40 mg at 12/31/24 2027        Current Facility-Administered Medications   Medication Dose Route Frequency Provider Last Rate Last Admin    ipratropium-albuterol (DUONEB) 0.5-2.5 (3) MG/3ML nebulizer solution 3 mL  3 mL Nebulization Q6H Resp PRN Octavio Rubalcava MD   3 mL at 12/30/24 1031    ondansetron (ZOFRAN) injection 4 mg  4 mg Intravenous Q6H PRN Nicolás Castillo MD   4 mg at 12/30/24 0908    morphine injection 2 mg  2 mg Intravenous Q3H PRN Nicolás Castillo MD   2 mg at 12/31/24 2023    oxymetazoline (AFRIN) 0.05 % nasal spray 1 spray  1 spray Each Nare PRN Cris Bean APNP        sodium chloride 0.9 % injection 10 mL  10 mL Intravenous PRN [place], Cris, APNP        albuterol inhaler 2 puff  2 puff Inhalation Q6H PRN Octavio Rubalcava MD        acetaminophen (TYLENOL) tablet 650 mg  650 mg Oral TID PRN Octavio Rubalcava MD        cyclobenzaprine (FLEXERIL) tablet 5 mg  5 mg Oral  TID PRN Octavio Rubalcava MD   5 mg at 25 0951    acetaminophen-codeine (TYLENOL NO.3) 300-30 MG per tablet 1 tablet  1 tablet Oral Q6H PRN Octavio Rubalcava MD   1 tablet at 24 0909    guaiFENesin 100 MG/5ML solution 200 mg  200 mg Oral Q4H PRN Octavio Rubalcava MD   200 mg at 24 0143        PHYSICAL EXAMINATION:  Constitutional:  Alert and Oriented. Pt in no acute distress.  Cardiovascular: Audible VAD Tones and intermittent-palpable pulse. No JVD, No HJR  Respiratory:  No respiratory distress. Diminished at bases..   GI: Abdomen soft, non-tender and non-distended.   Integumentary:  Warm. Dry. No rash. Driveline site CDI. Sheldahl intact.   Extremities: BLE w/1+ to 2+ non-pitting edema. All extremities warm and well perfused.  Neurologic:  Alert & oriented x 3. Normal motor function. Normal sensory function. No focal deficits noted.   Psychiatric: Cooperative, appropriate mood and affect.        VAD INTERROGATION  Device Type: Hearmate III  Implant Date: 3/26/21  Flow: 4.3 Pump Speed: 5300 PI: 4.5 Power: 3.8   Low Speed Settin HCT: 20%     Back-up battery used 5 times for 0 minutes  Change Back-up Battery in 28 months  Abnormal Trends: Asymptomatic with rare PI event  Alarms: Low Voltage Advisory  Changes Made: None  Log Files Sent: No        LABS, IMAGING, CARDIAC TESTING:  LAST 3 LABS:  CMP and Magnesium:  Recent Labs   Lab 25  0544 24  1301 24  0601 24  1416 24  0530   Glucose 117*  --  119* 119* 112*   Sodium 132*  --  133* 133* 134*   Potassium 4.4 4.2 4.1 3.5 3.7   Chloride 96*  --  97 98 98   Carbon Dioxide 30  --  29 32 32   Anion Gap 10  --  11 7 8   BUN 16  --  14 12 11   Creatinine 1.08*  --  0.98* 1.01* 0.82   Calcium 8.8  --  9.0 8.8 8.3*   Bilirubin, Total 1.8*  --  2.7*  --  3.2*   GOT/AST 24  --  16  --  16   GPT 14  --  17  --  13   Alkaline Phosphatase 92  --  92  --  82   Protein, Total 7.6  --  7.6  --  7.2   Albumin 3.1*  --  3.2*  --  3.1*    Globulin 4.5*  --  4.4*  --  4.1*   A/G Ratio 0.7*  --  0.7*  --  0.8*   Magnesium 2.5*  --  2.1 1.8 1.6*       CBC:  Recent Labs   Lab 01/01/25  0544 12/31/24  0600 12/30/24  0531   WBC 2.9* 4.1* 3.7*   RBC 4.83 4.57 4.22   HGB 14.4 13.4 12.5   HCT 43.9 42.3 38.0   MCV 90.9 92.6 90.0   MCH 29.8 29.3 29.6   MCHC 32.8 31.7* 32.9   RDW-CV 14.9 15.0 15.0   RDW-SD 49.8 51.3* 49.3   * 129* 132*   NRBC 0 0 0   Neutrophil, Percent 49 67 60   Lymphocytes, Percent 31 19 25   Mono, Percent 14 12 13   Eosinophils, Percent 5 1 1   Basophils, Percent 1 1 1   Immature Granulocytes 0 0 0   Absolute Neutrophils 1.4* 2.8 2.3   Absolute Lymphocytes 0.9* 0.8* 0.9*   Absolute Monocytes 0.4 0.5 0.5   Absolute Eosinophils  0.1 0.0 0.0   Absolute Basophils 0.0 0.0 0.0   Absolute Immature Granulocytes 0.0 0.0 0.0       INR:  Recent Labs   Lab 01/01/25  0544 12/31/24  0601 12/30/24  0530 09/20/24  0518 09/19/24  0555   INR 1.5 1.3 1.3   < > 1.4   Protime- PT 15.8* 13.8* 14.0*   < > 15.4*   PTT  --   --   --   --  30    < > = values in this interval not displayed.       LDH:  Recent Labs   Lab 01/01/25  0544 12/31/24  0601 12/30/24  0530   LD, Total 215 226 188       NT-proBNP:  No results for input(s): \"NTPROBNP\" in the last 72 hours.    Cultures:  Drive Line Culture & Bacterial, Blood Cultures:    Recent Labs   Lab 12/30/24  1112 12/30/24  1050 11/26/24  1716 09/21/24  0915 09/19/24  1748 09/19/24  0602   AANC  --   --   --   --  Rare Staphylococcus aureus*  --    Culture, Blood or Bone Marrow No Growth 2 Days. No Growth 2 Days. No Growth 5 Days.   < >  --  Staphylococcus aureus*   Gram Stain  --   --   --   --  Few Polymorphonuclear cells.  Rare Epithelial cells.  Rare Gram positive cocci Gram positive cocci*    < > = values in this interval not displayed.         ECG:   No results found for this or any previous visit.        CXR:   Results for orders placed or performed during the hospital encounter of 11/26/24   XR CHEST PA  OR AP 1 VIEW    Narrative    History: lvad, sob    Exam: XR CHEST AP OR PA.     Comparison: Same day.    Findings: Left chest wall ICD device and LVAD are in stable positions. The  cardiac silhouette is stable . No focal consolidations, pleural effusions,  or pneumothorax. No acute osseous abnormality.      Impression    Impression:   No acute cardiopulmonary abnormality.    Electronically Signed by: DIGNA ROE MD   Signed on: 11/26/2024 3:47 PM   Workstation ID: [id number]        Results for orders placed or performed during the hospital encounter of 12/29/24   XR CHEST PA AND LATERAL 2 VIEWS    Narrative    PROCEDURE:  XR CHEST PA AND LATERAL 2 VIEWS    INDICATION: Chest Pain.    COMPARISON: Chest radiograph 11/26/2024.    PROCEDURE COMMENTS: PA and lateral views of the chest.     FINDINGS:    Suboptimal inspiration limits evaluation. Bibasilar opacities may reflect  atelectasis or consolidation.    Stable moderate cardiomegaly. Pulmonary vasculature is prominent,  suggestive of congestion.    No pneumothorax. Small left pleural effusion suspected.    No acute osseous abnormality.    Stable left chest ICD and partially visualized LVAD. Intact sternal wires.      Impression    Stable moderate cardiomegaly with pulmonary vascular congestion and small  left pleural effusion.    Electronically Signed by: LOREN JUAN M.D.   Signed on: 12/29/2024 5:00 PM   Workstation ID: [id number]         Echocardiogram:      Results for orders placed or performed during the hospital encounter of 11/26/24   TRANSTHORACIC ECHO (TTE) COMPLETE W/ W/O IMAGING AGENT   Result Value Ref Range    Ejection Fraction 14%     AV VTI (Previously displayed as RENAE) 0.85     MV Peak A Velocity 216     MEMO LVOT Peak Gradient 1.0     LV end diastolic posterior wall thickness 2D 5.7     Left Ventricular Internal Dimension in Diastole 5.5     Left Internal Dimenson in Systole 1.0     Impression    *[facility]  [place]*  [address] [address]  [phone]  Transthoracic Echocardiogram (TTE)    Patient: Evelyn Mi     Study Date/Time:      2024 12:11PM  MRN:     [id number]               FIN#:                 [id number]  :     1972            Ht/Wt:                162.6cm 95.6kg  Age:     52                    BSA/BMI:              2.12m^2 36.2kg/m^2  Gender:  F                     Baseline BP:          98 / 71  *Ordering Physician:* Darya Walker     *Referring Physician:* Darya Walker     *Attending Physician:* Nicolás CastilloDiagnostic Physician:* John Castillo MD  *Sonographer:* Socrates Jon RDCS     *Fellow:* Lee Mckeon MD     ------------------------------------------------------------------------------  INDICATIONS:  Left ventricular assist device, 5300 RPM.    ------------------------------------------------------------------------------  STUDY CONCLUSIONS  SUMMARY:    1. Left ventricle: The cavity size is severely dilated. Wall thickness is     mildly increased. Systolic function is severely reduced. The ejection     fraction was measured by visual estimation. A left ventricular assist     device (LVAD) is visualized. The baseline LVAD speed is 5300 RPM rpm. There     is no Doppler evidence for obstruction of the inflow cannula. The ejection     fraction is 14%.  2. Aortic valve: In correlation with the LVAD, the leaflets partially open     intermittently.  3. Left atrium: The atrium is mildly dilated.  4. Right ventricle: The cavity size is dilated. Systolic function is reduced.     Pacemaker lead noted in right ventricle.  5. Right atrium: Pacemaker lead noted in right atrium.  6. Baseline ECG: Atrial fibrillation.    ------------------------------------------------------------------------------  STUDY DATA:  Patient room number: 9312W.  Procedure:  A transthoracic  echocardiogram was performed. Image quality was adequate.  M-mode, complete  2D,  complete spectral Doppler, and color Doppler.  Study status:  Routine.  Study completion:  There were no complications.    FINDINGS    BASELINE ECG:   Atrial fibrillation.  LEFT VENTRICLE:  The cavity size is severely dilated. Wall thickness is mildly  increased. Systolic function is severely reduced. There is severe diffuse  hypokinesis. A left ventricular assist device (LVAD) is visualized. The  baseline LVAD speed is 5300 RPM rpm. There is no Doppler evidence for  obstruction of the inflow cannula.    The ejection fraction was measured by  visual estimation. The ejection fraction is 14%. Cannot assess LV diastolic  function.    AORTIC VALVE:  The annulus is normal. The valve is trileaflet. The leaflets  are normal thickness. There is trivial regurgitation.  Doppler:  In  correlation with the LVAD, the leaflets partially open intermittently.    AORTA:  Aortic root: The root is normal-sized.    MITRAL VALVE:  The annulus is normal. The leaflets are mildly calcified.  Inflow velocity is within the normal range. There is no evidence for stenosis.  There is mild regurgitation. The peak diastolic gradient is 3mm Hg. The valve  area by pressure half-time is 3.5cm^2. The valve area index by pressure  half-time is 1.65cm^2/m^2.    LEFT ATRIUM:  The atrium is mildly dilated.    RIGHT VENTRICLE:  The cavity size is dilated. Systolic function is reduced.  Pacemaker lead noted in right ventricle.    VENTRICULAR SEPTUM:   Septal motion is dyssynergic.    PULMONIC VALVE:   Not well visualized. Velocity is within the normal range.  There is no evidence for stenosis. There is no regurgitation.    TRICUSPID VALVE:  The leaflets are normal thickness. Inflow velocity is within  the normal range. There is no evidence for stenosis. There is mild  regurgitation.    RIGHT ATRIUM:  The atrium is dilated. Pacemaker lead noted in right atrium.    PERICARDIUM:   There is no pericardial effusion.    SYSTEMIC VEINS:  Inferior vena cava: The  IVC is not visualized.    ------------------------------------------------------------------------------  Measurements     Left ventricle            Value        Ref       09/19/2024  Left ventricle continued     Value          Ref       09/19/2024   DONNIE, LAX chord        (H) 5.7   cm     3.8 - 5.2 5.7         SV/bsa, 1-p A4C              9     ml/m^2   --------- 12   ESD, LAX chord        (H) 5.5   cm     2.2 - 3.5 5.2         ESV, 2-p                 (H) 124   ml       14 - 42   126   DONNIE/bsa, LAX chord    (N) 2.7   cm/m^2 2.3 - 3.1 2.8         ESV/bsa, 2-p             (H) 58    ml/m^2   8 - 24    62   ESD/bsa, LAX chord    (H) 2.6   cm/m^2 1.3 - 2.1 2.5   PW, ED, LAX           (H) 1.0   cm     0.6 - 0.9 0.9         Right ventricle              Value          Ref       09/19/2024   DONNIE major ax, A4C         8.0   cm     --------- 8.2         DONNIE, LAX                     3.5   cm       --------- 3.6   ESD major ax, A4C         7.2   cm     --------- 7.8   FS major axis, A4C        10    %      --------- 4           Left atrium                  Value          Ref       09/19/2024   DONNIE/bsa major ax, A4C     3.8   cm/m^2 --------- 4.0         AP dim, ES               (H) 4.2   cm       2.7 - 3.8 4.3   ESD/bsa major ax, A4C     3.4   cm/m^2 --------- 3.8         AP dim index, ES         (N) 2.0   cm/m^2   1.5 - 2.3 2.1   SOFY, A4C                  37.2  cm^2   --------- 38.5        Area ES, A4C             (H) 25    cm^2     <=20      25   ROEL, A4C                  33.3  cm^2   --------- 34.5        Area/bsa ES, A4C             11.88 cm^2/m^2 --------- 12.01   FAC, A4C                  10    %      --------- 10          Vol, S                   (H) 78    ml       22 - 52   74   IVS, ED               (H) 1.0   cm     0.6 - 0.9 0.8         Vol/bsa, S               (H) 37    ml/m^2   16 - 34   36   PW, ED                (H) 1.0   cm     0.6 - 0.9 0.9         Vol, ES, 1-p A4C         (H) 78    ml       22 - 52   74    IVS/PW, ED                1.06         --------- 0.85        Vol/bsa, ES, 1-p A4C     (N) 37    ml/m^2   11 - 40   36   EDV                   (H) 181   ml     46 - 106  181   ESV                   (H) 168   ml     14 - 42   142         Mitral valve                 Value          Ref       09/19/2024   EF                    (L) 14    %      54 - 74   15          Peak E                       0.85  m/sec    --------- 1.11   SV                        8     ml     --------- 26          Decel time                   216   ms       --------- 136   EDV/bsa               (H) 85    ml/m^2 29 - 61   88          PHT                          63    ms       --------- 40   ESV/bsa               (H) 79    ml/m^2 8 - 24    69          Peak grad, D                 3     mm Hg    --------- 5   SV/bsa                    4     ml/m^2 --------- 13          MVA, PHT                     3.5   cm^2     --------- 5.5   SV, 1-p A4C               20    ml     --------- 25          MVA/bsa, PHT                 1.65  cm^2/m^2 --------- 2.69  Legend:  (L)  and  (H)  tavia values outside specified reference range.    (N)  marks values inside specified reference range.    Prepared and electronically signed by  John Castillo MD  11/27/2024 15:36    Prior Signatures:    Preliminary Reviewed by Lee Mckeon MD - 11/27/2024 3:06:53 PM           Right Heart Catheterization:  Results for orders placed or performed during the hospital encounter of 02/03/22   Cath/PV Case    Narrative    RIGHT HEART CATHETERIZATION       FINDINGS:   1. Right atrial pressures:    17  / 16 / 16 mmHg   2. Right ventricular pressures:   49 / 6 / 20  mmHg   3. Pulmonary artery pressures:   49 / 27 / 34  mmHg   4. Pulmonary capillary wedge pressures:   20 / 19 / 18  mmHg   5. Transpulmonary Gradient:     16   6. Pulmonary artery saturations:     61 %   7. Aortic saturations:      95 %   8. Cardiac output and index calculated by Roger method:  4.7 L/min, 2.3   L/min/m2   9.  Cardiac output and index by thermodilution method:  4.6 L/min, 2.3   L/min/m2   10. Supplemental cardio-pulmonary support:    O2: room air    Inotrope:  None    LVAD settings:     Device: HeartMate 3    Speed: 5300 rpm    Power: 4 martinez    Flow: 3.9 L/min  11. Heart rate:  91 bpm  12. Systemic blood pressure:  124 / 61 / 82  mmHg  13. Systemic vascular resistance: 14 Woods Units, 1122 dynes * sec/cm5   14. Pulmonary vascular resistance: 3.4 Woods Units, 272 dynes * sec/cm5   15. Right ventricular function parameters:    Right ventricular stroke work index: 455 mmHg * ml / m2    Pulmonary artery pulsitility index:  3.7   RA/PCWP ratio:  0.89      CONCLUSIONS:    Reduced cardiac output with Heartmate 3 LVAD at 5300 rpm   Mild to moderately elevated RA, RV, PAWP pressures   Mildly to moderately elevated pulmonary arterial pressure  .REduced right ventricular function     Normal SVR   Elevated PVR  .     RECOMMENDATIONS:    [place] sutured right neck at 52 cm  Will admit to CVTU for continuous hemodynamic monitoring and diuresis to   see if can reduced PA pressures   Echo  Change diuretic to lasix 60 mg IV q12      Vincent Cardenas, DO, FACC, FHFSA, FACOI  Advanced Heart Failure, Mechanical Circulatory Support, Transplant   Cardiology & Pulmonary Hypertension  [facility]  Phone: [phone] (internal)  [phone] (external)                 IMPRESSION AND PLAN:    Acute on Chronic severe biventricular heart failure s/p Heartmate III, Left Ventricular Assist Device (LVAD) as Destination Therapy (DT) on 3/26/2021 due to BMI and PHTN  - Etiology: NICM  - LV Core Biopsy: Benign myocardium with focal interstitial fibrosis consistent with cardiomyopathy. There is no evidence of active myocarditis, vasculitis or granuloma formation. [place] red stain for amyloid is negative. Iron stain is negative. Trichrome stain confirms interstitial fibrosis.   - NYHA class: NYHA III  - Cardio Diagnostics:   - 11/27/24 TTE:  Speed 5300 RPM, LVEF 14%, AoV leaflet partially open intermittently, mild MR, mild TR  - LVAD: VAD function stable. See VAD Interrogation above. Current speed: 5300 rpm  - Volume Status: approaching euvolemia s/p 160mg IVP lasix given in ED. (PTA on PO lasix 80 mg BID). Give 80 mg ivp lasix BID, with plan for 2.5 mg metolazone 3 days a week once euvolemic for maintenance. Schedule in CHF clinic. [facility] 1/2/25 once euvolemic.   - Anticoagulation: INR Goal 2-3. 1.3, no heparin bridge d/t bleeding history, up-dose coumadin  - Inpatient coumadin managed by: [facility] Pharm[facility]  - Outpatient coumadin managed by: [facility] VAD. Pt has home INR monitor.  - Continue ASA 81 mg QD.  - LDH: Stable. No signs of hemolysis  - MAP/BP: MAPs at goal. Continue current GDMT  - GDMT: See changes to GDMT below.  - Beta Blocker: None d/t RVF  - ACE/ARB/ARNI: Entresto 49/51 mg BID   - MRA: start zehra 25 mg daily on 12/31/24  - Diuretic: Lasix 80 mg BID IVP, plan for metolazone outpt. 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 Mosley. No fluid collection, no indication for surgical intervention  - BC: 9/19/24: +MSSA, + Staph Aureus  - BC x 2 from 9/21/24: NGTD  - IV Daptomycin 700 mg QD EOT  10/25/24-> completed  - Keflex 1000 mg  times a day& Bactrim prophylaxis 3 times a week, Per ID recs: low threshold to change antibiotics to IV pending clinical course, rec CT CAP d/t fevers - no intervention from CVS standpoint on 12/31/24     Hx Staphylococcus aureus DL infection  - DL cx (06/27/24) & (9/19/24) =Staphylococcus aureus   - On keflex 1000mg BID per Dr. Arreguin  for indefinite suppression  - wet kit QOD, no showering   - 11/27/24 CT CAP reviewed by Dr. Whitfield. - Dr. Arreguin notified of CVS recs. Per Dr. Arreguin, no change in antibiotic recommendation. Continue Keflex 1000 mg BID, Bactrim prophylaxis 3x/weekly  - Per Dr. Whitfield, repeat CT CAP 2-3 weeks as an outpatient. Order placed   - will obtain inpatient     COPD/Asthma; h/o 2 COVID admissions in the last year  - Current SOB is improving on IVP lasix   - has had two COVID admissions in the past year, potential for long COVID per Dr. Arreguin.   - Need PFTs and sleep study as outpatient  - Continue DuoNeb and alb inhaler  - Montelukast 10 mg stopped d/t black box warning per pulm in prior admission  - Flovent   - reschedule Dr. Cheek apt      HIV+  - HAART therapy->Biktarvy and prezcobix  - Admit 12/24-12/28 with COVID. Found also to have recent HIV viral load of 10k and CD4 ct 113. Notes indicate that she had been off HAART x2 wks - which she currently denies.   - On Bactrim DS 3x week for Pneumocystis prophylaxis  - Pt follows with Dr. Arreguin   - has been compliant 11/13/24  - 11/2024: viral load not detected 11/6/24     Hx of CVA  - CVA in 2019  - S/p thrombectomy and TPA  - No residuals  - CT head (9/4/22) & (10/6/22) negative for acute abnormality   - Pt wants to keep ASA     OHT Interest: barriers-> compliance, BMI, PHTN  - A Rh Positive  - Body mass index is 35.84 kg/m².  - h/o HIV medication noncompliance, monitor medication compliance  - h/o PHTN, will need RHC      TODAY'S PLAN:  -History of HeartMate 3 LVAD.  -Transition to oral diuretics today History of right ventricular failure post LVAD, plan for right heart catheterization Thursday.  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  -Continue Coumadin INR goal 2-3        Yogesh Akhtar M.D., M.S.  Clinical Professor, [facility]  Director, Heart Failure, MCS and Heart Transplant  Available by Perfect serve  [address] 6th [place] [address]  P   F   [facility]          no

## 2025-02-28 NOTE — PATIENT PROFILE ADULT. - AS SC BRADEN MOISTURE
Decrease Bumex to 3 mg (1.5 tablets) TWICE daily.     Please weigh yourself every day (after emptying your bladder) and keep a detailed log of weights.   Contact the Heart Failure program at [phone] if you gain 3+ lbs overnight or 5+ lbs in 5-7 days.  Limit daily sodium/salt intake to 2000 mg daily to prevent fluid retention.  Avoid canned foods, fast food/Chinese food, and processed meats (hot dogs, lunch meat, and sausage etc.). Caution with condiments.  Limit fluid intake to 2000 mL or 2 liters (about 60-65 ounces) daily.  Avoid electrolyte replacement drinks (such as Gatorade, Pedialyte, Propel, Liquid IV, etc.).  Bring complete list of medications and log of daily weights to your follow-up appointment.  
(4) rarely moist
